# Patient Record
Sex: MALE | Race: WHITE | NOT HISPANIC OR LATINO | Employment: OTHER | URBAN - METROPOLITAN AREA
[De-identification: names, ages, dates, MRNs, and addresses within clinical notes are randomized per-mention and may not be internally consistent; named-entity substitution may affect disease eponyms.]

---

## 2017-01-12 DIAGNOSIS — I10 ESSENTIAL HYPERTENSION, BENIGN: ICD-10-CM

## 2017-01-12 DIAGNOSIS — E78.00 PURE HYPERCHOLESTEROLEMIA: Primary | ICD-10-CM

## 2017-01-12 NOTE — TELEPHONE ENCOUNTER
atorvastatin (LIPITOR) 20mg         Last Written Prescription Date: 7/13/16  Last Fill Quantity: 90, # refills: 1    Last Office Visit with SHANIA, [facility] or [facility] prescribing provider:  Kerline 4/27/16   Future Office Visit:    Next 5 appointments (look out 90 days)     Mar 10, 2017  7:45 AM   Return Visit with Zen Taylor MD   [facility] PHYSICIANS [facility] [place] ([facility])    [address] [place] [id number] [phone]                  BP Readings from Last 3 Encounters:   09/21/16 126/78   04/27/16 124/60   02/09/16 110/60     ALT       <5   9/15/2016  CHOL      154   9/15/2016  HDL       56   9/15/2016  LDL       85   9/15/2016  TRIG       63   9/15/2016  CHOLHDLRATIO      2.5   5/26/2015

## 2017-01-16 RX ORDER — ATORVASTATIN CALCIUM 20 MG/1
20 TABLET, FILM COATED ORAL DAILY
Qty: 90 TABLET | Refills: 1 | Status: SHIPPED | OUTPATIENT
Start: 2017-01-16 | End: 2017-06-13

## 2017-01-16 NOTE — TELEPHONE ENCOUNTER
Prescription approved per [facility] Refill Protocol  Danay Amador RN BS

## 2017-02-02 ENCOUNTER — ANTICOAGULATION THERAPY VISIT (OUTPATIENT)
Dept: NURSING | Facility: CLINIC | Age: 82
End: 2017-02-02
Payer: COMMERCIAL

## 2017-02-02 DIAGNOSIS — Z79.01 LONG-TERM (CURRENT) USE OF ANTICOAGULANTS: Primary | ICD-10-CM

## 2017-02-02 DIAGNOSIS — I48.91 ATRIAL FIBRILLATION, UNSPECIFIED TYPE (H): ICD-10-CM

## 2017-02-02 LAB — INR POINT OF CARE: 3 (ref 0.86–1.14)

## 2017-02-02 PROCEDURE — 85610 PROTHROMBIN TIME: CPT | Mod: QW

## 2017-02-02 PROCEDURE — 36416 COLLJ CAPILLARY BLOOD SPEC: CPT

## 2017-02-02 PROCEDURE — 99207 ZZC NO CHARGE NURSE ONLY: CPT

## 2017-02-02 NOTE — MR AVS SNAPSHOT
Nathen JANICE Gage   2/2/2017 10:00 AM   Anticoagulation Therapy Visit    Description:  81 year old male   Provider:  [facility] ANTICOAGULATION CLINIC   Department:  Cr Nurse           INR as of 2/2/2017     Selected INR 3.0 (2/2/2017)      Anticoagulation Summary as of 2/2/2017     INR goal 2.0-3.0   Selected INR 3.0 (2/2/2017)   Full instructions 3 mg on Tue, Fri; 2.5 mg all other days   Next INR check 3/16/2017    Indications   Long-term (current) use of anticoagulants [Z79.01] [Z79.01]  Atrial fibrillation (H) [I48.91]         Your next Anticoagulation Clinic appointment(s)     Mar 16, 2017 10:00 AM   Anticoagulation Visit with [facility] ANTICO[facility]ULATION CLINIC   [facility]    [address] [place] [id number]   [phone]              Contact Numbers     Clinic Number:         February 2017 Details    Sun Mon Tue Wed Thu Fri Sat        1               2      2.5 mg   See details      3      3 mg         4      2.5 mg           5      2.5 mg         6      2.5 mg         7      3 mg         8      2.5 mg         9      2.5 mg         10      3 mg         11      2.5 mg           12      2.5 mg         13      2.5 mg         14      3 mg         15      2.5 mg         16      2.5 mg         17      3 mg         18      2.5 mg           19      2.5 mg         20      2.5 mg         21      3 mg         22      2.5 mg         23      2.5 mg         24      3 mg         25      2.5 mg           26      2.5 mg         27      2.5 mg         28      3 mg              Date Details   02/02 This INR check               How to take your warfarin dose     To take:  2.5 mg Take 1 of the 2.5 mg tablets.    To take:  3 mg Take 1 of the 3 mg tablets.           March 2017 Details    Sun Mon Tue Wed Thu Fri Sat        1      2.5 mg         2      2.5 mg         3      3 mg         4      2.5 mg           5      2.5 mg         6      2.5 mg         7      3 mg         8       2.5 mg         9      2.5 mg         10      3 mg         11      2.5 mg           12      2.5 mg         13      2.5 mg         14      3 mg         15      2.5 mg         16            17               18                 19               20               21               22               23               24               25                 26               27               28               29               30               31                 Date Details   No additional details    Date of next INR:  3/16/2017         How to take your warfarin dose     To take:  2.5 mg Take 1 of the 2.5 mg tablets.    To take:  3 mg Take 1 of the 3 mg tablets.

## 2017-02-02 NOTE — PROGRESS NOTES
"  ANTICOAGULATION FOLLOW-UP CLINIC VISIT    Patient Name:  Nathen Gage  Date:  2/2/2017  Contact Type:  Face to Face    SUBJECTIVE:     Patient Findings     Positives Nose Bleeds (2 nosebleeds in the past 6 weeks, they lasted only \"minutes\" with pressure.  Pt is using moisturizing nasal spray)           OBJECTIVE    INR PROTIME   Date Value Ref Range Status   02/02/2017 3.0* 0.86 - 1.14 Final       ASSESSMENT / PLAN  INR assessment THER    Recheck INR In: 6 WEEKS    INR Location Clinic      Anticoagulation Summary as of 2/2/2017     INR goal 2.0-3.0   Selected INR 3.0 (2/2/2017)   Maintenance plan 3 mg (3 mg x 1) on Tue, Fri; 2.5 mg (2.5 mg x 1) all other days   Full instructions 3 mg on Tue, Fri; 2.5 mg all other days   Weekly total 18.5 mg   No change documented Fidelina Pizano RN   Plan last modified Fidelina Pizano RN (8/18/2016)   Next INR check 3/16/2017   Target end date Indefinite    Indications   Long-term (current) use of anticoagulants [Z79.01] [Z79.01]  Atrial fibrillation (H) [I48.91]         Anticoagulation Episode Summary     INR check location     Preferred lab     Send INR reminders to [facility] CLINIC    Comments       Anticoagulation Care Providers     Provider Role Specialty Phone number    Kushal Valentin MD [facility] [phone]            See the Encounter Report to view Anticoagulation Flowsheet and Dosing Calendar (Go to Encounters tab in chart review, and find the Anticoagulation Therapy Visit)        Fidelina Pizano RN                 "

## 2017-02-23 ENCOUNTER — PRE VISIT (OUTPATIENT)
Dept: CARDIOLOGY | Facility: CLINIC | Age: 82
End: 2017-02-23

## 2017-03-09 DIAGNOSIS — I27.20 PULMONARY HYPERTENSION (H): ICD-10-CM

## 2017-03-09 DIAGNOSIS — I49.5 SICK SINUS SYNDROME (H): ICD-10-CM

## 2017-03-09 DIAGNOSIS — R55 SYNCOPE, UNSPECIFIED SYNCOPE TYPE: ICD-10-CM

## 2017-03-09 DIAGNOSIS — I10 ESSENTIAL HYPERTENSION WITH GOAL BLOOD PRESSURE LESS THAN 140/90: ICD-10-CM

## 2017-03-09 DIAGNOSIS — I77.810 DILATED AORTIC ROOT (H): ICD-10-CM

## 2017-03-09 DIAGNOSIS — I48.91 ATRIAL FIBRILLATION, UNSPECIFIED TYPE (H): ICD-10-CM

## 2017-03-09 LAB
ALT SERPL W P-5'-P-CCNC: 20 U/L (ref 0–70)
ANION GAP SERPL CALCULATED.3IONS-SCNC: 7 MMOL/L (ref 3–14)
BUN SERPL-MCNC: 17 MG/DL (ref 7–30)
CALCIUM SERPL-MCNC: 8.9 MG/DL (ref 8.5–10.1)
CHLORIDE SERPL-SCNC: 99 MMOL/L (ref 94–109)
CHOLEST SERPL-MCNC: 165 MG/DL
CO2 SERPL-SCNC: 29 MMOL/L (ref 20–32)
CREAT SERPL-MCNC: 0.94 MG/DL (ref 0.66–1.25)
GFR SERPL CREATININE-BSD FRML MDRD: 77 ML/MIN/1.7M2
GLUCOSE SERPL-MCNC: 128 MG/DL (ref 70–99)
HDLC SERPL-MCNC: 55 MG/DL
LDLC SERPL CALC-MCNC: 85 MG/DL
NONHDLC SERPL-MCNC: 110 MG/DL
POTASSIUM SERPL-SCNC: 4.6 MMOL/L (ref 3.4–5.3)
SODIUM SERPL-SCNC: 135 MMOL/L (ref 133–144)
TRIGL SERPL-MCNC: 123 MG/DL

## 2017-03-09 PROCEDURE — 80061 LIPID PANEL: CPT | Performed by: INTERNAL MEDICINE

## 2017-03-09 PROCEDURE — 84460 ALANINE AMINO (ALT) (SGPT): CPT | Performed by: INTERNAL MEDICINE

## 2017-03-09 PROCEDURE — 80048 BASIC METABOLIC PNL TOTAL CA: CPT | Performed by: INTERNAL MEDICINE

## 2017-03-09 PROCEDURE — 36415 COLL VENOUS BLD VENIPUNCTURE: CPT | Performed by: INTERNAL MEDICINE

## 2017-03-10 ENCOUNTER — OFFICE VISIT (OUTPATIENT)
Dept: CARDIOLOGY | Facility: CLINIC | Age: 82
End: 2017-03-10
Attending: INTERNAL MEDICINE
Payer: COMMERCIAL

## 2017-03-10 VITALS
HEART RATE: 84 BPM | BODY MASS INDEX: 25.91 KG/M2 | HEIGHT: 68 IN | DIASTOLIC BLOOD PRESSURE: 74 MMHG | WEIGHT: 171 LBS | SYSTOLIC BLOOD PRESSURE: 134 MMHG

## 2017-03-10 DIAGNOSIS — I48.91 ATRIAL FIBRILLATION, UNSPECIFIED TYPE (H): ICD-10-CM

## 2017-03-10 DIAGNOSIS — I10 ESSENTIAL HYPERTENSION WITH GOAL BLOOD PRESSURE LESS THAN 140/90: ICD-10-CM

## 2017-03-10 DIAGNOSIS — I77.810 DILATED AORTIC ROOT (H): ICD-10-CM

## 2017-03-10 DIAGNOSIS — I49.5 SICK SINUS SYNDROME (H): ICD-10-CM

## 2017-03-10 DIAGNOSIS — R55 SYNCOPE, UNSPECIFIED SYNCOPE TYPE: ICD-10-CM

## 2017-03-10 DIAGNOSIS — I27.20 PULMONARY HYPERTENSION (H): ICD-10-CM

## 2017-03-10 PROCEDURE — 99214 OFFICE O/P EST MOD 30 MIN: CPT | Performed by: INTERNAL MEDICINE

## 2017-03-10 NOTE — LETTER
3/10/2017    Kushal Valentin MD  [facility]   [address]  [facility] [address]    RE: Nathen JANICE Gage       Dear Colleague,    I had the pleasure of seeing Nathen JANICE Gage in the [facility].    The patient is an 81-year-old slightly overweight white male who presents to Cardiology Clinic for history of sick sinus syndrome with evidence of bradycardia and previous gastrointestinal bleed.  He has had 2-3 second pauses in the past with heart rate in the 30s and 40s, initial potassium was 3.1, repeat potassium was 2.7.  He had a 4-second pause, a GI tube was placed after which he vomited coffee-ground material.  Upper endoscopy revealed esophagitis.  He was placed on a proton pump inhibitor.  He has a distant history of cigarette smoking, discontinued in 1985, and hypertension for the past 15-20 years.  He has no history of diabetes mellitus, family history of premature coronary artery disease or history of documented hyperlipidemia.  He has had a history of mild elevation of serum lipids recently.  Pacemaker was inserted for the significant bradycardia with significant sick sinus syndrome with sinus pauses.  Holter monitor in the past has shown heart rates varying from 60 to 110 with average rate of 70 in primarily a paced rhythm.  The patient denies symptoms of chest discomfort, shortness of breath, dizziness, palpitations, nausea, vomiting, diaphoresis or syncope.  He denies PND, orthopnea, fevers, chills or sweats.  He has some easy fatigability, general malaise and mild dyspnea on exertion as well as slight lightheadedness if he changes position too quickly.  Echocardiography showed a normal-sized left ventricle with intact systolic function with ejection fraction 60%-65%.  There was no significant regional wall motion abnormality or valvular disease, and the ascending aorta was felt to be mildly dilated with no significant change from 2015.  The patient  has complained of nosebleeds over the past several weeks, especially prevalent this winter.      MEDICATIONS:   1.  Atorvastatin 20 mg a day.   2.  Warfarin as directed.   3.  Losartan 50 mg a day.   4.  Doxazosin 4 mg a day.   5.  Protonix 40 mg a day.      LABORATORY DATA:  Demonstrates a cholesterol of 165, HDL 55, LDL 85, triglycerides 123, sodium 135, potassium 4.6, BUN 17, creatinine 0.94 and ALT is 20.        The patient participates in activities without significant restriction.  He is satisfied with his lifestyle and his diet.      PHYSICAL EXAMINATION:   VITAL SIGNS:  Blood pressure 134/74 with a heart rate of 84 and regular, weight was 171 pounds, which is stable.   NECK:  Without jugular venous distention, carotid bruit or palpable thyroid.   CHEST:  Essentially clear to percussion and auscultation.   CARDIAC:  Regular rhythm with occasional premature beat.  There was a soft S4 gallop.  There was a 1 to 2/6 systolic murmur at the left sternal border with minimal radiation.  No diastolic murmur, rub or S3.   EXTREMITIES:  Without cyanosis or edema.      CLINICAL IMPRESSION:   1.  Stable cardiac condition.   2.  History of paroxysmal atrial fibrillation.   3.  History of dilated aortic root.   4.  History of pulmonary hypertension, mild.   5.  History of hyperlipidemia, at goal.   6.  History of sick sinus syndrome, status post pacemaker insertion for bradydysrhythmia.   7.  Distant history of cigarette smoking.   8.  History of systolic hypertension with slight increase in systolic blood pressure.   9.  History of gastrointestinal bleeding.   10.  History of nosebleeds.      DISCUSSION:  The patient has done well since his last clinic visit.  He has had no significant symptoms of chest discomfort, shortness of breath, dizziness or palpitations.  He has not been documented to have ischemic heart disease in the past.  He will need continued close followup of his device, serum lipids, basic metabolic panel  and blood pressure, as well as cautious anticoagulation.  He has had some problems with nosebleeds, and his INR has trended towards the high 2s or even 3.  It would be hopeful that one could keep his INR in the 2-2.5 range, which would probably improve the frequency of his bleeding.  He does not appear restricted in activity and appears stable.  Overall he is doing well.      RECOMMENDATIONS:   1.  Continue present medication.   2.  Device followup.   3.  Cautious anticoagulation with an INR goal of 2-2.5.   4.  Close followup of serum lipids, basic metabolic panel and blood pressure.   5.  Echocardiogram in 6 months to follow left ventricular function and aortic root dimension.   6.  Diet and exercise as tolerated.   7.  Routine medical followup.   8.  Cardiology followup in 6 months.     Again, thank you for allowing me to participate in the care of your patient.      Sincerely,    Zen Taylor MD

## 2017-03-10 NOTE — PROGRESS NOTES
HISTORY OF PRESENT ILLNESS:  The patient is an 81-year-old slightly overweight white male who presents to Cardiology Clinic for history of sick sinus syndrome with evidence of bradycardia and previous gastrointestinal bleed.  He has had 2-3 second pauses in the past with heart rate in the 30s and 40s, initial potassium was 3.1, repeat potassium was 2.7.  He had a 4-second pause, a GI tube was placed after which he vomited coffee-ground material.  Upper endoscopy revealed esophagitis.  He was placed on a proton pump inhibitor.  He has a distant history of cigarette smoking, discontinued in 1985, and hypertension for the past 15-20 years.  He has no history of diabetes mellitus, family history of premature coronary artery disease or history of documented hyperlipidemia.  He has had a history of mild elevation of serum lipids recently.  Pacemaker was inserted for the significant bradycardia with significant sick sinus syndrome with sinus pauses.  Holter monitor in the past has shown heart rates varying from 60 to 110 with average rate of 70 in primarily a paced rhythm.  The patient denies symptoms of chest discomfort, shortness of breath, dizziness, palpitations, nausea, vomiting, diaphoresis or syncope.  He denies PND, orthopnea, fevers, chills or sweats.  He has some easy fatigability, general malaise and mild dyspnea on exertion as well as slight lightheadedness if he changes position too quickly.  Echocardiography showed a normal-sized left ventricle with intact systolic function with ejection fraction 60%-65%.  There was no significant regional wall motion abnormality or valvular disease, and the ascending aorta was felt to be mildly dilated with no significant change from 2015.  The patient has complained of nosebleeds over the past several weeks, especially prevalent this winter.      MEDICATIONS:   1.  Atorvastatin 20 mg a day.   2.  Warfarin as directed.   3.  Losartan 50 mg a day.   4.  Doxazosin 4 mg a day.    5.  Protonix 40 mg a day.      LABORATORY DATA:  Demonstrates a cholesterol of 165, HDL 55, LDL 85, triglycerides 123, sodium 135, potassium 4.6, BUN 17, creatinine 0.94 and ALT is 20.        The patient participates in activities without significant restriction.  He is satisfied with his lifestyle and his diet.      PHYSICAL EXAMINATION:   VITAL SIGNS:  Blood pressure 134/74 with a heart rate of 84 and regular, weight was 171 pounds, which is stable.   NECK:  Without jugular venous distention, carotid bruit or palpable thyroid.   CHEST:  Essentially clear to percussion and auscultation.   CARDIAC:  Regular rhythm with occasional premature beat.  There was a soft S4 gallop.  There was a 1 to 2/6 systolic murmur at the left sternal border with minimal radiation.  No diastolic murmur, rub or S3.   EXTREMITIES:  Without cyanosis or edema.      CLINICAL IMPRESSION:   1.  Stable cardiac condition.   2.  History of paroxysmal atrial fibrillation.   3.  History of dilated aortic root.   4.  History of pulmonary hypertension, mild.   5.  History of hyperlipidemia, at goal.   6.  History of sick sinus syndrome, status post pacemaker insertion for bradydysrhythmia.   7.  Distant history of cigarette smoking.   8.  History of systolic hypertension with slight increase in systolic blood pressure.   9.  History of gastrointestinal bleeding.   10.  History of nosebleeds.      DISCUSSION:  The patient has done well since his last clinic visit.  He has had no significant symptoms of chest discomfort, shortness of breath, dizziness or palpitations.  He has not been documented to have ischemic heart disease in the past.  He will need continued close followup of his device, serum lipids, basic metabolic panel and blood pressure, as well as cautious anticoagulation.  He has had some problems with nosebleeds, and his INR has trended towards the high 2s or even 3.  It would be hopeful that one could keep his INR in the 2-2.5 range,  which would probably improve the frequency of his bleeding.  He does not appear restricted in activity and appears stable.  Overall he is doing well.      RECOMMENDATIONS:   1.  Continue present medication.   2.  Device followup.   3.  Cautious anticoagulation with an INR goal of 2-2.5.   4.  Close followup of serum lipids, basic metabolic panel and blood pressure.   5.  Echocardiogram in 6 months to follow left ventricular function and aortic root dimension.   6.  Diet and exercise as tolerated.   7.  Routine medical followup.   8.  Cardiology followup in 6 months.      cc:   [place] [address]         SCARLETT YING MD, [facility]             D: 03/10/2017 08:41   T: 03/10/2017 16:49   MT: GENI      Name:     MANUEL DEJESUS   MRN:      [id number]        Account:      [id number]   :      1935           Service Date: 03/10/2017      Document: [id number]

## 2017-03-10 NOTE — MR AVS SNAPSHOT
After Visit Summary   3/10/2017    Nathen Gage    MRN: [id number]           Patient Information     Date Of Birth          1935        Visit Information        Provider Department      3/10/2017 7:45 AM Zen Taylor MD [facility] [place]        Today's Diagnoses     Atrial fibrillation, unspecified type (H)        Essential hypertension with goal blood pressure less than 140/90        Syncope, unspecified syncope type        Dilated aortic root (H)        Pulmonary hypertension        Sick sinus syndrome           Follow-ups after your visit        Additional Services     Follow-Up with Cardiologist                 Your next 10 appointments already scheduled     Mar 16, 2017 10:00 AM CDT   Anticoagulation Visit with [facility] ANTICOAGULATION CLINIC   [facility] ([facility]    [address] [facility] [address] [phone]            Mar 22, 2017  9:00 AM CDT   Phone Device Check with PATE TECH1   [facility] [place] ([facility])    [address]  [facility] [id number] [phone]              Future tests that were ordered for you today     Open Future Orders        Priority Expected Expires Ordered    Basic metabolic panel Routine 9/6/2017 3/10/2018 3/10/2017    Lipid Profile Routine 9/6/2017 3/10/2018 3/10/2017    ALT Routine 9/6/2017 3/10/2018 3/10/2017    Echocardiogram Routine 9/6/2017 3/10/2018 3/10/2017    Follow-Up with Cardiologist Routine 9/6/2017 3/10/2018 3/10/2017            Who to contact     If you have questions or need follow up information about today's clinic visit or your schedule please contact [facility] [place] directly at [phone].  Normal or non-critical lab and imaging results will be communicated to you by MyChart, letter or phone within 4 business days after the clinic has  "received the results. If you do not hear from us within 7 days, please contact the clinic through [employer] or phone. If you have a critical or abnormal lab result, we will notify you by phone as soon as possible.  Submit refill requests through [employer] or call your pharmacy and they will forward the refill request to us. Please allow 3 business days for your refill to be completed.          Additional Information About Your Visit        [employer] Information     [employer] gives you secure access to your electronic health record. If you see a primary care provider, you can also send messages to your care team and make appointments. If you have questions, please call your primary care clinic.  If you do not have a primary care provider, please call [phone] and they will assist you.        Care EveryWhere ID     This is your Care EveryWhere ID. This could be used by other organizations to access your [place] medical records  [id number]        Your Vitals Were     Pulse Height BMI (Body Mass Index)             84 1.715 m (5' 7.5\") 26.39 kg/m2          Blood Pressure from Last 3 Encounters:   03/10/17 134/74   09/21/16 126/78   04/27/16 124/60    Weight from Last 3 Encounters:   03/10/17 77.6 kg (171 lb)   09/21/16 78.5 kg (173 lb)   04/27/16 79.3 kg (174 lb 14.4 oz)              We Performed the Following     Follow-Up with Cardiologist        Primary Care Provider Office Phone # Fax #    Kushal Johann Valentin [phone]       [address] [address]        Thank you!     Thank you for choosing [facility] [place]  for your care. Our goal is always to provide you with excellent care. Hearing back from our patients is one way we can continue to improve our services. Please take a few minutes to complete the written survey that you may receive in the mail after your visit with us. Thank you!             Your Updated " Medication List - Protect others around you: Learn how to safely use, store and throw away your medicines at www.disposemymeds.org.          This list is accurate as of: 3/10/17  8:33 AM.  Always use your most recent med list.                   Brand Name Dispense Instructions for use    atorvastatin 20 MG tablet    LIPITOR    90 tablet    Take 1 tablet (20 mg) by mouth daily       diclofenac 1 % Gel topical gel    VOLTAREN    100 g    Apply 4 grams to knees or 2 grams to hands four times daily using enclosed dosing card.       doxazosin 4 MG tablet    CARDURA    90 tablet    Take 1 tablet (4 mg) by mouth daily       losartan 50 MG tablet    COZAAR    90 tablet    Take 1 tablet (50 mg) by mouth daily       pantoprazole 40 MG EC tablet    PROTONIX    90 tablet    Take 1 tablet (40 mg) by mouth daily       * warfarin 3 MG tablet    COUMADIN    24 tablet    Take 3mg every Tu and Friday (pt uses 2.5mg tabs on the other days) OR as instructed by INR clinic       * warfarin 2.5 MG tablet    COUMADIN    30 tablet    Patient takes 2.5mg every Mon,Wed,Th,Sat,Sun (he takes 3mg every Tu,Fri) OR As directed by INR Clinic       * Notice:  This list has 2 medication(s) that are the same as other medications prescribed for you. Read the directions carefully, and ask your doctor or other care provider to review them with you.

## 2017-03-16 ENCOUNTER — ANTICOAGULATION THERAPY VISIT (OUTPATIENT)
Dept: NURSING | Facility: CLINIC | Age: 82
End: 2017-03-16
Payer: COMMERCIAL

## 2017-03-16 ENCOUNTER — TELEPHONE (OUTPATIENT)
Dept: NURSING | Facility: CLINIC | Age: 82
End: 2017-03-16

## 2017-03-16 DIAGNOSIS — I48.91 ATRIAL FIBRILLATION, UNSPECIFIED TYPE (H): ICD-10-CM

## 2017-03-16 DIAGNOSIS — I77.810 DILATED AORTIC ROOT (H): Primary | ICD-10-CM

## 2017-03-16 DIAGNOSIS — Z79.01 LONG-TERM (CURRENT) USE OF ANTICOAGULANTS: ICD-10-CM

## 2017-03-16 LAB — INR POINT OF CARE: 2.7 (ref 0.86–1.14)

## 2017-03-16 PROCEDURE — 36416 COLLJ CAPILLARY BLOOD SPEC: CPT

## 2017-03-16 PROCEDURE — 99207 ZZC NO CHARGE NURSE ONLY: CPT

## 2017-03-16 PROCEDURE — 85610 PROTHROMBIN TIME: CPT | Mod: QW

## 2017-03-16 NOTE — MR AVS SNAPSHOT
Nathen JANICE Gage   3/16/2017 10:00 AM   Anticoagulation Therapy Visit    Description:  81 year old male   Provider:  CR ANTICOAGULATION CLINIC   Department:  Cr Nurse           INR as of 3/16/2017     Today's INR 2.7      Anticoagulation Summary as of 3/16/2017     INR goal 2.0-2.5   Prior goal 2.0-3.0   Today's INR 2.7   Full instructions 3 mg on Tue; 2.5 mg all other days   Next INR check 3/28/2017    Indications   Long-term (current) use of anticoagulants [Z79.01] [Z79.01]  Atrial fibrillation (H) [I48.91]         Your next Anticoagulation Clinic appointment(s)     Mar 28, 2017  2:15 PM CDT   Anticoagulation Visit with CR ANTICOAGULATION CLINIC   [facility] ([facility])    [address] [address]   [phone]              Contact Numbers     Clinic Number:         March 2017 Details    Sun Mon Tue Wed Thu Fri Sat        1               2               3               4                 5               6               7               8               9               10               11                 12               13               14               15               16      2.5 mg   See details      17      2.5 mg         18      2.5 mg           19      2.5 mg         20      2.5 mg         21      3 mg         22      2.5 mg         23      2.5 mg         24      2.5 mg         25      2.5 mg           26      2.5 mg         27      2.5 mg         28            29               30               31                 Date Details   03/16 This INR check       Date of next INR:  3/28/2017         How to take your warfarin dose     To take:  2.5 mg Take 1 of the 2.5 mg tablets.    To take:  3 mg Take 1 of the 3 mg tablets.

## 2017-03-16 NOTE — PROGRESS NOTES
ANTICOAGULATION FOLLOW-UP CLINIC VISIT    Patient Name:  Nathen Gage  Date:  3/16/2017  Contact Type:  Face to Face    SUBJECTIVE:     Patient Findings     Positives Nose bleeds (nosebleed on 03/10/17, cardiologist dictation states to reduce INR goal to 2.0-2.5)           OBJECTIVE    INR Protime   Date Value Ref Range Status   03/16/2017 2.7 (A) 0.86 - 1.14 Final       ASSESSMENT / PLAN  INR assessment THER    Recheck INR In: 10 DAYS    INR Location Clinic      Anticoagulation Summary as of 3/16/2017     INR goal 2.0-2.5   Prior goal 2.0-3.0   Today's INR 2.7   Maintenance plan 3 mg (3 mg x 1) on Tue; 2.5 mg (2.5 mg x 1) all other days   Full instructions 3 mg on Tue; 2.5 mg all other days   Weekly total 18 mg   Plan last modified Fidelina Pizano RN (3/16/2017)   Next INR check 3/28/2017   Target end date Indefinite    Indications   Long-term (current) use of anticoagulants [Z79.01] [Z79.01]  Atrial fibrillation (H) [I48.91]         Anticoagulation Episode Summary     INR check location     Preferred lab     Send INR reminders to [facility] CLINIC    Comments       Anticoagulation Care Providers     Provider Role Specialty Phone number    Kushal Valentin MD [facility] [phone]            See the Encounter Report to view Anticoagulation Flowsheet and Dosing Calendar (Go to Encounters tab in chart review, and find the Anticoagulation Therapy Visit)        Fidelina Pizano RN

## 2017-03-16 NOTE — TELEPHONE ENCOUNTER
Effective 03//10/17, patient's cardiologist changed patient's INR goal range to: 2.0-2.5 due to intermittent nosebleeds.    New insurance and medicare reimbursement rules require that all of our INR patients have an INR Clinic referral order in Epic, and that it be renewed annually.    We have entered this initial order for you and your signature is required once you review it.      You may close this encounter once signed.    Thank you,    Fidelina Pizano RN

## 2017-03-22 ENCOUNTER — ALLIED HEALTH/NURSE VISIT (OUTPATIENT)
Dept: CARDIOLOGY | Facility: CLINIC | Age: 82
End: 2017-03-22
Payer: COMMERCIAL

## 2017-03-22 DIAGNOSIS — Z95.0 CARDIAC PACEMAKER IN SITU: Primary | ICD-10-CM

## 2017-03-22 PROCEDURE — 93293 PM PHONE R-STRIP DEVICE EVAL: CPT | Performed by: INTERNAL MEDICINE

## 2017-03-22 NOTE — MR AVS SNAPSHOT
After Visit Summary   3/22/2017    Nathen Gage    MRN: [id number]           Patient Information     Date Of Birth          1935        Visit Information        Provider Department      3/22/2017 9:00 AM PATE TECH1 [facility] [place]        Today's Diagnoses     Cardiac pacemaker in situ    -  1       Follow-ups after your visit        Your next 10 appointments already scheduled     Mar 28, 2017  2:15 PM CDT   Anticoagulation Visit with [facility] CLINIC   [facility] ([facility])    [address] [address] [phone]            Jun 27, 2017  9:00 AM CDT   Phone Device Check with PATE TECH1   [facility] [place] ([facility])    [address]  [facility] [address] [phone]              Who to contact     If you have questions or need follow up information about today's clinic visit or your schedule please contact [facility] directly at [phone].  Normal or non-critical lab and imaging results will be communicated to you by [employer], letter or phone within 4 business days after the clinic has received the results. If you do not hear from us within 7 days, please contact the clinic through [employer] or phone. If you have a critical or abnormal lab result, we will notify you by phone as soon as possible.  Submit refill requests through [employer] or call your pharmacy and they will forward the refill request to us. Please allow 3 business days for your refill to be completed.          Additional Information About Your Visit        [employer] Information     [employer] gives you secure access to your electronic health record. If you see a primary care provider, you can also send messages to your care team and make appointments. If you have questions, please call your primary care clinic.  If you  do not have a primary care provider, please call [phone] and they will assist you.        Care EveryWhere ID     This is your Care EveryWhere ID. This could be used by other organizations to access your [facility] medical records  [id number]         Blood Pressure from Last 3 Encounters:   03/10/17 134/74   09/21/16 126/78   04/27/16 124/60    Weight from Last 3 Encounters:   03/10/17 77.6 kg (171 lb)   09/21/16 78.5 kg (173 lb)   04/27/16 79.3 kg (174 lb 14.4 oz)              We Performed the Following     PM PHONE R STRIP EVAL UP TO 90 DAYS ([id number])        Primary Care Provider Office Phone # Fax #    Kushal Valentin [phone]       [address] [address]        Thank you!     Thank you for choosing [facility] AT [place]  for your care. Our goal is always to provide you with excellent care. Hearing back from our patients is one way we can continue to improve our services. Please take a few minutes to complete the written survey that you may receive in the mail after your visit with us. Thank you!             Your Updated Medication List - Protect others around you: Learn how to safely use, store and throw away your medicines at www.disposemymeds.org.          This list is accurate as of: 3/22/17  9:11 AM.  Always use your most recent med list.                   Brand Name Dispense Instructions for use    atorvastatin 20 MG tablet    LIPITOR    90 tablet    Take 1 tablet (20 mg) by mouth daily       diclofenac 1 % Gel topical gel    VOLTAREN    100 g    Apply 4 grams to knees or 2 grams to hands four times daily using enclosed dosing card.       doxazosin 4 MG tablet    CARDURA    90 tablet    Take 1 tablet (4 mg) by mouth daily       losartan 50 MG tablet    COZAAR    90 tablet    Take 1 tablet (50 mg) by mouth daily       pantoprazole 40 MG EC tablet    PROTONIX    90 tablet    Take 1 tablet (40 mg) by mouth  daily       * warfarin 3 MG tablet    COUMADIN    24 tablet    Take 3mg every Tu and Friday (pt uses 2.5mg tabs on the other days) OR as instructed by INR clinic       * warfarin 2.5 MG tablet    COUMADIN    30 tablet    Patient takes 2.5mg every Mon,Wed,Th,Sat,Sun (he takes 3mg every Tu,Fri) OR As directed by INR Clinic       * Notice:  This list has 2 medication(s) that are the same as other medications prescribed for you. Read the directions carefully, and ask your doctor or other care provider to review them with you.

## 2017-03-22 NOTE — PROGRESS NOTES
~90 day phone teletrace  Intrinsic Rhythm at time of check. Magnet response WNL.  F/U scheduled q 3 months. LAISHA BarronT

## 2017-03-28 ENCOUNTER — ANTICOAGULATION THERAPY VISIT (OUTPATIENT)
Dept: NURSING | Facility: CLINIC | Age: 82
End: 2017-03-28
Payer: COMMERCIAL

## 2017-03-28 DIAGNOSIS — Z79.01 LONG-TERM (CURRENT) USE OF ANTICOAGULANTS: ICD-10-CM

## 2017-03-28 DIAGNOSIS — I48.91 ATRIAL FIBRILLATION, UNSPECIFIED TYPE (H): ICD-10-CM

## 2017-03-28 LAB — INR POINT OF CARE: 3.4 (ref 0.86–1.14)

## 2017-03-28 PROCEDURE — 85610 PROTHROMBIN TIME: CPT | Mod: QW

## 2017-03-28 PROCEDURE — 99207 ZZC NO CHARGE NURSE ONLY: CPT

## 2017-03-28 PROCEDURE — 36416 COLLJ CAPILLARY BLOOD SPEC: CPT

## 2017-03-28 NOTE — MR AVS SNAPSHOT
Nathen JANICE Gage   3/28/2017 2:15 PM   Anticoagulation Therapy Visit    Description:  81 year old male   Provider:  [facility]   Department:  Cr Nurse           INR as of 3/28/2017     Today's INR 3.4!      Anticoagulation Summary as of 3/28/2017     INR goal 2.0-2.5   Today's INR 3.4!   Full instructions 3/28: Hold; Otherwise 2.5 mg every day   Next INR check 4/7/2017    Indications   Long-term (current) use of anticoagulants [Z79.01] [Z79.01]  Atrial fibrillation (H) [I48.91]         Your next Anticoagulation Clinic appointment(s)     Apr 07, 2017 12:45 PM CDT   Anticoagulation Visit with [facility] ANTICOAGULATION CLINIC   [facility] ([facility]    [address] [place] [address] [phone]              Contact Numbers     Clinic Number:         March 2017 Details    Sun Mon Tue Wed Thu Fri Sat        1               2               3               4                 5               6               7               8               9               10               11                 12               13               14               15               16               17               18                 19               20               21               22               23               24               25                 26               27               28      Hold   See details      29      2.5 mg         30      2.5 mg         31      2.5 mg           Date Details   03/28 This INR check               How to take your warfarin dose     To take:  2.5 mg Take 1 of the 2.5 mg tablets.    Hold Do not take your warfarin dose. See the Details table to the right for additional instructions.                April 2017 Details    Sun Mon Tue Wed Thu Fri Sat           1      2.5 mg           2      2.5 mg         3      2.5 mg         4      2.5 mg         5      2.5 mg         6      2.5 mg         7            8                 9               10                11               12               13               14               15                 16               17               18               19               20               21               22                 23               24               25               26               27               28               29                 30                      Date Details   No additional details    Date of next INR:  4/7/2017         How to take your warfarin dose     To take:  2.5 mg Take 1 of the 2.5 mg tablets.

## 2017-03-28 NOTE — PROGRESS NOTES
ANTICOAGULATION FOLLOW-UP CLINIC VISIT    Patient Name:  Nathen Gage  Date:  3/28/2017  Contact Type:  Face to Face    SUBJECTIVE:     Patient Findings     Positives No Problem Findings, Unexplained INR or factor level change    Comments Warfarin maintenance dose was decreased; however, INR increased so I reduced his weekly dose again.           OBJECTIVE    INR Protime   Date Value Ref Range Status   03/28/2017 3.4 (A) 0.86 - 1.14 Final       ASSESSMENT / PLAN  INR assessment SUPRA    Recheck INR In: 10 DAYS    INR Location Clinic      Anticoagulation Summary as of 3/28/2017     INR goal 2.0-2.5   Today's INR 3.4!   Maintenance plan 2.5 mg (2.5 mg x 1) every day   Full instructions 3/28: Hold; Otherwise 2.5 mg every day   Weekly total 17.5 mg   Plan last modified Fidelina Pizano RN (3/28/2017)   Next INR check 4/7/2017   Target end date Indefinite    Indications   Long-term (current) use of anticoagulants [Z79.01] [Z79.01]  Atrial fibrillation (H) [I48.91]         Anticoagulation Episode Summary     INR check location     Preferred lab     Send INR reminders to [facility]    Comments       Anticoagulation Care Providers     Provider Role Specialty Phone number    Kushal Valentin MD [facility] [phone]            See the Encounter Report to view Anticoagulation Flowsheet and Dosing Calendar (Go to Encounters tab in chart review, and find the Anticoagulation Therapy Visit)        Fidelina Pizano RN

## 2017-04-07 ENCOUNTER — ANTICOAGULATION THERAPY VISIT (OUTPATIENT)
Dept: NURSING | Facility: CLINIC | Age: 82
End: 2017-04-07
Payer: COMMERCIAL

## 2017-04-07 DIAGNOSIS — I48.91 ATRIAL FIBRILLATION, UNSPECIFIED TYPE (H): ICD-10-CM

## 2017-04-07 DIAGNOSIS — Z79.01 LONG-TERM (CURRENT) USE OF ANTICOAGULANTS: ICD-10-CM

## 2017-04-07 LAB — INR POINT OF CARE: 2.5 (ref 0.86–1.14)

## 2017-04-07 PROCEDURE — 99207 ZZC NO CHARGE NURSE ONLY: CPT

## 2017-04-07 PROCEDURE — 85610 PROTHROMBIN TIME: CPT | Mod: QW

## 2017-04-07 PROCEDURE — 36416 COLLJ CAPILLARY BLOOD SPEC: CPT

## 2017-04-07 NOTE — PROGRESS NOTES
ANTICOAGULATION FOLLOW-UP CLINIC VISIT    Patient Name:  Nathen Gage  Date:  4/7/2017  Contact Type:  Face to Face    SUBJECTIVE:     Patient Findings     Positives No Problem Findings           OBJECTIVE    INR Protime   Date Value Ref Range Status   04/07/2017 2.5 (A) 0.86 - 1.14 Final       ASSESSMENT / PLAN  INR assessment THER    Recheck INR In: 2 WEEKS    INR Location Clinic      Anticoagulation Summary as of 4/7/2017     INR goal 2.0-2.5   Today's INR 2.5   Maintenance plan 2.5 mg (2.5 mg x 1) every day   Full instructions 2.5 mg every day   Weekly total 17.5 mg   No change documented Fidelina Pizano, RN   Plan last modified Fidelina Pizano RN (3/28/2017)   Next INR check 4/18/2017   Target end date Indefinite    Indications   Long-term (current) use of anticoagulants [Z79.01] [Z79.01]  Atrial fibrillation (H) [I48.91]         Anticoagulation Episode Summary     INR check location     Preferred lab     Send INR reminders to [facility] CL[facility]IC    Comments       Anticoagulation Care Providers     Provider Role Specialty Phone number    Kushal Valentin MD [facility] [phone]            See the Encounter Report to view Anticoagulation Flowsheet and Dosing Calendar (Go to Encounters tab in chart review, and find the Anticoagulation Therapy Visit)        Fidelina Pizano, RN

## 2017-04-07 NOTE — MR AVS SNAPSHOT
Nathen Gage   4/7/2017 1:15 PM   Anticoagulation Therapy Visit    Description:  81 year old male   Provider[facility]  [facility]   Department:  Cr Nurse           INR as of 4/7/2017     Today's INR 2.5      Anticoagulation Summary as of 4/7/2017     INR goal 2.0-2.5   Today's INR 2.5   Full instructions 2.5 mg every day   Next INR check 4/18/2017    Indications   Long-term (current) use of anticoagulants [Z79.01] [Z79.01]  Atrial fibrillation (H) [I48.91]         Your next Anticoagulation Clinic appointment(s)     Apr 18, 2017  9:00 AM CDT   Anticoagulation Visit with [facility] ANTICOAGULATION CLINIC   [facility] ([facility])    [address] [place] [address] [phone]              Contact Numbers     Clinic Number:         April 2017 Details    Sun Mon Tue Wed Thu Fri Sat           1                 2               3               4               5               6               7      2.5 mg   See details      8      2.5 mg           9      2.5 mg         10      2.5 mg         11      2.5 mg         12      2.5 mg         13      2.5 mg         14      2.5 mg         15      2.5 mg           16      2.5 mg         17      2.5 mg         18            19               20               21               22                 23               24               25               26               27               28               29                 30                      Date Details   04/07 This INR check       Date of next INR:  4/18/2017         How to take your warfarin dose     To take:  2.5 mg Take 1 of the 2.5 mg tablets.

## 2017-04-18 ENCOUNTER — ANTICOAGULATION THERAPY VISIT (OUTPATIENT)
Dept: NURSING | Facility: CLINIC | Age: 82
End: 2017-04-18
Payer: COMMERCIAL

## 2017-04-18 DIAGNOSIS — Z79.01 LONG-TERM (CURRENT) USE OF ANTICOAGULANTS: ICD-10-CM

## 2017-04-18 DIAGNOSIS — I48.91 ATRIAL FIBRILLATION, UNSPECIFIED TYPE (H): ICD-10-CM

## 2017-04-18 LAB — INR POINT OF CARE: 2.1 (ref 0.86–1.14)

## 2017-04-18 PROCEDURE — 85610 PROTHROMBIN TIME: CPT | Mod: QW

## 2017-04-18 PROCEDURE — 36416 COLLJ CAPILLARY BLOOD SPEC: CPT

## 2017-04-18 PROCEDURE — 99207 ZZC NO CHARGE NURSE ONLY: CPT

## 2017-04-18 NOTE — PROGRESS NOTES
ANTICOAGULATION FOLLOW-UP CLINIC VISIT    Patient Name:  Nathen Gage  Date:  4/18/2017  Contact Type:  Face to Face    SUBJECTIVE:     Patient Findings     Positives Nose bleeds (1 nosebleed on 04/10/17, did not last long)           OBJECTIVE    INR Protime   Date Value Ref Range Status   04/18/2017 2.1 (A) 0.86 - 1.14 Final       ASSESSMENT / PLAN  INR assessment THER    Recheck INR In: 4 WEEKS    INR Location Clinic      Anticoagulation Summary as of 4/18/2017     INR goal 2.0-2.5   Today's INR 2.1   Maintenance plan 2.5 mg (2.5 mg x 1) every day   Full instructions 2.5 mg every day   Weekly total 17.5 mg   No change documented Fidelina Pizano, ARTIS   Plan last modified Fidelina Pizano, RN (3/28/2017)   Next INR check 5/16/2017   Target end date Indefinite    Indications   Long-term (current) use of anticoagulants [Z79.01] [Z79.01]  Atrial fibrillation (H) [I48.91]         Anticoagulation Episode Summary     INR check location     Preferred lab     Send INR reminders to [facility] CL[facility]    Comments       Anticoagulation Care Providers     Provider Role Specialty Phone number    Kushal Valentin MD [facility] [phone]            See the Encounter Report to view Anticoagulation Flowsheet and Dosing Calendar (Go to Encounters tab in chart review, and find the Anticoagulation Therapy Visit)        Fidelina Pizano, RN

## 2017-04-18 NOTE — MR AVS SNAPSHOT
Nathen Gage   4/18/2017 9:00 AM   Anticoagulation Therapy Visit    Description:  81 year old male   Provider[facility]  [facility]   Department:  Cr Nurse           INR as of 4/18/2017     Today's INR 2.1      Anticoagulation Summary as of 4/18/2017     INR goal 2.0-2.5   Today's INR 2.1   Full instructions 2.5 mg every day   Next INR check 5/16/2017    Indications   Long-term (current) use of anticoagulants [Z79.01] [Z79.01]  Atrial fibrillation (H) [I48.91]         Your next Anticoagulation Clinic appointment(s)     May 16, 2017  9:00 AM CDT   Anticoagulation Visit with [facility] CLINIC   [facility] ([facility]    [address] [place] [address] [phone]              Contact Numbers     Clinic Number:         April 2017 Details    Sun Mon Tue Wed Thu Fri Sat           1                 2               3               4               5               6               7               8                 9               10               11               12               13               14               15                 16               17               18      2.5 mg   See details      19      2.5 mg         20      2.5 mg         21      2.5 mg         22      2.5 mg           23      2.5 mg         24      2.5 mg         25      2.5 mg         26      2.5 mg         27      2.5 mg         28      2.5 mg         29      2.5 mg           30      2.5 mg                Date Details   04/18 This INR check               How to take your warfarin dose     To take:  2.5 mg Take 1 of the 2.5 mg tablets.           May 2017 Details    Sun Mon Tue Wed Thu Fri Sat      1      2.5 mg         2      2.5 mg         3      2.5 mg         4      2.5 mg         5      2.5 mg         6      2.5 mg           7      2.5 mg         8      2.5 mg         9      2.5 mg         10      2.5 mg         11      2.5 mg         12      2.5 mg         13       2.5 mg           14      2.5 mg         15      2.5 mg         16            17               18               19               20                 21               22               23               24               25               26               27                 28               29               30               31                   Date Details   No additional details    Date of next INR:  5/16/2017         How to take your warfarin dose     To take:  2.5 mg Take 1 of the 2.5 mg tablets.

## 2017-04-29 ENCOUNTER — TELEPHONE (OUTPATIENT)
Dept: FAMILY MEDICINE | Facility: CLINIC | Age: 82
End: 2017-04-29

## 2017-04-29 DIAGNOSIS — I15.9 SECONDARY HYPERTENSION: ICD-10-CM

## 2017-04-29 RX ORDER — LOSARTAN POTASSIUM 50 MG/1
50 TABLET ORAL DAILY
Qty: 90 TABLET | Refills: 0 | Status: SHIPPED | OUTPATIENT
Start: 2017-04-29 | End: 2017-06-13

## 2017-05-10 DIAGNOSIS — K21.9 ESOPHAGEAL REFLUX: ICD-10-CM

## 2017-05-10 NOTE — LETTER
[address] [id number] [phone]          May 12, 2017    Nathen Gage                                                                                                                     [facility] [place] [address]            Dear Nathen,    We recently received a call from your pharmacy requesting a refill of your medication (pantoprazole).    A review of your chart indicates that an appointment is required.  Please contact our office at [phone] to schedule your  Annual doctor's appointment.    We have authorized one refill (last refill) of your medication to allow time for you to schedule your appointment.    Taking care of your health is important to us and ongoing visits with your provider are vital to your care.  We look forward to seeing you in the near future.    Sincerely,    Kushal Valentin MD/Nataly HAN

## 2017-05-10 NOTE — TELEPHONE ENCOUNTER
Pantoprazole 40 mg tab       Last Written Prescription Date: 05/06/16  Last Fill Quantity: 90,  # refills: 3   Last Office Visit with G, P or [facility] prescribing provider: 05/12/15 Dr. Valentin

## 2017-05-12 RX ORDER — PANTOPRAZOLE SODIUM 40 MG/1
40 TABLET, DELAYED RELEASE ORAL DAILY
Qty: 90 TABLET | Refills: 0 | Status: SHIPPED | OUTPATIENT
Start: 2017-05-12 | End: 2017-06-13

## 2017-05-12 NOTE — TELEPHONE ENCOUNTER
Appointment letter sent  Prescription approved per [facility] Protocol.  Nataly Becerra RN, BSN

## 2017-05-16 ENCOUNTER — ANTICOAGULATION THERAPY VISIT (OUTPATIENT)
Dept: NURSING | Facility: CLINIC | Age: 82
End: 2017-05-16
Payer: COMMERCIAL

## 2017-05-16 DIAGNOSIS — Z79.01 LONG-TERM (CURRENT) USE OF ANTICOAGULANTS: ICD-10-CM

## 2017-05-16 DIAGNOSIS — I48.91 ATRIAL FIBRILLATION, UNSPECIFIED TYPE (H): ICD-10-CM

## 2017-05-16 LAB — INR POINT OF CARE: 2.6 (ref 0.86–1.14)

## 2017-05-16 PROCEDURE — 99207 ZZC NO CHARGE NURSE ONLY: CPT

## 2017-05-16 PROCEDURE — 85610 PROTHROMBIN TIME: CPT | Mod: QW

## 2017-05-16 PROCEDURE — 36416 COLLJ CAPILLARY BLOOD SPEC: CPT

## 2017-05-16 NOTE — MR AVS SNAPSHOT
Nathen Gage   5/16/2017 9:00 AM   Anticoagulation Therapy Visit    Description:  81 year old male   Provider:  [facility]   Department:  Cr Nurse           INR as of 5/16/2017     Today's INR 2.6!      Anticoagulation Summary as of 5/16/2017     INR goal 2.0-2.5   Today's INR 2.6!   Full instructions 2.5 mg every day   Next INR check 6/13/2017    Indications   Long-term (current) use of anticoagulants [Z79.01] [Z79.01]  Atrial fibrillation (H) [I48.91]         Your next Anticoagulation Clinic appointment(s)     May 16, 2017  9:00 AM CDT   Anticoagulation Visit with [facility]   [facility] ([facility]    [address] [address]   [phone]            Jun 13, 2017  9:00 AM CDT   Anticoagulation Visit with [facility] CL[facility]   [facility] ([facility])    [address] [address]   [phone]              Contact Numbers     Clinic Number:         May 2017 Details    Sun Mon Tue Wed Thu Fri Sat      1               2               3               4               5               6                 7               8               9               10               11               12               13                 14               15               16      2.5 mg   See details      17      2.5 mg         18      2.5 mg         19      2.5 mg         20      2.5 mg           21      2.5 mg         22      2.5 mg         23      2.5 mg         24      2.5 mg         25      2.5 mg         26      2.5 mg         27      2.5 mg           28      2.5 mg         29      2.5 mg         30      2.5 mg         31      2.5 mg             Date Details   05/16 This INR check   Eat greens               How to take your warfarin dose     To take:  2.5 mg Take 1 of the 2.5 mg tablets.           June 2017 Details    Sun Mon Tue Wed Thu Fri Sat         1       2.5 mg         2      2.5 mg         3      2.5 mg           4      2.5 mg         5      2.5 mg         6      2.5 mg         7      2.5 mg         8      2.5 mg         9      2.5 mg         10      2.5 mg           11      2.5 mg         12      2.5 mg         13            14               15               16               17                 18               19               20               21               22               23               24                 25               26               27               28               29               30                 Date Details   No additional details    Date of next INR:  6/13/2017         How to take your warfarin dose     To take:  2.5 mg Take 1 of the 2.5 mg tablets.

## 2017-05-16 NOTE — PROGRESS NOTES
ANTICOAGULATION FOLLOW-UP CLINIC VISIT    Patient Name:  Nathen Gage  Date:  5/16/2017  Contact Type:  Face to Face    SUBJECTIVE:     Patient Findings     Positives No Problem Findings           OBJECTIVE    INR Protime   Date Value Ref Range Status   05/16/2017 2.6 (A) 0.86 - 1.14 Final       ASSESSMENT / PLAN  INR assessment THER    Recheck INR In: 4 WEEKS    INR Location Clinic      Anticoagulation Summary as of 5/16/2017     INR goal 2.0-2.5   Today's INR 2.6!   Maintenance plan 2.5 mg (2.5 mg x 1) every day   Full instructions 2.5 mg every day   Weekly total 17.5 mg   No change documented Fidelina Pizano, RN   Plan last modified Fidelina Pizano RN (3/28/2017)   Next INR check 6/13/2017   Target end date Indefinite    Indications   Long-term (current) use of anticoagulants [Z79.01] [Z79.01]  Atrial fibrillation (H) [I48.91]         Anticoagulation Episode Summary     INR check location     Preferred lab     Send INR reminders to [facility] CL[facility]    Comments       Anticoagulation Care Providers     Provider Role Specialty Phone number    Kushal Valentin MD [facility] [phone]            See the Encounter Report to view Anticoagulation Flowsheet and Dosing Calendar (Go to Encounters tab in chart review, and find the Anticoagulation Therapy Visit)        Fidelina Pizano RN

## 2017-05-17 ENCOUNTER — OFFICE VISIT (OUTPATIENT)
Dept: FAMILY MEDICINE | Facility: CLINIC | Age: 82
End: 2017-05-17
Payer: COMMERCIAL

## 2017-05-17 VITALS
HEART RATE: 62 BPM | BODY MASS INDEX: 26.04 KG/M2 | WEIGHT: 171.8 LBS | OXYGEN SATURATION: 99 % | HEIGHT: 68 IN | RESPIRATION RATE: 14 BRPM | TEMPERATURE: 97.7 F | DIASTOLIC BLOOD PRESSURE: 78 MMHG | SYSTOLIC BLOOD PRESSURE: 162 MMHG

## 2017-05-17 DIAGNOSIS — I10 ESSENTIAL HYPERTENSION, BENIGN: Primary | ICD-10-CM

## 2017-05-17 DIAGNOSIS — Z23 NEED FOR TDAP VACCINATION: ICD-10-CM

## 2017-05-17 DIAGNOSIS — Z23 NEED FOR PNEUMOCOCCAL VACCINATION: ICD-10-CM

## 2017-05-17 PROCEDURE — 90715 TDAP VACCINE 7 YRS/> IM: CPT | Performed by: FAMILY MEDICINE

## 2017-05-17 PROCEDURE — 99213 OFFICE O/P EST LOW 20 MIN: CPT | Mod: 25 | Performed by: FAMILY MEDICINE

## 2017-05-17 PROCEDURE — 90471 IMMUNIZATION ADMIN: CPT | Performed by: FAMILY MEDICINE

## 2017-05-17 PROCEDURE — 36415 COLL VENOUS BLD VENIPUNCTURE: CPT | Performed by: FAMILY MEDICINE

## 2017-05-17 PROCEDURE — 80053 COMPREHEN METABOLIC PANEL: CPT | Performed by: FAMILY MEDICINE

## 2017-05-17 PROCEDURE — 90670 PCV13 VACCINE IM: CPT | Performed by: FAMILY MEDICINE

## 2017-05-17 PROCEDURE — 80061 LIPID PANEL: CPT | Performed by: FAMILY MEDICINE

## 2017-05-17 PROCEDURE — G0009 ADMIN PNEUMOCOCCAL VACCINE: HCPCS | Mod: 59 | Performed by: FAMILY MEDICINE

## 2017-05-17 NOTE — PROGRESS NOTES
SUBJECTIVE:                                                    Nathen Gage is a 81 year old male who presents to clinic today for the following health issues:      Medication Followup of All medications    Taking Medication as prescribed: yes    Side Effects:  None    Medication Helping Symptoms:  yes     SUBJECTIVE:    CC: Nathen Gage is a 81 year old male who presents for new issues with balance     HPI: at 81 he still ski's downhill, but feels like he can't stay up on ski's to the same degree    Reflexes are symmetrical and romberg negative     No issues except when he tries to downhill ski,  No fall history        PROBLEM LIST:                   Patient Active Problem List   Diagnosis     Esophageal reflux     Essential hypertension, benign     Testicular hypofunction     Impotence of organic origin     Gout     Actinic keratosis     Sick sinus syndrome (H)     Syncope     Hyponatremia     Pneumonia     Advanced directives, counseling/discussion     Pacemaker, artificial     Health Care Home     Atrial fibrillation (H)     Dilated aortic root (H)     Pulmonary hypertension (H)     H/O: GI bleed     Mixed hyperlipidemia     Faintness     Long-term (current) use of anticoagulants [Z79.01]     Acute idiopathic gout of left knee     Presbycusis, unspecified laterality       PAST MEDICAL HISTORY:                  Past Medical History:   Diagnosis Date     Actinic keratosis      Acute idiopathic gout of left knee 4/27/2016     Atrial fibrillation (H)     on Eliquis     Dilated aortic root (H)      Esophageal reflux      Esophagitis      H/O: GI bleed 2010     Hyperlipidaemia      Hyperlipidemia LDL goal <100      Impotence of organic origin      Presbycusis, unspecified laterality 4/27/2016     Pulmonary hypertension (H)      Sick sinus syndrome (H)     pacemaker implanted 2011     Unspecified essential hypertension        PAST SURGICAL HISTORY:                  Past Surgical History:   Procedure Laterality  Date     IMPLANT PACEMAKER  2011    Pacemaker/cardiac insitu / [employer] Dual chamber, V45       CURRENT MEDICATIONS:                  Current Outpatient Prescriptions   Medication Sig Dispense Refill     pantoprazole (PROTONIX) 40 MG EC tablet Take 1 tablet (40 mg) by mouth daily 90 tablet 0     losartan (COZAAR) 50 MG tablet Take 1 tablet (50 mg) by mouth daily 90 tablet 0     atorvastatin (LIPITOR) 20 MG tablet Take 1 tablet (20 mg) by mouth daily 90 tablet 1     warfarin (COUMADIN) 2.5 MG tablet Patient takes 2.5mg every Mon,Wed,Th,Sat,Sun (he takes 3mg every Tu,Fri) OR As directed by INR Clinic 30 tablet 5     warfarin (COUMADIN) 3 MG tablet Take 3mg every Tu and Friday (pt uses 2.5mg tabs on the other days) OR as instructed by INR clinic 24 tablet 1     doxazosin (CARDURA) 4 MG tablet Take 1 tablet (4 mg) by mouth daily 90 tablet 3     diclofenac (VOLTAREN) 1 % GEL Apply 4 grams to knees or 2 grams to hands four times daily using enclosed dosing card. 100 g 1             FAMILY HISTORY:                   Family History   Problem Relation Age of Onset     Alzheimer Disease Brother      CEREBROVASCULAR DISEASE Father 80     Family History Negative Sister      Family History Negative Son      Family History Negative Daughter      Family History Negative Sister      Family History Negative Daughter      Breast Cancer No family hx of      Prostate Cancer No family hx of        HEALTH MAINTENANCE:                    REVIEW OF OUTSIDE RECORDS: NO    REVIEW OF SYSTEMS:  C: NEGATIVE for fever, chills  I: NEGATIVE for worrisome rashes, moles or lesions  E: NEGATIVE for vision changes   E/M: NEGATIVE for ear, mouth and throat problems  R: NEGATIVE for significant cough or SOB  CV: NEGATIVE for chest pain, palpitations   GI: NEGATIVE for nausea, abdominal pain, heartburn, or change in bowel habits  : NEGATIVE for frequency, dysuria, or hematuria  M: NEGATIVE for significant arthralgias or myalgia  N: NEGATIVE  "for weakness, dizziness or paresthesias or headache  NVS:no headache or balance issus  INTEG:no moles or new rashes  LYMPH:no nodes or night sweats    EXAM:    /78 (BP Location: Left arm, Patient Position: Chair, Cuff Size: Adult Regular)  Pulse 62  Temp 97.7  F (36.5  C) (Oral)  Resp 14  Ht 5' 7.75\" (1.721 m)  Wt 171 lb 12.8 oz (77.9 kg)  SpO2 99%  BMI 26.32 kg/m2  GENERAL APPEARANCE: healthy, alert and no distress   EXAM:  GENERAL APPEARANCE: healthy, alert and no distress  EYES: EOMI,  PERRL  HENT: ear canals and TM's normal and nose and mouth without ulcers or lesions  NECK: no adenopathy, no asymmetry, masses, or scars and thyroid normal to palpation  RESP: lungs clear to auscultation - no rales, rhonchi or wheezes  CV: regular rates and rhythm, normal S1 S2, no S3 or S4 and no murmur, click or rub -  ABDOMEN:  soft, nontender, no HSM or masses and bowel sounds normal    MS: extremities normal- no gross deformities noted, no evidence of inflammation in joints, FROM in all extremities.  SKIN: no suspicious lesions or rashes  NEURO: Normal strength and tone, sensory exam grossly normal, mentation intact and speech normal  PSYCH: mentation appears normal and affect normal/bright  LYMPHATICS: No axillary, cervical, inguinal, or supraclavicular nodes  BACK:no tenderness or pain on straight let raise           ASSESSMENT/PLAN    (I10) Essential hypertension, benign  (primary encounter diagnosis)  Comment: previously controlled    Get pharm d check in two weeks, see MD for recheck in 4-6 weeks. No med changes today   Plan: Comprehensive metabolic panel (BMP + Alb, Alk         Phos, ALT, AST, Total. Bili, TP), Lipid Profile        with reflex to direct LDL                                                I have discussed with patient the risks, benefits, medications, treatment options and modalities.   I have instructed the patient to call or schedule a follow-up appointment if any problems or failure to " improve.

## 2017-05-17 NOTE — MR AVS SNAPSHOT
After Visit Summary   5/17/2017    Nathen Gage    MRN: [id number]           Patient Information     Date Of Birth          1935        Visit Information        Provider Department      5/17/2017 9:45 AM Kushal Valentin MD [facility]        Today's Diagnoses     Essential hypertension, benign    -  1    Need for Tdap vaccination        Need for pneumococcal vaccination           Follow-ups after your visit        Your next 10 appointments already scheduled     Jun 13, 2017  9:00 AM CDT   Anticoagulation Visit with [facility] CL[facility]IC   [facility] ([facility])    [address] [address] [phone]            Jun 27, 2017  9:00 AM CDT   Phone Device Check with PATE TECH1   [facility] PHYS[facility] AT [place] ([facility])    [address]  [place] [id number] [phone]              Who to contact     If you have questions or need follow up information about today's clinic visit or your schedule please contact [facility] directly at [phone].  Normal or non-critical lab and imaging results will be communicated to you by Receptoshart, letter or phone within 4 business days after the clinic has received the results. If you do not hear from us within 7 days, please contact the clinic through Receptoshart or phone. If you have a critical or abnormal lab result, we will notify you by phone as soon as possible.  Submit refill requests through [employer] or call your pharmacy and they will forward the refill request to us. Please allow 3 business days for your refill to be completed.          Additional Information About Your Visit        MyChart Information     [employer] gives you secure access to your electronic health record. If you see a primary care provider, you can also send messages to your care team and make appointments. If you have  "questions, please call your primary care clinic.  If you do not have a primary care provider, please call [phone] and they will assist you.        Care EveryWhere ID     This is your Care EveryWhere ID. This could be used by other organizations to access your [facility] medical records  [id number]        Your Vitals Were     Pulse Temperature Respirations Height Pulse Oximetry BMI (Body Mass Index)    62 97.7  F (36.5  C) (Oral) 14 5' 7.75\" (1.721 m) 99% 26.32 kg/m2       Blood Pressure from Last 3 Encounters:   05/17/17 162/78   03/10/17 134/74   09/21/16 126/78    Weight from Last 3 Encounters:   05/17/17 171 lb 12.8 oz (77.9 kg)   03/10/17 171 lb (77.6 kg)   09/21/16 173 lb (78.5 kg)              We Performed the Following     Comprehensive metabolic panel (BMP + Alb, Alk Phos, ALT, AST, Total. Bili, TP)     Lipid Profile with reflex to direct LDL     PNEUMOCOCCAL CONJ VACCINE 13 VALENT IM     TDAP VACCINE (ADACEL)        Primary Care Provider Office Phone # Fax #    Kushal Valentin [phone]       [address] [address]        Thank you!     Thank you for choosing [facility]  for your care. Our goal is always to provide you with excellent care. Hearing back from our patients is one way we can continue to improve our services. Please take a few minutes to complete the written survey that you may receive in the mail after your visit with us. Thank you!             Your Updated Medication List - Protect others around you: Learn how to safely use, store and throw away your medicines at www.disposemymeds.org.          This list is accurate as of: 5/17/17 12:52 PM.  Always use your most recent med list.                   Brand Name Dispense Instructions for use    atorvastatin 20 MG tablet    LIPITOR    90 tablet    Take 1 tablet (20 mg) by mouth daily       diclofenac 1 % Gel topical gel    VOLTAREN    100 g    Apply 4 " grams to knees or 2 grams to hands four times daily using enclosed dosing card.       doxazosin 4 MG tablet    CARDURA    90 tablet    Take 1 tablet (4 mg) by mouth daily       losartan 50 MG tablet    COZAAR    90 tablet    Take 1 tablet (50 mg) by mouth daily       pantoprazole 40 MG EC tablet    PROTONIX    90 tablet    Take 1 tablet (40 mg) by mouth daily       * warfarin 3 MG tablet    COUMADIN    24 tablet    Take 3mg every Tu and Friday (pt uses 2.5mg tabs on the other days) OR as instructed by INR clinic       * warfarin 2.5 MG tablet    COUMADIN    30 tablet    Patient takes 2.5mg every Mon,Wed,Th,Sat,Sun (he takes 3mg every Tu,Fri) OR As directed by INR Clinic       * Notice:  This list has 2 medication(s) that are the same as other medications prescribed for you. Read the directions carefully, and ask your doctor or other care provider to review them with you.

## 2017-05-17 NOTE — NURSING NOTE
"Chief Complaint   Patient presents with     Recheck Medication       Initial /78 (BP Location: Left arm, Patient Position: Chair, Cuff Size: Adult Regular)  Pulse 62  Temp 97.7  F (36.5  C) (Oral)  Resp 14  Ht 5' 7.75\" (1.721 m)  Wt 171 lb 12.8 oz (77.9 kg)  SpO2 99%  BMI 26.32 kg/m2 Estimated body mass index is 26.32 kg/(m^2) as calculated from the following:    Height as of this encounter: 5' 7.75\" (1.721 m).    Weight as of this encounter: 171 lb 12.8 oz (77.9 kg).  Medication Reconciliation: complete   Yasmani Perez CMA       "

## 2017-05-18 LAB
ALBUMIN SERPL-MCNC: 4.2 G/DL (ref 3.4–5)
ALP SERPL-CCNC: 90 U/L (ref 40–150)
ALT SERPL W P-5'-P-CCNC: 18 U/L (ref 0–70)
ANION GAP SERPL CALCULATED.3IONS-SCNC: 10 MMOL/L (ref 3–14)
AST SERPL W P-5'-P-CCNC: 14 U/L (ref 0–45)
BILIRUB SERPL-MCNC: 0.9 MG/DL (ref 0.2–1.3)
BUN SERPL-MCNC: 13 MG/DL (ref 7–30)
CALCIUM SERPL-MCNC: 9.2 MG/DL (ref 8.5–10.1)
CHLORIDE SERPL-SCNC: 104 MMOL/L (ref 94–109)
CHOLEST SERPL-MCNC: 163 MG/DL
CO2 SERPL-SCNC: 25 MMOL/L (ref 20–32)
CREAT SERPL-MCNC: 0.94 MG/DL (ref 0.66–1.25)
GFR SERPL CREATININE-BSD FRML MDRD: 77 ML/MIN/1.7M2
GLUCOSE SERPL-MCNC: 99 MG/DL (ref 70–99)
HDLC SERPL-MCNC: 57 MG/DL
LDLC SERPL CALC-MCNC: 82 MG/DL
NONHDLC SERPL-MCNC: 106 MG/DL
POTASSIUM SERPL-SCNC: 4.5 MMOL/L (ref 3.4–5.3)
PROT SERPL-MCNC: 7.5 G/DL (ref 6.8–8.8)
SODIUM SERPL-SCNC: 139 MMOL/L (ref 133–144)
TRIGL SERPL-MCNC: 118 MG/DL

## 2017-05-31 ENCOUNTER — ALLIED HEALTH/NURSE VISIT (OUTPATIENT)
Dept: FAMILY MEDICINE | Facility: CLINIC | Age: 82
End: 2017-05-31
Payer: COMMERCIAL

## 2017-05-31 VITALS — SYSTOLIC BLOOD PRESSURE: 138 MMHG | DIASTOLIC BLOOD PRESSURE: 70 MMHG

## 2017-05-31 DIAGNOSIS — I10 ESSENTIAL HYPERTENSION, BENIGN: ICD-10-CM

## 2017-05-31 DIAGNOSIS — Z01.30 BP CHECK: Primary | ICD-10-CM

## 2017-05-31 PROCEDURE — 99207 ZZC NO CHARGE NURSE ONLY: CPT | Performed by: FAMILY MEDICINE

## 2017-05-31 NOTE — PROGRESS NOTES
Nathen Gage is enrolled/participating in the retail pharmacy Blood Pressure Goals Achievement Program (BPGAP).  Nathen Gage was evaluated at [facility] on May 31, 2017 at which time his blood pressure was:    BP Readings from Last 3 Encounters:   05/31/17 138/70   05/17/17 162/78   03/10/17 134/74     Reviewed lifestyle modifications for blood pressure control and reduction: including making healthy food choices, managing weight, getting regular exercise, smoking cessation, reducing alcohol consumption, monitoring blood pressure regularly.     Nathen Gage is not experiencing symptoms.    Follow-Up: BP is at goal of < 150/90 mmHg (patient 60+ years of age without diabetes).  Recommended follow-up at pharmacy in 6 months.         Completed by: BARBIE Morrissey Student Pharmacist

## 2017-05-31 NOTE — MR AVS SNAPSHOT
After Visit Summary   5/31/2017    Nathen Gage    MRN: [id number]           Patient Information     Date Of Birth          1935        Visit Information        Provider Department      5/31/2017 3:27 PM Kushal Valentin MD [facility]        Today's Diagnoses     BP check    -  1    Essential hypertension, benign           Follow-ups after your visit        Your next 10 appointments already scheduled     Jun 13, 2017  9:00 AM CDT   Anticoagulation Visit with [facility] ANTICOAGULATION CLINIC   [facility] ([facility])    [address] [address] [phone]            Jun 13, 2017  9:30 AM CDT   SHORT with Kushal Valentin MD   [facility] ([facility])    [address] [address] [phone]            Jun 27, 2017  9:00 AM CDT   Phone Device Check with PATE TECH1   [facility] PHYSICIANS HE[facility] AT [place] ([facility])    [address] [place] [address] [phone]              Who to contact     If you have questions or need follow up information about today's clinic visit or your schedule please contact [facility] directly at [phone].  Normal or non-critical lab and imaging results will be communicated to you by Primeksshart, letter or phone within 4 business days after the clinic has received the results. If you do not hear from us within 7 days, please contact the clinic through MyChart or phone. If you have a critical or abnormal lab result, we will notify you by phone as soon as possible.  Submit refill requests through [employer] or call your pharmacy and they will forward the refill request to us. Please allow 3 business days for your refill to be completed.          Additional Information About Your Visit        [employer] Information     [employer] gives you secure access to  your electronic health record. If you see a primary care provider, you can also send messages to your care team and make appointments. If you have questions, please call your primary care clinic.  If you do not have a primary care provider, please call [phone] and they will assist you.        Care EveryWhere ID     This is your Care EveryWhere ID. This could be used by other organizations to access your [facility] medical records  [id number]         Blood Pressure from Last 3 Encounters:   05/31/17 138/70   05/17/17 162/78   03/10/17 134/74    Weight from Last 3 Encounters:   05/17/17 171 lb 12.8 oz (77.9 kg)   03/10/17 171 lb (77.6 kg)   09/21/16 173 lb (78.5 kg)              Today, you had the following     No orders found for display       Primary Care Provider Office Phone # Fax #    Kushal Valentin [phone]       [address] [address]        Thank you!     Thank you for choosing [facility]  for your care. Our goal is always to provide you with excellent care. Hearing back from our patients is one way we can continue to improve our services. Please take a few minutes to complete the written survey that you may receive in the mail after your visit with us. Thank you!             Your Updated Medication List - Protect others around you: Learn how to safely use, store and throw away your medicines at www.disposemymeds.org.          This list is accurate as of: 5/31/17  3:34 PM.  Always use your most recent med list.                   Brand Name Dispense Instructions for use    atorvastatin 20 MG tablet    LIPITOR    90 tablet    Take 1 tablet (20 mg) by mouth daily       diclofenac 1 % Gel topical gel    VOLTAREN    100 g    Apply 4 grams to knees or 2 grams to hands four times daily using enclosed dosing card.       doxazosin 4 MG tablet    CARDURA    90 tablet    Take 1 tablet (4 mg) by mouth daily       losartan 50  MG tablet    COZAAR    90 tablet    Take 1 tablet (50 mg) by mouth daily       pantoprazole 40 MG EC tablet    PROTONIX    90 tablet    Take 1 tablet (40 mg) by mouth daily       * warfarin 3 MG tablet    COUMADIN    24 tablet    Take 3mg every Tu and Friday (pt uses 2.5mg tabs on the other days) OR as instructed by INR clinic       * warfarin 2.5 MG tablet    COUMADIN    30 tablet    Patient takes 2.5mg every Mon,Wed,Th,Sat,Sun (he takes 3mg every Tu,Fri) OR As directed by INR Clinic       * Notice:  This list has 2 medication(s) that are the same as other medications prescribed for you. Read the directions carefully, and ask your doctor or other care provider to review them with you.

## 2017-06-06 DIAGNOSIS — N40.1 BENIGN LOCALIZED HYPERPLASIA OF PROSTATE WITH URINARY OBSTRUCTION: Primary | ICD-10-CM

## 2017-06-06 DIAGNOSIS — N13.8 BENIGN LOCALIZED HYPERPLASIA OF PROSTATE WITH URINARY OBSTRUCTION: Primary | ICD-10-CM

## 2017-06-06 NOTE — TELEPHONE ENCOUNTER
Doxazosin 4 mg       Last Written Prescription Date: 05/27/16  Last Fill Quantity: 90, # refills: 3    Last Office Visit with G, P or [facility] prescribing provider:  05/17/17 Dr Valentin   Future Office Visit:    Next 5 appointments (look out 90 days)     Jun 13, 2017  9:30 AM CDT   SHORT with Kushal Valentin MD   [facility] ([facility])    [address] [place] [address] [phone]                    BP Readings from Last 3 Encounters:   05/31/17 138/70   05/17/17 162/78   03/10/17 134/74

## 2017-06-09 RX ORDER — DOXAZOSIN 4 MG/1
4 TABLET ORAL DAILY
Qty: 90 TABLET | Refills: 1 | Status: SHIPPED | OUTPATIENT
Start: 2017-06-09 | End: 2017-12-05

## 2017-06-09 NOTE — TELEPHONE ENCOUNTER
Prescription approved per [facility] Refill Protocol.    Karen GREENE RN, BSN, PHN  [facility] [employer] RN

## 2017-06-13 ENCOUNTER — ANTICOAGULATION THERAPY VISIT (OUTPATIENT)
Dept: NURSING | Facility: CLINIC | Age: 82
End: 2017-06-13
Payer: COMMERCIAL

## 2017-06-13 ENCOUNTER — OFFICE VISIT (OUTPATIENT)
Dept: FAMILY MEDICINE | Facility: CLINIC | Age: 82
End: 2017-06-13
Payer: COMMERCIAL

## 2017-06-13 VITALS
SYSTOLIC BLOOD PRESSURE: 138 MMHG | WEIGHT: 172.9 LBS | HEIGHT: 68 IN | HEART RATE: 65 BPM | BODY MASS INDEX: 26.21 KG/M2 | DIASTOLIC BLOOD PRESSURE: 73 MMHG | OXYGEN SATURATION: 98 % | RESPIRATION RATE: 14 BRPM | TEMPERATURE: 97.8 F

## 2017-06-13 DIAGNOSIS — Z79.01 LONG-TERM (CURRENT) USE OF ANTICOAGULANTS: ICD-10-CM

## 2017-06-13 DIAGNOSIS — I48.91 ATRIAL FIBRILLATION, UNSPECIFIED TYPE (H): ICD-10-CM

## 2017-06-13 DIAGNOSIS — I15.9 SECONDARY HYPERTENSION: ICD-10-CM

## 2017-06-13 DIAGNOSIS — I49.5 SICK SINUS SYNDROME (H): ICD-10-CM

## 2017-06-13 DIAGNOSIS — I48.0 PAROXYSMAL ATRIAL FIBRILLATION (H): ICD-10-CM

## 2017-06-13 DIAGNOSIS — Z95.0 PACEMAKER, ARTIFICIAL: ICD-10-CM

## 2017-06-13 DIAGNOSIS — I10 ESSENTIAL HYPERTENSION, BENIGN: Primary | ICD-10-CM

## 2017-06-13 DIAGNOSIS — K21.00 GASTROESOPHAGEAL REFLUX DISEASE WITH ESOPHAGITIS: ICD-10-CM

## 2017-06-13 DIAGNOSIS — E78.00 PURE HYPERCHOLESTEROLEMIA: ICD-10-CM

## 2017-06-13 LAB — INR POINT OF CARE: 2.9 (ref 0.86–1.14)

## 2017-06-13 PROCEDURE — 99207 ZZC NO CHARGE NURSE ONLY: CPT

## 2017-06-13 PROCEDURE — 85610 PROTHROMBIN TIME: CPT | Mod: QW

## 2017-06-13 PROCEDURE — 99213 OFFICE O/P EST LOW 20 MIN: CPT | Performed by: FAMILY MEDICINE

## 2017-06-13 PROCEDURE — 36416 COLLJ CAPILLARY BLOOD SPEC: CPT

## 2017-06-13 PROCEDURE — 82043 UR ALBUMIN QUANTITATIVE: CPT | Performed by: FAMILY MEDICINE

## 2017-06-13 RX ORDER — ATORVASTATIN CALCIUM 20 MG/1
20 TABLET, FILM COATED ORAL DAILY
Qty: 90 TABLET | Refills: 2 | Status: SHIPPED | OUTPATIENT
Start: 2017-06-13 | End: 2018-04-19

## 2017-06-13 RX ORDER — PANTOPRAZOLE SODIUM 40 MG/1
40 TABLET, DELAYED RELEASE ORAL DAILY
Qty: 90 TABLET | Refills: 2 | Status: SHIPPED | OUTPATIENT
Start: 2017-06-13 | End: 2018-05-11

## 2017-06-13 RX ORDER — LOSARTAN POTASSIUM 50 MG/1
50 TABLET ORAL DAILY
Qty: 90 TABLET | Refills: 2 | Status: SHIPPED | OUTPATIENT
Start: 2017-06-13 | End: 2018-03-06

## 2017-06-13 NOTE — MR AVS SNAPSHOT
Nathen JANICE Gage   6/13/2017 9:00 AM   Anticoagulation Therapy Visit    Description:  81 year old male   Provider:  [facility]   Department:  Cr Nurse           INR as of 6/13/2017     Today's INR 2.9!      Anticoagulation Summary as of 6/13/2017     INR goal 2.0-2.5   Today's INR 2.9!   Full instructions 6/13: 1.25 mg; Otherwise 2.5 mg every day   Next INR check 7/6/2017    Indications   Long-term (current) use of anticoagulants [Z79.01] [Z79.01]  Atrial fibrillation (H) [I48.91]         Your next Anticoagulation Clinic appointment(s)     Jul 06, 2017 10:00 AM CDT   Anticoagulation Visit with CR ANTICOAGULATION CLINIC   [facility] ([facility])    [address] [place] [address] [phone]              Contact Numbers     Clinic Number:         June 2017 Details    Sun Mon Tue Wed Thu Fri Sat         1               2               3                 4               5               6               7               8               9               10                 11               12               13      1.25 mg   See details      14      2.5 mg         15      2.5 mg         16      2.5 mg         17      2.5 mg           18      2.5 mg         19      2.5 mg         20      2.5 mg         21      2.5 mg         22      2.5 mg         23      2.5 mg         24      2.5 mg           25      2.5 mg         26      2.5 mg         27      2.5 mg         28      2.5 mg         29      2.5 mg         30      2.5 mg           Date Details   06/13 This INR check   Take 1.25 mg (2.5 mg tablets x 0.5)   eat greens               How to take your warfarin dose     To take:  1.25 mg Take 0.5 of a 2.5 mg tablet.    To take:  2.5 mg Take 1 of the 2.5 mg tablets.           July 2017 Details    Sun Mon Tue Wed Thu Fri Sat           1      2.5 mg           2      2.5 mg         3      2.5 mg         4      2.5 mg         5      2.5 mg         6             7               8                 9               10               11               12               13               14               15                 16               17               18               19               20               21               22                 23               24               25               26               27               28               29                 30               31                     Date Details   No additional details    Date of next INR:  7/6/2017         How to take your warfarin dose     To take:  2.5 mg Take 1 of the 2.5 mg tablets.

## 2017-06-13 NOTE — MR AVS SNAPSHOT
After Visit Summary   6/13/2017    Nathen Gage    MRN: [id number]           Patient Information     Date Of Birth          1935        Visit Information        Provider Department      6/13/2017 9:30 AM Kushal Valentin MD [facility]        Today's Diagnoses     Essential hypertension, benign    -  1    Sick sinus syndrome (H)        Pacemaker, artificial        Paroxysmal atrial fibrillation (H)        Gastroesophageal reflux disease with esophagitis        Secondary hypertension        Pure hypercholesterolemia           Follow-ups after your visit        Your next 10 appointments already scheduled     Jun 27, 2017  9:00 AM CDT   Phone Device Check with PATE TECH1   [facility] [place] ([facility])    [address]  [facility] [id number] [phone]            Jul 06, 2017 10:00 AM CDT   Anticoagulation Visit with CR ANTICOAGULATION CLINIC   [facility] ([facility])    [address] [address] [phone]              Who to contact     If you have questions or need follow up information about today's clinic visit or your schedule please contact [facility] directly at [phone].  Normal or non-critical lab and imaging results will be communicated to you by MyChart, letter or phone within 4 business days after the clinic has received the results. If you do not hear from us within 7 days, please contact the clinic through [employer] System[employer] or phone. If you have a critical or abnormal lab result, we will notify you by phone as soon as possible.  Submit refill requests through [employer] or call your pharmacy and they will forward the refill request to us. Please allow 3 business days for your refill to be completed.          Additional Information About Your Visit        MyChart Information     [employer] gives you secure access to your  "electronic health record. If you see a primary care provider, you can also send messages to your care team and make appointments. If you have questions, please call your primary care clinic.  If you do not have a primary care provider, please call [phone] and they will assist you.        Care EveryWhere ID     This is your Care EveryWhere ID. This could be used by other organizations to access your [place] medical records  [id number]        Your Vitals Were     Pulse Temperature Respirations Height Pulse Oximetry BMI (Body Mass Index)    65 97.8  F (36.6  C) (Oral) 14 5' 7.5\" (1.715 m) 98% 26.68 kg/m2       Blood Pressure from Last 3 Encounters:   06/13/17 138/73   05/31/17 138/70   05/17/17 162/78    Weight from Last 3 Encounters:   06/13/17 172 lb 14.4 oz (78.4 kg)   05/17/17 171 lb 12.8 oz (77.9 kg)   03/10/17 171 lb (77.6 kg)              We Performed the Following     Albumin Random Urine Quantitative          Where to get your medicines      These medications were sent to [facility] # [id number] - [place], [place] - [address]  [address], [facility] [address]     Phone:  [phone]     atorvastatin 20 MG tablet    losartan 50 MG tablet    pantoprazole 40 MG EC tablet          Primary Care Provider Office Phone # Fax #    Kushal Valentin [phone]       [facility] [address] [place] [address]        Thank you!     Thank you for choosing [facility]  for your care. Our goal is always to provide you with excellent care. Hearing back from our patients is one way we can continue to improve our services. Please take a few minutes to complete the written survey that you may receive in the mail after your visit with us. Thank you!             Your Updated Medication List - Protect others around you: Learn how to safely use, store and throw away your medicines at www.disposemymeds.org.          This list is accurate as of: " 6/13/17  9:45 AM.  Always use your most recent med list.                   Brand Name Dispense Instructions for use    atorvastatin 20 MG tablet    LIPITOR    90 tablet    Take 1 tablet (20 mg) by mouth daily       diclofenac 1 % Gel topical gel    VOLTAREN    100 g    Apply 4 grams to knees or 2 grams to hands four times daily using enclosed dosing card.       doxazosin 4 MG tablet    CARDURA    90 tablet    Take 1 tablet (4 mg) by mouth daily       losartan 50 MG tablet    COZAAR    90 tablet    Take 1 tablet (50 mg) by mouth daily       pantoprazole 40 MG EC tablet    PROTONIX    90 tablet    Take 1 tablet (40 mg) by mouth daily       * warfarin 3 MG tablet    COUMADIN    24 tablet    Take 3mg every Tu and Friday (pt uses 2.5mg tabs on the other days) OR as instructed by INR clinic       * warfarin 2.5 MG tablet    COUMADIN    30 tablet    Patient takes 2.5mg every Mon,Wed,Th,Sat,Sun (he takes 3mg every Tu,Fri) OR As directed by INR Clinic       * Notice:  This list has 2 medication(s) that are the same as other medications prescribed for you. Read the directions carefully, and ask your doctor or other care provider to review them with you.

## 2017-06-13 NOTE — NURSING NOTE
"Chief Complaint   Patient presents with     Hypertension       Initial /73 (BP Location: Right arm, Patient Position: Chair, Cuff Size: Adult Regular)  Pulse 65  Temp 97.8  F (36.6  C) (Oral)  Resp 14  Ht 5' 7.5\" (1.715 m)  Wt 172 lb 14.4 oz (78.4 kg)  SpO2 98%  BMI 26.68 kg/m2 Estimated body mass index is 26.68 kg/(m^2) as calculated from the following:    Height as of this encounter: 5' 7.5\" (1.715 m).    Weight as of this encounter: 172 lb 14.4 oz (78.4 kg).  Medication Reconciliation: complete     "

## 2017-06-13 NOTE — PROGRESS NOTES
ANTICOAGULATION FOLLOW-UP CLINIC VISIT    Patient Name:  Nathen Gage  Date:  6/13/2017  Contact Type:  Face to Face    SUBJECTIVE:     Patient Findings     Positives Activity level change (possibly less active recently, will resume dancing)           OBJECTIVE    INR Protime   Date Value Ref Range Status   06/13/2017 2.9 (A) 0.86 - 1.14 Final       ASSESSMENT / PLAN  INR assessment SUPRA    Recheck INR In: 3 WEEKS    INR Location Clinic      Anticoagulation Summary as of 6/13/2017     INR goal 2.0-2.5   Today's INR 2.9!   Maintenance plan 2.5 mg (2.5 mg x 1) every day   Full instructions 6/13: 1.25 mg; Otherwise 2.5 mg every day   Weekly total 17.5 mg   Plan last modified Fidelina Pizano RN (6/13/2017)   Next INR check 7/6/2017   Target end date Indefinite    Indications   Long-term (current) use of anticoagulants [Z79.01] [Z79.01]  Atrial fibrillation (H) [I48.91]         Anticoagulation Episode Summary     INR check location     Preferred lab     Send INR reminders to [facility]    Comments       Anticoagulation Care Providers     Provider Role Specialty Phone number    Kushal Valentin MD [facility] [phone]            See the Encounter Report to view Anticoagulation Flowsheet and Dosing Calendar (Go to Encounters tab in chart review, and find the Anticoagulation Therapy Visit)        Fidelina Pizano RN

## 2017-06-13 NOTE — PROGRESS NOTES
"  SUBJECTIVE:                                                    Nathen Gage is a 81 year old male who presents to clinic today for the following health issues:      Hypertension Follow-up      Outpatient blood pressures are being checked at home.  Results are high.    Low Salt Diet: no added salt       Amount of exercise or physical activity: 2-3 days/week for an average of greater than 60 minutes    Problems taking medications regularly: No    Medication side effects: none    Diet: regular (no restrictions)    /73 (BP Location: Right arm, Patient Position: Chair, Cuff Size: Adult Regular)  Pulse 65  Temp 97.8  F (36.6  C) (Oral)  Resp 14  Ht 5' 7.5\" (1.715 m)  Wt 172 lb 14.4 oz (78.4 kg)  SpO2 98%  BMI 26.68 kg/m2  Subjective:   Nathen Gage is a 81 year old male with hypertension.  Current Outpatient Prescriptions   Medication Sig Dispense Refill     doxazosin (CARDURA) 4 MG tablet Take 1 tablet (4 mg) by mouth daily 90 tablet 1     pantoprazole (PROTONIX) 40 MG EC tablet Take 1 tablet (40 mg) by mouth daily 90 tablet 0     losartan (COZAAR) 50 MG tablet Take 1 tablet (50 mg) by mouth daily 90 tablet 0     atorvastatin (LIPITOR) 20 MG tablet Take 1 tablet (20 mg) by mouth daily 90 tablet 1     warfarin (COUMADIN) 2.5 MG tablet Patient takes 2.5mg every Mon,Wed,Th,Sat,Sun (he takes 3mg every Tu,Fri) OR As directed by INR Clinic 30 tablet 5     diclofenac (VOLTAREN) 1 % GEL Apply 4 grams to knees or 2 grams to hands four times daily using enclosed dosing card. 100 g 1     warfarin (COUMADIN) 3 MG tablet Take 3mg every Tu and Friday (pt uses 2.5mg tabs on the other days) OR as instructed by INR clinic (Patient not taking: Reported on 6/13/2017) 24 tablet 1      Hypertension ROS: taking medications as instructed, no medication side effects noted, no TIA's, no chest pain on exertion, no dyspnea on exertion, no swelling of ankles.   New concerns: he has poorer balance than when he was young.   Past " "Medical History:   Diagnosis Date     Actinic keratosis      Acute idiopathic gout of left knee 4/27/2016     Atrial fibrillation (H)     on Eliquis     Dilated aortic root (H)      Esophageal reflux      Esophagitis      H/O: GI bleed 2010     Hyperlipidaemia      Hyperlipidemia LDL goal <100      Impotence of organic origin      Presbycusis, unspecified laterality 4/27/2016     Pulmonary hypertension (H)      Sick sinus syndrome (H)     pacemaker implanted 2011     Unspecified essential hypertension        Past Surgical History:   Procedure Laterality Date     IMPLANT PACEMAKER  2011    Pacemaker/cardiac insitu / [employer] Dual chamber, V45       Family History   Problem Relation Age of Onset     Alzheimer Disease Brother      CEREBROVASCULAR DISEASE Father 80     Family History Negative Sister      Family History Negative Son      Family History Negative Daughter      Family History Negative Sister      Family History Negative Daughter      Breast Cancer No family hx of      Prostate Cancer No family hx of        Social History   Substance Use Topics     Smoking status: Former Smoker     Quit date: 1/1/1975     Smokeless tobacco: Never Used     Alcohol use Yes      Comment: 1- 2 BEERS WEEKLY       Objective:   /73 (BP Location: Right arm, Patient Position: Chair, Cuff Size: Adult Regular)  Pulse 65  Temp 97.8  F (36.6  C) (Oral)  Resp 14  Ht 5' 7.5\" (1.715 m)  Wt 172 lb 14.4 oz (78.4 kg)  SpO2 98%  BMI 26.68 kg/m2   Appearance healthy, alert and cooperative.  General exam BP noted to be well controlled today in office, S1, S2 normal, no gallop, no murmur, chest clear, no JVD, no HSM, no edema.   Lab review: most recent lipid panel reviewed, showing LDL result meets goal, HDL normal.     Assessment:    Hypertension improved.     Plan:   lab results and schedule of future lab studies reviewed with patient, recommended sodium restriction, cardiovascular risk and specific lipid/LDL goals " reviewed, reviewed medications and side effects in detail.

## 2017-06-15 LAB
CREAT UR-MCNC: 49 MG/DL
MICROALBUMIN UR-MCNC: <5 MG/L
MICROALBUMIN/CREAT UR: NORMAL MG/G CR (ref 0–17)

## 2017-06-27 ENCOUNTER — ALLIED HEALTH/NURSE VISIT (OUTPATIENT)
Dept: CARDIOLOGY | Facility: CLINIC | Age: 82
End: 2017-06-27
Payer: COMMERCIAL

## 2017-06-27 DIAGNOSIS — Z95.0 CARDIAC PACEMAKER IN SITU: Primary | ICD-10-CM

## 2017-06-27 PROCEDURE — 93293 PM PHONE R-STRIP DEVICE EVAL: CPT | Performed by: INTERNAL MEDICINE

## 2017-06-27 NOTE — MR AVS SNAPSHOT
After Visit Summary   6/27/2017    Nathen Gage    MRN: [id number]           Patient Information     Date Of Birth          1935        Visit Information        Provider Department      6/27/2017 9:00 AM PATE TECH1 [facility] AT [place]        Today's Diagnoses     Cardiac pacemaker in situ    -  1       Follow-ups after your visit        Your next 10 appointments already scheduled     Jul 06, 2017 10:00 AM CDT   Anticoagulation Visit with CR ANTICOAGULATION CLINIC   [facility] ([facility])    [id number] [place] [address] [phone]            Sep 27, 2017  8:50 AM CDT   Pacemaker Check with RU DCR2   [facility] ([facility])    [address]  [facility] [address] [phone]              Who to contact     If you have questions or need follow up information about today's clinic visit or your schedule please contact [facility] directly at [phone].  Normal or non-critical lab and imaging results will be communicated to you by [employer], letter or phone within 4 business days after the clinic has received the results. If you do not hear from us within 7 days, please contact the clinic through [employer] or phone. If you have a critical or abnormal lab result, we will notify you by phone as soon as possible.  Submit refill requests through [employer] or call your pharmacy and they will forward the refill request to us. Please allow 3 business days for your refill to be completed.          Additional Information About Your Visit        [employer] Information     [employer] gives you secure access to your electronic health record. If you see a primary care provider, you can also send messages to your care team and make appointments. If you have questions, please call your primary care clinic.  If you do  not have a primary care provider, please call [phone] and they will assist you.        Care EveryWhere ID     This is your Care EveryWhere ID. This could be used by other organizations to access your [place] medical records  [id number]         Blood Pressure from Last 3 Encounters:   06/13/17 138/73   05/31/17 138/70   05/17/17 162/78    Weight from Last 3 Encounters:   06/13/17 78.4 kg (172 lb 14.4 oz)   05/17/17 77.9 kg (171 lb 12.8 oz)   03/10/17 77.6 kg (171 lb)              We Performed the Following     PM PHONE R STRIP EVAL UP TO 90 DAYS ([id number])        Primary Care Provider Office Phone # Fax #    Kushal Valentin [phone]       [address] [address]        Equal Access to Services     MICHEEL ALEXANDRA : Hadii aad ku hadasho Soomaali, waaxda luqadaha, qaybta kaalmada adeegyada, waxay idiin hayaan adesavanna kharash lakaylan . So [facility] [phone].    ATENCIÓN: Si habla español, tiene a arana disposición servicios gratuitos de asistencia lingüística. Terrence al [phone].    We comply with applicable federal civil rights laws and [place] laws. We do not discriminate on the basis of race, color, national origin, age, disability sex, sexual orientation or gender identity.            Thank you!     Thank you for choosing [facility]YS[facility] AT [place]  for your care. Our goal is always to provide you with excellent care. Hearing back from our patients is one way we can continue to improve our services. Please take a few minutes to complete the written survey that you may receive in the mail after your visit with us. Thank you!             Your Updated Medication List - Protect others around you: Learn how to safely use, store and throw away your medicines at www.disposemymeds.org.          This list is accurate as of: 6/27/17  9:15 AM.  Always use your most recent med list.                   Brand Name Dispense  Instructions for use Diagnosis    atorvastatin 20 MG tablet    LIPITOR    90 tablet    Take 1 tablet (20 mg) by mouth daily    Pure hypercholesterolemia, Essential hypertension, benign       diclofenac 1 % Gel topical gel    VOLTAREN    100 g    Apply 4 grams to knees or 2 grams to hands four times daily using enclosed dosing card.    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       losartan 50 MG tablet    COZAAR    90 tablet    Take 1 tablet (50 mg) by mouth daily    Secondary hypertension       pantoprazole 40 MG EC tablet    PROTONIX    90 tablet    Take 1 tablet (40 mg) by mouth daily    Gastroesophageal reflux disease with esophagitis       * warfarin 3 MG tablet    COUMADIN    24 tablet    Take 3mg every Tu and Friday (pt uses 2.5mg tabs on the other days) OR as instructed by INR clinic    Long-term (current) use of anticoagulants       * warfarin 2.5 MG tablet    COUMADIN    30 tablet    Patient takes 2.5mg every Mon,Wed,Th,Sat,Sun (he takes 3mg every Tu,Fri) OR As directed by INR Clinic    Long term current use of anticoagulant therapy       * Notice:  This list has 2 medication(s) that are the same as other medications prescribed for you. Read the directions carefully, and ask your doctor or other care provider to review them with you.

## 2017-06-27 NOTE — PROGRESS NOTES
~ 90 day phone teletrace ~  Intrinsic rhythm at time of check. Magnet response WNL. Annual threshold scheduled in September. Theodore BANERJEE

## 2017-07-06 ENCOUNTER — ANTICOAGULATION THERAPY VISIT (OUTPATIENT)
Dept: NURSING | Facility: CLINIC | Age: 82
End: 2017-07-06
Payer: COMMERCIAL

## 2017-07-06 DIAGNOSIS — I48.0 PAROXYSMAL ATRIAL FIBRILLATION (H): ICD-10-CM

## 2017-07-06 DIAGNOSIS — Z79.01 LONG-TERM (CURRENT) USE OF ANTICOAGULANTS: ICD-10-CM

## 2017-07-06 LAB — INR POINT OF CARE: 2.4 (ref 0.86–1.14)

## 2017-07-06 PROCEDURE — 99207 ZZC NO CHARGE NURSE ONLY: CPT

## 2017-07-06 PROCEDURE — 36416 COLLJ CAPILLARY BLOOD SPEC: CPT

## 2017-07-06 PROCEDURE — 85610 PROTHROMBIN TIME: CPT | Mod: QW

## 2017-07-06 NOTE — PROGRESS NOTES
ANTICOAGULATION FOLLOW-UP CLINIC VISIT    Patient Name:  Nathen Gage  Date:  7/6/2017  Contact Type:  Face to Face    SUBJECTIVE:     Patient Findings     Positives Nose bleeds (Had 2 scant nosebleeds in the past few weeks, did not last more than a few minutes), No Problem Findings           OBJECTIVE    INR Protime   Date Value Ref Range Status   07/06/2017 2.4 (A) 0.86 - 1.14 Final       ASSESSMENT / PLAN  INR assessment THER    Recheck INR In: 4 WEEKS    INR Location Clinic      Anticoagulation Summary as of 7/6/2017     INR goal 2.0-2.5   Today's INR 2.4   Maintenance plan 2.5 mg (2.5 mg x 1) every day   Full instructions 2.5 mg every day   Weekly total 17.5 mg   No change documented Fidelina Pizano RN   Plan last modified Fidelina Pizano RN (6/13/2017)   Next INR check 8/8/2017   Target end date Indefinite    Indications   Long-term (current) use of anticoagulants [Z79.01] [Z79.01]  Atrial fibrillation (H) [I48.91]         Anticoagulation Episode Summary     INR check location     Preferred lab     Send INR reminders to [facility] ANTICOAG CLINIC    Comments       Anticoagulation Care Providers     Provider Role Specialty Phone number    Kushal Valnetin MD [facility] [phone]            See the Encounter Report to view Anticoagulation Flowsheet and Dosing Calendar (Go to Encounters tab in chart review, and find the Anticoagulation Therapy Visit)        Fidelina Piznao, RN

## 2017-08-08 ENCOUNTER — ANTICOAGULATION THERAPY VISIT (OUTPATIENT)
Dept: NURSING | Facility: CLINIC | Age: 82
End: 2017-08-08
Payer: COMMERCIAL

## 2017-08-08 DIAGNOSIS — I48.0 PAROXYSMAL ATRIAL FIBRILLATION (H): ICD-10-CM

## 2017-08-08 DIAGNOSIS — Z79.01 LONG-TERM (CURRENT) USE OF ANTICOAGULANTS: ICD-10-CM

## 2017-08-08 DIAGNOSIS — Z79.01 LONG TERM CURRENT USE OF ANTICOAGULANT THERAPY: ICD-10-CM

## 2017-08-08 LAB — INR POINT OF CARE: 2.7 (ref 0.86–1.14)

## 2017-08-08 PROCEDURE — 36416 COLLJ CAPILLARY BLOOD SPEC: CPT

## 2017-08-08 PROCEDURE — 99207 ZZC NO CHARGE NURSE ONLY: CPT

## 2017-08-08 PROCEDURE — 85610 PROTHROMBIN TIME: CPT | Mod: QW

## 2017-08-08 RX ORDER — WARFARIN SODIUM 2.5 MG/1
TABLET ORAL
Qty: 30 TABLET | Refills: 5 | Status: ON HOLD | OUTPATIENT
Start: 2017-08-08 | End: 2017-12-24

## 2017-08-08 NOTE — MR AVS SNAPSHOT
Nathenjoya Gage   8/8/2017 9:00 AM   Anticoagulation Therapy Visit    Description:  81 year old male   Provider:  CR ANTICOAGULATION CLINIC   Department:  Cr Nurse           INR as of 8/8/2017     Today's INR 2.7!      Anticoagulation Summary as of 8/8/2017     INR goal 2.0-2.5   Today's INR 2.7!   Full instructions 1.25 mg on Tue; 2.5 mg all other days   Next INR check 8/15/2017    Indications   Long-term (current) use of anticoagulants [Z79.01] [Z79.01]  Atrial fibrillation (H) [I48.91]         Your next Anticoagulation Clinic appointment(s)     Aug 15, 2017  9:15 AM CDT   Anticoagulation Visit with CR ANTICOAGULATION CLINIC   [facility]    [address] [place] [address] [phone]              Contact Numbers     Clinic Number:         August 2017 Details    Sun Mon Tue Wed Thu Fri Sat       1               2               3               4               5                 6               7               8      1.25 mg   See details      9      2.5 mg         10      2.5 mg         11      2.5 mg         12      2.5 mg           13      2.5 mg         14      2.5 mg         15            16               17               18               19                 20               21               22               23               24               25               26                 27               28               29               30               31                  Date Details   08/08 This INR check       Date of next INR:  8/15/2017         How to take your warfarin dose     To take:  1.25 mg Take 0.5 of a 2.5 mg tablet.    To take:  2.5 mg Take 1 of the 2.5 mg tablets.

## 2017-08-08 NOTE — PROGRESS NOTES
ANTICOAGULATION FOLLOW-UP CLINIC VISIT    Patient Name:  Nathen Gage  Date:  8/8/2017  Contact Type:  Face to Face    SUBJECTIVE:     Patient Findings     Positives Nose bleeds (Nosebleed on his drive here, lasted about 10 minutes), Unexplained INR or factor level change    Comments Patient's INR has been trending on upper end of 2.5 and/or supra so I did decrease his Warfarin maintenance dose again.  He is very active and eats a serving of greens daily.           OBJECTIVE    INR Protime   Date Value Ref Range Status   08/08/2017 2.7 (A) 0.86 - 1.14 Final       ASSESSMENT / PLAN  INR assessment SUPRA    Recheck INR In: 1 WEEK    INR Location Clinic      Anticoagulation Summary as of 8/8/2017     INR goal 2.0-2.5   Today's INR 2.7!   Maintenance plan 1.25 mg (2.5 mg x 0.5) on Tue; 2.5 mg (2.5 mg x 1) all other days   Full instructions 1.25 mg on Tue; 2.5 mg all other days   Weekly total 16.25 mg   Plan last modified Fidelina Pizano RN (8/8/2017)   Next INR check 8/15/2017   Target end date Indefinite    Indications   Long-term (current) use of anticoagulants [Z79.01] [Z79.01]  Atrial fibrillation (H) [I48.91]         Anticoagulation Episode Summary     INR check location     Preferred lab     Send INR reminders to [facility] CLINIC    Comments       Anticoagulation Care Providers     Provider Role Specialty Phone number    Kushal Valentin MD [facility] [phone]            See the Encounter Report to view Anticoagulation Flowsheet and Dosing Calendar (Go to Encounters tab in chart review, and find the Anticoagulation Therapy Visit)        Fidelina Pizano RN

## 2017-08-15 ENCOUNTER — ANTICOAGULATION THERAPY VISIT (OUTPATIENT)
Dept: NURSING | Facility: CLINIC | Age: 82
End: 2017-08-15
Payer: COMMERCIAL

## 2017-08-15 DIAGNOSIS — Z79.01 LONG-TERM (CURRENT) USE OF ANTICOAGULANTS: ICD-10-CM

## 2017-08-15 DIAGNOSIS — I48.0 PAROXYSMAL ATRIAL FIBRILLATION (H): ICD-10-CM

## 2017-08-15 LAB — INR POINT OF CARE: 2.5 (ref 0.86–1.14)

## 2017-08-15 PROCEDURE — 36416 COLLJ CAPILLARY BLOOD SPEC: CPT

## 2017-08-15 PROCEDURE — 99207 ZZC NO CHARGE NURSE ONLY: CPT

## 2017-08-15 PROCEDURE — 85610 PROTHROMBIN TIME: CPT | Mod: QW

## 2017-08-15 NOTE — PROGRESS NOTES
ANTICOAGULATION FOLLOW-UP CLINIC VISIT    Patient Name:  Nathen Gage  Date:  8/15/2017  Contact Type:  Face to Face    SUBJECTIVE:     Patient Findings     Positives No Problem Findings           OBJECTIVE    INR Protime   Date Value Ref Range Status   08/15/2017 2.5 (A) 0.86 - 1.14 Final       ASSESSMENT / PLAN  INR assessment THER    Recheck INR In: 2 WEEKS    INR Location Clinic      Anticoagulation Summary as of 8/15/2017     INR goal 2.0-2.5   Today's INR 2.5   Maintenance plan 1.25 mg (2.5 mg x 0.5) on Tue; 2.5 mg (2.5 mg x 1) all other days   Full instructions 1.25 mg on Tue; 2.5 mg all other days   Weekly total 16.25 mg   No change documented Fidelina Pizano RN   Plan last modified Fidelina Pizano RN (8/8/2017)   Next INR check 8/29/2017   Target end date Indefinite    Indications   Long-term (current) use of anticoagulants [Z79.01] [Z79.01]  Atrial fibrillation (H) [I48.91]         Anticoagulation Episode Summary     INR check location     Preferred lab     Send INR reminders to [facility]    Comments       Anticoagulation Care Providers     Provider Role Specialty Phone number    Kushal Valentin MD [facility] [phone]            See the Encounter Report to view Anticoagulation Flowsheet and Dosing Calendar (Go to Encounters tab in chart review, and find the Anticoagulation Therapy Visit)        Fidelina Pizano RN

## 2017-08-15 NOTE — MR AVS SNAPSHOT
Nathen JANICE Gage   8/15/2017 2:45 PM   Anticoagulation Therapy Visit    Description:  81 year old male   Provider:  [facility]   Department:  Cr Nurse           INR as of 8/15/2017     Today's INR 2.5      Anticoagulation Summary as of 8/15/2017     INR goal 2.0-2.5   Today's INR 2.5   Full instructions 1.25 mg on Tue; 2.5 mg all other days   Next INR check 8/29/2017    Indications   Long-term (current) use of anticoagulants [Z79.01] [Z79.01]  Atrial fibrillation (H) [I48.91]         Your next Anticoagulation Clinic appointment(s)     Aug 29, 2017  9:00 AM CDT   Anticoagulation Visit with [facility]   [facility] ([facility])    [address] [place] [address] [phone]              Contact Numbers     Clinic Number:         August 2017 Details    Sun Mon Tue Wed Thu Fri Sat       1               2               3               4               5                 6               7               8               9               10               11               12                 13               14               15      1.25 mg   See details      16      2.5 mg         17      2.5 mg         18      2.5 mg         19      2.5 mg           20      2.5 mg         21      2.5 mg         22      1.25 mg         23      2.5 mg         24      2.5 mg         25      2.5 mg         26      2.5 mg           27      2.5 mg         28      2.5 mg         29            30               31                  Date Details   08/15 This INR check       Date of next INR:  8/29/2017         How to take your warfarin dose     To take:  1.25 mg Take 0.5 of a 2.5 mg tablet.    To take:  2.5 mg Take 1 of the 2.5 mg tablets.

## 2017-08-24 ENCOUNTER — PRE VISIT (OUTPATIENT)
Dept: CARDIOLOGY | Facility: CLINIC | Age: 82
End: 2017-08-24

## 2017-08-29 ENCOUNTER — ANTICOAGULATION THERAPY VISIT (OUTPATIENT)
Dept: NURSING | Facility: CLINIC | Age: 82
End: 2017-08-29
Payer: COMMERCIAL

## 2017-08-29 DIAGNOSIS — Z79.01 LONG-TERM (CURRENT) USE OF ANTICOAGULANTS: ICD-10-CM

## 2017-08-29 DIAGNOSIS — I48.0 PAROXYSMAL ATRIAL FIBRILLATION (H): ICD-10-CM

## 2017-08-29 LAB — INR POINT OF CARE: 2.9 (ref 0.86–1.14)

## 2017-08-29 PROCEDURE — 85610 PROTHROMBIN TIME: CPT | Mod: QW

## 2017-08-29 PROCEDURE — 99207 ZZC NO CHARGE NURSE ONLY: CPT

## 2017-08-29 PROCEDURE — 36416 COLLJ CAPILLARY BLOOD SPEC: CPT

## 2017-08-29 NOTE — MR AVS SNAPSHOT
Nathen Gage   8/29/2017 9:00 AM   Anticoagulation Therapy Visit    Description:  81 year old male   Provider:  [facility]   Department:  Cr Nurse           INR as of 8/29/2017     Today's INR 2.9!      Anticoagulation Summary as of 8/29/2017     INR goal 2.0-2.5   Today's INR 2.9!   Full instructions 1.25 mg on Tue; 2.5 mg all other days   Next INR check 9/12/2017    Indications   Long-term (current) use of anticoagulants [Z79.01] [Z79.01]  Atrial fibrillation (H) [I48.91]         Your next Anticoagulation Clinic appointment(s)     Sep 12, 2017  8:45 AM CDT   Anticoagulation Visit with [facility]   [facility] ([facility])    [address] [place] [address] [phone]              Contact Numbers     Clinic Number:         August 2017 Details    Sun Mon Tue Wed Thu Fri Sat       1               2               3               4               5                 6               7               8               9               10               11               12                 13               14               15               16               17               18               19                 20               21               22               23               24               25               26                 27               28               29      1.25 mg   See details      30      2.5 mg         31      2.5 mg            Date Details   08/29 This INR check   eat dark greens today               How to take your warfarin dose     To take:  1.25 mg Take 0.5 of a 2.5 mg tablet.    To take:  2.5 mg Take 1 of the 2.5 mg tablets.           September 2017 Details    Sun Mon Tue Wed Thu Fri Sat          1      2.5 mg         2      2.5 mg           3      2.5 mg         4      2.5 mg         5      1.25 mg         6      2.5 mg         7      2.5 mg         8      2.5 mg         9      2.5 mg           10      2.5 mg          11      2.5 mg         12            13               14               15               16                 17               18               19               20               21               22               23                 24               25               26               27               28               29               30                Date Details   No additional details    Date of next INR:  9/12/2017         How to take your warfarin dose     To take:  1.25 mg Take 0.5 of a 2.5 mg tablet.    To take:  2.5 mg Take 1 of the 2.5 mg tablets.

## 2017-08-29 NOTE — PROGRESS NOTES
ANTICOAGULATION FOLLOW-UP CLINIC VISIT    Patient Name:  Nathen Gage  Date:  8/29/2017  Contact Type:  Face to Face    SUBJECTIVE:     Patient Findings     Positives Change in diet/appetite (NONE--pt eats a dark green salad everyday), Nose bleeds (2 nosebleeds on 08/27, may need to see PCP to discuss seeing ENT as nosebleeds have been an issue since starting Warfarin), Activity level change (Activity level fluctuates, encouraged consistency: if not able to bike ride, perhaps going for a walk.)    Comments Will discuss lowering weekly Warfarin dose at next visit, if INR is supra again and/or nosebleeds resume.           OBJECTIVE    INR Protime   Date Value Ref Range Status   08/29/2017 2.9 (A) 0.86 - 1.14 Final       ASSESSMENT / PLAN  INR assessment SUPRA    Recheck INR In: 2 WEEKS    INR Location Clinic      Anticoagulation Summary as of 8/29/2017     INR goal 2.0-2.5   Today's INR 2.9!   Maintenance plan 1.25 mg (2.5 mg x 0.5) on Tue; 2.5 mg (2.5 mg x 1) all other days   Full instructions 1.25 mg on Tue; 2.5 mg all other days   Weekly total 16.25 mg   Plan last modified Fidelina Pizano RN (8/8/2017)   Next INR check 9/12/2017   Target end date Indefinite    Indications   Long-term (current) use of anticoagulants [Z79.01] [Z79.01]  Atrial fibrillation (H) [I48.91]         Anticoagulation Episode Summary     INR check location     Preferred lab     Send INR reminders to [facility]    Comments       Anticoagulation Care Providers     Provider Role Specialty Phone number    Kushal Valentin MD [facility] [phone]            See the Encounter Report to view Anticoagulation Flowsheet and Dosing Calendar (Go to Encounters tab in chart review, and find the Anticoagulation Therapy Visit)        Fidelina Pizano RN

## 2017-09-08 ENCOUNTER — HOSPITAL ENCOUNTER (OUTPATIENT)
Dept: CARDIOLOGY | Facility: CLINIC | Age: 82
Discharge: HOME | End: 2017-09-08
Attending: INTERNAL MEDICINE | Admitting: INTERNAL MEDICINE
Payer: COMMERCIAL

## 2017-09-08 DIAGNOSIS — I10 ESSENTIAL HYPERTENSION WITH GOAL BLOOD PRESSURE LESS THAN 140/90: ICD-10-CM

## 2017-09-08 DIAGNOSIS — I49.5 SICK SINUS SYNDROME (H): ICD-10-CM

## 2017-09-08 DIAGNOSIS — I77.810 DILATED AORTIC ROOT (H): ICD-10-CM

## 2017-09-08 DIAGNOSIS — I48.91 ATRIAL FIBRILLATION, UNSPECIFIED TYPE (H): ICD-10-CM

## 2017-09-08 DIAGNOSIS — I27.20 PULMONARY HYPERTENSION (H): ICD-10-CM

## 2017-09-08 DIAGNOSIS — R55 SYNCOPE, UNSPECIFIED SYNCOPE TYPE: ICD-10-CM

## 2017-09-08 PROCEDURE — 93306 TTE W/DOPPLER COMPLETE: CPT | Mod: 26 | Performed by: INTERNAL MEDICINE

## 2017-09-08 PROCEDURE — 93306 TTE W/DOPPLER COMPLETE: CPT

## 2017-09-12 ENCOUNTER — ANTICOAGULATION THERAPY VISIT (OUTPATIENT)
Dept: NURSING | Facility: CLINIC | Age: 82
End: 2017-09-12
Payer: COMMERCIAL

## 2017-09-12 DIAGNOSIS — I48.0 PAROXYSMAL ATRIAL FIBRILLATION (H): ICD-10-CM

## 2017-09-12 DIAGNOSIS — Z79.01 LONG-TERM (CURRENT) USE OF ANTICOAGULANTS: ICD-10-CM

## 2017-09-12 LAB — INR POINT OF CARE: 2.3 (ref 0.86–1.14)

## 2017-09-12 PROCEDURE — 36416 COLLJ CAPILLARY BLOOD SPEC: CPT

## 2017-09-12 PROCEDURE — 99207 ZZC NO CHARGE NURSE ONLY: CPT

## 2017-09-12 PROCEDURE — 85610 PROTHROMBIN TIME: CPT | Mod: QW

## 2017-09-12 NOTE — MR AVS SNAPSHOT
Nathen Gage   9/12/2017 8:45 AM   Anticoagulation Therapy Visit    Description:  81 year old male   Provider[facility]   Department:  Cr Nurse           INR as of 9/12/2017     Today's INR 2.3      Anticoagulation Summary as of 9/12/2017     INR goal 2.0-2.5   Today's INR 2.3   Full instructions 1.25 mg on Tue; 2.5 mg all other days   Next INR check 9/26/2017    Indications   Long-term (current) use of anticoagulants [Z79.01] [Z79.01]  Atrial fibrillation (H) [I48.91]         Your next Anticoagulation Clinic appointment(s)     Sep 26, 2017  9:00 AM CDT   Anticoagulation Visit with [facility]   [facility] ([facility]    [address] [place] [address] [phone]              Contact Numbers     Clinic Number:         September 2017 Details    Sun Mon Tue Wed Thu Fri Sat          1               2                 3               4               5               6               7               8               9                 10               11               12      1.25 mg   See details      13      2.5 mg         14      2.5 mg         15      2.5 mg         16      2.5 mg           17      2.5 mg         18      2.5 mg         19      1.25 mg         20      2.5 mg         21      2.5 mg         22      2.5 mg         23      2.5 mg           24      2.5 mg         25      2.5 mg         26            27               28               29               30                Date Details   09/12 This INR check       Date of next INR:  9/26/2017         How to take your warfarin dose     To take:  1.25 mg Take 0.5 of a 2.5 mg tablet.    To take:  2.5 mg Take 1 of the 2.5 mg tablets.

## 2017-09-12 NOTE — PROGRESS NOTES
ANTICOAGULATION FOLLOW-UP CLINIC VISIT    Patient Name:  Nathen Gage  Date:  9/12/2017  Contact Type:  Face to Face    SUBJECTIVE:     Patient Findings     Positives Activity level change (Has been going to the [facility] more often), No Problem Findings           OBJECTIVE    INR Protime   Date Value Ref Range Status   09/12/2017 2.3 (A) 0.86 - 1.14 Final       ASSESSMENT / PLAN  INR assessment THER    Recheck INR In: 2 WEEKS    INR Location Clinic      Anticoagulation Summary as of 9/12/2017     INR goal 2.0-2.5   Today's INR 2.3   Maintenance plan 1.25 mg (2.5 mg x 0.5) on Tue; 2.5 mg (2.5 mg x 1) all other days   Full instructions 1.25 mg on Tue; 2.5 mg all other days   Weekly total 16.25 mg   No change documented Fidelina Pizano RN   Plan last modified Fidelina Pizano RN (8/8/2017)   Next INR check 9/26/2017   Target end date Indefinite    Indications   Long-term (current) use of anticoagulants [Z79.01] [Z79.01]  Atrial fibrillation (H) [I48.91]         Anticoagulation Episode Summary     INR check location     Preferred lab     Send INR reminders to [facility]    Comments       Anticoagulation Care Providers     Provider Role Specialty Phone number    Kushal Valentin MD [facility] [phone]            See the Encounter Report to view Anticoagulation Flowsheet and Dosing Calendar (Go to Encounters tab in chart review, and find the Anticoagulation Therapy Visit)        Fidelina Pizano RN

## 2017-09-13 ENCOUNTER — OFFICE VISIT (OUTPATIENT)
Dept: CARDIOLOGY | Facility: CLINIC | Age: 82
End: 2017-09-13
Attending: INTERNAL MEDICINE
Payer: COMMERCIAL

## 2017-09-13 VITALS
SYSTOLIC BLOOD PRESSURE: 146 MMHG | WEIGHT: 171.5 LBS | DIASTOLIC BLOOD PRESSURE: 86 MMHG | HEART RATE: 73 BPM | BODY MASS INDEX: 25.99 KG/M2 | HEIGHT: 68 IN

## 2017-09-13 DIAGNOSIS — I27.20 PULMONARY HYPERTENSION (H): ICD-10-CM

## 2017-09-13 DIAGNOSIS — I10 ESSENTIAL HYPERTENSION WITH GOAL BLOOD PRESSURE LESS THAN 140/90: ICD-10-CM

## 2017-09-13 DIAGNOSIS — I48.91 ATRIAL FIBRILLATION, UNSPECIFIED TYPE (H): ICD-10-CM

## 2017-09-13 DIAGNOSIS — R55 SYNCOPE, UNSPECIFIED SYNCOPE TYPE: ICD-10-CM

## 2017-09-13 DIAGNOSIS — I49.5 SICK SINUS SYNDROME (H): ICD-10-CM

## 2017-09-13 DIAGNOSIS — E78.2 MIXED HYPERLIPIDEMIA: Primary | ICD-10-CM

## 2017-09-13 DIAGNOSIS — I77.810 DILATED AORTIC ROOT (H): ICD-10-CM

## 2017-09-13 LAB
ALT SERPL W P-5'-P-CCNC: <5 U/L (ref 5–30)
ANION GAP SERPL CALCULATED.3IONS-SCNC: 11.5 MMOL/L (ref 6–17)
BUN SERPL-MCNC: 16 MG/DL (ref 7–30)
CALCIUM SERPL-MCNC: 9.7 MG/DL (ref 8.5–10.5)
CHLORIDE SERPL-SCNC: 100 MMOL/L (ref 98–107)
CHOLEST SERPL-MCNC: 158 MG/DL
CO2 SERPL-SCNC: 28 MMOL/L (ref 23–29)
CREAT SERPL-MCNC: 1.29 MG/DL (ref 0.7–1.3)
GFR SERPL CREATININE-BSD FRML MDRD: 54 ML/MIN/1.7M2
GLUCOSE SERPL-MCNC: 104 MG/DL (ref 70–105)
HDLC SERPL-MCNC: 56 MG/DL
LDLC SERPL CALC-MCNC: 81 MG/DL
NONHDLC SERPL-MCNC: 102 MG/DL
POTASSIUM SERPL-SCNC: 4.5 MMOL/L (ref 3.5–5.1)
SODIUM SERPL-SCNC: 135 MMOL/L (ref 136–145)
TRIGL SERPL-MCNC: 103 MG/DL

## 2017-09-13 PROCEDURE — 80048 BASIC METABOLIC PNL TOTAL CA: CPT | Performed by: INTERNAL MEDICINE

## 2017-09-13 PROCEDURE — 99214 OFFICE O/P EST MOD 30 MIN: CPT | Performed by: INTERNAL MEDICINE

## 2017-09-13 PROCEDURE — 80061 LIPID PANEL: CPT | Performed by: INTERNAL MEDICINE

## 2017-09-13 PROCEDURE — 36415 COLL VENOUS BLD VENIPUNCTURE: CPT | Performed by: INTERNAL MEDICINE

## 2017-09-13 PROCEDURE — 84460 ALANINE AMINO (ALT) (SGPT): CPT | Performed by: INTERNAL MEDICINE

## 2017-09-13 NOTE — MR AVS SNAPSHOT
After Visit Summary   9/13/2017    Nathen Gage    MRN: [id number]           Patient Information     Date Of Birth          1935        Visit Information        Provider Department      9/13/2017 8:45 AM Zen Taylor MD [facility] [place]        Today's Diagnoses     Mixed hyperlipidemia    -  1    Atrial fibrillation, unspecified type (H)        Essential hypertension with goal blood pressure less than 140/90        Syncope, unspecified syncope type        Dilated aortic root (H)        Pulmonary hypertension        Sick sinus syndrome           Follow-ups after your visit        Additional Services     Follow-Up with Cardiac Advanced Practice Provider           Follow-Up with Cardiologist                 Your next 10 appointments already scheduled     Sep 26, 2017  9:00 AM CDT   Anticoagulation Visit with [facility]   [facility] ([facility])    [id number] [place] [address] [phone]            Sep 27, 2017  8:50 AM CDT   Pacemaker Check with RU DCR2   [facility] [place] ([facility])    [address]  [facility] [id number] [phone]              Future tests that were ordered for you today     Open Future Orders        Priority Expected Expires Ordered    Basic metabolic panel Routine 3/12/2018 9/13/2018 9/13/2017    Lipid Profile Routine 3/12/2018 9/13/2018 9/13/2017    ALT Routine 3/12/2018 9/13/2018 9/13/2017    Follow-Up with Cardiologist Routine 3/12/2018 9/13/2018 9/13/2017    Basic metabolic panel Routine 12/12/2017 9/13/2018 9/13/2017    Follow-Up with Cardiac Advanced Practice Provider Routine 12/12/2017 9/13/2018 9/13/2017            Who to contact     If you have questions or need follow up information about today's clinic visit or your schedule please contact [facility] [place] directly at [phone].  Normal or non-critical lab and imaging results will be communicated to you by MyChart, letter or phone within 4 business days after the clinic has received the results. If you do not hear from us within 7 days, please contact the clinic through [employer] or phone. If you have a critical or abnormal lab result, we will notify you by phone as soon as possible.  Submit refill requests through [employer] or call your pharmacy and they will forward the refill request to us. Please allow 3 business days for your refill to be completed.          Additional Information About Your Visit        Users[employer] Information     [employer] gives you secure access to your electronic health record. If you see a primary care provider, you can also send messages to your care team and make appointments. If you have questions, please call your primary care clinic.  If you do not have a primary care provider, please call [phone] and they will assist you.        Care EveryWhere ID     This is your Care EveryWhere ID. This could be used by other organizations to access your [facility] medical records  [id number]        Your Vitals Were     Pulse Height BMI (Body Mass Index)             73 1.715 m (5' 7.5\") 26.46 kg/m2          Blood Pressure from Last 3 Encounters:   09/13/17 146/86   06/13/17 138/73   05/31/17 138/70    Weight from Last 3 Encounters:   09/13/17 77.8 kg (171 lb 8 oz)   06/13/17 78.4 kg (172 lb 14.4 oz)   05/17/17 77.9 kg (171 lb 12.8 oz)              We Performed the Following     Follow-Up with Cardiologist        Primary Care Provider Office Phone # Fax #    Kushal Valentin [phone] [address] [facility] [address]        Equal Access to Services     MICHELE ALEXANDRA AH: Hadii aad ku hadasho Sorosa mariaali, waaxda luqadaha, qaybta kaalmada adesavannayada, anamaria diaz. So [facility] [phone].    ATENCIÓN: Si habla español, tiene a arana " disposición servicios gratuitos de asistencia lingüística. Terrence haq [phone].    We comply with applicable federal civil rights laws and [place] laws. We do not discriminate on the basis of race, color, national origin, age, disability sex, sexual orientation or gender identity.            Thank you!     Thank you for choosing [facility] [place]  for your care. Our goal is always to provide you with excellent care. Hearing back from our patients is one way we can continue to improve our services. Please take a few minutes to complete the written survey that you may receive in the mail after your visit with us. Thank you!             Your Updated Medication List - Protect others around you: Learn how to safely use, store and throw away your medicines at www.disposemymeds.org.          This list is accurate as of: 9/13/17  9:28 AM.  Always use your most recent med list.                   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

## 2017-09-13 NOTE — PROGRESS NOTES
HISTORY OF PRESENT ILLNESS:  The patient is an 81-year-old slightly overweight white male who presents to Cardiology Clinic for history of sick sinus syndrome with evidence of bradycardia and previous gastrointestinal bleed.  He has had 2-3 pauses in the past with heart rates in the 30-40s with initial potassium 3.1 and repeat potassium 2.7.  He and a 4-second pause.  GI tube was placed after which he vomited coffee ground material.  On endoscopy, he had esophagitis and was placed on a proton pump inhibitor.      He has a distant history of cigarette smoking, discontinued in 1985 and hypertension for the past 15-20 years.  He has had no history of diabetes mellitus, family history of premature coronary disease or history of documented hyperlipidemia.  He has had a history of mild elevation of serum lipids.  Pacemaker was inserted for the significant bradydysrhythmia and sick sinus syndrome and sinus pauses.  Holter monitor in the past has shown heart rates varying from  with average rate of 70 and a primarily paced rhythm.        He denies symptoms of chest discomfort, shortness of breath, dizziness, palpitations, nausea, vomiting, diaphoresis or syncope.  He denies PND, orthopnea, fever, chills or sweats.  He does have mild easy fatigability and general malaise with slight dyspnea on exertion or slight lightheadedness if he changes position too quickly.        His most recent echocardiogram performed this month demonstrated a normal-sized left ventricle with left ventricular ejection fraction 55%-60%.  There was mild to moderate tricuspid insufficiency and mild elevation of right ventricular systolic pressure at 28 mmHg plus right atrial pressure.  There was mild mitral insufficiency present.  No regional wall motion abnormality was noted and the ascending thoracic aorta was mildly dilated with no significant change from 2016.      MEDICATIONS:   1.  Warfarin as directed.   2.  Protonix 40 mg a day.   3.   Losartan 50 mg a day.   4.  Atorvastatin 20 mg a day.   5.  Doxazosin 4 mg a day.      LABORATORY DATA:  Demonstrated cholesterol of 158, HDL 56, LDL 81, triglycerides 103.  Sodium 135, potassium 4.5, BUN 16, creatinine 1.29.  ALT less than 5.      The patient presents to Cardiology Clinic for followup of his bradydysrhythmia, isolated episodes of atrial fibrillation, hypertension and hyperlipidemia.      PHYSICAL EXAMINATION:   VITAL SIGNS:  Blood pressure 146/86 with a recheck of 132/64 and heart rate of 73.  Weight was 171 pounds, which is stable.   NECK:  Without jugular venous distention, carotid bruit or palpable thyroid.   CHEST:  Essentially clear to percussion and auscultation with slight decreased breath sounds at the bases.   CARDIAC:  Regular rhythm, occasional premature beat.  There was a soft S4 gallop.  There is a 1-2/6 systolic murmur at the left sternal border with minimal radiation.  No diastolic murmur, rub or S3.   EXTREMITIES:  Without cyanosis or edema.      CLINICAL IMPRESSION:   1.  Stable cardiac condition.   2.  History of paroxysmal atrial fibrillation.   3.  History of mildly dilated aortic root.   4.  History of mild pulmonary hypertension.   5.  History of hyperlipidemia, at goal.   6.  History of sick sinus syndrome, status post pacemaker insertion for bradydysrhythmia.   7.  Distant history of cigarette smoking.   8.  History of systolic blood pressure with slight increase in systolic blood pressure.   9.  History of gastrointestinal bleeding in the distant past.      DISCUSSION:  The patient has actually done well clinically since his last visit.  He has had no significant symptoms of chest discomfort, shortness of breath, dizziness or palpitations.  He has not been limited in his activity.  He needs continued close followup of his device, serum lipids, basic metabolic panel and blood pressure as well as cautious anticoagulation with INR in the low 2s as goal.  This is because of his  previous history of gastrointestinal bleed and nosebleeds.  His systolic blood pressure has been tending slightly higher in the high 130s and low 140s.  He will try to eliminate caffeine and reduce salt in his diet while maintaining good sleep pattern with close followup before considering increasing losartan to  mg a day.  Otherwise, he appears to be doing well and is stable.      RECOMMENDATIONS:   1.  Continue present medications.   2.  Device followup.   3.  Cautious anticoagulation with INR goal of 2.0 to 2.5.   4.  Close followup of serum lipids, basic metabolic panel and blood pressure with followup with Ines Velasquez in 3 months.   5.  Diet and exercise as tolerated, avoiding caffeine and salt.   6.  Routine medical followup.   7.  Cardiology followup in 6 months.      cc:      [place] [address]         SCARLETT YING MD, [facility]             D: 2017 09:33   T: 2017 10:33   MT: MIGUEL      Name:     MANUEL DEJESUS   MRN:      [id number]        Account:      [id number]   :      1935           Service Date: 2017      Document: [id number]

## 2017-09-13 NOTE — LETTER
9/13/2017    Kushal Valentin MD  [address]  [facility] [address]    RE: Nathen JANICE Gage       Dear Colleague,    I had the pleasure of seeing Nathen CAMACHO Merari in the [facility].    The patient is an 81-year-old slightly overweight white male who presents to Cardiology Clinic for history of sick sinus syndrome with evidence of bradycardia and previous gastrointestinal bleed.  He has had 2-3 pauses in the past with heart rates in the 30-40s with initial potassium 3.1 and repeat potassium 2.7.  He and a 4-second pause.  GI tube was placed after which he vomited coffee ground material.  On endoscopy, he had esophagitis and was placed on a proton pump inhibitor.      He has a distant history of cigarette smoking, discontinued in 1985 and hypertension for the past 15-20 years.  He has had no history of diabetes mellitus, family history of premature coronary disease or history of documented hyperlipidemia.  He has had a history of mild elevation of serum lipids.  Pacemaker was inserted for the significant bradydysrhythmia and sick sinus syndrome and sinus pauses.  Holter monitor in the past has shown heart rates varying from  with average rate of 70 and a primarily paced rhythm.     He denies symptoms of chest discomfort, shortness of breath, dizziness, palpitations, nausea, vomiting, diaphoresis or syncope.  He denies PND, orthopnea, fever, chills or sweats.  He does have mild easy fatigability and general malaise with slight dyspnea on exertion or slight lightheadedness if he changes position too quickly.        His most recent echocardiogram performed this month demonstrated a normal-sized left ventricle with left ventricular ejection fraction 55%-60%.  There was mild to moderate tricuspid insufficiency and mild elevation of right ventricular systolic pressure at 28 mmHg plus right atrial pressure.  There was mild mitral insufficiency present.  No regional wall motion  abnormality was noted and the ascending thoracic aorta was mildly dilated with no significant change from 2016.      MEDICATIONS:   1.  Warfarin as directed.   2.  Protonix 40 mg a day.   3.  Losartan 50 mg a day.   4.  Atorvastatin 20 mg a day.   5.  Doxazosin 4 mg a day.      LABORATORY DATA:  Demonstrated cholesterol of 158, HDL 56, LDL 81, triglycerides 103.  Sodium 135, potassium 4.5, BUN 16, creatinine 1.29.  ALT less than 5.      The patient presents to Cardiology Clinic for followup of his bradydysrhythmia, isolated episodes of atrial fibrillation, hypertension and hyperlipidemia.      PHYSICAL EXAMINATION:   VITAL SIGNS:  Blood pressure 146/86 with a recheck of 132/64 and heart rate of 73.  Weight was 171 pounds, which is stable.   NECK:  Without jugular venous distention, carotid bruit or palpable thyroid.   CHEST:  Essentially clear to percussion and auscultation with slight decreased breath sounds at the bases.   CARDIAC:  Regular rhythm, occasional premature beat.  There was a soft S4 gallop.  There is a 1-2/6 systolic murmur at the left sternal border with minimal radiation.  No diastolic murmur, rub or S3.   EXTREMITIES:  Without cyanosis or edema.      CLINICAL IMPRESSION:   1.  Stable cardiac condition.   2.  History of paroxysmal atrial fibrillation.   3.  History of mildly dilated aortic root.   4.  History of mild pulmonary hypertension.   5.  History of hyperlipidemia, at goal.   6.  History of sick sinus syndrome, status post pacemaker insertion for bradydysrhythmia.   7.  Distant history of cigarette smoking.   8.  History of systolic blood pressure with slight increase in systolic blood pressure.   9.  History of gastrointestinal bleeding in the distant past.      DISCUSSION:  The patient has actually done well clinically since his last visit.  He has had no significant symptoms of chest discomfort, shortness of breath, dizziness or palpitations.  He has not been limited in his activity.  He  needs continued close followup of his device, serum lipids, basic metabolic panel and blood pressure as well as cautious anticoagulation with INR in the low 2s as goal.  This is because of his previous history of gastrointestinal bleed and nosebleeds.  His systolic blood pressure has been tending slightly higher in the high 130s and low 140s.  He will try to eliminate caffeine and reduce salt in his diet while maintaining good sleep pattern with close followup before considering increasing losartan to  mg a day.  Otherwise, he appears to be doing well and is stable.      RECOMMENDATIONS:   1.  Continue present medications.   2.  Device followup.   3.  Cautious anticoagulation with INR goal of 2.0 to 2.5.   4.  Close followup of serum lipids, basic metabolic panel and blood pressure with followup with Ines Velasquez in 3 months.   5.  Diet and exercise as tolerated, avoiding caffeine and salt.   6.  Routine medical followup.   7.  Cardiology followup in 6 months.          Again, thank you for allowing me to participate in the care of your patient.      Sincerely,    Zen Taylor MD     [facility]

## 2017-09-26 ENCOUNTER — ANTICOAGULATION THERAPY VISIT (OUTPATIENT)
Dept: NURSING | Facility: CLINIC | Age: 82
End: 2017-09-26
Payer: COMMERCIAL

## 2017-09-26 DIAGNOSIS — Z79.01 LONG-TERM (CURRENT) USE OF ANTICOAGULANTS: ICD-10-CM

## 2017-09-26 DIAGNOSIS — I48.0 PAROXYSMAL ATRIAL FIBRILLATION (H): ICD-10-CM

## 2017-09-26 LAB — INR POINT OF CARE: 2.4 (ref 0.86–1.14)

## 2017-09-26 PROCEDURE — 99207 ZZC NO CHARGE NURSE ONLY: CPT

## 2017-09-26 PROCEDURE — 36416 COLLJ CAPILLARY BLOOD SPEC: CPT

## 2017-09-26 PROCEDURE — 85610 PROTHROMBIN TIME: CPT | Mod: QW

## 2017-09-26 NOTE — PROGRESS NOTES
ANTICOAGULATION FOLLOW-UP CLINIC VISIT    Patient Name:  Nathen Gage  Date:  9/26/2017  Contact Type:  Face to Face    SUBJECTIVE:     Patient Findings     Positives No Problem Findings           OBJECTIVE    INR Protime   Date Value Ref Range Status   09/26/2017 2.4 (A) 0.86 - 1.14 Final       ASSESSMENT / PLAN  INR assessment THER    Recheck INR In: 3 WEEKS    INR Location Clinic      Anticoagulation Summary as of 9/26/2017     INR goal 2.0-2.5   Today's INR 2.4   Maintenance plan 1.25 mg (2.5 mg x 0.5) on Tue; 2.5 mg (2.5 mg x 1) all other days   Full instructions 1.25 mg on Tue; 2.5 mg all other days   Weekly total 16.25 mg   No change documented Fidelina Pizano RN   Plan last modified Fidelina Pizano RN (8/8/2017)   Next INR check 10/17/2017   Target end date Indefinite    Indications   Long-term (current) use of anticoagulants [Z79.01] [Z79.01]  Atrial fibrillation (H) [I48.91]         Anticoagulation Episode Summary     INR check location     Preferred lab     Send INR reminders to [facility] CL[facility]    Comments       Anticoagulation Care Providers     Provider Role Specialty Phone number    Kushal Valentin MD [facility] [phone]            See the Encounter Report to view Anticoagulation Flowsheet and Dosing Calendar (Go to Encounters tab in chart review, and find the Anticoagulation Therapy Visit)        Fidelina Pizano RN

## 2017-09-26 NOTE — MR AVS SNAPSHOT
Nathen Gage   9/26/2017 9:00 AM   Anticoagulation Therapy Visit    Description:  81 year old male   Provider:  [facility]   Department:  Cr Nurse           INR as of 9/26/2017     Today's INR 2.4      Anticoagulation Summary as of 9/26/2017     INR goal 2.0-2.5   Today's INR 2.4   Full instructions 1.25 mg on Tue; 2.5 mg all other days   Next INR check 10/17/2017    Indications   Long-term (current) use of anticoagulants [Z79.01] [Z79.01]  Atrial fibrillation (H) [I48.91]         Your next Anticoagulation Clinic appointment(s)     Sep 26, 2017  9:00 AM CDT   [facility]agulation [facility] with [facility]   [facility]    [address] [address] [facility] [address]   [phone]            Oct 17, 2017  9:00 AM CDT   Anticoagulation Visit with [facility]ATION CLINIC   [facility]    [address] [facility] [address]   [phone]              Contact Numbers     Clinic Number:         September 2017 Details    Sun Mon Tue Wed Thu Fri Sat          1               2                 3               4               5               6               7               8               9                 10               11               12               13               14               15               16                 17               18               19               20               21               22               23                 24               25               26      1.25 mg   See details      27      2.5 mg         28      2.5 mg         29      2.5 mg         30      2.5 mg          Date Details   09/26 This INR check               How to take your warfarin dose     To take:  1.25 mg Take 0.5 of a 2.5 mg tablet.    To take:  2.5 mg Take 1 of the 2.5 mg tablets.           October 2017 Details    Sun Mon Tue Wed Thu Fri Sat     1      2.5 mg         2       2.5 mg         3      1.25 mg         4      2.5 mg         5      2.5 mg         6      2.5 mg         7      2.5 mg           8      2.5 mg         9      2.5 mg         10      1.25 mg         11      2.5 mg         12      2.5 mg         13      2.5 mg         14      2.5 mg           15      2.5 mg         16      2.5 mg         17            18               19               20               21                 22               23               24               25               26               27               28                 29               30               31                    Date Details   No additional details    Date of next INR:  10/17/2017         How to take your warfarin dose     To take:  1.25 mg Take 0.5 of a 2.5 mg tablet.    To take:  2.5 mg Take 1 of the 2.5 mg tablets.

## 2017-09-28 ENCOUNTER — OFFICE VISIT (OUTPATIENT)
Dept: FAMILY MEDICINE | Facility: CLINIC | Age: 82
End: 2017-09-28
Payer: COMMERCIAL

## 2017-09-28 VITALS
HEART RATE: 59 BPM | DIASTOLIC BLOOD PRESSURE: 82 MMHG | TEMPERATURE: 97.8 F | BODY MASS INDEX: 26.77 KG/M2 | SYSTOLIC BLOOD PRESSURE: 140 MMHG | RESPIRATION RATE: 16 BRPM | WEIGHT: 173.5 LBS | OXYGEN SATURATION: 99 %

## 2017-09-28 DIAGNOSIS — B35.4 TINEA CORPORIS: Primary | ICD-10-CM

## 2017-09-28 PROCEDURE — 99213 OFFICE O/P EST LOW 20 MIN: CPT | Performed by: FAMILY MEDICINE

## 2017-09-28 RX ORDER — TERBINAFINE HYDROCHLORIDE 250 MG/1
250 TABLET ORAL DAILY
Qty: 10 TABLET | Refills: 0 | Status: SHIPPED | OUTPATIENT
Start: 2017-09-28 | End: 2017-09-28

## 2017-09-28 RX ORDER — TERBINAFINE HYDROCHLORIDE 250 MG/1
250 TABLET ORAL DAILY
Qty: 10 TABLET | Refills: 0 | Status: SHIPPED | OUTPATIENT
Start: 2017-09-28 | End: 2017-11-21

## 2017-09-28 NOTE — PROGRESS NOTES
SUBJECTIVE:   Nathen Gage is a 81 year old male who presents to clinic today for the following health issues:      Rash  Onset: 2 days     Description:   Location: armpits and groin area   Character: painful, red  Itching (Pruritis): YES    Progression of Symptoms:  worsening    Accompanying Signs & Symptoms:  Fever: no   Body aches or joint pain: no   Sore throat symptoms: no   Recent cold symptoms: no     History:   Previous similar rash: no     Precipitating factors:   Exposure to similar rash: no   New exposures: None and clothes detergant    Recent travel: no     Alleviating factors:  none    Therapies Tried and outcome: none      Problem list and histories reviewed & adjusted, as indicated.  Additional history: as documented    Patient Active Problem List   Diagnosis     Esophageal reflux     Essential hypertension, benign     Testicular hypofunction     Impotence of organic origin     Gout     Actinic keratosis     Sick sinus syndrome (H)     Syncope     Hyponatremia     Pneumonia     Advanced directives, counseling/discussion     Pacemaker, artificial     Health Care Home     Atrial fibrillation (H)     Dilated aortic root (H)     Pulmonary hypertension (H)     H/O: GI bleed     Mixed hyperlipidemia     Faintness     Long-term (current) use of anticoagulants [Z79.01]     Acute idiopathic gout of left knee     Presbycusis, unspecified laterality     Past Surgical History:   Procedure Laterality Date     IMPLANT PACEMAKER  2011    Pacemaker/cardiac insitu / [employer] Dual chamber, V45       Social History   Substance Use Topics     Smoking status: Former Smoker     Quit date: 1/1/1975     Smokeless tobacco: Never Used     Alcohol use Yes      Comment: 1- 2 BEERS WEEKLY     Family History   Problem Relation Age of Onset     Alzheimer Disease Brother      CEREBROVASCULAR DISEASE Father 80     Family History Negative Sister      Family History Negative Son      Family History Negative Daughter       Family History Negative Sister      Family History Negative Daughter      Breast Cancer No family hx of      Prostate Cancer No family hx of              Reviewed and updated as needed this visit by clinical staff     Reviewed and updated as needed this visit by Provider         ROS:  Constitutional, HEENT, cardiovascular, pulmonary, gi and gu systems are negative, except as otherwise noted.      OBJECTIVE:   /82  Pulse 59  Temp 97.8  F (36.6  C) (Oral)  Resp 16  Wt 173 lb 8 oz (78.7 kg)  SpO2 99%  BMI 26.77 kg/m2  Body mass index is 26.77 kg/(m^2).  GENERAL: healthy, alert and no distress  SKIN: Erythematous patches of skin in armpits and groin bilaterally with multiple circular satellite lesions on torso     ASSESSMENT/PLAN:     1. Tinea corporis  - Will treat with orals since this rash is so widespread   - terbinafine (LAMISIL) 250 MG tablet; Take 1 tablet (250 mg) by mouth daily  Dispense: 10 tablet; Refill: 0    Follow up if symptoms are worsening or not improving    Mckenna Salas,   [facility]

## 2017-09-28 NOTE — MR AVS SNAPSHOT
After Visit Summary   9/28/2017    Nathen Gage    MRN: [id number]           Patient Information     Date Of Birth          1935        Visit Information        Provider Department      9/28/2017 2:20 PM Mckenna Salas,  [facility]        Today's Diagnoses     Tinea corporis    -  1       Follow-ups after your visit        Your next 10 appointments already scheduled     Oct 17, 2017  9:00 AM CDT   Anticoagulation Visit with [facility] ANTICOAGULATION CLINIC   [facility] ([facility])    [address] [address] [facility] [id number] [phone]            Oct 25, 2017  8:50 AM CDT   Pacemaker Check with RU DCR2   [facility] ([facility])    [address]  [facility] [id number] [phone]            Nov 29, 2017  9:00 AM CST   LAB with PATE LAB   [facility] ([facility])    [address] [place] [address] [address]  [facility] [id number] [phone]           Patient must bring picture ID. Patient should be prepared to give a urine specimen  Please do not eat 10-12 hours before your appointment if you are coming in fasting for labs on lipids, cholesterol, or glucose (sugar). Pregnant women should follow their Care Team instructions. Water with medications is okay. Do not drink coffee or other fluids. If you have concerns about taking  your medications, please ask at office or if scheduling via Local[employer]t, send a message by clicking on Secure Messaging, Message Your Care Team.            Nov 29, 2017 10:00 AM CST   Return Visit with TYRESE Oates CNP   [facility] ([facility])    [address] [place] [address] [address]  [facility] [id number] [phone]              Who to contact     If you have questions or need follow up information about today's clinic visit or your schedule  please contact [facility] directly at [phone].  Normal or non-critical lab and imaging results will be communicated to you by MyChart, letter or phone within 4 business days after the clinic has received the results. If you do not hear from us within 7 days, please contact the clinic through [employer] or phone. If you have a critical or abnormal lab result, we will notify you by phone as soon as possible.  Submit refill requests through [employer] or call your pharmacy and they will forward the refill request to us. Please allow 3 business days for your refill to be completed.          Additional Information About Your Visit        [employer] Information     [employer] gives you secure access to your electronic health record. If you see a primary care provider, you can also send messages to your care team and make appointments. If you have questions, please call your primary care clinic.  If you do not have a primary care provider, please call [phone] and they will assist you.        Care EveryWhere ID     This is your Care EveryWhere ID. This could be used by other organizations to access your [facility] medical records  [id number]        Your Vitals Were     Pulse Temperature Respirations Pulse Oximetry BMI (Body Mass Index)       59 97.8  F (36.6  C) (Oral) 16 99% 26.77 kg/m2        Blood Pressure from Last 3 Encounters:   09/28/17 140/82   09/13/17 146/86   06/13/17 138/73    Weight from Last 3 Encounters:   09/28/17 173 lb 8 oz (78.7 kg)   09/13/17 171 lb 8 oz (77.8 kg)   06/13/17 172 lb 14.4 oz (78.4 kg)              Today, you had the following     No orders found for display         Today's Medication Changes          These changes are accurate as of: 9/28/17 11:59 PM.  If you have any questions, ask your nurse or doctor.               Start taking these medicines.        Dose/Directions    terbinafine 250 MG tablet   Commonly known as:  lamISIL   Used for:  Tinea corporis   Started by:   Mckenna Salas,         Dose:  250 mg   Take 1 tablet (250 mg) by mouth daily   Quantity:  10 tablet   Refills:  0            Where to get your medicines      These [place] [facility] [address]  [address] [facility] [address]     Phone:  [phone]     terbinafine 250 MG tablet                Primary Care Provider Office Phone # Fax #    Kushal Valentin [phone] [address] [facility] [address]        Equal Access to Services     [facility]: Hadii aad ku hadasho Soomaali, waaxda luqadaha, qaybta kaalmada adeegyada, waxferoz vines haydoreen mclaughlin . So [facility] [phone].    ATENCIÓN: Si habla español, tiene a arana disposición servicios gratuitos de asistencia lingüística. [facility] [phone].    We comply with applicable federal civil rights laws and [place] laws. We do not discriminate on the basis of race, color, national origin, age, disability, sex, sexual orientation, or gender identity.            Thank you!     Thank you for choosing [facility]  for your care. Our goal is always to provide you with excellent care. Hearing back from our patients is one way we can continue to improve our services. Please take a few minutes to complete the written survey that you may receive in the mail after your visit with us. Thank you!             Your Updated Medication List - Protect others around you: Learn how to safely use, store and throw away your medicines at www.disposemymeds.org.          This list is accurate as of: 9/28/17 11:59 PM.  Always use your most recent med list.                   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

## 2017-09-28 NOTE — NURSING NOTE
"Chief Complaint   Patient presents with     Derm Problem     armpt and groin area x2 days        Initial /68 (BP Location: Right arm, Patient Position: Chair, Cuff Size: Adult Regular)  Pulse 59  Temp 97.8  F (36.6  C) (Oral)  Resp 16  Wt 173 lb 8 oz (78.7 kg)  SpO2 99%  BMI 26.77 kg/m2 Estimated body mass index is 26.77 kg/(m^2) as calculated from the following:    Height as of 9/13/17: 5' 7.5\" (1.715 m).    Weight as of this encounter: 173 lb 8 oz (78.7 kg).  Medication Reconciliation: complete   "

## 2017-10-17 ENCOUNTER — ANTICOAGULATION THERAPY VISIT (OUTPATIENT)
Dept: NURSING | Facility: CLINIC | Age: 82
End: 2017-10-17
Payer: COMMERCIAL

## 2017-10-17 DIAGNOSIS — Z79.01 LONG-TERM (CURRENT) USE OF ANTICOAGULANTS: ICD-10-CM

## 2017-10-17 DIAGNOSIS — I48.0 PAROXYSMAL ATRIAL FIBRILLATION (H): ICD-10-CM

## 2017-10-17 LAB — INR POINT OF CARE: 1.6 (ref 0.86–1.14)

## 2017-10-17 PROCEDURE — 85610 PROTHROMBIN TIME: CPT | Mod: QW

## 2017-10-17 PROCEDURE — 36416 COLLJ CAPILLARY BLOOD SPEC: CPT

## 2017-10-17 PROCEDURE — 99207 ZZC NO CHARGE NURSE ONLY: CPT

## 2017-10-17 NOTE — MR AVS SNAPSHOT
Nathen Gage   10/17/2017 9:00 AM   Anticoagulation Therapy Visit    Description:  81 year old male   Provider:  [facility] ANTICOAGULATION CLINIC   Department:  Cr Nurse           INR as of 10/17/2017     Today's INR 1.6!      Anticoagulation Summary as of 10/17/2017     INR goal 2.0-2.5   Today's INR 1.6!   Full instructions 10/17: 2.5 mg; Otherwise 1.25 mg on Tue; 2.5 mg all other days   Next INR check 10/31/2017    Indications   Long-term (current) use of anticoagulants [Z79.01] [Z79.01]  Atrial fibrillation (H) [I48.91]         Your next Anticoagulation Clinic appointment(s)     Oct 31, 2017  9:45 AM CDT   Anticoagulation Visit with [facility] ANTICOAGULATION CLINIC   [facility]    [address] [place] [address]   [phone]              Contact Numbers     Clinic Number:         October 2017 Details    Sun Mon Tue Wed Thu Fri Sat     1               2               3               4               5               6               7                 8               9               10               11               12               13               14                 15               16               17      2.5 mg   See details      18      2.5 mg         19      2.5 mg         20      2.5 mg         21      2.5 mg           22      2.5 mg         23      2.5 mg         24      1.25 mg         25      2.5 mg         26      2.5 mg         27      2.5 mg         28      2.5 mg           29      2.5 mg         30      2.5 mg         31                 Date Details   10/17 This INR check       Date of next INR:  10/31/2017         How to take your warfarin dose     To take:  1.25 mg Take 0.5 of a 2.5 mg tablet.    To take:  2.5 mg Take 1 of the 2.5 mg tablets.

## 2017-10-17 NOTE — PROGRESS NOTES
ANTICOAGULATION FOLLOW-UP CLINIC VISIT    Patient Name:  Nathen Gage  Date:  10/17/2017  Contact Type:  Face to Face    SUBJECTIVE:     Patient Findings     Positives Change in diet/appetite (Pt states he ate several large salads while travelling last week.  I encouraged consistency and smaller portion of greens to keep his INR stable.)           OBJECTIVE    INR Protime   Date Value Ref Range Status   10/17/2017 1.6 (A) 0.86 - 1.14 Final       ASSESSMENT / PLAN  INR assessment SUB    Recheck INR In: 2 WEEKS    INR Location Clinic      Anticoagulation Summary as of 10/17/2017     INR goal 2.0-2.5   Today's INR 1.6!   Maintenance plan 1.25 mg (2.5 mg x 0.5) on Tue; 2.5 mg (2.5 mg x 1) all other days   Full instructions 10/17: 2.5 mg; Otherwise 1.25 mg on Tue; 2.5 mg all other days   Weekly total 16.25 mg   Plan last modified Fidelina Pizano RN (8/8/2017)   Next INR check 10/31/2017   Target end date Indefinite    Indications   Long-term (current) use of anticoagulants [Z79.01] [Z79.01]  Atrial fibrillation (H) [I48.91]         Anticoagulation Episode Summary     INR check location     Preferred lab     Send INR reminders to [facility] CLINIC    Comments       Anticoagulation Care Providers     Provider Role Specialty Phone number    Kushal Valentin MD [facility] [phone]            See the Encounter Report to view Anticoagulation Flowsheet and Dosing Calendar (Go to Encounters tab in chart review, and find the Anticoagulation Therapy Visit)        Fidelina Pizano RN

## 2017-10-25 ENCOUNTER — ALLIED HEALTH/NURSE VISIT (OUTPATIENT)
Dept: CARDIOLOGY | Facility: CLINIC | Age: 82
End: 2017-10-25
Payer: COMMERCIAL

## 2017-10-25 DIAGNOSIS — Z95.0 CARDIAC PACEMAKER IN SITU: Primary | ICD-10-CM

## 2017-10-25 PROCEDURE — 93280 PM DEVICE PROGR EVAL DUAL: CPT | Performed by: INTERNAL MEDICINE

## 2017-10-25 NOTE — MR AVS SNAPSHOT
After Visit Summary   10/25/2017    Nathen Gage    MRN: [id number]           Patient Information     Date Of Birth          1935        Visit Information        Provider Department      10/25/2017 8:50 AM RU DCR2 [facility]        Today's Diagnoses     Cardiac pacemaker in situ    -  1       Follow-ups after your visit        Your next 10 appointments already scheduled     Oct 31, 2017  9:45 AM CDT   Anticoagulation Visit with CR ANTICOAGULATION CLINIC   [facility] ([facility])    [address] [facility] [id number] [phone]            Nov 29, 2017  9:00 AM CST   LAB with PATE LAB   [facility] ([facility])    [address] [place] [id number] [phone]           Patient must bring picture ID. Patient should be prepared to give a urine specimen  Please do not eat 10-12 hours before your appointment if you are coming in fasting for labs on lipids, cholesterol, or glucose (sugar). Pregnant women should follow their Care Team instructions. Water with medications is okay. Do not drink coffee or other fluids. If you have concerns about taking  your medications, please ask at office or if scheduling via dscout, send a message by clicking on Secure Messaging, Message Your Care Team.            Nov 29, 2017 10:00 AM CST   Return Visit with TYRESE Oates CNP   [facility] ([facility])    [address] [place], [place] [address]  [facility] [address] [phone]            Jan 25, 2018  8:45 AM CST   Phone Device Check with PATE TECH2   [facility] ([facility])    [address] [place] [address] [phone]              Who to contact     If you have questions or need follow up information about today's clinic  visit or your schedule please contact [facility] [place] directly at [phone].  Normal or non-critical lab and imaging results will be communicated to you by MyChart, letter or phone within 4 business days after the clinic has received the results. If you do not hear from us within 7 days, please contact the clinic through Talkwheelhart or phone. If you have a critical or abnormal lab result, we will notify you by phone as soon as possible.  Submit refill requests through [employer] or call your pharmacy and they will forward the refill request to us. Please allow 3 business days for your refill to be completed.          Additional Information About Your Visit        Talkwheelhart Information     [employer] gives you secure access to your electronic health record. If you see a primary care provider, you can also send messages to your care team and make appointments. If you have questions, please call your primary care clinic.  If you do not have a primary care provider, please call [phone] and they will assist you.        Care EveryWhere ID     This is your Care EveryWhere ID. This could be used by other organizations to access your [place] medical records  [id number]         Blood Pressure from Last 3 Encounters:   09/28/17 140/82   09/13/17 146/86   06/13/17 138/73    Weight from Last 3 Encounters:   09/28/17 78.7 kg (173 lb 8 oz)   09/13/17 77.8 kg (171 lb 8 oz)   06/13/17 78.4 kg (172 lb 14.4 oz)              We Performed the Following     PM DEVICE PROGRAMMING EVAL, DUAL LEAD PACER ([id number])        Primary Care Provider Office Phone # Fax #    Kushal Valentin [phone] [address] [facility] [address]        Equal Access to Services     MICHELE ALEXANDRA : Harley Cade, wamickey meehan, qaybta sabrinaalanamaria friedman. So [facility] [phone].    ATENCIÓN: Si habla español, tiene a arana disposición  servicios gratuitos de asistencia lingüística. Terrence haq [phone].    We comply with applicable federal civil rights laws and [place] laws. We do not discriminate on the basis of race, color, national origin, age, disability, sex, sexual orientation, or gender identity.            Thank you!     Thank you for choosing [facility] [place]  for your care. Our goal is always to provide you with excellent care. Hearing back from our patients is one way we can continue to improve our services. Please take a few minutes to complete the written survey that you may receive in the mail after your visit with us. Thank you!             Your Updated Medication List - Protect others around you: Learn how to safely use, store and throw away your medicines at www.disposemymeds.org.          This list is accurate as of: 10/25/17  9:18 AM.  Always use your most recent med list.                   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

## 2017-10-25 NOTE — PROGRESS NOTES
[employer] Pacemaker Device Check  AP: 36 % : 0 %  Mode: DDDR        Underlying Rhythm: SR  Heart Rate: Adequate variation per histogram  Sensing: stable    Pacing Threshold: stable   Impedance: stable  Battery Status: > 5 years  Atrial Arrhythmia: none  Ventricular Arrhythmia: 3 vent high rates for PAT lasting 8 sec.   Setting Change: none    Care Plan: f/u 3 months phone teletrace check. Sohail

## 2017-10-31 ENCOUNTER — ANTICOAGULATION THERAPY VISIT (OUTPATIENT)
Dept: NURSING | Facility: CLINIC | Age: 82
End: 2017-10-31
Payer: COMMERCIAL

## 2017-10-31 ENCOUNTER — ALLIED HEALTH/NURSE VISIT (OUTPATIENT)
Dept: NURSING | Facility: CLINIC | Age: 82
End: 2017-10-31
Payer: COMMERCIAL

## 2017-10-31 DIAGNOSIS — Z79.01 LONG-TERM (CURRENT) USE OF ANTICOAGULANTS: ICD-10-CM

## 2017-10-31 DIAGNOSIS — I48.0 PAROXYSMAL ATRIAL FIBRILLATION (H): ICD-10-CM

## 2017-10-31 DIAGNOSIS — Z23 NEED FOR PROPHYLACTIC VACCINATION AND INOCULATION AGAINST INFLUENZA: Primary | ICD-10-CM

## 2017-10-31 LAB — INR POINT OF CARE: 2.2 (ref 0.86–1.14)

## 2017-10-31 PROCEDURE — 36416 COLLJ CAPILLARY BLOOD SPEC: CPT

## 2017-10-31 PROCEDURE — 85610 PROTHROMBIN TIME: CPT | Mod: QW

## 2017-10-31 PROCEDURE — 99207 ZZC NO CHARGE NURSE ONLY: CPT

## 2017-10-31 PROCEDURE — G0008 ADMIN INFLUENZA VIRUS VAC: HCPCS

## 2017-10-31 PROCEDURE — 90662 IIV NO PRSV INCREASED AG IM: CPT

## 2017-10-31 NOTE — MR AVS SNAPSHOT
After Visit Summary   10/31/2017    Nathen Gage    MRN: [id number]           Patient Information     Date Of Birth          1935        Visit Information        Provider Department      10/31/2017 11:00 AM CR SILVER MA/LPN [facility]        Today's Diagnoses     Need for prophylactic vaccination and inoculation against influenza    -  1       Follow-ups after your visit        Your next 10 appointments already scheduled     Oct 31, 2017 11:00 AM CDT   Nurse Only with CR SILVER MA/LPN   [facility]    [address] [facility] [address]   [phone]            Nov 21, 2017  9:30 AM CST   Anticoagulation Visit with CR ANTICOAGULATION CLINIC   [facility]    [address] [facility] [address]   [phone]            Nov 29, 2017  9:00 AM CST   LAB with PATE LAB   [facility]    [address] [place], [place] [address]  [facility] [address] [phone]           Please do not eat 10-12 hours before your appointment if you are coming in fasting for labs on lipids, cholesterol, or glucose (sugar). This does not apply to pregnant women. Water, hot tea and black coffee (with nothing added) are okay. Do not drink other fluids, diet soda or chew gum.            Nov 29, 2017 10:00 AM CST   Return Visit with TYRESE Oates CNP   [facility] ([facility])    [facility] [address]  [facility] [address] [phone]            Jan 25, 2018  8:45 AM CST   Phone Device Check with PATE TECH2   [facility] ([facility])    [address] [place], [place] [address]  [facility] [id number] [phone]              Who to contact     If you have questions or need follow up information about today's clinic visit  or your schedule please contact [facility] directly at [phone].  Normal or non-critical lab and imaging results will be communicated to you by MyChart, letter or phone within 4 business days after the clinic has received the results. If you do not hear from us within 7 days, please contact the clinic through MobileDevHQhart or phone. If you have a critical or abnormal lab result, we will notify you by phone as soon as possible.  Submit refill requests through [employer] or call your pharmacy and they will forward the refill request to us. Please allow 3 business days for your refill to be completed.          Additional Information About Your Visit        MobileDevHQharAlexis Bittar Information     [employer] gives you secure access to your electronic health record. If you see a primary care provider, you can also send messages to your care team and make appointments. If you have questions, please call your primary care clinic.  If you do not have a primary care provider, please call [phone] and they will assist you.        Care EveryWhere ID     This is your Care EveryWhere ID. This could be used by other organizations to access your [place] medical records  [id number]         Blood Pressure from Last 3 Encounters:   09/28/17 140/82   09/13/17 146/86   06/13/17 138/73    Weight from Last 3 Encounters:   09/28/17 173 lb 8 oz (78.7 kg)   09/13/17 171 lb 8 oz (77.8 kg)   06/13/17 172 lb 14.4 oz (78.4 kg)              We Performed the Following     ADMIN INFLUENZA (For MEDICARE Patients ONLY) []     FLU VACCINE, INCREASED ANTIGEN, PRESV FREE, AGE 65+ [10663]        Primary Care Provider Office Phone # Fax #    Kushal Johann Valentin [phone] [address] [facility] [address]        Equal Access to Services     MICHELE ALEXANDRA : Harley Cade, estephania meehan, anamaria boateng. So [facility] [phone].    ATENCIÓN: Dre valdez  español, tiene a arana disposición servicios gratuitos de asistencia lingüística. Terrence haq [phone].    We comply with applicable federal civil rights laws and [place] laws. We do not discriminate on the basis of race, color, national origin, age, disability, sex, sexual orientation, or gender identity.            Thank you!     Thank you for choosing [facility]  for your care. Our goal is always to provide you with excellent care. Hearing back from our patients is one way we can continue to improve our services. Please take a few minutes to complete the written survey that you may receive in the mail after your visit with us. Thank you!             Your Updated Medication List - Protect others around you: Learn how to safely use, store and throw away your medicines at www.disposemymeds.org.          This list is accurate as of: 10/31/17 10:03 AM.  Always use your most recent med list.                   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

## 2017-10-31 NOTE — PROGRESS NOTES

## 2017-10-31 NOTE — MR AVS SNAPSHOT
Nathen JANICE Gage   10/31/2017 9:45 AM   Anticoagulation Therapy Visit    Description:  82 year old male   Provider:  CR ANTICOAGULATION CLINIC   Department:  Cr Nurse           INR as of 10/31/2017     Today's INR 2.2      Anticoagulation Summary as of 10/31/2017     INR goal 2.0-2.5   Today's INR 2.2   Full instructions 1.25 mg on Tue; 2.5 mg all other days   Next INR check 11/21/2017    Indications   Long-term (current) use of anticoagulants [Z79.01] [Z79.01]  Atrial fibrillation (H) [I48.91]         Your next Anticoagulation Clinic appointment(s)     Nov 21, 2017  9:30 AM CST   Anticoagulation Visit with CR ANTICOAGULATION CLINIC   [facility]    [address] [place] [address] [phone]              Contact Numbers     Clinic Number:         October 2017 Details    Sun Mon Tue Wed Thu Fri Sat     1               2               3               4               5               6               7                 8               9               10               11               12               13               14                 15               16               17               18               19               20               21                 22               23               24               25               26               27               28                 29               30               31      1.25 mg   See details           Date Details   10/31 This INR check               How to take your warfarin dose     To take:  1.25 mg Take 0.5 of a 2.5 mg tablet.           November 2017 Details    Sun Mon Tue Wed Thu Fri Sat        1      2.5 mg         2      2.5 mg         3      2.5 mg         4      2.5 mg           5      2.5 mg         6      2.5 mg         7      1.25 mg         8      2.5 mg         9      2.5 mg         10      2.5 mg         11      2.5 mg           12      2.5 mg         13      2.5 mg         14      1.25  mg         15      2.5 mg         16      2.5 mg         17      2.5 mg         18      2.5 mg           19      2.5 mg         20      2.5 mg         21            22               23               24               25                 26               27               28               29               30                  Date Details   No additional details    Date of next INR:  11/21/2017         How to take your warfarin dose     To take:  1.25 mg Take 0.5 of a 2.5 mg tablet.    To take:  2.5 mg Take 1 of the 2.5 mg tablets.

## 2017-10-31 NOTE — PROGRESS NOTES
ANTICOAGULATION FOLLOW-UP CLINIC VISIT    Patient Name:  Nathen Gage  Date:  10/31/2017  Contact Type:  Face to Face    SUBJECTIVE:     Patient Findings     Positives Change in diet/appetite (Watching portion sizes of greens, trying to stay with 1 cup servings.), Other complaints (Had URI last week with cough and nasal congestion.  Pt is feeling better now), Activity level change (Has resumed going to the [facility])           OBJECTIVE    INR Protime   Date Value Ref Range Status   10/31/2017 2.2 (A) 0.86 - 1.14 Final       ASSESSMENT / PLAN  INR assessment THER    Recheck INR In: 3 WEEKS    INR Location Clinic      Anticoagulation Summary as of 10/31/2017     INR goal 2.0-2.5   Today's INR 2.2   Maintenance plan 1.25 mg (2.5 mg x 0.5) on Tue; 2.5 mg (2.5 mg x 1) all other days   Full instructions 1.25 mg on Tue; 2.5 mg all other days   Weekly total 16.25 mg   No change documented Fidelina Pizano RN   Plan last modified Fidelina Pizano RN (8/8/2017)   Next INR check 11/21/2017   Target end date Indefinite    Indications   Long-term (current) use of anticoagulants [Z79.01] [Z79.01]  Atrial fibrillation (H) [I48.91]         Anticoagulation Episode Summary     INR check location     Preferred lab     Send INR reminders to [facility]    Comments       Anticoagulation Care Providers     Provider Role Specialty Phone number    Kushal Valentin MD [facility] [phone]            See the Encounter Report to view Anticoagulation Flowsheet and Dosing Calendar (Go to Encounters tab in chart review, and find the Anticoagulation Therapy Visit)        Fidelina Pizano, RN

## 2017-11-21 ENCOUNTER — ANTICOAGULATION THERAPY VISIT (OUTPATIENT)
Dept: NURSING | Facility: CLINIC | Age: 82
End: 2017-11-21
Payer: COMMERCIAL

## 2017-11-21 DIAGNOSIS — I48.0 PAROXYSMAL ATRIAL FIBRILLATION (H): ICD-10-CM

## 2017-11-21 DIAGNOSIS — Z79.01 LONG-TERM (CURRENT) USE OF ANTICOAGULANTS: ICD-10-CM

## 2017-11-21 LAB — INR POINT OF CARE: 2.8 (ref 0.86–1.14)

## 2017-11-21 PROCEDURE — 36416 COLLJ CAPILLARY BLOOD SPEC: CPT

## 2017-11-21 PROCEDURE — 99207 ZZC NO CHARGE NURSE ONLY: CPT

## 2017-11-21 PROCEDURE — 85610 PROTHROMBIN TIME: CPT | Mod: QW

## 2017-11-21 NOTE — MR AVS SNAPSHOT
Nathen Gage   11/21/2017 9:30 AM   Anticoagulation Therapy Visit    Description:  82 year old male   Provider:  CR ANTICOAGULATION CLINIC   Department:  Cr Nurse           INR as of 11/21/2017     Today's INR 2.8!      Anticoagulation Summary as of 11/21/2017     INR goal 2.0-2.5   Today's INR 2.8!   Full instructions 1.25 mg on Tue; 2.5 mg all other days   Next INR check 12/5/2017    Indications   Long-term (current) use of anticoagulants [Z79.01] [Z79.01]  Atrial fibrillation (H) [I48.91]         Your next Anticoagulation Clinic appointment(s)     Dec 05, 2017  8:45 AM CST   Anticoagulation Visit with  ANTICOAGULATION CLINIC   [facility]    [address] [place] [address] [phone]              Contact Numbers     Clinic Number:         November 2017 Details    Sun Mon Tue Wed Thu Fri Sat        1               2               3               4                 5               6               7               8               9               10               11                 12               13               14               15               16               17               18                 19               20               21      1.25 mg   See details      22      2.5 mg         23      2.5 mg         24      2.5 mg         25      2.5 mg           26      2.5 mg         27      2.5 mg         28      1.25 mg         29      2.5 mg         30      2.5 mg            Date Details   11/21 This INR check               How to take your warfarin dose     To take:  1.25 mg Take 0.5 of a 2.5 mg tablet.    To take:  2.5 mg Take 1 of the 2.5 mg tablets.           December 2017 Details    Sun Mon Tue Wed Thu Fri Sat          1      2.5 mg         2      2.5 mg           3      2.5 mg         4      2.5 mg         5            6               7               8               9                 10               11               12                13               14               15               16                 17               18               19               20               21               22               23                 24               25               26               27               28               29               30                 31                      Date Details   No additional details    Date of next INR:  12/5/2017         How to take your warfarin dose     To take:  1.25 mg Take 0.5 of a 2.5 mg tablet.    To take:  2.5 mg Take 1 of the 2.5 mg tablets.

## 2017-11-21 NOTE — PROGRESS NOTES
ANTICOAGULATION FOLLOW-UP CLINIC VISIT    Patient Name:  Nathen Gage  Date:  11/21/2017  Contact Type:  Face to Face    SUBJECTIVE:     Patient Findings     Positives Unexplained INR or factor level change    Comments Patient is eating greens everyday, he may add spinach to his salads or incorporate broccoli as his INR has been labile.  Patient is still going to the [facility] 3 times per week.           OBJECTIVE    INR Protime   Date Value Ref Range Status   11/21/2017 2.8 (A) 0.86 - 1.14 Final       ASSESSMENT / PLAN  INR assessment SUPRA    Recheck INR In: 2 WEEKS    INR Location Clinic      Anticoagulation Summary as of 11/21/2017     INR goal 2.0-2.5   Today's INR 2.8!   Maintenance plan 1.25 mg (2.5 mg x 0.5) on Tue; 2.5 mg (2.5 mg x 1) all other days   Full instructions 1.25 mg on Tue; 2.5 mg all other days   Weekly total 16.25 mg   No change documented Fidelina Pizano RN   Plan last modified Fidelina Pizano RN (8/8/2017)   Next INR check 12/5/2017   Target end date Indefinite    Indications   Long-term (current) use of anticoagulants [Z79.01] [Z79.01]  Atrial fibrillation (H) [I48.91]         Anticoagulation Episode Summary     INR check location     Preferred lab     Send INR reminders to  [facility]    Comments       Anticoagulation Care Providers     Provider Role Specialty Phone number    Kushal Valentin MD [facility] [phone]            See the Encounter Report to view Anticoagulation Flowsheet and Dosing Calendar (Go to Encounters tab in chart review, and find the Anticoagulation Therapy Visit)        Fidelina Pizano RN

## 2017-11-29 ENCOUNTER — OFFICE VISIT (OUTPATIENT)
Dept: CARDIOLOGY | Facility: CLINIC | Age: 82
End: 2017-11-29
Attending: INTERNAL MEDICINE
Payer: COMMERCIAL

## 2017-11-29 VITALS
HEIGHT: 68 IN | WEIGHT: 173.4 LBS | BODY MASS INDEX: 26.28 KG/M2 | DIASTOLIC BLOOD PRESSURE: 60 MMHG | HEART RATE: 69 BPM | SYSTOLIC BLOOD PRESSURE: 132 MMHG

## 2017-11-29 DIAGNOSIS — I27.20 PULMONARY HYPERTENSION (H): ICD-10-CM

## 2017-11-29 DIAGNOSIS — I10 ESSENTIAL HYPERTENSION WITH GOAL BLOOD PRESSURE LESS THAN 140/90: ICD-10-CM

## 2017-11-29 DIAGNOSIS — I48.91 ATRIAL FIBRILLATION, UNSPECIFIED TYPE (H): ICD-10-CM

## 2017-11-29 DIAGNOSIS — I49.5 SICK SINUS SYNDROME (H): ICD-10-CM

## 2017-11-29 DIAGNOSIS — E78.2 MIXED HYPERLIPIDEMIA: ICD-10-CM

## 2017-11-29 DIAGNOSIS — I77.810 DILATED AORTIC ROOT (H): ICD-10-CM

## 2017-11-29 DIAGNOSIS — R55 SYNCOPE, UNSPECIFIED SYNCOPE TYPE: ICD-10-CM

## 2017-11-29 DIAGNOSIS — I48.0 PAROXYSMAL ATRIAL FIBRILLATION (H): ICD-10-CM

## 2017-11-29 LAB
ANION GAP SERPL CALCULATED.3IONS-SCNC: 11.8 MMOL/L (ref 6–17)
BUN SERPL-MCNC: 13 MG/DL (ref 7–30)
CALCIUM SERPL-MCNC: 9.3 MG/DL (ref 8.5–10.5)
CHLORIDE SERPL-SCNC: 100 MMOL/L (ref 98–107)
CO2 SERPL-SCNC: 28 MMOL/L (ref 23–29)
CREAT SERPL-MCNC: 1.08 MG/DL (ref 0.7–1.3)
GFR SERPL CREATININE-BSD FRML MDRD: 66 ML/MIN/1.7M2
GLUCOSE SERPL-MCNC: 96 MG/DL (ref 70–105)
POTASSIUM SERPL-SCNC: 4.8 MMOL/L (ref 3.5–5.1)
SODIUM SERPL-SCNC: 135 MMOL/L (ref 136–145)

## 2017-11-29 PROCEDURE — 99213 OFFICE O/P EST LOW 20 MIN: CPT | Performed by: NURSE PRACTITIONER

## 2017-11-29 PROCEDURE — 36415 COLL VENOUS BLD VENIPUNCTURE: CPT | Performed by: INTERNAL MEDICINE

## 2017-11-29 PROCEDURE — 80048 BASIC METABOLIC PNL TOTAL CA: CPT | Performed by: INTERNAL MEDICINE

## 2017-11-29 NOTE — PROGRESS NOTES
HPI and Plan: #[id number]  See dictation    Orders Placed This Encounter   Procedures     EKG 12-lead complete w/read - Clinics       No orders of the defined types were placed in this encounter.      There are no discontinued medications.      Encounter Diagnoses   Name Primary?     Atrial fibrillation, unspecified type (H)      Essential hypertension with goal blood pressure less than 140/90      Syncope, unspecified syncope type      Dilated aortic root (H)      Pulmonary hypertension      Sick sinus syndrome      Mixed hyperlipidemia        CURRENT MEDICATIONS:  Current Outpatient Prescriptions   Medication Sig Dispense Refill     warfarin (COUMADIN) 2.5 MG tablet Take 1.25mg every Tu / Take 2.5mg all other days OR As directed by INR Clinic 30 tablet 5     pantoprazole (PROTONIX) 40 MG EC tablet Take 1 tablet (40 mg) by mouth daily 90 tablet 2     losartan (COZAAR) 50 MG tablet Take 1 tablet (50 mg) by mouth daily 90 tablet 2     atorvastatin (LIPITOR) 20 MG tablet Take 1 tablet (20 mg) by mouth daily 90 tablet 2     doxazosin (CARDURA) 4 MG tablet Take 1 tablet (4 mg) by mouth daily 90 tablet 1       ALLERGIES     Allergies   Allergen Reactions     Neomycin Sulfate        PAST MEDICAL HISTORY:  Past Medical History:   Diagnosis Date     Actinic keratosis      Acute idiopathic gout of left knee 4/27/2016     Atrial fibrillation (H)     on Eliquis     Dilated aortic root (H)      Esophageal reflux      Esophagitis      H/O: GI bleed 2010     Hyperlipidaemia      Hyperlipidemia LDL goal <100      Impotence of organic origin      Presbycusis, unspecified laterality 4/27/2016     Pulmonary hypertension      Sick sinus syndrome (H)     pacemaker implanted 2011     Unspecified essential hypertension        PAST SURGICAL HISTORY:  Past Surgical History:   Procedure Laterality Date     IMPLANT PACEMAKER  2011    Pacemaker/cardiac insitu / [employer] Dual chamber, V45       FAMILY HISTORY:  Family History   Problem  "Relation Age of Onset     Alzheimer Disease Brother      CEREBROVASCULAR DISEASE Father 80     Family History Negative Sister      Family History Negative Son      Family History Negative Daughter      Family History Negative Sister      Family History Negative Daughter      Breast Cancer No family hx of      Prostate Cancer No family hx of        SOCIAL HISTORY:  Social History     Social History     Marital status:      Spouse name: N/A     Number of children: N/A     Years of education: N/A     Social History Main Topics     Smoking status: Former Smoker     Quit date: 1/1/1975     Smokeless tobacco: Never Used     Alcohol use Yes      Comment: 1- 2 BEERS WEEKLY     Drug use: No     Sexual activity: Yes     Partners: Female     Other Topics Concern     Parent/Sibling W/ Cabg, Mi Or Angioplasty Before 65f 55m? No     Caffeine Concern No     1 cup coffee per day     Sleep Concern No     Stress Concern No     Weight Concern No     Special Diet Yes     on warfarin      Exercise Yes     states he is very active, dancing, skiing, walking      Bike Helmet Yes     Seat Belt Yes     Social History Narrative       Review of Systems:  Skin:  Negative       Eyes:  Positive for glasses;glaucoma early candidate for glaucoma  ENT:  Positive for hearing loss hearing aids  Respiratory:  Positive for cough     Cardiovascular:    lightheadedness;Positive for when bending over  Gastroenterology: Negative      Genitourinary:  Negative      Musculoskeletal:  Positive for back pain;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  Neurologic:  Positive for memory problems    Psychiatric:  Negative      Heme/Lymph/Imm:  Positive for allergies;easy bruising    Endocrine:  Negative        Physical Exam:  Vitals: /60  Pulse 69  Ht 1.715 m (5' 7.5\")  Wt 78.7 kg (173 lb 6.4 oz)  BMI 26.76 kg/m2    Constitutional:  cooperative, alert and oriented, well developed, well nourished, in no acute distress        Skin:  warm and dry to " the touch, no apparent skin lesions or masses noted          Head:  normocephalic, no masses or lesions        Eyes:  pupils equal and round, conjunctivae and lids unremarkable, sclera white, no xanthalasma, EOMS intact, no nystagmus        Lymph:      ENT:  no pallor or cyanosis, dentition good        Neck:           Respiratory:  normal breath sounds, clear to auscultation, normal A-P diameter, normal symmetry, normal respiratory excursion, no use of accessory muscles         Cardiac: regular rhythm;normal S1 and S2;no murmurs, gallops or rubs detected                not assessed this visit                                        GI:  abdomen soft;BS normoactive        Extremities and Muscular Skeletal:  no deformities, clubbing, cyanosis, erythema observed;no edema              Neurological:  no gross motor deficits;affect appropriate        Psych:  Alert and Oriented x 3        CC  Zen Taylor MD  [address]  [place] [id number]

## 2017-11-29 NOTE — MR AVS SNAPSHOT
After Visit Summary   11/29/2017    Nathen Gage    MRN: [id number]           Patient Information     Date Of Birth          1935        Visit Information        Provider Department      11/29/2017 10:00 AM Ines Velasquez APRN CNP [facility]        Today's Diagnoses     Atrial fibrillation, unspecified type (H)        Essential hypertension with goal blood pressure less than 140/90        Syncope, unspecified syncope type        Dilated aortic root (H)        Pulmonary hypertension        Sick sinus syndrome        Mixed hyperlipidemia          Care Instructions    No changes    Component      Latest Ref Rng & Units 9/13/2017 11/29/2017   Sodium      136 - 145 mmol/L 135 (L) 135 (L)   Potassium      3.5 - 5.1 mmol/L 4.5 4.8   Chloride      98 - 107 mmol/L 100 100   Carbon Dioxide      23 - 29 mmol/L 28 28   Anion Gap      6 - 17 mmol/L 11.5 11.8   Glucose      70 - 105 mg/dL 104 96   Urea Nitrogen      7 - 30 mg/dL 16 13   Creatinine      0.70 - 1.30 mg/dL 1.29 1.08   GFR Estimate      >60 mL/min/1.7m2 54 (L) 66   GFR Estimate If Black      >60 mL/min/1.7m2 65 79   Calcium      8.5 - 10.5 mg/dL 9.7 9.3             Follow-ups after your visit        Your next 10 appointments already scheduled     Dec 05, 2017  8:45 AM CST   Anticoagulation Visit with [facility]   [facility]    [address] [address] [address] [phone]            Jan 25, 2018  8:45 AM CST   Phone Device Check with PATE TECH2   [facility]   [place] ([facility])    [address] [place] [id number] [phone]              Who to contact     If you have questions or need follow up information about today's clinic visit or your schedule please contact [facility]   [place] directly at [phone].  Normal or  "non-critical lab and imaging results will be communicated to you by MyChart, letter or phone within 4 business days after the clinic has received the results. If you do not hear from us within 7 days, please contact the clinic through [employer] or phone. If you have a critical or abnormal lab result, we will notify you by phone as soon as possible.  Submit refill requests through [employer] or call your pharmacy and they will forward the refill request to us. Please allow 3 business days for your refill to be completed.          Additional Information About Your Visit        [employer] Information     [employer] gives you secure access to your electronic health record. If you see a primary care provider, you can also send messages to your care team and make appointments. If you have questions, please call your primary care clinic.  If you do not have a primary care provider, please call [phone] and they will assist you.        Care EveryWhere ID     This is your Care EveryWhere ID. This could be used by other organizations to access your [facility] medical records  [id number]        Your Vitals Were     Pulse Height BMI (Body Mass Index)             69 1.715 m (5' 7.5\") 26.76 kg/m2          Blood Pressure from Last 3 Encounters:   11/29/17 132/60   09/28/17 140/82   09/13/17 146/86    Weight from Last 3 Encounters:   11/29/17 78.7 kg (173 lb 6.4 oz)   09/28/17 78.7 kg (173 lb 8 oz)   09/13/17 77.8 kg (171 lb 8 oz)              We Performed the Following     EKG 12-lead complete w/read - Clinics     Follow-Up with Cardiac Advanced Practice Provider        Primary Care Provider Office Phone # Fax #    Kushal Johann Valentin [phone] [address] [facility] [address]        Equal Access to Services     MICHELE ALEXANDRA : Harley Cade, estephania meehan, qaybta kaalanamaria friedman. So [facility] [phone].    ATENCIÓN: Si pina pate " arana disposición servicios gratuitos de asistencia lingüística. Terrence haq [phone].    We comply with applicable federal civil rights laws and [place] laws. We do not discriminate on the basis of race, color, national origin, age, disability, sex, sexual orientation, or gender identity.            Thank you!     Thank you for choosing [facility]  for your care. Our goal is always to provide you with excellent care. Hearing back from our patients is one way we can continue to improve our services. Please take a few minutes to complete the written survey that you may receive in the mail after your visit with us. Thank you!             Your Updated Medication List - Protect others around you: Learn how to safely use, store and throw away your medicines at www.disposemymeds.org.          This list is accurate as of: 11/29/17 10:12 AM.  Always use your most recent med list.                   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

## 2017-11-29 NOTE — LETTER
11/29/2017      Kushal Valentin MD  [address] [facility] [address]      RE: Nathen Gage       Dear Colleague,    I had the pleasure of seeing Nathen Gage in the [facility].    HISTORY OF PRESENT ILLNESS:  Mr. Gage is a delightful 82-year-old gentleman here today for followup.  He is an established patient of Dr. Bonilla.  He has a history of sick sinus syndrome with evidence of bradycardia and gastrointestinal bleeding in the past.  He is on warfarin with an INR range of 2-2.5.  The patient states he has not had any nosebleeds for over 6 months.      He has had issues with hypokalemia associated with pauses noted on telemetry.  This was at the time of his GI bleed.  He has a history of cigarette smoking, stopping in 1995, and hypertension.  He underwent implantation of a pacemaker for sick sinus syndrome with pauses.      Overall, he is feeling well.  He denies chest pain, shortness of breath, lightheadedness or dizziness.  Blood pressure today is stable at 132/60.  BMP today is essentially normal with a creatinine of 1.08.      Last device interrogation in October showed 36% A-paced.  He is at DDDR.  Underlying rhythm was sinus.  He has had some episodes of paroxysmal atrial tachycardia.  All other review of systems and past medical history are noted below.      ASSESSMENT AND PLAN:  Mr. Gage is a delightful 82-year-old gentleman here today for followup:   1.  History of paroxysmal atrial fibrillation.  Not noted on his recent device checks.  He is on warfarin therapy.  He has had issues with a GI bleed in the past and nosebleeds.  He is at a range of 2-2.5.  He does not like the fluctuation of INRs associated with his diet.  He tries to eat healthy and be active.  He has not had any nosebleeds since on a stricter INR schedule.   2.  Hypertension.  Blood pressure is stable today.  He is on losartan 50 mg daily, Cardura 4 mg daily (mostly for BPH).   3.   Hypercholesterolemia.  The patient is on 20 mg of atorvastatin.  Lipids were checked in September and results were as follows:  Total cholesterol 158, HDL 56, LDL 81, triglycerides of 103.   4.  Sick sinus syndrome with bradyarrhythmias.  He has a dual chamber pacemaker that is checked on a regular basis.  Pocket has healed nicely.        As always, thank you for including us in his care.  Please feel free to contact us if you have questions or concerns regarding today's assessment and plan.       Outpatient Encounter Prescriptions as of 11/29/2017   Medication Sig Dispense Refill     [DISCONTINUED] warfarin (COUMADIN) 2.5 MG tablet Take 1.25mg every Tu / Take 2.5mg all other days OR As directed by INR Clinic 30 tablet 5     pantoprazole (PROTONIX) 40 MG EC tablet Take 1 tablet (40 mg) by mouth daily 90 tablet 2     losartan (COZAAR) 50 MG tablet Take 1 tablet (50 mg) by mouth daily 90 tablet 2     atorvastatin (LIPITOR) 20 MG tablet Take 1 tablet (20 mg) by mouth daily 90 tablet 2     [DISCONTINUED] doxazosin (CARDURA) 4 MG tablet Take 1 tablet (4 mg) by mouth daily 90 tablet 1     No facility-administered encounter medications on file as of 11/29/2017.        Again, thank you for allowing me to participate in the care of your patient.      Sincerely,    Ines Velasquez NP, APRN [facility]

## 2017-11-29 NOTE — PROGRESS NOTES
HISTORY OF PRESENT ILLNESS:  Mr. Gage is a delightful 82-year-old gentleman here today for followup.  He is an established patient of Dr. Bonilla.  He has a history of sick sinus syndrome with evidence of bradycardia and gastrointestinal bleeding in the past.  He is on warfarin with an INR range of 2-2.5.  The patient states he has not had any nosebleeds for over 6 months.      He has had issues with hypokalemia associated with pauses noted on telemetry.  This was at the time of his GI bleed.  He has a history of cigarette smoking, stopping in 1995, and hypertension.  He underwent implantation of a pacemaker for sick sinus syndrome with pauses.      Overall, he is feeling well.  He denies chest pain, shortness of breath, lightheadedness or dizziness.  Blood pressure today is stable at 132/60.  BMP today is essentially normal with a creatinine of 1.08.      Last device interrogation in October showed 36% A-paced.  He is at DDDR.  Underlying rhythm was sinus.  He has had some episodes of paroxysmal atrial tachycardia.  All other review of systems and past medical history are noted below.      ASSESSMENT AND PLAN:  Mr. Gage is a delightful 82-year-old gentleman here today for followup:   1.  History of paroxysmal atrial fibrillation.  Not noted on his recent device checks.  He is on warfarin therapy.  He has had issues with a GI bleed in the past and nosebleeds.  He is at a range of 2-2.5.  He does not like the fluctuation of INRs associated with his diet.  He tries to eat healthy and be active.  He has not had any nosebleeds since on a stricter INR schedule.   2.  Hypertension.  Blood pressure is stable today.  He is on losartan 50 mg daily, Cardura 4 mg daily (mostly for BPH).   3.  Hypercholesterolemia.  The patient is on 20 mg of atorvastatin.  Lipids were checked in September and results were as follows:  Total cholesterol 158, HDL 56, LDL 81, triglycerides of 103.   4.  Sick sinus syndrome with  bradyarrhythmias.  He has a dual chamber pacemaker that is checked on a regular basis.  Pocket has healed nicely.        As always, thank you for including us in his care.  Please feel free to contact us if you have questions or concerns regarding today's assessment and plan.         TONIE HOROWITZ NP             D: 2017 11:03   T: 2017 12:50   MT: JOS      Name:     MANUEL DEJESUS   MRN:      [id number]        Account:      [id number]   :      1935           Service Date: 2017      Document: [id number]

## 2017-11-29 NOTE — PATIENT INSTRUCTIONS
No changes    Component      Latest Ref Rng & Units 9/13/2017 11/29/2017   Sodium      136 - 145 mmol/L 135 (L) 135 (L)   Potassium      3.5 - 5.1 mmol/L 4.5 4.8   Chloride      98 - 107 mmol/L 100 100   Carbon Dioxide      23 - 29 mmol/L 28 28   Anion Gap      6 - 17 mmol/L 11.5 11.8   Glucose      70 - 105 mg/dL 104 96   Urea Nitrogen      7 - 30 mg/dL 16 13   Creatinine      0.70 - 1.30 mg/dL 1.29 1.08   GFR Estimate      >60 mL/min/1.7m2 54 (L) 66   GFR Estimate If Black      >60 mL/min/1.7m2 65 79   Calcium      8.5 - 10.5 mg/dL 9.7 9.3

## 2017-12-05 ENCOUNTER — ANTICOAGULATION THERAPY VISIT (OUTPATIENT)
Dept: NURSING | Facility: CLINIC | Age: 82
End: 2017-12-05
Payer: COMMERCIAL

## 2017-12-05 DIAGNOSIS — Z79.01 LONG-TERM (CURRENT) USE OF ANTICOAGULANTS: ICD-10-CM

## 2017-12-05 DIAGNOSIS — I48.91 ATRIAL FIBRILLATION (H): ICD-10-CM

## 2017-12-05 DIAGNOSIS — N13.8 BENIGN LOCALIZED HYPERPLASIA OF PROSTATE WITH URINARY OBSTRUCTION: ICD-10-CM

## 2017-12-05 DIAGNOSIS — N40.1 BENIGN LOCALIZED HYPERPLASIA OF PROSTATE WITH URINARY OBSTRUCTION: ICD-10-CM

## 2017-12-05 LAB — INR POINT OF CARE: 2.5 (ref 0.86–1.14)

## 2017-12-05 PROCEDURE — 99207 ZZC NO CHARGE NURSE ONLY: CPT

## 2017-12-05 PROCEDURE — 85610 PROTHROMBIN TIME: CPT | Mod: QW

## 2017-12-05 PROCEDURE — 36416 COLLJ CAPILLARY BLOOD SPEC: CPT

## 2017-12-05 RX ORDER — DOXAZOSIN 4 MG/1
4 TABLET ORAL DAILY
Qty: 90 TABLET | Refills: 1 | Status: SHIPPED | OUTPATIENT
Start: 2017-12-05 | End: 2018-06-07

## 2017-12-05 NOTE — PROGRESS NOTES
ANTICOAGULATION FOLLOW-UP CLINIC VISIT    Patient Name:  Nathen Gage  Date:  12/5/2017  Contact Type:  Face to Face    SUBJECTIVE:     Patient Findings     Positives No Problem Findings           OBJECTIVE    INR Protime   Date Value Ref Range Status   12/05/2017 2.5 (A) 0.86 - 1.14 Final       ASSESSMENT / PLAN  INR assessment THER    Recheck INR In: 2 WEEKS    INR Location Clinic      Anticoagulation Summary as of 12/5/2017     INR goal 2.0-2.5   Today's INR 2.5   Maintenance plan 1.25 mg (2.5 mg x 0.5) on Tue; 2.5 mg (2.5 mg x 1) all other days   Full instructions 1.25 mg on Tue; 2.5 mg all other days   Weekly total 16.25 mg   No change documented Betty Mcallister RN   Plan last modified Fidelina Pizano RN (8/8/2017)   Next INR check 12/21/2017   Target end date Indefinite    Indications   Long-term (current) use of anticoagulants [Z79.01] [Z79.01]  Atrial fibrillation (H) [I48.91]         Anticoagulation Episode Summary     INR check location     Preferred lab     Send INR reminders to [facility]    Comments       Anticoagulation Care Providers     Provider Role Specialty Phone number    Kushal Valentin MD [facility] [phone]            See the Encounter Report to view Anticoagulation Flowsheet and Dosing Calendar (Go to Encounters tab in chart review, and find the Anticoagulation Therapy Visit)        Betty Mcallister, ARTIS

## 2017-12-05 NOTE — MR AVS SNAPSHOT
Nathen JANICE Gage   12/5/2017 8:45 AM   Anticoagulation Therapy Visit    Description:  82 year old male   Provider:  [facility] ANTICO[facility]ATION CLINIC   Department:  Cr Nurse           INR as of 12/5/2017     Today's INR 2.5      Anticoagulation Summary as of 12/5/2017     INR goal 2.0-2.5   Today's INR 2.5   Full instructions 1.25 mg on Tue; 2.5 mg all other days   Next INR check 12/21/2017    Indications   Long-term (current) use of anticoagulants [Z79.01] [Z79.01]  Atrial fibrillation (H) [I48.91]         Your next Anticoagulation Clinic appointment(s)     Dec 21, 2017  1:15 PM CST   Anticoagulation [facility] with [facility]    [address] [place] [id number] [phone]              Contact Numbers     Clinic Number:         December 2017 Details    Sun Mon Tue Wed Thu Fri Sat          1               2                 3               4               5      1.25 mg   See details      6      2.5 mg         7      2.5 mg         8      2.5 mg         9      2.5 mg           10      2.5 mg         11      2.5 mg         12      1.25 mg         13      2.5 mg         14      2.5 mg         15      2.5 mg         16      2.5 mg           17      2.5 mg         18      2.5 mg         19      1.25 mg         20      2.5 mg         21            22               23                 24               25               26               27               28               29               30                 31                      Date Details   12/05 This INR check       Date of next INR:  12/21/2017         How to take your warfarin dose     To take:  1.25 mg Take 0.5 of a 2.5 mg tablet.    To take:  2.5 mg Take 1 of the 2.5 mg tablets.

## 2017-12-21 ENCOUNTER — ANTICOAGULATION THERAPY VISIT (OUTPATIENT)
Dept: NURSING | Facility: CLINIC | Age: 82
End: 2017-12-21
Payer: COMMERCIAL

## 2017-12-21 DIAGNOSIS — Z79.01 LONG-TERM (CURRENT) USE OF ANTICOAGULANTS: ICD-10-CM

## 2017-12-21 DIAGNOSIS — I48.91 ATRIAL FIBRILLATION (H): ICD-10-CM

## 2017-12-21 LAB — INR POINT OF CARE: 2 (ref 0.86–1.14)

## 2017-12-21 PROCEDURE — 85610 PROTHROMBIN TIME: CPT | Mod: QW

## 2017-12-21 PROCEDURE — 36416 COLLJ CAPILLARY BLOOD SPEC: CPT

## 2017-12-21 NOTE — PROGRESS NOTES
ANTICOAGULATION FOLLOW-UP CLINIC VISIT    Patient Name:  Nathen Gage  Date:  12/21/2017  Contact Type:  Face to Face    SUBJECTIVE:     Patient Findings     Positives Nose bleeds (1 nosebleed last week, did not last long and did not have to apply pressure.)           OBJECTIVE    INR Protime   Date Value Ref Range Status   12/21/2017 2.0 (A) 0.86 - 1.14 Final       ASSESSMENT / PLAN  No question data found.  Anticoagulation Summary as of 12/21/2017     INR goal 2.0-2.5   Today's INR 2.0   Maintenance plan 1.25 mg (2.5 mg x 0.5) on Tue; 2.5 mg (2.5 mg x 1) all other days   Full instructions 1.25 mg on Tue; 2.5 mg all other days   Weekly total 16.25 mg   No change documented Fidelina Pizano RN   Plan last modified Fidelina Pizano RN (8/8/2017)   Next INR check 1/9/2018   Target end date Indefinite    Indications   Long-term (current) use of anticoagulants [Z79.01] [Z79.01]  Atrial fibrillation (H) [I48.91]         Anticoagulation Episode Summary     INR check location     Preferred lab     Send INR reminders to [facility]    Comments       Anticoagulation Care Providers     Provider Role Specialty Phone number    Kushal Valentin MD [facility] [phone]            See the Encounter Report to view Anticoagulation Flowsheet and Dosing Calendar (Go to Encounters tab in chart review, and find the Anticoagulation Therapy Visit)        Fidelina Pizano RN

## 2017-12-21 NOTE — MR AVS SNAPSHOT
Nathen JANICE Gage   12/21/2017 1:15 PM   Anticoagulation Therapy Visit    Description:  82 year old male   Provider:  [facility]   Department:  [facility] Nurse           INR as of 12/21/2017     Today's INR 2.0      Anticoagulation Summary as of 12/21/2017     INR goal 2.0-2.5   Today's INR 2.0   Full instructions 1.25 mg on Tue; 2.5 mg all other days   Next INR check 1/9/2018    Indications   Long-term (current) use of anticoagulants [Z79.01] [Z79.01]  Atrial fibrillation (H) [I48.91]         Your next Anticoagulation Clinic appointment(s)     Dec 21, 2017  1:15 PM CST   [facility] Visit with [facility]   [facility]    [address] [address]   [phone]            Jan 09, 2018  9:00 AM CST   Anticoagulation Visit with [facility]   [facility]    [address] [address]   [phone]              Contact Numbers     Clinic Number:         December 2017 Details    Sun Mon Tue Wed Thu Fri Sat          1               2                 3               4               5               6               7               8               9                 10               11               12               13               14               15               16                 17               18               19               20               21      2.5 mg   See details      22      2.5 mg         23      2.5 mg           24      2.5 mg         25      2.5 mg         26      1.25 mg         27      2.5 mg         28      2.5 mg         29      2.5 mg         30      2.5 mg           31      2.5 mg                Date Details   12/21 This INR check               How to take your warfarin dose     To take:  1.25 mg Take 0.5 of a 2.5 mg tablet.    To take:  2.5 mg Take 1 of the 2.5 mg tablets.           January 2018 Details     Sun Mon Tue Wed Thu Fri Sat      1      2.5 mg         2      1.25 mg         3      2.5 mg         4      2.5 mg         5      2.5 mg         6      2.5 mg           7      2.5 mg         8      2.5 mg         9            10               11               12               13                 14               15               16               17               18               19               20                 21               22               23               24               25               26               27                 28               29               30               31                   Date Details   No additional details    Date of next INR:  1/9/2018         How to take your warfarin dose     To take:  1.25 mg Take 0.5 of a 2.5 mg tablet.    To take:  2.5 mg Take 1 of the 2.5 mg tablets.

## 2017-12-24 ENCOUNTER — HOSPITAL ENCOUNTER (OUTPATIENT)
Facility: CLINIC | Age: 82
Setting detail: OBSERVATION
Discharge: HOME | End: 2017-12-25
Attending: EMERGENCY MEDICINE | Admitting: INTERNAL MEDICINE
Payer: COMMERCIAL

## 2017-12-24 ENCOUNTER — APPOINTMENT (OUTPATIENT)
Dept: GENERAL RADIOLOGY | Facility: CLINIC | Age: 82
End: 2017-12-24
Attending: EMERGENCY MEDICINE
Payer: COMMERCIAL

## 2017-12-24 DIAGNOSIS — R07.9 ACUTE CHEST PAIN: ICD-10-CM

## 2017-12-24 DIAGNOSIS — Z79.01 LONG-TERM (CURRENT) USE OF ANTICOAGULANTS: Primary | ICD-10-CM

## 2017-12-24 DIAGNOSIS — J18.9 PNEUMONIA DUE TO INFECTIOUS ORGANISM, UNSPECIFIED LATERALITY, UNSPECIFIED PART OF LUNG: ICD-10-CM

## 2017-12-24 LAB
ALBUMIN SERPL-MCNC: 3.5 G/DL (ref 3.4–5)
ALP SERPL-CCNC: 83 U/L (ref 40–150)
ALT SERPL W P-5'-P-CCNC: 20 U/L (ref 0–70)
ANION GAP SERPL CALCULATED.3IONS-SCNC: 8 MMOL/L (ref 3–14)
AST SERPL W P-5'-P-CCNC: 15 U/L (ref 0–45)
BASOPHILS # BLD AUTO: 0 10E9/L (ref 0–0.2)
BASOPHILS NFR BLD AUTO: 0.2 %
BILIRUB SERPL-MCNC: 0.7 MG/DL (ref 0.2–1.3)
BUN SERPL-MCNC: 23 MG/DL (ref 7–30)
CALCIUM SERPL-MCNC: 8.6 MG/DL (ref 8.5–10.1)
CHLORIDE SERPL-SCNC: 100 MMOL/L (ref 94–109)
CO2 SERPL-SCNC: 24 MMOL/L (ref 20–32)
CREAT SERPL-MCNC: 0.93 MG/DL (ref 0.66–1.25)
DIFFERENTIAL METHOD BLD: ABNORMAL
EOSINOPHIL # BLD AUTO: 0.1 10E9/L (ref 0–0.7)
EOSINOPHIL NFR BLD AUTO: 0.9 %
ERYTHROCYTE [DISTWIDTH] IN BLOOD BY AUTOMATED COUNT: 13.9 % (ref 10–15)
GFR SERPL CREATININE-BSD FRML MDRD: 78 ML/MIN/1.7M2
GLUCOSE SERPL-MCNC: 146 MG/DL (ref 70–99)
HCT VFR BLD AUTO: 40.3 % (ref 40–53)
HGB BLD-MCNC: 13.6 G/DL (ref 13.3–17.7)
IMM GRANULOCYTES # BLD: 0.1 10E9/L (ref 0–0.4)
IMM GRANULOCYTES NFR BLD: 0.4 %
INR PPP: 2.19 (ref 0.86–1.14)
LACTATE SERPL-SCNC: 1.6 MMOL/L (ref 0.4–2)
LYMPHOCYTES # BLD AUTO: 0.8 10E9/L (ref 0.8–5.3)
LYMPHOCYTES NFR BLD AUTO: 6 %
MCH RBC QN AUTO: 29.6 PG (ref 26.5–33)
MCHC RBC AUTO-ENTMCNC: 33.7 G/DL (ref 31.5–36.5)
MCV RBC AUTO: 88 FL (ref 78–100)
MONOCYTES # BLD AUTO: 0.9 10E9/L (ref 0–1.3)
MONOCYTES NFR BLD AUTO: 6.5 %
NEUTROPHILS # BLD AUTO: 11.3 10E9/L (ref 1.6–8.3)
NEUTROPHILS NFR BLD AUTO: 86 %
NRBC # BLD AUTO: 0 10*3/UL
NRBC BLD AUTO-RTO: 0 /100
PLATELET # BLD AUTO: 167 10E9/L (ref 150–450)
POTASSIUM SERPL-SCNC: 3.8 MMOL/L (ref 3.4–5.3)
PROT SERPL-MCNC: 7 G/DL (ref 6.8–8.8)
RBC # BLD AUTO: 4.6 10E12/L (ref 4.4–5.9)
SODIUM SERPL-SCNC: 132 MMOL/L (ref 133–144)
SODIUM SERPL-SCNC: 133 MMOL/L (ref 133–144)
TROPONIN I SERPL-MCNC: <0.015 UG/L (ref 0–0.04)
TROPONIN I SERPL-MCNC: <0.015 UG/L (ref 0–0.04)
WBC # BLD AUTO: 13.2 10E9/L (ref 4–11)
WBC # BLD AUTO: 16.4 10E9/L (ref 4–11)

## 2017-12-24 PROCEDURE — 84295 ASSAY OF SERUM SODIUM: CPT | Performed by: INTERNAL MEDICINE

## 2017-12-24 PROCEDURE — 25000132 ZZH RX MED GY IP 250 OP 250 PS 637: Performed by: INTERNAL MEDICINE

## 2017-12-24 PROCEDURE — 84484 ASSAY OF TROPONIN QUANT: CPT | Performed by: INTERNAL MEDICINE

## 2017-12-24 PROCEDURE — 96367 TX/PROPH/DG ADDL SEQ IV INF: CPT

## 2017-12-24 PROCEDURE — 76604 US EXAM CHEST: CPT

## 2017-12-24 PROCEDURE — 25000128 H RX IP 250 OP 636

## 2017-12-24 PROCEDURE — 93005 ELECTROCARDIOGRAM TRACING: CPT

## 2017-12-24 PROCEDURE — 87040 BLOOD CULTURE FOR BACTERIA: CPT | Performed by: EMERGENCY MEDICINE

## 2017-12-24 PROCEDURE — 96365 THER/PROPH/DIAG IV INF INIT: CPT

## 2017-12-24 PROCEDURE — 85048 AUTOMATED LEUKOCYTE COUNT: CPT | Mod: 91 | Performed by: INTERNAL MEDICINE

## 2017-12-24 PROCEDURE — 85610 PROTHROMBIN TIME: CPT | Performed by: EMERGENCY MEDICINE

## 2017-12-24 PROCEDURE — 36415 COLL VENOUS BLD VENIPUNCTURE: CPT | Performed by: INTERNAL MEDICINE

## 2017-12-24 PROCEDURE — 71020 XR CHEST 2 VW: CPT

## 2017-12-24 PROCEDURE — 25000128 H RX IP 250 OP 636: Performed by: INTERNAL MEDICINE

## 2017-12-24 PROCEDURE — 80053 COMPREHEN METABOLIC PANEL: CPT | Performed by: EMERGENCY MEDICINE

## 2017-12-24 PROCEDURE — 84484 ASSAY OF TROPONIN QUANT: CPT | Performed by: EMERGENCY MEDICINE

## 2017-12-24 PROCEDURE — 96361 HYDRATE IV INFUSION ADD-ON: CPT

## 2017-12-24 PROCEDURE — 25000128 H RX IP 250 OP 636: Performed by: EMERGENCY MEDICINE

## 2017-12-24 PROCEDURE — 85025 COMPLETE CBC W/AUTO DIFF WBC: CPT | Performed by: EMERGENCY MEDICINE

## 2017-12-24 PROCEDURE — 36415 COLL VENOUS BLD VENIPUNCTURE: CPT | Performed by: EMERGENCY MEDICINE

## 2017-12-24 PROCEDURE — 99219 ZZC INITIAL OBSERVATION CARE,LEVL II: CPT | Performed by: INTERNAL MEDICINE

## 2017-12-24 PROCEDURE — G0378 HOSPITAL OBSERVATION PER HR: HCPCS

## 2017-12-24 PROCEDURE — 96375 TX/PRO/DX INJ NEW DRUG ADDON: CPT

## 2017-12-24 PROCEDURE — 99285 EMERGENCY DEPT VISIT HI MDM: CPT | Mod: 25

## 2017-12-24 PROCEDURE — 83605 ASSAY OF LACTIC ACID: CPT | Performed by: EMERGENCY MEDICINE

## 2017-12-24 RX ORDER — DOXAZOSIN 4 MG/1
4 TABLET ORAL DAILY
Status: DISCONTINUED | OUTPATIENT
Start: 2017-12-24 | End: 2017-12-25 | Stop reason: HOSPADM

## 2017-12-24 RX ORDER — ACETAMINOPHEN 325 MG/1
650 TABLET ORAL EVERY 4 HOURS PRN
Status: DISCONTINUED | OUTPATIENT
Start: 2017-12-24 | End: 2017-12-25 | Stop reason: HOSPADM

## 2017-12-24 RX ORDER — WARFARIN SODIUM 2.5 MG/1
2.5 TABLET ORAL
Status: ON HOLD | COMMUNITY
End: 2017-12-25

## 2017-12-24 RX ORDER — ONDANSETRON 2 MG/ML
INJECTION INTRAMUSCULAR; INTRAVENOUS
Status: COMPLETED
Start: 2017-12-24 | End: 2017-12-24

## 2017-12-24 RX ORDER — CEFTRIAXONE SODIUM 1 G/50ML
1 INJECTION, SOLUTION INTRAVENOUS EVERY 24 HOURS
Status: DISCONTINUED | OUTPATIENT
Start: 2017-12-25 | End: 2017-12-24

## 2017-12-24 RX ORDER — SODIUM CHLORIDE AND POTASSIUM CHLORIDE 150; 900 MG/100ML; MG/100ML
INJECTION, SOLUTION INTRAVENOUS CONTINUOUS
Status: DISPENSED | OUTPATIENT
Start: 2017-12-24 | End: 2017-12-24

## 2017-12-24 RX ORDER — ASPIRIN 81 MG/1
81 TABLET ORAL DAILY
Status: DISCONTINUED | OUTPATIENT
Start: 2017-12-25 | End: 2017-12-25 | Stop reason: HOSPADM

## 2017-12-24 RX ORDER — NALOXONE HYDROCHLORIDE 0.4 MG/ML
.1-.4 INJECTION, SOLUTION INTRAMUSCULAR; INTRAVENOUS; SUBCUTANEOUS
Status: DISCONTINUED | OUTPATIENT
Start: 2017-12-24 | End: 2017-12-25 | Stop reason: HOSPADM

## 2017-12-24 RX ORDER — CEFTRIAXONE SODIUM 1 G/50ML
1 INJECTION, SOLUTION INTRAVENOUS ONCE
Status: COMPLETED | OUTPATIENT
Start: 2017-12-24 | End: 2017-12-24

## 2017-12-24 RX ORDER — AZITHROMYCIN 250 MG/1
250 TABLET, FILM COATED ORAL DAILY
Status: DISCONTINUED | OUTPATIENT
Start: 2017-12-25 | End: 2017-12-25 | Stop reason: HOSPADM

## 2017-12-24 RX ORDER — PANTOPRAZOLE SODIUM 40 MG/1
40 TABLET, DELAYED RELEASE ORAL DAILY
Status: DISCONTINUED | OUTPATIENT
Start: 2017-12-24 | End: 2017-12-25 | Stop reason: HOSPADM

## 2017-12-24 RX ORDER — NITROGLYCERIN 0.4 MG/1
0.4 TABLET SUBLINGUAL EVERY 5 MIN PRN
Status: DISCONTINUED | OUTPATIENT
Start: 2017-12-24 | End: 2017-12-25 | Stop reason: HOSPADM

## 2017-12-24 RX ORDER — ONDANSETRON 2 MG/ML
4 INJECTION INTRAMUSCULAR; INTRAVENOUS ONCE
Status: COMPLETED | OUTPATIENT
Start: 2017-12-24 | End: 2017-12-24

## 2017-12-24 RX ORDER — LIDOCAINE 40 MG/G
CREAM TOPICAL
Status: DISCONTINUED | OUTPATIENT
Start: 2017-12-24 | End: 2017-12-25 | Stop reason: HOSPADM

## 2017-12-24 RX ORDER — WARFARIN SODIUM 2.5 MG/1
1.25 TABLET ORAL
COMMUNITY
End: 2018-02-06

## 2017-12-24 RX ORDER — LOSARTAN POTASSIUM 25 MG/1
50 TABLET ORAL DAILY
Status: DISCONTINUED | OUTPATIENT
Start: 2017-12-24 | End: 2017-12-25 | Stop reason: HOSPADM

## 2017-12-24 RX ORDER — ATORVASTATIN CALCIUM 20 MG/1
20 TABLET, FILM COATED ORAL DAILY
Status: DISCONTINUED | OUTPATIENT
Start: 2017-12-24 | End: 2017-12-25 | Stop reason: HOSPADM

## 2017-12-24 RX ORDER — ACETAMINOPHEN 650 MG/1
650 SUPPOSITORY RECTAL EVERY 4 HOURS PRN
Status: DISCONTINUED | OUTPATIENT
Start: 2017-12-24 | End: 2017-12-25 | Stop reason: HOSPADM

## 2017-12-24 RX ORDER — ASPIRIN 81 MG/1
162 TABLET, CHEWABLE ORAL ONCE
Status: DISCONTINUED | OUTPATIENT
Start: 2017-12-24 | End: 2017-12-24

## 2017-12-24 RX ORDER — ALUMINA, MAGNESIA, AND SIMETHICONE 2400; 2400; 240 MG/30ML; MG/30ML; MG/30ML
30 SUSPENSION ORAL EVERY 4 HOURS PRN
Status: DISCONTINUED | OUTPATIENT
Start: 2017-12-24 | End: 2017-12-25 | Stop reason: HOSPADM

## 2017-12-24 RX ORDER — WARFARIN SODIUM 2.5 MG/1
2.5 TABLET ORAL
Status: COMPLETED | OUTPATIENT
Start: 2017-12-24 | End: 2017-12-24

## 2017-12-24 RX ADMIN — ONDANSETRON 4 MG: 2 INJECTION INTRAMUSCULAR; INTRAVENOUS at 05:02

## 2017-12-24 RX ADMIN — DOXAZOSIN 4 MG: 4 TABLET ORAL at 20:36

## 2017-12-24 RX ADMIN — AZITHROMYCIN MONOHYDRATE 500 MG: 500 INJECTION, POWDER, LYOPHILIZED, FOR SOLUTION INTRAVENOUS at 04:38

## 2017-12-24 RX ADMIN — POTASSIUM CHLORIDE AND SODIUM CHLORIDE: 900; 150 INJECTION, SOLUTION INTRAVENOUS at 12:38

## 2017-12-24 RX ADMIN — PANTOPRAZOLE SODIUM 40 MG: 40 TABLET, DELAYED RELEASE ORAL at 20:36

## 2017-12-24 RX ADMIN — ATORVASTATIN CALCIUM 20 MG: 20 TABLET, FILM COATED ORAL at 20:36

## 2017-12-24 RX ADMIN — WARFARIN SODIUM 2.5 MG: 2.5 TABLET ORAL at 17:39

## 2017-12-24 RX ADMIN — CEFTRIAXONE SODIUM 1 G: 1 INJECTION, SOLUTION INTRAVENOUS at 04:28

## 2017-12-24 ASSESSMENT — ACTIVITIES OF DAILY LIVING (ADL)
SWALLOWING: 0-->SWALLOWS FOODS/LIQUIDS WITHOUT DIFFICULTY
BATHING: 0-->INDEPENDENT
DRESS: 0-->INDEPENDENT
COGNITION: 0 - NO COGNITION ISSUES REPORTED
EATING: 0 - INDEPENDENT
AMBULATION: 0-->INDEPENDENT
COMMUNICATION: 0 - UNDERSTANDS/COMMUNICATES WITHOUT DIFFICULTY
RETIRED_COMMUNICATION: 0-->UNDERSTANDS/COMMUNICATES WITHOUT DIFFICULTY
TOILETING: 0 - INDEPENDENT
SWALLOWING: 0 - SWALLOWS FOODS/LIQUIDS WITHOUT DIFFICULTY
AMBULATION: 0 - INDEPENDENT
BATHING: 0 - INDEPENDENT
TOILETING: 0-->INDEPENDENT
FALL_HISTORY_WITHIN_LAST_SIX_MONTHS: NO
TRANSFERRING: 0 - INDEPENDENT
DRESS: 0 - INDEPENDENT
TRANSFERRING: 0-->INDEPENDENT
RETIRED_EATING: 0-->INDEPENDENT

## 2017-12-24 ASSESSMENT — ENCOUNTER SYMPTOMS
SHORTNESS OF BREATH: 0
DIAPHORESIS: 1
CHEST TIGHTNESS: 1
FEVER: 0
NAUSEA: 0
VOMITING: 0
COUGH: 1

## 2017-12-24 NOTE — PLAN OF CARE
Problem: Patient Care Overview  Goal: Plan of Care/Patient Progress Review  PRIMARY DIAGNOSIS: PNEUMONIA  OUTPATIENT/OBSERVATION GOALS TO BE MET BEFORE DISCHARGE:  1. Dyspnea improved and O2 sats >88% on RA or back to baseline O2 levels: Yes   SpO2: 97 %, O2 Device: None (Room air)    2. Tolerating oral abx or appropriate plans made outpatient infusion: Yes    3. Vitals signs normal or return to baseline: Yes    4. Short term supplemental O2 needed with activity at home: No    5. Tolerate oral intake to maintain hydration: Yes    6. Return to near baseline physical activity: Yes    Discharge Planner Nurse   Safe discharge environment identified: Yes  Barriers to discharge: No       Entered by: Zee Galan 12/24/2017 4:09 PM     Please review provider order for any additional goals.   Nurse to notify provider when observation goals have been met and patient is ready for discharge.    VSS, up with SBA, A&Ox4 though forgetful, [place], LS with exp wheezing, RA, denies SOB, on IV rocephin and zithromax, family at bedside, will continue to monitor and provide supportive cares.

## 2017-12-24 NOTE — ED NOTES
Observation Brochure and Video   Patient informed of observation status based on provider's order. Observation Brochure was given and video watched.  Jonathan Seaver, RN

## 2017-12-24 NOTE — PLAN OF CARE
Problem: Patient Care Overview  Goal: Plan of Care/Patient Progress Review  PRIMARY DIAGNOSIS: PNEUMONIA  OUTPATIENT/OBSERVATION GOALS TO BE MET BEFORE DISCHARGE:  1. Dyspnea improved and O2 sats >88% on RA or back to baseline O2 levels: Yes   SpO2: 96 %, O2 Device: None (Room air)    2. Tolerating oral abx or appropriate plans made outpatient infusion: No    3. Vitals signs normal or return to baseline: Yes    4. Short term supplemental O2 needed with activity at home: No    5. Tolerate oral intake to maintain hydration: Yes    6. Return to near baseline physical activity: Yes    Discharge Planner Nurse   Safe discharge environment identified: Yes  Barriers to discharge: Yes- IV antibiotics        Entered by: Porsha Lira 12/24/2017 10:32 AM     Please review provider order for any additional goals.   Nurse to notify provider when observation goals have been met and patient is ready for discharge.    Patient alert and oriented but forgetful and hard of hearing. Received rocephin and zithromax in ed. Placed on droplet precautions for pneumonia. Home meds not verified. SL btw antibiotics. Denies cough. On tele- nsr. Moving SBA- denied dizziness when he got up to go to bathroom. Wife at bedside. Denies chest pain, pressure, or heaviness. Trop x2 neg.Blood cultures pending. INR 2.19. WBC 13.2 and Na 132.  No nausea- diet switched from mod carb diet to low saturated fat diet.

## 2017-12-24 NOTE — PROGRESS NOTES
Pt seen again in room with his wife.  His wife reports that he has been coughing, weak, poor appetite over the last 3 days.  He had emesis x 1 in ED.  Still appears and feels miserable and mildly dehydrated.  Will restart some IVF and check BMP in am.  Monitor I/O's and encourage po intake as able.  No fevers, but WBC is increasing.

## 2017-12-24 NOTE — ED PROVIDER NOTES
History     Chief Complaint:  Chest Pain      HPI   Nathen Gage is a 82 year old male with a history of hypertension, hyperlipidemia, and atrial fibrillation with a pacemaker currently anticoagulated on Coumadin who presents via EMS for evaluation of chest pain. Recently, the patient has had a slight non-productive cough. Yesterday morning, the patient started to develop chest tightness throughout his entire chest. Between midnight and 0100, the patient started to develop primarily left-sided chest pain. He characterizes this chest pain as the sensation of intense pressure with some associated diaphoresis. Due to this new chest pain, EMS was called to bring the patient into the ED for evaluation. He was provided 324 mg Aspirin and a spray of Nitroglycerin by EMS. He did have relief of his chest pain after the Nitro. Currently in the ED, the patient reports that his chest pain is completely resolved, and he would have rated it at 3/10 at its most severe. Otherwise, he has not had any fever, shortness of breath, leg swelling, nausea, or vomiting in association with his recent symptoms. He has never had similar chest pain.     Cardiac Risk Factors:  CAD:    Negative  Hypertension:   Positive  Hyperlipidemia:  Positive  Diabetes:   Negative  Tobacco use:   Former smoker  Gender:   Male  Age:    82  Familial Hx of CAD:  Negative      Allergies:  Neomycin sulfate      Medications:    doxazosin (CARDURA) 4 MG tablet  warfarin (COUMADIN) 2.5 MG tablet  pantoprazole (PROTONIX) 40 MG EC tablet  losartan (COZAAR) 50 MG tablet  atorvastatin (LIPITOR) 20 MG tablet    Past Medical History:    Actinic keratosis   Acute idiopathic gout of left knee  Atrial fibrillation  Dilated aortic root  Esophageal reflux  Esophagitis   History of GI bleed  Hyperlipidemia   Impotence of organic origin  Presbycusis   Pulmonary hypertension  Hypertension   Sick sinus syndrome     Past Surgical History:    Implant pacemaker     Family History:  "   Alzheimer disease - Brother  Cerebrovascular disease - Father     Social History:  Tobacco use:    Former smoker, quit 1975  Alcohol use:    Positive - 1-2 beers / week  Marital status:       Accompanied to ED by:  EMS and wife      Review of Systems   Constitutional: Positive for diaphoresis. Negative for fever.   Respiratory: Positive for cough and chest tightness. Negative for shortness of breath.    Cardiovascular: Positive for chest pain. Negative for leg swelling.   Gastrointestinal: Negative for nausea and vomiting.   All other systems reviewed and are negative.    Physical Exam   First Vitals:  BP: 151/86  Pulse: 78  Heart Rate: 70  Temp: 98.7  F (37.1  C)  Resp: 18  Height: 172.7 cm (5' 8\")  Weight: 75.8 kg (167 lb)  SpO2: 97 %      Physical Exam  Constitutional:  Oriented to person, place, and time. Well-appearing.   HENT:   Head:    Normocephalic.   Mouth/Throat:   Oropharynx is clear and moist.   Eyes:    EOM are normal. Pupils are equal, round, and reactive to light.   Neck:    Neck supple.   Cardiovascular:  Normal rate, regular rhythm and normal heart sounds.      Exam reveals no gallop and no friction rub.       No murmur heard.  Pulmonary/Chest:  Effort normal and breath sounds normal.      No respiratory distress. No wheezes. No rales.      No reproducible chest wall pain.  Abdominal:   Soft. No distension. No tenderness. No rebound and no guarding.   Musculoskeletal:  Normal range of motion.      2+ distal equal pulses.     No leg calf tenderness, swelling or edema.   Neurological:   Alert and oriented to person, place, and time.           Moves all 4 extremities spontaneously    Skin:    No rash noted. No pallor.      Emergency Department Course   ECG (2:08:31):  Indication: Screening for cardiovascular disease.   Rate 69 bpm. RI interval 164 ms. QRS duration 96 ms. QT/QTc 402/430 ms. P-R-T axes 45 -43 26.   Interpretation: Normal sinus rhythm, Left axis deviation, Moderate voltage " criteria for LVH may be normal variant, Abnormal ECG  Agree with computer interpretation. Yes   No significant change compared to EKG dated 6/2/2014.   Interpreted at 0217 by Dr. Lynn.      Imaging:  Radiographic findings were communicated with the patient and family who voiced understanding of the findings.    XR Chest:  IMPRESSION: A small region of hazy opacities in the mid left lung is nonspecific, but most likely represents pneumonia. A followup chest radiograph would be useful in one month to confirm resolution.  Per radiology.     Laboratory:  CBC: WBC 13.2 high, o/w WNL (HGB 13.6, )  CMP:  low, Glucose 146 high, o/w WNL (Creatinine 0.93)  Troponin I 0210: <0.015  INR: 2.19 high  Lactic acid: 1.6   Blood culture: Pending x2       Interventions:  0428 Rocephin 1 g IV   0438 Azithromycin 500 mg IV     Emergency Department Course:  Patient was brought to the ED via EMS.     Nursing notes and vitals reviewed.  0218: I performed an exam of the patient as documented above.     0330: I updated and reassessed the patient .    0355: I spoke with Dr. Mayo of the hospitalist service regarding patient's presentation, findings, and plan of care.     Findings and plan explained to the Patient and spouse who consents to admission. Discussed the patient with Dr. Mayo, who will admit the patient to an obs bed for further monitoring, evaluation, and treatment.     Impression & Plan      Medical Decision Making:  Nathen Gage is a 82 year old male that came in complaining of chest pain. Differential includes ACS, PE, pneumothorax, costochondritis, or other causes. Symptoms did resolve after one spray of Nitroglycerin by EMS. Lab work and chest X-ray otherwise shows a slightly elevated white count with opacity that could go along with pneumonia. This is quite an atypical presentation, and the patient denies any fever or productive cough, but certainly this could go along with community acquired pneumonia.  I do believe that risk factors and chest pain that resolved with Nitroglycerin, he will need further monitoring to evaluate for possible cardiac causes. The patient is on Coumadin and is therapeutic, and pulmonary embolism seems quite unlikely, especially in light of resolved symptoms. The patient will be admitted for further monitoring.     Addendum: It was noted after admission,  that Mr Gage had a episode of vomiting and became clammy. He denied chest pain but bp was 85 systolic. I repeated a ekg and troponin that was unchanged and preformed a bedside cardiac us that showed no pericardial fluid. After a 500 cc fluid bolus he BP has normalized and he feels better after Zofran and remains asymptomatic.     Diagnosis:    ICD-10-CM   1. Acute chest pain R07.9   2. Pneumonia due to infectious organism, unspecified laterality, unspecified part of lung J18.9       Disposition:  Admitted to Dr. Mayo.       I, Alexandro Hess, am serving as a scribe at 2:18 AM on 12/24/2017 to document services personally performed by Dr. Lynn, based on my observations and the provider's statements to me.    [facility] EMERGENCY DEPARTMENT       Sha Lynn MD  12/24/17 5052       Sha Lynn MD  12/24/17 6149

## 2017-12-24 NOTE — PLAN OF CARE
Problem: Patient Care Overview  Goal: Plan of Care/Patient Progress Review  Outcome: Improving  PRIMARY DIAGNOSIS: PNEUMONIA  OUTPATIENT/OBSERVATION GOALS TO BE MET BEFORE DISCHARGE:  1. Dyspnea improved and O2 sats >88% on RA or back to baseline O2 levels: Yes   SpO2: 96 %, O2 Device: None (Room air)     2. Tolerating oral abx or appropriate plans made outpatient infusion: No     3. Vitals signs normal or return to baseline: Yes     4. Short term supplemental O2 needed with activity at home: No     5. Tolerate oral intake to maintain hydration: Yes     6. Return to near baseline physical activity: Yes     Discharge Planner Nurse   Safe discharge environment identified: Yes  Barriers to discharge: Yes- IV antibiotics        Entered by: Porsha Lira 12/24/2017 10:32 AM     Please review provider order for any additional goals.   Nurse to notify provider when observation goals have been met and patient is ready for discharge.     Patient alert and oriented but forgetful and hard of hearing. Received rocephin and zithromax in ed. Placed on droplet precautions for pneumonia. Home meds not verified. SL btw antibiotics. Denies cough. On tele- nsr. Moving SBA- denied dizziness when he got up to go to bathroom. Wife at bedside. Denies chest pain, pressure, or heaviness. Trop x2 neg.Blood cultures pending. INR 2.19. WBC 13.2 and Na 132.  No nausea- diet switched from mod carb diet to low saturated fat diet. Patient ambulated halls as SBA this a.m And 02 watched- see note.

## 2017-12-24 NOTE — ED NOTES
[facility]  ED Nurse Handoff Report    Nathen Gage is a 82 year old male with a complaint of chest pressure/epigastric pain since last evening.  Per EMS, pain resolved after nitro administration en route.  Pt is currently pain free.  Xray shows likely PNA.  Pt is alert and 1x assist in ambulation.  ED Chief complaint: Chest Pain  . ED Diagnosis:   Final diagnoses:   Acute chest pain     Allergies:   Allergies   Allergen Reactions     Neomycin Sulfate        Code Status: Full Code  Activity level - Baseline/Home:  Independent. Activity Level - Current:   Stand with Assist. Lift room needed: No. Bariatric: No   Needed: No   Isolation: No. Infection: Not Applicable.     Vital Signs:   Vitals:    12/24/17 0230 12/24/17 0240 12/24/17 0245 12/24/17 0300   BP:  113/54  110/64   Pulse:       Resp: 25 21 15    Temp:       TempSrc:       SpO2: 96% 96% 96%    Weight:       Height:           Cardiac Rhythm:  ,      Pain level:    Patient confused: No. Patient Falls Risk: Yes.   Elimination Status: Has voided   Patient Report - Initial Complaint: chest pressure. Focused Assessment: epigastric pain and chest pressure   Tests Performed: blood, xray. Abnormal Results: xray shows possible PNA.   Treatments provided: ASA, nitro  Family Comments: wife at bedside  OBS brochure/video discussed/provided to patient:  Yes  ED Medications:   Medications   cefTRIAXone in d5w (ROCEPHIN) intermittent infusion 1 g (not administered)   azithromycin (ZITHROMAX) 500 mg in D5W 250 mL intermittent infusion (not administered)     Drips infusing:  No  For the majority of the shift, the patient's behavior Green. Interventions performed were n/a.     Severe Sepsis OR Septic Shock Diagnosis Present: No      ED Nurse Name/Phone Number: Jonathan Seaver,   3:35 AM  RECEIVING UNIT ED HANDOFF REVIEW    Above ED Nurse Handoff Report was reviewed: Yes  Reviewed by: Steff MATHEW Che on December 24, 2017 at 4:28 AM

## 2017-12-24 NOTE — PLAN OF CARE
Problem: Patient Care Overview  Goal: Discharge Needs Assessment  ROOM # 272-65    Living Situation (if not independent, order SW consult):  Facility name:  : [facility],  (Wife)    Activity level at baseline: Independent  Activity level on admit: SBA      Patient registered to observation; given Patient Bill of Rights; given the opportunity to ask questions about observation status and their plan of care.  Patient has been oriented to the observation room, bathroom and call light is in place.    Discussed discharge goals and expectations with patient/family.

## 2017-12-24 NOTE — PHARMACY-ANTICOAGULATION SERVICE
Clinical Pharmacy - Warfarin Dosing Consult     Pharmacy has been consulted to manage this patient s warfarin therapy.  Indication: Atrial Fibrillation  Therapy Goal: INR 2-3  Warfarin Prior to Admission: Yes  Warfarin PTA Regimen: 2.5 mg daily except Tuesday 1.25 mg  Significant drug interactions: Lipitor, Protonix, ASA, Zithromax  Recent documented change in oral intake/nutrition: Unknown    INR   Date Value Ref Range Status   12/24/2017 2.19 (H) 0.86 - 1.14 Final     INR Protime   Date Value Ref Range Status   12/21/2017 2.0 (A) 0.86 - 1.14 Final       Recommend warfarin 2.5 mg today.  Pharmacy will monitor Nathen JANICE Gage daily and order warfarin doses to achieve specified goal.      Please contact pharmacy as soon as possible if the warfarin needs to be held for a procedure or if the warfarin goals change.

## 2017-12-24 NOTE — PROGRESS NOTES
Pt walked with SBA assist 300 ft,pt O2%ranged from 93% to 95% and HR ranged from 94 to 101 beats pt did very well.

## 2017-12-24 NOTE — PHARMACY-ADMISSION MEDICATION HISTORY
Admission medication history interview status for this patient is complete. See [facility] admission navigator for allergy information, prior to admission medications and immunization status.     Medication history interview source(s):Patient  Medication history resources (including written lists, pill bottles, clinic record): Pill bottles  Primary pharmacy:[facility]     Changes made to PTA medication list:  Added: none  Deleted: none  Changed: none    Actions taken by pharmacist (provider contacted, etc):None     Additional medication history information:None    Medication reconciliation/reorder completed by provider prior to medication history? Yes    Do you take OTC medications (eg tylenol, ibuprofen, fish oil, eye/ear drops, etc)? No    For patients on insulin therapy: No    Prior to Admission medications    Medication Sig Last Dose Taking? Auth Provider   warfarin (COUMADIN) 2.5 MG tablet Take 2.5 mg by mouth All other days 12/23/2017 at pm Yes Unknown, Entered By History   warfarin (COUMADIN) 2.5 MG tablet Take 1.25 mg by mouth Every Tuesday 12/19/2017 Yes Unknown, Entered By History   doxazosin (CARDURA) 4 MG tablet Take 1 tablet (4 mg) by mouth daily 12/23/2017 at pm Yes Kushal Valentin MD   pantoprazole (PROTONIX) 40 MG EC tablet Take 1 tablet (40 mg) by mouth daily 12/23/2017 at pm Yes Kushal Valentin MD   losartan (COZAAR) 50 MG tablet Take 1 tablet (50 mg) by mouth daily 12/23/2017 at am Yes Kushal Valentin MD   atorvastatin (LIPITOR) 20 MG tablet Take 1 tablet (20 mg) by mouth daily 12/23/2017 at pm Yes Kushal Valentin MD

## 2017-12-24 NOTE — ED NOTES
Pt w/chest pressure/epigastric discomfort since last evening.  Unable to get to sleep due to pressure.  Given ASA and Nitro en route, pt reports relief of pressure after nitro administration.  Denies n/v. Some dizziness when trying to get up.

## 2017-12-24 NOTE — ED NOTES
Bed: ED10  Expected date: 12/24/17  Expected time: 1:53 AM  Means of arrival: Ambulance  Comments:  BV2

## 2017-12-24 NOTE — ED NOTES
Pt w/brief vagal episode after vomiting.  BP dropped to 80 systolic and HR down to 60.  Pt responded well to fluids and repositioning.  Repeat troponin ordered.

## 2017-12-24 NOTE — PROGRESS NOTES
Pt seen and examined.  Admitted earlier this am.  Lung exam fairly clear with no wheezing and good air exchange. Being treated for PNA (hazy infiltrate in mid left lung). Will start droplet precautions.  Recheck WBC count and ambulate to assess for oxygen needs.    Was mildly hypotensive and hyponatremic on ED arrival - as given one Liter of IVF and BP normalized.  Will repeat sodium level now.

## 2017-12-24 NOTE — IP AVS SNAPSHOT
[facility] Observation Department    [address]    [facility] [address]    Phone:  [phone]                                       After Visit Summary   12/24/2017    Nathen Gage    MRN: [id number]           After Visit Summary Signature Page     I have received my discharge instructions, and my questions have been answered. I have discussed any challenges I see with this plan with the nurse or doctor.    ..........................................................................................................................................  Patient/Patient Representative Signature      ..........................................................................................................................................  Patient Representative Print Name and Relationship to Patient    ..................................................               ................................................  Date                                            Time    ..........................................................................................................................................  Reviewed by Signature/Title    ...................................................              ..............................................  Date                                                            Time

## 2017-12-24 NOTE — IP AVS SNAPSHOT
MRN:[id number]                      After Visit Summary   12/24/2017    Nathen Gage    MRN: [id number]           Thank you!     Thank you for choosing [facility] for your care. Our goal is always to provide you with excellent care. Hearing back from our patients is one way we can continue to improve our services. Please take a few minutes to complete the written survey that you may receive in the mail after you visit. If you would like to speak to someone directly about your visit please contact Patient Relations at [phone]. Thank you!          Patient Information     Date Of Birth          1935        Designated Caregiver       Most Recent Value    Caregiver    Will someone help with your care after discharge? yes    Name of designated caregiver  Nichol    Phone number of caregiver [phone]    Caregiver address na      About your hospital stay     You were admitted on:  December 24, 2017 You last received care in the:  [facility] Observation Department    You were discharged on:  December 25, 2017        Reason for your hospital stay       You were hospitalized due to elevated wbc, chest pain and CXR revealed a left sided pneumonia.  Your cardiac labs were normal and telemetry monitoring was wnl.  Your symptoms improved.                  Who to Call     For medical emergencies, please call 911.  For non-urgent questions about your medical care, please call your primary care provider or clinic, [phone]          Attending Provider     Provider Specialty    Sha Lynn MD Emergency Medicine    Mehul Mayo MD Internal Medicine       Primary Care Provider Office Phone # Fax #    Kushal Valentin [phone]       When to contact your care team       Notify for fever >101.5; black or bloody stools; severe, persistent, or worsening nausea, vomiting, abdominal pain or distention, diarrhea, constipation; chest pain,  difficulty breathing, swelling; dizziness, weakness, lightheadedness, fainting.  Proceed to the nearest emergency room for any urgent concerns.                  After Care Instructions     Activity       Your activity upon discharge: activity as tolerated            Diet       Follow this diet upon discharge: Orders Placed This Encounter      Low Saturated Fat Diet                  Follow-up Appointments     Follow-up and recommended labs and tests        Please follow up with your PCP this week for recheck.  Please have your INR checked on 12/27 this week while on antibiotics that may alter your warfarin level.  Please do not take your warfarin dose tonight 12/25.  Take your normal 1.25mg dose on 12/26.  Have your INR checked on 12/27 and discuss the results with your doctor before resuming your regular dose of warfarin.  Your hemoglobin was slightly low at 11.7.  Please follow up with your PCP for further monitoring.  Consider outpatient stress testing with your PCP after your acute illness has resolved.                  Your next 10 appointments already scheduled     Dec 27, 2017 10:30 AM CST   [facility]   [facility]    [address] [place] [id number] [phone]           Please do not eat 10-12 hours before your appointment if you are coming in fasting for labs on lipids, cholesterol, or glucose (sugar). This does not apply to pregnant women. Water, hot tea and black coffee (with nothing added) are okay. Do not drink other fluids, diet soda or chew gum.            Jan 09, 2018  9:00 AM CST   Anticoagulation Visit with [facility] CL[facility]   [facility]    [address] [place] [id number] [phone]            Jan 25, 2018  8:45 AM CST   Phone Device Check with PATE TECHSondra   [facility]   Kayla ([facility])    Eddie Box  "[facility] [address]  [facility] [id number] [phone]                         Future tests that were ordered for you     INR                 Warfarin Instruction     You have started taking a medicine called warfarin. This is a blood-thinning medicine (anticoagulant). It helps prevent and treat blood clots.      Before leaving the hospital, make sure you know how much to take and how long to take it.      You will need regular blood tests to make sure your blood is clotting safely. It is very important to see your doctor for regular blood tests.    Talk to your doctor before taking any new medicine (this includes over-the-counter drugs and herbal products). Many medicines can interact with warfarin. This may cause more bleeding or too much clotting.     Eating a lot of vitamin K--found in green, leafy vegetables--can change the way warfarin works in your body. Do NOT avoid these foods. Instead, try to eat the same amount each day.     Bleeding is the most common side-effect of warfarin. You may notice bleeding gums, a bloody nose, bruises and bleeding longer when you cut yourself. See a doctor at once if:   o You cough up blood  o You find blood in your stool (poop)  o You have a deep cut, or a cut that bleeds longer than 10 minutes   o You have a bad cut, hard fall, accident or hit your head (go to urgent care or the emergency room).    For women who can get pregnant: This medicine can harm an unborn baby. Be very careful not to get pregnant while taking this medicine. If you think you might be pregnant, call your doctor right away.    For more information, read \"Guide to Warfarin Therapy,  the booklet you received in the hospital.        Pending Results     Date and Time Order Name Status Description    12/24/2017 0453 EKG 12 lead Preliminary     12/24/2017 0354 Blood culture Preliminary     12/24/2017 0354 Blood culture Preliminary     12/24/2017 0223 EKG 12-lead, tracing only Preliminary           " "  Statement of Approval     Ordered          12/25/17 1023  I have reviewed and agree with all the recommendations and orders detailed in this document.  EFFECTIVE NOW     Approved and electronically signed by:  Chula Hernandez PA-C             Admission Information     Date & Time Provider Department Dept. Phone    12/24/2017 Mehul Mayo MD [facility] Observation Department [phone]      Your Vitals Were     Blood Pressure Pulse Temperature Respirations Height Weight    152/60 (BP Location: Right arm) 78 96.2  F (35.7  C) (Oral) 16 1.74 m (5' 8.5\") 78 kg (171 lb 14.4 oz)    Pulse Oximetry BMI (Body Mass Index)                96% 25.76 kg/m2          [employer] Information     [employer] gives you secure access to your electronic health record. If you see a primary care provider, you can also send messages to your care team and make appointments. If you have questions, please call your primary care clinic.  If you do not have a primary care provider, please call [phone] and they will assist you.        Care EveryWhere ID     This is your Care EveryWhere ID. This could be used by other organizations to access your [facility] medical records  [id number]        Equal Access to Services     MICHELE ALEXANDRA : Hadii margarita Cade, wayusrada zoran, qaybta kaalmada emmett, anamaria diaz. So [facility] [phone].    ATENCIÓN: Si habla español, tiene a arana disposición servicios gratuitos de asistencia lingüística. Terrence al [phone].    We comply with applicable federal civil rights laws and [place] laws. We do not discriminate on the basis of race, color, national origin, age, disability, sex, sexual orientation, or gender identity.               Review of your medicines      START taking        Dose / Directions    acetaminophen 325 MG tablet   Commonly known as:  TYLENOL   Used for:  Pneumonia due to infectious organism, unspecified laterality, unspecified " part of lung        Dose:  650 mg   Take 2 tablets (650 mg) by mouth every 4 hours as needed for mild pain or fever   Quantity:  100 tablet   Refills:  0       azithromycin 250 MG tablet   Commonly known as:  ZITHROMAX   Indication:  Community Acquired Pneumonia   Used for:  Pneumonia due to infectious organism, unspecified laterality, unspecified part of lung        Dose:  250 mg   Start taking on:  12/26/2017   Take 1 tablet (250 mg) by mouth daily   Quantity:  3 tablet   Refills:  0       cefuroxime 500 MG tablet   Commonly known as:  CEFTIN   Used for:  Pneumonia due to infectious organism, unspecified laterality, unspecified part of lung        Dose:  500 mg   Start taking on:  12/26/2017   Take 1 tablet (500 mg) by mouth 2 times daily for 5 days   Quantity:  10 tablet   Refills:  0         CONTINUE these medicines which may have CHANGED, or have new prescriptions. If we are uncertain of the size of tablets/capsules you have at home, strength may be listed as something that might have changed.        Dose / Directions    warfarin 2.5 MG tablet   Commonly known as:  COUMADIN   This may have changed:  Another medication with the same name was removed. Continue taking this medication, and follow the directions you see here.        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        Dose:  4 mg   Take 1 tablet (4 mg) by mouth daily   Quantity:  90 tablet   Refills:  1       losartan 50 MG tablet   Commonly known as:  COZAAR   Used for:  Secondary  hypertension        Dose:  50 mg   Take 1 tablet (50 mg) by mouth daily   Quantity:  90 tablet   Refills:  2       pantoprazole 40 MG EC tablet   Commonly known as:  PROTONIX   Used for:  Gastroesophageal reflux disease with esophagitis        Dose:  40 mg   Take 1 tablet (40 mg) by mouth daily   Quantity:  90 tablet   Refills:  2            Where to get your medicines      These medications were sent to [place], [place] - [address] [facility]  [address] [facility] [address]     Phone:  [phone]     azithromycin 250 MG tablet    cefuroxime 500 MG tablet         Some of these will need a paper prescription and others can be bought over the counter. Ask your nurse if you have questions.     You don't need a prescription for these medications     acetaminophen 325 MG tablet               ANTIBIOTIC INSTRUCTION     You've Been Prescribed an Antibiotic - Now What?  Your healthcare team thinks that you or your loved one might have an infection. Some infections can be treated with antibiotics, which are powerful, life-saving drugs. Like all medications, antibiotics have side effects and should only be used when necessary. There are some important things you should know about your antibiotic treatment.      Your healthcare team may run tests before you start taking an antibiotic.    Your team may take samples (e.g., from your blood, urine or other areas) to run tests to look for bacteria. These test can be important to determine if you need an antibiotic at all and, if you do, which antibiotic will work best.      Within a few days, your healthcare team might change or even stop your antibiotic.    Your team may start you on an antibiotic while they are working to find out what is making you sick.    Your team might change your antibiotic because test results show that a different antibiotic would be better to treat your infection.    In some cases, once your team has more  information, they learn that you do not need an antibiotic at all. They may find out that you don't have an infection, or that the antibiotic you're taking won't work against your infection. For example, an infection caused by a virus can't be treated with antibiotics. Staying on an antibiotic when you don't need it is more likely to be harmful than helpful.      You may experience side effects from your antibiotic.    Like all medications, antibiotics have side effects. Some of these can be serious.    Let you healthcare team know if you have any known allergies when you are admitted to the hospital.    One significant side effect of nearly all antibiotics is the risk of severe and sometimes deadly diarrhea caused by Clostridium difficile (C. Difficile). This occurs when a person takes antibiotics because some good germs are destroyed. Antibiotic use allows C. diificile to take over, putting patients at high risk for this serious infection.    As a patient or caregiver, it is important to understand your or your loved one's antibiotic treatment. It is especially important for caregivers to speak up when patients can't speak for themselves. Here are some important questions to ask your healthcare team.    What infection is this antibiotic treating and how do you know I have that infection?    What side effects might occur from this antibiotic?    How long will I need to take this antibiotic?    Is it safe to take this antibiotic with other medications or supplements (e.g., vitamins) that I am taking?     Are there any special directions I need to know about taking this antibiotic? For example, should I take it with food?    How will I be monitored to know whether my infection is responding to the antibiotic?    What tests may help to make sure the right antibiotic is prescribed for me?      Information provided by:  www.cdc.gov/getsmart  U.S. Department of Health and Human Services  Centers for disease Control and  Prevention  National Center for Emerging and Zoonotic Infectious Diseases  Division of Healthcare Quality Promotion         Protect others around you: Learn how to safely use, store and throw away your medicines at www.disposemymeds.org.             Medication List: This is a list of all your medications and when to take them. Check marks below indicate your daily home schedule. Keep this list as a reference.      Medications           Morning Afternoon Evening Bedtime As Needed    acetaminophen 325 MG tablet   Commonly known as:  TYLENOL   Take 2 tablets (650 mg) by mouth every 4 hours as needed for mild pain or fever                                atorvastatin 20 MG tablet   Commonly known as:  LIPITOR   Take 1 tablet (20 mg) by mouth daily   Last time this was given:  20 mg on 12/24/2017  8:36 PM                                azithromycin 250 MG tablet   Commonly known as:  ZITHROMAX   Take 1 tablet (250 mg) by mouth daily   Start taking on:  12/26/2017   Last time this was given:  250 mg on 12/25/2017  8:47 AM                                cefuroxime 500 MG tablet   Commonly known as:  CEFTIN   Take 1 tablet (500 mg) by mouth 2 times daily for 5 days   Start taking on:  12/26/2017                                doxazosin 4 MG tablet   Commonly known as:  CARDURA   Take 1 tablet (4 mg) by mouth daily   Last time this was given:  4 mg on 12/24/2017  8:36 PM                                losartan 50 MG tablet   Commonly known as:  COZAAR   Take 1 tablet (50 mg) by mouth daily   Last time this was given:  50 mg on 12/25/2017  8:47 AM                                pantoprazole 40 MG EC tablet   Commonly known as:  PROTONIX   Take 1 tablet (40 mg) by mouth daily   Last time this was given:  40 mg on 12/24/2017  8:36 PM                                warfarin 2.5 MG tablet   Commonly known as:  COUMADIN   Take 1.25 mg by mouth Every Tuesday   Last time this was given:  2.5 mg on 12/24/2017  5:39 PM                                           More Information        Pneumonia (Adult)  Pneumonia is an infection deep within the lungs. It is in the small air sacs (alveoli). Pneumonia may be caused by a virus or bacteria. Pneumonia caused by bacteria is usually treated with an antibiotic. Severe cases may need to be treated in the hospital. Milder cases can be treated at home. Symptoms usually start to get better during the first 2 days of treatment.    Home care  Follow these guidelines when caring for yourself at home:    Rest at home for the first 2 to 3 days, or until you feel stronger. Don t let yourself get overly tired when you go back to your activities.    Stay away from cigarette smoke - yours or other people s.    You may use acetaminophen or ibuprofen to control fever or pain, unless another medicine was prescribed. If you have chronic liver or kidney disease, talk with your healthcare provider before using these medicines. Also talk with your provider if you ve had a stomach ulcer or gastrointestinal bleeding. Don t give aspirin to anyone younger than 18 years of age who is ill with a fever. It may cause severe liver damage.    Your appetite may be poor, so a light diet is fine.    Drink 6 to 8 glasses of fluids every day to make sure you are getting enough fluids. Beverages can include water, sport drinks, sodas without caffeine, juices, tea, or soup. Fluids will help loosen secretions in the lung. This will make it easier for you to cough up the phlegm (sputum). If you also have heart or kidney disease, check with your healthcare provider before you drink extra fluids.    Take antibiotic medicine prescribed until it is all gone, even if you are feeling better after a few days.  Follow-up care  Follow up with your healthcare provider in the next 2 to 3 days, or as advised. This is to be sure the medicine is helping you get better.  If you are 65 or older, you should get a pneumococcal vaccine and a yearly flu  (influenza) shot. You should also get these vaccines if you have chronic lung disease like asthma, emphysema, or COPD. Recently, a second type of pneumonia vaccine has become available for everyone over 65 years old. This is in addition to the previous vaccine. Ask your provider about this.  When to seek medical advice  Call your healthcare provider right away if any of these occur:    You don t get better within the first 48 hours of treatment    Shortness of breath gets worse    Rapid breathing (more than 25 breaths per minute)    Coughing up blood    Chest pain gets worse with breathing    Fever of 100.4 F (38 C) or higher that doesn t get better with fever medicine    Weakness, dizziness, or fainting that gets worse    Thirst or dry mouth that gets worse    Sinus pain, headache, or a stiff neck    Chest pain not caused by coughing  Date Last Reviewed: 1/1/2017 2000-2017 [employer]. [address] [place] [address]. All rights reserved. This information is not intended as a substitute for professional medical care. Always follow your healthcare professional's instructions.                Treating Pneumonia  Pneumonia is an infection of one or both of the lungs. Pneumonia:    Is usually caused by either a virus or a bacteria    Can be very serious, especially in infants, young children, and older adults. It s also serious for those with other long-term health problems or weakened immune systems.    Is sometimes treated at home and sometimes in the hospital    Antibiotic medicines  Antibiotics may be prescribed for pneumonia caused by bacteria. They may be pills (oral medicines), or shots (injections). Or they may be given by IV (intravenously) into a vein. If you are taking oral medicines at home:    Fill your prescription and start taking your medicine as soon as you can.    You will likely start to feel better in a day or 2, but don t stop taking the antibiotic.    Use a pill  organizer to help you remember to take your medicine.    Let your healthcare provider know if you have side effects.    Take your medicine exactly as directed on the label. Talk to your provider or pharmacist if you have any questions.  Antiviral medicines  Antiviral medicine may be prescribed for pneumonia caused by a virus. For example, antiviral medicine may be prescribed for pneumonia caused by the flu virus. Antibiotics do not work against viruses. If you are taking antiviral medicine at home:    Fill your prescription and start taking your medicine as soon as you can.    Talk with your provider or pharmacist about possible side effects.    Take the medicine exactly as instructed.  To relieve symptoms  There are many medicines that can help relieve symptoms of pneumonia. Some are prescription and some are over-the-counter.  Your healthcare provider may recommend:    Acetaminophen or ibuprofen to lower your fever and to lessen headache or other pain    Cough medicine to loosen mucus or to reduce coughing  Make sure you check with your healthcare provider or pharmacist before taking any over-the-counter medicines.  Special treatments  If you are hospitalized for pneumonia, you may have other therapies, including:    Inhaled medicines to help with breathing or chest congestion    Supplemental oxygen to increase low oxygen levels  Drink fluids and eat healthy  You should eat healthy to help your body fight the infection. Drinking a lot of fluids helps to replace fluids lost from fever and to loosen mucus in your chest.    Diet. Make healthy food choices, including fruits and vegetables, lean meats and other proteins, 100% whole grain and low- or no-fat dairy products.    Fluids. Drink at least 6 to 8 tall glasses a day. Water and 100% fruit or vegetable juice are best.  Get plenty of rest and sleep  You may be more tired than usual for a while. It is important to get enough sleep at night. It s also important to  rest during the day. Talk with your healthcare provider if coughing or other symptoms are interfering with your sleep.  Preventing the spread of germs  The best thing you can do to prevent spreading germs is to wash your hands often. You should:    Rub your hand with soap and water for 20 to 30 seconds.    Clean in between your fingers, the backs of your hands, and around your nails.    Dry your hands on a separate towel or use paper towels.  You should also:    Keep alcohol-based hand  nearby.    Make sure you also clean surfaces that you touch. Use a product that kills all types of germs.    Stay away from others until you are feeling better.  When to call your healthcare provider  Call your healthcare provider if you have any of the following:    Symptoms get worse    Fever continues    Shortness of breath gets worse    Increased mucus or mucus that is darker in color    Coughing gets worse    Lips or fingers are bluish in color    Side effects from your medicine   Date Last Reviewed: 12/1/2016 2000-2017 [employer]. [address] [place] [address]. All rights reserved. This information is not intended as a substitute for professional medical care. Always follow your healthcare professional's instructions.                Patient Education    Cefuroxime Axetil Oral suspension    Cefuroxime Axetil Oral tablet    Cefuroxime Sodium Solution for injection  Cefuroxime Axetil Oral tablet  What is this medicine?  CEFUROXIME (se fyoor OX eem) is a cephalosporin antibiotic. It is used to treat certain kinds of bacterial infections. It will not work for colds, flu, or other viral infections.  This medicine may be used for other purposes; ask your health care provider or pharmacist if you have questions.  What should I tell my health care provider before I take this medicine?  They need to know if you have any of these conditions:    bleeding problems    bowel disease, like  colitis    kidney disease    liver disease    an unusual or allergic reaction to cefuroxime, other antibiotics or medicines, foods, dyes or preservatives    pregnant or trying to get pregnant    breast-feeding  How should I use this medicine?  Take this medicine by mouth with a full glass of water. Follow the directions on the prescription label. Do not crush or chew. This medicine works best if you take it with food. Take your medicine at regular intervals. Do not take your medicine more often than directed. Take all of your medicine as directed even if you think your are better. Do not skip doses or stop your medicine early.  Talk to your pediatrician regarding the use of this medicine in children. Special care may be needed. While this drug may be prescribed for children as young as 3 months of age for selected conditions, precautions do apply.  Overdosage: If you think you have taken too much of this medicine contact a poison control center or emergency room at once.  NOTE: This medicine is only for you. Do not share this medicine with others.  What if I miss a dose?  If you miss a dose, take it as soon as you can. If it is almost time for your next dose, take only that dose. Do not take double or extra doses.  What may interact with this medicine?  This medicine may interact with the following medications:    antacids    birth control pills    certain medicines for infection like amikacin, gentamicin, tobramycin    diuretics    probenecid    warfarin  This list may not describe all possible interactions. Give your health care provider a list of all the medicines, herbs, non-prescription drugs, or dietary supplements you use. Also tell them if you smoke, drink alcohol, or use illegal drugs. Some items may interact with your medicine.  What should I watch for while using this medicine?  Tell your doctor or health care professional if your symptoms do not improve or if you get new symptoms.  Do not treat diarrhea  with over the counter products. Contact your doctor if you have diarrhea that lasts more than 2 days or if it is severe and watery.  This medicine can interfere with some urine glucose tests. If you use such tests, talk with your health care professional.  If you are being treated for a sexually transmitted disease, avoid sexual contact until you have finished your treatment. Your sexual partner may also need treatment.  What side effects may I notice from receiving this medicine?  Side effects that you should report to your doctor or health care professional as soon as possible:    allergic reactions like skin rash, itching or hives, swelling of the face, lips, or tongue    dark urine    difficulty breathing    fever    irregular heartbeat or chest pain    redness, blistering, peeling or loosening of the skin, including inside the mouth    seizures    unusual bleeding or bruising    unusually weak or tired    white patches or sores in the mouth  Side effects that usually do not require medical attention (report to your doctor or health care professional if they continue or are bothersome):    diarrhea    gas or heartburn    headache    nausea, vomiting    vaginal itching  This list may not describe all possible side effects. Call your doctor for medical advice about side effects. You may report side effects to FDA at 2-084-FDA-2869.  Where should I keep my medicine?  Keep out of the reach of children.  Store at room temperature between 15 and 30 degrees C (59 and 86 degrees F). Keep container tightly closed. Protect from moisture. Throw away any unused medicine after the expiration date.  NOTE:This sheet is a summary. It may not cover all possible information. If you have questions about this medicine, talk to your doctor, pharmacist, or health care provider. Copyright  2016 Gold Standard                Patient Education    Azithromycin Ophthalmic drops, solution    Azithromycin Oral suspension    Azithromycin Oral  suspension, extended release    Azithromycin Oral tablet    Azithromycin Solution for injection  Azithromycin Oral tablet  What is this medicine?  AZITHROMYCIN (az ith mona MYE sin) is a macrolide antibiotic. It is used to treat or prevent certain kinds of bacterial infections. It will not work for colds, flu, or other viral infections.  This medicine may be used for other purposes; ask your health care provider or pharmacist if you have questions.  What should I tell my health care provider before I take this medicine?  They need to know if you have any of these conditions:    kidney disease    liver disease    irregular heartbeat or heart disease    an unusual or allergic reaction to azithromycin, erythromycin, other macrolide antibiotics, foods, dyes, or preservatives    pregnant or trying to get pregnant    breast-feeding  How should I use this medicine?  Take this medicine by mouth with a full glass of water. Follow the directions on the prescription label. The tablets can be taken with food or on an empty stomach. If the medicine upsets your stomach, take it with food. Take your medicine at regular intervals. Do not take your medicine more often than directed. Take all of your medicine as directed even if you think your are better. Do not skip doses or stop your medicine early.  Talk to your pediatrician regarding the use of this medicine in children. Special care may be needed.  Overdosage: If you think you have taken too much of this medicine contact a poison control center or emergency room at once.  NOTE: This medicine is only for you. Do not share this medicine with others.  What if I miss a dose?  If you miss a dose, take it as soon as you can. If it is almost time for your next dose, take only that dose. Do not take double or extra doses.  What may interact with this medicine?  Do not take this medicine with any of the following medications:    lincomycin  This medicine may also interact with the  following medications:    amiodarone    antacids    birth control pills    cyclosporine    digoxin    magnesium    nelfinavir    phenytoin    warfarin  This list may not describe all possible interactions. Give your health care provider a list of all the medicines, herbs, non-prescription drugs, or dietary supplements you use. Also tell them if you smoke, drink alcohol, or use illegal drugs. Some items may interact with your medicine.  What should I watch for while using this medicine?  Tell your doctor or health care professional if your symptoms do not improve.  Do not treat diarrhea with over the counter products. Contact your doctor if you have diarrhea that lasts more than 2 days or if it is severe and watery.  This medicine can make you more sensitive to the sun. Keep out of the sun. If you cannot avoid being in the sun, wear protective clothing and use sunscreen. Do not use sun lamps or tanning beds/booths.  What side effects may I notice from receiving this medicine?  Side effects that you should report to your doctor or health care professional as soon as possible:    allergic reactions like skin rash, itching or hives, swelling of the face, lips, or tongue    confusion, nightmares or hallucinations    dark urine    difficulty breathing    hearing loss    irregular heartbeat or chest pain    pain or difficulty passing urine    redness, blistering, peeling or loosening of the skin, including inside the mouth    white patches or sores in the mouth    yellowing of the eyes or skin  Side effects that usually do not require medical attention (report to your doctor or health care professional if they continue or are bothersome):    diarrhea    dizziness, drowsiness    headache    stomach upset or vomiting    tooth discoloration    vaginal irritation  This list may not describe all possible side effects. Call your doctor for medical advice about side effects. You may report side effects to FDA at  1-800-FDA-1088.  Where should I keep my medicine?  Keep out of the reach of children.  Store at room temperature between 15 and 30 degrees C (59 and 86 degrees F). Throw away any unused medicine after the expiration date.  NOTE:This sheet is a summary. It may not cover all possible information. If you have questions about this medicine, talk to your doctor, pharmacist, or health care provider. Copyright  2016 Gold Standard

## 2017-12-25 VITALS
HEIGHT: 69 IN | OXYGEN SATURATION: 96 % | BODY MASS INDEX: 25.46 KG/M2 | TEMPERATURE: 96.2 F | SYSTOLIC BLOOD PRESSURE: 152 MMHG | WEIGHT: 171.9 LBS | DIASTOLIC BLOOD PRESSURE: 60 MMHG | RESPIRATION RATE: 16 BRPM | HEART RATE: 78 BPM

## 2017-12-25 LAB
ANION GAP SERPL CALCULATED.3IONS-SCNC: 5 MMOL/L (ref 3–14)
BUN SERPL-MCNC: 19 MG/DL (ref 7–30)
CALCIUM SERPL-MCNC: 8.4 MG/DL (ref 8.5–10.1)
CHLORIDE SERPL-SCNC: 105 MMOL/L (ref 94–109)
CO2 SERPL-SCNC: 25 MMOL/L (ref 20–32)
CREAT SERPL-MCNC: 0.84 MG/DL (ref 0.66–1.25)
ERYTHROCYTE [DISTWIDTH] IN BLOOD BY AUTOMATED COUNT: 14.5 % (ref 10–15)
FLUAV+FLUBV AG SPEC QL: NEGATIVE
FLUAV+FLUBV AG SPEC QL: NEGATIVE
GFR SERPL CREATININE-BSD FRML MDRD: 88 ML/MIN/1.7M2
GLUCOSE SERPL-MCNC: 96 MG/DL (ref 70–99)
HCT VFR BLD AUTO: 35.6 % (ref 40–53)
HGB BLD-MCNC: 11.7 G/DL (ref 13.3–17.7)
INR PPP: 3.02 (ref 0.86–1.14)
MCH RBC QN AUTO: 29.3 PG (ref 26.5–33)
MCHC RBC AUTO-ENTMCNC: 32.9 G/DL (ref 31.5–36.5)
MCV RBC AUTO: 89 FL (ref 78–100)
PLATELET # BLD AUTO: 158 10E9/L (ref 150–450)
POTASSIUM SERPL-SCNC: 4.3 MMOL/L (ref 3.4–5.3)
RBC # BLD AUTO: 3.99 10E12/L (ref 4.4–5.9)
SODIUM SERPL-SCNC: 135 MMOL/L (ref 133–144)
SPECIMEN SOURCE: NORMAL
WBC # BLD AUTO: 8.6 10E9/L (ref 4–11)

## 2017-12-25 PROCEDURE — G0378 HOSPITAL OBSERVATION PER HR: HCPCS

## 2017-12-25 PROCEDURE — 25000132 ZZH RX MED GY IP 250 OP 250 PS 637: Performed by: INTERNAL MEDICINE

## 2017-12-25 PROCEDURE — 99217 ZZC OBSERVATION CARE DISCHARGE: CPT | Performed by: PHYSICIAN ASSISTANT

## 2017-12-25 PROCEDURE — 87804 INFLUENZA ASSAY W/OPTIC: CPT | Mod: 91 | Performed by: PHYSICIAN ASSISTANT

## 2017-12-25 PROCEDURE — 80048 BASIC METABOLIC PNL TOTAL CA: CPT | Performed by: INTERNAL MEDICINE

## 2017-12-25 PROCEDURE — 85610 PROTHROMBIN TIME: CPT | Performed by: INTERNAL MEDICINE

## 2017-12-25 PROCEDURE — 36415 COLL VENOUS BLD VENIPUNCTURE: CPT | Performed by: INTERNAL MEDICINE

## 2017-12-25 PROCEDURE — 85027 COMPLETE CBC AUTOMATED: CPT | Performed by: INTERNAL MEDICINE

## 2017-12-25 PROCEDURE — 96366 THER/PROPH/DIAG IV INF ADDON: CPT

## 2017-12-25 PROCEDURE — 25000128 H RX IP 250 OP 636: Performed by: INTERNAL MEDICINE

## 2017-12-25 RX ORDER — ACETAMINOPHEN 325 MG/1
650 TABLET ORAL EVERY 4 HOURS PRN
Qty: 100 TABLET | Status: ON HOLD | COMMUNITY
Start: 2017-12-25 | End: 2018-12-02

## 2017-12-25 RX ORDER — CEFUROXIME AXETIL 500 MG/1
500 TABLET ORAL 2 TIMES DAILY
Qty: 10 TABLET | Refills: 0 | Status: SHIPPED | OUTPATIENT
Start: 2017-12-26 | End: 2017-12-31

## 2017-12-25 RX ORDER — AZITHROMYCIN 250 MG/1
250 TABLET, FILM COATED ORAL DAILY
Qty: 3 TABLET | Refills: 0 | Status: SHIPPED | OUTPATIENT
Start: 2017-12-26 | End: 2017-12-29

## 2017-12-25 RX ORDER — WARFARIN SODIUM 2 MG/1
2 TABLET ORAL
Status: DISCONTINUED | OUTPATIENT
Start: 2017-12-25 | End: 2017-12-25 | Stop reason: HOSPADM

## 2017-12-25 RX ORDER — CEFTRIAXONE SODIUM 2 G/50ML
2 INJECTION, SOLUTION INTRAVENOUS EVERY 24 HOURS
Status: DISCONTINUED | OUTPATIENT
Start: 2017-12-25 | End: 2017-12-25 | Stop reason: HOSPADM

## 2017-12-25 RX ADMIN — LOSARTAN POTASSIUM 50 MG: 25 TABLET, FILM COATED ORAL at 08:47

## 2017-12-25 RX ADMIN — AZITHROMYCIN 250 MG: 250 TABLET, FILM COATED ORAL at 08:47

## 2017-12-25 RX ADMIN — CEFTRIAXONE SODIUM 2 G: 2 INJECTION, SOLUTION INTRAVENOUS at 03:57

## 2017-12-25 RX ADMIN — ASPIRIN 81 MG: 81 TABLET, COATED ORAL at 08:47

## 2017-12-25 NOTE — PLAN OF CARE
Problem: Patient Care Overview  Goal: Plan of Care/Patient Progress Review  Outcome: Improving  PRIMARY DIAGNOSIS: PNEUMONIA  OUTPATIENT/OBSERVATION GOALS TO BE MET BEFORE DISCHARGE:  1. Dyspnea improved and O2 sats >88% on RA or back to baseline O2 levels: Yes   SpO2: 96 %, O2 Device: None (Room air)    2. Tolerating oral abx or appropriate plans made outpatient infusion: No- IV antibiotics still ordered    3. Vitals signs normal or return to baseline: Yes    4. Short term supplemental O2 needed with activity at home: No    5. Tolerate oral intake to maintain hydration: Yes    6. Return to near baseline physical activity: Yes    Discharge Planner Nurse   Safe discharge environment identified: Yes  Barriers to discharge: Yes- on IV antibiotics        Entered by: Porsha Lira 12/25/2017 11:08 AM     Pt is alert and oriented but forgetful. [place]. On IV rocephin and oral zithromax for pneumonia. No cough present and denies SOB at rest. Will walk in halls and watch 02. On tele- NSR but also a-paced. Low saturated fat. WBC 8.6 from 16.4 yesterday. INR 3.02. Lives with wife. Up SBA. No fevers present.      Please review provider order for any additional goals.   Nurse to notify provider when observation goals have been met and patient is ready for discharge.

## 2017-12-25 NOTE — PLAN OF CARE
Problem: Patient Care Overview  Goal: Plan of Care/Patient Progress Review  Outcome: Improving  PRIMARY DIAGNOSIS: PNEUMONIA  OUTPATIENT/OBSERVATION GOALS TO BE MET BEFORE DISCHARGE:  1. Dyspnea improved and O2 sats >88% on RA or back to baseline O2 levels: Yes                          SpO2: 95 %, O2 Device: None (Room air)      2. Tolerating oral abx or appropriate plans made outpatient infusion: Yes      3. Vitals signs normal or return to baseline: Yes      4. Short term supplemental O2 needed with activity at home: No      5. Tolerate oral intake to maintain hydration: Yes      6. Return to near baseline physical activity: Yes      Discharge Planner Nurse   Safe discharge environment identified: Yes  Barriers to discharge: No       Entered by: Beverley Johnson, 12/25/2017     Please review provider order for any additional goals.   Nurse to notify provider when observation goals have been met and patient is ready for discharge.  Vitally stable, on room air. Denies difficulty breathing, shortness of breath or cough. Lung sounds diminished. Up to bathroom with stand by assist. Bed alarm on for safety.

## 2017-12-25 NOTE — PHARMACY-ANTICOAGULATION SERVICE
Clinical Pharmacy- Warfarin Discharge Note  This patient is currently on warfarin for the treatment of Atrial fibrillation.  INR Goal= 2-3      Anticoagulation Dose History     Recent Dosing and Labs Latest Ref Rng & Units 10/17/2017 10/31/2017 11/21/2017 12/5/2017 12/21/2017 12/24/2017 12/25/2017    Warfarin 2.5 mg - - - - - - 2.5 mg -    INR 0.86 - 1.14 - - - - - 2.19(H) 3.02(H)    INR 0.86 - 1.14 1.6(A) 2.2(A) 2.8(A) 2.5(A) 2.0(A) - -            Agree with discharging the patient on a warfarin regimen of no dose today, 12/25/17 (as INR jumped from 2.19 yesterday to 3.02 today); take 1.25 mg on 12/26; have INR checked at clinic on 12/27/17, with further dosing at that time. Note: is on antibiotic which may affect warfarin dosing, so need to monitor INR closely.

## 2017-12-25 NOTE — PLAN OF CARE
"Problem: Patient Care Overview  Goal: Plan of Care/Patient Progress Review  Pt walked halls as SBA. \"A little SOB but I normally do on walks\". Fast pace. 02 watched and stayed above 95. HR max was 101.       "

## 2017-12-25 NOTE — PLAN OF CARE
Problem: Patient Care Overview  Goal: Plan of Care/Patient Progress Review  Outcome: Adequate for Discharge Date Met: 12/25/17  Patient's After Visit Summary was reviewed with patient and spouse  Patient verbalized understanding of After Visit Summary, recommended follow up and was given an opportunity to ask questions.   Discharge medications sent home with patient/family: YES   Discharged with spouse    OBSERVATION patient END time: 1113

## 2017-12-25 NOTE — PLAN OF CARE
Problem: Patient Care Overview  Goal: Plan of Care/Patient Progress Review  Outcome: Improving  PRIMARY DIAGNOSIS: PNEUMONIA  OUTPATIENT/OBSERVATION GOALS TO BE MET BEFORE DISCHARGE:  1. Dyspnea improved and O2 sats >88% on RA or back to baseline O2 levels: Yes                                      SpO2: 95 %, O2 Device: None (Room air)      2. Tolerating oral abx or appropriate plans made outpatient infusion: Yes. Will switch to PO antibiotics at discharge.      3. Vitals signs normal or return to baseline: Yes      4. Short term supplemental O2 needed with activity at home: No      5. Tolerate oral intake to maintain hydration: Yes      6. Return to near baseline physical activity: Yes    Vitally stable; afebrile and on room air. Reports feeling well and ready to discharge. IV Rocephin given. Heart rhythm per telemetry tech: sinus rhythm with rate of 60, occasional a-paced.         Discharge Planner Nurse   Safe discharge environment identified: Yes  Barriers to discharge: No       Entered by: Beverley Johnson, 12/25/2017      Please review provider order for any additional goals.   Nurse to notify provider when observation goals have been met and patient is ready for discharge.

## 2017-12-25 NOTE — PLAN OF CARE
Problem: Patient Care Overview  Goal: Plan of Care/Patient Progress Review  PRIMARY DIAGNOSIS: PNEUMONIA  OUTPATIENT/OBSERVATION GOALS TO BE MET BEFORE DISCHARGE:  1. Dyspnea improved and O2 sats >88% on RA or back to baseline O2 levels: Yes   SpO2: 97 %, O2 Device: None (Room air)     2. Tolerating oral abx or appropriate plans made outpatient infusion: Yes     3. Vitals signs normal or return to baseline: Yes     4. Short term supplemental O2 needed with activity at home: No     5. Tolerate oral intake to maintain hydration: Yes     6. Return to near baseline physical activity: Yes     Discharge Planner Nurse   Safe discharge environment identified: Yes  Barriers to discharge: No       Entered by: Zee Galan 12/24/2017 7:23 PM     Please review provider order for any additional goals.   Nurse to notify provider when observation goals have been met and patient is ready for discharge.     No changes, denies pain, VSS, up with SBA, A&Ox4 though forgetful, [place], LS with exp wheezing, RA, denies SOB, on IV rocephin and zithromax, family at bedside, will continue to monitor and provide supportive cares.

## 2017-12-25 NOTE — DISCHARGE SUMMARY
[facility] Observation Unit Discharge Summary          Nathen Gage MRN# [id number]   Age: 82 year old YOB: 1935     Date of Admission:  12/24/2017  Date of Discharge::  12/25/2017  Admitting Physician:  Mehul Mayo MD  Discharge Physician:  Chula Hernandez PA-C  Primary Physician: Kushal Valentin     Primary Discharge Diagnoses:   Chest Pain  Community Acquired Pneumonia     Secondary Discharge Diagnoses:   HTN  Sick Sinus Syndrome s/p PPM  Pulmonary HTN  Erectile Dysfunction  HLD  GI Bleed 2010  Esophagitis  GERD  Dilated Aortic Arch  Chronic Atrial Fibrillation on Warfarin  Gout     Hospital Course:   For detail history, please refer to H & P from 12/24/2017. In brief, this is a 82 year old male with history of hypertension, sick sinus syndrome status post pacemaker placement, pulmonary hypertension, erectile dysfunction, hypercholesterolemia, GI bleed in 2010, esophagitis, GERD, dilated aortic arch, atrial fibrillation for which he is chronically anticoagulated with Coumadin, and gout involving left knee.  He presented to the emergency department early in the morning on 12/24/17 for evaluation of 1 day of chest tightness, diaphoresis, shortness of breath.    Patient reported not feeling well starting the evening of 12/22 with upset stomach, reflux, nausea.  He continue to have general feelings of not being well on 12/23 with increased reflux, belching and upset stomach.  He developed fatigue, weakness and diffuse chest tightness around midnight on 12/24.  Due to this new chest pain, EMS was called to bring the patient into the ED for evaluation. He was provided 324 mg Aspirin and a spray of Nitroglycerin by EMS. He did have relief of his chest pain after the Nitro.    Work up in the ED revealed elevated wbc, cxr with left sided infiltrate, ekg wnl x2.  While in the ED patient had an episode of vomiting and became clammy. He denied chest pain at that time but bp was  85 systolic. An ekg and troponin were repeated that was unchanged and a bedside cardiac us was performed that showed no pericardial fluid. After a 500 cc fluid bolus the BP normalized.    Patient was admitted to the observation unit..  He had no further chest pain.  He denied shortness of breath or evidence of blood loss.  Telemetry monitoring was unremarkable.  He was treated for CAP and dehydration with IVF hydration,Rocephin and Zithromax.   Influenza was negative.  The following day, patient felt back to his baseline.  He was able to ambulate independently with oxygen saturations >95% on room air.  He felt ready to discharge.  He was discharged on Ceftin and Zithromax.  INR the day of discharge was 3.02.  Pharmacy was consulted and patient was instructed to hold his coumadin tonight 12/25, resume 1.25mg on 12/26 and patient was scheduled for an INR check on 12/27 with further dosing to be completed by his PCP.  Patient instructed to follow up with his PCP for recheck later this week and to consider outpatient cardiac stress test when acute illness has resolved and follow up hgb of 11.7.      Procedures/Imaging:     Results for orders placed or performed during the hospital encounter of 12/24/17   XR Chest 2 Views    Narrative    CHEST 2 VIEWS   12/24/2017 2:53 AM     HISTORY: Chest pain.    COMPARISON: 6/2/2014.    FINDINGS: A small region of ill-defined hazy opacities in the lateral  aspect of the mid left lung. The right lung is clear. Normal-sized  cardiac silhouette. Atherosclerotic calcification in the thoracic  aorta. Left anterior chest wall cardiac device with lead tips in the  right atrium and right ventricle.      Impression    IMPRESSION: A small region of hazy opacities in the mid left lung is  nonspecific, but most likely represents pneumonia. A followup chest  radiograph would be useful in one month to confirm resolution.    MORGAN RUSSO MD       Consultations:   None    Code Status:  "  Full    Allergies:     Allergies   Allergen Reactions     Neomycin Sulfate         Subjective:   Patient denies chest pain, shortness of breath, lightheadedness, abdominal pain, dysuria, cough, blood in stools. He feels back at his baseline today.    Physical Exam:   Blood pressure 143/67, pulse 78, temperature 96.3  F (35.7  C), temperature source Axillary, resp. rate 16, height 1.74 m (5' 8.5\"), weight 78 kg (171 lb 14.4 oz), SpO2 96 %.  On room air  General: Alert, interactive, NAD  HEENT: AT/NC, sclera anicteric, PERRL, EOMI, OP clear with MMM  Neck: Supple, no JVD or cervical LAD  Resp: clear to auscultation bilaterally, no crackles or wheezes  Cardiac: regular rate and rhythm, no murmur  Abdomen: Soft, nontender, nondistended. +BS.  No rebound or guarding.  Extremities: No LE edema  Skin: Warm and dry, no jaundice or rash  Neuro: Alert & oriented x 3, no focal deficits, moves all extremities equally     Discharge Medicatios:        Discharge Medication List as of 12/25/2017 10:52 AM      START taking these medications    Details   acetaminophen (TYLENOL) 325 MG tablet Take 2 tablets (650 mg) by mouth every 4 hours as needed for mild pain or fever, Disp-100 tablet, OTC      cefuroxime (CEFTIN) 500 MG tablet Take 1 tablet (500 mg) by mouth 2 times daily for 5 days, Disp-10 tablet, R-0, E-Prescribe      azithromycin (ZITHROMAX) 250 MG tablet Take 1 tablet (250 mg) by mouth daily, Disp-3 tablet, R-0, E-Prescribe         CONTINUE these medications which have NOT CHANGED    Details   warfarin (COUMADIN) 2.5 MG tablet Take 1.25 mg by mouth Every Tuesday , Historical      doxazosin (CARDURA) 4 MG tablet Take 1 tablet (4 mg) by mouth daily, Disp-90 tablet, R-1, E-Prescribe      pantoprazole (PROTONIX) 40 MG EC tablet Take 1 tablet (40 mg) by mouth daily, Disp-90 tablet, R-2, E-Prescribe      losartan (COZAAR) 50 MG tablet Take 1 tablet (50 mg) by mouth daily, Disp-90 tablet, R-2, E-Prescribe      atorvastatin " (LIPITOR) 20 MG tablet Take 1 tablet (20 mg) by mouth daily, Disp-90 tablet, R-2, E-Prescribe             Instructions Given to Patient as Discharge:     Discharge Procedure Orders  INR   Standing Status: Future  Standing Exp. Date: 02/23/18     Activity   Order Comments: Your activity upon discharge: activity as tolerated   Order Specific Question Answer Comments   Is discharge order? Yes      When to contact your care team   Order Comments: Notify for fever >101.5; black or bloody stools; severe, persistent, or worsening nausea, vomiting, abdominal pain or distention, diarrhea, constipation; chest pain, difficulty breathing, swelling; dizziness, weakness, lightheadedness, fainting.  Proceed to the nearest emergency room for any urgent concerns.     Reason for your hospital stay   Order Comments: You were hospitalized due to elevated wbc, chest pain and CXR revealed a left sided pneumonia.  Your cardiac labs were normal and telemetry monitoring was wnl.  Your symptoms improved.     Follow-up and recommended labs and tests    Order Comments: Please follow up with your PCP this week for recheck.  Please have your INR checked on 12/27 this week while on antibiotics that may alter your warfarin level.  Please do not take your warfarin dose tonight 12/25.  Take your normal 1.25mg dose on 12/26.  Have your INR checked on 12/27 and discuss the results with your doctor before resuming your regular dose of warfarin.  Your hemoglobin was slightly low at 11.7.  Please follow up with your PCP for further monitoring.  Consider outpatient stress testing with your PCP after your acute illness has resolved.     Full Code     Diet   Order Comments: Follow this diet upon discharge: Orders Placed This Encounter     Low Saturated Fat Diet   Order Specific Question Answer Comments   Is discharge order? Yes          Pending Tests at Discharge:   none    Discharge Disposition:   Discharged to home     Chula VARMAC  Hospitalist  Service  Pager [phone]    >30 minutes was spent in discharge planning, care coordination, physical examination and medication reconciliation on the date of discharge, 12/25/2017

## 2017-12-26 ENCOUNTER — TELEPHONE (OUTPATIENT)
Dept: FAMILY MEDICINE | Facility: CLINIC | Age: 82
End: 2017-12-26

## 2017-12-26 LAB
INTERPRETATION ECG - MUSE: NORMAL
INTERPRETATION ECG - MUSE: NORMAL

## 2017-12-26 NOTE — TELEPHONE ENCOUNTER
Chief Complaint   Patient presents with     Hospital F/U     IP D/C 12/25/17  Acute Chest Pain, Long-Term (Current) Use Of Anticoagulants   ED/IP VISITS 0/0

## 2017-12-26 NOTE — TELEPHONE ENCOUNTER
"Hospital/TCU/ED for chronic condition Discharge Protocol    \"Hi, my name is Nataly SCHRADER, a registered nurse, and I am calling from [facility].  I am calling to follow up and see how things are going for you after your recent emergency visit/hospital/TCU stay.\"    Tell me how you are doing now that you are home?\" doing good      Discharge Instructions    \"Let's review your discharge instructions.  What is/are the follow-up recommendations?  Pt. Response: see JM    \"Has an appointment with your primary care provider been scheduled?\"   Yes. (confirm)    \"When you see the provider, I would recommend that you bring your medications with you.\"    Medications    \"Tell me what changed about your medicines when you discharged?\"    Changes to chronic meds?    none    \"What questions do you have about your medications?\"    None     New diagnoses of heart failure, COPD, diabetes, or MI?    No        On warfarin: \"Were you given any recommendations for follow-up with the anticoagulation clinic?\" Yes, has lab apt since INR clinic not here    Medication reconciliation completed? Yes  Was MTM referral placed (*Make sure to put transitions as reason for referral)?   No    Call Summary    \"What questions or concerns do you have about your recent visit and your follow-up care?\"     none    \"If you have questions or things don't continue to improve, we encourage you contact us through the main clinic number (give number).  Even if the clinic is not open, triage nurses are available 24/7 to help you.     We would like you to know that our clinic has extended hours (provide information).  We also have urgent care (provide details on closest location and hours/contact info)\"      \"Thank you for your time and take care!\"    Nataly Becerra, RN, BSN  Message handled by Nurse Triage.               "

## 2017-12-27 ENCOUNTER — OFFICE VISIT (OUTPATIENT)
Dept: FAMILY MEDICINE | Facility: CLINIC | Age: 82
End: 2017-12-27
Payer: COMMERCIAL

## 2017-12-27 VITALS
BODY MASS INDEX: 25.92 KG/M2 | TEMPERATURE: 97.7 F | DIASTOLIC BLOOD PRESSURE: 68 MMHG | HEIGHT: 69 IN | HEART RATE: 72 BPM | WEIGHT: 175 LBS | RESPIRATION RATE: 12 BRPM | OXYGEN SATURATION: 99 % | SYSTOLIC BLOOD PRESSURE: 134 MMHG

## 2017-12-27 DIAGNOSIS — Z79.01 LONG-TERM (CURRENT) USE OF ANTICOAGULANTS: Primary | ICD-10-CM

## 2017-12-27 DIAGNOSIS — Z79.01 LONG-TERM (CURRENT) USE OF ANTICOAGULANTS: ICD-10-CM

## 2017-12-27 LAB — INR PPP: 1.6 (ref 0.86–1.14)

## 2017-12-27 PROCEDURE — 85610 PROTHROMBIN TIME: CPT | Performed by: FAMILY MEDICINE

## 2017-12-27 PROCEDURE — 99495 TRANSJ CARE MGMT MOD F2F 14D: CPT | Performed by: FAMILY MEDICINE

## 2017-12-27 PROCEDURE — 36416 COLLJ CAPILLARY BLOOD SPEC: CPT | Performed by: FAMILY MEDICINE

## 2017-12-27 NOTE — MR AVS SNAPSHOT
After Visit Summary   12/27/2017    Nathen Gage    MRN: [id number]           Patient Information     Date Of Birth          1935        Visit Information        Provider Department      12/27/2017 1:00 PM Kushal Valentin MD [facility]        Today's Diagnoses     Long-term (current) use of anticoagulants [Z79.01]           Follow-ups after your visit        Your next 10 appointments already scheduled     Jan 09, 2018  9:00 AM CST   Anticoagulation Visit with [facility] ANT[facility]AGULATION CLINIC   [facility] ([facility])    [address] [id number] [phone]            Jan 25, 2018  8:45 AM CST   Phone Device Check with PATE TECH2   [facility] ([facility])    [address]  [facility] [id number] [phone]              Future tests that were ordered for you today     Open Future Orders        Priority Expected Expires Ordered    INR ASAP  12/27/2018 12/27/2017            Who to contact     If you have questions or need follow up information about today's clinic visit or your schedule please contact [facility] directly at [phone].  Normal or non-critical lab and imaging results will be communicated to you by MyChart, letter or phone within 4 business days after the clinic has received the results. If you do not hear from us within 7 days, please contact the clinic through MyChart or phone. If you have a critical or abnormal lab result, we will notify you by phone as soon as possible.  Submit refill requests through [employer] or call your pharmacy and they will forward the refill request to us. Please allow 3 business days for your refill to be completed.          Additional Information About Your Visit        MyChart Information     [employer] gives you secure access to your electronic health record. If you see a primary care  "provider, you can also send messages to your care team and make appointments. If you have questions, please call your primary care clinic.  If you do not have a primary care provider, please call [phone] and they will assist you.        Care EveryWhere ID     This is your Care EveryWhere ID. This could be used by other organizations to access your [facility] medical records  [id number]        Your Vitals Were     Pulse Temperature Respirations Height Pulse Oximetry BMI (Body Mass Index)    72 97.7  F (36.5  C) (Oral) 12 5' 8.5\" (1.74 m) 99% 26.22 kg/m2       Blood Pressure from Last 3 Encounters:   12/27/17 134/68   12/25/17 152/60   11/29/17 132/60    Weight from Last 3 Encounters:   12/27/17 175 lb (79.4 kg)   12/24/17 171 lb 14.4 oz (78 kg)   11/29/17 173 lb 6.4 oz (78.7 kg)               Primary Care Provider Office Phone # Fax #    Kushal Valentin [phone] [address] [address] [address]        Equal Access to Services     KRIS ALEXANDRA AH: Hadii aad ku hadasho Soomaali, waaxda luqadaha, qaybta kaalmada adeegyada, waxay jasmyn haydavinn jd baca lakaylan ah. So [facility] [phone].    ATENCIÓN: Si habla español, tiene a arana disposición servicios gratuitos de asistencia lingüística. Llame al [phone].    We comply with applicable federal civil rights laws and [place] laws. We do not discriminate on the basis of race, color, national origin, age, disability, sex, sexual orientation, or gender identity.            Thank you!     Thank you for choosing [facility]  for your care. Our goal is always to provide you with excellent care. Hearing back from our patients is one way we can continue to improve our services. Please take a few minutes to complete the written survey that you may receive in the mail after your visit with us. Thank you!             Your Updated Medication List - Protect others around you: Learn how to safely use, store and throw away " your medicines at www.disposemymeds.org.          This list is accurate as of: 12/27/17  1:25 PM.  Always use your most recent med list.                   Brand Name Dispense Instructions for use Diagnosis    acetaminophen 325 MG tablet    TYLENOL    100 tablet    Take 2 tablets (650 mg) by mouth every 4 hours as needed for mild pain or fever    Pneumonia due to infectious organism, unspecified laterality, unspecified part of lung       atorvastatin 20 MG tablet    LIPITOR    90 tablet    Take 1 tablet (20 mg) by mouth daily    Pure hypercholesterolemia, Essential hypertension, benign       azithromycin 250 MG tablet    ZITHROMAX    3 tablet    Take 1 tablet (250 mg) by mouth daily    Pneumonia due to infectious organism, unspecified laterality, unspecified part of lung       cefuroxime 500 MG tablet    CEFTIN    10 tablet    Take 1 tablet (500 mg) by mouth 2 times daily for 5 days    Pneumonia due to infectious organism, unspecified laterality, unspecified part of lung       doxazosin 4 MG tablet    CARDURA    90 tablet    Take 1 tablet (4 mg) by mouth daily    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

## 2017-12-27 NOTE — PROGRESS NOTES
"  SUBJECTIVE:   Nathen Gage is a 82 year old male who presents to clinic today for the following health issues:          Hospital Follow-up Visit:    Hospital/Nursing Home/IP Rehab Facility: [facility]  Date of Admission: 12/24/17  Date of Discharge: 12/25/17  Reason(s) for Admission: chest pain with epigastric pain after eating spicy food           Problems taking medications regularly:  None       Medication changes since discharge: antibiotics       Problems adhering to non-medication therapy:  None  Check INR due to antibiotic therapy     Summary of hospitalization:  [facility] discharge summary reviewed  Diagnostic Tests/Treatments reviewed.  Follow up needed: INR   Other Healthcare Providers Involved in Patient s Care:         Specialist appointment - Cardiology for his routine folowup   Update since discharge: improved.     Post Discharge Medication Reconciliation: discharge medications reconciled, continue medications without change.  Plan of care communicated with patient     Coding guidelines for this visit:  Type of Medical   Decision Making Face-to-Face Visit       within 7 Days of discharge Face-to-Face Visit        within 14 days of discharge   Moderate Complexity [id number]   High Complexity [id number]          XRAY AT ER FOUND GROUND GLASS \"INFILTRATE\"   Past Medical History:   Diagnosis Date     Actinic keratosis      Acute idiopathic gout of left knee 4/27/2016     Atrial fibrillation (H)     on Eliquis     Dilated aortic root (H)      Esophageal reflux      Esophagitis      H/O: GI bleed 2010     Hyperlipidaemia      Hyperlipidemia LDL goal <100      Impotence of organic origin      Presbycusis, unspecified laterality 4/27/2016     Pulmonary hypertension      Sick sinus syndrome (H)     pacemaker implanted 2011     Unspecified essential hypertension        Past Surgical History:   Procedure Laterality Date     IMPLANT PACEMAKER  2011    Pacemaker/cardiac insitu / [employer] " [employer] Dual chamber, V45       Family History   Problem Relation Age of Onset     Alzheimer Disease Brother      CEREBROVASCULAR DISEASE Father 80     Family History Negative Sister      Family History Negative Son      Family History Negative Daughter      Family History Negative Sister      Family History Negative Daughter      Breast Cancer No family hx of      Prostate Cancer No family hx of        Social History   Substance Use Topics     Smoking status: Former Smoker     Quit date: 1/1/1975     Smokeless tobacco: Never Used     Alcohol use Yes      Comment: 1- 2 BEERS WEEKLY      REVIEW OF SYSTEMS    Generally has been started  feeling well since the epigastric pain episode. No problems with vision, hearing, dental or neck pain.Has hph airborne or ingestion allergy  No chest pain, palpitations, dyspnea, change in bowel habits, blood  in stool or dyspepsia.  No rashes, changing moles, weakness, lassitude or back problems.  No chronic issues . No dysuria  Patient not  a smoker. No problems with significant headaches.    On exam the vital signs are stable  Weight is Body mass index is 26.22 kg/(m^2).   Eyes show iram   No neck masses or thyromegaly.Ear nose and throat shows normal   No bruits, murmers, rubs or extrasounds. No cardiomegaly or chest wall tenderness. Lungs clear, no abdominal masses or organomegaly. No CVA tenderness.  Skin eval no rash    Good peripheral pulses. No adenopathy.  Normal gait and stance. Neck is supple.  Back exam shows good rom     (Z79.01) Long-term (current) use of anticoagulants [Z79.01]  Comment:   Plan: INR        Monitor while on antibiotic for pneumonia: ground glass opacity could be new but chronic, ./68 (BP Location: Right arm, Patient Position: Chair, Cuff Size: Adult Regular)  Pulse 72  Temp 97.7  F (36.5  C) (Oral)  Resp 12  Ht 5' 8.5\" (1.74 m)  Wt 175 lb (79.4 kg)  SpO2 99%  BMI 26.22 kg/m2      Follow up again in two days for antibiotic and inr " evalutation

## 2017-12-28 ENCOUNTER — ANTICOAGULATION THERAPY VISIT (OUTPATIENT)
Dept: FAMILY MEDICINE | Facility: CLINIC | Age: 82
End: 2017-12-28
Payer: COMMERCIAL

## 2017-12-28 DIAGNOSIS — Z79.01 LONG-TERM (CURRENT) USE OF ANTICOAGULANTS: ICD-10-CM

## 2017-12-28 DIAGNOSIS — I48.91 ATRIAL FIBRILLATION (H): ICD-10-CM

## 2017-12-28 NOTE — MR AVS SNAPSHOT
Nathen CAMACHO Merari   12/28/2017   Anticoagulation Therapy Visit    Description:  82 year old male   Provider:  Kushal Valentin MD   Department:  [facility]           INR as of 12/28/2017     Today's INR No new INR was available at the time of this encounter.      Anticoagulation Summary as of 12/28/2017     INR goal 2.0-2.5   Today's INR No new INR was available at the time of this encounter.   Full instructions 1.25 mg on Tue; 2.5 mg all other days   Next INR check 12/29/2017    Indications   Long-term (current) use of anticoagulants [Z79.01] [Z79.01]  Atrial fibrillation (H) [I48.91]         Your next Anticoagulation Clinic appointment(s)     Jan 09, 2018  9:00 AM CST   Anticoagulation Visit with [facility]   [facility]    [address] [place] [id number]   [phone]              December 2017 Details    Sun Mon Tue Wed Thu Fri Sat          1               2                 3               4               5               6               7               8               9                 10               11               12               13               14               15               16                 17               18               19               20               21               22               23                 24               25               26               27               28      2.5 mg   See details      29 30                 31                      Date Details   12/28 This INR check       Date of next INR:  12/29/2017         How to take your warfarin dose     To take:  2.5 mg Take 1 of the 2.5 mg tablets.

## 2017-12-28 NOTE — PROGRESS NOTES
ANTICOAGULATION FOLLOW-UP CLINIC VISIT    Patient Name:  Nathen Gage  Date:  12/28/2017  Contact Type:  Telephone/ with pt     SUBJECTIVE:     Patient Findings     Positives Change in medications           OBJECTIVE    INR   Date Value Ref Range Status   12/27/2017 1.60 (H) 0.86 - 1.14 Final     Comment:     This test is intended for monitoring Coumadin therapy.  Results are not   accurate in patients with prolonged INR due to factor deficiency.         ASSESSMENT / PLAN  No question data found.  Anticoagulation Summary as of 12/28/2017     INR goal 2.0-2.5   Today's INR    Maintenance plan 1.25 mg (2.5 mg x 0.5) on Tue; 2.5 mg (2.5 mg x 1) all other days   Full instructions 1.25 mg on Tue; 2.5 mg all other days   Weekly total 16.25 mg   Plan last modified Fidelina Pizano RN (8/8/2017)   Next INR check 12/29/2017   Target end date Indefinite    Indications   Long-term (current) use of anticoagulants [Z79.01] [Z79.01]  Atrial fibrillation (H) [I48.91]         Anticoagulation Episode Summary     INR check location     Preferred lab     Send INR reminders to [facility]    Comments       Anticoagulation Care Providers     Provider Role Specialty Phone number    Kushal Valentin MD [facility] [phone]            See the Encounter Report to view Anticoagulation Flowsheet and Dosing Calendar (Go to Encounters tab in chart review, and find the Anticoagulation Therapy Visit)    Dosage done by pt's PCP according to pt    Juliette Harper RN

## 2017-12-29 ENCOUNTER — OFFICE VISIT (OUTPATIENT)
Dept: FAMILY MEDICINE | Facility: CLINIC | Age: 82
End: 2017-12-29
Payer: COMMERCIAL

## 2017-12-29 ENCOUNTER — ANTICOAGULATION THERAPY VISIT (OUTPATIENT)
Dept: FAMILY MEDICINE | Facility: CLINIC | Age: 82
End: 2017-12-29

## 2017-12-29 VITALS
WEIGHT: 174 LBS | TEMPERATURE: 97.3 F | RESPIRATION RATE: 14 BRPM | BODY MASS INDEX: 26.37 KG/M2 | HEART RATE: 65 BPM | OXYGEN SATURATION: 97 % | HEIGHT: 68 IN | SYSTOLIC BLOOD PRESSURE: 138 MMHG | DIASTOLIC BLOOD PRESSURE: 82 MMHG

## 2017-12-29 DIAGNOSIS — Z79.01 LONG-TERM (CURRENT) USE OF ANTICOAGULANTS: ICD-10-CM

## 2017-12-29 DIAGNOSIS — I48.91 ATRIAL FIBRILLATION (H): ICD-10-CM

## 2017-12-29 DIAGNOSIS — E78.5 HYPERLIPIDEMIA LDL GOAL <160: Primary | ICD-10-CM

## 2017-12-29 DIAGNOSIS — K22.4 DIFFUSE ESOPHAGEAL SPASM: ICD-10-CM

## 2017-12-29 LAB
ERYTHROCYTE [DISTWIDTH] IN BLOOD BY AUTOMATED COUNT: 14 % (ref 10–15)
HCT VFR BLD AUTO: 39.7 % (ref 40–53)
HGB BLD-MCNC: 13.3 G/DL (ref 13.3–17.7)
INR PPP: 1.5 (ref 0.86–1.14)
MCH RBC QN AUTO: 29.6 PG (ref 26.5–33)
MCHC RBC AUTO-ENTMCNC: 33.5 G/DL (ref 31.5–36.5)
MCV RBC AUTO: 88 FL (ref 78–100)
PLATELET # BLD AUTO: 220 10E9/L (ref 150–450)
RBC # BLD AUTO: 4.49 10E12/L (ref 4.4–5.9)
WBC # BLD AUTO: 6.5 10E9/L (ref 4–11)

## 2017-12-29 PROCEDURE — 36415 COLL VENOUS BLD VENIPUNCTURE: CPT | Performed by: FAMILY MEDICINE

## 2017-12-29 PROCEDURE — 85610 PROTHROMBIN TIME: CPT | Performed by: FAMILY MEDICINE

## 2017-12-29 PROCEDURE — 80053 COMPREHEN METABOLIC PANEL: CPT | Performed by: FAMILY MEDICINE

## 2017-12-29 PROCEDURE — 80061 LIPID PANEL: CPT | Performed by: FAMILY MEDICINE

## 2017-12-29 PROCEDURE — 85027 COMPLETE CBC AUTOMATED: CPT | Performed by: FAMILY MEDICINE

## 2017-12-29 PROCEDURE — 99214 OFFICE O/P EST MOD 30 MIN: CPT | Performed by: FAMILY MEDICINE

## 2017-12-29 NOTE — PROGRESS NOTES
SUBJECTIVE:   Nathen Gage is a 82 year old male who presents to clinic today for the following health issues:       Follow up from the last visit     SUBJECTIVE:    CC: Nathen Gage is a 82 year old male who presents for spell of very hot foot precipitating a fainting reaction    HPI: no chest pain, no palpitations, went to ER. Ischemia ruled out.  Current Outpatient Prescriptions   Medication     acetaminophen (TYLENOL) 325 MG tablet     cefuroxime (CEFTIN) 500 MG tablet     warfarin (COUMADIN) 2.5 MG tablet     doxazosin (CARDURA) 4 MG tablet     pantoprazole (PROTONIX) 40 MG EC tablet     losartan (COZAAR) 50 MG tablet     atorvastatin (LIPITOR) 20 MG tablet     No current facility-administered medications for this visit.        PROBLEM LIST:                   Patient Active Problem List   Diagnosis     Esophageal reflux     Essential hypertension, benign     Testicular hypofunction     Impotence of organic origin     Gout     Actinic keratosis     Sick sinus syndrome (H)     Syncope     Hyponatremia     Community acquired pneumonia     Advanced directives, counseling/discussion     Pacemaker, artificial     Health Care Home     Atrial fibrillation (H)     Dilated aortic root (H)     Pulmonary hypertension     H/O: GI bleed     Mixed hyperlipidemia     Faintness     Long-term (current) use of anticoagulants [Z79.01]     Acute idiopathic gout of left knee     Presbycusis, unspecified laterality     Chest pain       PAST MEDICAL HISTORY:                  Past Medical History:   Diagnosis Date     Actinic keratosis      Acute idiopathic gout of left knee 4/27/2016     Atrial fibrillation (H)     on Eliquis     Dilated aortic root (H)      Esophageal reflux      Esophagitis      H/O: GI bleed 2010     Hyperlipidaemia      Hyperlipidemia LDL goal <100      Impotence of organic origin      Presbycusis, unspecified laterality 4/27/2016     Pulmonary hypertension      Sick sinus syndrome (H)     pacemaker  implanted 2011     Unspecified essential hypertension        PAST SURGICAL HISTORY:                  Past Surgical History:   Procedure Laterality Date     IMPLANT PACEMAKER  2011    Pacemaker/cardiac insitu / [employer] Dual chamber, V45       CURRENT MEDICATIONS:                  Current Outpatient Prescriptions   Medication Sig Dispense Refill     acetaminophen (TYLENOL) 325 MG tablet Take 2 tablets (650 mg) by mouth every 4 hours as needed for mild pain or fever 100 tablet      cefuroxime (CEFTIN) 500 MG tablet Take 1 tablet (500 mg) by mouth 2 times daily for 5 days 10 tablet 0     warfarin (COUMADIN) 2.5 MG tablet Take 1.25 mg by mouth Every Tuesday        doxazosin (CARDURA) 4 MG tablet Take 1 tablet (4 mg) by mouth daily 90 tablet 1     pantoprazole (PROTONIX) 40 MG EC tablet Take 1 tablet (40 mg) by mouth daily 90 tablet 2     losartan (COZAAR) 50 MG tablet Take 1 tablet (50 mg) by mouth daily 90 tablet 2     atorvastatin (LIPITOR) 20 MG tablet Take 1 tablet (20 mg) by mouth daily 90 tablet 2             FAMILY HISTORY:                   Family History   Problem Relation Age of Onset     Alzheimer Disease Brother      CEREBROVASCULAR DISEASE Father 80     Family History Negative Sister      Family History Negative Son      Family History Negative Daughter      Family History Negative Sister      Family History Negative Daughter      Breast Cancer No family hx of      Prostate Cancer No family hx of        HEALTH MAINTENANCE:                    REVIEW OF OUTSIDE RECORDS: NO    REVIEW OF SYSTEMS:  C: NEGATIVE for fever, chills  I: NEGATIVE for worrisome rashes, moles or lesions  E: NEGATIVE for vision changes   E/M: NEGATIVE for ear, mouth and throat problems  R: NEGATIVE for significant cough or SOB  CV: NEGATIVE for chest pain, palpitations   GI: NEGATIVE for nausea, abdominal pain, heartburn, or change in bowel habits  : NEGATIVE for frequency, dysuria, or hematuria  M: NEGATIVE for significant  "arthralgias or myalgia  N: NEGATIVE for weakness, dizziness or paresthesias or headache  NVS:no headache or balance issus  INTEG:no moles or new rashes  LYMPH:no nodes or night sweats    EXAM:    /70 (BP Location: Right arm, Patient Position: Chair, Cuff Size: Adult Regular)  Pulse 65  Temp 97.3  F (36.3  C) (Oral)  Resp 14  Ht 5' 7.5\" (1.715 m)  Wt 174 lb (78.9 kg)  SpO2 97%  BMI 26.85 kg/m2  GENERAL APPEARANCE: healthy, alert and no distress   EXAM:  GENERAL APPEARANCE: healthy, alert and no distress  EYES: EOMI,  PERRL  HENT: ear canals and TM's normal and nose and mouth without ulcers or lesions  NECK: no adenopathy, no asymmetry, masses, or scars and thyroid normal to palpation  RESP: lungs clear to auscultation - no rales, rhonchi or wheezes  CV: regular rates and rhythm, normal S1 S2, no S3 or S4 and no murmur, click or rub -  ABDOMEN:  soft, nontender, no HSM or masses and bowel sounds normal  BACK:no tenderness or pain on straight let raise           ASSESSMENT/PLAN  1. Hyperlipidemia LDL goal <160        2. Syncope:  Apparent vasovagal secondary to esophogeal spasm    -review med issues, diet and exercise                         I have discussed with patient the risks, benefits, medications, treatment options and modalities.   I have instructed the patient to call or schedule a follow-up appointment if any problems or failure to improve.                         "

## 2017-12-29 NOTE — MR AVS SNAPSHOT
Nathen CAMACHO Merari   12/29/2017   Anticoagulation Therapy Visit    Description:  82 year old male   Provider:  Kushal Valentin MD   Department:  [facility]           INR as of 12/29/2017     Today's INR No new INR was available at the time of this encounter.      Anticoagulation Summary as of 12/29/2017     INR goal 2.0-2.5   Today's INR No new INR was available at the time of this encounter.   Full instructions 12/29: 5 mg; Otherwise 1.25 mg on Tue; 2.5 mg all other days   Next INR check 1/2/2018    Indications   Long-term (current) use of anticoagulants [Z79.01] [Z79.01]  Atrial fibrillation (H) [I48.91]         Description     Dosing per pt's PCP -        Your next Anticoagulation Clinic appointment(s)     Jan 02, 2018  3:30 PM CST   Anticoagulation Visit with  ANT[facility]ULATION CLINIC   [facility]    [address] [address]   [phone]            Jan 09, 2018  9:00 AM CST   Anticoagulation Visit with  ANTICOAGULATION CLINIC   [facility]    [address] [address]   [phone]              December 2017 Details    Sun Mon Tue Wed Thu Fri Sat          1               2                 3               4               5               6               7               8               9                 10               11               12               13               14               15               16                 17               18               19               20               21               22               23                 24               25               26               27               28               29      5 mg   See details      30      2.5 mg           31      2.5 mg                Date Details   12/29 This INR check               How to take your warfarin dose     To take:  2.5 mg Take 1 of the 2.5 mg tablets.     To take:  5 mg Take 2 of the 2.5 mg tablets.           January 2018 Details    Sun Mon Tue Wed Thu Fri Sat      1      2.5 mg         2            3               4               5               6                 7               8               9               10               11               12               13                 14               15               16               17               18               19               20                 21               22               23               24               25               26               27                 28               29               30               31                   Date Details   No additional details    Date of next INR:  1/2/2018         How to take your warfarin dose     To take:  1.25 mg Take 0.5 of a 2.5 mg tablet.    To take:  2.5 mg Take 1 of the 2.5 mg tablets.

## 2017-12-29 NOTE — MR AVS SNAPSHOT
After Visit Summary   12/29/2017    Nathen Gage    MRN: [id number]           Patient Information     Date Of Birth          1935        Visit Information        Provider Department      12/29/2017 10:15 AM Kushal Valentin MD [facility]        Today's Diagnoses     Hyperlipidemia LDL goal <160    -  1    Diffuse esophageal spasm          Care Instructions    Take 5 mg today then back to the 2.5 and see INR Monday           Follow-ups after your visit        Follow-up notes from your care team     Return in about 3 months (around 3/29/2018).      Your next 10 appointments already scheduled     Jan 02, 2018  3:30 PM CST   Anticoagulation Visit with [facility]   [facility] ([facility])    [address] [facility] [address] [phone]            Jan 09, 2018  9:00 AM CST   Anticoagulation Visit with [facility]ATION CLINIC   [facility] ([facility])    [id number] [place] [address] [phone]            Jan 25, 2018  8:45 AM CST   Phone Device Check with PATE TECH2   [facility] ([facility])    [address] [place] [address] [address]  [facility] [id number] [phone]              Who to contact     If you have questions or need follow up information about today's clinic visit or your schedule please contact [facility] directly at [phone].  Normal or non-critical lab and imaging results will be communicated to you by MyChart, letter or phone within 4 business days after the clinic has received the results. If you do not hear from us within 7 days, please contact the clinic through MyChart or phone. If you have a critical or abnormal lab result, we will notify you by phone as soon as possible.  Submit refill requests through [email or web address] or call your pharmacy and they will  "forward the refill request to us. Please allow 3 business days for your refill to be completed.          Additional Information About Your Visit        Tr[employer] Information     [employer] gives you secure access to your electronic health record. If you see a primary care provider, you can also send messages to your care team and make appointments. If you have questions, please call your primary care clinic.  If you do not have a primary care provider, please call [phone] and they will assist you.        Care EveryWhere ID     This is your Care EveryWhere ID. This could be used by other organizations to access your [place] medical records  [id number]        Your Vitals Were     Pulse Temperature Respirations Height Pulse Oximetry BMI (Body Mass Index)    65 97.3  F (36.3  C) (Oral) 14 5' 7.5\" (1.715 m) 97% 26.85 kg/m2       Blood Pressure from Last 3 Encounters:   12/29/17 138/82   12/27/17 134/68   12/25/17 152/60    Weight from Last 3 Encounters:   12/29/17 174 lb (78.9 kg)   12/27/17 175 lb (79.4 kg)   12/24/17 171 lb 14.4 oz (78 kg)              We Performed the Following     CBC with platelets     Comprehensive metabolic panel     Lipid panel reflex to direct LDL Fasting          Today's Medication Changes          These changes are accurate as of: 12/29/17 11:59 PM.  If you have any questions, ask your nurse or doctor.               Stop taking these medicines if you haven't already. Please contact your care team if you have questions.     azithromycin 250 MG tablet   Commonly known as:  ZITHROMAX   Stopped by:  Kushal Valentin MD                    Primary Care Provider Office Phone # Fax #    Kushal Valentin [phone] [address] [facility] [address]        Equal Access to Services     KRIS ALEXANDRA : Harley Cade, wamickey meehan, qaybta kaalellen christine, anamaria diaz. So [facility] [phone].    ATENCIÓN: Si habla " español, tiene a arana disposición servicios gratuitos de asistencia lingüística. Terrence haq [phone].    We comply with applicable federal civil rights laws and [place] laws. We do not discriminate on the basis of race, color, national origin, age, disability, sex, sexual orientation, or gender identity.            Thank you!     Thank you for choosing [facility]  for your care. Our goal is always to provide you with excellent care. Hearing back from our patients is one way we can continue to improve our services. Please take a few minutes to complete the written survey that you may receive in the mail after your visit with us. Thank you!             Your Updated Medication List - Protect others around you: Learn how to safely use, store and throw away your medicines at www.disposemymeds.org.          This list is accurate as of: 12/29/17 11:59 PM.  Always use your most recent med list.                   Brand Name Dispense Instructions for use Diagnosis    acetaminophen 325 MG tablet    TYLENOL    100 tablet    Take 2 tablets (650 mg) by mouth every 4 hours as needed for mild pain or fever    Pneumonia due to infectious organism, unspecified laterality, unspecified part of lung       atorvastatin 20 MG tablet    LIPITOR    90 tablet    Take 1 tablet (20 mg) by mouth daily    Pure hypercholesterolemia, Essential hypertension, benign       cefuroxime 500 MG tablet    CEFTIN    10 tablet    Take 1 tablet (500 mg) by mouth 2 times daily for 5 days    Pneumonia due to infectious organism, unspecified laterality, unspecified part of lung       doxazosin 4 MG tablet    CARDURA    90 tablet    Take 1 tablet (4 mg) by mouth daily    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

## 2017-12-29 NOTE — PROGRESS NOTES
ANTICOAGULATION FOLLOW-UP CLINIC VISIT    Patient Name:  Nathen Gage  Date:  12/29/2017  Contact Type:  Telephone- confirmed with pt dosing done with PCP at  today     SUBJECTIVE:     Patient Findings     Positives No Problem Findings           OBJECTIVE    INR   Date Value Ref Range Status   12/29/2017 1.50 (H) 0.86 - 1.14 Final     Comment:     This test is intended for monitoring Coumadin therapy.  Results are not   accurate in patients with prolonged INR due to factor deficiency.         ASSESSMENT / PLAN  No question data found.  Anticoagulation Summary as of 12/29/2017     INR goal 2.0-2.5   Today's INR No new INR was available at the time of this encounter.   Maintenance plan 1.25 mg (2.5 mg x 0.5) on Tue; 2.5 mg (2.5 mg x 1) all other days   Full instructions 12/29: 5 mg; Otherwise 1.25 mg on Tue; 2.5 mg all other days   Weekly total 16.25 mg   Plan last modified Fidelina Pizano RN (8/8/2017)   Next INR check 1/2/2018   Target end date Indefinite    Indications   Long-term (current) use of anticoagulants [Z79.01] [Z79.01]  Atrial fibrillation (H) [I48.91]         Anticoagulation Episode Summary     INR check location     Preferred lab     Send INR reminders to [facility]    Comments       Anticoagulation Care Providers     Provider Role Specialty Phone number    Kushal Valentin MD [facility] [phone]            See the Encounter Report to view Anticoagulation Flowsheet and Dosing Calendar (Go to Encounters tab in chart review, and find the Anticoagulation Therapy Visit)    Dosage adjustment done by pt's PCP at      Juliette Harper RN

## 2017-12-29 NOTE — NURSING NOTE
"Chief Complaint   Patient presents with     RECHECK       Initial /70 (BP Location: Right arm, Patient Position: Chair, Cuff Size: Adult Regular)  Pulse 65  Temp 97.3  F (36.3  C) (Oral)  Resp 14  Ht 5' 7.5\" (1.715 m)  Wt 174 lb (78.9 kg)  SpO2 97%  BMI 26.85 kg/m2 Estimated body mass index is 26.85 kg/(m^2) as calculated from the following:    Height as of this encounter: 5' 7.5\" (1.715 m).    Weight as of this encounter: 174 lb (78.9 kg).  Medication Reconciliation: complete   "

## 2017-12-30 LAB
ALBUMIN SERPL-MCNC: 4 G/DL (ref 3.4–5)
ALP SERPL-CCNC: 89 U/L (ref 40–150)
ALT SERPL W P-5'-P-CCNC: 19 U/L (ref 0–70)
ANION GAP SERPL CALCULATED.3IONS-SCNC: 7 MMOL/L (ref 3–14)
AST SERPL W P-5'-P-CCNC: 18 U/L (ref 0–45)
BACTERIA SPEC CULT: NO GROWTH
BACTERIA SPEC CULT: NO GROWTH
BILIRUB SERPL-MCNC: 0.7 MG/DL (ref 0.2–1.3)
BUN SERPL-MCNC: 12 MG/DL (ref 7–30)
CALCIUM SERPL-MCNC: 9.3 MG/DL (ref 8.5–10.1)
CHLORIDE SERPL-SCNC: 100 MMOL/L (ref 94–109)
CHOLEST SERPL-MCNC: 142 MG/DL
CO2 SERPL-SCNC: 29 MMOL/L (ref 20–32)
CREAT SERPL-MCNC: 0.92 MG/DL (ref 0.66–1.25)
GFR SERPL CREATININE-BSD FRML MDRD: 79 ML/MIN/1.7M2
GLUCOSE SERPL-MCNC: 94 MG/DL (ref 70–99)
HDLC SERPL-MCNC: 56 MG/DL
LDLC SERPL CALC-MCNC: 73 MG/DL
Lab: NORMAL
Lab: NORMAL
NONHDLC SERPL-MCNC: 86 MG/DL
POTASSIUM SERPL-SCNC: 4.8 MMOL/L (ref 3.4–5.3)
PROT SERPL-MCNC: 7.5 G/DL (ref 6.8–8.8)
SODIUM SERPL-SCNC: 136 MMOL/L (ref 133–144)
SPECIMEN SOURCE: NORMAL
SPECIMEN SOURCE: NORMAL
TRIGL SERPL-MCNC: 65 MG/DL

## 2018-01-02 ENCOUNTER — ANTICOAGULATION THERAPY VISIT (OUTPATIENT)
Dept: NURSING | Facility: CLINIC | Age: 83
End: 2018-01-02
Payer: COMMERCIAL

## 2018-01-02 DIAGNOSIS — Z79.01 LONG-TERM (CURRENT) USE OF ANTICOAGULANTS: ICD-10-CM

## 2018-01-02 DIAGNOSIS — I48.91 ATRIAL FIBRILLATION (H): ICD-10-CM

## 2018-01-02 LAB — INR POINT OF CARE: 1.8 (ref 0.86–1.14)

## 2018-01-02 PROCEDURE — 99207 ZZC NO CHARGE NURSE ONLY: CPT

## 2018-01-02 PROCEDURE — 36416 COLLJ CAPILLARY BLOOD SPEC: CPT

## 2018-01-02 PROCEDURE — 85610 PROTHROMBIN TIME: CPT | Mod: QW

## 2018-01-02 NOTE — MR AVS SNAPSHOT
Nathen JANICE Gage   1/2/2018 3:30 PM   Anticoagulation Therapy Visit    Description:  82 year old male   Provider:  [facility]   Department:  Cr Nurse           INR as of 1/2/2018     Today's INR 1.8!      Anticoagulation Summary as of 1/2/2018     INR goal 2.0-2.5   Today's INR 1.8!   Full instructions 1/2: 2.5 mg; Otherwise 1.25 mg on Tue; 2.5 mg all other days   Next INR check 1/11/2018    Indications   Long-term (current) use of anticoagulants [Z79.01] [Z79.01]  Atrial fibrillation (H) [I48.91]         Your next Anticoagulation Clinic appointment(s)     Jan 11, 2018 10:15 AM CST   Anticoagulation Visit with  [facility]UL[facility] CLINIC   [facility]    [address] [place] [address]   [phone]              Contact Numbers     Clinic Number:         January 2018 Details    Sun Mon Tue Wed Thu Fri Sat      1               2      2.5 mg   See details      3      2.5 mg         4      2.5 mg         5      2.5 mg         6      2.5 mg           7      2.5 mg         8      2.5 mg         9      1.25 mg         10      2.5 mg         11            12               13                 14               15               16               17               18               19               20                 21               22               23               24               25               26               27                 28               29               30               31                   Date Details   01/02 This INR check       Date of next INR:  1/11/2018         How to take your warfarin dose     To take:  1.25 mg Take 0.5 of a 2.5 mg tablet.    To take:  2.5 mg Take 1 of the 2.5 mg tablets.

## 2018-01-02 NOTE — PROGRESS NOTES
ANTICOAGULATION FOLLOW-UP CLINIC VISIT    Patient Name:  Nathen Gage  Date:  1/2/2018  Contact Type:  Face to Face    SUBJECTIVE:     Patient Findings     Positives Unexplained INR or factor level change           OBJECTIVE    INR Protime   Date Value Ref Range Status   01/02/2018 1.8 (A) 0.86 - 1.14 Final       ASSESSMENT / PLAN  INR assessment SUB    Recheck INR In: 10 DAYS    INR Location Clinic      Anticoagulation Summary as of 1/2/2018     INR goal 2.0-2.5   Today's INR 1.8!   Maintenance plan 1.25 mg (2.5 mg x 0.5) on Tue; 2.5 mg (2.5 mg x 1) all other days   Full instructions 1/2: 2.5 mg; Otherwise 1.25 mg on Tue; 2.5 mg all other days   Weekly total 16.25 mg   Plan last modified Fidelina Pizano RN (8/8/2017)   Next INR check 1/11/2018   Target end date Indefinite    Indications   Long-term (current) use of anticoagulants [Z79.01] [Z79.01]  Atrial fibrillation (H) [I48.91]         Anticoagulation Episode Summary     INR check location     Preferred lab     Send INR reminders to [facility] CL[facility]    Comments       Anticoagulation Care Providers     Provider Role Specialty Phone number    Kushal Valentin MD [facility] [phone]            See the Encounter Report to view Anticoagulation Flowsheet and Dosing Calendar (Go to Encounters tab in chart review, and find the Anticoagulation Therapy Visit)        Fidelina Pizano, RN

## 2018-01-11 ENCOUNTER — ANTICOAGULATION THERAPY VISIT (OUTPATIENT)
Dept: NURSING | Facility: CLINIC | Age: 83
End: 2018-01-11
Payer: COMMERCIAL

## 2018-01-11 DIAGNOSIS — Z79.01 LONG-TERM (CURRENT) USE OF ANTICOAGULANTS: ICD-10-CM

## 2018-01-11 DIAGNOSIS — I48.91 ATRIAL FIBRILLATION (H): ICD-10-CM

## 2018-01-11 LAB — INR POINT OF CARE: 2 (ref 0.86–1.14)

## 2018-01-11 PROCEDURE — 99207 ZZC NO CHARGE NURSE ONLY: CPT

## 2018-01-11 PROCEDURE — 36416 COLLJ CAPILLARY BLOOD SPEC: CPT

## 2018-01-11 PROCEDURE — 85610 PROTHROMBIN TIME: CPT | Mod: QW

## 2018-01-11 NOTE — MR AVS SNAPSHOT
Nathen JANICE Gage   1/11/2018 10:15 AM   Anticoagulation Therapy Visit    Description:  82 year old male   Provider:  [facility]   Department:  Cr Nurse           INR as of 1/11/2018     Today's INR 2.0      Anticoagulation Summary as of 1/11/2018     INR goal 2.0-2.5   Today's INR 2.0   Full instructions 1.25 mg on Tue; 2.5 mg all other days   Next INR check 1/23/2018    Indications   Long-term (current) use of anticoagulants [Z79.01] [Z79.01]  Atrial fibrillation (H) [I48.91]         Your next Anticoagulation Clinic appointment(s)     Jan 23, 2018  9:30 AM CST   Anticoagulation Visit with [facility]   [facility]    [address] [place] [id number] [phone]              Contact Numbers     Clinic Number:         January 2018 Details    Sun Mon Tue Wed Thu Fri Sat      1               2               3               4               5               6                 7               8               9               10               11      2.5 mg   See details      12      2.5 mg         13      2.5 mg           14      2.5 mg         15      2.5 mg         16      1.25 mg         17      2.5 mg         18      2.5 mg         19      2.5 mg         20      2.5 mg           21      2.5 mg         22      2.5 mg         23            24               25               26               27                 28               29               30               31                   Date Details   01/11 This INR check       Date of next INR:  1/23/2018         How to take your warfarin dose     To take:  1.25 mg Take 0.5 of a 2.5 mg tablet.    To take:  2.5 mg Take 1 of the 2.5 mg tablets.

## 2018-01-11 NOTE — PROGRESS NOTES
ANTICOAGULATION FOLLOW-UP CLINIC VISIT    Patient Name:  Nathen Gage  Date:  1/11/2018  Contact Type:  Face to Face    SUBJECTIVE:     Patient Findings     Positives No Problem Findings           OBJECTIVE    INR Protime   Date Value Ref Range Status   01/11/2018 2.0 (A) 0.86 - 1.14 Final       ASSESSMENT / PLAN  INR assessment THER    Recheck INR In: 2 WEEKS    INR Location Clinic      Anticoagulation Summary as of 1/11/2018     INR goal 2.0-2.5   Today's INR 2.0   Maintenance plan 1.25 mg (2.5 mg x 0.5) on Tue; 2.5 mg (2.5 mg x 1) all other days   Full instructions 1.25 mg on Tue; 2.5 mg all other days   Weekly total 16.25 mg   No change documented Fidelina Pizano RN   Plan last modified Fidelina Pizano RN (8/8/2017)   Next INR check 1/23/2018   Target end date Indefinite    Indications   Long-term (current) use of anticoagulants [Z79.01] [Z79.01]  Atrial fibrillation (H) [I48.91]         Anticoagulation Episode Summary     INR check location     Preferred lab     Send INR reminders to [facility]    Comments       Anticoagulation Care Providers     Provider Role Specialty Phone number    Kushal Valentin MD [facility] [phone]            See the Encounter Report to view Anticoagulation Flowsheet and Dosing Calendar (Go to Encounters tab in chart review, and find the Anticoagulation Therapy Visit)        Fidelina Pizano, ARTIS

## 2018-01-23 ENCOUNTER — ANTICOAGULATION THERAPY VISIT (OUTPATIENT)
Dept: NURSING | Facility: CLINIC | Age: 83
End: 2018-01-23
Payer: COMMERCIAL

## 2018-01-23 DIAGNOSIS — Z79.01 LONG-TERM (CURRENT) USE OF ANTICOAGULANTS: ICD-10-CM

## 2018-01-23 DIAGNOSIS — I48.91 ATRIAL FIBRILLATION (H): ICD-10-CM

## 2018-01-23 LAB — INR POINT OF CARE: 2.4 (ref 0.86–1.14)

## 2018-01-23 PROCEDURE — 99207 ZZC NO CHARGE NURSE ONLY: CPT

## 2018-01-23 PROCEDURE — 85610 PROTHROMBIN TIME: CPT | Mod: QW

## 2018-01-23 PROCEDURE — 36416 COLLJ CAPILLARY BLOOD SPEC: CPT

## 2018-01-23 NOTE — MR AVS SNAPSHOT
Nathen Gage   1/23/2018 9:30 AM   Anticoagulation Therapy Visit    Description:  82 year old male   Provider:  [facility]   Department:  Cr Nurse           INR as of 1/23/2018     Today's INR 2.4      Anticoagulation Summary as of 1/23/2018     INR goal 2.0-2.5   Today's INR 2.4   Full instructions 1.25 mg on Tue; 2.5 mg all other days   Next INR check 2/6/2018    Indications   Long-term (current) use of anticoagulants [Z79.01] [Z79.01]  Atrial fibrillation (H) [I48.91]         Your next Anticoagulation Clinic appointment(s)     Jan 23, 2018  9:30 AM CST   Anticoagulation [facility] with [facility]   [facility]    [address] [facility] [address]   [phone]            Feb 06, 2018  9:30 AM CST   Anticoagulation Visit with [facility]   [facility]    [address] [facility] [address]   [phone]              Contact Numbers     Clinic Number:         January 2018 Details    Sun Mon Tue Wed Thu Fri Sat      1               2               3               4               5               6                 7               8               9               10               11               12               13                 14               15               16               17               18               19               20                 21               22               23      1.25 mg   See details      24      2.5 mg         25      2.5 mg         26      2.5 mg         27      2.5 mg           28      2.5 mg         29      2.5 mg         30      1.25 mg         31      2.5 mg             Date Details   01/23 This INR check               How to take your warfarin dose     To take:  1.25 mg Take 0.5 of a 2.5 mg tablet.    To take:  2.5 mg Take 1 of the 2.5 mg tablets.           February 2018 Details    Sun Mon Tue Wed Thu  Fri Sat         1      2.5 mg         2      2.5 mg         3      2.5 mg           4      2.5 mg         5      2.5 mg         6            7               8               9               10                 11               12               13               14               15               16               17                 18               19               20               21               22               23               24                 25               26               27               28                   Date Details   No additional details    Date of next INR:  2/6/2018         How to take your warfarin dose     To take:  1.25 mg Take 0.5 of a 2.5 mg tablet.    To take:  2.5 mg Take 1 of the 2.5 mg tablets.

## 2018-01-23 NOTE — PROGRESS NOTES
ANTICOAGULATION FOLLOW-UP CLINIC VISIT    Patient Name:  Nathen Gage  Date:  1/23/2018  Contact Type:  Face to Face    SUBJECTIVE:     Patient Findings     Positives Nose bleeds (Had 3 nosebleeds in the past 2 weeks, pt states he did hold pressure on his nose and the bleeding stopped in a timely fashion)    Comments I did discuss with patient the medicated nosebleed sticks that he can search the internet for since he is prone to nosebleeds even when his INR is therapeutic.           OBJECTIVE    INR Protime   Date Value Ref Range Status   01/23/2018 2.4 (A) 0.86 - 1.14 Final       ASSESSMENT / PLAN  INR assessment THER    Recheck INR In: 2 WEEKS    INR Location Clinic      Anticoagulation Summary as of 1/23/2018     INR goal 2.0-2.5   Today's INR 2.4   Maintenance plan 1.25 mg (2.5 mg x 0.5) on Tue; 2.5 mg (2.5 mg x 1) all other days   Full instructions 1.25 mg on Tue; 2.5 mg all other days   Weekly total 16.25 mg   No change documented Fidelina Pizano RN   Plan last modified Fidelina Pizano, RN (8/8/2017)   Next INR check 2/6/2018   Target end date Indefinite    Indications   Long-term (current) use of anticoagulants [Z79.01] [Z79.01]  Atrial fibrillation (H) [I48.91]         Anticoagulation Episode Summary     INR check location     Preferred lab     Send INR reminders to [facility]    Comments       Anticoagulation Care Providers     Provider Role Specialty Phone number    Kushal Valentin MD [facility] [phone]            See the Encounter Report to view Anticoagulation Flowsheet and Dosing Calendar (Go to Encounters tab in chart review, and find the Anticoagulation Therapy Visit)        Fidelina Pizano, ARTIS

## 2018-01-25 ENCOUNTER — ALLIED HEALTH/NURSE VISIT (OUTPATIENT)
Dept: CARDIOLOGY | Facility: CLINIC | Age: 83
End: 2018-01-25
Payer: COMMERCIAL

## 2018-01-25 DIAGNOSIS — Z95.0 CARDIAC PACEMAKER IN SITU: Primary | ICD-10-CM

## 2018-01-25 PROCEDURE — 93293 PM PHONE R-STRIP DEVICE EVAL: CPT | Performed by: INTERNAL MEDICINE

## 2018-01-25 NOTE — MR AVS SNAPSHOT
After Visit Summary   1/25/2018    Nathen Gage    MRN: [id number]           Patient Information     Date Of Birth          1935        Visit Information        Provider Department      1/25/2018 8:45 AM HERLINDA BANEGAS [facility]        Today's Diagnoses     Cardiac pacemaker in situ    -  1       Follow-ups after your visit        Your next 10 appointments already scheduled     Feb 06, 2018  9:30 AM CST   Anticoagulation Visit with [facility] ANTICOAGULATION CLINIC   [facility]    [address] [place] [phone]            Mar 01, 2018  9:15 AM CST   LAB with RU LAB   [facility] AT [place] ([facility])    [address]  [facility] [id number] [phone]           Please do not eat 10-12 hours before your appointment if you are coming in fasting for labs on lipids, cholesterol, or glucose (sugar). This does not apply to pregnant women. Water, hot tea and black coffee (with nothing added) are okay. Do not drink other fluids, diet soda or chew gum.            Mar 06, 2018  8:15 AM CST   Return Visit with Zen Taylor MD   [facility] ([facility]    [address] [place] [address] [address]  [facility] [id number] [phone]            Apr 26, 2018  8:45 AM CDT   Phone Device Check with HERLINDA BANEGAS   [facility] ([facility]    [address] [place], [place] [id number]  [facility] [id number] [phone]              Who to contact     If you have questions or need follow up information about today's clinic visit or your schedule please contact [facility] directly at [phone].  Normal or non-critical lab and imaging results will be communicated to you by MyChart, letter or phone within 4 business days  after the clinic has received the results. If you do not hear from us within 7 days, please contact the clinic through Pumodo or phone. If you have a critical or abnormal lab result, we will notify you by phone as soon as possible.  Submit refill requests through Pumodo or call your pharmacy and they will forward the refill request to us. Please allow 3 business days for your refill to be completed.          Additional Information About Your Visit        [email or web address] Information     Pumodo gives you secure access to your electronic health record. If you see a primary care provider, you can also send messages to your care team and make appointments. If you have questions, please call your primary care clinic.  If you do not have a primary care provider, please call [phone] and they will assist you.        Care EveryWhere ID     This is your Care EveryWhere ID. This could be used by other organizations to access your [place] medical records  [id number]         Blood Pressure from Last 3 Encounters:   12/29/17 138/82   12/27/17 134/68   12/25/17 152/60    Weight from Last 3 Encounters:   12/29/17 78.9 kg (174 lb)   12/27/17 79.4 kg (175 lb)   12/24/17 78 kg (171 lb 14.4 oz)              We Performed the Following     PM PHONE R STRIP EVAL UP TO 90 DAYS ([id number])        Primary Care Provider Office Phone # Fax #    Kushal Johann Valentin [phone] [address] [facility] [address]        Equal Access to Services     KRIS ALEXANDRA : Hadii margarita ku hadasho Soomaali, waaxda luqadaha, qaybta kaalmada adeegyada, anamaria mclaughlin . So [facility] [phone].    ATENCIÓN: Si habla tarik, tiene a arana disposición servicios gratuitos de asistencia lingüística. Llame al [phone].    We comply with applicable federal civil rights laws and [place] laws. We do not discriminate on the basis of race, color, national origin, age, disability, sex, sexual orientation, or gender  identity.            Thank you!     Thank you for choosing [facility]ART [facility]  for your care. Our goal is always to provide you with excellent care. Hearing back from our patients is one way we can continue to improve our services. Please take a few minutes to complete the written survey that you may receive in the mail after your visit with us. Thank you!             Your Updated Medication List - Protect others around you: Learn how to safely use, store and throw away your medicines at www.disposemymeds.org.          This list is accurate as of 1/25/18  8:52 AM.  Always use your most recent med list.                   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

## 2018-02-06 ENCOUNTER — ANTICOAGULATION THERAPY VISIT (OUTPATIENT)
Dept: NURSING | Facility: CLINIC | Age: 83
End: 2018-02-06
Payer: COMMERCIAL

## 2018-02-06 DIAGNOSIS — I48.91 ATRIAL FIBRILLATION (H): ICD-10-CM

## 2018-02-06 DIAGNOSIS — Z79.01 LONG-TERM (CURRENT) USE OF ANTICOAGULANTS: ICD-10-CM

## 2018-02-06 LAB — INR POINT OF CARE: 2.3 (ref 0.86–1.14)

## 2018-02-06 PROCEDURE — 99207 ZZC NO CHARGE NURSE ONLY: CPT

## 2018-02-06 PROCEDURE — 85610 PROTHROMBIN TIME: CPT | Mod: QW

## 2018-02-06 PROCEDURE — 36416 COLLJ CAPILLARY BLOOD SPEC: CPT

## 2018-02-06 RX ORDER — WARFARIN SODIUM 2.5 MG/1
TABLET ORAL
Qty: 30 TABLET | Refills: 5 | Status: SHIPPED | OUTPATIENT
Start: 2018-02-06 | End: 2018-04-24

## 2018-02-06 NOTE — PROGRESS NOTES
ANTICOAGULATION FOLLOW-UP CLINIC VISIT    Patient Name:  Nathen Gage  Date:  2/6/2018  Contact Type:  Face to Face    SUBJECTIVE:     Patient Findings     Positives No Problem Findings           OBJECTIVE    INR Protime   Date Value Ref Range Status   02/06/2018 2.3 (A) 0.86 - 1.14 Final       ASSESSMENT / PLAN  INR assessment THER    Recheck INR In: 2 WEEKS    INR Location Clinic      Anticoagulation Summary as of 2/6/2018     INR goal 2.0-2.5   Today's INR 2.3   Maintenance plan 1.25 mg (2.5 mg x 0.5) on Tue; 2.5 mg (2.5 mg x 1) all other days   Full instructions 1.25 mg on Tue; 2.5 mg all other days   Weekly total 16.25 mg   No change documented Fidelina Pizano RN   Plan last modified Fidelina Pizano RN (8/8/2017)   Next INR check 2/20/2018   Target end date Indefinite    Indications   Long-term (current) use of anticoagulants [Z79.01] [Z79.01]  Atrial fibrillation (H) [I48.91]         Anticoagulation Episode Summary     INR check location     Preferred lab     Send INR reminders to [facility] CLINIC    Comments       Anticoagulation Care Providers     Provider Role Specialty Phone number    Kushal Valentin MD [facility] [phone]            See the Encounter Report to view Anticoagulation Flowsheet and Dosing Calendar (Go to Encounters tab in chart review, and find the Anticoagulation Therapy Visit)        Fidelina Pizano, ARTIS

## 2018-02-06 NOTE — MR AVS SNAPSHOT
Nathen JANICE Gage   2/6/2018 9:30 AM   Anticoagulation Therapy Visit    Description:  82 year old male   Provider:  [facility] ANTICO[facility]ATION CLINIC   Department:  Cr Nurse           INR as of 2/6/2018     Today's INR 2.3      Anticoagulation Summary as of 2/6/2018     INR goal 2.0-2.5   Today's INR 2.3   Full instructions 1.25 mg on Tue; 2.5 mg all other days   Next INR check 2/20/2018    Indications   Long-term (current) use of anticoagulants [Z79.01] [Z79.01]  Atrial fibrillation (H) [I48.91]         Your next Anticoagulation Clinic appointment(s)     Feb 20, 2018  9:00 AM CST   Anticoagulation Visit with CR ANTICOAGULATION CLINIC   [facility]    [address] [place] [phone]              Contact Numbers     Clinic Number:         February 2018 Details    Sun Mon Tue Wed Thu Fri Sat         1               2               3                 4               5               6      1.25 mg   See details      7      2.5 mg         8      2.5 mg         9      2.5 mg         10      2.5 mg           11      2.5 mg         12      2.5 mg         13      1.25 mg         14      2.5 mg         15      2.5 mg         16      2.5 mg         17      2.5 mg           18      2.5 mg         19      2.5 mg         20            21               22               23               24                 25               26               27               28                   Date Details   02/06 This INR check       Date of next INR:  2/20/2018         How to take your warfarin dose     To take:  1.25 mg Take 0.5 of a 2.5 mg tablet.    To take:  2.5 mg Take 1 of the 2.5 mg tablets.

## 2018-02-20 ENCOUNTER — PRE VISIT (OUTPATIENT)
Dept: CARDIOLOGY | Facility: CLINIC | Age: 83
End: 2018-02-20

## 2018-02-20 ENCOUNTER — ANTICOAGULATION THERAPY VISIT (OUTPATIENT)
Dept: NURSING | Facility: CLINIC | Age: 83
End: 2018-02-20
Payer: COMMERCIAL

## 2018-02-20 DIAGNOSIS — Z79.01 LONG-TERM (CURRENT) USE OF ANTICOAGULANTS: ICD-10-CM

## 2018-02-20 DIAGNOSIS — I48.91 ATRIAL FIBRILLATION (H): ICD-10-CM

## 2018-02-20 LAB — INR POINT OF CARE: 2.7 (ref 0.86–1.14)

## 2018-02-20 PROCEDURE — 36416 COLLJ CAPILLARY BLOOD SPEC: CPT

## 2018-02-20 PROCEDURE — 99207 ZZC NO CHARGE NURSE ONLY: CPT

## 2018-02-20 PROCEDURE — 85610 PROTHROMBIN TIME: CPT | Mod: QW

## 2018-02-20 NOTE — PROGRESS NOTES
ANTICOAGULATION FOLLOW-UP CLINIC VISIT    Patient Name:  Nathen Gage  Date:  2/20/2018  Contact Type:  Face to Face    SUBJECTIVE:     Patient Findings     Positives Nose bleeds (1 nosebleed 1 week ago, pt states it stopped quickly after he applied pressure), Unexplained INR or factor level change           OBJECTIVE    INR Protime   Date Value Ref Range Status   02/20/2018 2.7 (A) 0.86 - 1.14 Final       ASSESSMENT / PLAN  INR assessment SUPRA    Recheck INR In: 2 WEEKS    INR Location Clinic      Anticoagulation Summary as of 2/20/2018     INR goal 2.0-2.5   Today's INR 2.7!   Maintenance plan 1.25 mg (2.5 mg x 0.5) on Tue; 2.5 mg (2.5 mg x 1) all other days   Full instructions 1.25 mg on Tue; 2.5 mg all other days   Weekly total 16.25 mg   Plan last modified Fidelina Pizano RN (8/8/2017)   Next INR check    Target end date Indefinite    Indications   Long-term (current) use of anticoagulants [Z79.01] [Z79.01]  Atrial fibrillation (H) [I48.91]         Anticoagulation Episode Summary     INR check location     Preferred lab     Send INR reminders to [facility] ANTICO CLINIC    Comments       Anticoagulation Care Providers     Provider Role Specialty Phone number    Kushal Valentin MD [facility] [phone]            See the Encounter Report to view Anticoagulation Flowsheet and Dosing Calendar (Go to Encounters tab in chart review, and find the Anticoagulation Therapy Visit)        Fidelina Pizano RN

## 2018-02-20 NOTE — MR AVS SNAPSHOT
Nathen JANICE Gage   2/20/2018 9:00 AM   Anticoagulation Therapy Visit    Description:  82 year old male   Provider:  [facility]   Department:  Cr Nurse           INR as of 2/20/2018     Today's INR 2.7!      Anticoagulation Summary as of 2/20/2018     INR goal 2.0-2.5   Today's INR 2.7!   Full instructions 1.25 mg on Tue; 2.5 mg all other days   Next INR check 3/6/2018    Indications   Long-term (current) use of anticoagulants [Z79.01] [Z79.01]  Atrial fibrillation (H) [I48.91]         Contact Numbers     Clinic Number:         February 2018 Details    Sun Mon Tue Wed Thu Fri Sat         1               2               3                 4               5               6               7               8               9               10                 11               12               13               14               15               16               17                 18               19               20      1.25 mg   See details      21      2.5 mg         22      2.5 mg         23      2.5 mg         24      2.5 mg           25      2.5 mg         26      2.5 mg         27      1.25 mg         28      2.5 mg             Date Details   02/20 This INR check   Eat greens               How to take your warfarin dose     To take:  1.25 mg Take 0.5 of a 2.5 mg tablet.    To take:  2.5 mg Take 1 of the 2.5 mg tablets.           March 2018 Details    Sun Mon Tue Wed Thu Fri Sat         1      2.5 mg         2      2.5 mg         3      2.5 mg           4      2.5 mg         5      2.5 mg         6            7               8               9               10                 11               12               13               14               15               16               17                 18               19               20               21               22               23               24                 25               26               27               28               29               30                31                Date Details   No additional details    Date of next INR:  3/6/2018         How to take your warfarin dose     To take:  1.25 mg Take 0.5 of a 2.5 mg tablet.    To take:  2.5 mg Take 1 of the 2.5 mg tablets.

## 2018-03-01 DIAGNOSIS — I10 ESSENTIAL HYPERTENSION WITH GOAL BLOOD PRESSURE LESS THAN 140/90: ICD-10-CM

## 2018-03-01 DIAGNOSIS — I27.20 PULMONARY HYPERTENSION (H): ICD-10-CM

## 2018-03-01 DIAGNOSIS — I49.5 SICK SINUS SYNDROME (H): ICD-10-CM

## 2018-03-01 DIAGNOSIS — R55 SYNCOPE, UNSPECIFIED SYNCOPE TYPE: ICD-10-CM

## 2018-03-01 DIAGNOSIS — I48.91 ATRIAL FIBRILLATION, UNSPECIFIED TYPE (H): ICD-10-CM

## 2018-03-01 DIAGNOSIS — I77.810 DILATED AORTIC ROOT (H): ICD-10-CM

## 2018-03-01 DIAGNOSIS — E78.2 MIXED HYPERLIPIDEMIA: ICD-10-CM

## 2018-03-01 LAB
ALT SERPL W P-5'-P-CCNC: 20 U/L (ref 0–70)
ANION GAP SERPL CALCULATED.3IONS-SCNC: 5 MMOL/L (ref 3–14)
BUN SERPL-MCNC: 15 MG/DL (ref 7–30)
CALCIUM SERPL-MCNC: 9.1 MG/DL (ref 8.5–10.1)
CHLORIDE SERPL-SCNC: 100 MMOL/L (ref 94–109)
CHOLEST SERPL-MCNC: 155 MG/DL
CO2 SERPL-SCNC: 29 MMOL/L (ref 20–32)
CREAT SERPL-MCNC: 0.97 MG/DL (ref 0.66–1.25)
GFR SERPL CREATININE-BSD FRML MDRD: 74 ML/MIN/1.7M2
GLUCOSE SERPL-MCNC: 93 MG/DL (ref 70–99)
HDLC SERPL-MCNC: 63 MG/DL
LDLC SERPL CALC-MCNC: 69 MG/DL
NONHDLC SERPL-MCNC: 92 MG/DL
POTASSIUM SERPL-SCNC: 4.6 MMOL/L (ref 3.4–5.3)
SODIUM SERPL-SCNC: 134 MMOL/L (ref 133–144)
TRIGL SERPL-MCNC: 116 MG/DL

## 2018-03-01 PROCEDURE — 84460 ALANINE AMINO (ALT) (SGPT): CPT | Performed by: INTERNAL MEDICINE

## 2018-03-01 PROCEDURE — 80048 BASIC METABOLIC PNL TOTAL CA: CPT | Performed by: INTERNAL MEDICINE

## 2018-03-01 PROCEDURE — 80061 LIPID PANEL: CPT | Performed by: INTERNAL MEDICINE

## 2018-03-01 PROCEDURE — 36415 COLL VENOUS BLD VENIPUNCTURE: CPT | Performed by: INTERNAL MEDICINE

## 2018-03-06 ENCOUNTER — OFFICE VISIT (OUTPATIENT)
Dept: CARDIOLOGY | Facility: CLINIC | Age: 83
End: 2018-03-06
Attending: INTERNAL MEDICINE
Payer: COMMERCIAL

## 2018-03-06 VITALS
HEART RATE: 84 BPM | SYSTOLIC BLOOD PRESSURE: 162 MMHG | HEIGHT: 68 IN | DIASTOLIC BLOOD PRESSURE: 79 MMHG | WEIGHT: 176 LBS | BODY MASS INDEX: 26.67 KG/M2

## 2018-03-06 DIAGNOSIS — I15.9 SECONDARY HYPERTENSION: ICD-10-CM

## 2018-03-06 DIAGNOSIS — I48.91 ATRIAL FIBRILLATION, UNSPECIFIED TYPE (H): ICD-10-CM

## 2018-03-06 DIAGNOSIS — I27.20 PULMONARY HYPERTENSION (H): ICD-10-CM

## 2018-03-06 DIAGNOSIS — I10 ESSENTIAL HYPERTENSION WITH GOAL BLOOD PRESSURE LESS THAN 140/90: ICD-10-CM

## 2018-03-06 DIAGNOSIS — R55 SYNCOPE, UNSPECIFIED SYNCOPE TYPE: ICD-10-CM

## 2018-03-06 DIAGNOSIS — E78.2 MIXED HYPERLIPIDEMIA: ICD-10-CM

## 2018-03-06 DIAGNOSIS — I77.810 DILATED AORTIC ROOT (H): ICD-10-CM

## 2018-03-06 DIAGNOSIS — I49.5 SICK SINUS SYNDROME (H): ICD-10-CM

## 2018-03-06 PROCEDURE — 99214 OFFICE O/P EST MOD 30 MIN: CPT | Performed by: INTERNAL MEDICINE

## 2018-03-06 RX ORDER — LOSARTAN POTASSIUM 100 MG/1
100 TABLET ORAL DAILY
Qty: 90 TABLET | Refills: 3 | Status: SHIPPED | OUTPATIENT
Start: 2018-03-06 | End: 2019-03-26

## 2018-03-06 NOTE — MR AVS SNAPSHOT
After Visit Summary   3/6/2018    Nathen Gage    MRN: [id number]           Patient Information     Date Of Birth          1935        Visit Information        Provider Department      3/6/2018 8:15 AM Zen Taylor MD [facility]        Today's Diagnoses     Atrial fibrillation, unspecified type (H)        Essential hypertension with goal blood pressure less than 140/90        Syncope, unspecified syncope type        Dilated aortic root (H)        Pulmonary hypertension        Sick sinus syndrome        Mixed hyperlipidemia        Secondary hypertension           Follow-ups after your visit        Additional Services     Follow-Up with Cardiac Advanced Practice Provider           Follow-Up with Cardiologist                 Your next 10 appointments already scheduled     Mar 08, 2018 10:15 AM CST   Anticoagulation Visit with CR ANTICOAGULATION CLINIC   [facility]    [address] [address] [facility] [id number] [phone]            Apr 26, 2018  8:45 AM CDT   Phone Device Check with PATE TECH2   [facility] ([facility])    [address]  [facility] [address]   [phone]              Future tests that were ordered for you today     Open Future Orders        Priority Expected Expires Ordered    Basic metabolic panel Routine 9/2/2018 3/6/2019 3/6/2018    Lipid Profile Routine 9/2/2018 3/6/2019 3/6/2018    ALT Routine 9/2/2018 3/6/2019 3/6/2018    Echocardiogram Routine 9/2/2018 3/6/2019 3/6/2018    Follow-Up with Cardiologist Routine 9/2/2018 3/6/2019 3/6/2018    Basic metabolic panel Routine 4/5/2018 3/6/2019 3/6/2018    Follow-Up with Cardiac Advanced Practice Provider Routine 4/5/2018 3/6/2019 3/6/2018            Who to contact     If you have questions or need follow up information about today's clinic visit or your  "schedule please contact [facility] directly at [phone].  Normal or non-critical lab and imaging results will be communicated to you by MyChart, letter or phone within 4 business days after the clinic has received the results. If you do not hear from us within 7 days, please contact the clinic through [employer] Software[employer] or phone. If you have a critical or abnormal lab result, we will notify you by phone as soon as possible.  Submit refill requests through Stadion[employer] or call your pharmacy and they will forward the refill request to us. Please allow 3 business days for your refill to be completed.          Additional Information About Your Visit        [employer] Information     [employer] gives you secure access to your electronic health record. If you see a primary care provider, you can also send messages to your care team and make appointments. If you have questions, please call your primary care clinic.  If you do not have a primary care provider, please call [phone] and they will assist you.        Care EveryWhere ID     This is your Care EveryWhere ID. This could be used by other organizations to access your [place] medical records  [id number]        Your Vitals Were     Pulse Height BMI (Body Mass Index)             84 1.715 m (5' 7.5\") 27.16 kg/m2          Blood Pressure from Last 3 Encounters:   03/06/18 162/79   12/29/17 138/82   12/27/17 134/68    Weight from Last 3 Encounters:   03/06/18 79.8 kg (176 lb)   12/29/17 78.9 kg (174 lb)   12/27/17 79.4 kg (175 lb)              We Performed the Following     Follow-Up with Cardiologist          Today's Medication Changes          These changes are accurate as of 3/6/18  9:17 AM.  If you have any questions, ask your nurse or doctor.               These medicines have changed or have updated prescriptions.        Dose/Directions    losartan 100 MG tablet   Commonly known as:  COZAAR   This may have changed:    - medication " strength  - how much to take   Used for:  Secondary hypertension   Changed by:  Zen Taylor MD        Dose:  100 mg   Take 1 tablet (100 mg) by mouth daily   Quantity:  90 tablet   Refills:  3            Where to get your medicines      These medications were sent to [id number] - [place], [place] - [address]  [address], [facility] [address]     Phone:  [phone]     losartan 100 MG tablet                Primary Care Provider Office Phone # Fax #    Kushal Johann Valentin [phone] [address] [facility] [address]        Equal Access to Services     [facility]: Hadii aad ku hadasho Soomaali, waaxda luqadaha, qaybta kaalmada adeegyada, waxay idiin hayaan adesavanna mclaughlin . So [facility] [phone].    ATENCIÓN: Si habla español, tiene a arana disposición servicios gratuitos de asistencia lingüística. [facility] [phone].    We comply with applicable federal civil rights laws and [place] laws. We do not discriminate on the basis of race, color, national origin, age, disability, sex, sexual orientation, or gender identity.            Thank you!     Thank you for choosing [facility]  for your care. Our goal is always to provide you with excellent care. Hearing back from our patients is one way we can continue to improve our services. Please take a few minutes to complete the written survey that you may receive in the mail after your visit with us. Thank you!             Your Updated Medication List - Protect others around you: Learn how to safely use, store and throw away your medicines at www.disposemymeds.org.          This list is accurate as of 3/6/18  9:17 AM.  Always use your most recent med list.                   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

## 2018-03-06 NOTE — LETTER
3/6/2018    Kushal Valentin MD  [address]  [facility] [address]    RE: Nathen JANICE Gage       Dear Colleague,    I had the pleasure of seeing Nathen JANICE Gage in the [facility].    Service Date: 03/06/2018      HISTORY OF PRESENT ILLNESS:  The patient is an 82-year-old slightly overweight white male who presents to Cardiology Clinic for history of sick sinus syndrome with evidence of bradycardia and previous gastrointestinal bleed.  He had evidence of 2-3 second pauses in the past with heart rates in the 30s-40swith initial potassium of 3.1 and repeat potassium of 2.7.  He had a 4-second pause at that time.  A GI tube was placed after which he vomited coffee-ground material and diagnosed with esophagitis and was treated with protein pump inhibitor.      He has a distant history of cigarette smoking, discontinued in 1985, hypertension for the past 15-20 years.  He has no history of diabetes mellitus, family history of premature coronary disease or history of documented hyperlipidemia.  He has had a history of mild elevation of serum lipids.  Pacemaker was inserted for the significant bradydysrhythmia, sick sinus syndrome with sinus pauses.  Holter monitor in the past has shown heart rates varying from  with average rate of 70, primarily paced rhythm.        He denies symptoms of anurag chest discomfort, shortness of breath, dizziness, palpitations, nausea, vomiting, diaphoresis or syncope.  He denies PND, orthopnea, fevers, chills or sweats.  He does have mild easy fatigability, general malaise and slight dyspnea on exertion and slight lightheadedness if he moves too quickly.        Echocardiography performed last year demonstrated a normal-sized left ventricle with left ventricular ejection fraction of 55%-60%.  There was mild to moderate tricuspid insufficiency with mild elevation of right ventricular systolic pressure of 28 mmHg plus right atrial pressure.  There was  mild mitral insufficiency present.  There was no regional wall motion abnormality.  The ascending thoracic aorta was mildly dilated with no significant change from 2016.      MEDICATIONS:   1.  Losartan 50 mg a day.   2.  Warfarin as directed.   3.  Acetaminophen 650 mg as needed.   4.  Cardura 4 mg a day.   5.  Protonix 40 mg a day.   6.  Atorvastatin 20 mg a day.      LABORATORY DATA:  Demonstrated cholesterol 155, HDL 63, LDL 69, triglyceride 116.  Sodium 134, potassium 4.6, BUN 15, creatinine 0.97.  Hemoglobin 13.3, platelet count 220,000.  ALT 20.        The patient presents to Cardiology Clinic for followup of bradydysrhythmia with isolated episodes of atrial fibrillation, hypertension and hyperlipidemia.      PHYSICAL EXAMINATION:   VITAL SIGNS:  Blood pressure 162/79 with a heart rate of 80 and regular.  Weight was 176 pounds, which is up 5 pounds from previous visit.   NECK:  Without jugular venous distention, carotid bruit or palpable thyroid.   CHEST:  Essentially clear to percussion and auscultation with slight decreased breath sounds at the bases.   CARDIAC:  Regular rhythm with occasional premature beat.  There was a soft S4 gallop.  There is a 1-2/6 systolic murmur at the left sternal border with minimal radiation.  No diastolic murmur, rub or S3.   EXTREMITIES:  Without cyanosis or edema.      CLINICAL IMPRESSION:   1.  Stable cardiac condition.   2.  History of paroxysmal atrial fibrillation.   3.  History of mildly dilated aortic root.   4.  History of mild pulmonary hypertension.   5.  History of hyperlipidemia, at goal.   6.  History of sick sinus syndrome, status post pacemaker insertion for bradydysrhythmia.   7.  Distant history of cigarette smoking.   8.  History of mild elevation in systolic blood pressure.   9.  History of gastrointestinal bleed in the past.      DISCUSSION:  The patient has done well clinically from last clinic visit.  He has had no significant symptoms of chest  discomfort, shortness of breath, dizziness or palpitations.  He is not limited his activity.  Blood pressure has remained slightly elevated and this will certainly be increased to 100 mg a day from 50 mg.  He will need close followup of his serum lipids, basic metabolic panel and blood pressure.  INR goal has been low 2's or high 1's because of this tendency towards nosebleeds and gastrointestinal bleeding.  He will continue to try to avoid caffeine and salt.  He will need close followup of his device as well as serum lipids, basic metabolic panel and blood pressure.  Otherwise, he appears to be doing well and is stable.      RECOMMENDATIONS:   1.  Continue present medications except to increase losartan to 100 mg a day.   2.  Device followup.   3.  Cautious anticoagulation.   4.  Close followup of serum lipids, basic metabolic panel and blood pressure with followup with Siobhan Lobo in 1-2 months   5.  Diet and exercise as tolerated, avoiding caffeine and salt.   6.  Consider echocardiography later this year to follow left ventricular function.   7.  Routine medical followup.   8.  Cardiology followup in 4-6 months.      cc:      Kushal Valentin MD    [facility]   [address] [place] [address]         SCARLETT YING MD, [facility]             D: 2018   T: 2018   MT: MIGUEL      Name:     MANUEL DEJESUS   MRN:      [id number]        Account:      [id number]   :      1935           Service Date: 2018      Document: [id number]        Thank you for allowing me to participate in the care of your patient.      Sincerely,     Scarlett Ying MD     [facility]    cc:   Scarlett Ying MD  [address]  [place] [address]

## 2018-03-06 NOTE — LETTER
3/6/2018      Kushal Valetnin MD  [address]  [facility] [address]      RE: Nathen JANICE Gage       Dear Colleague,    I had the pleasure of seeing Nathen JANICE Gage in the [facility].    Service Date: 03/06/2018      HISTORY OF PRESENT ILLNESS:  The patient is an 82-year-old slightly overweight white male who presents to Cardiology Clinic for history of sick sinus syndrome with evidence of bradycardia and previous gastrointestinal bleed.  He had evidence of 2-3 second pauses in the past with heart rates in the 30s-40swith initial potassium of 3.1 and repeat potassium of 2.7.  He had a 4-second pause at that time.  A GI tube was placed after which he vomited coffee-ground material and diagnosed with esophagitis and was treated with protein pump inhibitor.      He has a distant history of cigarette smoking, discontinued in 1985, hypertension for the past 15-20 years.  He has no history of diabetes mellitus, family history of premature coronary disease or history of documented hyperlipidemia.  He has had a history of mild elevation of serum lipids.  Pacemaker was inserted for the significant bradydysrhythmia, sick sinus syndrome with sinus pauses.  Holter monitor in the past has shown heart rates varying from  with average rate of 70, primarily paced rhythm.        He denies symptoms of anurag chest discomfort, shortness of breath, dizziness, palpitations, nausea, vomiting, diaphoresis or syncope.  He denies PND, orthopnea, fevers, chills or sweats.  He does have mild easy fatigability, general malaise and slight dyspnea on exertion and slight lightheadedness if he moves too quickly.        Echocardiography performed last year demonstrated a normal-sized left ventricle with left ventricular ejection fraction of 55%-60%.  There was mild to moderate tricuspid insufficiency with mild elevation of right ventricular systolic pressure of 28 mmHg plus right atrial pressure.  There  was mild mitral insufficiency present.  There was no regional wall motion abnormality.  The ascending thoracic aorta was mildly dilated with no significant change from 2016.      MEDICATIONS:   1.  Losartan 50 mg a day.   2.  Warfarin as directed.   3.  Acetaminophen 650 mg as needed.   4.  Cardura 4 mg a day.   5.  Protonix 40 mg a day.   6.  Atorvastatin 20 mg a day.      LABORATORY DATA:  Demonstrated cholesterol 155, HDL 63, LDL 69, triglyceride 116.  Sodium 134, potassium 4.6, BUN 15, creatinine 0.97.  Hemoglobin 13.3, platelet count 220,000.  ALT 20.        The patient presents to Cardiology Clinic for followup of bradydysrhythmia with isolated episodes of atrial fibrillation, hypertension and hyperlipidemia.      PHYSICAL EXAMINATION:   VITAL SIGNS:  Blood pressure 162/79 with a heart rate of 80 and regular.  Weight was 176 pounds, which is up 5 pounds from previous visit.   NECK:  Without jugular venous distention, carotid bruit or palpable thyroid.   CHEST:  Essentially clear to percussion and auscultation with slight decreased breath sounds at the bases.   CARDIAC:  Regular rhythm with occasional premature beat.  There was a soft S4 gallop.  There is a 1-2/6 systolic murmur at the left sternal border with minimal radiation.  No diastolic murmur, rub or S3.   EXTREMITIES:  Without cyanosis or edema.      CLINICAL IMPRESSION:   1.  Stable cardiac condition.   2.  History of paroxysmal atrial fibrillation.   3.  History of mildly dilated aortic root.   4.  History of mild pulmonary hypertension.   5.  History of hyperlipidemia, at goal.   6.  History of sick sinus syndrome, status post pacemaker insertion for bradydysrhythmia.   7.  Distant history of cigarette smoking.   8.  History of mild elevation in systolic blood pressure.   9.  History of gastrointestinal bleed in the past.      DISCUSSION:  The patient has done well clinically from last clinic visit.  He has had no significant symptoms of chest  discomfort, shortness of breath, dizziness or palpitations.  He is not limited his activity.  Blood pressure has remained slightly elevated and this will certainly be increased to 100 mg a day from 50 mg.  He will need close followup of his serum lipids, basic metabolic panel and blood pressure.  INR goal has been low 2's or high 1's because of this tendency towards nosebleeds and gastrointestinal bleeding.  He will continue to try to avoid caffeine and salt.  He will need close followup of his device as well as serum lipids, basic metabolic panel and blood pressure.  Otherwise, he appears to be doing well and is stable.      RECOMMENDATIONS:   1.  Continue present medications except to increase losartan to 100 mg a day.   2.  Device followup.   3.  Cautious anticoagulation.   4.  Close followup of serum lipids, basic metabolic panel and blood pressure with followup with Siobhan Lobo in 1-2 months   5.  Diet and exercise as tolerated, avoiding caffeine and salt.   6.  Consider echocardiography later this year to follow left ventricular function.   7.  Routine medical followup.   8.  Cardiology followup in 4-6 months.      cc:      Kushal Valentin MD    [address] [address]         SCARLETT YING MD, FACC             D: 2018   T: 2018   MT: MIGUEL      Name:     MANUEL DEJESUS   MRN:      [id number]        Account:      [id number]   :      1935           Service Date: 2018      Document: [id number]        Outpatient Encounter Prescriptions as of 3/6/2018   Medication Sig Dispense Refill     losartan (COZAAR) 100 MG tablet Take 1 tablet (100 mg) by mouth daily 90 tablet 3     warfarin (COUMADIN) 2.5 MG tablet Take 1.25mg every Tu / Take 2.5mg all other days OR as instructed by INR clinic 30 tablet 5     acetaminophen (TYLENOL) 325 MG tablet Take 2 tablets (650 mg) by mouth every 4 hours as needed for mild pain or fever 100 tablet       doxazosin (CARDURA) 4 MG tablet Take 1 tablet (4 mg) by mouth daily 90 tablet 1     pantoprazole (PROTONIX) 40 MG EC tablet Take 1 tablet (40 mg) by mouth daily 90 tablet 2     atorvastatin (LIPITOR) 20 MG tablet Take 1 tablet (20 mg) by mouth daily 90 tablet 2     [DISCONTINUED] losartan (COZAAR) 50 MG tablet Take 1 tablet (50 mg) by mouth daily 90 tablet 2     No facility-administered encounter medications on file as of 3/6/2018.      Again, thank you for allowing me to participate in the care of your patient.      Sincerely,    Zen Taylor MD     [facility]

## 2018-03-08 ENCOUNTER — ANTICOAGULATION THERAPY VISIT (OUTPATIENT)
Dept: NURSING | Facility: CLINIC | Age: 83
End: 2018-03-08
Payer: COMMERCIAL

## 2018-03-08 DIAGNOSIS — Z79.01 LONG-TERM (CURRENT) USE OF ANTICOAGULANTS: ICD-10-CM

## 2018-03-08 DIAGNOSIS — I48.91 ATRIAL FIBRILLATION (H): ICD-10-CM

## 2018-03-08 LAB — INR POINT OF CARE: 2.6 (ref 0.86–1.14)

## 2018-03-08 PROCEDURE — 99207 ZZC NO CHARGE NURSE ONLY: CPT

## 2018-03-08 PROCEDURE — 85610 PROTHROMBIN TIME: CPT | Mod: QW

## 2018-03-08 PROCEDURE — 36416 COLLJ CAPILLARY BLOOD SPEC: CPT

## 2018-03-08 NOTE — MR AVS SNAPSHOT
Nathen Gage   3/8/2018 10:15 AM   Anticoagulation Therapy Visit    Description:  82 year old male   Provider:  [facility]   Department:  [facility] Nurse           INR as of 3/8/2018     Today's INR 2.6!      Anticoagulation Summary as of 3/8/2018     INR goal 2.0-2.5   Today's INR 2.6!   Full instructions 1.25 mg on Tue; 2.5 mg all other days   Next INR check 3/20/2018    Indications   Long-term (current) use of anticoagulants [Z79.01] [Z79.01]  Atrial fibrillation (H) [I48.91]         Your next Anticoagulation Clinic appointment(s)     Mar 20, 2018  9:30 AM CDT   [facility]ticoagulation Visit with [facility]   [facility]    [address] [place] [id number] [phone]              Contact Numbers     Clinic Number:         March 2018 Details    Sun Mon Tue Wed Thu Fri Sat         1               2               3                 4               5               6               7               8      2.5 mg   See details      9      2.5 mg         10      2.5 mg           11      2.5 mg         12      2.5 mg         13      1.25 mg         14      2.5 mg         15      2.5 mg         16      2.5 mg         17      2.5 mg           18      2.5 mg         19      2.5 mg         20            21               22               23               24                 25               26               27               28               29               30               31                Date Details   03/08 This INR check       Date of next INR:  3/20/2018         How to take your warfarin dose     To take:  1.25 mg Take 0.5 of a 2.5 mg tablet.    To take:  2.5 mg Take 1 of the 2.5 mg tablets.

## 2018-03-08 NOTE — PROGRESS NOTES
ANTICOAGULATION FOLLOW-UP CLINIC VISIT    Patient Name:  Nathen Gage  Date:  3/8/2018  Contact Type:  Face to Face    SUBJECTIVE:     Patient Findings     Positives No Problem Findings           OBJECTIVE    INR Protime   Date Value Ref Range Status   03/08/2018 2.6 (A) 0.86 - 1.14 Final       ASSESSMENT / PLAN  INR assessment THER    Recheck INR In: 2 WEEKS    INR Location Clinic      Anticoagulation Summary as of 3/8/2018     INR goal 2.0-2.5   Today's INR 2.6!   Maintenance plan 1.25 mg (2.5 mg x 0.5) on Tue; 2.5 mg (2.5 mg x 1) all other days   Full instructions 1.25 mg on Tue; 2.5 mg all other days   Weekly total 16.25 mg   No change documented Fidelina Pizano RN   Plan last modified Fidelina Pizano RN (8/8/2017)   Next INR check 3/20/2018   Target end date Indefinite    Indications   Long-term (current) use of anticoagulants [Z79.01] [Z79.01]  Atrial fibrillation (H) [I48.91]         Anticoagulation Episode Summary     INR check location     Preferred lab     Send INR reminders to [facility]    Comments       Anticoagulation Care Providers     Provider Role Specialty Phone number    Kushal Valentin MD [facility] [phone]            See the Encounter Report to view Anticoagulation Flowsheet and Dosing Calendar (Go to Encounters tab in chart review, and find the Anticoagulation Therapy Visit)        Fidelina Pizano, ARTIS

## 2018-03-08 NOTE — PROGRESS NOTES
Service Date: 03/06/2018      HISTORY OF PRESENT ILLNESS:  The patient is an 82-year-old slightly overweight white male who presents to Cardiology Clinic for history of sick sinus syndrome with evidence of bradycardia and previous gastrointestinal bleed.  He had evidence of 2-3 second pauses in the past with heart rates in the 30s-40swith initial potassium of 3.1 and repeat potassium of 2.7.  He had a 4-second pause at that time.  A GI tube was placed after which he vomited coffee-ground material and diagnosed with esophagitis and was treated with protein pump inhibitor.      He has a distant history of cigarette smoking, discontinued in 1985, hypertension for the past 15-20 years.  He has no history of diabetes mellitus, family history of premature coronary disease or history of documented hyperlipidemia.  He has had a history of mild elevation of serum lipids.  Pacemaker was inserted for the significant bradydysrhythmia, sick sinus syndrome with sinus pauses.  Holter monitor in the past has shown heart rates varying from  with average rate of 70, primarily paced rhythm.        He denies symptoms of anurag chest discomfort, shortness of breath, dizziness, palpitations, nausea, vomiting, diaphoresis or syncope.  He denies PND, orthopnea, fevers, chills or sweats.  He does have mild easy fatigability, general malaise and slight dyspnea on exertion and slight lightheadedness if he moves too quickly.        Echocardiography performed last year demonstrated a normal-sized left ventricle with left ventricular ejection fraction of 55%-60%.  There was mild to moderate tricuspid insufficiency with mild elevation of right ventricular systolic pressure of 28 mmHg plus right atrial pressure.  There was mild mitral insufficiency present.  There was no regional wall motion abnormality.  The ascending thoracic aorta was mildly dilated with no significant change from 2016.      MEDICATIONS:   1.  Losartan 50 mg a day.   2.   Warfarin as directed.   3.  Acetaminophen 650 mg as needed.   4.  Cardura 4 mg a day.   5.  Protonix 40 mg a day.   6.  Atorvastatin 20 mg a day.      LABORATORY DATA:  Demonstrated cholesterol 155, HDL 63, LDL 69, triglyceride 116.  Sodium 134, potassium 4.6, BUN 15, creatinine 0.97.  Hemoglobin 13.3, platelet count 220,000.  ALT 20.        The patient presents to Cardiology Clinic for followup of bradydysrhythmia with isolated episodes of atrial fibrillation, hypertension and hyperlipidemia.      PHYSICAL EXAMINATION:   VITAL SIGNS:  Blood pressure 162/79 with a heart rate of 80 and regular.  Weight was 176 pounds, which is up 5 pounds from previous visit.   NECK:  Without jugular venous distention, carotid bruit or palpable thyroid.   CHEST:  Essentially clear to percussion and auscultation with slight decreased breath sounds at the bases.   CARDIAC:  Regular rhythm with occasional premature beat.  There was a soft S4 gallop.  There is a 1-2/6 systolic murmur at the left sternal border with minimal radiation.  No diastolic murmur, rub or S3.   EXTREMITIES:  Without cyanosis or edema.      CLINICAL IMPRESSION:   1.  Stable cardiac condition.   2.  History of paroxysmal atrial fibrillation.   3.  History of mildly dilated aortic root.   4.  History of mild pulmonary hypertension.   5.  History of hyperlipidemia, at goal.   6.  History of sick sinus syndrome, status post pacemaker insertion for bradydysrhythmia.   7.  Distant history of cigarette smoking.   8.  History of mild elevation in systolic blood pressure.   9.  History of gastrointestinal bleed in the past.      DISCUSSION:  The patient has done well clinically from last clinic visit.  He has had no significant symptoms of chest discomfort, shortness of breath, dizziness or palpitations.  He is not limited his activity.  Blood pressure has remained slightly elevated and this will certainly be increased to 100 mg a day from 50 mg.  He will need close  followup of his serum lipids, basic metabolic panel and blood pressure.  INR goal has been low 2's or high 1's because of this tendency towards nosebleeds and gastrointestinal bleeding.  He will continue to try to avoid caffeine and salt.  He will need close followup of his device as well as serum lipids, basic metabolic panel and blood pressure.  Otherwise, he appears to be doing well and is stable.      RECOMMENDATIONS:   1.  Continue present medications except to increase losartan to 100 mg a day.   2.  Device followup.   3.  Cautious anticoagulation.   4.  Close followup of serum lipids, basic metabolic panel and blood pressure with followup with Siobhan Lobo in 1-2 months   5.  Diet and exercise as tolerated, avoiding caffeine and salt.   6.  Consider echocardiography later this year to follow left ventricular function.   7.  Routine medical followup.   8.  Cardiology followup in 4-6 months.      cc:      Kushal Valentin MD    [facility]   [address] [place] [address]         SCARLETT YING MD, [facility]             D: 2018   T: 2018   MT: MIGUEL      Name:     MANUEL DEJESUS   MRN:      [id number]        Account:      [id number]   :      1935           Service Date: 2018      Document: [id number]

## 2018-03-12 ENCOUNTER — TELEPHONE (OUTPATIENT)
Dept: NURSING | Facility: CLINIC | Age: 83
End: 2018-03-12

## 2018-03-12 DIAGNOSIS — I48.0 PAROXYSMAL ATRIAL FIBRILLATION (H): Primary | ICD-10-CM

## 2018-03-12 NOTE — TELEPHONE ENCOUNTER
Has the patient previously taken warfarin? yes  If yes, for what indication? Atrial Fibrillation    Does the patient have any of the following indications for a higher range of 2.5-3.5:    Mitral position mechanical valve? no    Meryl-Shiley, Ball and Cage or Monoleaflet valve (regardless of position) no    Other (if yes, please explain)No    New insurance and medicare reimbursement rules require that all of our INR patients have an INR Clinic referral order in Epic, and that it be renewed annually.     We have entered this initial order for you and your signature is required once you review it.       You may close this encounter once signed.     Thank you,     Fidelina Pizano RN

## 2018-03-20 ENCOUNTER — ALLIED HEALTH/NURSE VISIT (OUTPATIENT)
Dept: FAMILY MEDICINE | Facility: CLINIC | Age: 83
End: 2018-03-20

## 2018-03-20 ENCOUNTER — ANTICOAGULATION THERAPY VISIT (OUTPATIENT)
Dept: NURSING | Facility: CLINIC | Age: 83
End: 2018-03-20
Payer: COMMERCIAL

## 2018-03-20 VITALS — SYSTOLIC BLOOD PRESSURE: 142 MMHG | DIASTOLIC BLOOD PRESSURE: 60 MMHG

## 2018-03-20 DIAGNOSIS — I27.20 PULMONARY HYPERTENSION (H): Primary | ICD-10-CM

## 2018-03-20 DIAGNOSIS — Z79.01 LONG-TERM (CURRENT) USE OF ANTICOAGULANTS: ICD-10-CM

## 2018-03-20 DIAGNOSIS — I48.91 ATRIAL FIBRILLATION (H): ICD-10-CM

## 2018-03-20 LAB — INR POINT OF CARE: 2.6 (ref 0.86–1.14)

## 2018-03-20 PROCEDURE — 99207 ZZC NO CHARGE NURSE ONLY: CPT | Performed by: FAMILY MEDICINE

## 2018-03-20 PROCEDURE — 36416 COLLJ CAPILLARY BLOOD SPEC: CPT

## 2018-03-20 PROCEDURE — 99207 ZZC NO CHARGE NURSE ONLY: CPT

## 2018-03-20 PROCEDURE — 85610 PROTHROMBIN TIME: CPT | Mod: QW

## 2018-03-20 NOTE — PROGRESS NOTES
Nathen Gage is enrolled/participating in the retail pharmacy Blood Pressure Goals Achievement Program (BPGAP).  Nathen Gage was evaluated at [facility] on March 20, 2018 at which time his blood pressure was:    BP Readings from Last 3 Encounters:   03/20/18 142/60   03/06/18 162/79   12/29/17 138/82     Reviewed lifestyle modifications for blood pressure control and reduction: including making healthy food choices, managing weight, getting regular exercise, smoking cessation, reducing alcohol consumption, monitoring blood pressure regularly.     Nathen Gage is not experiencing symptoms.    Follow-Up: BP is at goal of < 150/90 mmHg (patient 60+ years of age without diabetes).  Recommended follow-up at pharmacy in 6 months.     Recommendation to Provider: Patient is currently at goal    Nathen Gage was evaluated for enrollment into the PGEN study today.    Patient eligible for enrollment:  No  Patient interested in enrollment:  No    Completed by: Susan Mcfarland

## 2018-03-20 NOTE — MR AVS SNAPSHOT
Nathen JANICE Gage   3/20/2018 9:30 AM   Anticoagulation Therapy Visit    Description:  82 year old male   Provider:  [facility]   Department:  Cr Nurse           INR as of 3/20/2018     Today's INR 2.6!      Anticoagulation Summary as of 3/20/2018     INR goal 2.0-2.5   Today's INR 2.6!   Full instructions 1.25 mg on Tue; 2.5 mg all other days   Next INR check 4/3/2018    Indications   Long-term (current) use of anticoagulants [Z79.01] [Z79.01]  Atrial fibrillation (H) [I48.91]         Your next Anticoagulation Clinic appointment(s)     Apr 03, 2018  9:30 AM CDT   [facility]    [address] [place] [address] [phone]              Contact Numbers     Clinic Number:         March 2018 Details    Sun Mon Tue Wed Thu Fri Sat         1               2               3                 4               5               6               7               8               9               10                 11               12               13               14               15               16               17                 18               19               20      1.25 mg   See details      21      2.5 mg         22      2.5 mg         23      2.5 mg         24      2.5 mg           25      2.5 mg         26      2.5 mg         27      1.25 mg         28      2.5 mg         29      2.5 mg         30      2.5 mg         31      2.5 mg          Date Details   03/20 This INR check               How to take your warfarin dose     To take:  1.25 mg Take 0.5 of a 2.5 mg tablet.    To take:  2.5 mg Take 1 of the 2.5 mg tablets.           April 2018 Details    Sun Mon Tue Wed Thu Fri Sat     1      2.5 mg         2      2.5 mg         3            4               5               6               7                 8               9               10               11               12                13               14                 15               16               17               18               19               20               21                 22               23               24               25               26               27               28                 29               30                     Date Details   No additional details    Date of next INR:  4/3/2018         How to take your warfarin dose     To take:  1.25 mg Take 0.5 of a 2.5 mg tablet.    To take:  2.5 mg Take 1 of the 2.5 mg tablets.

## 2018-03-20 NOTE — PROGRESS NOTES
ANTICOAGULATION FOLLOW-UP CLINIC VISIT    Patient Name:  Nathen Gage  Date:  3/20/2018  Contact Type:  Face to Face    SUBJECTIVE:     Patient Findings     Positives No Problem Findings, Unexplained INR or factor level change    Comments Will continue at same Warfarin maintenance dose at this time.  Patient denies any nosebleeds or any other signs of bleeding and he eats dark greens everyday.           OBJECTIVE    INR Protime   Date Value Ref Range Status   03/20/2018 2.6 (A) 0.86 - 1.14 Final       ASSESSMENT / PLAN  INR assessment THER    Recheck INR In: 2 WEEKS    INR Location Clinic      Anticoagulation Summary as of 3/20/2018     INR goal 2.0-2.5   Today's INR 2.6!   Maintenance plan 1.25 mg (2.5 mg x 0.5) on Tue; 2.5 mg (2.5 mg x 1) all other days   Full instructions 1.25 mg on Tue; 2.5 mg all other days   Weekly total 16.25 mg   Plan last modified Fidelina Pizano RN (8/8/2017)   Next INR check 4/3/2018   Target end date Indefinite    Indications   Long-term (current) use of anticoagulants [Z79.01] [Z79.01]  Atrial fibrillation (H) [I48.91]         Anticoagulation Episode Summary     INR check location     Preferred lab     Send INR reminders to [facility]    Comments       Anticoagulation Care Providers     Provider Role Specialty Phone number    Kushal Valentin MD [facility] [phone]            See the Encounter Report to view Anticoagulation Flowsheet and Dosing Calendar (Go to Encounters tab in chart review, and find the Anticoagulation Therapy Visit)        Fidelina Pizano RN

## 2018-03-20 NOTE — MR AVS SNAPSHOT
After Visit Summary   3/20/2018    Nathen Gage    MRN: [id number]           Patient Information     Date Of Birth          1935        Visit Information        Provider Department      3/20/2018 10:02 AM Kushal Valentin MD [facility]        Today's Diagnoses     Pulmonary hypertension    -  1       Follow-ups after your visit        Your next 10 appointments already scheduled     Apr 03, 2018  9:30 AM CDT   Anticoagulation Visit with [facility]   [facility] ([facility]    [address] [id number] [phone]            Apr 17, 2018 10:00 AM CDT   LAB with RU LAB   [facility] [place] ([facility])    [address]  [facility] [id number] [phone]           Please do not eat 10-12 hours before your appointment if you are coming in fasting for labs on lipids, cholesterol, or glucose (sugar). This does not apply to pregnant women. Water, hot tea and black coffee (with nothing added) are okay. Do not drink other fluids, diet soda or chew gum.            Apr 17, 2018 11:00 AM CDT   Return Visit with Siobhan Santoro PA-C   [facility] ([facility])    [address]  [facility] [address] [phone]            Apr 26, 2018  8:45 AM CDT   Phone Device Check with PATE TECH2   [facility]   [place] ([facility])    [address] [place] [address] [address]  [facility] [id number] [phone]              Who to contact     If you have questions or need follow up information about today's clinic visit or your schedule please contact [facility] directly at [phone].  Normal or non-critical lab and imaging results will be communicated to you by MyChart, letter or phone within 4 business days after the clinic has  received the results. If you do not hear from us within 7 days, please contact the clinic through [employer] or phone. If you have a critical or abnormal lab result, we will notify you by phone as soon as possible.  Submit refill requests through [employer] or call your pharmacy and they will forward the refill request to us. Please allow 3 business days for your refill to be completed.          Additional Information About Your Visit        [employer] Information     [employer] gives you secure access to your electronic health record. If you see a primary care provider, you can also send messages to your care team and make appointments. If you have questions, please call your primary care clinic.  If you do not have a primary care provider, please call [phone] and they will assist you.        Care EveryWhere ID     This is your Care EveryWhere ID. This could be used by other organizations to access your [place] medical records  [id number]         Blood Pressure from Last 3 Encounters:   03/20/18 142/60   03/06/18 162/79   12/29/17 138/82    Weight from Last 3 Encounters:   03/06/18 176 lb (79.8 kg)   12/29/17 174 lb (78.9 kg)   12/27/17 175 lb (79.4 kg)              Today, you had the following     No orders found for display       Primary Care Provider Office Phone # Fax #    Kushal Valentin [phone] [address] [address] [facility] [address]        Equal Access to Services     [facility] : Hadii aad ku hadasho Soomaali, waaxda luqadaha, qaybta kaalmada adeegyada, anamaria vines haydoreen mclaughlin . So [facility] [phone].    ATENCIÓN: Si habla español, tiene a arana disposición servicios gratuitos de asistencia lingüística. Llame al [phone].    We comply with applicable federal civil rights laws and [place] laws. We do not discriminate on the basis of race, color, national origin, age, disability, sex, sexual orientation, or gender identity.            Thank you!     Thank you  for choosing [facility]  for your care. Our goal is always to provide you with excellent care. Hearing back from our patients is one way we can continue to improve our services. Please take a few minutes to complete the written survey that you may receive in the mail after your visit with us. Thank you!             Your Updated Medication List - Protect others around you: Learn how to safely use, store and throw away your medicines at www.disposemymeds.org.          This list is accurate as of 3/20/18 10:04 AM.  Always use your most recent med list.                   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

## 2018-04-03 ENCOUNTER — ANTICOAGULATION THERAPY VISIT (OUTPATIENT)
Dept: NURSING | Facility: CLINIC | Age: 83
End: 2018-04-03
Payer: COMMERCIAL

## 2018-04-03 DIAGNOSIS — I48.91 ATRIAL FIBRILLATION (H): ICD-10-CM

## 2018-04-03 DIAGNOSIS — Z79.01 LONG-TERM (CURRENT) USE OF ANTICOAGULANTS: ICD-10-CM

## 2018-04-03 LAB — INR POINT OF CARE: 2.6 (ref 0.86–1.14)

## 2018-04-03 PROCEDURE — 85610 PROTHROMBIN TIME: CPT | Mod: QW

## 2018-04-03 PROCEDURE — 36416 COLLJ CAPILLARY BLOOD SPEC: CPT

## 2018-04-03 PROCEDURE — 99207 ZZC NO CHARGE NURSE ONLY: CPT

## 2018-04-03 NOTE — MR AVS SNAPSHOT
Nathen JANICE Gage   4/3/2018 9:30 AM   Anticoagulation Therapy Visit    Description:  82 year old male   Provider[facility]  [facility]   Department:  Cr Nurse           INR as of 4/3/2018     Today's INR 2.6!      Anticoagulation Summary as of 4/3/2018     INR goal 2.0-2.5   Today's INR 2.6!   Full instructions 1.25 mg on Tue, Fri; 2.5 mg all other days   Next INR check 4/24/2018    Indications   Long-term (current) use of anticoagulants [Z79.01] [Z79.01]  Atrial fibrillation (H) [I48.91]         Your next Anticoagulation Clinic appointment(s)     Apr 24, 2018  9:00 AM CDT   Anticoagulation Visit with [facility]   [facility]    [address] [place] [address]   [phone]              Contact Numbers     Clinic Number:         April 2018 Details    Sun Mon Tue Wed Thu Fri Sat     1               2               3      1.25 mg   See details      4      2.5 mg         5      2.5 mg         6      1.25 mg         7      2.5 mg           8      2.5 mg         9      2.5 mg         10      1.25 mg         11      2.5 mg         12      2.5 mg         13      1.25 mg         14      2.5 mg           15      2.5 mg         16      2.5 mg         17      1.25 mg         18      2.5 mg         19      2.5 mg         20      1.25 mg         21      2.5 mg           22      2.5 mg         23      2.5 mg         24            25               26               27               28                 29               30                     Date Details   04/03 This INR check   eat greens       Date of next INR:  4/24/2018         How to take your warfarin dose     To take:  1.25 mg Take 0.5 of a 2.5 mg tablet.    To take:  2.5 mg Take 1 of the 2.5 mg tablets.

## 2018-04-03 NOTE — PROGRESS NOTES
ANTICOAGULATION FOLLOW-UP CLINIC VISIT    Patient Name:  Nathen Gage  Date:  4/3/2018  Contact Type:  Face to Face    SUBJECTIVE:     Patient Findings     Positives Dental/Other procedures (Cataract surgery scheduled for 04/17/18), No Problem Findings    Comments INR has been trending on high side so I did decrease his Warfarin maintenance dose.  Patient prefers to come back for next INR in 3 weeks due to cataract surgery 2 weeks from today.           OBJECTIVE    INR Protime   Date Value Ref Range Status   04/03/2018 2.6 (A) 0.86 - 1.14 Final       ASSESSMENT / PLAN  INR assessment THER    Recheck INR In: 3 WEEKS    INR Location Clinic      Anticoagulation Summary as of 4/3/2018     INR goal 2.0-2.5   Today's INR 2.6!   Maintenance plan 1.25 mg (2.5 mg x 0.5) on Tue, Fri; 2.5 mg (2.5 mg x 1) all other days   Full instructions 1.25 mg on Tue, Fri; 2.5 mg all other days   Weekly total 15 mg   Plan last modified Fidelina Pizano RN (4/3/2018)   Next INR check 4/24/2018   Target end date Indefinite    Indications   Long-term (current) use of anticoagulants [Z79.01] [Z79.01]  Atrial fibrillation (H) [I48.91]         Anticoagulation Episode Summary     INR check location     Preferred lab     Send INR reminders to [facility] CLINIC    Comments       Anticoagulation Care Providers     Provider Role Specialty Phone number    Kushal Valentin MD [facility] [phone]            See the Encounter Report to view Anticoagulation Flowsheet and Dosing Calendar (Go to Encounters tab in chart review, and find the Anticoagulation Therapy Visit)        Fidelina Pizano RN

## 2018-04-12 ENCOUNTER — OFFICE VISIT (OUTPATIENT)
Dept: FAMILY MEDICINE | Facility: CLINIC | Age: 83
End: 2018-04-12
Payer: COMMERCIAL

## 2018-04-12 VITALS
HEART RATE: 64 BPM | BODY MASS INDEX: 26.31 KG/M2 | SYSTOLIC BLOOD PRESSURE: 140 MMHG | TEMPERATURE: 97.6 F | RESPIRATION RATE: 12 BRPM | OXYGEN SATURATION: 99 % | WEIGHT: 173.6 LBS | DIASTOLIC BLOOD PRESSURE: 84 MMHG | HEIGHT: 68 IN

## 2018-04-12 DIAGNOSIS — Z01.818 PREOP GENERAL PHYSICAL EXAM: Primary | ICD-10-CM

## 2018-04-12 PROCEDURE — 99214 OFFICE O/P EST MOD 30 MIN: CPT | Performed by: FAMILY MEDICINE

## 2018-04-12 NOTE — MR AVS SNAPSHOT
After Visit Summary   4/12/2018    Nathen Gage    MRN: [id number]           Patient Information     Date Of Birth          1935        Visit Information        Provider Department      4/12/2018 9:00 AM Kushal Valentin MD [facility]        Today's Diagnoses     Preop general physical exam    -  1      Care Instructions      Before Your Surgery      Call your surgeon if there is any change in your health. This includes signs of a cold or flu (such as a sore throat, runny nose, cough, rash or fever).    Do not smoke, drink alcohol or take over the counter medicine (unless your surgeon or primary care doctor tells you to) for the 24 hours before and after surgery.    If you take prescribed drugs: Follow your doctor s orders about which medicines to take and which to stop until after surgery.    Eating and drinking prior to surgery: follow the instructions from your surgeon    Take a shower or bath the night before surgery. Use the soap your surgeon gave you to gently clean your skin. If you do not have soap from your surgeon, use your regular soap. Do not shave or scrub the surgery site.  Wear clean pajamas and have clean sheets on your bed.           Follow-ups after your visit        Your next 10 appointments already scheduled     Apr 16, 2018  9:00 AM CDT   LAB with RU LAB   [facility] ([facility])    [address] [address] [phone]           Please do not eat 10-12 hours before your appointment if you are coming in fasting for labs on lipids, cholesterol, or glucose (sugar). This does not apply to pregnant women. Water, hot tea and black coffee (with nothing added) are okay. Do not drink other fluids, diet soda or chew gum.            Apr 17, 2018  7:30 AM CDT   Return Visit with Siobhan Santoro PA-C   [facility] ([facility])     [address]  [facility] [address]   [phone]            Apr 24, 2018  9:00 AM CDT   Anticoagulation Visit with [facility]ATION CLINIC   [facility] ([facility])    [address] [facility] [id number] [phone]            Apr 26, 2018  8:45 AM CDT   Phone Device Check with PATE TECH2   [facility] ([facility])    [address]  [facility] [id number] [phone] OPT 2            May 29, 2018   Procedure with Mat Echevarria MD   [facility] (--)    [address]  [facility] [id number] [phone]              Who to contact     If you have questions or need follow up information about today's clinic visit or your schedule please contact [facility] directly at [phone].  Normal or non-critical lab and imaging results will be communicated to you by [employer], letter or phone within 4 business days after the clinic has received the results. If you do not hear from us within 7 days, please contact the clinic through [employer] or phone. If you have a critical or abnormal lab result, we will notify you by phone as soon as possible.  Submit refill requests through [employer] or call your pharmacy and they will forward the refill request to us. Please allow 3 business days for your refill to be completed.          Additional Information About Your Visit        [employer] Information     [employer] gives you secure access to your electronic health record. If you see a primary care provider, you can also send messages to your care team and make appointments. If you have questions, please call your primary care clinic.  If you do not have a primary care provider, please call [phone] and they will assist you.        Care EveryWhere ID     This is your Care EveryWhere ID. This could be used by other organizations to  "access your [place] medical records  [id number]        Your Vitals Were     Pulse Temperature Respirations Height Pulse Oximetry BMI (Body Mass Index)    64 97.6  F (36.4  C) (Oral) 12 5' 7.5\" (1.715 m) 99% 26.79 kg/m2       Blood Pressure from Last 3 Encounters:   04/12/18 140/84   03/20/18 142/60   03/06/18 162/79    Weight from Last 3 Encounters:   04/12/18 173 lb 9.6 oz (78.7 kg)   03/06/18 176 lb (79.8 kg)   12/29/17 174 lb (78.9 kg)              Today, you had the following     No orders found for display       Primary Care Provider Office Phone # Fax #    Kushal Valentin [phone] [address] [facility] [address]        Equal Access to Services     [facility]: Hadii aad ku hadasho Soomaali, waaxda luqadaha, qaybta kaalmada adeegyada, waxay chloein hayaan jd mclaughlin . So [facility] [phone].    ATENCIÓN: Si habla español, tiene a arana disposición servicios gratuitos de asistencia lingüística. Terrence al [phone].    We comply with applicable federal civil rights laws and [place] laws. We do not discriminate on the basis of race, color, national origin, age, disability, sex, sexual orientation, or gender identity.            Thank you!     Thank you for choosing [facility]  for your care. Our goal is always to provide you with excellent care. Hearing back from our patients is one way we can continue to improve our services. Please take a few minutes to complete the written survey that you may receive in the mail after your visit with us. Thank you!             Your Updated Medication List - Protect others around you: Learn how to safely use, store and throw away your medicines at www.disposemymeds.org.          This list is accurate as of 4/12/18  9:53 AM.  Always use your most recent med list.                   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

## 2018-04-12 NOTE — PROGRESS NOTES
[facility]  [address] [facility] [address]  [phone]  Dept: [phone]    PRE-OP EVALUATION:  Today's date: 2018    Nathen Gage (: 1935) presents for pre-operative evaluation assessment as requested by Dr. Perez.  He requires evaluation and anesthesia risk assessment prior to undergoing surgery/procedure for treatment of the left eye .    Proposed Surgery/ Procedure: cataract surgery  Date of Surgery/ Procedure: 18  Time of Surgery/ Procedure: 11:30am  Hospital/Surgical Facility: [facility]  Fax number for surgical facility:   Primary Physician: Kushal Valentin  Type of Anesthesia Anticipated: Choice    Patient has a Health Care Directive or Living Will:  YES living will    1. YES - DO YOU HAVE A HISTORY OF HEART ATTACK, STROKE, STENT, BYPASS OR SURGERY ON AN ARTERY IN THE HEAD, NECK, HEART OR LEG?  2. YES - DO YOU EVER HAVE ANY PAIN OR DISCOMFORT IN YOUR CHEST?   3. NO - Do you have a history of  Heart Failure?  4. NO - Are you troubled by shortness of breath when: walking on the level, up a slight hill or at night?  5. NO - Do you currently have a cold, bronchitis or other respiratory infection?  6. NO - Do you have a cough, shortness of breath or wheezing?  7. NO - Do you sometimes get pains in the calves of your legs when you walk?  8. YES - DO YOU OR ANYONE IN YOUR FAMILY HAVE PREVIOUS HISTORY OF BLOOD CLOTS?   9. NO - Do you or does anyone in your family have a serious bleeding problem such as prolonged bleeding following surgeries or cuts?  10. NO - Have you ever had problems with anemia or been told to take iron pills?  11. NO - Have you had any abnormal blood loss such as black, tarry or bloody stools, or abnormal vaginal bleeding?  12. NO - Have you ever had a blood transfusion?  13. NO - Have you or any of your relatives ever had problems with anesthesia?  14. NO - Do you have sleep apnea, excessive snoring or  daytime drowsiness?  15. NO - Do you have any prosthetic heart valves?  16. NO - Do you have prosthetic joints?  17. NO - Is there any chance that you may be pregnant?      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 6 weeks apart   Patient takes warfarin on a regular basis: [facility] about need to hold or not     MEDICAL HISTORY:     Patient Active Problem List    Diagnosis Date Noted     Chest pain 12/24/2017     Priority: Medium     Acute idiopathic gout of left knee 04/27/2016     Priority: Medium     Presbycusis, unspecified laterality 04/27/2016     Priority: Medium     Long-term (current) use of anticoagulants [Z79.01] 04/14/2016     Priority: Medium     Faintness 05/12/2015     Priority: Medium     Dilated aortic root (H)      Priority: Medium     Pulmonary hypertension      Priority: Medium     H/O: GI bleed      Priority: Medium     2010 in [place]       Mixed hyperlipidemia      Priority: Medium     Diagnosis updated by automated process. Provider to review and confirm.       Atrial fibrillation (H) 06/11/2014     Priority: Medium     Health Care Home 03/15/2013     Priority: Medium     Sylwia Gonzalez RN-BSN, [facility]  [phone].  FPA / [facility]      DX V65.8 REPLACED WITH [id number] HEALTH CARE HOME (04/08/2013)       Pacemaker, artificial 02/08/2012     Priority: Medium     Advanced directives, counseling/discussion 11/08/2011     Priority: Medium     Advance Directive Problem List Overview:   Name Relationship Phone    Primary Health Care Agent            Alternative Health Care Agent          Discussed advance care planning with patient; information given to patient to review. 11/8/2011            Sick sinus syndrome (H) 10/19/2011     Priority: Medium     S/p dual chamber pacemaker 2011       Syncope 10/19/2011     Priority: Medium     Hyponatremia 10/19/2011     Priority: Medium     Community acquired pneumonia  10/19/2011     Priority: Medium     Actinic keratosis 08/02/2005     Priority: Medium     Gout 08/01/2005     Priority: Medium     Problem list name updated by automated process. 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 Provider to review       Esophageal reflux 11/04/2003     Priority: Medium      Past Medical History:   Diagnosis Date     Actinic keratosis      Acute idiopathic gout of left knee 4/27/2016     Atrial fibrillation (H)     on Eliquis     Dilated aortic root (H)      Esophageal reflux      Esophagitis      H/O: GI bleed 2010     Hyperlipidaemia      Hyperlipidemia LDL goal <100      Impotence of organic origin      Presbycusis, unspecified laterality 4/27/2016     Pulmonary hypertension      Sick sinus syndrome (H)     pacemaker implanted 2011     Unspecified essential hypertension      Past Surgical History:   Procedure Laterality Date     IMPLANT PACEMAKER  2011    Pacemaker/cardiac insitu / [place] [employer] Dual chamber, V45     Current Outpatient Prescriptions   Medication Sig Dispense Refill     losartan (COZAAR) 100 MG tablet Take 1 tablet (100 mg) by mouth daily 90 tablet 3     warfarin (COUMADIN) 2.5 MG tablet Take 1.25mg every Tu / Take 2.5mg all other days OR as instructed by INR clinic 30 tablet 5     acetaminophen (TYLENOL) 325 MG tablet Take 2 tablets (650 mg) by mouth every 4 hours as needed for mild pain or fever 100 tablet      doxazosin (CARDURA) 4 MG tablet Take 1 tablet (4 mg) by mouth daily 90 tablet 1     pantoprazole (PROTONIX) 40 MG EC tablet Take 1 tablet (40 mg) by mouth daily 90 tablet 2     atorvastatin (LIPITOR) 20 MG tablet Take 1 tablet (20 mg) by mouth daily 90 tablet 2     OTC products: None, except as noted above    Allergies   Allergen Reactions      Neomycin Sulfate       Latex Allergy: NO    Social History   Substance Use Topics     Smoking status: Former Smoker     Quit date: 1/1/1975     Smokeless tobacco: Never Used     Alcohol use Yes      Comment: 1- 2 BEERS WEEKLY     History   Drug Use No       REVIEW OF SYSTEMS:      REVIEW OF SYSTEMS    Generally has been generally feeling well until this episode. No problems with vision, hearing, dental or neck pain.Has hph airborne or ingestion allergy  No chest pain, palpitations, dyspnea, change in bowel habits, blood  in stool or dyspepsia.  No rashes, changing moles, weakness, lassitude or back problems.  No chronic issues . No dysuria  Patient not  a smoker. No problems with significant headaches.      EXAM:   On exam the vital signs are stable  Weight is Body mass index is 26.79 kg/(m^2).   Eyes show iram   No neck masses or thyromegaly.Ear nose and throat shows good re  No bruits, murmers, rubs or extrasounds. No cardiomegaly or chest wall tenderness. Lungs clear, no abdominal masses or organomegaly. No CVA tenderness.  Skin eval normal   No hernias, good range of motion neck, back and extremities. No abnormal skin lesions. Normal genitalia. Good peripheral pulses. No adenopathy.  Normal gait and stance. Neck is supple.  Back exam shows good rom         DIAGNOSTICS:   No labs or EKG required for low risk surgery (c    Recent Labs   Lab Test 04/03/18 03/20/18 03/01/18   0909   12/29/17   1033   12/25/17   0534   HGB   --    --    --    --    --   13.3   --   11.7*   PLT   --    --    --    --    --   220   --   158   INR  2.6*  2.6*   < >   --    < >   --    < >  3.02*   NA   --    --    --   134   --   136   --   135   POTASSIUM   --    --    --   4.6   --   4.8   --   4.3   CR   --    --    --   0.97   --   0.92   --   0.84    < > = values in this interval not displayed.        IMPRESSION:     (Z01.818) Preop general physical exam  (primary encounter diagnosis)  Comment: check re: inr  Plan: ok to  stay on meds per surgeons office           ICD-10-CM    1. Preop general physical exam Z01.818      Fit for surgery   RECOMMENDATIONS:   (Z01.818) Preop general physical exam  (primary encounter diagnosis)  Comment:   Plan: fit for surgery, history of heart disease , will need follow up visit for procedure 2          Copy of this evaluation report is provided to requesting physician.    [facility] Preop Guidelines    Revised Cardiac Risk Index

## 2018-04-16 DIAGNOSIS — E78.2 MIXED HYPERLIPIDEMIA: ICD-10-CM

## 2018-04-16 DIAGNOSIS — R55 SYNCOPE, UNSPECIFIED SYNCOPE TYPE: ICD-10-CM

## 2018-04-16 DIAGNOSIS — I48.91 ATRIAL FIBRILLATION, UNSPECIFIED TYPE (H): ICD-10-CM

## 2018-04-16 DIAGNOSIS — I15.9 SECONDARY HYPERTENSION: ICD-10-CM

## 2018-04-16 DIAGNOSIS — I49.5 SICK SINUS SYNDROME (H): ICD-10-CM

## 2018-04-16 DIAGNOSIS — I77.810 DILATED AORTIC ROOT (H): ICD-10-CM

## 2018-04-16 DIAGNOSIS — I27.20 PULMONARY HYPERTENSION (H): ICD-10-CM

## 2018-04-16 DIAGNOSIS — I10 ESSENTIAL HYPERTENSION WITH GOAL BLOOD PRESSURE LESS THAN 140/90: ICD-10-CM

## 2018-04-16 LAB
ALT SERPL W P-5'-P-CCNC: 19 U/L (ref 0–70)
ANION GAP SERPL CALCULATED.3IONS-SCNC: 7 MMOL/L (ref 3–14)
BUN SERPL-MCNC: 13 MG/DL (ref 7–30)
CALCIUM SERPL-MCNC: 9.2 MG/DL (ref 8.5–10.1)
CHLORIDE SERPL-SCNC: 100 MMOL/L (ref 94–109)
CHOLEST SERPL-MCNC: 162 MG/DL
CO2 SERPL-SCNC: 27 MMOL/L (ref 20–32)
CREAT SERPL-MCNC: 1.04 MG/DL (ref 0.66–1.25)
GFR SERPL CREATININE-BSD FRML MDRD: 68 ML/MIN/1.7M2
GLUCOSE SERPL-MCNC: 94 MG/DL (ref 70–99)
HDLC SERPL-MCNC: 66 MG/DL
LDLC SERPL CALC-MCNC: 74 MG/DL
NONHDLC SERPL-MCNC: 96 MG/DL
POTASSIUM SERPL-SCNC: 4.1 MMOL/L (ref 3.4–5.3)
SODIUM SERPL-SCNC: 134 MMOL/L (ref 133–144)
TRIGL SERPL-MCNC: 109 MG/DL

## 2018-04-16 PROCEDURE — 84460 ALANINE AMINO (ALT) (SGPT): CPT | Performed by: INTERNAL MEDICINE

## 2018-04-16 PROCEDURE — 80061 LIPID PANEL: CPT | Performed by: INTERNAL MEDICINE

## 2018-04-16 PROCEDURE — 36415 COLL VENOUS BLD VENIPUNCTURE: CPT | Performed by: INTERNAL MEDICINE

## 2018-04-16 PROCEDURE — 80048 BASIC METABOLIC PNL TOTAL CA: CPT | Performed by: INTERNAL MEDICINE

## 2018-04-17 ENCOUNTER — OFFICE VISIT (OUTPATIENT)
Dept: CARDIOLOGY | Facility: CLINIC | Age: 83
End: 2018-04-17
Attending: INTERNAL MEDICINE
Payer: COMMERCIAL

## 2018-04-17 VITALS
HEART RATE: 66 BPM | WEIGHT: 173.1 LBS | SYSTOLIC BLOOD PRESSURE: 162 MMHG | DIASTOLIC BLOOD PRESSURE: 78 MMHG | BODY MASS INDEX: 26.24 KG/M2 | HEIGHT: 68 IN

## 2018-04-17 DIAGNOSIS — I10 ESSENTIAL HYPERTENSION WITH GOAL BLOOD PRESSURE LESS THAN 140/90: Primary | ICD-10-CM

## 2018-04-17 DIAGNOSIS — I77.810 DILATED AORTIC ROOT (H): ICD-10-CM

## 2018-04-17 DIAGNOSIS — I49.5 SICK SINUS SYNDROME (H): ICD-10-CM

## 2018-04-17 DIAGNOSIS — I48.0 PAROXYSMAL ATRIAL FIBRILLATION (H): ICD-10-CM

## 2018-04-17 PROCEDURE — 99214 OFFICE O/P EST MOD 30 MIN: CPT | Performed by: PHYSICIAN ASSISTANT

## 2018-04-17 NOTE — PROGRESS NOTES
"Service Date: 04/17/2018      HISTORY OF PRESENT ILLNESS:  Mr. Gage is a pleasant 82-year-old gentleman who presents to Cardiology office today to follow up on his blood pressure after he was placed on an increased dose of losartan when he last saw Dr. Taylor at the beginning of March.      His cardiac history includes sick sinus syndrome status post permanent pacemaker implantation in 2011, reportedly with a history of paroxysmal atrial fibrillation based on \"mode switching\" noted on a device check in March 2014.  However, in looking at his most recent device checks, I do not see that recurrent atrial fibrillation has been noted.  He is on coumadin with an INR goal of 2-2.5 due to a h/o a GI bleed.  He has hypertension and a mildly dilated ascending aorta at 3.7 cm.  Echocardiography has also suggested elevated right ventricular systolic pressures consistent with mild pulmonary hypertension which has been stable for at least 5 years.  Again, when he was last in to see Dr. Taylor his blood pressure was somewhat elevated and it was recommended that he increase his losartan to 100 mg daily.  He presents today for followup.      Unfortunately, the patient has not taken his blood pressure medication for the last 2 days.  He is undergoing cataract surgery later today and tells me that he was instructed to not take any of his medications for 2 days.  His blood pressure is again elevated in the office today.  He does not have a way to follow up his blood pressure at home.  He was in to see his primary care provider about a week ago for his pre-op.  His blood pressure was 140/84 at that time.  The patient is overall feeling well and denies any chest discomfort, dyspnea on exertion, orthopnea, PND or lower extremity edema.  He is tolerating his current medication regimen without difficulty.      CURRENT CARDIAC MEDICATIONS:   1.  Losartan 100 mg daily.   2.  Coumadin as directed.   3.  Atorvastatin 20 mg daily.     4.  He " also takes Doxazosin 4 mg daily.      The remainder of his medications, allergies and review of systems were reviewed and as are documented separately.      PHYSICAL EXAMINATION:   GENERAL:  The patient is an 82-year-old male who appears his stated age.  He is in no apparent distress.   VITAL SIGNS:  His blood pressure on recheck is 162/78, pulse 66.  Weight is 173 pounds, which is stable.   LUNGS:  Breathing is nonlabored.  His lungs are clear to auscultation bilaterally.   CARDIAC:  Reveals a regular rate and rhythm, no murmurs appreciated.  No lower extremity edema.   NEUROLOGIC:  Alert and oriented.      ASSESSMENT AND PLAN:  Neal is a pleasant 82-year-old gentleman with a history of sick sinus syndrome, status post permanent pacemaker implantation, history of paroxysmal atrial fibrillation, although I do not see it has been noted on any recent device checks, mild pulmonary hypertension and a mild ascending aortic dilatation by echocardiography and hypertension who has had some elevated blood pressure readings recently.  His blood pressure is elevated today, but again he has not taken his blood pressure medication for the past 2 days.  At this point, I suggested that we set him up for a nurse visit in a couple of weeks to recheck his blood pressure.  If his blood pressure is still greater than 140/90, at that time, I would like to consider adding an additional antihypertensive such as a low dose of beta blocker.      If his blood pressure appears controlled, then we will plan to have him follow up in the Cardiology Clinic with Dr. Zheng this fall.  If his blood pressure is not controlled, of course we will set up a followup in the interim.      Thank you for allowing me to participate in the care of this pleasant patient.         ELLEN FAGAN PA-C             D: 2018   T: 2018   MT: JOS      Name:     MANUEL DEJESUS   MRN:      [id number]        Account:      [id number]   :       1935           Service Date: 04/17/2018      Document: [id number]

## 2018-04-17 NOTE — PATIENT INSTRUCTIONS
Thank you for your [facility] visit today. Your provider has recommended the following:  Medication Changes:  No changes today  Recommendations:  Please set up a RN visit for a BP check in 1-2 weeks  Follow-up:  See Dr Zheng for cardiology follow up in Sept 2018.    Reminder:  Please bring in your current medication list or your medication, over the counter supplements and vitamin bottles as we will review these at each office visit.

## 2018-04-17 NOTE — PROGRESS NOTES
Please see separate dictation for HPI, PHYSICAL EXAM AND IMPRESSION/PLAN.    CURRENT MEDICATIONS:  Current Outpatient Prescriptions   Medication Sig Dispense Refill     prednisolon-gatiflox-bromfenac, pt own, no charge, 1-0.5-0.075 % opthalmic solution 1 drop 3 times daily       losartan (COZAAR) 100 MG tablet Take 1 tablet (100 mg) by mouth daily 90 tablet 3     warfarin (COUMADIN) 2.5 MG tablet Take 1.25mg every Tu / Take 2.5mg all other days OR as instructed by INR clinic 30 tablet 5     acetaminophen (TYLENOL) 325 MG tablet Take 2 tablets (650 mg) by mouth every 4 hours as needed for mild pain or fever 100 tablet      doxazosin (CARDURA) 4 MG tablet Take 1 tablet (4 mg) by mouth daily 90 tablet 1     pantoprazole (PROTONIX) 40 MG EC tablet Take 1 tablet (40 mg) by mouth daily 90 tablet 2     atorvastatin (LIPITOR) 20 MG tablet Take 1 tablet (20 mg) by mouth daily 90 tablet 2       ALLERGIES:     Allergies   Allergen Reactions     Neomycin Sulfate        PAST MEDICAL HISTORY:  Past Medical History:   Diagnosis Date     Actinic keratosis      Acute idiopathic gout of left knee 4/27/2016     Atrial fibrillation (H)     on Eliquis     Dilated aortic root (H)      Esophageal reflux      Esophagitis      H/O: GI bleed 2010     Hyperlipidaemia      Hyperlipidemia LDL goal <100      Impotence of organic origin      Presbycusis, unspecified laterality 4/27/2016     Pulmonary hypertension      Sick sinus syndrome (H)     pacemaker implanted 2011     Unspecified essential hypertension        PAST SURGICAL HISTORY:  Past Surgical History:   Procedure Laterality Date     IMPLANT PACEMAKER  2011    Pacemaker/cardiac insitu / [employer] [employer] Dual chamber, V45       SOCIAL HISTORY:  Social History     Social History     Marital status:      Spouse name: N/A     Number of children: N/A     Years of education: N/A     Social History Main Topics     Smoking status: Former Smoker     Quit date: 1/1/1975     Smokeless  tobacco: Never Used     Alcohol use Yes      Comment: 1- 2 BEERS WEEKLY     Drug use: No     Sexual activity: Yes     Partners: Female     Other Topics Concern     Parent/Sibling W/ Cabg, Mi Or Angioplasty Before 65f 55m? No     Caffeine Concern No     1 cup coffee per day     Sleep Concern No     Stress Concern No     Weight Concern No     Special Diet Yes     on warfarin      Exercise Yes     states he is very active, dancing, skiing, walking      Bike Helmet Yes     Seat Belt Yes     Social History Narrative       FAMILY HISTORY:  Family History   Problem Relation Age of Onset     Alzheimer Disease Brother      CEREBROVASCULAR DISEASE Father 80     Family History Negative Sister      Family History Negative Son      Family History Negative Daughter      Family History Negative Sister      Family History Negative Daughter      Breast Cancer No family hx of      Prostate Cancer No family hx of        Review of Systems:  Skin:  Negative   follows with Dermatology   Eyes:  Positive for glasses;glaucoma cataract surgery today (4/17)  ENT:  Positive for hearing loss hearing aids  Respiratory:  Positive for dyspnea on exertion stairs   Cardiovascular:    lightheadedness;Positive for when bending over  Gastroenterology: Negative      Genitourinary:  Negative      Musculoskeletal:  Positive for back pain;arthritis    Neurologic:  Positive for memory problems    Psychiatric:  Negative      Heme/Lymph/Imm:  Positive for allergies;easy bruising    Endocrine:  Negative         Reviewed. Remainder of the note dictated.    Siobhan Santoro PA-C

## 2018-04-17 NOTE — LETTER
4/17/2018    Kushal Valentin MD  [address] [address] [facility] [address]    RE: Nathen Gage       Dear Colleague,    I had the pleasure of seeing Nathen Gage in the [facility].    Please see separate dictation for HPI, PHYSICAL EXAM AND IMPRESSION/PLAN.    CURRENT MEDICATIONS:  Current Outpatient Prescriptions   Medication Sig Dispense Refill     prednisolon-gatiflox-bromfenac, pt own, no charge, 1-0.5-0.075 % opthalmic solution 1 drop 3 times daily       losartan (COZAAR) 100 MG tablet Take 1 tablet (100 mg) by mouth daily 90 tablet 3     warfarin (COUMADIN) 2.5 MG tablet Take 1.25mg every Tu / Take 2.5mg all other days OR as instructed by INR clinic 30 tablet 5     acetaminophen (TYLENOL) 325 MG tablet Take 2 tablets (650 mg) by mouth every 4 hours as needed for mild pain or fever 100 tablet      doxazosin (CARDURA) 4 MG tablet Take 1 tablet (4 mg) by mouth daily 90 tablet 1     pantoprazole (PROTONIX) 40 MG EC tablet Take 1 tablet (40 mg) by mouth daily 90 tablet 2     atorvastatin (LIPITOR) 20 MG tablet Take 1 tablet (20 mg) by mouth daily 90 tablet 2       ALLERGIES:     Allergies   Allergen Reactions     Neomycin Sulfate        PAST MEDICAL HISTORY:  Past Medical History:   Diagnosis Date     Actinic keratosis      Acute idiopathic gout of left knee 4/27/2016     Atrial fibrillation (H)     on Eliquis     Dilated aortic root (H)      Esophageal reflux      Esophagitis      H/O: GI bleed 2010     Hyperlipidaemia      Hyperlipidemia LDL goal <100      Impotence of organic origin      Presbycusis, unspecified laterality 4/27/2016     Pulmonary hypertension      Sick sinus syndrome (H)     pacemaker implanted 2011     Unspecified essential hypertension        PAST SURGICAL HISTORY:  Past Surgical History:   Procedure Laterality Date     IMPLANT PACEMAKER  2011    Pacemaker/cardiac insitu / [employer] Dual chamber, V45       SOCIAL HISTORY:  Social History      Social History     Marital status:      Spouse name: N/A     Number of children: N/A     Years of education: N/A     Social History Main Topics     Smoking status: Former Smoker     Quit date: 1/1/1975     Smokeless tobacco: Never Used     Alcohol use Yes      Comment: 1- 2 BEERS WEEKLY     Drug use: No     Sexual activity: Yes     Partners: Female     Other Topics Concern     Parent/Sibling W/ Cabg, Mi Or Angioplasty Before 65f 55m? No     Caffeine Concern No     1 cup coffee per day     Sleep Concern No     Stress Concern No     Weight Concern No     Special Diet Yes     on warfarin      Exercise Yes     states he is very active, dancing, skiing, walking      Bike Helmet Yes     Seat Belt Yes     Social History Narrative       FAMILY HISTORY:  Family History   Problem Relation Age of Onset     Alzheimer Disease Brother      CEREBROVASCULAR DISEASE Father 80     Family History Negative Sister      Family History Negative Son      Family History Negative Daughter      Family History Negative Sister      Family History Negative Daughter      Breast Cancer No family hx of      Prostate Cancer No family hx of        Review of Systems:  Skin:  Negative   follows with Dermatology   Eyes:  Positive for glasses;glaucoma cataract surgery today (4/17)  ENT:  Positive for hearing loss hearing aids  Respiratory:  Positive for dyspnea on exertion stairs   Cardiovascular:    lightheadedness;Positive for when bending over  Gastroenterology: Negative      Genitourinary:  Negative      Musculoskeletal:  Positive for back pain;arthritis    Neurologic:  Positive for memory problems    Psychiatric:  Negative      Heme/Lymph/Imm:  Positive for allergies;easy bruising    Endocrine:  Negative         Reviewed. Remainder of the note dictated.    Siobhan Santoro PA-C        Service Date: 04/17/2018      HISTORY OF PRESENT ILLNESS:  Mr. Gage is a pleasant 82-year-old gentleman who presents to Cardiology office today to  "follow up on his blood pressure after he was placed on an increased dose of losartan when he last saw Dr. Taylor at the beginning of March.      His cardiac history includes sick sinus syndrome status post permanent pacemaker implantation in 2011, reportedly with a history of paroxysmal atrial fibrillation based on \"mode switching\" noted on a device check in March 2014.  However, in looking at his most recent device checks, I do not see that recurrent atrial fibrillation has been noted.  He is on coumadin with an INR goal of 2-2.5 due to a h/o a GI bleed.  He has hypertension and a mildly dilated ascending aorta at 3.7 cm.  Echocardiography has also suggested elevated right ventricular systolic pressures consistent with mild pulmonary hypertension which has been stable for at least 5 years.  Again, when he was last in to see Dr. Taylor his blood pressure was somewhat elevated and it was recommended that he increase his losartan to 100 mg daily.  He presents today for followup.      Unfortunately, the patient has not taken his blood pressure medication for the last 2 days.  He is undergoing cataract surgery later today and tells me that he was instructed to not take any of his medications for 2 days.  His blood pressure is again elevated in the office today.  He does not have a way to follow up his blood pressure at home.  He was in to see his primary care provider about a week ago for his pre-op.  His blood pressure was 140/84 at that time.  The patient is overall feeling well and denies any chest discomfort, dyspnea on exertion, orthopnea, PND or lower extremity edema.  He is tolerating his current medication regimen without difficulty.      CURRENT CARDIAC MEDICATIONS:   1.  Losartan 100 mg daily.   2.  Coumadin as directed.   3.  Atorvastatin 20 mg daily.     4.  He also takes Doxazosin 4 mg daily.      The remainder of his medications, allergies and review of systems were reviewed and as are documented " separately.      PHYSICAL EXAMINATION:   GENERAL:  The patient is an 82-year-old male who appears his stated age.  He is in no apparent distress.   VITAL SIGNS:  His blood pressure on recheck is 162/78, pulse 66.  Weight is 173 pounds, which is stable.   LUNGS:  Breathing is nonlabored.  His lungs are clear to auscultation bilaterally.   CARDIAC:  Reveals a regular rate and rhythm, no murmurs appreciated.  No lower extremity edema.   NEUROLOGIC:  Alert and oriented.      ASSESSMENT AND PLAN:  Neal is a pleasant 82-year-old gentleman with a history of sick sinus syndrome, status post permanent pacemaker implantation, history of paroxysmal atrial fibrillation, although I do not see it has been noted on any recent device checks, mild pulmonary hypertension and a mild ascending aortic dilatation by echocardiography and hypertension who has had some elevated blood pressure readings recently.  His blood pressure is elevated today, but again he has not taken his blood pressure medication for the past 2 days.  At this point, I suggested that we set him up for a nurse visit in a couple of weeks to recheck his blood pressure.  If his blood pressure is still greater than 140/90, at that time, I would like to consider adding an additional antihypertensive such as a low dose of beta blocker.      If his blood pressure appears controlled, then we will plan to have him follow up in the Cardiology Clinic with Dr. Zheng this fall.  If his blood pressure is not controlled, of course we will set up a followup in the interim.      Thank you for allowing me to participate in the care of this pleasant patient.         ELLEN FAGAN PA-C             D: 2018   T: 2018   MT: JOS      Name:     MANUEL DEJESUS   MRN:      [id number]        Account:      [id number]   :      1935           Service Date: 2018      Document: [id number]        Thank you for allowing me to participate in the care of your  patient.      Sincerely,     Siobhan Santoro PA-C     [facility] [place]    cc:   Zen Taylor MD  [address]  [place] [id number]

## 2018-04-17 NOTE — MR AVS SNAPSHOT
After Visit Summary   4/17/2018    Nathen Gage    MRN: [id number]           Patient Information     Date Of Birth          1935        Visit Information        Provider Department      4/17/2018 7:30 AM Siobhan Santoro PA-C [facility]        Today's Diagnoses     Essential hypertension with goal blood pressure less than 140/90    -  1    Atrial fibrillation, unspecified type (H)        Dilated aortic root (H)        Sick sinus syndrome          Care Instructions    Thank you for your  Heart Care visit today. Your provider has recommended the following:  Medication Changes:  No changes today  Recommendations:  Please set up a RN visit for a BP check in 1-2 weeks  Follow-up:  See Dr Zheng for cardiology follow up in Sept 2018.    Reminder:  Please bring in your current medication list or your medication, over the counter supplements and vitamin bottles as we will review these at each office visit.                  Follow-ups after your visit        Additional Services     Follow-Up with Nurse                 Your next 10 appointments already scheduled     Apr 24, 2018  9:00 AM CDT   Anticoagulation Visit with [facility]    [address] [facility] [id number] [phone]            Apr 24, 2018 10:00 AM CDT   Nurse Only with RU [facility] HRT NURSE   [facility] ([facility])    [address]  [facility] [id number] [phone]            Apr 26, 2018  8:45 AM CDT   Phone Device Check with PATE TECH2   [facility] ([facility])    [address]  [facility] [address] [phone] OPT 2            May 29, 2018   Procedure with Mat Echevarria MD   [facility] (--)    [address] [place] [address] [address]  [place] [address]   [phone]              Future tests that were ordered for you today     Open Future Orders        Priority Expected Expires Ordered    Follow-Up with Nurse Routine 4/24/2018 4/16/2020 4/17/2018            Who to contact     If you have questions or need follow up information about today's clinic visit or your schedule please contact [facility] directly at [phone].  Normal or non-critical lab and imaging results will be communicated to you by [employer], letter or phone within 4 business days after the clinic has received the results. If you do not hear from us within 7 days, please contact the clinic through [employer] or phone. If you have a critical or abnormal lab result, we will notify you by phone as soon as possible.  Submit refill requests through [employer] or call your pharmacy and they will forward the refill request to us. Please allow 3 business days for your refill to be completed.          Additional Information About Your Visit        [employer] Information     [employer] gives you secure access to your electronic health record. If you see a primary care provider, you can also send messages to your care team and make appointments. If you have questions, please call your primary care clinic.  If you do not have a primary care provider, please call [phone] and they will assist you.        Care EveryWhere ID     This is your Care EveryWhere ID. This could be used by other organizations to access your [place] medical records  [id number]        Your Vitals Were     Pulse Height BMI (Body Mass Index)             66 1.715 m (5' 7.5\") 26.71 kg/m2          Blood Pressure from Last 3 Encounters:   04/17/18 162/78   04/12/18 140/84   03/20/18 142/60    Weight from Last 3 Encounters:   04/17/18 78.5 kg (173 lb 1.6 oz)   04/12/18 78.7 kg (173 lb 9.6 oz)   03/06/18 79.8 kg (176 lb)              We Performed the Following     Follow-Up with " Cardiac Advanced Practice Provider        Primary Care Provider Office Phone # Fax #    Kushal Johann Valentin [phone] [address] [facility] [address]        Equal Access to Services     MICHELE ALEXANDRA : Hadjeff margarita ku manano Soomaali, waaxda luqadaha, qaybta kaalmada adeegyada, anamaria medinadoreen diaz. So [facility] [phone].    ATENCIÓN: Si habla español, tiene a arana disposición servicios gratuitos de asistencia lingüística. Llame al [phone].    We comply with applicable federal civil rights laws and [place] laws. We do not discriminate on the basis of race, color, national origin, age, disability, sex, sexual orientation, or gender identity.            Thank you!     Thank you for choosing [facility]  for your care. Our goal is always to provide you with excellent care. Hearing back from our patients is one way we can continue to improve our services. Please take a few minutes to complete the written survey that you may receive in the mail after your visit with us. Thank you!             Your Updated Medication List - Protect others around you: Learn how to safely use, store and throw away your medicines at www.disposemymeds.org.          This list is accurate as of 4/17/18  8:19 AM.  Always use your most recent med list.                   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

## 2018-04-17 NOTE — LETTER
"4/17/2018      Kushal Valentin MD  [address] [facility] [address]      RE: Nathen Gage       Dear Colleague,    I had the pleasure of seeing Nathen Gage in the [facility].    Service Date: 04/17/2018      HISTORY OF PRESENT ILLNESS:  Mr. Gage is a pleasant 82-year-old gentleman who presents to Cardiology office today to follow up on his blood pressure after he was placed on an increased dose of losartan when he last saw Dr. Taylor at the beginning of March.      His cardiac history includes sick sinus syndrome status post permanent pacemaker implantation in 2011, reportedly with a history of paroxysmal atrial fibrillation based on \"mode switching\" noted on a device check in March 2014.  However, in looking at his most recent device checks, I do not see that recurrent atrial fibrillation has been noted.  He is on coumadin with an INR goal of 2-2.5 due to a h/o a GI bleed.  He has hypertension and a mildly dilated ascending aorta at 3.7 cm.  Echocardiography has also suggested elevated right ventricular systolic pressures consistent with mild pulmonary hypertension which has been stable for at least 5 years.  Again, when he was last in to see Dr. Taylor his blood pressure was somewhat elevated and it was recommended that he increase his losartan to 100 mg daily.  He presents today for followup.      Unfortunately, the patient has not taken his blood pressure medication for the last 2 days.  He is undergoing cataract surgery later today and tells me that he was instructed to not take any of his medications for 2 days.  His blood pressure is again elevated in the office today.  He does not have a way to follow up his blood pressure at home.  He was in to see his primary care provider about a week ago for his pre-op.  His blood pressure was 140/84 at that time.  The patient is overall feeling well and denies any chest discomfort, dyspnea on exertion, orthopnea, PND or " lower extremity edema.  He is tolerating his current medication regimen without difficulty.      CURRENT CARDIAC MEDICATIONS:   1.  Losartan 100 mg daily.   2.  Coumadin as directed.   3.  Atorvastatin 20 mg daily.     4.  He also takes Doxazosin 4 mg daily.      The remainder of his medications, allergies and review of systems were reviewed and as are documented separately.      PHYSICAL EXAMINATION:   GENERAL:  The patient is an 82-year-old male who appears his stated age.  He is in no apparent distress.   VITAL SIGNS:  His blood pressure on recheck is 162/78, pulse 66.  Weight is 173 pounds, which is stable.   LUNGS:  Breathing is nonlabored.  His lungs are clear to auscultation bilaterally.   CARDIAC:  Reveals a regular rate and rhythm, no murmurs appreciated.  No lower extremity edema.   NEUROLOGIC:  Alert and oriented.      ASSESSMENT AND PLAN:  Neal is a pleasant 82-year-old gentleman with a history of sick sinus syndrome, status post permanent pacemaker implantation, history of paroxysmal atrial fibrillation, although I do not see it has been noted on any recent device checks, mild pulmonary hypertension and a mild ascending aortic dilatation by echocardiography and hypertension who has had some elevated blood pressure readings recently.  His blood pressure is elevated today, but again he has not taken his blood pressure medication for the past 2 days.  At this point, I suggested that we set him up for a nurse visit in a couple of weeks to recheck his blood pressure.  If his blood pressure is still greater than 140/90, at that time, I would like to consider adding an additional antihypertensive such as a low dose of beta blocker.      If his blood pressure appears controlled, then we will plan to have him follow up in the Cardiology Clinic with Dr. Zheng this fall.  If his blood pressure is not controlled, of course we will set up a followup in the interim.      Thank you for allowing me to participate in  the care of this pleasant patient.         ELLEN FAGAN PA-C             D: 2018   T: 2018   MT: JOS      Name:     MANUEL DEJESUS   MRN:      [id number]        Account:      [id number]   :      1935           Service Date: 2018      Document: [id number]           Outpatient Encounter Prescriptions as of 2018   Medication Sig Dispense Refill     acetaminophen (TYLENOL) 325 MG tablet Take 2 tablets (650 mg) by mouth every 4 hours as needed for mild pain or fever 100 tablet      doxazosin (CARDURA) 4 MG tablet Take 1 tablet (4 mg) by mouth daily 90 tablet 1     losartan (COZAAR) 100 MG tablet Take 1 tablet (100 mg) by mouth daily 90 tablet 3     pantoprazole (PROTONIX) 40 MG EC tablet Take 1 tablet (40 mg) by mouth daily 90 tablet 2     prednisolon-gatiflox-bromfenac, pt own, no charge, 1-0.5-0.075 % opthalmic solution 1 drop 3 times daily       warfarin (COUMADIN) 2.5 MG tablet Take 1.25mg every Tu / Take 2.5mg all other days OR as instructed by INR clinic 30 tablet 5     [DISCONTINUED] atorvastatin (LIPITOR) 20 MG tablet Take 1 tablet (20 mg) by mouth daily 90 tablet 2     No facility-administered encounter medications on file as of 2018.        Again, thank you for allowing me to participate in the care of your patient.      Sincerely,    Ellen Fagan PA-C     [facility]

## 2018-04-19 DIAGNOSIS — E78.00 PURE HYPERCHOLESTEROLEMIA: ICD-10-CM

## 2018-04-19 DIAGNOSIS — I10 ESSENTIAL HYPERTENSION, BENIGN: ICD-10-CM

## 2018-04-19 NOTE — TELEPHONE ENCOUNTER
Requested Prescriptions   Pending Prescriptions Disp Refills     atorvastatin (LIPITOR) 20 MG tablet 90 tablet 2     Sig: Take 1 tablet (20 mg) by mouth daily    There is no refill protocol information for this order

## 2018-04-20 RX ORDER — ATORVASTATIN CALCIUM 20 MG/1
20 TABLET, FILM COATED ORAL DAILY
Qty: 90 TABLET | Refills: 3 | Status: SHIPPED | OUTPATIENT
Start: 2018-04-20 | End: 2019-02-15

## 2018-04-20 NOTE — TELEPHONE ENCOUNTER
Recent Labs   Lab Test  04/16/18   0848  03/01/18   0909   05/26/15   0727  12/04/14   0804   CHOL  162  155   < >  133  170   HDL  66  63   < >  54  53   LDL  74  69   < >  66  96   TRIG  109  116   < >  63  105   CHOLHDLRATIO   --    --    --   2.5  3.2    < > = values in this interval not displayed.     Lab Results   Component Value Date    AST 18 12/29/2017     Lab Results   Component Value Date    ALT 19 04/16/2018     Prescription approved per [facility] Refill Protocol.  Nataly Becerra, RN, BSN

## 2018-04-24 ENCOUNTER — ALLIED HEALTH/NURSE VISIT (OUTPATIENT)
Dept: CARDIOLOGY | Facility: CLINIC | Age: 83
End: 2018-04-24
Attending: PHYSICIAN ASSISTANT
Payer: COMMERCIAL

## 2018-04-24 ENCOUNTER — TELEPHONE (OUTPATIENT)
Dept: CARDIOLOGY | Facility: CLINIC | Age: 83
End: 2018-04-24

## 2018-04-24 ENCOUNTER — ANTICOAGULATION THERAPY VISIT (OUTPATIENT)
Dept: NURSING | Facility: CLINIC | Age: 83
End: 2018-04-24
Payer: COMMERCIAL

## 2018-04-24 VITALS — SYSTOLIC BLOOD PRESSURE: 128 MMHG | HEART RATE: 70 BPM | DIASTOLIC BLOOD PRESSURE: 62 MMHG

## 2018-04-24 DIAGNOSIS — I10 ESSENTIAL HYPERTENSION WITH GOAL BLOOD PRESSURE LESS THAN 140/90: ICD-10-CM

## 2018-04-24 DIAGNOSIS — I48.0 PAROXYSMAL ATRIAL FIBRILLATION (H): ICD-10-CM

## 2018-04-24 DIAGNOSIS — Z79.01 LONG-TERM (CURRENT) USE OF ANTICOAGULANTS: ICD-10-CM

## 2018-04-24 LAB — INR POINT OF CARE: 1.6 (ref 0.86–1.14)

## 2018-04-24 PROCEDURE — 99207 ZZC NO CHARGE NURSE ONLY: CPT

## 2018-04-24 PROCEDURE — 99207 ZZC NO CHARGE NURSE ONLY: CPT | Performed by: PHYSICIAN ASSISTANT

## 2018-04-24 PROCEDURE — 85610 PROTHROMBIN TIME: CPT | Mod: QW

## 2018-04-24 PROCEDURE — 36416 COLLJ CAPILLARY BLOOD SPEC: CPT

## 2018-04-24 RX ORDER — WARFARIN SODIUM 2.5 MG/1
TABLET ORAL
Qty: 30 TABLET | Refills: 5 | Status: SHIPPED | OUTPATIENT
Start: 2018-04-24 | End: 2018-11-27

## 2018-04-24 NOTE — TELEPHONE ENCOUNTER
[facility] NURSE VISIT    On 4/17/18, Patient's blood pressure was 162/78, pulse was 66. Pt had not taken his meds for two days at this OV.      Patient is here today for a blood pressure check. Patient took Losartan 100 mg at 6:30am. Blood pressure today was taken at 10:15am.     Office blood pressure and pulse  Site: RA  Position: sitting   Cuff size: Adult large   BP: 128/62    Pulse: 70 bpm    2nd BP after sitting for 5 mins: 126/62 HR 70    Tolerance: good.     Visit ordered by: Siobhan Santoro PA-C.    Provider in clinic today:    Patient's cardiologist: Dr.Chapel White RN  [facility]

## 2018-04-24 NOTE — MR AVS SNAPSHOT
Nathen Gage   4/24/2018 9:00 AM   Anticoagulation Therapy Visit    Description:  82 year old male   Provider:  CR ANTICOAGULATION CLINIC   Department:  Cr Nurse           INR as of 4/24/2018     Today's INR 1.6!      Anticoagulation Summary as of 4/24/2018     INR goal 2.0-2.5   Today's INR 1.6!   Full instructions 4/24: 2.5 mg; Otherwise 1.25 mg on Tue, Fri; 2.5 mg all other days   Next INR check 5/8/2018    Indications   Long-term (current) use of anticoagulants [Z79.01] [Z79.01]  Atrial fibrillation (H) [I48.91]         Your next Anticoagulation Clinic appointment(s)     May 08, 2018  2:15 PM CDT   An[facility] with [facility]    [address] [place] [address]   [phone]              Contact Numbers     Clinic Number:         April 2018 Details    Sun Mon Tue Wed Thu Fri Sat     1               2               3               4               5               6               7                 8               9               10               11               12               13               14                 15               16               17               18               19               20               21                 22               23               24      2.5 mg   See details      25      2.5 mg         26      2.5 mg         27      1.25 mg         28      2.5 mg           29      2.5 mg         30      2.5 mg               Date Details   04/24 This INR check               How to take your warfarin dose     To take:  1.25 mg Take 0.5 of a 2.5 mg tablet.    To take:  2.5 mg Take 1 of the 2.5 mg tablets.           May 2018 Details    Sun Mon Tue Wed Thu Fri Sat       1      1.25 mg         2      2.5 mg         3      2.5 mg         4      1.25 mg         5      2.5 mg           6      2.5 mg         7      2.5 mg         8            9               10               11                12                 13               14               15               16               17               18               19                 20               21               22               23               24               25               26                 27               28               29               30               31                  Date Details   No additional details    Date of next INR:  5/8/2018         How to take your warfarin dose     To take:  1.25 mg Take 0.5 of a 2.5 mg tablet.    To take:  2.5 mg Take 1 of the 2.5 mg tablets.

## 2018-04-24 NOTE — PROGRESS NOTES
ANTICOAGULATION FOLLOW-UP CLINIC VISIT    Patient Name:  Nathen Gage  Date:  4/24/2018  Contact Type:  Face to Face    SUBJECTIVE:     Patient Findings     Positives Change in diet/appetite (Ate more greens in the past week, I encouraged consistency and small portion sizes.), Dental/Other procedures (L cataract surgery on 04/17/18), Intentional hold of therapy (Held 04/15 and 04/16/18 for cataract surgery. Pt states he was told he didn't have to hold his Warfarin prior to surgery but then someone else told he had to.)           OBJECTIVE    INR Protime   Date Value Ref Range Status   04/24/2018 1.6 (A) 0.86 - 1.14 Final       ASSESSMENT / PLAN  INR assessment SUB    Recheck INR In: 2 WEEKS    INR Location Clinic      Anticoagulation Summary as of 4/24/2018     INR goal 2.0-2.5   Today's INR 1.6!   Maintenance plan 1.25 mg (2.5 mg x 0.5) on Tue, Fri; 2.5 mg (2.5 mg x 1) all other days   Full instructions 4/24: 2.5 mg; Otherwise 1.25 mg on Tue, Fri; 2.5 mg all other days   Weekly total 15 mg   Plan last modified Fidelina Pizano RN (4/3/2018)   Next INR check 5/8/2018   Target end date Indefinite    Indications   Long-term (current) use of anticoagulants [Z79.01] [Z79.01]  Atrial fibrillation (H) [I48.91]         Anticoagulation Episode Summary     INR check location     Preferred lab     Send INR reminders to [facility] CL[facility]IC    Comments       Anticoagulation Care Providers     Provider Role Specialty Phone number    Kushal Valentin MD [facility] [phone]            See the Encounter Report to view Anticoagulation Flowsheet and Dosing Calendar (Go to Encounters tab in chart review, and find the Anticoagulation Therapy Visit)        Fidelina Pizano RN

## 2018-04-24 NOTE — MR AVS SNAPSHOT
After Visit Summary   4/24/2018    Nathen Gage    MRN: [id number]           Patient Information     Date Of Birth          1935        Visit Information        Provider Department      4/24/2018 10:00 AM RU [facility] HRT NURSE [facility]        Today's Diagnoses     Essential hypertension with goal blood pressure less than 140/90          Care Instructions    See telephone encounter for nurse only BP check. ARTIS White            Follow-ups after your visit        Your next 10 appointments already scheduled     Apr 26, 2018  8:45 AM CDT   Phone Device Check with PATE TECH2   [facility] ([facility])    [address]  [facility] [address] [phone] OPT 2            May 08, 2018  2:15 PM CDT   Anticoagulation Visit with CR ANTICOAGULATION CLINIC   [facility]    [address] [place] [address] [phone]            May 24, 2018  8:45 AM CDT   Pre-Op physical with Kushal Valentin MD   [facility] ([facility])    [address] [place] [address] [phone]            May 29, 2018   Procedure with Mat Echevarria MD   [facility] PeriOP Services (--)    [address] [place] [address], [address]  [facility] [address] [phone]              Who to contact     If you have questions or need follow up information about today's clinic visit or your schedule please contact [facility] directly at [phone].  Normal or non-critical lab and imaging results will be communicated to you by MyChart, letter or phone within 4 business days after the clinic has received the results. If you do not hear from us within 7 days, please contact the clinic through MyChart or phone. If you have a critical or abnormal lab  result, we will notify you by phone as soon as possible.  Submit refill requests through [employer] or call your pharmacy and they will forward the refill request to us. Please allow 3 business days for your refill to be completed.          Additional Information About Your Visit        [employer] Information     [employer] gives you secure access to your electronic health record. If you see a primary care provider, you can also send messages to your care team and make appointments. If you have questions, please call your primary care clinic.  If you do not have a primary care provider, please call [phone] and they will assist you.        Care EveryWhere ID     This is your Care EveryWhere ID. This could be used by other organizations to access your [facility] medical records  [id number]        Your Vitals Were     Pulse                   70            Blood Pressure from Last 3 Encounters:   04/24/18 128/62   04/17/18 162/78   04/12/18 140/84    Weight from Last 3 Encounters:   04/17/18 78.5 kg (173 lb 1.6 oz)   04/12/18 78.7 kg (173 lb 9.6 oz)   03/06/18 79.8 kg (176 lb)              We Performed the Following     Follow-Up with Nurse          Today's Medication Changes          These changes are accurate as of 4/24/18 10:37 AM.  If you have any questions, ask your nurse or doctor.               These medicines have changed or have updated prescriptions.        Dose/Directions    warfarin 2.5 MG tablet   Commonly known as:  COUMADIN   This may have changed:  additional instructions   Used for:  Long-term (current) use of anticoagulants        Take 1.25mg every Tu & Fri / Take 2.5mg all other days OR as instructed by INR clinic   Quantity:  30 tablet   Refills:  5            Where to get your medicines      These medications were sent to [id number] - [place] - [address]  [address], [facility] [address]     Phone:  [phone]     warfarin 2.5 MG tablet                Primary Care  Provider Office Phone # Fax #    Kushal Johann Valentin [phone] [address] [address] [address]        Equal Access to Services     CAROLINEKRIS IBRAHIMA : Harley margarita gordon jason Soifeanyi, waaxda luqadaha, qaybta kaalmada luis fda, anamaria fordminnie diaz. So [facility] [phone].    ATENCIÓN: Si habla español, tiene a arana disposición servicios gratuitos de asistencia lingüística. Llame al [phone].    We comply with applicable federal civil rights laws and [place] laws. We do not discriminate on the basis of race, color, national origin, age, disability, sex, sexual orientation, or gender identity.            Thank you!     Thank you for choosing [facility]  for your care. Our goal is always to provide you with excellent care. Hearing back from our patients is one way we can continue to improve our services. Please take a few minutes to complete the written survey that you may receive in the mail after your visit with us. Thank you!             Your Updated Medication List - Protect others around you: Learn how to safely use, store and throw away your medicines at www.disposemymeds.org.          This list is accurate as of 4/24/18 10:37 AM.  Always use your most recent med list.                   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

## 2018-04-27 ENCOUNTER — ALLIED HEALTH/NURSE VISIT (OUTPATIENT)
Dept: CARDIOLOGY | Facility: CLINIC | Age: 83
End: 2018-04-27
Payer: COMMERCIAL

## 2018-04-27 DIAGNOSIS — Z95.0 CARDIAC PACEMAKER IN SITU: Primary | ICD-10-CM

## 2018-04-27 PROCEDURE — 93293 PM PHONE R-STRIP DEVICE EVAL: CPT | Performed by: INTERNAL MEDICINE

## 2018-04-27 NOTE — PROGRESS NOTES
~ 90 day phone teletrace ~  Intrinsic rhythm at time of check. Magnet response WNL. F/U phone teletrace q 3 months. Order in for follow up with Dr. Rodriguez to be scheduled in September. Theodore BANERJEE

## 2018-04-27 NOTE — MR AVS SNAPSHOT
After Visit Summary   4/27/2018    Nathen Gage    MRN: [id number]           Patient Information     Date Of Birth          1935        Visit Information        Provider Department      4/27/2018 3:45 PM PATE TECH1 [facility]        Today's Diagnoses     Cardiac pacemaker in situ    -  1       Follow-ups after your visit        Your next 10 appointments already scheduled     May 08, 2018  2:15 PM CDT   Anticoagulation Visit with CR ANTICOAGULATION CLINIC   [facility] ([facility])    [address] [place] [address] [phone]            May 24, 2018  8:45 AM CDT   Pre-Op physical with Kushal Valentin MD   [facility] ([facility])    [address] [address] [phone]            May 29, 2018   Procedure with Mat Echevarria MD   [facility] PeriOP [facility] (--)    [address] [place], [place] [address], [address]  [facility] [address] [phone]            Jul 31, 2018  9:30 AM CDT   Phone Device Check with HERLINDA KEITH   [facility] ([facility])    [address]  [facility] [address] [phone] OPT 2              Who to contact     If you have questions or need follow up information about today's clinic visit or your schedule please contact [facility] directly at [phone].  Normal or non-critical lab and imaging results will be communicated to you by MyChart, letter or phone within 4 business days after the clinic has received the results. If you do not hear from us within 7 days, please contact the clinic through MyChart or phone. If you have a critical or abnormal lab result, we will notify you by phone as soon as possible.  Submit refill requests through [employer] or call your pharmacy and they will forward the refill  request to us. Please allow 3 business days for your refill to be completed.          Additional Information About Your Visit        MyChart Information     [employer] gives you secure access to your electronic health record. If you see a primary care provider, you can also send messages to your care team and make appointments. If you have questions, please call your primary care clinic.  If you do not have a primary care provider, please call [phone] and they will assist you.        Care EveryWhere ID     This is your Care EveryWhere ID. This could be used by other organizations to access your [facility] medical records  [id number]         Blood Pressure from Last 3 Encounters:   04/24/18 128/62   04/17/18 162/78   04/12/18 140/84    Weight from Last 3 Encounters:   04/17/18 78.5 kg (173 lb 1.6 oz)   04/12/18 78.7 kg (173 lb 9.6 oz)   03/06/18 79.8 kg (176 lb)              We Performed the Following     PM PHONE R STRIP EVAL UP TO 90 DAYS ([id number])        Primary Care Provider Office Phone # Fax #    Kushal Johann Valentin [phone] [address] [address] [facility] [address]        Equal Access to Services     MICHELE ALEXANDRA : Hadii aad ku hadasho Soomaali, waaxda luqadaha, qaybta kaalmada adeegyada, anamaria deann jd diaz. So [facility] [phone].    ATENCIÓN: Si habla español, tiene a arana disposición servicios gratuitos de asistencia lingüística. Llame al [phone].    We comply with applicable federal civil rights laws and [place] laws. We do not discriminate on the basis of race, color, national origin, age, disability, sex, sexual orientation, or gender identity.            Thank you!     Thank you for choosing [facility]  for your care. Our goal is always to provide you with excellent care. Hearing back from our patients is one way we can continue to improve our services. Please take a few minutes to complete the written survey that  you may receive in the mail after your visit with us. Thank you!             Your Updated Medication List - Protect others around you: Learn how to safely use, store and throw away your medicines at www.disposemymeds.org.          This list is accurate as of 4/27/18  3:58 PM.  Always use your most recent med list.                   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

## 2018-05-08 ENCOUNTER — ANTICOAGULATION THERAPY VISIT (OUTPATIENT)
Dept: NURSING | Facility: CLINIC | Age: 83
End: 2018-05-08
Payer: COMMERCIAL

## 2018-05-08 DIAGNOSIS — Z79.01 LONG-TERM (CURRENT) USE OF ANTICOAGULANTS: ICD-10-CM

## 2018-05-08 DIAGNOSIS — I48.0 PAROXYSMAL ATRIAL FIBRILLATION (H): ICD-10-CM

## 2018-05-08 LAB — INR POINT OF CARE: 2.1 (ref 0.86–1.14)

## 2018-05-08 PROCEDURE — 85610 PROTHROMBIN TIME: CPT | Mod: QW

## 2018-05-08 PROCEDURE — 99207 ZZC NO CHARGE NURSE ONLY: CPT

## 2018-05-08 PROCEDURE — 36416 COLLJ CAPILLARY BLOOD SPEC: CPT

## 2018-05-08 NOTE — MR AVS SNAPSHOT
Nathen JANICE Gage   5/8/2018 2:15 PM   Anticoagulation Therapy Visit    Description:  82 year old male   Provider:   ANTICOAGULATION CLINIC   Department:  Cr Nurse           INR as of 5/8/2018     Today's INR 2.1      Anticoagulation Summary as of 5/8/2018     INR goal 2.0-2.5   Today's INR 2.1   Full instructions 1.25 mg on Tue, Fri; 2.5 mg all other days   Next INR check 5/24/2018    Indications   Long-term (current) use of anticoagulants [Z79.01] [Z79.01]  Atrial fibrillation (H) [I48.91]         Your next Anticoagulation Clinic appointment(s)     May 08, 2018  2:15 PM CDT   Anticoagulation Visit with CR ANTICOAGULATION CLINIC   [facility] ([facility]    [address] [address]   [phone]            May 24, 2018 11:00 AM CDT   Anticoagulation Visit with  ANTICOAGULATION CLINIC   [facility] ([facility]    [address] [address]   [phone]              Contact Numbers     Clinic Number:         May 2018 Details    Sun Mon Tue Wed Thu Fri Sat       1               2               3               4               5                 6               7               8      1.25 mg   See details      9      2.5 mg         10      2.5 mg         11      1.25 mg         12      2.5 mg           13      2.5 mg         14      2.5 mg         15      1.25 mg         16      2.5 mg         17      2.5 mg         18      1.25 mg         19      2.5 mg           20      2.5 mg         21      2.5 mg         22      1.25 mg         23      2.5 mg         24            25               26                 27               28               29               30               31                  Date Details   05/08 This INR check       Date of next INR:  5/24/2018         How to take your warfarin dose     To take:  1.25 mg Take 0.5 of a 2.5 mg tablet.    To take:  2.5 mg Take 1 of the  2.5 mg tablets.

## 2018-05-08 NOTE — PROGRESS NOTES
ANTICOAGULATION FOLLOW-UP CLINIC VISIT    Patient Name:  Nathen Gage  Date:  5/8/2018  Contact Type:  Face to Face    SUBJECTIVE:     Patient Findings     Positives No Problem Findings           OBJECTIVE    INR Protime   Date Value Ref Range Status   05/08/2018 2.1 (A) 0.86 - 1.14 Final       ASSESSMENT / PLAN  INR assessment THER    Recheck INR In: 3 WEEKS    INR Location Clinic      Anticoagulation Summary as of 5/8/2018     INR goal 2.0-2.5   Today's INR 2.1   Maintenance plan 1.25 mg (2.5 mg x 0.5) on Tue, Fri; 2.5 mg (2.5 mg x 1) all other days   Full instructions 1.25 mg on Tue, Fri; 2.5 mg all other days   Weekly total 15 mg   No change documented Fidelina Pizano RN   Plan last modified Fidelina Pizano RN (4/3/2018)   Next INR check 5/24/2018   Target end date Indefinite    Indications   Long-term (current) use of anticoagulants [Z79.01] [Z79.01]  Atrial fibrillation (H) [I48.91]         Anticoagulation Episode Summary     INR check location     Preferred lab     Send INR reminders to [facility] CLINIC    Comments       Anticoagulation Care Providers     Provider Role Specialty Phone number    Kushal Valentin MD [facility] Practice [phone]            See the Encounter Report to view Anticoagulation Flowsheet and Dosing Calendar (Go to Encounters tab in chart review, and find the Anticoagulation Therapy Visit)        Fidelina Pizano, ARTIS

## 2018-05-11 DIAGNOSIS — K21.00 GASTROESOPHAGEAL REFLUX DISEASE WITH ESOPHAGITIS: ICD-10-CM

## 2018-05-11 NOTE — TELEPHONE ENCOUNTER
"Requested Prescriptions   Pending Prescriptions Disp Refills     pantoprazole (PROTONIX) 40 MG EC tablet    Last Written Prescription Date:  6/13/17  Last Fill Quantity: 90 tablet,  # refills: 2   Last office visit: 4/12/2018 with prescribing provider:  Barbie 4/12/18   Future Office Visit:   Next 5 appointments (look out 90 days)     May 24, 2018 11:15 AM CDT   Pre-Op physical with Kushal Valentin MD   [facility]    [address] [place] [address] [phone]                  90 tablet 2     Sig: Take 1 tablet (40 mg) by mouth daily    PPI Protocol Passed    5/11/2018  2:59 PM       Passed - Not on Clopidogrel (unless Pantoprazole ordered)       Passed - No diagnosis of osteoporosis on record       Passed - Recent (12 mo) or future (30 days) visit within the authorizing provider's specialty    Patient had office visit in the last 12 months or has a visit in the next 30 days with authorizing provider or within the authorizing provider's specialty.  See \"Patient Info\" tab in inbasket, or \"Choose Columns\" in Meds & Orders section of the refill encounter.           Passed - Patient is age 18 or older          "

## 2018-05-14 RX ORDER — PANTOPRAZOLE SODIUM 40 MG/1
40 TABLET, DELAYED RELEASE ORAL DAILY
Qty: 90 TABLET | Refills: 0 | Status: SHIPPED | OUTPATIENT
Start: 2018-05-14 | End: 2018-08-14

## 2018-05-14 NOTE — TELEPHONE ENCOUNTER
Due for annual med check for gerd  Prescription approved per [facility] Refill Protocol.  Nataly Becerra RN, BSN

## 2018-05-24 ENCOUNTER — ANTICOAGULATION THERAPY VISIT (OUTPATIENT)
Dept: NURSING | Facility: CLINIC | Age: 83
End: 2018-05-24
Payer: COMMERCIAL

## 2018-05-24 ENCOUNTER — OFFICE VISIT (OUTPATIENT)
Dept: FAMILY MEDICINE | Facility: CLINIC | Age: 83
End: 2018-05-24
Payer: COMMERCIAL

## 2018-05-24 VITALS
BODY MASS INDEX: 26.22 KG/M2 | RESPIRATION RATE: 14 BRPM | HEART RATE: 69 BPM | SYSTOLIC BLOOD PRESSURE: 148 MMHG | TEMPERATURE: 97.9 F | WEIGHT: 169.9 LBS | DIASTOLIC BLOOD PRESSURE: 75 MMHG | OXYGEN SATURATION: 98 %

## 2018-05-24 DIAGNOSIS — Z79.01 LONG-TERM (CURRENT) USE OF ANTICOAGULANTS: ICD-10-CM

## 2018-05-24 DIAGNOSIS — Z01.818 PREOP GENERAL PHYSICAL EXAM: Primary | ICD-10-CM

## 2018-05-24 DIAGNOSIS — I48.0 PAROXYSMAL ATRIAL FIBRILLATION (H): ICD-10-CM

## 2018-05-24 LAB — INR POINT OF CARE: 1.6 (ref 0.86–1.14)

## 2018-05-24 PROCEDURE — 85610 PROTHROMBIN TIME: CPT | Mod: QW

## 2018-05-24 PROCEDURE — 36416 COLLJ CAPILLARY BLOOD SPEC: CPT

## 2018-05-24 PROCEDURE — 99207 ZZC NO CHARGE NURSE ONLY: CPT

## 2018-05-24 PROCEDURE — 99214 OFFICE O/P EST MOD 30 MIN: CPT | Performed by: FAMILY MEDICINE

## 2018-05-24 NOTE — PROGRESS NOTES
[facility]  [address] [address]  [phone]  Dept: [phone]    PRE-OP EVALUATION:  Today's date: 2018    Nathen Gage (: 1935) presents for pre-operative evaluation assessment as requested by Dr. Echevarria.  He requires evaluation and anesthesia risk assessment prior to undergoing surgery/procedure for treatment of the right eye .    Fax number for surgical facility: [facility]  Primary Physician: Kushal Valentin  Type of Anesthesia Anticipated: Choice    Patient has a Health Care Directive or Living Will:  YES both    Preop Questions 2018   1.  Do you have a history of Heart attack, stroke, stent, coronary bypass surgery, or other heart surgery? YES  - stable   2.  Do you ever have any pain or discomfort in your chest? None current    3.  Do you have a history of  Heart Failure? no   4.   Are you troubled by shortness of breath when:  walking on a level surface, or up a slight hill, or at night? Cardiac pacemaker   5.  Do you currently have a cold, bronchitis or other respiratory infection? No   6.  Do you have a cough, shortness of breath, or wheezing? No   7.  Do you sometimes get pains in the calves of your legs when you walk? No   8. Do you or anyone in your family have previous history of blood clots? no   9.  Do you or does anyone in your family have a serious bleeding problem such as prolonged bleeding following surgeries or cuts? No   10. Have you ever had problems with anemia or been told to take iron pills? No   11. Have you had any abnormal blood loss such as black, tarry or bloody stools? No   12. Have you ever had a blood transfusion? No   13. Have you or any of your relatives ever had problems with anesthesia? UNKNOWN -    14. Do you have sleep apnea, excessive snoring or daytime drowsiness? UNKNOWN -    15. Do you have any prosthetic heart valves? No   16. Do you have prosthetic joints? No         HPI:     HPI  related to upcoming procedure: cataract surgery      A-FIB - Patient has a longstanding history of chronic A-fib currently on anticoagulent control. Current treatment regimen includes Warfarin for stroke prevention and denies significant symptoms of lightheadedness, palpitations or dyspnea.                                                                                                                                                                             .    MEDICAL HISTORY:     Patient Active Problem List    Diagnosis Date Noted     Chest pain 12/24/2017     Priority: Medium     Acute idiopathic gout of left knee 04/27/2016     Priority: Medium     Presbycusis, unspecified laterality 04/27/2016     Priority: Medium     Long-term (current) use of anticoagulants [Z79.01] 04/14/2016     Priority: Medium     Faintness 05/12/2015     Priority: Medium     Dilated aortic root (H)      Priority: Medium     Pulmonary hypertension      Priority: Medium     H/O: GI bleed      Priority: Medium     2010 in [place]       Mixed hyperlipidemia      Priority: Medium     Diagnosis updated by automated process. Provider to review and confirm.       Atrial fibrillation (H) 06/11/2014     Priority: Medium     Health Care Home 03/15/2013     Priority: Medium     Sylwia Gonzalez RN-BSN, [facility]  [phone].  FPA / [facility]      DX V65.8 REPLACED WITH [id number] HEALTH CARE HOME (04/08/2013)       Pacemaker, artificial 02/08/2012     Priority: Medium     Advanced directives, counseling/discussion 11/08/2011     Priority: Medium     Advance Directive Problem List Overview:   Name Relationship Phone    Primary Health Care Agent            Alternative Health Care Agent          Discussed advance care planning with patient; information given to patient to review. 11/8/2011            Sick sinus syndrome (H) 10/19/2011     Priority: Medium     S/p dual chamber pacemaker 2011       Syncope 10/19/2011     Priority:  Medium     Hyponatremia 10/19/2011     Priority: Medium     Community acquired pneumonia 10/19/2011     Priority: Medium     Actinic keratosis 08/02/2005     Priority: Medium     Gout 08/01/2005     Priority: Medium     Problem list name updated by automated process. 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 Provider to review       Esophageal reflux 11/04/2003     Priority: Medium      Past Medical History:   Diagnosis Date     Actinic keratosis      Acute idiopathic gout of left knee 4/27/2016     Atrial fibrillation (H)     on Eliquis     Dilated aortic root (H)      Esophageal reflux      Esophagitis      H/O: GI bleed 2010     Hyperlipidaemia      Hyperlipidemia LDL goal <100      Impotence of organic origin      Presbycusis, unspecified laterality 4/27/2016     Pulmonary hypertension      Sick sinus syndrome (H)     pacemaker implanted 2011     Unspecified essential hypertension      Past Surgical History:   Procedure Laterality Date     IMPLANT PACEMAKER  2011    Pacemaker/cardiac insitu / [employer] Dual chamber, V45     Current Outpatient Prescriptions   Medication Sig Dispense Refill     acetaminophen (TYLENOL) 325 MG tablet Take 2 tablets (650 mg) by mouth every 4 hours as needed for mild pain or fever 100 tablet      atorvastatin (LIPITOR) 20 MG tablet Take 1 tablet (20 mg) by mouth daily 90 tablet 3     doxazosin (CARDURA) 4 MG tablet Take 1 tablet (4 mg) by mouth daily 90 tablet 1     losartan (COZAAR) 100 MG tablet Take 1 tablet (100 mg) by mouth daily 90 tablet 3     pantoprazole (PROTONIX) 40 MG EC tablet Take 1 tablet (40 mg) by mouth daily 90 tablet 0     prednisolon-gatiflox-bromfenac, pt own, no charge, 1-0.5-0.075 % opthalmic solution 1 drop 3 times daily       warfarin  (COUMADIN) 2.5 MG tablet Take 1.25mg every Tu & Fri / Take 2.5mg all other days OR as instructed by INR clinic 30 tablet 5     OTC products: None, except as noted above    Allergies   Allergen Reactions     Neomycin Sulfate       Latex Allergy: NO    Social History   Substance Use Topics     Smoking status: Former Smoker     Quit date: 1/1/1975     Smokeless tobacco: Never Used     Alcohol use Yes      Comment: 1- 2 BEERS WEEKLY     History   Drug Use No       REVIEW OF SYSTEMS:   Constitutional, neuro, ENT, endocrine, pulmonary, cardiac, gastrointestinal, genitourinary, musculoskeletal, integument and psychiatric systems are negative, except as otherwise noted.    EXAM:   There were no vitals taken for this visit.    GENERAL APPEARANCE: healthy, alert and no distress     EYES: EOMI,  PERRL     HENT: ear canals and TM's normal and nose and mouth without ulcers or lesions     NECK: no adenopathy, no asymmetry, masses, or scars and thyroid normal to palpation     RESP: lungs clear to auscultation - no rales, rhonchi or wheezes     CV: regular rates and rhythm, normal S1 S2, no S3 or S4 and no murmur, click or rub     ABDOMEN:  soft, nontender, no HSM or masses and bowel sounds normal     MS: extremities normal- no gross deformities noted, no evidence of inflammation in joints, FROM in all extremities.     SKIN: no suspicious lesions or rashes     NEURO: Normal strength and tone, sensory exam grossly normal, mentation intact and speech normal     PSYCH: mentation appears normal. and affect normal/bright     LYMPHATICS: No cervical adenopathy    DIAGNOSTICS:   No labs or EKG required for low risk surgery (cataract, skin procedure, breast biopsy, etc)    Recent Labs   Lab Test 05/24/18 05/08/18 04/16/18   0848   03/01/18   0909   12/29/17   1033   12/25/17   0534   HGB   --    --    --    --    --    --    --   13.3   --   11.7*   PLT   --    --    --    --    --    --    --   220   --   158   INR  1.6*  2.1*   < >    --    < >   --    < >   --    < >  3.02*   NA   --    --    --   134   --   134   --   136   --   135   POTASSIUM   --    --    --   4.1   --   4.6   --   4.8   --   4.3   CR   --    --    --   1.04   --   0.97   --   0.92   --   0.84    < > = values in this interval not displayed.        IMPRESSION:   Reason for surgery/procedure: right eye cataract  Diagnosis/reason for consult: preop     The proposed surgical procedure is considered LOW risk.    REVISED CARDIAC RISK INDEX  The patient has the following serious cardiovascular risks for perioperative complications such as (MI, PE, VFib and 3  AV Block):  No serious cardiac risks  INTERPRETATION: 1 risks: Class II (low risk - 0.9% complication rate)    The patient has the following additional risks for perioperative complications:  No identified additional risks      ICD-10-CM    1. Preop general physical exam Z01.818        RECOMMENDATIONS:     (Z01.818) Preop general physical exam  (primary encounter diagnosis)  Comment:   Plan: fit for surgery and MAC anaesthesia     Signed Electronically by: Kushal Valentin MD    Copy of this evaluation report is provided to requesting physician.    [place] Preop Guidelines    Revised Cardiac Risk Index

## 2018-05-24 NOTE — MR AVS SNAPSHOT
After Visit Summary   5/24/2018    Nathen Gage    MRN: [id number]           Patient Information     Date Of Birth          1935        Visit Information        Provider Department      5/24/2018 11:15 AM Kushal Valentin MD [facility]        Today's Diagnoses     Preop general physical exam    -  1      Care Instructions      Before Your Surgery      Call your surgeon if there is any change in your health. This includes signs of a cold or flu (such as a sore throat, runny nose, cough, rash or fever).    Do not smoke, drink alcohol or take over the counter medicine (unless your surgeon or primary care doctor tells you to) for the 24 hours before and after surgery.    If you take prescribed drugs: Follow your doctor s orders about which medicines to take and which to stop until after surgery.    Eating and drinking prior to surgery: follow the instructions from your surgeon    Take a shower or bath the night before surgery. Use the soap your surgeon gave you to gently clean your skin. If you do not have soap from your surgeon, use your regular soap. Do not shave or scrub the surgery site.  Wear clean pajamas and have clean sheets on your bed.           Follow-ups after your visit        Your next 10 appointments already scheduled     May 29, 2018   Procedure with Mat Echevarria MD   [facility] (--)    [address]  [facility] [address]   [phone]            Jun 05, 2018  9:00 AM CDT   Anticoagulation Visit with [facility] CL[facility]IC   [facility]    [address] [address] [id number] [phone]            Jul 31, 2018  9:30 AM CDT   Phone Device Check with PATE TECH1   [facility] ([facility])    [address]  [facility] [address] [phone] OPT 2              Who to contact      If you have questions or need follow up information about today's clinic visit or your schedule please contact [facility] directly at [phone].  Normal or non-critical lab and imaging results will be communicated to you by MyChart, letter or phone within 4 business days after the clinic has received the results. If you do not hear from us within 7 days, please contact the clinic through MyChart or phone. If you have a critical or abnormal lab result, we will notify you by phone as soon as possible.  Submit refill requests through [employer] or call your pharmacy and they will forward the refill request to us. Please allow 3 business days for your refill to be completed.          Additional Information About Your Visit        [facility] Information     [employer] gives you secure access to your electronic health record. If you see a primary care provider, you can also send messages to your care team and make appointments. If you have questions, please call your primary care clinic.  If you do not have a primary care provider, please call [phone] and they will assist you.        Care EveryWhere ID     This is your Care EveryWhere ID. This could be used by other organizations to access your [place] medical records  [id number]        Your Vitals Were     Pulse Temperature Respirations Pulse Oximetry BMI (Body Mass Index)       69 97.9  F (36.6  C) (Oral) 14 98% 26.22 kg/m2        Blood Pressure from Last 3 Encounters:   05/24/18 148/75   04/24/18 128/62   04/17/18 162/78    Weight from Last 3 Encounters:   05/24/18 169 lb 14.4 oz (77.1 kg)   04/17/18 173 lb 1.6 oz (78.5 kg)   04/12/18 173 lb 9.6 oz (78.7 kg)              Today, you had the following     No orders found for display       Primary Care Provider Office Phone # Fax #    Kushal Valentin [phone] [address]  [facility] [address]        Equal Access to Services     MICHELE ALEXANDRA AH: Harley gomez  Rayo, wayusrada luqadaha, qaybta kaalellen christine, anamaria chloein hayaan imersavanna ummlo laLoretadoreen joe. So [facility] [phone].    ATENCIÓN: Si courtney montanez, tiene a arana disposición servicios gratuitos de asistencia lingüística. Terrence al [phone].    We comply with applicable federal civil rights laws and [place] laws. We do not discriminate on the basis of race, color, national origin, age, disability, sex, sexual orientation, or gender identity.            Thank you!     Thank you for choosing [facility]  for your care. Our goal is always to provide you with excellent care. Hearing back from our patients is one way we can continue to improve our services. Please take a few minutes to complete the written survey that you may receive in the mail after your visit with us. Thank you!             Your Updated Medication List - Protect others around you: Learn how to safely use, store and throw away your medicines at www.disposemymeds.org.          This list is accurate as of 5/24/18 11:56 AM.  Always use your most recent med list.                   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

## 2018-05-24 NOTE — PROGRESS NOTES
ANTICOAGULATION FOLLOW-UP CLINIC VISIT    Patient Name:  Nathen Gage  Date:  5/24/2018  Contact Type:  Face to Face    SUBJECTIVE:     Patient Findings     Positives Dental/Other procedures (05/29-R eye cataract surgery), Unexplained INR or factor level change           OBJECTIVE    INR Protime   Date Value Ref Range Status   05/24/2018 1.6 (A) 0.86 - 1.14 Final       ASSESSMENT / PLAN  INR assessment SUB    Recheck INR In: 2 WEEKS    INR Location Clinic      Anticoagulation Summary as of 5/24/2018     INR goal 2.0-2.5   Today's INR 1.6!   Warfarin maintenance plan 1.25 mg (2.5 mg x 0.5) on Tue, Fri; 2.5 mg (2.5 mg x 1) all other days   Full warfarin instructions 5/24: 3.75 mg; Otherwise 1.25 mg on Tue, Fri; 2.5 mg all other days   Weekly warfarin total 15 mg   Plan last modified Fidelina Pizano RN (4/3/2018)   Next INR check 6/5/2018   Target end date Indefinite    Indications   Long-term (current) use of anticoagulants [Z79.01] [Z79.01]  Atrial fibrillation (H) [I48.91]         Anticoagulation Episode Summary     INR check location     Preferred lab     Send INR reminders to [facility]    Comments       Anticoagulation Care Providers     Provider Role Specialty Phone number    Kushal Valentin MD [facility] [phone]            See the Encounter Report to view Anticoagulation Flowsheet and Dosing Calendar (Go to Encounters tab in chart review, and find the Anticoagulation Therapy Visit)        Fidelina Pizano RN

## 2018-05-24 NOTE — MR AVS SNAPSHOT
Nathen JANICE Gage   5/24/2018 11:00 AM   Anticoagulation Therapy Visit    Description:  82 year old male   Provider:  [facility]   Department:  Cr Nurse           INR as of 5/24/2018     Today's INR 1.6!      Anticoagulation Summary as of 5/24/2018     INR goal 2.0-2.5   Today's INR 1.6!   Full warfarin instructions 5/24: 3.75 mg; Otherwise 1.25 mg on Tue, Fri; 2.5 mg all other days   Next INR check 6/5/2018    Indications   Long-term (current) use of anticoagulants [Z79.01] [Z79.01]  Atrial fibrillation (H) [I48.91]         Your next Anticoagulation Clinic appointment(s)     Jun 05, 2018  9:00 AM CDT   Anticoagulation Visit with  ANTICOAGULATION CLINIC   [facility] ([facility])    [address] [place] [address]   [phone]              Contact Numbers     Clinic Number:         May 2018 Details    Sun Mon Tue Wed Thu Fri Sat       1               2               3               4               5                 6               7               8               9               10               11               12                 13               14               15               16               17               18               19                 20               21               22               23               24      3.75 mg   See details      25      1.25 mg         26      2.5 mg           27      2.5 mg         28      2.5 mg         29      1.25 mg         30      2.5 mg         31      2.5 mg            Date Details   05/24 This INR check               How to take your warfarin dose     To take:  1.25 mg Take 0.5 of a 2.5 mg tablet.    To take:  2.5 mg Take 1 of the 2.5 mg tablets.    To take:  3.75 mg Take 1.5 of the 2.5 mg tablets.           June 2018 Details    Sun Mon Tue Wed Thu Fri Sat          1      1.25 mg         2      2.5 mg           3      2.5 mg         4      2.5 mg         5            6               7                8               9                 10               11               12               13               14               15               16                 17               18               19               20               21               22               23                 24               25               26               27               28               29               30                Date Details   No additional details    Date of next INR:  6/5/2018         How to take your warfarin dose     To take:  1.25 mg Take 0.5 of a 2.5 mg tablet.    To take:  2.5 mg Take 1 of the 2.5 mg tablets.

## 2018-05-25 NOTE — H&P (VIEW-ONLY)
[facility]  [address] [facility] [address]  [phone]  Dept: [phone]    PRE-OP EVALUATION:  Today's date: 2018    Nathen Gage (: 1935) presents for pre-operative evaluation assessment as requested by Dr. Echevarria.  He requires evaluation and anesthesia risk assessment prior to undergoing surgery/procedure for treatment of the right eye .    Fax number for surgical facility: [facility]  Primary Physician: Kushal Valentin  Type of Anesthesia Anticipated: Choice    Patient has a Health Care Directive or Living Will:  YES both    Preop Questions 2018   1.  Do you have a history of Heart attack, stroke, stent, coronary bypass surgery, or other heart surgery? YES  - stable   2.  Do you ever have any pain or discomfort in your chest? None current    3.  Do you have a history of  Heart Failure? no   4.   Are you troubled by shortness of breath when:  walking on a level surface, or up a slight hill, or at night? Cardiac pacemaker   5.  Do you currently have a cold, bronchitis or other respiratory infection? No   6.  Do you have a cough, shortness of breath, or wheezing? No   7.  Do you sometimes get pains in the calves of your legs when you walk? No   8. Do you or anyone in your family have previous history of blood clots? no   9.  Do you or does anyone in your family have a serious bleeding problem such as prolonged bleeding following surgeries or cuts? No   10. Have you ever had problems with anemia or been told to take iron pills? No   11. Have you had any abnormal blood loss such as black, tarry or bloody stools? No   12. Have you ever had a blood transfusion? No   13. Have you or any of your relatives ever had problems with anesthesia? UNKNOWN -    14. Do you have sleep apnea, excessive snoring or daytime drowsiness? UNKNOWN -    15. Do you have any prosthetic heart valves? No   16. Do you have prosthetic joints? No         HPI:     HPI  related to upcoming procedure: cataract surgery      A-FIB - Patient has a longstanding history of chronic A-fib currently on anticoagulent control. Current treatment regimen includes Warfarin for stroke prevention and denies significant symptoms of lightheadedness, palpitations or dyspnea.                                                                                                                                                                             .    MEDICAL HISTORY:     Patient Active Problem List    Diagnosis Date Noted     Chest pain 12/24/2017     Priority: Medium     Acute idiopathic gout of left knee 04/27/2016     Priority: Medium     Presbycusis, unspecified laterality 04/27/2016     Priority: Medium     Long-term (current) use of anticoagulants [Z79.01] 04/14/2016     Priority: Medium     Faintness 05/12/2015     Priority: Medium     Dilated aortic root (H)      Priority: Medium     Pulmonary hypertension      Priority: Medium     H/O: GI bleed      Priority: Medium     2010 in [place]       Mixed hyperlipidemia      Priority: Medium     Diagnosis updated by automated process. Provider to review and confirm.       Atrial fibrillation (H) 06/11/2014     Priority: Medium     Health Care Home 03/15/2013     Priority: Medium     Sylwia Gonzalez RN-BSN, [facility]  [phone].  FPA / G [facility]      DX V65.8 REPLACED WITH [id number] HEALTH CARE HOME (04/08/2013)       Pacemaker, artificial 02/08/2012     Priority: Medium     Advanced directives, counseling/discussion 11/08/2011     Priority: Medium     Advance Directive Problem List Overview:   Name Relationship Phone    Primary Health Care Agent            Alternative Health Care Agent          Discussed advance care planning with patient; information given to patient to review. 11/8/2011            Sick sinus syndrome (H) 10/19/2011     Priority: Medium     S/p dual chamber pacemaker 2011       Syncope 10/19/2011     Priority:  Medium     Hyponatremia 10/19/2011     Priority: Medium     Community acquired pneumonia 10/19/2011     Priority: Medium     Actinic keratosis 08/02/2005     Priority: Medium     Gout 08/01/2005     Priority: Medium     Problem list name updated by automated process. 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 Provider to review       Esophageal reflux 11/04/2003     Priority: Medium      Past Medical History:   Diagnosis Date     Actinic keratosis      Acute idiopathic gout of left knee 4/27/2016     Atrial fibrillation (H)     on Eliquis     Dilated aortic root (H)      Esophageal reflux      Esophagitis      H/O: GI bleed 2010     Hyperlipidaemia      Hyperlipidemia LDL goal <100      Impotence of organic origin      Presbycusis, unspecified laterality 4/27/2016     Pulmonary hypertension      Sick sinus syndrome (H)     pacemaker implanted 2011     Unspecified essential hypertension      Past Surgical History:   Procedure Laterality Date     IMPLANT PACEMAKER  2011    Pacemaker/cardiac insitu / [employer] Dual chamber, V45     Current Outpatient Prescriptions   Medication Sig Dispense Refill     acetaminophen (TYLENOL) 325 MG tablet Take 2 tablets (650 mg) by mouth every 4 hours as needed for mild pain or fever 100 tablet      atorvastatin (LIPITOR) 20 MG tablet Take 1 tablet (20 mg) by mouth daily 90 tablet 3     doxazosin (CARDURA) 4 MG tablet Take 1 tablet (4 mg) by mouth daily 90 tablet 1     losartan (COZAAR) 100 MG tablet Take 1 tablet (100 mg) by mouth daily 90 tablet 3     pantoprazole (PROTONIX) 40 MG EC tablet Take 1 tablet (40 mg) by mouth daily 90 tablet 0     prednisolon-gatiflox-bromfenac, pt own, no charge, 1-0.5-0.075 % opthalmic solution 1 drop 3 times daily       warfarin  (COUMADIN) 2.5 MG tablet Take 1.25mg every Tu & Fri / Take 2.5mg all other days OR as instructed by INR clinic 30 tablet 5     OTC products: None, except as noted above    Allergies   Allergen Reactions     Neomycin Sulfate       Latex Allergy: NO    Social History   Substance Use Topics     Smoking status: Former Smoker     Quit date: 1/1/1975     Smokeless tobacco: Never Used     Alcohol use Yes      Comment: 1- 2 BEERS WEEKLY     History   Drug Use No       REVIEW OF SYSTEMS:   Constitutional, neuro, ENT, endocrine, pulmonary, cardiac, gastrointestinal, genitourinary, musculoskeletal, integument and psychiatric systems are negative, except as otherwise noted.    EXAM:   There were no vitals taken for this visit.    GENERAL APPEARANCE: healthy, alert and no distress     EYES: EOMI,  PERRL     HENT: ear canals and TM's normal and nose and mouth without ulcers or lesions     NECK: no adenopathy, no asymmetry, masses, or scars and thyroid normal to palpation     RESP: lungs clear to auscultation - no rales, rhonchi or wheezes     CV: regular rates and rhythm, normal S1 S2, no S3 or S4 and no murmur, click or rub     ABDOMEN:  soft, nontender, no HSM or masses and bowel sounds normal     MS: extremities normal- no gross deformities noted, no evidence of inflammation in joints, FROM in all extremities.     SKIN: no suspicious lesions or rashes     NEURO: Normal strength and tone, sensory exam grossly normal, mentation intact and speech normal     PSYCH: mentation appears normal. and affect normal/bright     LYMPHATICS: No cervical adenopathy    DIAGNOSTICS:   No labs or EKG required for low risk surgery (cataract, skin procedure, breast biopsy, etc)    Recent Labs   Lab Test 05/24/18 05/08/18 04/16/18   0848   03/01/18   0909   12/29/17   1033   12/25/17   0534   HGB   --    --    --    --    --    --    --   13.3   --   11.7*   PLT   --    --    --    --    --    --    --   220   --   158   INR  1.6*  2.1*   < >    --    < >   --    < >   --    < >  3.02*   NA   --    --    --   134   --   134   --   136   --   135   POTASSIUM   --    --    --   4.1   --   4.6   --   4.8   --   4.3   CR   --    --    --   1.04   --   0.97   --   0.92   --   0.84    < > = values in this interval not displayed.        IMPRESSION:   Reason for surgery/procedure: right eye cataract  Diagnosis/reason for consult: preop     The proposed surgical procedure is considered LOW risk.    REVISED CARDIAC RISK INDEX  The patient has the following serious cardiovascular risks for perioperative complications such as (MI, PE, VFib and 3  AV Block):  No serious cardiac risks  INTERPRETATION: 1 risks: Class II (low risk - 0.9% complication rate)    The patient has the following additional risks for perioperative complications:  No identified additional risks      ICD-10-CM    1. Preop general physical exam Z01.818        RECOMMENDATIONS:     (Z01.818) Preop general physical exam  (primary encounter diagnosis)  Comment:   Plan: fit for surgery and MAC anaesthesia     Signed Electronically by: Kushal Valentin MD    Copy of this evaluation report is provided to requesting physician.    [place] Preop Guidelines    Revised Cardiac Risk Index

## 2018-05-29 ENCOUNTER — ANESTHESIA EVENT (OUTPATIENT)
Dept: SURGERY | Facility: CLINIC | Age: 83
End: 2018-05-29
Payer: COMMERCIAL

## 2018-05-29 ENCOUNTER — HOSPITAL ENCOUNTER (OUTPATIENT)
Facility: CLINIC | Age: 83
Discharge: HOME | End: 2018-05-29
Attending: OPHTHALMOLOGY | Admitting: OPHTHALMOLOGY
Payer: COMMERCIAL

## 2018-05-29 ENCOUNTER — ANESTHESIA (OUTPATIENT)
Dept: SURGERY | Facility: CLINIC | Age: 83
End: 2018-05-29
Payer: COMMERCIAL

## 2018-05-29 ENCOUNTER — SURGERY (OUTPATIENT)
Age: 83
End: 2018-05-29

## 2018-05-29 VITALS
TEMPERATURE: 97.3 F | HEIGHT: 69 IN | DIASTOLIC BLOOD PRESSURE: 82 MMHG | SYSTOLIC BLOOD PRESSURE: 126 MMHG | OXYGEN SATURATION: 95 % | RESPIRATION RATE: 16 BRPM

## 2018-05-29 PROCEDURE — V2632 POST CHMBR INTRAOCULAR LENS: HCPCS | Performed by: OPHTHALMOLOGY

## 2018-05-29 PROCEDURE — 27210794 ZZH OR GENERAL SUPPLY STERILE: Performed by: OPHTHALMOLOGY

## 2018-05-29 PROCEDURE — 37000009 ZZH ANESTHESIA TECHNICAL FEE, EACH ADDTL 15 MIN: Performed by: OPHTHALMOLOGY

## 2018-05-29 PROCEDURE — 25000128 H RX IP 250 OP 636: Performed by: OPHTHALMOLOGY

## 2018-05-29 PROCEDURE — 25000125 ZZHC RX 250: Performed by: OPHTHALMOLOGY

## 2018-05-29 PROCEDURE — 25000128 H RX IP 250 OP 636: Performed by: NURSE ANESTHETIST, CERTIFIED REGISTERED

## 2018-05-29 PROCEDURE — 40000170 ZZH STATISTIC PRE-PROCEDURE ASSESSMENT II: Performed by: OPHTHALMOLOGY

## 2018-05-29 PROCEDURE — 36000101 ZZH EYE SURGERY LEVEL 3 1ST 30 MIN: Performed by: OPHTHALMOLOGY

## 2018-05-29 PROCEDURE — 37000008 ZZH ANESTHESIA TECHNICAL FEE, 1ST 30 MIN: Performed by: OPHTHALMOLOGY

## 2018-05-29 PROCEDURE — 25000128 H RX IP 250 OP 636: Performed by: ANESTHESIOLOGY

## 2018-05-29 PROCEDURE — 25000125 ZZHC RX 250: Performed by: ANESTHESIOLOGY

## 2018-05-29 PROCEDURE — 71000028 ZZH EYE RECOVERY PHASE 2 EACH 15 MINS: Performed by: OPHTHALMOLOGY

## 2018-05-29 DEVICE — EYE IMP IOL AMO PCL TECNIS ZCB00 17.0: Type: IMPLANTABLE DEVICE | Site: EYE | Status: FUNCTIONAL

## 2018-05-29 RX ORDER — DICLOFENAC SODIUM 1 MG/ML
1 SOLUTION/ DROPS OPHTHALMIC
Status: COMPLETED | OUTPATIENT
Start: 2018-05-29 | End: 2018-05-29

## 2018-05-29 RX ORDER — ONDANSETRON 2 MG/ML
INJECTION INTRAMUSCULAR; INTRAVENOUS PRN
Status: DISCONTINUED | OUTPATIENT
Start: 2018-05-29 | End: 2018-05-29

## 2018-05-29 RX ORDER — PHENYLEPHRINE HYDROCHLORIDE 25 MG/ML
1 SOLUTION/ DROPS OPHTHALMIC
Status: COMPLETED | OUTPATIENT
Start: 2018-05-29 | End: 2018-05-29

## 2018-05-29 RX ORDER — PROPARACAINE HYDROCHLORIDE 5 MG/ML
1 SOLUTION/ DROPS OPHTHALMIC ONCE
Status: DISCONTINUED | OUTPATIENT
Start: 2018-05-29 | End: 2018-05-29 | Stop reason: HOSPADM

## 2018-05-29 RX ORDER — BALANCED SALT SOLUTION 6.4; .75; .48; .3; 3.9; 1.7 MG/ML; MG/ML; MG/ML; MG/ML; MG/ML; MG/ML
SOLUTION OPHTHALMIC PRN
Status: DISCONTINUED | OUTPATIENT
Start: 2018-05-29 | End: 2018-05-29 | Stop reason: HOSPADM

## 2018-05-29 RX ORDER — TROPICAMIDE 10 MG/ML
1 SOLUTION/ DROPS OPHTHALMIC
Status: COMPLETED | OUTPATIENT
Start: 2018-05-29 | End: 2018-05-29

## 2018-05-29 RX ORDER — PROPARACAINE HYDROCHLORIDE 5 MG/ML
1 SOLUTION/ DROPS OPHTHALMIC ONCE
Status: COMPLETED | OUTPATIENT
Start: 2018-05-29 | End: 2018-05-29

## 2018-05-29 RX ORDER — MOXIFLOXACIN 5 MG/ML
1 SOLUTION/ DROPS OPHTHALMIC
Status: COMPLETED | OUTPATIENT
Start: 2018-05-29 | End: 2018-05-29

## 2018-05-29 RX ORDER — LIDOCAINE HYDROCHLORIDE 10 MG/ML
INJECTION, SOLUTION EPIDURAL; INFILTRATION; INTRACAUDAL; PERINEURAL PRN
Status: DISCONTINUED | OUTPATIENT
Start: 2018-05-29 | End: 2018-05-29 | Stop reason: HOSPADM

## 2018-05-29 RX ORDER — SODIUM CHLORIDE, SODIUM LACTATE, POTASSIUM CHLORIDE, CALCIUM CHLORIDE 600; 310; 30; 20 MG/100ML; MG/100ML; MG/100ML; MG/100ML
INJECTION, SOLUTION INTRAVENOUS CONTINUOUS
Status: DISCONTINUED | OUTPATIENT
Start: 2018-05-29 | End: 2018-05-29 | Stop reason: HOSPADM

## 2018-05-29 RX ADMIN — Medication 1 APPLICATOR: at 13:10

## 2018-05-29 RX ADMIN — ONDANSETRON 4 MG: 2 INJECTION INTRAMUSCULAR; INTRAVENOUS at 13:04

## 2018-05-29 RX ADMIN — TROPICAMIDE 1 DROP: 10 SOLUTION/ DROPS OPHTHALMIC at 12:14

## 2018-05-29 RX ADMIN — LIDOCAINE HYDROCHLORIDE 1 ML: 10 INJECTION, SOLUTION EPIDURAL; INFILTRATION; INTRACAUDAL; PERINEURAL at 13:09

## 2018-05-29 RX ADMIN — TROPICAMIDE 1 DROP: 10 SOLUTION/ DROPS OPHTHALMIC at 12:00

## 2018-05-29 RX ADMIN — TROPICAMIDE 1 DROP: 10 SOLUTION/ DROPS OPHTHALMIC at 12:05

## 2018-05-29 RX ADMIN — MIDAZOLAM 1 MG: 1 INJECTION INTRAMUSCULAR; INTRAVENOUS at 13:12

## 2018-05-29 RX ADMIN — MOXIFLOXACIN 1 DROP: 5 SOLUTION/ DROPS OPHTHALMIC at 12:14

## 2018-05-29 RX ADMIN — MIDAZOLAM 1 MG: 1 INJECTION INTRAMUSCULAR; INTRAVENOUS at 12:58

## 2018-05-29 RX ADMIN — PHENYLEPHRINE HYDROCHLORIDE 1 DROP: 2.5 SOLUTION/ DROPS OPHTHALMIC at 12:05

## 2018-05-29 RX ADMIN — DICLOFENAC SODIUM 1 DROP: 1 SOLUTION OPHTHALMIC at 12:14

## 2018-05-29 RX ADMIN — MOXIFLOXACIN 1 DROP: 5 SOLUTION/ DROPS OPHTHALMIC at 12:00

## 2018-05-29 RX ADMIN — LIDOCAINE HYDROCHLORIDE 1 ML: 10 INJECTION, SOLUTION EPIDURAL; INFILTRATION; INTRACAUDAL; PERINEURAL at 12:14

## 2018-05-29 RX ADMIN — EPINEPHRINE 500 ML: 1 INJECTION, SOLUTION, CONCENTRATE INTRAVENOUS at 13:09

## 2018-05-29 RX ADMIN — LIDOCAINE HYDROCHLORIDE 0.5 ML: 35 GEL OPHTHALMIC at 13:09

## 2018-05-29 RX ADMIN — MOXIFLOXACIN 1 DROP: 5 SOLUTION/ DROPS OPHTHALMIC at 12:05

## 2018-05-29 RX ADMIN — BALANCED SALT SOLUTION 15 ML: 6.4; .75; .48; .3; 3.9; 1.7 SOLUTION OPHTHALMIC at 13:09

## 2018-05-29 RX ADMIN — PROPARACAINE HYDROCHLORIDE 1 DROP: 5 SOLUTION/ DROPS OPHTHALMIC at 12:00

## 2018-05-29 RX ADMIN — DICLOFENAC SODIUM 1 DROP: 1 SOLUTION OPHTHALMIC at 12:05

## 2018-05-29 RX ADMIN — SODIUM CHLORIDE, POTASSIUM CHLORIDE, SODIUM LACTATE AND CALCIUM CHLORIDE: 600; 310; 30; 20 INJECTION, SOLUTION INTRAVENOUS at 12:14

## 2018-05-29 RX ADMIN — PHENYLEPHRINE HYDROCHLORIDE 1 DROP: 2.5 SOLUTION/ DROPS OPHTHALMIC at 12:00

## 2018-05-29 RX ADMIN — DICLOFENAC SODIUM 1 DROP: 1 SOLUTION OPHTHALMIC at 12:00

## 2018-05-29 RX ADMIN — PHENYLEPHRINE HYDROCHLORIDE 1 DROP: 2.5 SOLUTION/ DROPS OPHTHALMIC at 12:14

## 2018-05-29 ASSESSMENT — ENCOUNTER SYMPTOMS: DYSRHYTHMIAS: 1

## 2018-05-29 NOTE — OP NOTE
PREOPERATIVE DIAGNOSIS: Cataract, Right eye.   POSTOPERATIVE DIAGNOSIS: Cataract, Right eye.   OPERATION: Phacoemulsification with implantation of posterior chamber intraocular lens, Right eye.   ANESTHESIA: Monitored anesthesia care.   INDICATIONS FOR SURGERY: Nathen Gage has noted a progressive decline in the vision of his Right eye secondary to a cataract. This has affected his ability to perform routine functions including reading. The patient has progressive cataract changes consistent with his vision and symptoms.     PROCEDURE: Informed consent was obtained from the patient preoperatively with the risks and alternatives reviewed, including the possibility of loss of vision. In the preoperative area, the patient was administered topical anesthetic consisting of 2% Xylocaine jelly. The patient was taken to the operating room. The face was prepped and draped in the usual sterile fashion. Attention was directed to the Right eye. A stab incision was made at the limbus with a 15-degree blade. Viscoat was used to replace aqueous. A keratome was used to make a limbal self-sealing incision 2.5 mm in diameter. A curvilinear capsulorrhexis was performed with the Utrata forceps. Hydrodissection was carried out. The nucleus was removed with the phacoemulsification handpiece in a four-quadrant cracking technique. The cortex was removed with the irrigation and aspiration handpiece. The posterior capsule remained intact. Provisc was used to inflate the capsular bag. A posterior chamber intraocular lens was taken from its case and inspected. It was free of defects and it was folded into the shooter. The lens was then injected into the eye by directing the leading haptic into the capsular bag. The trailing haptic was then placed in the eye with a haptic . The lens centered well. The Provisc was removed from the eye with the I&A handpiece. The eye was inflated with balanced salt solution. The wound was inspected and  found to be watertight. Topical Vigamox and Pred Forte were applied. An eye shield was placed over the eye. The patient tolerated the procedure well and left the operating area in good condition.       Implant Name Type Inv. Item Serial No.  Lot No. LRB No. Used   EYE IMP IOL MARGIE PCL TECNIS ZCB00 17.0 Lens/Eye Implant EYE IMP IOL MARGIE PCL TECNIS ZCB00 17.0 [id number] [employer] OPT   Right 1       Mat Echevarria M.D.

## 2018-05-29 NOTE — DISCHARGE INSTRUCTIONS
[facility] Discharge  Instructions  Anesthesia ([facility] Care Center)   Adult Discharge Instructions    For 24 hours after surgery    1. Get plenty of rest.  Make arrangements to have a responsible adult stay with you for at least 6 hours after you leave the hospital.  2. Do not drive or use heavy equipment for 24 hours.    3. Do not drink alcohol for 24 hours.  4. Do not sign legal documents or make important decisions for 24 hours.  5. Avoid strenuous or risky activities. You may feel lightheaded.  If so, sit for a few minutes before standing.  Have someone help you get up.   6. Conscious sedation patients may resume a regular diet..  7. Any questions of medical nature, call your physician.    [facility]  Cataract Surgery Discharge Instructions  [facility] MD SP Gardner MD J. Hasan, MD C. Nichols, MD J. O'Neill, MD S. Schaefer, MD J. Stephens, MD        Start using drops when you arrive at home today    You may have been prescribed SmartDrops or OneDrop, which is a compound formula drop that combines all three medications in a single drop. This drop should be instilled to the surgical eye 3 times daily until gone.        Place shield over surgical eye at bedtime for 3 nights.      The eye will feel itchy, scratchy, and vision will be blurred, you may take Tylenol for the scratchy feeling if this is bothersome.      No eye rubbing or swimming for I week.      You may resume all prescription medications as directed by your primary doctor.      Call if increasing pain, progressively worsening vision or worsening redness of surgical eye.      On-call doctor can be reached at [phone].      **Bring your eye drops to your eye appointment 5/30/18.

## 2018-05-29 NOTE — ANESTHESIA PREPROCEDURE EVALUATION
Anesthesia Evaluation     . Pt has had prior anesthetic.     No history of anesthetic complications          ROS/MED HX    ENT/Pulmonary:      (-) sleep apnea   Neurologic:       Cardiovascular: Comment: Dilated Aortic root    (+) Dyslipidemia, hypertension----. : . . . :. dysrhythmias a-fib and Sick Sinus Syndrome, . pulmonary hypertension,       METS/Exercise Tolerance:     Hematologic:         Musculoskeletal:         GI/Hepatic:     (+) GERD       Renal/Genitourinary:         Endo:         Psychiatric:         Infectious Disease:         Malignancy:         Other:                                    Anesthesia Plan      History & Physical Review  History and physical reviewed and following examination; no interval change.    ASA Status:  3 .    NPO Status:  > 8 hours    Plan for MAC Reason for MAC:  Procedure to face, neck, head or breast  PONV prophylaxis:  Ondansetron (or other 5HT-3)       Postoperative Care  Postoperative pain management:  Oral pain medications.      Consents  Anesthetic plan, risks, benefits and alternatives discussed with:  Patient..                          .

## 2018-05-29 NOTE — ANESTHESIA POSTPROCEDURE EVALUATION
Patient: Nathen Gage    Procedure(s):  RIGHT EYE PHACOEMULSIFICATION CLEAR CORNEA WITH STANDARD INTRAOCULAR LENS IMPLANT  - Wound Class: I-Clean    Diagnosis:NUCLEAR CATARACT   Diagnosis Additional Information: No value filed.    Anesthesia Type:  MAC    Note:  Anesthesia Post Evaluation    Patient location during evaluation: PACU  Patient participation: Able to fully participate in evaluation  Level of consciousness: awake  Pain management: adequate  Airway patency: patent  Cardiovascular status: acceptable  Respiratory status: acceptable  Hydration status: acceptable  PONV: none     Anesthetic complications: None          Last vitals:  Vitals:    05/29/18 1328 05/29/18 1340 05/29/18 1400   BP: 136/82 146/74 126/82   Resp: 14 16 16   Temp:      SpO2: 98% 96% 95%         Electronically Signed By: Nalini Brizuela MD, MD  May 29, 2018  3:56 PM

## 2018-05-29 NOTE — IP AVS SNAPSHOT
MRN:[id number]                      After Visit Summary   5/29/2018    Nathen Gage    MRN: [id number]           Thank you!     Thank you for choosing [facility] for your care. Our goal is always to provide you with excellent care. Hearing back from our patients is one way we can continue to improve our services. Please take a few minutes to complete the written survey that you may receive in the mail after you visit with us. Thank you!        Patient Information     Date Of Birth          1935        About your hospital stay     You were admitted on:  May 29, 2018 You last received care in the:  [facility] [place]    You were discharged on:  May 29, 2018       Who to Call     For medical emergencies, please call 911.  For non-urgent questions about your medical care, please call your primary care provider or clinic, [phone]  For questions related to your surgery, please call your surgery clinic        Attending Provider     Provider Specialty    Mat Echevarria MD Ophthalmology       Primary Care Provider Office Phone # Fax #    Kushal Valentin [phone]      Your next 10 appointments already scheduled     Jun 05, 2018  9:00 AM CDT   Anticoagulation Visit with [facility]ULATION CLINIC   [facility]    [address] [address] [address] [phone]            Jul 31, 2018  9:30 AM CDT   Phone Device Check with PATE TECH1   [facility] ([facility])    [address]  [facility] [place] [id number] [phone] OPT 2              Further instructions from your care team       [facility]est[facility] Discharge  Instructions  Anesthesia (Eye Care Center)   Adult Discharge Instructions    For 24 hours after surgery    1. Get plenty of rest.  Make arrangements to have a responsible adult stay with you for  "at least 6 hours after you leave the hospital.  2. Do not drive or use heavy equipment for 24 hours.    3. Do not drink alcohol for 24 hours.  4. Do not sign legal documents or make important decisions for 24 hours.  5. Avoid strenuous or risky activities. You may feel lightheaded.  If so, sit for a few minutes before standing.  Have someone help you get up.   6. Conscious sedation patients may resume a regular diet..  7. Any questions of medical nature, call your physician.    [facility]  Cataract Surgery Discharge Instructions  [facility] MD SP Gardner MD J. Hasan, MD C. Nichols, MD J. O'Neill, MD S. Schaefer, MD J. Stephens, MD        Start using drops when you arrive at home today    You may have been prescribed SmartDrops or OneDrop, which is a compound formula drop that combines all three medications in a single drop. This drop should be instilled to the surgical eye 3 times daily until gone.        Place shield over surgical eye at bedtime for 3 nights.      The eye will feel itchy, scratchy, and vision will be blurred, you may take Tylenol for the scratchy feeling if this is bothersome.      No eye rubbing or swimming for I week.      You may resume all prescription medications as directed by your primary doctor.      Call if increasing pain, progressively worsening vision or worsening redness of surgical eye.      On-call doctor can be reached at [phone].      **Bring your eye drops to your eye appointment 5/30/18.    Pending Results     No orders found from 5/27/2018 to 5/30/2018.            Admission Information     Date & Time Provider Department Dept. Phone    5/29/2018 Mat Echevarria MD [facility] [place] [phone]      Your Vitals Were     Blood Pressure Temperature Respirations Height Pulse Oximetry       136/82 97.3  F (36.3  C) (Temporal) 14 1.74 m (5' 8.5\") 98%     "   [employer] Information     [employer] gives you secure access to your electronic health record. If you see a primary care provider, you can also send messages to your care team and make appointments. If you have questions, please call your primary care clinic.  If you do not have a primary care provider, please call [phone] and they will assist you.        Care EveryWhere ID     This is your Care EveryWhere ID. This could be used by other organizations to access your [facility] medical records  [id number]        Equal Access to Services     MICHELE ALEXANDRA : Hadii margarita gordon hadasho Soomaali, waaxda luqadaha, qaybta kaalmada adeegyada, anamaria diaz. So [facility] [phone].    ATENCIÓN: Si habla español, tiene a arana disposición servicios gratuitos de asistencia lingüística. Llame al [phone].    We comply with applicable federal civil rights laws and [place] laws. We do not discriminate on the basis of race, color, national origin, age, disability, sex, sexual orientation, or gender identity.               Review of your medicines      CONTINUE these medicines which have NOT CHANGED        Dose / Directions    acetaminophen 325 MG tablet   Commonly known as:  TYLENOL   Used for:  Pneumonia due to infectious organism, unspecified laterality, unspecified part of lung        Dose:  650 mg   Take 2 tablets (650 mg) by mouth every 4 hours as needed for mild pain or fever   Quantity:  100 tablet   Refills:  0       atorvastatin 20 MG tablet   Commonly known as:  LIPITOR   Used for:  Pure hypercholesterolemia, Essential hypertension, benign        Dose:  20 mg   Take 1 tablet (20 mg) by mouth daily   Quantity:  90 tablet   Refills:  3       doxazosin 4 MG tablet   Commonly known as:  CARDURA   Used for:  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        Dose:  4 mg   Take 1 tablet (4 mg) by mouth daily   Quantity:  90 tablet   Refills:  1       losartan 100 MG tablet    Commonly known as:  COZAAR   Used for:  Secondary hypertension        Dose:  100 mg   Take 1 tablet (100 mg) by mouth daily   Quantity:  90 tablet   Refills:  3       pantoprazole 40 MG EC tablet   Commonly known as:  PROTONIX   Used for:  Gastroesophageal reflux disease with esophagitis        Dose:  40 mg   Take 1 tablet (40 mg) by mouth daily   Quantity:  90 tablet   Refills:  0       prednisolon-gatiflox-bromfenac (pt own, no charge) 1-0.5-0.075 % opthalmic solution        Dose:  1 drop   1 drop 3 times daily   Refills:  0       warfarin 2.5 MG tablet   Commonly known as:  COUMADIN   Used for:  Long-term (current) use of anticoagulants        Take 1.25mg every Tu & Fri / Take 2.5mg all other days OR as instructed by INR clinic   Quantity:  30 tablet   Refills:  5                Protect others around you: Learn how to safely use, store and throw away your medicines at www.disposemymeds.org.             Medication List: This is a list of all your medications and when to take them. Check marks below indicate your daily home schedule. Keep this list as a reference.      Medications           Morning Afternoon Evening Bedtime As Needed    acetaminophen 325 MG tablet   Commonly known as:  TYLENOL   Take 2 tablets (650 mg) by mouth every 4 hours as needed for mild pain or fever                                atorvastatin 20 MG tablet   Commonly known as:  LIPITOR   Take 1 tablet (20 mg) by mouth daily                                doxazosin 4 MG tablet   Commonly known as:  CARDURA   Take 1 tablet (4 mg) by mouth daily                                losartan 100 MG tablet   Commonly known as:  COZAAR   Take 1 tablet (100 mg) by mouth daily                                pantoprazole 40 MG EC tablet   Commonly known as:  PROTONIX   Take 1 tablet (40 mg) by mouth daily                                prednisolon-gatiflox-bromfenac (pt own, no charge) 1-0.5-0.075 % opthalmic solution   1 drop 3 times daily   Last time  this was given:  1 drop on 5/29/2018  1:10 PM                                warfarin 2.5 MG tablet   Commonly known as:  COUMADIN   Take 1.25mg every Tu & Fri / Take 2.5mg all other days OR as instructed by INR clinic

## 2018-05-29 NOTE — IP AVS SNAPSHOT
[facility]    [address]    [place] [address]    Phone:  [phone]    Fax:  [phone]                                       After Visit Summary   5/29/2018    Nathen Gage    MRN: [id number]           After Visit Summary Signature Page     I have received my discharge instructions, and my questions have been answered. I have discussed any challenges I see with this plan with the nurse or doctor.    ..........................................................................................................................................  Patient/Patient Representative Signature      ..........................................................................................................................................  Patient Representative Print Name and Relationship to Patient    ..................................................               ................................................  Date                                            Time    ..........................................................................................................................................  Reviewed by Signature/Title    ...................................................              ..............................................  Date                                                            Time

## 2018-06-05 ENCOUNTER — ANTICOAGULATION THERAPY VISIT (OUTPATIENT)
Dept: NURSING | Facility: CLINIC | Age: 83
End: 2018-06-05
Payer: COMMERCIAL

## 2018-06-05 DIAGNOSIS — Z79.01 LONG-TERM (CURRENT) USE OF ANTICOAGULANTS: ICD-10-CM

## 2018-06-05 DIAGNOSIS — I48.0 PAROXYSMAL ATRIAL FIBRILLATION (H): ICD-10-CM

## 2018-06-05 LAB — INR POINT OF CARE: 2.1 (ref 0.86–1.14)

## 2018-06-05 PROCEDURE — 99207 ZZC NO CHARGE NURSE ONLY: CPT

## 2018-06-05 PROCEDURE — 36416 COLLJ CAPILLARY BLOOD SPEC: CPT

## 2018-06-05 PROCEDURE — 85610 PROTHROMBIN TIME: CPT | Mod: QW

## 2018-06-05 NOTE — MR AVS SNAPSHOT
Nathen JANICE Gage   6/5/2018 2:30 PM   Anticoagulation Therapy Visit    Description:  82 year old male   Provider:   [facility]   Department:  Cr Nurse           INR as of 6/5/2018     Today's INR 2.1      Anticoagulation Summary as of 6/5/2018     INR goal 2.0-2.5   Today's INR 2.1   Full warfarin instructions 1.25 mg on Tue, Fri; 2.5 mg all other days   Next INR check 6/19/2018    Indications   Long-term (current) use of anticoagulants [Z79.01] [Z79.01]  Atrial fibrillation (H) [I48.91]         Your next Anticoagulation Clinic appointment(s)     Jun 05, 2018  2:30 PM CDT   Anticoagulation Visit with [facility]   [facility] ([facility])    [address] [address]   [phone]            Jun 19, 2018  9:00 AM CDT   Anticoagulation Visit with  ANTICOAGULATION CLINIC   [facility] ([facility])    [address] [address] [address]   [phone]              Contact Numbers     Clinic Number:         June 2018 Details    Sun Mon Tue Wed Thu Fri Sat          1               2                 3               4               5      1.25 mg   See details      6      2.5 mg         7      2.5 mg         8      1.25 mg         9      2.5 mg           10      2.5 mg         11      2.5 mg         12      1.25 mg         13      2.5 mg         14      2.5 mg         15      1.25 mg         16      2.5 mg           17      2.5 mg         18      2.5 mg         19            20               21               22               23                 24               25               26               27               28               29               30                Date Details   06/05 This INR check       Date of next INR:  6/19/2018         How to take your warfarin dose     To take:  1.25 mg Take 0.5 of a 2.5 mg tablet.    To take:  2.5 mg Take 1 of the 2.5 mg tablets.

## 2018-06-05 NOTE — PROGRESS NOTES
ANTICOAGULATION FOLLOW-UP CLINIC VISIT    Patient Name:  Nathen Gage  Date:  6/5/2018  Contact Type:  Face to Face    SUBJECTIVE:     Patient Findings     Positives No Problem Findings           OBJECTIVE    INR Protime   Date Value Ref Range Status   06/05/2018 2.1 (A) 0.86 - 1.14 Final       ASSESSMENT / PLAN  INR assessment THER    Recheck INR In: 2 WEEKS    INR Location Clinic      Anticoagulation Summary as of 6/5/2018     INR goal 2.0-2.5   Today's INR 2.1   Warfarin maintenance plan 1.25 mg (2.5 mg x 0.5) on Tue, Fri; 2.5 mg (2.5 mg x 1) all other days   Full warfarin instructions 1.25 mg on Tue, Fri; 2.5 mg all other days   Weekly warfarin total 15 mg   No change documented Fidelina Pizano RN   Plan last modified Fidelina Pizano RN (4/3/2018)   Next INR check 6/19/2018   Target end date Indefinite    Indications   Long-term (current) use of anticoagulants [Z79.01] [Z79.01]  Atrial fibrillation (H) [I48.91]         Anticoagulation Episode Summary     INR check location     Preferred lab     Send INR reminders to [facility]    Comments       Anticoagulation Care Providers     Provider Role Specialty Phone number    Kushal Valentin MD [facility] [phone]            See the Encounter Report to view Anticoagulation Flowsheet and Dosing Calendar (Go to Encounters tab in chart review, and find the Anticoagulation Therapy Visit)        Fidelina Pizano RN

## 2018-06-07 DIAGNOSIS — N40.1 BENIGN LOCALIZED HYPERPLASIA OF PROSTATE WITH URINARY OBSTRUCTION: ICD-10-CM

## 2018-06-07 DIAGNOSIS — N13.8 BENIGN LOCALIZED HYPERPLASIA OF PROSTATE WITH URINARY OBSTRUCTION: ICD-10-CM

## 2018-06-09 NOTE — TELEPHONE ENCOUNTER
"Requested Prescriptions   Pending Prescriptions Disp Refills     doxazosin (CARDURA) 4 MG tablet [Pharmacy Med Name: Doxazosin Mesylate Oral Tablet 4 MG]  Last Written Prescription Date:  12/5/2017  Last Fill Quantity: 90 tablet,  # refills: 1   Last office visit: 5/24/2018 with prescribing provider:  Barbie   Future Office Visit:     90 tablet 0     Sig: TAKE 1 TABLET (4 MG) BY MOUTH DAILY    Alpha Blockers Passed    6/7/2018  9:12 AM       Passed - Blood pressure under 140/90 in past 12 months    BP Readings from Last 3 Encounters:   05/29/18 126/82   05/24/18 148/75   04/24/18 128/62                Passed - Recent (12 mo) or future (30 days) visit within the authorizing provider's specialty    Patient had office visit in the last 12 months or has a visit in the next 30 days with authorizing provider or within the authorizing provider's specialty.  See \"Patient Info\" tab in inbasket, or \"Choose Columns\" in Meds & Orders section of the refill encounter.           Passed - Patient does not have Tadalafil, Vardenafil, or Sildenafil on their medication list       Passed - Patient is 18 years of age or older          "

## 2018-06-11 RX ORDER — DOXAZOSIN 4 MG/1
TABLET ORAL
Qty: 90 TABLET | Refills: 0 | Status: SHIPPED | OUTPATIENT
Start: 2018-06-11 | End: 2018-09-11

## 2018-06-11 NOTE — TELEPHONE ENCOUNTER
Prescription approved per [facility] Refill Protocol.  Juliette Harper RN

## 2018-06-19 ENCOUNTER — ANTICOAGULATION THERAPY VISIT (OUTPATIENT)
Dept: NURSING | Facility: CLINIC | Age: 83
End: 2018-06-19
Payer: COMMERCIAL

## 2018-06-19 DIAGNOSIS — Z79.01 LONG-TERM (CURRENT) USE OF ANTICOAGULANTS: ICD-10-CM

## 2018-06-19 DIAGNOSIS — I48.0 PAROXYSMAL ATRIAL FIBRILLATION (H): ICD-10-CM

## 2018-06-19 LAB — INR POINT OF CARE: 2.7 (ref 0.86–1.14)

## 2018-06-19 PROCEDURE — 99207 ZZC NO CHARGE NURSE ONLY: CPT

## 2018-06-19 PROCEDURE — 85610 PROTHROMBIN TIME: CPT | Mod: QW

## 2018-06-19 PROCEDURE — 36416 COLLJ CAPILLARY BLOOD SPEC: CPT

## 2018-06-19 NOTE — MR AVS SNAPSHOT
Nathen JANICE Gage   6/19/2018 9:00 AM   Anticoagulation Therapy Visit    Description:  82 year old male   Provider:  [facility]   Department:  Cr Nurse           INR as of 6/19/2018     Today's INR 2.7!      Anticoagulation Summary as of 6/19/2018     INR goal 2.0-2.5   Today's INR 2.7!   Full warfarin instructions 1.25 mg on Tue, Fri; 2.5 mg all other days   Next INR check 7/3/2018    Indications   Long-term (current) use of anticoagulants [Z79.01] [Z79.01]  Atrial fibrillation (H) [I48.91]         Contact Numbers     Clinic Number:         June 2018 Details    Sun Mon Tue Wed Thu Fri Sat          1               2                 3               4               5               6               7               8               9                 10               11               12               13               14               15               16                 17               18               19      1.25 mg   See details      20      2.5 mg         21      2.5 mg         22      1.25 mg         23      2.5 mg           24      2.5 mg         25      2.5 mg         26      1.25 mg         27      2.5 mg         28      2.5 mg         29      1.25 mg         30      2.5 mg          Date Details   06/19 This INR check   Eat greens               How to take your warfarin dose     To take:  1.25 mg Take 0.5 of a 2.5 mg tablet.    To take:  2.5 mg Take 1 of the 2.5 mg tablets.           July 2018 Details    Sun Mon Tue Wed Thu Fri Sat     1      2.5 mg         2      2.5 mg         3            4               5               6               7                 8               9               10               11               12               13               14                 15               16               17               18               19               20               21                 22               23               24               25               26               27               28                  29               30               31                    Date Details   No additional details    Date of next INR:  7/3/2018         How to take your warfarin dose     To take:  1.25 mg Take 0.5 of a 2.5 mg tablet.    To take:  2.5 mg Take 1 of the 2.5 mg tablets.

## 2018-06-19 NOTE — PROGRESS NOTES
"  ANTICOAGULATION FOLLOW-UP CLINIC VISIT    Patient Name:  Nathen Gage  Date:  6/19/2018  Contact Type:  Face to Face    SUBJECTIVE:     Patient Findings     Positives Change in diet/appetite (Less greens in the past week, I encouraged consistency), Nose bleeds (1 nosebleed about 1 1/2 weeks ago but pt states the bleeding stopped \"right away\" with pressure)           OBJECTIVE    INR Protime   Date Value Ref Range Status   06/19/2018 2.7 (A) 0.86 - 1.14 Final       ASSESSMENT / PLAN  INR assessment SUPRA    Recheck INR In: 2 WEEKS    INR Location Clinic      Anticoagulation Summary as of 6/19/2018     INR goal 2.0-2.5   Today's INR 2.7!   Warfarin maintenance plan 1.25 mg (2.5 mg x 0.5) on Tue, Fri; 2.5 mg (2.5 mg x 1) all other days   Full warfarin instructions 1.25 mg on Tue, Fri; 2.5 mg all other days   Weekly warfarin total 15 mg   No change documented Fidelina Pizano RN   Plan last modified Fidelina Pizano RN (4/3/2018)   Next INR check 7/3/2018   Target end date Indefinite    Indications   Long-term (current) use of anticoagulants [Z79.01] [Z79.01]  Atrial fibrillation (H) [I48.91]         Anticoagulation Episode Summary     INR check location     Preferred lab     Send INR reminders to  [facility]    Comments       Anticoagulation Care Providers     Provider Role Specialty Phone number    Kushal Valentin MD [facility] [phone]            See the Encounter Report to view Anticoagulation Flowsheet and Dosing Calendar (Go to Encounters tab in chart review, and find the Anticoagulation Therapy Visit)        Fidelina Pizano RN               "

## 2018-07-03 ENCOUNTER — ANTICOAGULATION THERAPY VISIT (OUTPATIENT)
Dept: NURSING | Facility: CLINIC | Age: 83
End: 2018-07-03
Payer: COMMERCIAL

## 2018-07-03 DIAGNOSIS — Z79.01 LONG-TERM (CURRENT) USE OF ANTICOAGULANTS: ICD-10-CM

## 2018-07-03 DIAGNOSIS — I48.0 PAROXYSMAL ATRIAL FIBRILLATION (H): ICD-10-CM

## 2018-07-03 LAB — INR POINT OF CARE: 1.9 (ref 0.86–1.14)

## 2018-07-03 PROCEDURE — 99207 ZZC NO CHARGE NURSE ONLY: CPT

## 2018-07-03 PROCEDURE — 36416 COLLJ CAPILLARY BLOOD SPEC: CPT

## 2018-07-03 PROCEDURE — 85610 PROTHROMBIN TIME: CPT | Mod: QW

## 2018-07-03 NOTE — MR AVS SNAPSHOT
Nathen JANICE Gage   7/3/2018 11:30 AM   Anticoagulation Therapy Visit    Description:  82 year old male   Provider:  [facility]   Department:  Cr Nurse           INR as of 7/3/2018     Today's INR 1.9!      Anticoagulation Summary as of 7/3/2018     INR goal 2.0-2.5   Today's INR 1.9!   Full warfarin instructions 7/3: 2.5 mg; 7/4: 1.25 mg; Otherwise 1.25 mg on Tue, Fri; 2.5 mg all other days   Next INR check 7/17/2018    Indications   Long-term (current) use of anticoagulants [Z79.01] [Z79.01]  Atrial fibrillation (H) [I48.91]         Your next Anticoagulation Clinic appointment(s)     Jul 17, 2018  9:45 AM CDT   Anticoagulation Visit with [facility]   [facility]    [address] [address]   [phone]              Contact Numbers     Clinic Number:         July 2018 Details    Sun Mon Tue Wed Thu Fri Sat     1               2               3      2.5 mg   See details      4      1.25 mg         5      2.5 mg         6      1.25 mg         7      2.5 mg           8      2.5 mg         9      2.5 mg         10      1.25 mg         11      2.5 mg         12      2.5 mg         13      1.25 mg         14      2.5 mg           15      2.5 mg         16      2.5 mg         17            18               19               20               21                 22               23               24               25               26               27               28                 29               30               31                    Date Details   07/03 This INR check       Date of next INR:  7/17/2018         How to take your warfarin dose     To take:  1.25 mg Take 0.5 of a 2.5 mg tablet.    To take:  2.5 mg Take 1 of the 2.5 mg tablets.

## 2018-07-03 NOTE — PROGRESS NOTES
ANTICOAGULATION FOLLOW-UP CLINIC VISIT    Patient Name:  Nathen Gage  Date:  7/3/2018  Contact Type:  Face to Face    SUBJECTIVE:     Patient Findings     Positives Change in diet/appetite (We discussed vitamin K and the importance of eating small portions and being consistent with number of servings per week.), No Problem Findings           OBJECTIVE    INR Protime   Date Value Ref Range Status   07/03/2018 1.9 (A) 0.86 - 1.14 Final       ASSESSMENT / PLAN  INR assessment THER    Recheck INR In: 2 WEEKS    INR Location Clinic      Anticoagulation Summary as of 7/3/2018     INR goal 2.0-2.5   Today's INR 1.9!   Warfarin maintenance plan 1.25 mg (2.5 mg x 0.5) on Tue, Fri; 2.5 mg (2.5 mg x 1) all other days   Full warfarin instructions 7/3: 2.5 mg; 7/4: 1.25 mg; Otherwise 1.25 mg on Tue, Fri; 2.5 mg all other days   Weekly warfarin total 15 mg   Plan last modified Fidelina Pizano RN (4/3/2018)   Next INR check 7/17/2018   Target end date Indefinite    Indications   Long-term (current) use of anticoagulants [Z79.01] [Z79.01]  Atrial fibrillation (H) [I48.91]         Anticoagulation Episode Summary     INR check location     Preferred lab     Send INR reminders to [facility]    Comments       Anticoagulation Care Providers     Provider Role Specialty Phone number    Kushal Valentin MD [facility] [phone]            See the Encounter Report to view Anticoagulation Flowsheet and Dosing Calendar (Go to Encounters tab in chart review, and find the Anticoagulation Therapy Visit)        Fidelina Pizano RN

## 2018-07-17 ENCOUNTER — ANTICOAGULATION THERAPY VISIT (OUTPATIENT)
Dept: NURSING | Facility: CLINIC | Age: 83
End: 2018-07-17
Payer: COMMERCIAL

## 2018-07-17 DIAGNOSIS — I48.0 PAROXYSMAL ATRIAL FIBRILLATION (H): ICD-10-CM

## 2018-07-17 DIAGNOSIS — Z79.01 LONG-TERM (CURRENT) USE OF ANTICOAGULANTS: ICD-10-CM

## 2018-07-17 LAB — INR POINT OF CARE: 2 (ref 0.86–1.14)

## 2018-07-17 PROCEDURE — 99207 ZZC NO CHARGE NURSE ONLY: CPT

## 2018-07-17 PROCEDURE — 85610 PROTHROMBIN TIME: CPT | Mod: QW

## 2018-07-17 PROCEDURE — 36416 COLLJ CAPILLARY BLOOD SPEC: CPT

## 2018-07-17 NOTE — MR AVS SNAPSHOT
Nathen JANICE Gage   7/17/2018 9:45 AM   Anticoagulation Therapy Visit    Description:  82 year old male   Provider[facility]   Department:  Cr Nurse           INR as of 7/17/2018     Today's INR 2.0      Anticoagulation Summary as of 7/17/2018     INR goal 2.0-2.5   Today's INR 2.0   Full warfarin instructions 1.25 mg on Tue, Fri; 2.5 mg all other days   Next INR check 7/31/2018    Indications   Long-term (current) use of anticoagulants [Z79.01] [Z79.01]  Atrial fibrillation (H) [I48.91]         Your next Anticoagulation Clinic appointment(s)     Jul 17, 2018  9:45 AM CDT   Anticoagulation Visit with [facility]   [facility]    [address] [place] [address] [phone]              Contact Numbers     Clinic Number:         July 2018 Details    Sun Mon Tue Wed Thu Fri Sat     1               2               3               4               5               6               7                 8               9               10               11               12               13               14                 15               16               17      1.25 mg   See details      18      2.5 mg         19      2.5 mg         20      1.25 mg         21      2.5 mg           22      2.5 mg         23      2.5 mg         24      1.25 mg         25      2.5 mg         26      2.5 mg         27      1.25 mg         28      2.5 mg           29      2.5 mg         30      2.5 mg         31                 Date Details   07/17 This INR check       Date of next INR:  7/31/2018         How to take your warfarin dose     To take:  1.25 mg Take 0.5 of a 2.5 mg tablet.    To take:  2.5 mg Take 1 of the 2.5 mg tablets.

## 2018-07-17 NOTE — PROGRESS NOTES
ANTICOAGULATION FOLLOW-UP CLINIC VISIT    Patient Name:  Nathen Gage  Date:  7/17/2018  Contact Type:  Face to Face    SUBJECTIVE:     Patient Findings     Positives No Problem Findings           OBJECTIVE    INR Protime   Date Value Ref Range Status   07/17/2018 2.0 (A) 0.86 - 1.14 Final       ASSESSMENT / PLAN  INR assessment THER    Recheck INR In: 2 WEEKS    INR Location Clinic      Anticoagulation Summary as of 7/17/2018     INR goal 2.0-2.5   Today's INR 2.0   Warfarin maintenance plan 1.25 mg (2.5 mg x 0.5) on Tue, Fri; 2.5 mg (2.5 mg x 1) all other days   Full warfarin instructions 1.25 mg on Tue, Fri; 2.5 mg all other days   Weekly warfarin total 15 mg   No change documented Fidelina Pizano RN   Plan last modified Fidelina Pizano RN (4/3/2018)   Next INR check 7/31/2018   Target end date Indefinite    Indications   Long-term (current) use of anticoagulants [Z79.01] [Z79.01]  Atrial fibrillation (H) [I48.91]         Anticoagulation Episode Summary     INR check location     Preferred lab     Send [facility]R reminders to  [facility]    Comments       Anticoagulation Care Providers     Provider Role Specialty Phone number    Kushal Valentin MD [facility] [phone]            See the Encounter Report to view Anticoagulation Flowsheet and Dosing Calendar (Go to Encounters tab in chart review, and find the Anticoagulation Therapy Visit)        Fidelina Pizano RN

## 2018-07-24 ENCOUNTER — OFFICE VISIT (OUTPATIENT)
Dept: FAMILY MEDICINE | Facility: CLINIC | Age: 83
End: 2018-07-24
Payer: COMMERCIAL

## 2018-07-24 VITALS
DIASTOLIC BLOOD PRESSURE: 75 MMHG | BODY MASS INDEX: 25.61 KG/M2 | HEART RATE: 66 BPM | RESPIRATION RATE: 12 BRPM | SYSTOLIC BLOOD PRESSURE: 138 MMHG | OXYGEN SATURATION: 97 % | TEMPERATURE: 97.5 F | WEIGHT: 170.9 LBS

## 2018-07-24 DIAGNOSIS — M76.61 ACHILLES TENDINITIS, RIGHT LEG: Primary | ICD-10-CM

## 2018-07-24 DIAGNOSIS — I10 ESSENTIAL HYPERTENSION, BENIGN: ICD-10-CM

## 2018-07-24 DIAGNOSIS — I48.0 PAROXYSMAL ATRIAL FIBRILLATION (H): ICD-10-CM

## 2018-07-24 DIAGNOSIS — Z95.0 PACEMAKER, ARTIFICIAL: ICD-10-CM

## 2018-07-24 PROCEDURE — 99214 OFFICE O/P EST MOD 30 MIN: CPT | Performed by: FAMILY MEDICINE

## 2018-07-24 NOTE — PROGRESS NOTES
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    CMS is normal to the foot. No significant numbness and tingling.  No history of specific trauma but overuse and footwear issues     Discussed heel sparing appliances and orthotics as well as the basics of footwear choices and barefoot walking.    Fascial stretches, nsaid  meds, orthotics and Podiatry referral for non improvement: suggested shoe inserts  (M76.61) Achilles tendinitis, right leg  (primary encounter diagnosis)  Comment:   Plan: ice and orthotics,   Current Outpatient Prescriptions   Medication     acetaminophen (TYLENOL) 325 MG tablet     atorvastatin (LIPITOR) 20 MG tablet     doxazosin (CARDURA) 4 MG tablet     losartan (COZAAR) 100 MG tablet     pantoprazole (PROTONIX) 40 MG EC tablet     prednisolon-gatiflox-bromfenac, pt own, no charge, 1-0.5-0.075 % opthalmic solution     warfarin (COUMADIN) 2.5 MG tablet     No current facility-administered medications for this visit.      On coumadin no nsaids         Plantar  achilles tendonitis    Follow up atrial fibrillation and pacemaker, had syncopal episodes and has done really well since, [facility] follow up       Duration: 2 1/2 to 3 weeks    Description  Location: pain in right heel    Intensity:  8/10    Accompanying signs and symptoms: none    History  Previous similar problem: no   Previous evaluation:  none    Precipitating or alleviating factors:  Trauma or overuse: no   Aggravating factors include: walking    Therapies tried and outcome: ice and exercises      Past Medical History:   Diagnosis Date     Actinic keratosis      Acute idiopathic  gout of left knee 4/27/2016     Atrial fibrillation (H)     on Eliquis     Dilated aortic root (H)      Esophageal reflux      Esophagitis      H/O: GI bleed 2010     Hyperlipidaemia      Hyperlipidemia LDL goal <100      Impotence of organic origin      Presbycusis, unspecified laterality 4/27/2016     Pulmonary hypertension      Sick sinus syndrome (H)     pacemaker implanted 2011     Unspecified essential hypertension        Past Surgical History:   Procedure Laterality Date     IMPLANT PACEMAKER  2011    Pacemaker/cardiac insitu / [employer] Dual chamber, V45     PHACOEMULSIFICATION CLEAR CORNEA WITH STANDARD INTRAOCULAR LENS IMPLANT Right 5/29/2018    Procedure: PHACOEMULSIFICATION CLEAR CORNEA WITH STANDARD INTRAOCULAR LENS IMPLANT;  RIGHT EYE PHACOEMULSIFICATION CLEAR CORNEA WITH STANDARD INTRAOCULAR LENS IMPLANT ;  Surgeon: Mat Echevarria MD;  Location: [facility]       Family History   Problem Relation Age of Onset     Alzheimer Disease Brother      Cerebrovascular Disease Father 80     Family History Negative Sister      Family History Negative Son      Family History Negative Daughter      Family History Negative Sister      Family History Negative Daughter      Breast Cancer No family hx of      Prostate Cancer No family hx of        Social History   Substance Use Topics     Smoking status: Former Smoker     Quit date: 1/1/1975     Smokeless tobacco: Never Used     Alcohol use Yes      Comment: 1- 2 BEERS WEEKLY     On exam the vital signs are stable  Weight is Body mass index is 25.61 kg/(m^2).   Eyes show malinda  No neck masses or thyromegaly.Ear nose and throat shows normal   No bruits, murmers, rubs or extrasounds. No cardiomegaly or chest wall tenderness. Lungs clear, no abdominal masses or organomegaly. No CVA tenderness.  Skin eval no rash     (M76.61) Achilles tendinitis, right leg  (primary encounter diagnosis)  Comment:   Plan:     (I10) Essential hypertension, benign  Comment:    Plan: at [facility]    (Z95.0) Pacemaker, artificial  Comment:   Plan: regular pulse    (I48.0) Paroxysmal atrial fibrillation (H)  Comment:   /75 (BP Location: Left arm, Patient Position: Chair, Cuff Size: Adult Regular)  Pulse 66  Temp 97.5  F (36.4  C) (Oral)  Resp 12  Wt 170 lb 14.4 oz (77.5 kg)  SpO2 97%  BMI 25.61 kg/m2  Regular pulse

## 2018-07-24 NOTE — MR AVS SNAPSHOT
After Visit Summary   7/24/2018    Nathen Gage    MRN: [id number]           Patient Information     Date Of Birth          1935        Visit Information        Provider Department      7/24/2018 11:30 AM Kushal Valentin MD [facility]        Today's Diagnoses     Achilles tendinitis, right leg    -  1    Essential hypertension, benign        Pacemaker, artificial        Paroxysmal atrial fibrillation (H)           Follow-ups after your visit        Follow-up notes from your care team     Return in about 3 months (around 10/24/2018).      Your next 10 appointments already scheduled     Jul 31, 2018  9:30 AM CDT   Phone Device Check with PATE TECH1   [facility] ([facility])    [address]  [facility] [id number] [phone] OPT 2            Jul 31, 2018 10:00 AM CDT   Anticoagulation Visit with [facility] CL[facility]   [facility] ([facility]    [address] [address] [phone]              Who to contact     If you have questions or need follow up information about today's clinic visit or your schedule please contact [facility] directly at [phone].  Normal or non-critical lab and imaging results will be communicated to you by MyChart, letter or phone within 4 business days after the clinic has received the results. If you do not hear from us within 7 days, please contact the clinic through MyChart or phone. If you have a critical or abnormal lab result, we will notify you by phone as soon as possible.  Submit refill requests through [employer] or call your pharmacy and they will forward the refill request to us. Please allow 3 business days for your refill to be completed.          Additional Information About Your Visit        [employer] ([employer]hart Information     [employer] gives you secure access to your electronic health  record. If you see a primary care provider, you can also send messages to your care team and make appointments. If you have questions, please call your primary care clinic.  If you do not have a primary care provider, please call [phone] and they will assist you.        Care EveryWhere ID     This is your Care EveryWhere ID. This could be used by other organizations to access your [place] medical records  [id number]        Your Vitals Were     Pulse Temperature Respirations Pulse Oximetry BMI (Body Mass Index)       66 97.5  F (36.4  C) (Oral) 12 97% 25.61 kg/m2        Blood Pressure from Last 3 Encounters:   07/24/18 138/75   05/29/18 126/82   05/24/18 148/75    Weight from Last 3 Encounters:   07/24/18 170 lb 14.4 oz (77.5 kg)   05/24/18 169 lb 14.4 oz (77.1 kg)   04/17/18 173 lb 1.6 oz (78.5 kg)              Today, you had the following     No orders found for display       Primary Care Provider Office Phone # Fax #    Kushal Valentin [phone] [address] [address] [address]        Equal Access to Services     MICHELE ALEXANDRA : Hadii margarita ku hadasho Soomaali, waaxda luqadaha, qaybta kaalmada adeegyada, anamaria diaz. So [facility] [phone].    ATENCIÓN: Si habla español, tiene a arana disposición servicios gratuitos de asistencia lingüística. Llame al [phone].    We comply with applicable federal civil rights laws and [place] laws. We do not discriminate on the basis of race, color, national origin, age, disability, sex, sexual orientation, or gender identity.            Thank you!     Thank you for choosing [facility]  for your care. Our goal is always to provide you with excellent care. Hearing back from our patients is one way we can continue to improve our services. Please take a few minutes to complete the written survey that you may receive in the mail after your visit with us. Thank you!             Your Updated  Medication List - Protect others around you: Learn how to safely use, store and throw away your medicines at www.disposemymeds.org.          This list is accurate as of 7/24/18  5:24 PM.  Always use your most recent med list.                   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

## 2018-07-31 ENCOUNTER — ALLIED HEALTH/NURSE VISIT (OUTPATIENT)
Dept: CARDIOLOGY | Facility: CLINIC | Age: 83
End: 2018-07-31
Payer: COMMERCIAL

## 2018-07-31 ENCOUNTER — ANTICOAGULATION THERAPY VISIT (OUTPATIENT)
Dept: NURSING | Facility: CLINIC | Age: 83
End: 2018-07-31
Payer: COMMERCIAL

## 2018-07-31 DIAGNOSIS — I48.0 PAROXYSMAL ATRIAL FIBRILLATION (H): ICD-10-CM

## 2018-07-31 DIAGNOSIS — Z79.01 LONG-TERM (CURRENT) USE OF ANTICOAGULANTS: ICD-10-CM

## 2018-07-31 DIAGNOSIS — Z95.0 CARDIAC PACEMAKER IN SITU: Primary | ICD-10-CM

## 2018-07-31 LAB — INR POINT OF CARE: 2.1 (ref 0.86–1.14)

## 2018-07-31 PROCEDURE — 99207 ZZC NO CHARGE NURSE ONLY: CPT

## 2018-07-31 PROCEDURE — 85610 PROTHROMBIN TIME: CPT | Mod: QW

## 2018-07-31 PROCEDURE — 36416 COLLJ CAPILLARY BLOOD SPEC: CPT

## 2018-07-31 PROCEDURE — 93293 PM PHONE R-STRIP DEVICE EVAL: CPT | Performed by: INTERNAL MEDICINE

## 2018-07-31 NOTE — MR AVS SNAPSHOT
After Visit Summary   7/31/2018    Nathen Gage    MRN: [id number]           Patient Information     Date Of Birth          1935        Visit Information        Provider Department      7/31/2018 9:30 AM PATE TECH1 [facility]        Today's Diagnoses     Cardiac pacemaker in situ    -  1       Follow-ups after your visit        Your next 10 appointments already scheduled     Aug 14, 2018  9:45 AM CDT   Anticoagulation Visit with [facility] ANTICOAGULATION CLINIC   [facility] ([facility])    [id number] [place] [address] [phone]            Nov 07, 2018  9:30 AM CST   Pacemaker Check with RU DCRN   [facility] ([facility])    [address]  [place] [address] [phone]              Who to contact     If you have questions or need follow up information about today's clinic visit or your schedule please contact [facility] directly at [phone].  Normal or non-critical lab and imaging results will be communicated to you by RunnerPlacehart, letter or phone within 4 business days after the clinic has received the results. If you do not hear from us within 7 days, please contact the clinic through [employer]ner[employer] or phone. If you have a critical or abnormal lab result, we will notify you by phone as soon as possible.  Submit refill requests through [employer] or call your pharmacy and they will forward the refill request to us. Please allow 3 business days for your refill to be completed.          Additional Information About Your Visit        Runner[employer] Information     [employer] gives you secure access to your electronic health record. If you see a primary care provider, you can also send messages to your care team and make appointments. If you have questions, please call your primary care clinic.  If you do not  have a primary care provider, please call [phone] and they will assist you.        Care EveryWhere ID     This is your Care EveryWhere ID. This could be used by other organizations to access your [place] medical records  [id number]         Blood Pressure from Last 3 Encounters:   07/24/18 138/75   05/29/18 126/82   05/24/18 148/75    Weight from Last 3 Encounters:   07/24/18 77.5 kg (170 lb 14.4 oz)   05/24/18 77.1 kg (169 lb 14.4 oz)   04/17/18 78.5 kg (173 lb 1.6 oz)              We Performed the Following     PM PHONE R STRIP EVAL UP TO 90 DAYS ([phone])        Primary Care Provider Office Phone # Fax #    Kushal Johann Valentin [phone] [address] [address] [address]        Equal Access to Services     [facility]: Hadii aad ku hadasho Soomaali, waaxda luqadaha, qaybta kaalmada adeegyada, waxay chloein hayaan jd mclaughlin . So [facility] [place] [facility] [phone].    ATENCIÓN: Si habla español, tiene a arana disposición servicios gratuitos de asistencia lingüística. Harpalame al [phone].    We comply with applicable federal civil rights laws and [place] laws. We do not discriminate on the basis of race, color, national origin, age, disability, sex, sexual orientation, or gender identity.            Thank you!     Thank you for choosing [facility]  for your care. Our goal is always to provide you with excellent care. Hearing back from our patients is one way we can continue to improve our services. Please take a few minutes to complete the written survey that you may receive in the mail after your visit with us. Thank you!             Your Updated Medication List - Protect others around you: Learn how to safely use, store and throw away your medicines at www.disposemymeds.org.          This list is accurate as of 7/31/18 10:31 AM.  Always use your most recent med list.                   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

## 2018-07-31 NOTE — PROGRESS NOTES
~ 90 day phone teletrace ~  Intrinsic rhythm at time of check. Magnet response WNL. Annual threshold scheduled in November. Theodore BANERJEE

## 2018-07-31 NOTE — PROGRESS NOTES
ANTICOAGULATION FOLLOW-UP CLINIC VISIT    Patient Name:  Nathen Gage  Date:  7/31/2018  Contact Type:  Face to Face    SUBJECTIVE:     Patient Findings     Positives No Problem Findings           OBJECTIVE    INR Protime   Date Value Ref Range Status   07/31/2018 2.1 (A) 0.86 - 1.14 Final       ASSESSMENT / PLAN  INR assessment THER    Recheck INR In: 2 WEEKS    INR Location Clinic      Anticoagulation Summary as of 7/31/2018     INR goal 2.0-2.5   Today's INR 2.1   Warfarin maintenance plan 1.25 mg (2.5 mg x 0.5) on Tue, Fri; 2.5 mg (2.5 mg x 1) all other days   Full warfarin instructions 1.25 mg on Tue, Fri; 2.5 mg all other days   Weekly warfarin total 15 mg   No change documented Fidelina Pizano RN   Plan last modified Fidelina Pizano RN (4/3/2018)   Next INR check 8/14/2018   Target end date Indefinite    Indications   Long-term (current) use of anticoagulants [Z79.01] [Z79.01]  Atrial fibrillation (H) [I48.91]         Anticoagulation Episode Summary     INR check location     Preferred lab     Send INR reminders to [facility] CL[facility]    Comments       Anticoagulation Care Providers     Provider Role Specialty Phone number    Kushal Valentin MD [facility] [phone]            See the Encounter Report to view Anticoagulation Flowsheet and Dosing Calendar (Go to Encounters tab in chart review, and find the Anticoagulation Therapy Visit)        Fidelina Pizano RN

## 2018-07-31 NOTE — MR AVS SNAPSHOT
Nathen JANICE Gage   7/31/2018 10:00 AM   Anticoagulation Therapy Visit    Description:  82 year old male   Provider:  [facility]   Department:  Cr Nurse           INR as of 7/31/2018     Today's INR 2.1      Anticoagulation Summary as of 7/31/2018     INR goal 2.0-2.5   Today's INR 2.1   Full warfarin instructions 1.25 mg on Tue, Fri; 2.5 mg all other days   Next INR check 8/14/2018    Indications   Long-term (current) use of anticoagulants [Z79.01] [Z79.01]  Atrial fibrillation (H) [I48.91]         Your next Anticoagulation Clinic appointment(s)     Aug 14, 2018  9:45 AM CDT   Anticoagulation Visit with [facility] ANTICOAGULATION CLINIC   [facility] ([facility]    [address] [place] [address] [phone]              Contact Numbers     Clinic Number:         July 2018 Details    Sun Mon Tue Wed Thu Fri Sat     1               2               3               4               5               6               7                 8               9               10               11               12               13               14                 15               16               17               18               19               20               21                 22               23               24               25               26               27               28                 29               30               31      1.25 mg   See details           Date Details   07/31 This INR check               How to take your warfarin dose     To take:  1.25 mg Take 0.5 of a 2.5 mg tablet.           August 2018 Details    Sun Mon Tue Wed Thu Fri Sat        1      2.5 mg         2      2.5 mg         3      1.25 mg         4      2.5 mg           5      2.5 mg         6      2.5 mg         7      1.25 mg         8      2.5 mg         9      2.5 mg         10      1.25 mg         11      2.5 mg           12      2.5 mg         13      2.5 mg         14             15               16               17               18                 19               20               21               22               23               24               25                 26               27               28               29               30               31                 Date Details   No additional details    Date of next INR:  8/14/2018         How to take your warfarin dose     To take:  1.25 mg Take 0.5 of a 2.5 mg tablet.    To take:  2.5 mg Take 1 of the 2.5 mg tablets.

## 2018-08-14 ENCOUNTER — ANTICOAGULATION THERAPY VISIT (OUTPATIENT)
Dept: NURSING | Facility: CLINIC | Age: 83
End: 2018-08-14
Payer: COMMERCIAL

## 2018-08-14 DIAGNOSIS — Z79.01 LONG-TERM (CURRENT) USE OF ANTICOAGULANTS: ICD-10-CM

## 2018-08-14 DIAGNOSIS — K21.00 GASTROESOPHAGEAL REFLUX DISEASE WITH ESOPHAGITIS: ICD-10-CM

## 2018-08-14 DIAGNOSIS — I48.0 PAROXYSMAL ATRIAL FIBRILLATION (H): ICD-10-CM

## 2018-08-14 LAB — INR POINT OF CARE: 2.1 (ref 0.86–1.14)

## 2018-08-14 PROCEDURE — 36416 COLLJ CAPILLARY BLOOD SPEC: CPT

## 2018-08-14 PROCEDURE — 99207 ZZC NO CHARGE NURSE ONLY: CPT

## 2018-08-14 PROCEDURE — 85610 PROTHROMBIN TIME: CPT | Mod: QW

## 2018-08-14 RX ORDER — PANTOPRAZOLE SODIUM 40 MG/1
40 TABLET, DELAYED RELEASE ORAL DAILY
Qty: 90 TABLET | Refills: 2 | Status: SHIPPED | OUTPATIENT
Start: 2018-08-14 | End: 2019-05-10

## 2018-08-14 NOTE — MR AVS SNAPSHOT
Nathen JANICE Gage   8/14/2018 9:45 AM   Anticoagulation Therapy Visit    Description:  82 year old male   Provider:  [facility]   Department:  Cr Nurse           INR as of 8/14/2018     Today's INR 2.1      Anticoagulation Summary as of 8/14/2018     INR goal 2.0-2.5   Today's INR 2.1   Full warfarin instructions 1.25 mg on Tue, Fri; 2.5 mg all other days   Next INR check 8/28/2018    Indications   Long-term (current) use of anticoagulants [Z79.01] [Z79.01]  Atrial fibrillation (H) [I48.91]         Your next Anticoagulation Clinic appointment(s)     Aug 28, 2018  9:00 AM CDT   Anticoagulation Visit with CR ANTICOAGULATION CLINIC   [facility] ([facility])    [address] [place] [id number] [phone]              Contact Numbers     Clinic Number:         August 2018 Details    Sun Mon Tue Wed Thu Fri Sat        1               2               3               4                 5               6               7               8               9               10               11                 12               13               14      1.25 mg   See details      15      2.5 mg         16      2.5 mg         17      1.25 mg         18      2.5 mg           19      2.5 mg         20      2.5 mg         21      1.25 mg         22      2.5 mg         23      2.5 mg         24      1.25 mg         25      2.5 mg           26      2.5 mg         27      2.5 mg         28            29               30               31                 Date Details   08/14 This INR check       Date of next INR:  8/28/2018         How to take your warfarin dose     To take:  1.25 mg Take 0.5 of a 2.5 mg tablet.    To take:  2.5 mg Take 1 of the 2.5 mg tablets.

## 2018-08-14 NOTE — PROGRESS NOTES
ANTICOAGULATION FOLLOW-UP CLINIC VISIT    Patient Name:  Nathen Gage  Date:  8/14/2018  Contact Type:  Face to Face    SUBJECTIVE:     Patient Findings     Positives No Problem Findings           OBJECTIVE    INR Protime   Date Value Ref Range Status   08/14/2018 2.1 (A) 0.86 - 1.14 Final       ASSESSMENT / PLAN  INR assessment THER    Recheck INR In: 2 WEEKS    INR Location Clinic      Anticoagulation Summary as of 8/14/2018     INR goal 2.0-2.5   Today's INR 2.1   Warfarin maintenance plan 1.25 mg (2.5 mg x 0.5) on Tue, Fri; 2.5 mg (2.5 mg x 1) all other days   Full warfarin instructions 1.25 mg on Tue, Fri; 2.5 mg all other days   Weekly warfarin total 15 mg   No change documented Fidelina Pizano RN   Plan last modified Fidelina Pizano RN (4/3/2018)   Next INR check 8/28/2018   Target end date Indefinite    Indications   Long-term (current) use of anticoagulants [Z79.01] [Z79.01]  Atrial fibrillation (H) [I48.91]         Anticoagulation Episode Summary     INR check location     Preferred lab     Send INR reminders to [facility] CL[facility]IC    Comments       Anticoagulation Care Providers     Provider Role Specialty Phone number    Kushal Valentin MD [facility] [phone]            See the Encounter Report to view Anticoagulation Flowsheet and Dosing Calendar (Go to Encounters tab in chart review, and find the Anticoagulation Therapy Visit)        Fidelina Pizano RN

## 2018-08-28 ENCOUNTER — ANTICOAGULATION THERAPY VISIT (OUTPATIENT)
Dept: NURSING | Facility: CLINIC | Age: 83
End: 2018-08-28
Payer: COMMERCIAL

## 2018-08-28 DIAGNOSIS — Z79.01 LONG-TERM (CURRENT) USE OF ANTICOAGULANTS: ICD-10-CM

## 2018-08-28 DIAGNOSIS — I48.0 PAROXYSMAL ATRIAL FIBRILLATION (H): ICD-10-CM

## 2018-08-28 LAB — INR POINT OF CARE: 2.6 (ref 0.86–1.14)

## 2018-08-28 PROCEDURE — 36416 COLLJ CAPILLARY BLOOD SPEC: CPT

## 2018-08-28 PROCEDURE — 99207 ZZC NO CHARGE NURSE ONLY: CPT

## 2018-08-28 PROCEDURE — 85610 PROTHROMBIN TIME: CPT | Mod: QW

## 2018-08-28 NOTE — MR AVS SNAPSHOT
Nathenjoya Gage   8/28/2018 9:00 AM   Anticoagulation Therapy Visit    Description:  82 year old male   Provider:  [facility]   Department:  Cr Nurse           INR as of 8/28/2018     Today's INR 2.6!      Anticoagulation Summary as of 8/28/2018     INR goal 2.0-2.5   Today's INR 2.6!   Full warfarin instructions 1.25 mg on Tue, Fri; 2.5 mg all other days   Next INR check 9/11/2018    Indications   Long-term (current) use of anticoagulants [Z79.01] [Z79.01]  Atrial fibrillation (H) [I48.91]         Your next Anticoagulation Clinic appointment(s)     Sep 11, 2018  9:00 AM CDT   Anticoagulation Visit with CR ANTICOAGULATION CLINIC   [facility]    [address] [place] [id number] [phone]              Contact Numbers     Clinic Number:         August 2018 Details    Sun Mon Tue Wed Thu Fri Sat        1               2               3               4                 5               6               7               8               9               10               11                 12               13               14               15               16               17               18                 19               20               21               22               23               24               25                 26               27               28      1.25 mg   See details      29      2.5 mg         30      2.5 mg         31      1.25 mg           Date Details   08/28 This INR check   Eat greens               How to take your warfarin dose     To take:  1.25 mg Take 0.5 of a 2.5 mg tablet.    To take:  2.5 mg Take 1 of the 2.5 mg tablets.           September 2018 Details    Sun Mon Tue Wed Thu Fri Sat           1      2.5 mg           2      2.5 mg         3      2.5 mg         4      1.25 mg         5      2.5 mg         6      2.5 mg         7      1.25 mg         8      2.5 mg           9      2.5 mg         10       2.5 mg         11            12               13               14               15                 16               17               18               19               20               21               22                 23               24               25               26               27               28               29                 30                      Date Details   No additional details    Date of next INR:  9/11/2018         How to take your warfarin dose     To take:  1.25 mg Take 0.5 of a 2.5 mg tablet.    To take:  2.5 mg Take 1 of the 2.5 mg tablets.

## 2018-08-28 NOTE — PROGRESS NOTES
"  ANTICOAGULATION FOLLOW-UP CLINIC VISIT    Patient Name:  Nathen Gage  Date:  8/28/2018  Contact Type:  Face to Face    SUBJECTIVE:     Patient Findings     Positives Change in diet/appetite (\"a little less greens\", I encouraged consistency)           OBJECTIVE    INR Protime   Date Value Ref Range Status   08/28/2018 2.6 (A) 0.86 - 1.14 Final       ASSESSMENT / PLAN  INR assessment THER    Recheck INR In: 2 WEEKS    INR Location Clinic      Anticoagulation Summary as of 8/28/2018     INR goal 2.0-2.5   Today's INR 2.6!   Warfarin maintenance plan 1.25 mg (2.5 mg x 0.5) on Tue, Fri; 2.5 mg (2.5 mg x 1) all other days   Full warfarin instructions 1.25 mg on Tue, Fri; 2.5 mg all other days   Weekly warfarin total 15 mg   No change documented Fidelina Pizano RN   Plan last modified Fidelina Pizano RN (4/3/2018)   Next INR check 9/11/2018   Target end date Indefinite    Indications   Long-term (current) use of anticoagulants [Z79.01] [Z79.01]  Atrial fibrillation (H) [I48.91]         Anticoagulation Episode Summary     INR check location     Preferred lab     Send INR reminders to [facility] CL[facility]    Comments       Anticoagulation Care Providers     Provider Role Specialty Phone number    Kushal Valentin MD [facility] [phone]            See the Encounter Report to view Anticoagulation Flowsheet and Dosing Calendar (Go to Encounters tab in chart review, and find the Anticoagulation Therapy Visit)        Fidelina Pizano RN               "

## 2018-09-11 ENCOUNTER — ANTICOAGULATION THERAPY VISIT (OUTPATIENT)
Dept: NURSING | Facility: CLINIC | Age: 83
End: 2018-09-11
Payer: COMMERCIAL

## 2018-09-11 DIAGNOSIS — N13.8 BENIGN LOCALIZED HYPERPLASIA OF PROSTATE WITH URINARY OBSTRUCTION: ICD-10-CM

## 2018-09-11 DIAGNOSIS — Z79.01 LONG-TERM (CURRENT) USE OF ANTICOAGULANTS: ICD-10-CM

## 2018-09-11 DIAGNOSIS — N40.1 BENIGN LOCALIZED HYPERPLASIA OF PROSTATE WITH URINARY OBSTRUCTION: ICD-10-CM

## 2018-09-11 DIAGNOSIS — I48.0 PAROXYSMAL ATRIAL FIBRILLATION (H): ICD-10-CM

## 2018-09-11 LAB — INR POINT OF CARE: 2.8 (ref 0.86–1.14)

## 2018-09-11 PROCEDURE — 85610 PROTHROMBIN TIME: CPT | Mod: QW

## 2018-09-11 PROCEDURE — 99207 ZZC NO CHARGE NURSE ONLY: CPT

## 2018-09-11 PROCEDURE — 36416 COLLJ CAPILLARY BLOOD SPEC: CPT

## 2018-09-11 RX ORDER — DOXAZOSIN 4 MG/1
TABLET ORAL
Qty: 90 TABLET | Refills: 0 | Status: SHIPPED | OUTPATIENT
Start: 2018-09-11 | End: 2018-12-12

## 2018-09-11 NOTE — MR AVS SNAPSHOT
Nathen JANICE Gage   9/11/2018 11:30 AM   Anticoagulation Therapy Visit    Description:  82 year old male   Provider:  [facility]   Department:  Cr Nurse           INR as of 9/11/2018     Today's INR 2.8!      Anticoagulation Summary as of 9/11/2018     INR goal 2.0-2.5   Today's INR 2.8!   Full warfarin instructions 1.25 mg on Tue, Fri; 2.5 mg all other days   Next INR check 9/25/2018    Indications   Long-term (current) use of anticoagulants [Z79.01] [Z79.01]  Atrial fibrillation (H) [I48.91]         Your next Anticoagulation Clinic appointment(s)     Sep 25, 2018  9:45 AM CDT   Anticoagulation Visit with [facility] CLINIC   [facility]    [address] [place] [address]   [phone]              Contact Numbers     Clinic Number:         September 2018 Details    Sun Mon Tue Wed Thu Fri Sat           1                 2               3               4               5               6               7               8                 9               10               11      1.25 mg   See details      12      2.5 mg         13      2.5 mg         14      1.25 mg         15      2.5 mg           16      2.5 mg         17      2.5 mg         18      1.25 mg         19      2.5 mg         20      2.5 mg         21      1.25 mg         22      2.5 mg           23      2.5 mg         24      2.5 mg         25            26               27               28               29                 30                      Date Details   09/11 This INR check   Eat greens       Date of next INR:  9/25/2018         How to take your warfarin dose     To take:  1.25 mg Take 0.5 of a 2.5 mg tablet.    To take:  2.5 mg Take 1 of the 2.5 mg tablets.

## 2018-09-11 NOTE — PROGRESS NOTES
ANTICOAGULATION FOLLOW-UP CLINIC VISIT    Patient Name:  Nathen Gage  Date:  9/11/2018  Contact Type:  Face to Face    SUBJECTIVE:     Patient Findings     Positives Activity level change (has not been to the [facility] x 1 + weeks, he does plan to resume)           OBJECTIVE    INR Protime   Date Value Ref Range Status   09/11/2018 2.8 (A) 0.86 - 1.14 Final       ASSESSMENT / PLAN  INR assessment SUPRA    Recheck INR In: 2 WEEKS    INR Location Clinic      Anticoagulation Summary as of 9/11/2018     INR goal 2.0-2.5   Today's INR 2.8!   Warfarin maintenance plan 1.25 mg (2.5 mg x 0.5) on Tue, Fri; 2.5 mg (2.5 mg x 1) all other days   Full warfarin instructions 1.25 mg on Tue, Fri; 2.5 mg all other days   Weekly warfarin total 15 mg   No change documented Fidelina Pizano, RN   Plan last modified Fidelina Pizano RN (4/3/2018)   Next INR check 9/25/2018   Target end date Indefinite    Indications   Long-term (current) use of anticoagulants [Z79.01] [Z79.01]  Atrial fibrillation (H) [I48.91]         Anticoagulation Episode Summary     INR check location     Preferred lab     Send INR reminders to [facility]    Comments       Anticoagulation Care Providers     Provider Role Specialty Phone number    Kushal Valentin MD [facility] [phone]            See the Encounter Report to view Anticoagulation Flowsheet and Dosing Calendar (Go to Encounters tab in chart review, and find the Anticoagulation Therapy Visit)        Fidelina Pizano RN

## 2018-09-18 ENCOUNTER — OFFICE VISIT (OUTPATIENT)
Dept: PODIATRY | Facility: CLINIC | Age: 83
End: 2018-09-18
Payer: COMMERCIAL

## 2018-09-18 ENCOUNTER — RADIANT APPOINTMENT (OUTPATIENT)
Dept: GENERAL RADIOLOGY | Facility: CLINIC | Age: 83
End: 2018-09-18
Attending: PODIATRIST
Payer: COMMERCIAL

## 2018-09-18 VITALS
BODY MASS INDEX: 25.18 KG/M2 | DIASTOLIC BLOOD PRESSURE: 62 MMHG | SYSTOLIC BLOOD PRESSURE: 130 MMHG | HEIGHT: 69 IN | WEIGHT: 170 LBS

## 2018-09-18 DIAGNOSIS — M76.61 ACHILLES TENDINITIS OF RIGHT LOWER EXTREMITY: ICD-10-CM

## 2018-09-18 DIAGNOSIS — M79.671 RIGHT FOOT PAIN: Primary | ICD-10-CM

## 2018-09-18 DIAGNOSIS — M79.671 RIGHT FOOT PAIN: ICD-10-CM

## 2018-09-18 DIAGNOSIS — M77.51 BONE SPUR OF RIGHT ANKLE: ICD-10-CM

## 2018-09-18 PROCEDURE — 73630 X-RAY EXAM OF FOOT: CPT | Mod: RT

## 2018-09-18 PROCEDURE — 99203 OFFICE O/P NEW LOW 30 MIN: CPT | Performed by: PODIATRIST

## 2018-09-18 RX ORDER — DEXAMETHASONE SODIUM PHOSPHATE 4 MG/ML
4 INJECTION, SOLUTION INTRA-ARTICULAR; INTRALESIONAL; INTRAMUSCULAR; INTRAVENOUS; SOFT TISSUE ONCE
Qty: 30 ML | Refills: 0 | Status: SHIPPED | OUTPATIENT
Start: 2018-09-18 | End: 2018-12-06

## 2018-09-18 NOTE — PATIENT INSTRUCTIONS
Thank you for choosing [facility]!    DR. OLIVA'S CLINIC SCHEDULE  MONDAY AM - [place] TUESDAY - [place]   [address] [address] [place] [address]   [phone] / [phone] / [phone]       WEDNESDAY - [place] FRIDAY AM - WOUND CENTER   [address]   [place] [address] [place] [address]   [phone] / [phone]       FRIDAY PM - [place] SCHEDULE SURGERY: [phone]   [address] BILLING QUESTIONS: [phone]   [place] [address] AFTER HOURS: [phone] APPOINTMENTS: [phone]     Consumer Price Line (CPL) [phone]       FOOT CARE NURSES  If you are interested in having a foot care nurse come out to your   home, please call one of these contacts for more information:  [facility]  [phone] [facility]  [phone]   [facility]  [phone]  [place] [phone]  [place]   [place]  [phone]  At [place] & [facility] [phone]     NAIL FUNGUS / ONYCHOMYCOSIS   Nail fungus is not a hygiene problem and will not likely lead to significant medical   problems. The nails may get thick causing pain and possibly local skin infection.   Treatments include debridement (trimming), oral antifungals, topical antifungals and complete removal of the nail. Most fungal nails are not treated.   Topicals such as tea tree oil can be helpful for surface fungus and may, at best, limit   progression. Over the counter creams (such as Lamisil) can also be used however, their effectiveness is also quite low.  Topical treatment with Pen lac is expensive and often not covered by insurance. Pen lac has an approximate 8% success rate. Topical therapy recommendations is to apply twice a day for at least 3-4 months as it takes 9 months for new  nail to grow out.    Experts suggest soaking your feet for 15 to 20 minutes in a mixture of 1 cup vinegar to 4 cups warm water. Be sure to rinse well and pat your feet dry when you're done. You can soak your feet like this daily. But if your skin becomes irritated, try soaking only two to three times a week. Vicks VapoRub, as with vinegar, there have been no controlled clinical trials to assess the effectiveness of Vicks VapoRub on nail fungus, but there have been numerous anecdotal reports that it works. There's no consensus on how often to apply this product, so check with your doctor before using it on your nails.     Oral therapies include Sporanox and Lamisil. Oral therapies are also expensive and not very effective. Side effects such as liver disease are the main concern. Return of fungus is common even if the treatment worked.     Other Tips:  - Penlac nail medication apply daily x 4 months; remove old polish first day of each week  - Antifungal cream/powder (Zeasorb) - apply daily to feet and shoes x 2 months  - Clean shoes with Lysol or in washing machine every few weeks  - Rotate shoe gear; give them 24 hours to dry out between days wearing them  - Clean pair of socks in morning, clean pair in afternoon if your feet sweat  - Shower shoes used in public showers/pools    ACHILLES TENDON LENGTHENING  The Achilles tendon and calf muscles play a critical role in normal foot and ankle function. Tightness of the muscle and tendon complex prohibits the normal amount of ankle flexibility. Inadequate ankle dorsiflexion, also known as equinus, results in compensatory stress throughout the leg and foot.     Unnatural stress on the foot results in arch collapse, foot pronation, plantar fasciitis, pain in the ball of the foot, hammer toes, bunions, arthritis, foot wounds, etc. The knee, hip, and leg muscles may also be affected by ankle stiffness. People with diabetes have additional problems with tightness of the  Achilles tendon. The combination of foot numbness and a tight Achilles may lead to pressure sores in the ball of the foot. Bone breakdown through the midfoot may also occur as a complication of equinus.     Initial treatment might involve stretching exercises. Stretching will keep the muscles loose but you will not likely accomplish making the tendon longer. Daily Achilles stretching is important   nonetheless. Surgical lengthening of the tendon is often required. .   Surgery can be performed in several ways. The tendon can be lengthened at the ankle level (Achilles lengthening) or in the mid calf (Gastroc lengthening). Achilles and gastrocnemius lengthening can be performed as an isolated procedure or in combination with surgical procedures for other conditions.   Lengthening procedures are most commonly performed for treatment of flatfoot deformity, contracture associated with diabetes related forefoot and midfoot ulcers, contracture after injury or stroke and to treat Achilles tendonitis.   The goal of surgery is to make the tendon longer yet still preserve function of the calf muscles. Most surgical patients do not have noticeable weakness once they recover from the surgery.     The main potential hazard of surgery involves tendon rupture during the healing process. Rupture is not common but could potentially occur. The tendon can also become excessively lengthened, especially from the stress of premature walking before the tendon is healed. Tendon adhesion, skin adhesion and nerve irritation or numbness can also occur.    CALF AND ACHILLES TENDON STRECHES   Stretch gently, do not bounce.   Do each set of stretches three times a day while you are having symptoms.   Do each set of stretches once a day after symptoms are relieved.     1. Towel Stretch     Sit on hard surface with one leg straight out in front of you.     Loop a towel around the ball of the foot and pull the towel to your body.     Be sure to keep  your leg straight.     You should feel tension in the calf muscle of the straight leg.     Repeat 3 times on each side for at least 15 seconds each.     2. Standing Calf Stretch     Stand about a foot from a wall and extend one leg behind you.     Keep both feet flat on the floor, toes pointed straight ahead, and the rear knee  straight.     Lean into the wall until you feel tension in the rear leg.     Hold for at least 15 seconds.     Repeat 2 times on each side.     3. Standing Achilles Tendon Stretch     Get into position of Standing Calf Stretch.     Lower the hips downward as you slightly bend the knee of the rear leg.     Keep both feet flat on the floor and toes straight ahead.     Hold for at least 15 seconds.     Repeat 2 times on each side.       Home Medical Equipment:  1. [place] [facility]    [facility] -[address]  [address]   [place] [phone]     2. [facility] - [address] [place] [address], [place], [phone]        Body Mass Index (BMI)  Many things can cause foot and ankle problems. Foot structure, activity level, foot mechanics and injuries are common causes of pain.  One very important issue that often goes unmentioned, is body weight. Extra weight can cause increased stress on muscles, ligaments, bones and tendons.  Sometimes just a few extra pounds is all it takes to put one over her/his threshold. Without reducing that stress, it can be difficult to alleviate pain. Some people are uncomfortable addressing this issue, but we feel it is important for you to think about it. As Foot &  Ankle specialists, our job is addressing the lower extremity problem and possible causes. Regarding extra body weight, we encourage patients to discuss diet and weight management plans with their primary care doctors. It is this team approach that gives you the best opportunity for pain relief and  getting you back on your feet.

## 2018-09-18 NOTE — MR AVS SNAPSHOT
After Visit Summary   9/18/2018    Nathen Gage    MRN: [id number]           Patient Information     Date Of Birth          1935        Visit Information        Provider Department      9/18/2018 9:15 AM Jenny Napier DPM, Podiatry/Foot and Ankle Surgery [facility]        Today's Diagnoses     Right foot pain    -  1    Achilles tendinitis of right lower extremity        Bone spur of right ankle          Care Instructions    Thank you for choosing [facility]!    DR. NAPIER'S CLINIC SCHEDULE  MONDAY AM - [place] TUESDAY - [place]   [address] [facility] [address] [address] [address] [place] [address]   [phone] / [phone]       WEDNESDAY - [place] FRIDAY AM - WOUND CENTER   [address]   [place] [address] [place] [address]   [phone] / [phone]       FRIDAY PM - [place] SCHEDULE SURGERY: [phone]   [address] BILLING QUESTIONS: [phone]   [place] [address] AFTER HOURS: [phone] APPOINTMENTS: [phone]     Consumer Price Line (CPL) [phone]       FOOT CARE NURSES  If you are interested in having a foot care nurse come out to your   home, please call one of these contacts for more information:  [facility]  [phone] [facility]  [phone]   [facility]  [phone]  [place] [phone]  [place]   [place] [facility]  [phone]  At [place] & [facility] [phone]     NAIL FUNGUS / ONYCHOMYCOSIS   Nail fungus is not a hygiene problem and will not likely lead to significant medical   problems. The nails may get thick causing pain and possibly local skin infection.   Treatments include debridement (trimming), oral antifungals, topical antifungals and complete removal of the nail. Most  fungal nails are not treated.   Topicals such as tea tree oil can be helpful for surface fungus and may, at best, limit   progression. Over the counter creams (such as Lamisil) can also be used however, their effectiveness is also quite low.  Topical treatment with Pen lac is expensive and often not covered by insurance. Pen lac has an approximate 8% success rate. Topical therapy recommendations is to apply twice a day for at least 3-4 months as it takes 9 months for new nail to grow out.    Experts suggest soaking your feet for 15 to 20 minutes in a mixture of 1 cup vinegar to 4 cups warm water. Be sure to rinse well and pat your feet dry when you're done. You can soak your feet like this daily. But if your skin becomes irritated, try soaking only two to three times a week. Vicks VapoRub, as with vinegar, there have been no controlled clinical trials to assess the effectiveness of Vicks VapoRub on nail fungus, but there have been numerous anecdotal reports that it works. There's no consensus on how often to apply this product, so check with your doctor before using it on your nails.     Oral therapies include Sporanox and Lamisil. Oral therapies are also expensive and not very effective. Side effects such as liver disease are the main concern. Return of fungus is common even if the treatment worked.     Other Tips:  - Penlac nail medication apply daily x 4 months; remove old polish first day of each week  - Antifungal cream/powder (Zeasorb) - apply daily to feet and shoes x 2 months  - Clean shoes with Lysol or in washing machine every few weeks  - Rotate shoe gear; give them 24 hours to dry out between days wearing them  - Clean pair of socks in morning, clean pair in afternoon if your feet sweat  - Shower shoes used in public showers/pools    ACHILLES TENDON LENGTHENING  The Achilles tendon and calf muscles play a critical role in normal foot and ankle function. Tightness of the muscle and tendon complex  prohibits the normal amount of ankle flexibility. Inadequate ankle dorsiflexion, also known as equinus, results in compensatory stress throughout the leg and foot.     Unnatural stress on the foot results in arch collapse, foot pronation, plantar fasciitis, pain in the ball of the foot, hammer toes, bunions, arthritis, foot wounds, etc. The knee, hip, and leg muscles may also be affected by ankle stiffness. People with diabetes have additional problems with tightness of the Achilles tendon. The combination of foot numbness and a tight Achilles may lead to pressure sores in the ball of the foot. Bone breakdown through the midfoot may also occur as a complication of equinus.     Initial treatment might involve stretching exercises. Stretching will keep the muscles loose but you will not likely accomplish making the tendon longer. Daily Achilles stretching is important   nonetheless. Surgical lengthening of the tendon is often required. .   Surgery can be performed in several ways. The tendon can be lengthened at the ankle level (Achilles lengthening) or in the mid calf (Gastroc lengthening). Achilles and gastrocnemius lengthening can be performed as an isolated procedure or in combination with surgical procedures for other conditions.   Lengthening procedures are most commonly performed for treatment of flatfoot deformity, contracture associated with diabetes related forefoot and midfoot ulcers, contracture after injury or stroke and to treat Achilles tendonitis.   The goal of surgery is to make the tendon longer yet still preserve function of the calf muscles. Most surgical patients do not have noticeable weakness once they recover from the surgery.     The main potential hazard of surgery involves tendon rupture during the healing process. Rupture is not common but could potentially occur. The tendon can also become excessively lengthened, especially from the stress of premature walking before the tendon is healed.  Tendon adhesion, skin adhesion and nerve irritation or numbness can also occur.    CALF AND ACHILLES TENDON STRECHES   Stretch gently, do not bounce.   Do each set of stretches three times a day while you are having symptoms.   Do each set of stretches once a day after symptoms are relieved.     1. Towel Stretch     Sit on hard surface with one leg straight out in front of you.     Loop a towel around the ball of the foot and pull the towel to your body.     Be sure to keep your leg straight.     You should feel tension in the calf muscle of the straight leg.     Repeat 3 times on each side for at least 15 seconds each.     2. Standing Calf Stretch     Stand about a foot from a wall and extend one leg behind you.     Keep both feet flat on the floor, toes pointed straight ahead, and the rear knee  straight.     Lean into the wall until you feel tension in the rear leg.     Hold for at least 15 seconds.     Repeat 2 times on each side.     3. Standing Achilles Tendon Stretch     Get into position of Standing Calf Stretch.     Lower the hips downward as you slightly bend the knee of the rear leg.     Keep both feet flat on the floor and toes straight ahead.     Hold for at least 15 seconds.     Repeat 2 times on each side.       Home Medical Equipment:  1. [place] [facility]    [facility] -[address] [place]   [address]   [place] [phone]     2. [place] [facility] - [address], [phone]        Body Mass Index (BMI)  Many things can cause foot and ankle problems. Foot structure, activity level, foot mechanics and injuries are common causes of pain.  One very important issue that often goes unmentioned, is body weight. Extra weight can cause increased stress on muscles, ligaments, bones and tendons.  Sometimes just a few extra pounds is all it takes to put one over her/his threshold. Without  reducing that stress, it can be difficult to alleviate pain. Some people are uncomfortable addressing this issue, but we feel it is important for you to think about it. As Foot &  Ankle specialists, our job is addressing the lower extremity problem and possible causes. Regarding extra body weight, we encourage patients to discuss diet and weight management plans with their primary care doctors. It is this team approach that gives you the best opportunity for pain relief and getting you back on your feet.                  Follow-ups after your visit        Your next 10 appointments already scheduled     Sep 25, 2018  9:45 AM CDT   Anticoagulation [facility] with [facility]    [address] [facility] [address] [phone]            Nov 07, 2018  9:30 AM CST   Pacemaker Check with OSVALDO ESQUIVEL   [facility] ([facility])    [address] [facility] [address]  [facility] [address] [phone]              Who to contact     If you have questions or need follow up information about today's clinic visit or your schedule please contact [facility] directly at [phone].  Normal or non-critical lab and imaging results will be communicated to you by MyChart, letter or phone within 4 business days after the clinic has received the results. If you do not hear from us within 7 days, please contact the clinic through [employer] or phone. If you have a critical or abnormal lab result, we will notify you by phone as soon as possible.  Submit refill requests through [employer] or call your pharmacy and they will forward the refill request to us. Please allow 3 business days for your refill to be completed.          Additional Information About Your Visit        MyChart Information     [employer] gives you secure access to your electronic health record. If you see a primary care  "provider, you can also send messages to your care team and make appointments. If you have questions, please call your primary care clinic.  If you do not have a primary care provider, please call [phone] and they will assist you.        Care EveryWhere ID     This is your Care EveryWhere ID. This could be used by other organizations to access your [facility] medical records  [id number]        Your Vitals Were     Height BMI (Body Mass Index)                5' 8.5\" (1.74 m) 25.47 kg/m2           Blood Pressure from Last 3 Encounters:   09/18/18 130/62   07/24/18 138/75   05/29/18 126/82    Weight from Last 3 Encounters:   09/18/18 170 lb (77.1 kg)   07/24/18 170 lb 14.4 oz (77.5 kg)   05/24/18 169 lb 14.4 oz (77.1 kg)               Primary Care Provider Office Phone # Fax #    Kushal Johann Valentin [phone] [address] [facility] [address]        Equal Access to Services     [facility]: Hadii aad ku hadasho Soomaali, waaxda luqadaha, qaybta kaalmada adeegyada, anamaria mclaughlin . So [facility] [phone].    ATENCIÓN: Si habla español, tiene a arana disposición servicios gratuitos de asistencia lingüística. Llame al [phone].    We comply with applicable federal civil rights laws and [place] laws. We do not discriminate on the basis of race, color, national origin, age, disability, sex, sexual orientation, or gender identity.            Thank you!     Thank you for choosing [facility]  for your care. Our goal is always to provide you with excellent care. Hearing back from our patients is one way we can continue to improve our services. Please take a few minutes to complete the written survey that you may receive in the mail after your visit with us. Thank you!             Your Updated Medication List - Protect others around you: Learn how to safely use, store and throw away your medicines at www.disposemymeds.org.          This list is " accurate as of 9/18/18  9:45 AM.  Always use your most recent med list.                   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

## 2018-09-18 NOTE — LETTER
9/18/2018         RE: Nathen Gage  [address] [place] [address]        Dear Colleague,    Thank you for referring your patient, Nathen Gage, to the [facility]. Please see a copy of my visit note below.    PATIENT HISTORY:   Nathen Gage is a 82 year old male who presents to clinic for pain to right heel. Notes it hurts after walking a lot. Has been that way for 2 months. Denies injury. Has tried inserts about a week ago which have been helping some. Pain is 6/10 at its worst. Wondering what can be done for it. Also wondering where to get his nails clipped.     Review of Systems:  Patient denies fever, chills, rash, wound, stiffness,numbness, weakness, heart burn, blood in stool, chest pain with activity, calf pain when walking, shortness of breath with activity, chronic cough, easy bleeding/bruising, swelling of ankles, excessive thirst, fatigue, depression, anxiety.  Patient admits to limping at times.     PAST MEDICAL HISTORY:   Past Medical History:   Diagnosis Date     Actinic keratosis      Acute idiopathic gout of left knee 4/27/2016     Atrial fibrillation (H)     on Eliquis     Dilated aortic root (H)      Esophageal reflux      Esophagitis      H/O: GI bleed 2010     Hyperlipidaemia      Hyperlipidemia LDL goal <100      Impotence of organic origin      Presbycusis, unspecified laterality 4/27/2016     Pulmonary hypertension      Sick sinus syndrome (H)     pacemaker implanted 2011     Unspecified essential hypertension         PAST SURGICAL HISTORY:   Past Surgical History:   Procedure Laterality Date     IMPLANT PACEMAKER  2011    Pacemaker/cardiac insitu / [employer] Dual chamber, V45     PHACOEMULSIFICATION CLEAR CORNEA WITH STANDARD INTRAOCULAR LENS IMPLANT Right 5/29/2018    Procedure: PHACOEMULSIFICATION CLEAR CORNEA WITH STANDARD INTRAOCULAR LENS IMPLANT;  RIGHT EYE PHACOEMULSIFICATION CLEAR CORNEA WITH STANDARD INTRAOCULAR LENS IMPLANT ;   Surgeon: Mat Echevarria MD;  Location: [facility]        MEDICATIONS:   Current Outpatient Prescriptions:      acetaminophen (TYLENOL) 325 MG tablet, Take 2 tablets (650 mg) by mouth every 4 hours as needed for mild pain or fever, Disp: 100 tablet, Rfl:      atorvastatin (LIPITOR) 20 MG tablet, Take 1 tablet (20 mg) by mouth daily, Disp: 90 tablet, Rfl: 3     doxazosin (CARDURA) 4 MG tablet, TAKE 1 TABLET (4 MG) BY MOUTH DAILY, Disp: 90 tablet, Rfl: 0     losartan (COZAAR) 100 MG tablet, Take 1 tablet (100 mg) by mouth daily, Disp: 90 tablet, Rfl: 3     pantoprazole (PROTONIX) 40 MG EC tablet, Take 1 tablet (40 mg) by mouth daily, Disp: 90 tablet, Rfl: 2     prednisolon-gatiflox-bromfenac, pt own, no charge, 1-0.5-0.075 % opthalmic solution, 1 drop 3 times daily, Disp: , Rfl:      warfarin (COUMADIN) 2.5 MG tablet, Take 1.25mg every Tu & Fri / Take 2.5mg all other days OR as instructed by INR clinic, Disp: 30 tablet, Rfl: 5     ALLERGIES:    Allergies   Allergen Reactions     Neomycin Sulfate         SOCIAL HISTORY:   Social History     Social History     Marital status:      Spouse name: N/A     Number of children: N/A     Years of education: N/A     Occupational History     Not on file.     Social History Main Topics     Smoking status: Former Smoker     Quit date: 1/1/1975     Smokeless tobacco: Never Used     Alcohol use Yes      Comment: 1- 2 BEERS WEEKLY     Drug use: No     Sexual activity: Yes     Partners: Female     Other Topics Concern     Parent/Sibling W/ Cabg, Mi Or Angioplasty Before 65f 55m? No     Caffeine Concern No     1 cup coffee per day     Sleep Concern No     Stress Concern No     Weight Concern No     Special Diet Yes     on warfarin      Exercise Yes     states he is very active, dancing, skiing, walking      Bike Helmet Yes     Seat Belt Yes     Social History Narrative        FAMILY HISTORY:   Family History   Problem Relation Age of Onset     Alzheimer Disease Brother       "Cerebrovascular Disease Father 80     Family History Negative Sister      Family History Negative Son      Family History Negative Daughter      Family History Negative Sister      Family History Negative Daughter      Breast Cancer No family hx of      Prostate Cancer No family hx of         EXAM:Vitals: /62  Ht 5' 8.5\" (1.74 m)  Wt 170 lb (77.1 kg)  BMI 25.47 kg/m2    General appearance: Patient is alert and fully cooperative with history & exam.  No sign of distress is noted during the visit.     Psychiatric: Affect is pleasant & appropriate.  Patient appears motivated to improve health.     Respiratory: Breathing is regular & unlabored while sitting.     HEENT: Hearing is intact to spoken word.  Speech is clear.  No gross evidence of visual impairment that would impact ambulation.     Dermatologic: no open lesions.      Vascular: DP & PT pulses are intact & regular bilaterally.  No significant edema or varicosities noted.  CFT and skin temperature is normal to both lower extremities.     Neurologic: Lower extremity sensation is intact to light touch.  No evidence of weakness or contracture in the lower extremities.  No evidence of neuropathy.     Musculoskeletal: Patient is ambulatory without assistive device or brace.  No gross ankle deformity noted.  No foot or ankle joint effusion is noted.    Radiographs:  I personally reviewed the xrays. Posterior heel spur noted. No fractures. Non weight bearing.      ASSESSMENT:    Right foot pain  Achilles tendinitis of right lower extremity  Bone spur of right ankle     PLAN:  Reviewed [facility] in [facility]. Reviewed xrays. Reviewed and discussed causes of tendonitis.  We discussed treatments such as immobiliation, icing, stretching, heel lifts, orthotics, physical therapy, MRI.     At this time, continue in inserts. Recommend PT with ionto, stretches and night splint. If pain continues, recommend MRI.       Jenny Napier DPM, Podiatry/Foot and Ankle " Surgery    Weight management plan: Patient was referred to their PCP to discuss a diet and exercise plan.    Patient to follow up with Primary Care provider regarding elevated blood pressure.        Again, thank you for allowing me to participate in the care of your patient.        Sincerely,        Jenny Napier DPM, Podiatry/Foot and Ankle Surgery

## 2018-09-18 NOTE — PROGRESS NOTES
PATIENT HISTORY:   Nathen Gage is a 82 year old male who presents to clinic for pain to right heel. Notes it hurts after walking a lot. Has been that way for 2 months. Denies injury. Has tried inserts about a week ago which have been helping some. Pain is 6/10 at its worst. Wondering what can be done for it. Also wondering where to get his nails clipped.     Review of Systems:  Patient denies fever, chills, rash, wound, stiffness,numbness, weakness, heart burn, blood in stool, chest pain with activity, calf pain when walking, shortness of breath with activity, chronic cough, easy bleeding/bruising, swelling of ankles, excessive thirst, fatigue, depression, anxiety.  Patient admits to limping at times.     PAST MEDICAL HISTORY:   Past Medical History:   Diagnosis Date     Actinic keratosis      Acute idiopathic gout of left knee 4/27/2016     Atrial fibrillation (H)     on Eliquis     Dilated aortic root (H)      Esophageal reflux      Esophagitis      H/O: GI bleed 2010     Hyperlipidaemia      Hyperlipidemia LDL goal <100      Impotence of organic origin      Presbycusis, unspecified laterality 4/27/2016     Pulmonary hypertension      Sick sinus syndrome (H)     pacemaker implanted 2011     Unspecified essential hypertension         PAST SURGICAL HISTORY:   Past Surgical History:   Procedure Laterality Date     IMPLANT PACEMAKER  2011    Pacemaker/cardiac insitu / [employer] Dual chamber, V45     PHACOEMULSIFICATION CLEAR CORNEA WITH STANDARD INTRAOCULAR LENS IMPLANT Right 5/29/2018    Procedure: PHACOEMULSIFICATION CLEAR CORNEA WITH STANDARD INTRAOCULAR LENS IMPLANT;  RIGHT EYE PHACOEMULSIFICATION CLEAR CORNEA WITH STANDARD INTRAOCULAR LENS IMPLANT ;  Surgeon: Mat Echevarria MD;  Location: [facility]        MEDICATIONS:   Current Outpatient Prescriptions:      acetaminophen (TYLENOL) 325 MG tablet, Take 2 tablets (650 mg) by mouth every 4 hours as needed for mild pain or fever, Disp: 100 tablet,  Rfl:      atorvastatin (LIPITOR) 20 MG tablet, Take 1 tablet (20 mg) by mouth daily, Disp: 90 tablet, Rfl: 3     doxazosin (CARDURA) 4 MG tablet, TAKE 1 TABLET (4 MG) BY MOUTH DAILY, Disp: 90 tablet, Rfl: 0     losartan (COZAAR) 100 MG tablet, Take 1 tablet (100 mg) by mouth daily, Disp: 90 tablet, Rfl: 3     pantoprazole (PROTONIX) 40 MG EC tablet, Take 1 tablet (40 mg) by mouth daily, Disp: 90 tablet, Rfl: 2     prednisolon-gatiflox-bromfenac, pt own, no charge, 1-0.5-0.075 % opthalmic solution, 1 drop 3 times daily, Disp: , Rfl:      warfarin (COUMADIN) 2.5 MG tablet, Take 1.25mg every Tu & Fri / Take 2.5mg all other days OR as instructed by INR clinic, Disp: 30 tablet, Rfl: 5     ALLERGIES:    Allergies   Allergen Reactions     Neomycin Sulfate         SOCIAL HISTORY:   Social History     Social History     Marital status:      Spouse name: N/A     Number of children: N/A     Years of education: N/A     Occupational History     Not on file.     Social History Main Topics     Smoking status: Former Smoker     Quit date: 1/1/1975     Smokeless tobacco: Never Used     Alcohol use Yes      Comment: 1- 2 BEERS WEEKLY     Drug use: No     Sexual activity: Yes     Partners: Female     Other Topics Concern     Parent/Sibling W/ Cabg, Mi Or Angioplasty Before 65f 55m? No     Caffeine Concern No     1 cup coffee per day     Sleep Concern No     Stress Concern No     Weight Concern No     Special Diet Yes     on warfarin      Exercise Yes     states he is very active, dancing, skiing, walking      Bike Helmet Yes     Seat Belt Yes     Social History Narrative        FAMILY HISTORY:   Family History   Problem Relation Age of Onset     Alzheimer Disease Brother      Cerebrovascular Disease Father 80     Family History Negative Sister      Family History Negative Son      Family History Negative Daughter      Family History Negative Sister      Family History Negative Daughter      Breast Cancer No family hx of       "Prostate Cancer No family hx of         EXAM:Vitals: /62  Ht 5' 8.5\" (1.74 m)  Wt 170 lb (77.1 kg)  BMI 25.47 kg/m2    General appearance: Patient is alert and fully cooperative with history & exam.  No sign of distress is noted during the visit.     Psychiatric: Affect is pleasant & appropriate.  Patient appears motivated to improve health.     Respiratory: Breathing is regular & unlabored while sitting.     HEENT: Hearing is intact to spoken word.  Speech is clear.  No gross evidence of visual impairment that would impact ambulation.     Dermatologic: no open lesions.      Vascular: DP & PT pulses are intact & regular bilaterally.  No significant edema or varicosities noted.  CFT and skin temperature is normal to both lower extremities.     Neurologic: Lower extremity sensation is intact to light touch.  No evidence of weakness or contracture in the lower extremities.  No evidence of neuropathy.     Musculoskeletal: Patient is ambulatory without assistive device or brace.  No gross ankle deformity noted.  No foot or ankle joint effusion is noted.    Radiographs:  I personally reviewed the xrays. Posterior heel spur noted. No fractures. Non weight bearing.      ASSESSMENT:    Right foot pain  Achilles tendinitis of right lower extremity  Bone spur of right ankle     PLAN:  Reviewed patient's chart in [facility]. Reviewed xrays. Reviewed and discussed causes of tendonitis.  We discussed treatments such as immobiliation, icing, stretching, heel lifts, orthotics, physical therapy, MRI.     At this time, continue in inserts. Recommend PT with ionto, stretches and night splint. If pain continues, recommend MRI.       Jenny Napier DPM, Podiatry/Foot and Ankle Surgery    Weight management plan: Patient was referred to their PCP to discuss a diet and exercise plan.    Patient to follow up with Primary Care provider regarding elevated blood pressure.      "

## 2018-09-20 ENCOUNTER — THERAPY VISIT (OUTPATIENT)
Dept: PHYSICAL THERAPY | Facility: CLINIC | Age: 83
End: 2018-09-20
Payer: COMMERCIAL

## 2018-09-20 DIAGNOSIS — M25.571 PAIN IN JOINT INVOLVING ANKLE AND FOOT, RIGHT: Primary | ICD-10-CM

## 2018-09-20 PROCEDURE — 97161 PT EVAL LOW COMPLEX 20 MIN: CPT | Mod: GP | Performed by: PHYSICAL THERAPIST

## 2018-09-20 PROCEDURE — G8979 MOBILITY GOAL STATUS: HCPCS | Mod: GP | Performed by: PHYSICAL THERAPIST

## 2018-09-20 PROCEDURE — 97110 THERAPEUTIC EXERCISES: CPT | Mod: GP | Performed by: PHYSICAL THERAPIST

## 2018-09-20 PROCEDURE — G8978 MOBILITY CURRENT STATUS: HCPCS | Mod: GP | Performed by: PHYSICAL THERAPIST

## 2018-09-20 NOTE — PROGRESS NOTES
[facility] Initial Evaluation  Subjective:  Patient is a 82 year old male presenting with rehab right ankle/foot hpi. The history is provided by the patient. No  was used.   Nathen Gage is a 82 year old male with a right ankle condition.  Condition occurred with:  Insidious onset.  Condition occurred: for unknown reasons.  This is a new condition  Patient reports an onset of posterior heel pain beginning about 6 weeks ago.  Pain developed over the course of the day on about 8/13/2018.  Patient c/o pain with walking.    Patient reports pain:  Posterior and other (heel).    Pain is described as sharp and is intermittent and reported as 4/10.  Associated with: patient denies swelling, N/T, instability or weakness. Pain is worse in the P.M. and worse in the A.M..  Symptoms are exacerbated by standing and walking and relieved by rest and ice.  Since onset symptoms are gradually improving.  Special tests:  X-ray (heel spur).  Previous treatment: none.    General health as reported by patient is good.  Pertinent medical history includes:  High blood pressure, heart problems and implanted devices.  Medical allergies: no.  Other surgeries include:  Heart surgery (pacemaker).  Current medications:  High blood pressure medication.  Current occupation is Retired.        Barriers include:  None as reported by patient.    Red flags:  None as reported by patient.                        Objective:  Standing Alignment:                Ankle/Foot:  Pes cavus R    Gait:    Gait Type:  Normal   Assistive Devices:  None      Flexibility/Screens:       Lower Extremity:      Decreased right lower extremity flexibility:  Gastroc and Soleus          Ankle/Foot Evaluation  ROM:  AROM is normal.PROM is normal.      Strength:    Dorsiflexion:  Right: 5/5    Pain:-  Plantarflexion: Right: 5-/5   Pain:-/+  Inversion:Right: 5/5   Pain:-  Eversion:Right: 5/5   Pain:-                    SPECIAL TESTS:        Right ankle negative for the following special tests:  Ibrahim sign and Toy's sign  PALPATION:     Right ankle tenderness present at:   achilles tendon (insertion)  Right ankle tenderness not present at:  gastroc/soleus; anterior tibialis; deltoid ligament; plantar fascia or medial calcaneal  EDEMA: normal            FUNCTIONAL TESTS:           Proprioception:  Stork Balance Test: Right: 20+ seconds                                                     General     ROS    Assessment/Plan:    Patient is a 82 year old male with right side ankle complaints.    Patient has the following significant findings with corresponding treatment plan.                Diagnosis 1:  Achilles tendinitis   Pain -  self management, education and home program  Decreased ROM/flexibility - therapeutic exercise, therapeutic activity and home program  Decreased strength - therapeutic exercise, therapeutic activities and home program  Impaired gait - gait training and home program  Impaired muscle performance - neuro re-education and home program  Decreased function - therapeutic activities and home program    Therapy Evaluation Codes:   1) History comprised of:   Personal factors that impact the plan of care:      None.    Comorbidity factors that impact the plan of care are:      Heart problems and High blood pressure.     Medications impacting care: High blood pressure.  2) Examination of Body Systems comprised of:   Body structures and functions that impact the plan of care:      Ankle.   Activity limitations that impact the plan of care are:      Standing and Walking.  3) Clinical presentation characteristics are:   Stable/Uncomplicated.  4) Decision-Making    Low complexity using standardized patient assessment instrument and/or measureable assessment of functional outcome.  Cumulative Therapy Evaluation is: Low complexity.    Previous and current functional limitations:  (See Goal Flow Sheet for this information)    Short term  and Long term goals: (See Goal Flow Sheet for this information)     Communication ability:  Patient appears to be able to clearly communicate and understand verbal and written communication and follow directions correctly.  Treatment Explanation - The following has been discussed with the patient:   RX ordered/plan of care  Anticipated outcomes  Possible risks and side effects  This patient would benefit from PT intervention to resume normal activities.   Rehab potential is good.    Frequency:  1 X week, once daily  Duration:  for 6 weeks  Discharge Plan:  Achieve all LTG.  Independent in home treatment program.  Reach maximal therapeutic benefit.    Please refer to the daily flowsheet for treatment today, total treatment time and time spent performing 1:1 timed codes.

## 2018-09-27 ENCOUNTER — THERAPY VISIT (OUTPATIENT)
Dept: PHYSICAL THERAPY | Facility: CLINIC | Age: 83
End: 2018-09-27
Payer: COMMERCIAL

## 2018-09-27 DIAGNOSIS — M25.571 PAIN IN JOINT INVOLVING ANKLE AND FOOT, RIGHT: ICD-10-CM

## 2018-09-27 PROCEDURE — 97110 THERAPEUTIC EXERCISES: CPT | Mod: GP | Performed by: PHYSICAL THERAPY ASSISTANT

## 2018-09-27 PROCEDURE — 97033 APP MDLTY 1+IONTPHRSIS EA 15: CPT | Mod: GP | Performed by: PHYSICAL THERAPY ASSISTANT

## 2018-10-02 ENCOUNTER — ANTICOAGULATION THERAPY VISIT (OUTPATIENT)
Dept: NURSING | Facility: CLINIC | Age: 83
End: 2018-10-02
Payer: COMMERCIAL

## 2018-10-02 DIAGNOSIS — I48.0 PAROXYSMAL ATRIAL FIBRILLATION (H): ICD-10-CM

## 2018-10-02 LAB — INR POINT OF CARE: 2.2 (ref 0.86–1.14)

## 2018-10-02 PROCEDURE — 36416 COLLJ CAPILLARY BLOOD SPEC: CPT

## 2018-10-02 PROCEDURE — 85610 PROTHROMBIN TIME: CPT | Mod: QW

## 2018-10-02 NOTE — PROGRESS NOTES
ANTICOAGULATION FOLLOW-UP CLINIC VISIT    Patient Name:  Nathen Gage  Date:  10/2/2018  Contact Type:  Face to Face    SUBJECTIVE:     Patient Findings     Positives Activity level change (Going to physical therapy for R foot bone spur and plantar faciitis), No Problem Findings           OBJECTIVE    INR Protime   Date Value Ref Range Status   10/02/2018 2.2 (A) 0.86 - 1.14 Final       ASSESSMENT / PLAN  INR assessment THER    Recheck INR In: 2 WEEKS    INR Location Clinic      Anticoagulation Summary as of 10/2/2018     INR goal 2.0-2.5   Today's INR 2.2   Warfarin maintenance plan 1.25 mg (2.5 mg x 0.5) on Tue, Fri; 2.5 mg (2.5 mg x 1) all other days   Full warfarin instructions 1.25 mg on Tue, Fri; 2.5 mg all other days   Weekly warfarin total 15 mg   No change documented Fidelina Pizano, ARTIS   Plan last modified Fidelina Pizano RN (4/3/2018)   Next INR check 10/16/2018   Target end date Indefinite    Indications   Long-term (current) use of anticoagulants [Z79.01] [Z79.01]  Atrial fibrillation (H) [I48.91]         Anticoagulation Episode Summary     INR check location     Preferred lab     Send INR reminders to [facility]    Comments       Anticoagulation Care Providers     Provider Role Specialty Phone number    Kushal Valentin MD [facility] [phone]            See the Encounter Report to view Anticoagulation Flowsheet and Dosing Calendar (Go to Encounters tab in chart review, and find the Anticoagulation Therapy Visit)        Fidelina Pizano RN

## 2018-10-02 NOTE — MR AVS SNAPSHOT
Nathen Gage   10/2/2018 9:00 AM   Anticoagulation Therapy Visit    Description:  82 year old male   Provider:  [facility]   Department:  [facility]           INR as of 10/2/2018     Today's INR 2.2      Anticoagulation Summary as of 10/2/2018     INR goal 2.0-2.5   Today's INR 2.2   Full warfarin instructions 1.25 mg on Tue, Fri; 2.5 mg all other days   Next INR check 10/16/2018    Indications   Long-term (current) use of anticoagulants [Z79.01] [Z79.01]  Atrial fibrillation (H) [I48.91]         Your next Anticoagulation Clinic appointment(s)     Oct 16, 2018  8:15 AM CDT   [facility]ticoagulation [facility] with [facility]   [facility]    [address] [place] [address] [id number] [phone]              Contact Numbers     Clinic Number:         October 2018 Details    Sun Mon Tue Wed Thu Fri Sat      1               2      1.25 mg   See details      3      2.5 mg         4      2.5 mg         5      1.25 mg         6      2.5 mg           7      2.5 mg         8      2.5 mg         9      1.25 mg         10      2.5 mg         11      2.5 mg         12      1.25 mg         13      2.5 mg           14      2.5 mg         15      2.5 mg         16            17               18               19               20                 21               22               23               24               25               26               27                 28               29               30               31                   Date Details   10/02 This INR check       Date of next INR:  10/16/2018         How to take your warfarin dose     To take:  1.25 mg Take 0.5 of a 2.5 mg tablet.    To take:  2.5 mg Take 1 of the 2.5 mg tablets.

## 2018-10-04 ENCOUNTER — THERAPY VISIT (OUTPATIENT)
Dept: PHYSICAL THERAPY | Facility: CLINIC | Age: 83
End: 2018-10-04
Payer: COMMERCIAL

## 2018-10-04 DIAGNOSIS — M25.571 PAIN IN JOINT INVOLVING ANKLE AND FOOT, RIGHT: ICD-10-CM

## 2018-10-04 PROCEDURE — 97033 APP MDLTY 1+IONTPHRSIS EA 15: CPT | Mod: GP | Performed by: PHYSICAL THERAPY ASSISTANT

## 2018-10-04 PROCEDURE — 97110 THERAPEUTIC EXERCISES: CPT | Mod: GP | Performed by: PHYSICAL THERAPY ASSISTANT

## 2018-10-10 ENCOUNTER — THERAPY VISIT (OUTPATIENT)
Dept: PHYSICAL THERAPY | Facility: CLINIC | Age: 83
End: 2018-10-10
Payer: COMMERCIAL

## 2018-10-10 DIAGNOSIS — M25.571 PAIN IN JOINT INVOLVING ANKLE AND FOOT, RIGHT: ICD-10-CM

## 2018-10-10 PROCEDURE — 97110 THERAPEUTIC EXERCISES: CPT | Mod: GP | Performed by: PHYSICAL THERAPY ASSISTANT

## 2018-10-10 PROCEDURE — 97033 APP MDLTY 1+IONTPHRSIS EA 15: CPT | Mod: GP | Performed by: PHYSICAL THERAPY ASSISTANT

## 2018-10-16 ENCOUNTER — ALLIED HEALTH/NURSE VISIT (OUTPATIENT)
Dept: NURSING | Facility: CLINIC | Age: 83
End: 2018-10-16
Payer: COMMERCIAL

## 2018-10-16 ENCOUNTER — ANTICOAGULATION THERAPY VISIT (OUTPATIENT)
Dept: NURSING | Facility: CLINIC | Age: 83
End: 2018-10-16
Payer: COMMERCIAL

## 2018-10-16 DIAGNOSIS — I48.0 PAROXYSMAL ATRIAL FIBRILLATION (H): ICD-10-CM

## 2018-10-16 DIAGNOSIS — Z23 NEED FOR PROPHYLACTIC VACCINATION AND INOCULATION AGAINST INFLUENZA: Primary | ICD-10-CM

## 2018-10-16 LAB — INR POINT OF CARE: 1.7 (ref 0.86–1.14)

## 2018-10-16 PROCEDURE — G0008 ADMIN INFLUENZA VIRUS VAC: HCPCS

## 2018-10-16 PROCEDURE — 90662 IIV NO PRSV INCREASED AG IM: CPT

## 2018-10-16 PROCEDURE — 85610 PROTHROMBIN TIME: CPT | Mod: QW

## 2018-10-16 PROCEDURE — 36416 COLLJ CAPILLARY BLOOD SPEC: CPT

## 2018-10-16 NOTE — PROGRESS NOTES

## 2018-10-16 NOTE — MR AVS SNAPSHOT
After Visit Summary   10/16/2018    Nathen Gage    MRN: [id number]           Patient Information     Date Of Birth          1935        Visit Information        Provider Department      10/16/2018 8:30 AM CR MA/LPN [facility]        Today's Diagnoses     Need for prophylactic vaccination and inoculation against influenza    -  1       Follow-ups after your visit        Your next 10 appointments already scheduled     Oct 17, 2018 11:00 AM CDT   CASSIDY Extremity with Dora Grimes PTA   [facility] [place] [facility] ([facility] [place]  )    [address] [facility] [address]  [address]  [facility] [address]   [phone]            Oct 30, 2018  8:45 AM CDT   Anticoagulation Visit with [facility] CLINIC   [facility] ([facility])    [address] [address] [phone]            Nov 07, 2018  9:30 AM CST   Pacemaker Check with OSVALDO ROMANN   [facility] ([facility])    [address] [facility] [address]  [facility] [id number] [phone]              Who to contact     If you have questions or need follow up information about today's clinic visit or your schedule please contact [facility] directly at [phone].  Normal or non-critical lab and imaging results will be communicated to you by Kyruushart, letter or phone within 4 business days after the clinic has received the results. If you do not hear from us within 7 days, please contact the clinic through Kyruushart or phone. If you have a critical or abnormal lab result, we will notify you by phone as soon as possible.  Submit refill requests through Movatu or call your pharmacy and they will forward the refill request to us. Please allow 3 business days for your refill to be completed.          Additional Information About Your Visit        Movatu Information     Movatu gives you secure access to your electronic  health record. If you see a primary care provider, you can also send messages to your care team and make appointments. If you have questions, please call your primary care clinic.  If you do not have a primary care provider, please call [phone] and they will assist you.        Care EveryWhere ID     This is your Care EveryWhere ID. This could be used by other organizations to access your [facility] medical records  [id number]         Blood Pressure from Last 3 Encounters:   09/18/18 130/62   07/24/18 138/75   05/29/18 126/82    Weight from Last 3 Encounters:   09/18/18 170 lb (77.1 kg)   07/24/18 170 lb 14.4 oz (77.5 kg)   05/24/18 169 lb 14.4 oz (77.1 kg)              We Performed the Following     FLU VACCINE, INCREASED ANTIGEN, PRESV FREE, AGE 65+ [91483]     Vaccine Administration, Initial [74627]        Primary Care Provider Office Phone # Fax #    Kushal Valentin [phone] [address] [facility] [address]        Equal Access to Services     KRIS ALEXANDRA : Hadii aad ku hadasho Soifeanyi, waaxda luqadaha, qaybta kaalmada emmett, anamaria mclaughlin . So [facility] [phone].    ATENCIÓN: Si habla español, tiene a arana disposición servicios gratuitos de asistencia lingüística. [facility] [phone].    We comply with applicable federal civil rights laws and [place] laws. We do not discriminate on the basis of race, color, national origin, age, disability, sex, sexual orientation, or gender identity.            Thank you!     Thank you for choosing [facility]  for your care. Our goal is always to provide you with excellent care. Hearing back from our patients is one way we can continue to improve our services. Please take a few minutes to complete the written survey that you may receive in the mail after your visit with us. Thank you!             Your Updated Medication List - Protect others around you: Learn how to safely use, store  and throw away your medicines at www.disposemymeds.org.          This list is accurate as of 10/16/18  8:48 AM.  Always use your most recent med list.                   Brand Name Dispense Instructions for use Diagnosis    acetaminophen 325 MG tablet    TYLENOL    100 tablet    Take 2 tablets (650 mg) by mouth every 4 hours as needed for mild pain or fever    Pneumonia due to infectious organism, unspecified laterality, unspecified part of lung       atorvastatin 20 MG tablet    LIPITOR    90 tablet    Take 1 tablet (20 mg) by mouth daily    Pure hypercholesterolemia, Essential hypertension, benign       dexamethasone 4 MG/ML injection    DECADRON    30 mL    Apply 1 mL (4 mg) topically once for 1 dose For physical therapy, iontophoresis    Right foot pain, Achilles tendinitis of right lower extremity, Bone spur of right ankle       doxazosin 4 MG tablet    CARDURA    90 tablet    TAKE 1 TABLET (4 MG) BY MOUTH DAILY    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       losartan 100 MG tablet    COZAAR    90 tablet    Take 1 tablet (100 mg) by mouth daily    Secondary hypertension       order for DME     1 Device    Equipment being ordered: night splint    Right foot pain, Achilles tendinitis of right lower extremity, Bone spur of right ankle       pantoprazole 40 MG EC tablet    PROTONIX    90 tablet    Take 1 tablet (40 mg) by mouth daily    Gastroesophageal reflux disease with esophagitis       prednisolon-gatiflox-bromfenac (pt own, no charge) 1-0.5-0.075 % opthalmic solution      1 drop 3 times daily        warfarin 2.5 MG tablet    COUMADIN    30 tablet    Take 1.25mg every Tu & Fri / Take 2.5mg all other days OR as instructed by INR clinic    Long-term (current) use of anticoagulants

## 2018-10-16 NOTE — MR AVS SNAPSHOT
Nathen JANICE Gage   10/16/2018 8:15 AM   Anticoagulation Therapy Visit    Description:  82 year old male   Provider:  [facility]   Department:  Cr Nurse           INR as of 10/16/2018     Today's INR 1.7!      Anticoagulation Summary as of 10/16/2018     INR goal 2.0-2.5   Today's INR 1.7!   Full warfarin instructions 10/16: 2.5 mg; Otherwise 1.25 mg on Tue, Fri; 2.5 mg all other days   Next INR check 10/30/2018    Indications   Long-term (current) use of anticoagulants [Z79.01] [Z79.01]  Atrial fibrillation (H) [I48.91]         Your next Anticoagulation Clinic appointment(s)     Oct 30, 2018  8:45 AM CDT   Anticoagulation Visit with  ANTICOAGULATION CLINIC   [facility]    [address] [place] [address]   [phone]              Contact Numbers     Clinic Number:         October 2018 Details    Sun Mon Tue Wed Thu Fri Sat      1               2               3               4               5               6                 7               8               9               10               11               12               13                 14               15               16      2.5 mg   See details      17      2.5 mg         18      2.5 mg         19      1.25 mg         20      2.5 mg           21      2.5 mg         22      2.5 mg         23      1.25 mg         24      2.5 mg         25      2.5 mg         26      1.25 mg         27      2.5 mg           28      2.5 mg         29      2.5 mg         30            31                   Date Details   10/16 This INR check       Date of next INR:  10/30/2018         How to take your warfarin dose     To take:  1.25 mg Take 0.5 of a 2.5 mg tablet.    To take:  2.5 mg Take 1 of the 2.5 mg tablets.

## 2018-10-16 NOTE — PROGRESS NOTES
ANTICOAGULATION FOLLOW-UP CLINIC VISIT    Patient Name:  Nathen Gage  Date:  10/16/2018  Contact Type:  Face to Face    SUBJECTIVE:     Patient Findings     Positives Change in diet/appetite (Possibly more greens in the past week)           OBJECTIVE    INR Protime   Date Value Ref Range Status   10/16/2018 1.7 (A) 0.86 - 1.14 Final       ASSESSMENT / PLAN  INR assessment SUB 2 we   Recheck INR In: 2 WEEKS    INR Location Clinic      Anticoagulation Summary as of 10/16/2018     INR goal 2.0-2.5   Today's INR 1.7!   Warfarin maintenance plan 1.25 mg (2.5 mg x 0.5) on Tue, Fri; 2.5 mg (2.5 mg x 1) all other days   Full warfarin instructions 10/16: 2.5 mg; Otherwise 1.25 mg on Tue, Fri; 2.5 mg all other days   Weekly warfarin total 15 mg   Plan last modified Fidelina Pizano RN (4/3/2018)   Next INR check 10/30/2018   Target end date Indefinite    Indications   Long-term (current) use of anticoagulants [Z79.01] [Z79.01]  Atrial fibrillation (H) [I48.91]         Anticoagulation Episode Summary     INR check location     Preferred lab     Send INR reminders to [facility]    Comments       Anticoagulation Care Providers     Provider Role Specialty Phone number    Kushal Valentin MD [facility] [phone]            See the Encounter Report to view Anticoagulation Flowsheet and Dosing Calendar (Go to Encounters tab in chart review, and find the Anticoagulation Therapy Visit)        Fidelina Pizano, RN

## 2018-10-17 ENCOUNTER — THERAPY VISIT (OUTPATIENT)
Dept: PHYSICAL THERAPY | Facility: CLINIC | Age: 83
End: 2018-10-17
Payer: COMMERCIAL

## 2018-10-17 DIAGNOSIS — M25.571 PAIN IN JOINT INVOLVING ANKLE AND FOOT, RIGHT: ICD-10-CM

## 2018-10-17 PROCEDURE — 97033 APP MDLTY 1+IONTPHRSIS EA 15: CPT | Mod: GP | Performed by: PHYSICAL THERAPY ASSISTANT

## 2018-10-17 PROCEDURE — 97110 THERAPEUTIC EXERCISES: CPT | Mod: GP | Performed by: PHYSICAL THERAPY ASSISTANT

## 2018-10-24 ENCOUNTER — THERAPY VISIT (OUTPATIENT)
Dept: PHYSICAL THERAPY | Facility: CLINIC | Age: 83
End: 2018-10-24
Payer: COMMERCIAL

## 2018-10-24 DIAGNOSIS — M25.571 PAIN IN JOINT INVOLVING ANKLE AND FOOT, RIGHT: ICD-10-CM

## 2018-10-24 PROCEDURE — 97033 APP MDLTY 1+IONTPHRSIS EA 15: CPT | Mod: GP | Performed by: PHYSICAL THERAPY ASSISTANT

## 2018-10-24 PROCEDURE — 97110 THERAPEUTIC EXERCISES: CPT | Mod: GP | Performed by: PHYSICAL THERAPY ASSISTANT

## 2018-10-24 PROCEDURE — G8979 MOBILITY GOAL STATUS: HCPCS | Mod: GP | Performed by: PHYSICAL THERAPY ASSISTANT

## 2018-10-24 PROCEDURE — G8978 MOBILITY CURRENT STATUS: HCPCS | Mod: GP | Performed by: PHYSICAL THERAPY ASSISTANT

## 2018-10-24 PROCEDURE — G8980 MOBILITY D/C STATUS: HCPCS | Mod: GP | Performed by: PHYSICAL THERAPY ASSISTANT

## 2018-10-24 NOTE — MR AVS SNAPSHOT
After Visit Summary   10/24/2018    Nathen Gage    MRN: [id number]           Patient Information     Date Of Birth          1935        Visit Information        Provider Department      10/24/2018 9:00 AM Dora Grimes PTA [facility] [place] [facility]        Today's Diagnoses     Pain in joint involving ankle and foot, right           Follow-ups after your visit        Your next 10 appointments already scheduled     Oct 30, 2018  8:45 AM CDT   Anticoagulation Visit with [facility]AGULATION CLINIC   [facility]    [id number] [place] [address] [phone]            Nov 07, 2018  9:30 AM CST   Pacemaker Check with OSVALDO ROMANN   [facility] ([facility])    [address]  [facility] [address] [phone]              Who to contact     If you have questions or need follow up information about today's clinic visit or your schedule please contact [facility] directly at [phone].  Normal or non-critical lab and imaging results will be communicated to you by MyChart, letter or phone within 4 business days after the clinic has received the results. If you do not hear from us within 7 days, please contact the clinic through Hipmunkhart or phone. If you have a critical or abnormal lab result, we will notify you by phone as soon as possible.  Submit refill requests through HengZhi or call your pharmacy and they will forward the refill request to us. Please allow 3 business days for your refill to be completed.          Additional Information About Your Visit        MyChart Information     HengZhi gives you secure access to your electronic health record. If you see a primary care provider, you can also send messages to your care team and make appointments. If you have questions, please call your primary care clinic.  If you do not have a primary care provider,  please call [phone] and they will assist you.        Care EveryWhere ID     This is your Care EveryWhere ID. This could be used by other organizations to access your [facility] medical records  [id number]         Blood Pressure from Last 3 Encounters:   09/18/18 130/62   07/24/18 138/75   05/29/18 126/82    Weight from Last 3 Encounters:   09/18/18 77.1 kg (170 lb)   07/24/18 77.5 kg (170 lb 14.4 oz)   05/24/18 77.1 kg (169 lb 14.4 oz)              We Performed the Following     Iontophoresis     Therapeutic Exercises        Primary Care Provider Office Phone # Fax #    Kushal Valentin [phone] [address] [facility] [address]        Equal Access to Services     [facility] HospitalCHARLENE : Hadii margarita gordon hadasho Soomaali, waaxda luqadaha, qaybta kaalmada adeegyada, anamaria mclaughlin . So [facility] [phone].    ATENCIÓN: Si habla español, tiene a arana disposición servicios gratuitos de asistencia lingüística. [place] [phone].    We comply with applicable federal civil rights laws and [place] laws. We do not discriminate on the basis of race, color, national origin, age, disability, sex, sexual orientation, or gender identity.            Thank you!     Thank you for choosing [facility]  for your care. Our goal is always to provide you with excellent care. Hearing back from our patients is one way we can continue to improve our services. Please take a few minutes to complete the written survey that you may receive in the mail after your visit with us. Thank you!             Your Updated Medication List - Protect others around you: Learn how to safely use, store and throw away your medicines at www.disposemymeds.org.          This list is accurate as of 10/24/18 11:59 PM.  Always use your most recent med list.                   Brand Name Dispense Instructions for use Diagnosis    acetaminophen 325 MG tablet    TYLENOL    100 tablet    Take 2 tablets  (650 mg) by mouth every 4 hours as needed for mild pain or fever    Pneumonia due to infectious organism, unspecified laterality, unspecified part of lung       atorvastatin 20 MG tablet    LIPITOR    90 tablet    Take 1 tablet (20 mg) by mouth daily    Pure hypercholesterolemia, Essential hypertension, benign       dexamethasone 4 MG/ML injection    DECADRON    30 mL    Apply 1 mL (4 mg) topically once for 1 dose For physical therapy, iontophoresis    Right foot pain, Achilles tendinitis of right lower extremity, Bone spur of right ankle       doxazosin 4 MG tablet    CARDURA    90 tablet    TAKE 1 TABLET (4 MG) BY MOUTH DAILY    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       losartan 100 MG tablet    COZAAR    90 tablet    Take 1 tablet (100 mg) by mouth daily    Secondary hypertension       order for DME     1 Device    Equipment being ordered: night splint    Right foot pain, Achilles tendinitis of right lower extremity, Bone spur of right ankle       pantoprazole 40 MG EC tablet    PROTONIX    90 tablet    Take 1 tablet (40 mg) by mouth daily    Gastroesophageal reflux disease with esophagitis       prednisolon-gatiflox-bromfenac (pt own, no charge) 1-0.5-0.075 % opthalmic solution      1 drop 3 times daily        warfarin 2.5 MG tablet    COUMADIN    30 tablet    Take 1.25mg every Tu & Fri / Take 2.5mg all other days OR as instructed by INR clinic    Long-term (current) use of anticoagulants

## 2018-10-25 NOTE — PROGRESS NOTES
Subjective:  HPI                    Objective:  System    Physical Exam    General     ROS    Assessment/Plan:    ASSESSMENT/PLAN  Updated problem list and treatment plan: Diagnosis 1:  R Ankle Pain  Pain -  home program  Decreased ROM/flexibility - home program  Impaired muscle performance - home program  Decreased function - home program  STG/LTGs have been met:  Yes (See Goal flow sheet completed today.)  Progress toward STG/LTGs have been made:  Yes (See Goal flow sheet completed today.)  Assessment of Progress: The patient's condition is improving.  Patient is meeting short term goals and is progressing towards long term goals.  Self Management Plans:  Patient has been instructed in a home treatment program.  Patient is independent in a home treatment program.  I have re-evaluated this patient and find that the nature, scope, duration and intensity of the therapy is appropriate for the medical condition of the patient.  Nathen continues to require the following intervention to meet STG and LT's:  PT intervention is no longer required to meet STG/LTG.    Recommendations:  This patient is ready to be discharged from therapy and continue their home treatment program.    Please refer to the daily flowsheet for treatment today, total treatment time and time spent performing 1:1 timed codes.

## 2018-10-25 NOTE — PROGRESS NOTES
Subjective:  HPI                    Objective:  System    Physical Exam    General     ROS    Assessment/Plan:    DISCHARGE REPORT    Progress reporting period is from 9/20/18 to 10/24/18.      SUBJECTIVE  Subjective changes noted by patient:   Patient reports that he is about 75-80% back to normal. He states that he has an occasional pain but for the most part her can do most anything.  Difficulty finding the tender area on the achilles tendon insertion area.  Current pain level is .  Current Pain level: 2/10    Previous pain level was:   Initial Pain level: 4/10   Changes in function:  Yes (See Goal flowsheet attached for changes in current functional level)     Adverse reaction to treatment or activity: None     OBJECTIVE  Changes noted in objective findings:  Yes, right ankle DF 16, PF =45,  Patient strength was good with patient able to get to 30 single leg heel raises on the right only.  5/5.  Patient has a good HEP.

## 2018-10-30 ENCOUNTER — ANTICOAGULATION THERAPY VISIT (OUTPATIENT)
Dept: NURSING | Facility: CLINIC | Age: 83
End: 2018-10-30
Payer: COMMERCIAL

## 2018-10-30 DIAGNOSIS — I48.0 PAROXYSMAL ATRIAL FIBRILLATION (H): ICD-10-CM

## 2018-10-30 LAB — INR POINT OF CARE: 2.6 (ref 0.86–1.14)

## 2018-10-30 PROCEDURE — 85610 PROTHROMBIN TIME: CPT | Mod: QW

## 2018-10-30 PROCEDURE — 99207 ZZC NO CHARGE NURSE ONLY: CPT

## 2018-10-30 PROCEDURE — 36416 COLLJ CAPILLARY BLOOD SPEC: CPT

## 2018-10-30 NOTE — MR AVS SNAPSHOT
Nathen JANICE Gage   10/30/2018 8:45 AM   Anticoagulation Therapy Visit    Description:  83 year old male   Provider:  [facility]   Department:  Cr Nurse           INR as of 10/30/2018     Today's INR 2.6!      Anticoagulation Summary as of 10/30/2018     INR goal 2.0-2.5   Today's INR 2.6!   Full warfarin instructions 1.25 mg on Tue, Fri; 2.5 mg all other days   Next INR check 11/13/2018    Indications   Long-term (current) use of anticoagulants [Z79.01] [Z79.01]  Atrial fibrillation (H) [I48.91]         Your next Anticoagulation Clinic appointment(s)     Nov 13, 2018  8:30 AM CST   Anticoagulation Visit with [facility]   [facility]    [address] [place] [address] [phone]              Contact Numbers     Clinic Number:         October 2018 Details    Sun Mon Tue Wed Thu Fri Sat      1               2               3               4               5               6                 7               8               9               10               11               12               13                 14               15               16               17               18               19               20                 21               22               23               24               25               26               27                 28               29               30      1.25 mg   See details      31      2.5 mg             Date Details   10/30 This INR check               How to take your warfarin dose     To take:  1.25 mg Take 0.5 of a 2.5 mg tablet.    To take:  2.5 mg Take 1 of the 2.5 mg tablets.           November 2018 Details    Sun Mon Tue Wed Thu Fri Sat         1      2.5 mg         2      1.25 mg         3      2.5 mg           4      2.5 mg         5      2.5 mg         6      1.25 mg         7      2.5 mg         8      2.5 mg         9      1.25 mg         10      2.5 mg           11       2.5 mg         12      2.5 mg         13            14               15               16               17                 18               19               20               21               22               23               24                 25               26               27               28               29               30                 Date Details   No additional details    Date of next INR:  11/13/2018         How to take your warfarin dose     To take:  1.25 mg Take 0.5 of a 2.5 mg tablet.    To take:  2.5 mg Take 1 of the 2.5 mg tablets.

## 2018-10-30 NOTE — PROGRESS NOTES
ANTICOAGULATION FOLLOW-UP CLINIC VISIT    Patient Name:  Nathen Gage  Date:  10/30/2018  Contact Type:  Face to Face    SUBJECTIVE:     Patient Findings     Positives No Problem Findings, Unexplained INR or factor level change           OBJECTIVE    INR Protime   Date Value Ref Range Status   10/30/2018 2.6 (A) 0.86 - 1.14 Final       ASSESSMENT / PLAN  INR assessment THER    Recheck INR In: 2 WEEKS    INR Location Clinic      Anticoagulation Summary as of 10/30/2018     INR goal 2.0-2.5   Today's INR 2.6!   Warfarin maintenance plan 1.25 mg (2.5 mg x 0.5) on Tue, Fri; 2.5 mg (2.5 mg x 1) all other days   Full warfarin instructions 1.25 mg on Tue, Fri; 2.5 mg all other days   Weekly warfarin total 15 mg   No change documented Fidelina Pizano RN   Plan last modified Fidelina Pizano RN (4/3/2018)   Next INR check 11/13/2018   Target end date Indefinite    Indications   Long-term (current) use of anticoagulants [Z79.01] [Z79.01]  Atrial fibrillation (H) [I48.91]         Anticoagulation Episode Summary     INR check location     Preferred lab     Send INR reminders to [facility]    Comments       Anticoagulation Care Providers     Provider Role Specialty Phone number    Kushal Valentin MD [facility] [phone]            See the Encounter Report to view Anticoagulation Flowsheet and Dosing Calendar (Go to Encounters tab in chart review, and find the Anticoagulation Therapy Visit)        Fidelina Pizano RN

## 2018-11-07 ENCOUNTER — ALLIED HEALTH/NURSE VISIT (OUTPATIENT)
Dept: CARDIOLOGY | Facility: CLINIC | Age: 83
End: 2018-11-07
Payer: COMMERCIAL

## 2018-11-07 DIAGNOSIS — Z95.0 CARDIAC PACEMAKER IN SITU: Primary | ICD-10-CM

## 2018-11-07 DIAGNOSIS — I49.5 SICK SINUS SYNDROME (H): ICD-10-CM

## 2018-11-07 PROCEDURE — 93280 PM DEVICE PROGR EVAL DUAL: CPT | Performed by: INTERNAL MEDICINE

## 2018-11-07 NOTE — MR AVS SNAPSHOT
After Visit Summary   11/7/2018    Nathen Gage    MRN: [id number]           Patient Information     Date Of Birth          1935        Visit Information        Provider Department      11/7/2018 9:30 AM RU DCRN [facility]        Today's Diagnoses     Cardiac pacemaker in situ    -  1    Sick sinus syndrome           Follow-ups after your visit        Your next 10 appointments already scheduled     Nov 13, 2018  8:30 AM CST   Anticoagulation Visit with [facility] ANTICOAGULATION CLINIC   [facility] ([facility])    [address] [place] [address] [phone]            Jan 22, 2019  8:30 AM CST   Ech Complete with [address] ([facility] [facility] [place]    [address]  [facility] [address] [phone]           1.  Please bring or wear a comfortable two-piece outfit. 2.  You may eat, drink and take your normal medicines. 3.  For any questions that cannot be answered, please contact the ordering physician 4.  Please do not wear perfumes or scented lotions on the day of your exam. ***Please check-in at the [place] Registration Office located in [address] in the [facility] building. When you are finished registering, please go to [address] and have a seat. The technician will call your name for the test.            Jan 22, 2019  9:30 AM CST   LAB with RU LAB   [facility] ([facility])    [address]  [facility] [id number] [phone]           Please do not eat 10-12 hours before your appointment if you are coming in fasting for labs on lipids, cholesterol, or glucose (sugar). This does not apply to pregnant women. Water, hot tea and black coffee (with nothing added) are okay. Do not drink other fluids, diet soda or chew gum.            Jan 31, 2019   9:45 AM CST   Return Visit with De Zheng MD   [facility] ([facility])    [address]  [facility] [address] [phone]            Feb 12, 2019  8:45 AM CST   Phone Device Check with PATE TECH1   [facility] ([facility])    [address]  [place] [address] [phone] OPT 2              Who to contact     If you have questions or need follow up information about today's clinic visit or your schedule please contact [facility] directly at [phone].  Normal or non-critical lab and imaging results will be communicated to you by [employer], letter or phone within 4 business days after the clinic has received the results. If you do not hear from us within 7 days, please contact the clinic through [employer] or phone. If you have a critical or abnormal lab result, we will notify you by phone as soon as possible.  Submit refill requests through [employer] or call your pharmacy and they will forward the refill request to us. Please allow 3 business days for your refill to be completed.          Additional Information About Your Visit        [employer] Information     [employer] gives you secure access to your electronic health record. If you see a primary care provider, you can also send messages to your care team and make appointments. If you have questions, please call your primary care clinic.  If you do not have a primary care provider, please call [phone] and they will assist you.        Care EveryWhere ID     This is your Care EveryWhere ID. This could be used by other organizations to access your [facility] medical records  [id number]         Blood Pressure from Last 3 Encounters:   09/18/18 130/62   07/24/18 138/75   05/29/18 126/82    Weight from Last 3 Encounters:   09/18/18 77.1 kg (170 lb)   07/24/18 77.5 kg (170 lb 14.4 oz)    05/24/18 77.1 kg (169 lb 14.4 oz)              We Performed the Following     PM DEVICE PROGRAMMING EVAL, DUAL LEAD PACER ([id number])        Primary Care Provider Office Phone # Fax #    Kushal Valentin [phone]        Equal Access to Services     [facility]: Hadii aad ku hadasho Soomaali, waaxda luqadaha, qaybta kaalmada adeegyada, waxay idiin hayaan adeeg kharash la'aan . So [facility] [phone].    ATENCIÓN: Si habla español, tiene a arana disposición servicios gratuitos de asistencia lingüística. Llame al [phone].    We comply with applicable federal civil rights laws and [place] laws. We do not discriminate on the basis of race, color, national origin, age, disability, sex, sexual orientation, or gender identity.            Thank you!     Thank you for choosing [facility]  for your care. Our goal is always to provide you with excellent care. Hearing back from our patients is one way we can continue to improve our services. Please take a few minutes to complete the written survey that you may receive in the mail after your visit with us. Thank you!             Your Updated Medication List - Protect others around you: Learn how to safely use, store and throw away your medicines at www.disposemymeds.org.          This list is accurate as of 11/7/18  9:47 AM.  Always use your most recent med list.                   Brand Name Dispense Instructions for use Diagnosis    acetaminophen 325 MG tablet    TYLENOL    100 tablet    Take 2 tablets (650 mg) by mouth every 4 hours as needed for mild pain or fever    Pneumonia due to infectious organism, unspecified laterality, unspecified part of lung       atorvastatin 20 MG tablet    LIPITOR    90 tablet    Take 1 tablet (20 mg) by mouth daily    Pure hypercholesterolemia, Essential hypertension, benign       dexamethasone 4 MG/ML injection    DECADRON    30  mL    Apply 1 mL (4 mg) topically once for 1 dose For physical therapy, iontophoresis    Right foot pain, Achilles tendinitis of right lower extremity, Bone spur of right ankle       doxazosin 4 MG tablet    CARDURA    90 tablet    TAKE 1 TABLET (4 MG) BY MOUTH DAILY    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       losartan 100 MG tablet    COZAAR    90 tablet    Take 1 tablet (100 mg) by mouth daily    Secondary hypertension       order for DME     1 Device    Equipment being ordered: night splint    Right foot pain, Achilles tendinitis of right lower extremity, Bone spur of right ankle       pantoprazole 40 MG EC tablet    PROTONIX    90 tablet    Take 1 tablet (40 mg) by mouth daily    Gastroesophageal reflux disease with esophagitis       prednisolon-gatiflox-bromfenac (pt own, no charge) 1-0.5-0.075 % opthalmic solution      1 drop 3 times daily        warfarin 2.5 MG tablet    COUMADIN    30 tablet    Take 1.25mg every Tu & Fri / Take 2.5mg all other days OR as instructed by INR clinic    Long-term (current) use of anticoagulants

## 2018-11-07 NOTE — PROGRESS NOTES
[employer] (D) Pacemaker Device Check  AP: 30 % : 0 %  Mode: DDDR         Underlying Rhythm: SR 60's  Heart Rate: good variability   Sensing: WNL    Pacing Threshold: WNL   Impedance: WNL  Battery Status: >5 yrs estimated longevity   Device Site: [facility]  Atrial Arrhythmia: 1 episode of PAT lasting 3 seconds.   Ventricular Arrhythmia: none  Setting Change: none    Care Plan: phone check in 3 months, scheduled. Pt due to see Dr. Zheng this fall, pt set this up on the say out of clinic today.    RICHIE RN

## 2018-11-13 ENCOUNTER — ANTICOAGULATION THERAPY VISIT (OUTPATIENT)
Dept: NURSING | Facility: CLINIC | Age: 83
End: 2018-11-13
Payer: COMMERCIAL

## 2018-11-13 DIAGNOSIS — I48.0 PAROXYSMAL ATRIAL FIBRILLATION (H): ICD-10-CM

## 2018-11-13 LAB — INR POINT OF CARE: 2.7 (ref 0.86–1.14)

## 2018-11-13 PROCEDURE — 99207 ZZC NO CHARGE NURSE ONLY: CPT

## 2018-11-13 PROCEDURE — 36416 COLLJ CAPILLARY BLOOD SPEC: CPT

## 2018-11-13 PROCEDURE — 85610 PROTHROMBIN TIME: CPT | Mod: QW

## 2018-11-13 NOTE — PROGRESS NOTES
ANTICOAGULATION FOLLOW-UP CLINIC VISIT    Patient Name:  Nathen Gage  Date:  11/13/2018  Contact Type:  Face to Face    SUBJECTIVE:     Patient Findings     Positives Change in diet/appetite (6 days of greens vs. 7), Activity level change (1 day at the [facility] versus 2 days )           OBJECTIVE    INR Protime   Date Value Ref Range Status   11/13/2018 2.7 (A) 0.86 - 1.14 Final       ASSESSMENT / PLAN  INR assessment SUPRA    Recheck INR In: 2 WEEKS    INR Location Clinic      Anticoagulation Summary as of 11/13/2018     INR goal 2.0-2.5   Today's INR 2.7!   Warfarin maintenance plan 1.25 mg (2.5 mg x 0.5) on Tue, Fri; 2.5 mg (2.5 mg x 1) all other days   Full warfarin instructions 1.25 mg on Tue, Fri; 2.5 mg all other days   Weekly warfarin total 15 mg   No change documented Fidelina Pizano RN   Plan last modified Fidelina Pizano RN (4/3/2018)   Next INR check 11/27/2018   Target end date Indefinite    Indications   Long-term (current) use of anticoagulants [Z79.01] [Z79.01]  Atrial fibrillation (H) [I48.91]         Anticoagulation Episode Summary     INR check location     Preferred lab     Send INR reminders to [facility]    Comments       Anticoagulation Care Providers     Provider Role Specialty Phone number    Kushal Valentin MD [facility] [phone]            See the Encounter Report to view Anticoagulation Flowsheet and Dosing Calendar (Go to Encounters tab in chart review, and find the Anticoagulation Therapy Visit)        Fidelina Pizano RN

## 2018-11-13 NOTE — MR AVS SNAPSHOT
Nathen JANICE Gage   11/13/2018 8:30 AM   Anticoagulation Therapy Visit    Description:  83 year old male   Provider:  CR ANTICOAGULATION CLINIC   Department:  Cr Nurse           INR as of 11/13/2018     Today's INR 2.7!      Anticoagulation Summary as of 11/13/2018     INR goal 2.0-2.5   Today's INR 2.7!   Full warfarin instructions 1.25 mg on Tue, Fri; 2.5 mg all other days   Next INR check 11/27/2018    Indications   Long-term (current) use of anticoagulants [Z79.01] [Z79.01]  Atrial fibrillation (H) [I48.91]         Your next Anticoagulation Clinic appointment(s)     Nov 27, 2018  8:45 AM CST   Anticoagulation Visit with CR ANTICOAGULATION CLINIC   [facility] ([facility])    [address] [place] [address]   [phone]              Contact Numbers     Clinic Number:         November 2018 Details    Sun Mon Tue Wed Thu Fri Sat         1               2               3                 4               5               6               7               8               9               10                 11               12               13      1.25 mg   See details      14      2.5 mg         15      2.5 mg         16      1.25 mg         17      2.5 mg           18      2.5 mg         19      2.5 mg         20      1.25 mg         21      2.5 mg         22      2.5 mg         23      1.25 mg         24      2.5 mg           25      2.5 mg         26      2.5 mg         27            28               29               30                 Date Details   11/13 This INR check   Eat greens       Date of next INR:  11/27/2018         How to take your warfarin dose     To take:  1.25 mg Take 0.5 of a 2.5 mg tablet.    To take:  2.5 mg Take 1 of the 2.5 mg tablets.

## 2018-11-27 ENCOUNTER — ANTICOAGULATION THERAPY VISIT (OUTPATIENT)
Dept: NURSING | Facility: CLINIC | Age: 83
End: 2018-11-27
Payer: COMMERCIAL

## 2018-11-27 DIAGNOSIS — I48.0 PAROXYSMAL ATRIAL FIBRILLATION (H): ICD-10-CM

## 2018-11-27 DIAGNOSIS — Z79.01 LONG TERM CURRENT USE OF ANTICOAGULANTS WITH INR GOAL OF 2.0-3.0: Primary | ICD-10-CM

## 2018-11-27 LAB — INR POINT OF CARE: 1.6 (ref 0.86–1.14)

## 2018-11-27 PROCEDURE — 36416 COLLJ CAPILLARY BLOOD SPEC: CPT

## 2018-11-27 PROCEDURE — 99207 ZZC NO CHARGE NURSE ONLY: CPT

## 2018-11-27 PROCEDURE — 85610 PROTHROMBIN TIME: CPT | Mod: QW

## 2018-11-27 RX ORDER — WARFARIN SODIUM 2.5 MG/1
TABLET ORAL
Qty: 30 TABLET | Refills: 8 | Status: SHIPPED | OUTPATIENT
Start: 2018-11-27 | End: 2019-02-13

## 2018-11-27 NOTE — MR AVS SNAPSHOT
Nathen CAMACHO Merari   11/27/2018 8:45 AM   Anticoagulation Therapy Visit    Description:  83 year old male   Provider:  [facility]   Department:  Cr Nurse           INR as of 11/27/2018     Today's INR 1.6!      Anticoagulation Summary as of 11/27/2018     INR goal 2.0-2.5   Today's INR 1.6!   Full warfarin instructions 11/27: 2.5 mg; 11/28: 3.75 mg; Otherwise 1.25 mg on Tue, Fri; 2.5 mg all other days   Next INR check 12/11/2018    Indications   Long-term (current) use of anticoagulants [Z79.01] [Z79.01]  Atrial fibrillation (H) [I48.91]         Your next Anticoagulation Clinic appointment(s)     Dec 11, 2018  8:30 AM CST   Anticoagulation Visit with  ANTICOAGULATION CLINIC   [facility] ([facility])    [address] [place] [address]   [phone]              Contact Numbers     Clinic Number:         November 2018 Details    Sun Mon Tue Wed Thu Fri Sat         1               2               3                 4               5               6               7               8               9               10                 11               12               13               14               15               16               17                 18               19               20               21               22               23               24                 25               26               27      2.5 mg   See details      28      3.75 mg         29      2.5 mg         30      1.25 mg           Date Details   11/27 This INR check               How to take your warfarin dose     To take:  1.25 mg Take 0.5 of a 2.5 mg tablet.    To take:  2.5 mg Take 1 of the 2.5 mg tablets.    To take:  3.75 mg Take 1.5 of the 2.5 mg tablets.           December 2018 Details    Sun Mon Tue Wed Thu Fri Sat           1      2.5 mg           2      2.5 mg         3      2.5 mg         4      1.25 mg         5      2.5 mg         6      2.5 mg         7       1.25 mg         8      2.5 mg           9      2.5 mg         10      2.5 mg         11            12               13               14               15                 16               17               18               19               20               21               22                 23               24               25               26               27               28               29                 30               31                     Date Details   No additional details    Date of next INR:  12/11/2018         How to take your warfarin dose     To take:  1.25 mg Take 0.5 of a 2.5 mg tablet.    To take:  2.5 mg Take 1 of the 2.5 mg tablets.

## 2018-11-27 NOTE — PROGRESS NOTES
ANTICOAGULATION FOLLOW-UP CLINIC VISIT    Patient Name:  Nathen Gage  Date:  11/27/2018  Contact Type:  Face to Face    SUBJECTIVE:     Patient Findings     Positives Change in diet/appetite (Increased greens recently, I encouraged consistency and small portions.), Missed doses (Missed x 1, unsure what day)           OBJECTIVE    INR Protime   Date Value Ref Range Status   11/27/2018 1.6 (A) 0.86 - 1.14 Final       ASSESSMENT / PLAN  INR assessment SUB    Recheck INR In: 2 WEEKS    INR Location Clinic      Anticoagulation Summary as of 11/27/2018     INR goal 2.0-2.5   Today's INR 1.6!   Warfarin maintenance plan 1.25 mg (2.5 mg x 0.5) on Tue, Fri; 2.5 mg (2.5 mg x 1) all other days   Full warfarin instructions 11/27: 2.5 mg; 11/28: 3.75 mg; Otherwise 1.25 mg on Tue, Fri; 2.5 mg all other days   Weekly warfarin total 15 mg   Plan last modified Fidelina Pizano RN (4/3/2018)   Next INR check 12/11/2018   Target end date Indefinite    Indications   Long-term (current) use of anticoagulants [Z79.01] [Z79.01]  Atrial fibrillation (H) [I48.91]         Anticoagulation Episode Summary     INR check location     Preferred lab     Send INR reminders to [facility]    Comments       Anticoagulation Care Providers     Provider Role Specialty Phone number    Kushal Valentin MD [facility] [phone]            See the Encounter Report to view Anticoagulation Flowsheet and Dosing Calendar (Go to Encounters tab in chart review, and find the Anticoagulation Therapy Visit)        Fidelina Pizano RN

## 2018-12-01 ENCOUNTER — HOSPITAL ENCOUNTER (EMERGENCY)
Facility: CLINIC | Age: 83
Discharge: HOME | End: 2018-12-01
Attending: EMERGENCY MEDICINE | Admitting: EMERGENCY MEDICINE
Payer: COMMERCIAL

## 2018-12-01 ENCOUNTER — APPOINTMENT (OUTPATIENT)
Dept: GENERAL RADIOLOGY | Facility: CLINIC | Age: 83
End: 2018-12-01
Payer: COMMERCIAL

## 2018-12-01 VITALS
OXYGEN SATURATION: 99 % | TEMPERATURE: 97.7 F | DIASTOLIC BLOOD PRESSURE: 92 MMHG | SYSTOLIC BLOOD PRESSURE: 159 MMHG | RESPIRATION RATE: 16 BRPM

## 2018-12-01 DIAGNOSIS — R07.89 ATYPICAL CHEST PAIN: ICD-10-CM
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
## 2018-12-01 DIAGNOSIS — R06.02 SOB (SHORTNESS OF BREATH): ICD-10-CM

## 2018-12-01 LAB
ANION GAP SERPL CALCULATED.3IONS-SCNC: 6 MMOL/L (ref 3–14)
BASOPHILS # BLD AUTO: 0 10E9/L (ref 0–0.2)
BASOPHILS NFR BLD AUTO: 0.3 %
BUN SERPL-MCNC: 17 MG/DL (ref 7–30)
CALCIUM SERPL-MCNC: 8.9 MG/DL (ref 8.5–10.1)
CHLORIDE SERPL-SCNC: 98 MMOL/L (ref 94–109)
CO2 SERPL-SCNC: 29 MMOL/L (ref 20–32)
CREAT SERPL-MCNC: 0.95 MG/DL (ref 0.66–1.25)
DIFFERENTIAL METHOD BLD: NORMAL
EOSINOPHIL # BLD AUTO: 0.1 10E9/L (ref 0–0.7)
EOSINOPHIL NFR BLD AUTO: 1.1 %
ERYTHROCYTE [DISTWIDTH] IN BLOOD BY AUTOMATED COUNT: 13.4 % (ref 10–15)
GFR SERPL CREATININE-BSD FRML MDRD: 76 ML/MIN/1.7M2
GLUCOSE SERPL-MCNC: 100 MG/DL (ref 70–99)
HCT VFR BLD AUTO: 42.5 % (ref 40–53)
HGB BLD-MCNC: 14 G/DL (ref 13.3–17.7)
IMM GRANULOCYTES # BLD: 0 10E9/L (ref 0–0.4)
IMM GRANULOCYTES NFR BLD: 0.6 %
INR PPP: 2.02 (ref 0.86–1.14)
LYMPHOCYTES # BLD AUTO: 0.8 10E9/L (ref 0.8–5.3)
LYMPHOCYTES NFR BLD AUTO: 11.6 %
MCH RBC QN AUTO: 29.7 PG (ref 26.5–33)
MCHC RBC AUTO-ENTMCNC: 32.9 G/DL (ref 31.5–36.5)
MCV RBC AUTO: 90 FL (ref 78–100)
MONOCYTES # BLD AUTO: 0.7 10E9/L (ref 0–1.3)
MONOCYTES NFR BLD AUTO: 10.2 %
NEUTROPHILS # BLD AUTO: 5.3 10E9/L (ref 1.6–8.3)
NEUTROPHILS NFR BLD AUTO: 76.2 %
NRBC # BLD AUTO: 0 10*3/UL
NRBC BLD AUTO-RTO: 0 /100
PLATELET # BLD AUTO: 184 10E9/L (ref 150–450)
POTASSIUM SERPL-SCNC: 5.4 MMOL/L (ref 3.4–5.3)
RBC # BLD AUTO: 4.72 10E12/L (ref 4.4–5.9)
SODIUM SERPL-SCNC: 133 MMOL/L (ref 133–144)
TROPONIN I SERPL-MCNC: <0.015 UG/L (ref 0–0.04)
WBC # BLD AUTO: 7 10E9/L (ref 4–11)

## 2018-12-01 PROCEDURE — 71046 X-RAY EXAM CHEST 2 VIEWS: CPT

## 2018-12-01 PROCEDURE — 99285 EMERGENCY DEPT VISIT HI MDM: CPT | Mod: 25

## 2018-12-01 PROCEDURE — 36415 COLL VENOUS BLD VENIPUNCTURE: CPT | Performed by: EMERGENCY MEDICINE

## 2018-12-01 PROCEDURE — 85610 PROTHROMBIN TIME: CPT | Performed by: EMERGENCY MEDICINE

## 2018-12-01 PROCEDURE — 93005 ELECTROCARDIOGRAM TRACING: CPT

## 2018-12-01 PROCEDURE — 84484 ASSAY OF TROPONIN QUANT: CPT | Performed by: EMERGENCY MEDICINE

## 2018-12-01 PROCEDURE — 25000132 ZZH RX MED GY IP 250 OP 250 PS 637: Performed by: PHYSICIAN ASSISTANT

## 2018-12-01 PROCEDURE — 80048 BASIC METABOLIC PNL TOTAL CA: CPT | Performed by: EMERGENCY MEDICINE

## 2018-12-01 PROCEDURE — 82565 ASSAY OF CREATININE: CPT

## 2018-12-01 PROCEDURE — 85025 COMPLETE CBC W/AUTO DIFF WBC: CPT | Performed by: EMERGENCY MEDICINE

## 2018-12-01 RX ORDER — ACETAMINOPHEN 325 MG/1
975 TABLET ORAL ONCE
Status: COMPLETED | OUTPATIENT
Start: 2018-12-01 | End: 2018-12-01

## 2018-12-01 RX ADMIN — ACETAMINOPHEN 975 MG: 325 TABLET, FILM COATED ORAL at 16:28

## 2018-12-01 ASSESSMENT — ENCOUNTER SYMPTOMS
NECK PAIN: 0
ABDOMINAL PAIN: 0
NAUSEA: 0
VOMITING: 0
SHORTNESS OF BREATH: 1
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
## 2018-12-01 NOTE — ED AVS SNAPSHOT
[facility] Emergency Department    [address]    [facility] [address]    Phone:  [phone]    Fax:  [phone]                                       Nathen Gage   MRN: [id number]    Department:  [facility] Emergency Department   Date of Visit:  12/1/2018           After Visit Summary Signature Page     I have received my discharge instructions, and my questions have been answered. I have discussed any challenges I see with this plan with the nurse or doctor.    ..........................................................................................................................................  Patient/Patient Representative Signature      ..........................................................................................................................................  Patient Representative Print Name and Relationship to Patient    ..................................................               ................................................  Date                                   Time    ..........................................................................................................................................  Reviewed by Signature/Title    ...................................................              ..............................................  Date                                               Time          22EPIC Rev 08/18
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
## 2018-12-02 ENCOUNTER — HOSPITAL ENCOUNTER (OUTPATIENT)
Facility: CLINIC | Age: 83
Setting detail: OBSERVATION
Discharge: HOME | End: 2018-12-02
Attending: EMERGENCY MEDICINE | Admitting: INTERNAL MEDICINE
Payer: COMMERCIAL

## 2018-12-02 ENCOUNTER — APPOINTMENT (OUTPATIENT)
Dept: GENERAL RADIOLOGY | Facility: CLINIC | Age: 83
End: 2018-12-02
Attending: EMERGENCY MEDICINE
Payer: COMMERCIAL

## 2018-12-02 VITALS
SYSTOLIC BLOOD PRESSURE: 103 MMHG | HEIGHT: 69 IN | BODY MASS INDEX: 25.59 KG/M2 | RESPIRATION RATE: 16 BRPM | OXYGEN SATURATION: 96 % | DIASTOLIC BLOOD PRESSURE: 49 MMHG | WEIGHT: 172.8 LBS | TEMPERATURE: 96.9 F

## 2018-12-02 DIAGNOSIS — J18.9 PNEUMONIA DUE TO INFECTIOUS ORGANISM, UNSPECIFIED LATERALITY, UNSPECIFIED PART OF LUNG: ICD-10-CM
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
## 2018-12-02 DIAGNOSIS — R07.89 ATYPICAL CHEST PAIN: ICD-10-CM

## 2018-12-02 DIAGNOSIS — R55 NEAR SYNCOPE: ICD-10-CM

## 2018-12-02 LAB
ALBUMIN SERPL-MCNC: 3.8 G/DL (ref 3.4–5)
ALP SERPL-CCNC: 93 U/L (ref 40–150)
ALT SERPL W P-5'-P-CCNC: 23 U/L (ref 0–70)
ANION GAP SERPL CALCULATED.3IONS-SCNC: 8 MMOL/L (ref 3–14)
AST SERPL W P-5'-P-CCNC: 19 U/L (ref 0–45)
BASOPHILS # BLD AUTO: 0 10E9/L (ref 0–0.2)
BASOPHILS NFR BLD AUTO: 0.3 %
BILIRUB SERPL-MCNC: 0.7 MG/DL (ref 0.2–1.3)
BUN SERPL-MCNC: 17 MG/DL (ref 7–30)
CALCIUM SERPL-MCNC: 8.7 MG/DL (ref 8.5–10.1)
CHLORIDE SERPL-SCNC: 98 MMOL/L (ref 94–109)
CO2 SERPL-SCNC: 28 MMOL/L (ref 20–32)
CREAT SERPL-MCNC: 0.99 MG/DL (ref 0.66–1.25)
DIFFERENTIAL METHOD BLD: NORMAL
EOSINOPHIL # BLD AUTO: 0.1 10E9/L (ref 0–0.7)
EOSINOPHIL NFR BLD AUTO: 0.8 %
ERYTHROCYTE [DISTWIDTH] IN BLOOD BY AUTOMATED COUNT: 13.5 % (ref 10–15)
GFR SERPL CREATININE-BSD FRML MDRD: 72 ML/MIN/1.7M2
GLUCOSE SERPL-MCNC: 104 MG/DL (ref 70–99)
HCT VFR BLD AUTO: 41.8 % (ref 40–53)
HGB BLD-MCNC: 14.2 G/DL (ref 13.3–17.7)
IMM GRANULOCYTES # BLD: 0 10E9/L (ref 0–0.4)
IMM GRANULOCYTES NFR BLD: 0.2 %
INTERPRETATION ECG - MUSE: NORMAL
INTERPRETATION ECG - MUSE: NORMAL
LYMPHOCYTES # BLD AUTO: 0.9 10E9/L (ref 0.8–5.3)
LYMPHOCYTES NFR BLD AUTO: 9.8 %
MCH RBC QN AUTO: 30.4 PG (ref 26.5–33)
MCHC RBC AUTO-ENTMCNC: 34 G/DL (ref 31.5–36.5)
MCV RBC AUTO: 90 FL (ref 78–100)
MONOCYTES # BLD AUTO: 0.9 10E9/L (ref 0–1.3)
MONOCYTES NFR BLD AUTO: 9.5 %
NEUTROPHILS # BLD AUTO: 7.1 10E9/L (ref 1.6–8.3)
NEUTROPHILS NFR BLD AUTO: 79.4 %
NRBC # BLD AUTO: 0 10*3/UL
NRBC BLD AUTO-RTO: 0 /100
PLATELET # BLD AUTO: 202 10E9/L (ref 150–450)
POTASSIUM SERPL-SCNC: 3.7 MMOL/L (ref 3.4–5.3)
PROT SERPL-MCNC: 7.2 G/DL (ref 6.8–8.8)
RBC # BLD AUTO: 4.67 10E12/L (ref 4.4–5.9)
SODIUM SERPL-SCNC: 134 MMOL/L (ref 133–144)
TROPONIN I SERPL-MCNC: <0.015 UG/L (ref 0–0.04)
WBC # BLD AUTO: 9 10E9/L (ref 4–11)

## 2018-12-02 PROCEDURE — 25000128 H RX IP 250 OP 636: Performed by: EMERGENCY MEDICINE

## 2018-12-02 PROCEDURE — 99285 EMERGENCY DEPT VISIT HI MDM: CPT | Mod: 25

## 2018-12-02 PROCEDURE — 80053 COMPREHEN METABOLIC PANEL: CPT | Performed by: EMERGENCY MEDICINE

## 2018-12-02 PROCEDURE — 93005 ELECTROCARDIOGRAM TRACING: CPT

## 2018-12-02 PROCEDURE — 25000128 H RX IP 250 OP 636: Performed by: INTERNAL MEDICINE

## 2018-12-02 PROCEDURE — 25000132 ZZH RX MED GY IP 250 OP 250 PS 637: Performed by: INTERNAL MEDICINE

## 2018-12-02 PROCEDURE — 36415 COLL VENOUS BLD VENIPUNCTURE: CPT | Performed by: INTERNAL MEDICINE

## 2018-12-02 PROCEDURE — 25000132 ZZH RX MED GY IP 250 OP 250 PS 637: Performed by: EMERGENCY MEDICINE

## 2018-12-02 PROCEDURE — 84484 ASSAY OF TROPONIN QUANT: CPT | Performed by: EMERGENCY MEDICINE

## 2018-12-02 PROCEDURE — 85025 COMPLETE CBC W/AUTO DIFF WBC: CPT | Performed by: EMERGENCY MEDICINE

## 2018-12-02 PROCEDURE — 99207 ZZC CDG-CODE CATEGORY CHANGED: CPT | Performed by: INTERNAL MEDICINE

## 2018-12-02 PROCEDURE — 84484 ASSAY OF TROPONIN QUANT: CPT | Performed by: INTERNAL MEDICINE

## 2018-12-02 PROCEDURE — 71046 X-RAY EXAM CHEST 2 VIEWS: CPT

## 2018-12-02 PROCEDURE — G0378 HOSPITAL OBSERVATION PER HR: HCPCS

## 2018-12-02 PROCEDURE — 99235 HOSP IP/OBS SAME DATE MOD 70: CPT | Performed by: INTERNAL MEDICINE

## 2018-12-02 RX ORDER — LOSARTAN POTASSIUM 100 MG/1
100 TABLET ORAL DAILY
Status: DISCONTINUED | OUTPATIENT
Start: 2018-12-02 | End: 2018-12-02 | Stop reason: HOSPADM

## 2018-12-02 RX ORDER — ACETAMINOPHEN 325 MG/1
975 TABLET ORAL 3 TIMES DAILY
Status: DISCONTINUED | OUTPATIENT
Start: 2018-12-02 | End: 2018-12-02 | Stop reason: HOSPADM

## 2018-12-02 RX ORDER — NALOXONE HYDROCHLORIDE 0.4 MG/ML
.1-.4 INJECTION, SOLUTION INTRAMUSCULAR; INTRAVENOUS; SUBCUTANEOUS
Status: DISCONTINUED | OUTPATIENT
Start: 2018-12-02 | End: 2018-12-02 | Stop reason: HOSPADM

## 2018-12-02 RX ORDER — ACETAMINOPHEN 325 MG/1
975 TABLET ORAL 3 TIMES DAILY
Qty: 1 BOTTLE | Refills: 0 | Status: SHIPPED | OUTPATIENT
Start: 2018-12-02 | End: 2019-02-22

## 2018-12-02 RX ORDER — PANTOPRAZOLE SODIUM 40 MG/1
40 TABLET, DELAYED RELEASE ORAL DAILY
Status: DISCONTINUED | OUTPATIENT
Start: 2018-12-02 | End: 2018-12-02 | Stop reason: HOSPADM

## 2018-12-02 RX ORDER — SODIUM CHLORIDE, SODIUM LACTATE, POTASSIUM CHLORIDE, CALCIUM CHLORIDE 600; 310; 30; 20 MG/100ML; MG/100ML; MG/100ML; MG/100ML
1000 INJECTION, SOLUTION INTRAVENOUS CONTINUOUS
Status: DISCONTINUED | OUTPATIENT
Start: 2018-12-02 | End: 2018-12-02

## 2018-12-02 RX ORDER — CYCLOBENZAPRINE HCL 10 MG
5-10 TABLET ORAL 3 TIMES DAILY PRN
Qty: 15 TABLET | Refills: 0 | Status: SHIPPED | OUTPATIENT
Start: 2018-12-02 | End: 2019-03-12

## 2018-12-02 RX ORDER — ONDANSETRON 2 MG/ML
4 INJECTION INTRAMUSCULAR; INTRAVENOUS EVERY 6 HOURS PRN
Status: DISCONTINUED | OUTPATIENT
Start: 2018-12-02 | End: 2018-12-02 | Stop reason: HOSPADM

## 2018-12-02 RX ORDER — ACETAMINOPHEN 325 MG/1
650 TABLET ORAL EVERY 4 HOURS PRN
Status: DISCONTINUED | OUTPATIENT
Start: 2018-12-02 | End: 2018-12-02

## 2018-12-02 RX ORDER — ONDANSETRON 4 MG/1
4 TABLET, ORALLY DISINTEGRATING ORAL EVERY 6 HOURS PRN
Status: DISCONTINUED | OUTPATIENT
Start: 2018-12-02 | End: 2018-12-02 | Stop reason: HOSPADM

## 2018-12-02 RX ORDER — SODIUM CHLORIDE 9 MG/ML
INJECTION, SOLUTION INTRAVENOUS CONTINUOUS
Status: DISCONTINUED | OUTPATIENT
Start: 2018-12-02 | End: 2018-12-02

## 2018-12-02 RX ORDER — ACETAMINOPHEN 650 MG/1
650 SUPPOSITORY RECTAL EVERY 4 HOURS PRN
Status: DISCONTINUED | OUTPATIENT
Start: 2018-12-02 | End: 2018-12-02

## 2018-12-02 RX ORDER — HYDROMORPHONE HYDROCHLORIDE 1 MG/ML
0.5 INJECTION, SOLUTION INTRAMUSCULAR; INTRAVENOUS; SUBCUTANEOUS
Status: DISCONTINUED | OUTPATIENT
Start: 2018-12-02 | End: 2018-12-02

## 2018-12-02 RX ORDER — OXYCODONE AND ACETAMINOPHEN 5; 325 MG/1; MG/1
1 TABLET ORAL ONCE
Status: COMPLETED | OUTPATIENT
Start: 2018-12-02 | End: 2018-12-02

## 2018-12-02 RX ORDER — OXYCODONE HYDROCHLORIDE 5 MG/1
5-10 TABLET ORAL
Status: DISCONTINUED | OUTPATIENT
Start: 2018-12-02 | End: 2018-12-02 | Stop reason: HOSPADM

## 2018-12-02 RX ORDER — IBUPROFEN 600 MG/1
600 TABLET, FILM COATED ORAL EVERY 6 HOURS PRN
Status: DISCONTINUED | OUTPATIENT
Start: 2018-12-02 | End: 2018-12-02 | Stop reason: HOSPADM

## 2018-12-02 RX ORDER — ATORVASTATIN CALCIUM 20 MG/1
20 TABLET, FILM COATED ORAL DAILY
Status: DISCONTINUED | OUTPATIENT
Start: 2018-12-02 | End: 2018-12-02 | Stop reason: HOSPADM

## 2018-12-02 RX ADMIN — PANTOPRAZOLE SODIUM 40 MG: 40 TABLET, DELAYED RELEASE ORAL at 09:29

## 2018-12-02 RX ADMIN — OXYCODONE HYDROCHLORIDE 10 MG: 5 TABLET ORAL at 09:30

## 2018-12-02 RX ADMIN — IBUPROFEN 600 MG: 600 TABLET ORAL at 09:30

## 2018-12-02 RX ADMIN — ATORVASTATIN CALCIUM 20 MG: 20 TABLET, FILM COATED ORAL at 09:29

## 2018-12-02 RX ADMIN — LOSARTAN POTASSIUM 100 MG: 100 TABLET ORAL at 09:30

## 2018-12-02 RX ADMIN — OXYCODONE HYDROCHLORIDE AND ACETAMINOPHEN 1 TABLET: 5; 325 TABLET ORAL at 01:31

## 2018-12-02 RX ADMIN — SODIUM CHLORIDE, POTASSIUM CHLORIDE, SODIUM LACTATE AND CALCIUM CHLORIDE 1000 ML: 600; 310; 30; 20 INJECTION, SOLUTION INTRAVENOUS at 00:57

## 2018-12-02 RX ADMIN — SODIUM CHLORIDE: 9 INJECTION, SOLUTION INTRAVENOUS at 04:11

## 2018-12-02 ASSESSMENT — ENCOUNTER SYMPTOMS
CHILLS: 0
ABDOMINAL PAIN: 0
FEVER: 0
DIARRHEA: 0
VOMITING: 0
NUMBNESS: 0
NAUSEA: 0
WEAKNESS: 0
NECK PAIN: 1
DIZZINESS: 1

## 2018-12-02 NOTE — PLAN OF CARE
"Problem: Patient Care Overview  Goal: Plan of Care/Patient Progress Review  PRIMARY DIAGNOSIS: CHEST PAIN  OUTPATIENT/OBSERVATION GOALS TO BE MET BEFORE DISCHARGE:  1. Ruled out acute coronary syndrome (negative or stable Troponin):  Yes  2. Pain Status: Improved-controlled with oral pain medications.  3. Appropriate provocative testing performed: Yes  - Stress Test Procedure: none at this time  - Interpretation of cardiac rhythm per telemetry tech: A paced SR HR 73    4. Cleared by Consultants (if applicable):No  5. Return to near baseline physical activity: No  Discharge Planner Nurse   Safe discharge environment identified: Yes  Barriers to discharge: Yes       Entered by: Sintia Guerrero 12/02/2018 9:45 AM     Please review provider order for any additional goals.   Nurse to notify provider when observation goals have been met and patient is ready for discharge.  Patient is alert and oriented x4. VS WNL and documented. Lung sounds clear in all lobes and patient is on RA. Denies SOB. Patient states increased pain when taking deep breaths. Patient rating chest pain a 5/10 and is being treated with Ibuprofen and Oxycodone. Patient states pain is worse with any movement. Active bowel sounds in all 4 quadrants with LBM yesterday. Patient denies any pain, urgency, and frequency when voiding. Will get patient up to ambulate. Monitor pain.   /77 (BP Location: Right arm)  Temp 95.6  F (35.3  C) (Oral)  Resp 16  Ht 1.753 m (5' 9\")  Wt 78.4 kg (172 lb 12.8 oz)  SpO2 96%  BMI 25.52 kg/m2            "
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
## 2018-12-02 NOTE — PHARMACY-ANTICOAGULATION SERVICE
Clinical Pharmacy- Warfarin Discharge Note  This patient is currently on warfarin for the treatment of Atrial fibrillation.  INR Goal= 2-3  Expected length of therapy undetermined.           Anticoagulation Dose History     Recent Dosing and Labs Latest Ref Rng & Units 9/11/2018 10/2/2018 10/16/2018 10/30/2018 11/13/2018 11/27/2018 12/1/2018    INR 0.86 - 1.14 - - - - - - 2.02(H)    INR 0.86 - 1.14 2.8(A) 2.2(A) 1.7(A) 2.6(A) 2.7(A) 1.6(A) -        Agree with discharge orders to continue home regimen and to have INR checked on 12/11/2018

## 2018-12-02 NOTE — PROGRESS NOTES
"Problem: Patient Care Overview  Goal: Plan of Care/Patient Progress Review  Patient's After Visit Summary was reviewed with patient and/or spouse.   Patient verbalized understanding of After Visit Summary, recommended follow up and was given an opportunity to ask questions.   Discharge medications sent home with patient/family: YES, Flexeril, Tylenol, and Icy hot patches.    Discharged with spouse and transport tech with all belongings. Patient medically cleared to discharge.      /46 (BP Location: Left arm)  Pulse 65  Temp 97.8  F (36.6  C) (Oral)  Resp 16  Ht 1.778 m (5' 10\")  Wt 68 kg (150 lb)  SpO2 96%  BMI 21.52 kg/m2    OBSERVATION patient END time: 1325  "

## 2018-12-02 NOTE — DISCHARGE INSTRUCTIONS
Take Tylenol as needed at home for pain control.  Follow-up with your primary care provider next week for recheck of symptoms.  Return to the ED for any changing or worsening chest pain, increased shortness of breath, new concerns.

## 2018-12-02 NOTE — PROGRESS NOTES
ROOM # [id number]    Living Situation (if not independent, order SW consult): Ind with wife  Facility name:  : [facility] [phone]    Activity level at baseline: Ind  Activity level on admit: SBA d/t dizziness & pain      Patient registered to observation; given Patient Bill of Rights; given the opportunity to ask questions about observation status and their plan of care.  Patient has been oriented to the observation room, bathroom and call light is in place.    Discussed discharge goals and expectations with patient/family.
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
## 2018-12-02 NOTE — PHARMACY-ADMISSION MEDICATION HISTORY
Admission medication history interview status for this patient is complete. See [facility] admission navigator for allergy information, prior to admission medications and immunization status.     Medication history interview source(s):Patient  Medication history resources (including written lists, pill bottles, clinic record):None    Changes made to PTA medication list:  Added: none  Deleted: none  Changed: none    Actions taken by pharmacist (provider contacted, etc):None     Additional medication history information:None    Medication reconciliation/reorder completed by provider prior to medication history? No    For patients on insulin therapy: no (Yes/No)   Lantus/levemir/NPH/Mix 70/30 dose: ___ in AM/PM or twice daily   Sliding scale Novolog Y/N   If Yes, do you have a baseline novolog pre-meal dose: ______units with meals   Patients eat three meals a day: Y/N ---  How many episodes of hypoglycemia (low blood glucose) do you have weekly: ---   How many missed doses do you have a week: ---  How many times do you check your blood glucose per day: ---  Any Barriers to therapy: cost of medications/comfortable with giving injections (if applicable)/ comfortable and confident with current diabetes regimen ---      Prior to Admission medications    Medication Sig Last Dose Taking? Auth Provider   acetaminophen (TYLENOL) 325 MG tablet Take 2 tablets (650 mg) by mouth every 4 hours as needed for mild pain or fever 12/1/2018 at Unknown time Yes Chula Hernandez PA-C   atorvastatin (LIPITOR) 20 MG tablet Take 1 tablet (20 mg) by mouth daily Past Week at Unknown time Yes Kushal Valentin MD   doxazosin (CARDURA) 4 MG tablet TAKE 1 TABLET (4 MG) BY MOUTH DAILY Past Week at Unknown time Yes Kushal Valentin MD   losartan (COZAAR) 100 MG tablet Take 1 tablet (100 mg) by mouth daily 12/1/2018 at Unknown time Yes Zen Taylor MD   pantoprazole (PROTONIX) 40 MG EC tablet Take 1 tablet (40 mg) by mouth daily  Past Week at Unknown time Yes Kushal Valentin MD   prednisolon-gatiflox-bromfenac, pt own, no charge, 1-0.5-0.075 % opthalmic solution 1 drop by Both Eyelids route 3 times daily  12/1/2018 at Unknown time Yes Reported, Patient   warfarin (COUMADIN) 2.5 MG tablet Take 1.25mg every Tu & Fri / Take 2.5mg all other days OR as instructed by INR clinic 12/1/2018 at 2.5mg Yes Kushal Valentin MD   dexamethasone (DECADRON) 4 MG/ML injection Apply 1 mL (4 mg) topically once for 1 dose For physical therapy, iontophoresis   Jenny Napier DPM, Podiatry/Foot and Ankle Surgery   order for DME Equipment being ordered: night splint   Jenny Napier DPM, Podiatry/Foot and Ankle Surgery
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
## 2018-12-02 NOTE — PLAN OF CARE
Problem: Patient Care Overview  Goal: Plan of Care/Patient Progress Review  Outcome: No Change  PRIMARY DIAGNOSIS: CHEST PAIN  OUTPATIENT/OBSERVATION GOALS TO BE MET BEFORE DISCHARGE:  1. Ruled out acute coronary syndrome (negative or stable Troponin):  Neg x 2  2. Pain Status: Improved - controlled with oral pain medications.  3. Appropriate provocative testing performed: N/A  - Stress Test Procedure: None ordered.  - Interpretation of cardiac rhythm per telemetry tech: Atrial paced HR 60    4. Cleared by Consultants (if applicable):N/A  5. Return to near baseline physical activity: Yes - SBA d/t dizziness  Discharge Planner Nurse   Safe discharge environment identified: Yes  Barriers to discharge: Yes - pain control       Entered by: Chanell Melgar 12/02/2018       VSS. Patient reports 4/10 chest pain, worse with movement and deep breath; declines need for intervention at this time. Denies shortness of breath, dizziness, or associated cardiac symptoms. Trops neg x 2. Chest xray neg. Tele = atrial paced HR 60. Moving with SBA d/t near syncope at home and increased pain with movement. PT to see. IVF infusing.     Please review provider order for any additional goals.   Nurse to notify provider when observation goals have been met and patient is ready for discharge.

## 2018-12-02 NOTE — PHARMACY-ADMISSION MEDICATION HISTORY
Alfredo Daigle, [facility] Pharmacist Signed Pharmacy Pharmacy-Admission Medication History   Date of Service: 12/2/2018  8:55 AM Creation Time: 12/2/2018  8:55 AM         []Hide copied text  []Leona for attribution information  Admission medication history interview status for this patient is complete. See [facility] admission navigator for allergy information, prior to admission medications and immunization status.      Medication history interview source(s):Patient  Medication history resources (including written lists, pill bottles, clinic record):None     Changes made to PTA medication list:  Added: none  Deleted: none  Changed: none     Actions taken by pharmacist (provider contacted, etc):None      Additional medication history information:None     Medication reconciliation/reorder completed by provider prior to medication history? No     For patients on insulin therapy: no (Yes/No)   Lantus/levemir/NPH/Mix 70/30 dose: ___ in AM/PM or twice daily   Sliding scale Novolog Y/N   If Yes, do you have a baseline novolog pre-meal dose: ______units with meals   Patients eat three meals a day: Y/N ---  How many episodes of hypoglycemia (low blood glucose) do you have weekly: ---   How many missed doses do you have a week: ---  How many times do you check your blood glucose per day: ---  Any Barriers to therapy: cost of medications/comfortable with giving injections (if applicable)/ comfortable and confident with current diabetes regimen ---               Prior to Admission medications    Medication Sig Last Dose Taking? Auth Provider   acetaminophen (TYLENOL) 325 MG tablet Take 2 tablets (650 mg) by mouth every 4 hours as needed for mild pain or fever 12/1/2018 at Unknown time Yes Chula Hernandez PA-C   atorvastatin (LIPITOR) 20 MG tablet Take 1 tablet (20 mg) by mouth daily Past Week at Unknown time Yes Kushal Valentin MD   doxazosin (CARDURA) 4 MG tablet TAKE 1 TABLET (4 MG) BY MOUTH DAILY Past Week at Unknown time Yes  Kushal Valentin MD   losartan (COZAAR) 100 MG tablet Take 1 tablet (100 mg) by mouth daily 12/1/2018 at Unknown time Yes Zen Taylor MD   pantoprazole (PROTONIX) 40 MG EC tablet Take 1 tablet (40 mg) by mouth daily Past Week at Unknown time Yes Kushal Valentin MD   prednisolon-gatiflox-bromfenac, pt own, no charge, 1-0.5-0.075 % opthalmic solution 1 drop by Both Eyelids route 3 times daily  12/1/2018 at Unknown time Yes Reported, Patient   warfarin (COUMADIN) 2.5 MG tablet Take 1.25mg every Tu & Fri / Take 2.5mg all other days OR as instructed by INR clinic 12/1/2018 at 2.5mg Yes Kushal Valentin MD   dexamethasone (DECADRON) 4 MG/ML injection Apply 1 mL (4 mg) topically once for 1 dose For physical therapy, iontophoresis     Jenny Napier DPM, Podiatry/Foot and Ankle Surgery   order for DME Equipment being ordered: night splint     Jenny Napier DPM, Podiatry/Foot and Ankle Surgery

## 2018-12-02 NOTE — IP AVS SNAPSHOT
[facility] Observation Department    [address]    [facility] [address]    Phone:  [phone]                                       After Visit Summary   12/2/2018    Nathen Gage    MRN: [id number]           After Visit Summary Signature Page     I have received my discharge instructions, and my questions have been answered. I have discussed any challenges I see with this plan with the nurse or doctor.    ..........................................................................................................................................  Patient/Patient Representative Signature      ..........................................................................................................................................  Patient Representative Print Name and Relationship to Patient    ..................................................               ................................................  Date                                   Time    ..........................................................................................................................................  Reviewed by Signature/Title    ...................................................              ..............................................  Date                                               Time          22EPIC Rev 08/18

## 2018-12-02 NOTE — ED TRIAGE NOTES
Patient had a near syncopal episode while taking his meds before bed. Patient felt faint and lowered himself to his knees and called for help. Denies LOC. VSS.

## 2018-12-03 ENCOUNTER — TELEPHONE (OUTPATIENT)
Dept: FAMILY MEDICINE | Facility: CLINIC | Age: 83
End: 2018-12-03

## 2018-12-03 LAB
CREAT BLD-MCNC: 0.9 MG/DL (ref 0.66–1.25)
GFR SERPL CREATININE-BSD FRML MDRD: 81 ML/MIN/1.7M2

## 2018-12-03 NOTE — TELEPHONE ENCOUNTER
"Hospital/TCU/ED for chronic condition Discharge Protocol    \"Hi, my name is Nataly MACRINA, a registered nurse, and I am calling from [facility].  I am calling to follow up and see how things are going for you after your recent emergency visit/hospital/TCU stay.\"    Tell me how you are doing now that you are home?\" pain with movement      Discharge Instructions    \"Let's review your discharge instructions.  What is/are the follow-up recommendations?  Pt. Response: f/u with     \"Has an appointment with your primary care provider been scheduled?\"   Yes. (confirm) will see Dr Salas    \"When you see the provider, I would recommend that you bring your medications with you.\"    Medications    \"Tell me what changed about your medicines when you discharged?\"    Changes to chronic meds?    no    \"What questions do you have about your medications?\"    None     New diagnoses of heart failure, COPD, diabetes, or MI?    No          On warfarin: \"Were you given any recommendations for follow-up with the anticoagulation clinic?\" Yes - Anticoagulation clinic appointment is already scheduled at appropriate interval, had INR at ER and no concerns    Medication reconciliation completed? Yes  Was MTM referral placed (*Make sure to put transitions as reason for referral)?   No    Call Summary    \"What questions or concerns do you have about your recent visit and your follow-up care?\"     none    \"If you have questions or things don't continue to improve, we encourage you contact us through the main clinic number (give number).  Even if the clinic is not open, triage nurses are available 24/7 to help you.     We would like you to know that our clinic has extended hours (provide information).  We also have urgent care (provide details on closest location and hours/contact info)\"      \"Thank you for your time and take care!\"  Nataly Becerra RN, BSN  Message handled by Nurse Triage.             "

## 2018-12-06 ENCOUNTER — OFFICE VISIT (OUTPATIENT)
Dept: FAMILY MEDICINE | Facility: CLINIC | Age: 83
End: 2018-12-06
Payer: COMMERCIAL

## 2018-12-06 VITALS
HEART RATE: 90 BPM | HEIGHT: 69 IN | OXYGEN SATURATION: 98 % | TEMPERATURE: 97.5 F | RESPIRATION RATE: 12 BRPM | BODY MASS INDEX: 26.07 KG/M2 | SYSTOLIC BLOOD PRESSURE: 142 MMHG | WEIGHT: 176 LBS | DIASTOLIC BLOOD PRESSURE: 80 MMHG

## 2018-12-06 DIAGNOSIS — R07.89 CHEST WALL PAIN: ICD-10-CM

## 2018-12-06 DIAGNOSIS — K59.00 CONSTIPATION, UNSPECIFIED CONSTIPATION TYPE: ICD-10-CM

## 2018-12-06 DIAGNOSIS — Z95.0 PACEMAKER, ARTIFICIAL: ICD-10-CM
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
## 2018-12-06 DIAGNOSIS — I49.5 SICK SINUS SYNDROME (H): ICD-10-CM

## 2018-12-06 DIAGNOSIS — R55 PRE-SYNCOPE: ICD-10-CM

## 2018-12-06 PROCEDURE — 99496 TRANSJ CARE MGMT HIGH F2F 7D: CPT | Performed by: FAMILY MEDICINE
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
## 2018-12-11 ENCOUNTER — ANTICOAGULATION THERAPY VISIT (OUTPATIENT)
Dept: NURSING | Facility: CLINIC | Age: 83
End: 2018-12-11
Payer: COMMERCIAL

## 2018-12-11 DIAGNOSIS — I48.0 PAROXYSMAL ATRIAL FIBRILLATION (H): ICD-10-CM

## 2018-12-11 LAB — INR POINT OF CARE: 3.1 (ref 0.86–1.14)

## 2018-12-11 PROCEDURE — 36416 COLLJ CAPILLARY BLOOD SPEC: CPT

## 2018-12-11 PROCEDURE — 99207 ZZC NO CHARGE NURSE ONLY: CPT

## 2018-12-11 PROCEDURE — 85610 PROTHROMBIN TIME: CPT | Mod: QW
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
## 2018-12-12 DIAGNOSIS — N40.1 BENIGN LOCALIZED HYPERPLASIA OF PROSTATE WITH URINARY OBSTRUCTION: ICD-10-CM

## 2018-12-12 DIAGNOSIS — N13.8 BENIGN LOCALIZED HYPERPLASIA OF PROSTATE WITH URINARY OBSTRUCTION: ICD-10-CM

## 2018-12-13 NOTE — TELEPHONE ENCOUNTER
"Requested Prescriptions   Pending Prescriptions Disp Refills     doxazosin (CARDURA) 4 MG tablet [Pharmacy Med Name: DOXAZOSIN 4MG TABLETS]    Last Written Prescription Date: 9/11/18  Last Fill Quantity: 90 tablet,  # refills: 0   Last office visit: 12/6/2018 with prescribing provider:  Josue   Future Office Visit:   Next 5 appointments (look out 90 days)    Jan 31, 2019  9:45 AM CST  Return Visit with De Zheng MD  [facility] ([facility] [address]  [facility] [id number] [phone]          90 tablet 0     Sig: TAKE 1 TABLET(4 MG) BY MOUTH DAILY    Alpha Blockers Failed - 12/12/2018  7:16 AM       Failed - Blood pressure under 140/90 in past 12 months    BP Readings from Last 3 Encounters:   12/06/18 142/80   12/02/18 103/49   12/01/18 (!) 159/92                Passed - Recent (12 mo) or future (30 days) visit within the authorizing provider's specialty    Patient had office visit in the last 12 months or has a visit in the next 30 days with authorizing provider or within the authorizing provider's specialty.  See \"Patient Info\" tab in inbasket, or \"Choose Columns\" in Meds & Orders section of the refill encounter.             Passed - Patient does not have Tadalafil, Vardenafil, or Sildenafil on their medication list       Passed - Patient is 18 years of age or older        "
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
## 2018-12-15 RX ORDER — DOXAZOSIN 4 MG/1
TABLET ORAL
Qty: 30 TABLET | Refills: 0 | Status: SHIPPED | OUTPATIENT
Start: 2018-12-15 | End: 2018-12-26

## 2018-12-15 NOTE — TELEPHONE ENCOUNTER
Prescription approved per [facility] Refill Protocol.  For 30 day fill pt is due for f/u   LM with wife to have pt call clinic next week.       Return in about 3 months (around 10/24/2018)     Juliette Harper RN

## 2018-12-15 NOTE — TELEPHONE ENCOUNTER
Pt returned call-informed of message, scheduled with Dr Valentin on 12/26/18.    Archana Cornell/TYLER

## 2018-12-18 ENCOUNTER — ANTICOAGULATION THERAPY VISIT (OUTPATIENT)
Dept: NURSING | Facility: CLINIC | Age: 83
End: 2018-12-18
Payer: COMMERCIAL

## 2018-12-18 DIAGNOSIS — I48.0 PAROXYSMAL ATRIAL FIBRILLATION (H): ICD-10-CM

## 2018-12-18 LAB — INR POINT OF CARE: 1.9 (ref 0.86–1.14)

## 2018-12-18 PROCEDURE — 36416 COLLJ CAPILLARY BLOOD SPEC: CPT

## 2018-12-18 PROCEDURE — 99207 ZZC NO CHARGE NURSE ONLY: CPT

## 2018-12-18 PROCEDURE — 85610 PROTHROMBIN TIME: CPT | Mod: QW

## 2018-12-18 NOTE — PROGRESS NOTES
ANTICOAGULATION FOLLOW-UP CLINIC VISIT    Patient Name:  Nathen Gage  Date:  2018  Contact Type:  Face to Face    SUBJECTIVE:     Patient Findings     Positives:   Intentional hold of therapy (Held on  due to supra INR), No Problem Findings    Comments:   Patient will try to be consistent with greens and resume activity so I will have him resume his usual Warfarin maintenance dose.           OBJECTIVE    INR Protime   Date Value Ref Range Status   2018 1.9 (A) 0.86 - 1.14 Final       ASSESSMENT / PLAN  INR assessment THER    Recheck INR In: 2 WEEKS    INR Location Clinic      Anticoagulation Summary  As of 2018    INR goal:   2.0-2.5   TTR:   57.1 % (2.7 y)   INR used for dosin.9! (2018)   Warfarin maintenance plan:   1.25 mg (2.5 mg x 0.5) every Tue, Fri; 2.5 mg (2.5 mg x 1) all other days   Full warfarin instructions:   : 2.5 mg; : 1.25 mg; Otherwise 1.25 mg every Tue, Fri; 2.5 mg all other days   Weekly warfarin total:   15 mg   Plan last modified:   Fidelina Pizano RN (4/3/2018)   Next INR check:   1/3/2019   Target end date:   Indefinite    Indications    Long-term (current) use of anticoagulants [Z79.01] [Z79.01]  Atrial fibrillation (H) [I48.91]             Anticoagulation Episode Summary     INR check location:       Preferred lab:       Send INR reminders to:   [facility]    Comments:         Anticoagulation Care Providers     Provider Role Specialty Phone number    Kushal Valentin MD [facility] [phone]            See the Encounter Report to view Anticoagulation Flowsheet and Dosing Calendar (Go to Encounters tab in chart review, and find the Anticoagulation Therapy Visit)        Fidelina Pizano RN

## 2018-12-26 ENCOUNTER — OFFICE VISIT (OUTPATIENT)
Dept: FAMILY MEDICINE | Facility: CLINIC | Age: 83
End: 2018-12-26
Payer: COMMERCIAL

## 2018-12-26 VITALS
SYSTOLIC BLOOD PRESSURE: 138 MMHG | HEART RATE: 67 BPM | BODY MASS INDEX: 25.9 KG/M2 | OXYGEN SATURATION: 99 % | WEIGHT: 175.4 LBS | DIASTOLIC BLOOD PRESSURE: 78 MMHG | TEMPERATURE: 97.5 F

## 2018-12-26 DIAGNOSIS — I49.5 SICK SINUS SYNDROME (H): Primary | ICD-10-CM

## 2018-12-26 DIAGNOSIS — N40.1 BENIGN LOCALIZED HYPERPLASIA OF PROSTATE WITH URINARY OBSTRUCTION: ICD-10-CM

## 2018-12-26 DIAGNOSIS — R55 PRE-SYNCOPE: ICD-10-CM

## 2018-12-26 DIAGNOSIS — I48.0 PAROXYSMAL ATRIAL FIBRILLATION (H): ICD-10-CM

## 2018-12-26 DIAGNOSIS — N13.8 BENIGN LOCALIZED HYPERPLASIA OF PROSTATE WITH URINARY OBSTRUCTION: ICD-10-CM

## 2018-12-26 PROCEDURE — 99214 OFFICE O/P EST MOD 30 MIN: CPT | Performed by: FAMILY MEDICINE

## 2018-12-26 RX ORDER — DOXAZOSIN 4 MG/1
TABLET ORAL
Qty: 90 TABLET | Refills: 1 | Status: SHIPPED | OUTPATIENT
Start: 2018-12-26 | End: 2019-07-04
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
## 2019-01-03 ENCOUNTER — ANTICOAGULATION THERAPY VISIT (OUTPATIENT)
Dept: NURSING | Facility: CLINIC | Age: 84
End: 2019-01-03
Payer: COMMERCIAL

## 2019-01-03 DIAGNOSIS — I48.0 PAROXYSMAL ATRIAL FIBRILLATION (H): ICD-10-CM

## 2019-01-03 LAB — INR POINT OF CARE: 2.3 (ref 0.86–1.14)

## 2019-01-03 PROCEDURE — 36416 COLLJ CAPILLARY BLOOD SPEC: CPT

## 2019-01-03 PROCEDURE — 99207 ZZC NO CHARGE NURSE ONLY: CPT

## 2019-01-03 PROCEDURE — 85610 PROTHROMBIN TIME: CPT | Mod: QW

## 2019-01-03 NOTE — PROGRESS NOTES
ANTICOAGULATION FOLLOW-UP CLINIC VISIT    Patient Name:  Nathen Gage  Date:  1/3/2019  Contact Type:  Face to Face    SUBJECTIVE:     Patient Findings     Positives:   No Problem Findings           OBJECTIVE    INR Protime   Date Value Ref Range Status   2019 2.3 (A) 0.86 - 1.14 Final       ASSESSMENT / PLAN  INR assessment THER    Recheck INR In: 2 WEEKS    INR Location Clinic      Anticoagulation Summary  As of 1/3/2019    INR goal:   2.0-2.5   TTR:   57.4 % (2.7 y)   INR used for dosin.3 (1/3/2019)   Warfarin maintenance plan:   1.25 mg (2.5 mg x 0.5) every Tue, Fri; 2.5 mg (2.5 mg x 1) all other days   Full warfarin instructions:   1.25 mg every Tue, Fri; 2.5 mg all other days   Weekly warfarin total:   15 mg   No change documented:   Fidelina Pizano RN   Plan last modified:   Fidelina Pizano RN (4/3/2018)   Next INR check:   1/15/2019   Target end date:   Indefinite    Indications    Long-term (current) use of anticoagulants [Z79.01] [Z79.01]  Atrial fibrillation (H) [I48.91]             Anticoagulation Episode Summary     INR check location:       Preferred lab:       Send INR reminders to:   [facility]    Comments:         Anticoagulation Care Providers     Provider Role Specialty Phone number    Kushal Valentin MD [facility] [phone]            See the Encounter Report to view Anticoagulation Flowsheet and Dosing Calendar (Go to Encounters tab in chart review, and find the Anticoagulation Therapy Visit)        Fidelina Pizano RN

## 2019-01-15 ENCOUNTER — ANTICOAGULATION THERAPY VISIT (OUTPATIENT)
Dept: NURSING | Facility: CLINIC | Age: 84
End: 2019-01-15
Payer: COMMERCIAL

## 2019-01-15 DIAGNOSIS — I48.0 PAROXYSMAL ATRIAL FIBRILLATION (H): ICD-10-CM

## 2019-01-15 LAB — INR POINT OF CARE: 2.3 (ref 0.86–1.14)

## 2019-01-15 PROCEDURE — 99207 ZZC NO CHARGE NURSE ONLY: CPT

## 2019-01-15 PROCEDURE — 36416 COLLJ CAPILLARY BLOOD SPEC: CPT

## 2019-01-15 PROCEDURE — 85610 PROTHROMBIN TIME: CPT | Mod: QW

## 2019-01-15 NOTE — PROGRESS NOTES
ANTICOAGULATION FOLLOW-UP CLINIC VISIT    Patient Name:  Nathen Gage  Date:  1/15/2019  Contact Type:  Face to Face    SUBJECTIVE:     Patient Findings     Positives:   No Problem Findings           OBJECTIVE    INR Protime   Date Value Ref Range Status   01/15/2019 2.3 (A) 0.86 - 1.14 Final       ASSESSMENT / PLAN  INR assessment THER    Recheck INR In: 3 WEEKS    INR Location Clinic      Anticoagulation Summary  As of 1/15/2019    INR goal:   2.0-2.5   TTR:   57.9 % (2.7 y)   INR used for dosin.3 (1/15/2019)   Warfarin maintenance plan:   1.25 mg (2.5 mg x 0.5) every Tue, Fri; 2.5 mg (2.5 mg x 1) all other days   Full warfarin instructions:   1.25 mg every Tue, Fri; 2.5 mg all other days   Weekly warfarin total:   15 mg   No change documented:   Fidelina Pizano RN   Plan last modified:   Fidelina Pizano RN (4/3/2018)   Next INR check:   2019   Target end date:   Indefinite    Indications    Long-term (current) use of anticoagulants [Z79.01] [Z79.01]  Atrial fibrillation (H) [I48.91]             Anticoagulation Episode Summary     INR check location:       Preferred lab:       Send INR reminders to:   [facility]    Comments:         Anticoagulation Care Providers     Provider Role Specialty Phone number    Kushal Valentin MD [facility] [phone]            See the Encounter Report to view Anticoagulation Flowsheet and Dosing Calendar (Go to Encounters tab in chart review, and find the Anticoagulation Therapy Visit)        Fidelina Pizano RN

## 2019-01-22 ENCOUNTER — HOSPITAL ENCOUNTER (OUTPATIENT)
Dept: CARDIOLOGY | Facility: CLINIC | Age: 84
Discharge: HOME | End: 2019-01-22
Attending: INTERNAL MEDICINE | Admitting: INTERNAL MEDICINE
Payer: COMMERCIAL

## 2019-01-22 DIAGNOSIS — I10 ESSENTIAL HYPERTENSION WITH GOAL BLOOD PRESSURE LESS THAN 140/90: ICD-10-CM

## 2019-01-22 DIAGNOSIS — R55 SYNCOPE, UNSPECIFIED SYNCOPE TYPE: ICD-10-CM

## 2019-01-22 DIAGNOSIS — I77.810 DILATED AORTIC ROOT (H): ICD-10-CM

## 2019-01-22 DIAGNOSIS — I49.5 SICK SINUS SYNDROME (H): ICD-10-CM

## 2019-01-22 DIAGNOSIS — I27.20 PULMONARY HYPERTENSION (H): ICD-10-CM

## 2019-01-22 DIAGNOSIS — I15.9 SECONDARY HYPERTENSION: ICD-10-CM

## 2019-01-22 DIAGNOSIS — E78.2 MIXED HYPERLIPIDEMIA: ICD-10-CM

## 2019-01-22 DIAGNOSIS — I48.91 ATRIAL FIBRILLATION, UNSPECIFIED TYPE (H): ICD-10-CM

## 2019-01-22 LAB
ANION GAP SERPL CALCULATED.3IONS-SCNC: 6 MMOL/L (ref 3–14)
BUN SERPL-MCNC: 13 MG/DL (ref 7–30)
CALCIUM SERPL-MCNC: 8.6 MG/DL (ref 8.5–10.1)
CHLORIDE SERPL-SCNC: 104 MMOL/L (ref 94–109)
CO2 SERPL-SCNC: 27 MMOL/L (ref 20–32)
CREAT SERPL-MCNC: 0.97 MG/DL (ref 0.66–1.25)
GFR SERPL CREATININE-BSD FRML MDRD: 72 ML/MIN/{1.73_M2}
GLUCOSE SERPL-MCNC: 97 MG/DL (ref 70–99)
POTASSIUM SERPL-SCNC: 4.3 MMOL/L (ref 3.4–5.3)
SODIUM SERPL-SCNC: 137 MMOL/L (ref 133–144)

## 2019-01-22 PROCEDURE — 93306 TTE W/DOPPLER COMPLETE: CPT | Mod: 26 | Performed by: INTERNAL MEDICINE

## 2019-01-22 PROCEDURE — 80048 BASIC METABOLIC PNL TOTAL CA: CPT | Performed by: INTERNAL MEDICINE

## 2019-01-22 PROCEDURE — 93306 TTE W/DOPPLER COMPLETE: CPT

## 2019-01-22 PROCEDURE — 36415 COLL VENOUS BLD VENIPUNCTURE: CPT | Performed by: INTERNAL MEDICINE

## 2019-01-31 ENCOUNTER — OFFICE VISIT (OUTPATIENT)
Dept: CARDIOLOGY | Facility: CLINIC | Age: 84
End: 2019-01-31
Attending: INTERNAL MEDICINE
Payer: COMMERCIAL

## 2019-01-31 VITALS
DIASTOLIC BLOOD PRESSURE: 70 MMHG | HEIGHT: 69 IN | BODY MASS INDEX: 25.77 KG/M2 | WEIGHT: 174 LBS | HEART RATE: 62 BPM | SYSTOLIC BLOOD PRESSURE: 128 MMHG

## 2019-01-31 DIAGNOSIS — I71.21 ASCENDING AORTIC ANEURYSM (H): ICD-10-CM

## 2019-01-31 DIAGNOSIS — I48.0 PAF (PAROXYSMAL ATRIAL FIBRILLATION) (H): ICD-10-CM

## 2019-01-31 DIAGNOSIS — I10 ESSENTIAL HYPERTENSION WITH GOAL BLOOD PRESSURE LESS THAN 140/90: Primary | ICD-10-CM

## 2019-01-31 PROCEDURE — 99214 OFFICE O/P EST MOD 30 MIN: CPT | Performed by: INTERNAL MEDICINE

## 2019-01-31 ASSESSMENT — MIFFLIN-ST. JEOR: SCORE: 1479.89

## 2019-01-31 NOTE — LETTER
1/31/2019      Kushal Valentin MD  [address] [facility] [address]      RE: Nathen Colonkoffi       Dear Colleague,    I had the pleasure of seeing Nathen Gage in the [facility].    Service Date: 01/31/2019      HISTORY OF PRESENT ILLNESS:  Mr. Gage is a pleasant 83-year-old gentleman with a history of a permanent pacemaker for sick sinus syndrome, paroxysmal atrial fibrillation and mildly dilated ascending aorta along with essential hypertension who returns in close clinical followup.  He saw Siobhan Santoro last in 04/2018.  The patient has been feeling well from a cardiovascular standpoint, denying any chest pain, pressure, shortness of breath, orthopnea or paroxysmal nocturnal dyspnea.      His blood pressure is well controlled at today's visit at 128/70.      He underwent a transthoracic echocardiogram prior to our visit on 01/22/2019.  This showed a visually estimated ejection fraction of 55%-60%.  His ascending aorta measured 3.9 cm, which was not significantly changed in comparison with the prior study.      His most recent device check showed normal function.      IMPRESSION AND PLAN:   1.  Status post permanent pacemaker implantation.  The patient will continue to follow in the Device Clinic as directed by the Device Clinic.   2.  Paroxysmal atrial fibrillation.  The patient will remain on warfarin for stroke prophylaxis, unchanged.  No mode switch was seen on his most recent device check.  He is not on any AV jason blocking agents and if his device check in the future shows fibrillation with RVR, he should be started on a beta blocker.   3.  Mildly dilated ascending aorta.  This is unchanged in comparison with the prior transthoracic echocardiogram.  I suspect it is related to his hypertension rather than an aortopathy.  I will hold on adding a beta blocker at this time.  He will undergo a repeat transthoracic echocardiogram in 1 year.   4.  Essential  hypertension.  The patient's blood pressure is currently well controlled and I will continue his antihypertensive regimen unchanged.      Mr. Dejesus will return in 1 year with a repeat transthoracic echocardiogram to be completed at that time.         TOMAS ZHENG MD             D: 2019   T: 2019   MT:       Name:     MANUEL DEJESUS   MRN:      [id number]        Account:      [id number]   :      1935           Service Date: 2019      Document: [id number]         Outpatient Encounter Medications as of 2019   Medication Sig Dispense Refill     atorvastatin (LIPITOR) 20 MG tablet Take 1 tablet (20 mg) by mouth daily 90 tablet 3     doxazosin (CARDURA) 4 MG tablet TAKE 1 TABLET(4 MG) BY MOUTH DAILY 90 tablet 1     losartan (COZAAR) 100 MG tablet Take 1 tablet (100 mg) by mouth daily 90 tablet 3     pantoprazole (PROTONIX) 40 MG EC tablet Take 1 tablet (40 mg) by mouth daily 90 tablet 2     warfarin (COUMADIN) 2.5 MG tablet Take 1.25mg every  & Fri / Take 2.5mg all other days OR as instructed by INR clinic 30 tablet 8     [] acetaminophen (TYLENOL) 325 MG tablet Take 3 tablets (975 mg) by mouth 3 times daily for 5 days 1 Bottle 0     cyclobenzaprine (FLEXERIL) 10 MG tablet Take 0.5-1 tablets (5-10 mg) by mouth 3 times daily as needed for muscle spasms (Patient not taking: Reported on 2019) 15 tablet 0     prednisolon-gatiflox-bromfenac, pt own, no charge, 1-0.5-0.075 % opthalmic solution 1 drop by Both Eyelids route 3 times daily        No facility-administered encounter medications on file as of 2019.          Again, thank you for allowing me to participate in the care of your patient.      Sincerely,    Tomas Zheng MD     [facility]

## 2019-01-31 NOTE — LETTER
1/31/2019    Kushal Valentin MD  [address] [facility] [address]    RE: Nathen Gage       Dear Colleague,    I had the pleasure of seeing Nathen Gage in the [facility].    HPI and Plan:   See dictation    Orders Placed This Encounter   Procedures     Follow-Up with Cardiologist     Echocardiogram Complete       No orders of the defined types were placed in this encounter.      There are no discontinued medications.      Encounter Diagnoses   Name Primary?     Essential hypertension with goal blood pressure less than 140/90 Yes     Ascending aortic aneurysm (H)      PAF (paroxysmal atrial fibrillation) (H)        CURRENT MEDICATIONS:  Current Outpatient Medications   Medication Sig Dispense Refill     atorvastatin (LIPITOR) 20 MG tablet Take 1 tablet (20 mg) by mouth daily 90 tablet 3     doxazosin (CARDURA) 4 MG tablet TAKE 1 TABLET(4 MG) BY MOUTH DAILY 90 tablet 1     losartan (COZAAR) 100 MG tablet Take 1 tablet (100 mg) by mouth daily 90 tablet 3     pantoprazole (PROTONIX) 40 MG EC tablet Take 1 tablet (40 mg) by mouth daily 90 tablet 2     warfarin (COUMADIN) 2.5 MG tablet Take 1.25mg every Tu & Fri / Take 2.5mg all other days OR as instructed by INR clinic 30 tablet 8     cyclobenzaprine (FLEXERIL) 10 MG tablet Take 0.5-1 tablets (5-10 mg) by mouth 3 times daily as needed for muscle spasms (Patient not taking: Reported on 1/31/2019) 15 tablet 0     prednisolon-gatiflox-bromfenac, pt own, no charge, 1-0.5-0.075 % opthalmic solution 1 drop by Both Eyelids route 3 times daily          ALLERGIES     Allergies   Allergen Reactions     Neomycin Sulfate        PAST MEDICAL HISTORY:  Past Medical History:   Diagnosis Date     Actinic keratosis      Acute idiopathic gout of left knee 4/27/2016     Atrial fibrillation (H)     on Eliquis     Dilated aortic root (H)      Esophageal reflux      Esophagitis      H/O: GI bleed 2010     Hyperlipidaemia      Hyperlipidemia LDL  goal <100      Impotence of organic origin      Presbycusis, unspecified laterality 2016     Pulmonary hypertension (H)      Sick sinus syndrome (H)     pacemaker implanted      Unspecified essential hypertension        PAST SURGICAL HISTORY:  Past Surgical History:   Procedure Laterality Date     IMPLANT PACEMAKER      Pacemaker/cardiac insitu / [employer] Dual chamber, V45     PHACOEMULSIFICATION CLEAR CORNEA WITH STANDARD INTRAOCULAR LENS IMPLANT Right 2018    Procedure: PHACOEMULSIFICATION CLEAR CORNEA WITH STANDARD INTRAOCULAR LENS IMPLANT;  RIGHT EYE PHACOEMULSIFICATION CLEAR CORNEA WITH STANDARD INTRAOCULAR LENS IMPLANT ;  Surgeon: Mat Echevarria MD;  Location: [facility]       FAMILY HISTORY:  Family History   Problem Relation Age of Onset     Alzheimer Disease Brother      Cerebrovascular Disease Father 80     Family History Negative Sister      Family History Negative Son      Family History Negative Daughter      Family History Negative Sister      Family History Negative Daughter      Breast Cancer No family hx of      Prostate Cancer No family hx of        SOCIAL HISTORY:  Social History     Socioeconomic History     Marital status:      Spouse name: None     Number of children: None     Years of education: None     Highest education level: None   Social Needs     Financial resource strain: None     Food insecurity - worry: None     Food insecurity - inability: None     Transportation needs - medical: None     Transportation needs - non-medical: None   Occupational History     None   Tobacco Use     Smoking status: Former Smoker     Last attempt to quit: 1975     Years since quittin.1     Smokeless tobacco: Never Used   Substance and Sexual Activity     Alcohol use: Yes     Comment: 1- 2 BEERS WEEKLY     Drug use: No     Sexual activity: Yes     Partners: Female   Other Topics Concern     Parent/sibling w/ CABG, MI or angioplasty before 65F 55M? No      " Service Not Asked     Blood Transfusions Not Asked     Caffeine Concern No     Comment: 1 cup coffee per day     Occupational Exposure Not Asked     Hobby Hazards Not Asked     Sleep Concern No     Stress Concern No     Weight Concern No     Special Diet Yes     Comment: on warfarin      Back Care Not Asked     Exercise Yes     Comment: states he is very active, dancing, skiing, walking      Bike Helmet Yes     Seat Belt Yes     Self-Exams Not Asked   Social History Narrative     None       Review of Systems:  Skin:  Negative for       Eyes:  Positive for glasses    ENT:  Negative      Respiratory:  Negative       Cardiovascular:  Negative      Gastroenterology: Positive for reflux takes medication   Genitourinary:  Negative      Musculoskeletal:  Positive for arthritis    Neurologic:  Negative      Psychiatric:  Negative      Heme/Lymph/Imm:  Positive for easy bruising    Endocrine:  Negative        Physical Exam:  Vitals: /70 (BP Location: Right arm, Patient Position: Sitting, Cuff Size: Adult Regular)   Pulse 62   Ht 1.753 m (5' 9.02\")   Wt 78.9 kg (174 lb)   BMI 25.68 kg/m       Constitutional:  in no acute distress        Skin:  warm and dry to the touch          Head:  normocephalic        Eyes:  sclera white        Lymph:No Cervical lymphadenopathy present     ENT:  no pallor or cyanosis        Neck:  no carotid bruit        Respiratory:  clear to auscultation         Cardiac: regular rhythm;normal S1 and S2   S4   systolic murmur;LUSB;grade 1        pulses full and equal                                        GI:  abdomen soft;BS normoactive        Extremities and Muscular Skeletal:  no deformities, clubbing, cyanosis, erythema observed;no edema              Neurological:  no gross motor deficits        Psych:  Alert and Oriented x 3;affect appropriate, oriented to time, person and place        CC  Zen Taylor MD  [address]  [place] [address]                Thank you " for allowing me to participate in the care of your patient.      Sincerely,     De Zheng MD     [facility]    cc:   Zen Taylor MD  [address]  [place] [id number]

## 2019-01-31 NOTE — PROGRESS NOTES
HPI and Plan:   See dictation    Orders Placed This Encounter   Procedures     Follow-Up with Cardiologist     Echocardiogram Complete       No orders of the defined types were placed in this encounter.      There are no discontinued medications.      Encounter Diagnoses   Name Primary?     Essential hypertension with goal blood pressure less than 140/90 Yes     Ascending aortic aneurysm (H)      PAF (paroxysmal atrial fibrillation) (H)        CURRENT MEDICATIONS:  Current Outpatient Medications   Medication Sig Dispense Refill     atorvastatin (LIPITOR) 20 MG tablet Take 1 tablet (20 mg) by mouth daily 90 tablet 3     doxazosin (CARDURA) 4 MG tablet TAKE 1 TABLET(4 MG) BY MOUTH DAILY 90 tablet 1     losartan (COZAAR) 100 MG tablet Take 1 tablet (100 mg) by mouth daily 90 tablet 3     pantoprazole (PROTONIX) 40 MG EC tablet Take 1 tablet (40 mg) by mouth daily 90 tablet 2     warfarin (COUMADIN) 2.5 MG tablet Take 1.25mg every Tu & Fri / Take 2.5mg all other days OR as instructed by INR clinic 30 tablet 8     cyclobenzaprine (FLEXERIL) 10 MG tablet Take 0.5-1 tablets (5-10 mg) by mouth 3 times daily as needed for muscle spasms (Patient not taking: Reported on 1/31/2019) 15 tablet 0     prednisolon-gatiflox-bromfenac, pt own, no charge, 1-0.5-0.075 % opthalmic solution 1 drop by Both Eyelids route 3 times daily          ALLERGIES     Allergies   Allergen Reactions     Neomycin Sulfate        PAST MEDICAL HISTORY:  Past Medical History:   Diagnosis Date     Actinic keratosis      Acute idiopathic gout of left knee 4/27/2016     Atrial fibrillation (H)     on Eliquis     Dilated aortic root (H)      Esophageal reflux      Esophagitis      H/O: GI bleed 2010     Hyperlipidaemia      Hyperlipidemia LDL goal <100      Impotence of organic origin      Presbycusis, unspecified laterality 4/27/2016     Pulmonary hypertension (H)      Sick sinus syndrome (H)     pacemaker implanted 2011     Unspecified essential hypertension         PAST SURGICAL HISTORY:  Past Surgical History:   Procedure Laterality Date     IMPLANT PACEMAKER      Pacemaker/cardiac insitu / [employer] Dual chamber, V45     PHACOEMULSIFICATION CLEAR CORNEA WITH STANDARD INTRAOCULAR LENS IMPLANT Right 2018    Procedure: PHACOEMULSIFICATION CLEAR CORNEA WITH STANDARD INTRAOCULAR LENS IMPLANT;  RIGHT EYE PHACOEMULSIFICATION CLEAR CORNEA WITH STANDARD INTRAOCULAR LENS IMPLANT ;  Surgeon: Mat Echevarria MD;  Location: [facility]       FAMILY HISTORY:  Family History   Problem Relation Age of Onset     Alzheimer Disease Brother      Cerebrovascular Disease Father 80     Family History Negative Sister      Family History Negative Son      Family History Negative Daughter      Family History Negative Sister      Family History Negative Daughter      Breast Cancer No family hx of      Prostate Cancer No family hx of        SOCIAL HISTORY:  Social History     Socioeconomic History     Marital status:      Spouse name: None     Number of children: None     Years of education: None     Highest education level: None   Social Needs     Financial resource strain: None     Food insecurity - worry: None     Food insecurity - inability: None     Transportation needs - medical: None     Transportation needs - non-medical: None   Occupational History     None   Tobacco Use     Smoking status: Former Smoker     Last attempt to quit: 1975     Years since quittin.1     Smokeless tobacco: Never Used   Substance and Sexual Activity     Alcohol use: Yes     Comment: 1- 2 BEERS WEEKLY     Drug use: No     Sexual activity: Yes     Partners: Female   Other Topics Concern     Parent/sibling w/ CABG, MI or angioplasty before 65F 55M? No      Service Not Asked     Blood Transfusions Not Asked     Caffeine Concern No     Comment: 1 cup coffee per day     Occupational Exposure Not Asked     Hobby Hazards Not Asked     Sleep Concern No     Stress Concern No      "Weight Concern No     Special Diet Yes     Comment: on warfarin      Back Care Not Asked     Exercise Yes     Comment: states he is very active, dancing, skiing, walking      Bike Helmet Yes     Seat Belt Yes     Self-Exams Not Asked   Social History Narrative     None       Review of Systems:  Skin:  Negative for       Eyes:  Positive for glasses    ENT:  Negative      Respiratory:  Negative       Cardiovascular:  Negative      Gastroenterology: Positive for reflux takes medication   Genitourinary:  Negative      Musculoskeletal:  Positive for arthritis    Neurologic:  Negative      Psychiatric:  Negative      Heme/Lymph/Imm:  Positive for easy bruising    Endocrine:  Negative        Physical Exam:  Vitals: /70 (BP Location: Right arm, Patient Position: Sitting, Cuff Size: Adult Regular)   Pulse 62   Ht 1.753 m (5' 9.02\")   Wt 78.9 kg (174 lb)   BMI 25.68 kg/m      Constitutional:  in no acute distress        Skin:  warm and dry to the touch          Head:  normocephalic        Eyes:  sclera white        Lymph:No Cervical lymphadenopathy present     ENT:  no pallor or cyanosis        Neck:  no carotid bruit        Respiratory:  clear to auscultation         Cardiac: regular rhythm;normal S1 and S2   S4   systolic murmur;LUSB;grade 1        pulses full and equal                                        GI:  abdomen soft;BS normoactive        Extremities and Muscular Skeletal:  no deformities, clubbing, cyanosis, erythema observed;no edema              Neurological:  no gross motor deficits        Psych:  Alert and Oriented x 3;affect appropriate, oriented to time, person and place        CC  Zen Taylor MD  [address]  [place] [id number]              "

## 2019-01-31 NOTE — PROGRESS NOTES
Service Date: 01/31/2019      HISTORY OF PRESENT ILLNESS:  Mr. Gage is a pleasant 83-year-old gentleman with a history of a permanent pacemaker for sick sinus syndrome, paroxysmal atrial fibrillation and mildly dilated ascending aorta along with essential hypertension who returns in close clinical followup.  He saw Siobhan Santoro last in 04/2018.  The patient has been feeling well from a cardiovascular standpoint, denying any chest pain, pressure, shortness of breath, orthopnea or paroxysmal nocturnal dyspnea.      His blood pressure is well controlled at today's visit at 128/70.      He underwent a transthoracic echocardiogram prior to our visit on 01/22/2019.  This showed a visually estimated ejection fraction of 55%-60%.  His ascending aorta measured 3.9 cm, which was not significantly changed in comparison with the prior study.      His most recent device check showed normal function.      IMPRESSION AND PLAN:   1.  Status post permanent pacemaker implantation.  The patient will continue to follow in the Device Clinic as directed by the Device Clinic.   2.  Paroxysmal atrial fibrillation.  The patient will remain on warfarin for stroke prophylaxis, unchanged.  No mode switch was seen on his most recent device check.  He is not on any AV jason blocking agents and if his device check in the future shows fibrillation with RVR, he should be started on a beta blocker.   3.  Mildly dilated ascending aorta.  This is unchanged in comparison with the prior transthoracic echocardiogram.  I suspect it is related to his hypertension rather than an aortopathy.  I will hold on adding a beta blocker at this time.  He will undergo a repeat transthoracic echocardiogram in 1 year.   4.  Essential hypertension.  The patient's blood pressure is currently well controlled and I will continue his antihypertensive regimen unchanged.      Mr. Gage will return in 1 year with a repeat transthoracic echocardiogram to be completed at  that time.         TOMAS EVANS MD             D: 2019   T: 2019   MT: MIGUEL      Name:     MANUEL DEJESUS   MRN:      [id number]        Account:      [id number]   :      1935           Service Date: 2019      Document: [id number]

## 2019-02-05 ENCOUNTER — ANTICOAGULATION THERAPY VISIT (OUTPATIENT)
Dept: NURSING | Facility: CLINIC | Age: 84
End: 2019-02-05
Payer: COMMERCIAL

## 2019-02-05 DIAGNOSIS — I48.0 PAROXYSMAL ATRIAL FIBRILLATION (H): ICD-10-CM

## 2019-02-05 LAB — INR POINT OF CARE: 1.9 (ref 0.86–1.14)

## 2019-02-05 PROCEDURE — 85610 PROTHROMBIN TIME: CPT | Mod: QW

## 2019-02-05 PROCEDURE — 36416 COLLJ CAPILLARY BLOOD SPEC: CPT

## 2019-02-05 PROCEDURE — 99207 ZZC NO CHARGE NURSE ONLY: CPT

## 2019-02-05 NOTE — PROGRESS NOTES
ANTICOAGULATION FOLLOW-UP CLINIC VISIT    Patient Name:  Nathen Gage  Date:  2019  Contact Type:  Face to Face    SUBJECTIVE:     Patient Findings     Positives:   Nose bleeds (Nosebleed on  that lasted about 5 minutes.), Unexplained INR or factor level change           OBJECTIVE    INR Protime   Date Value Ref Range Status   2019 1.9 (A) 0.86 - 1.14 Final       ASSESSMENT / PLAN  INR assessment THER    Recheck INR In: 3 WEEKS    INR Location Clinic      Anticoagulation Summary  As of 2019    INR goal:   2.0-2.5   TTR:   58.2 % (2.8 y)   INR used for dosin.9! (2019)   Warfarin maintenance plan:   1.25 mg (2.5 mg x 0.5) every Tue, Fri; 2.5 mg (2.5 mg x 1) all other days   Full warfarin instructions:   2/: 2.5 mg; 2/6: 1.25 mg; Otherwise 1.25 mg every Tue, Fri; 2.5 mg all other days   Weekly warfarin total:   15 mg   Plan last modified:   Fidelina Pizano RN (4/3/2018)   Next INR check:   2019   Target end date:   Indefinite    Indications    Long-term (current) use of anticoagulants [Z79.01] [Z79.01]  Atrial fibrillation (H) [I48.91]             Anticoagulation Episode Summary     INR check location:       Preferred lab:       Send INR reminders to:   [facility]    Comments:         Anticoagulation Care Providers     Provider Role Specialty Phone number    Kushal Valentin MD [facility] [phone]            See the Encounter Report to view Anticoagulation Flowsheet and Dosing Calendar (Go to Encounters tab in chart review, and find the Anticoagulation Therapy Visit)        Fidelina Pizano RN

## 2019-02-12 ENCOUNTER — ANCILLARY PROCEDURE (OUTPATIENT)
Dept: CARDIOLOGY | Facility: CLINIC | Age: 84
End: 2019-02-12
Attending: INTERNAL MEDICINE
Payer: COMMERCIAL

## 2019-02-12 DIAGNOSIS — I49.5 SICK SINUS SYNDROME (H): ICD-10-CM

## 2019-02-12 PROCEDURE — 93293 PM PHONE R-STRIP DEVICE EVAL: CPT | Performed by: INTERNAL MEDICINE

## 2019-02-13 DIAGNOSIS — Z79.01 LONG TERM CURRENT USE OF ANTICOAGULANTS WITH INR GOAL OF 2.0-3.0: ICD-10-CM

## 2019-02-13 DIAGNOSIS — I48.0 PAROXYSMAL ATRIAL FIBRILLATION (H): Primary | ICD-10-CM

## 2019-02-13 NOTE — TELEPHONE ENCOUNTER
"Requested Prescriptions   Pending Prescriptions Disp Refills     warfarin (JANTOVEN) 2.5 MG tablet [Pharmacy Med Name: JANTOVEN 2.5MG TABLETS (GREEN)]    Last Written Prescription Date:  11/27/18  Last Fill Quantity: 30 tablets,  # refills: 8   Last office visit: 12/26/2018 with prescribing provider:  Barbie   Future Office Visit:     30 tablet 0     Sig: TAKE ONE-HALF TABLET BY MOUTH ON TUESDAY AND FRIDAY AND TAKE 1 TABLET BY MOUTH EVERY OTHER DAY OR AS DIRECTED BY INR CLINIC    Vitamin K Antagonists Failed - 2/13/2019  3:21 AM       Failed - INR is within goal in the past 6 weeks    Confirm INR is within goal in the past 6 weeks.     Recent Labs   Lab Test 02/05/19   INR 1.9*                      Passed - Recent (12 mo) or future (30 days) visit within the authorizing provider's specialty    Patient had office visit in the last 12 months or has a visit in the next 30 days with authorizing provider or within the authorizing provider's specialty.  See \"Patient Info\" tab in inbasket, or \"Choose Columns\" in Meds & Orders section of the refill encounter.             Passed - Medication is active on med list       Passed - Patient is 18 years of age or older          "

## 2019-02-14 DIAGNOSIS — E78.00 PURE HYPERCHOLESTEROLEMIA: ICD-10-CM

## 2019-02-14 DIAGNOSIS — I10 ESSENTIAL HYPERTENSION, BENIGN: ICD-10-CM

## 2019-02-14 RX ORDER — WARFARIN SODIUM 2.5 MG/1
TABLET ORAL
Qty: 30 TABLET | Refills: 8 | Status: SHIPPED | OUTPATIENT
Start: 2019-02-14 | End: 2019-04-02

## 2019-02-14 NOTE — TELEPHONE ENCOUNTER
Last INR: 02/05/19=1.9  Next INR: 02/26/19    Prescription approved per [facility] Refill Protocol.    Fidelina Pizano RN

## 2019-02-14 NOTE — TELEPHONE ENCOUNTER
"Requested Prescriptions   Pending Prescriptions Disp Refills     atorvastatin (LIPITOR) 20 MG tablet    Last Written Prescription Date: 4/20/18   Last Fill Quantity: 90 tablet,  # refills: 3   Last office visit: 12/26/2018 with prescribing provider:  Barbie   Future Office Visit:     90 tablet 3     Sig: Take 1 tablet (20 mg) by mouth daily    Statins Protocol Passed - 2/14/2019 10:34 AM       Passed - LDL on file in past 12 months    Recent Labs   Lab Test 04/16/18  0848   LDL 74            Passed - No abnormal creatine kinase in past 12 months    No lab results found.            Passed - Recent (12 mo) or future (30 days) visit within the authorizing provider's specialty    Patient had office visit in the last 12 months or has a visit in the next 30 days with authorizing provider or within the authorizing provider's specialty.  See \"Patient Info\" tab in inbasket, or \"Choose Columns\" in Meds & Orders section of the refill encounter.             Passed - Medication is active on med list       Passed - Patient is age 18 or older          "

## 2019-02-15 RX ORDER — ATORVASTATIN CALCIUM 20 MG/1
20 TABLET, FILM COATED ORAL DAILY
Qty: 90 TABLET | Refills: 0 | Status: SHIPPED | OUTPATIENT
Start: 2019-02-15 | End: 2019-07-21

## 2019-02-15 NOTE — TELEPHONE ENCOUNTER
Prescription approved per [facility] Refill Protocol.  Nataly Becerra RN, BSN

## 2019-02-16 LAB
MDC_IDC_LEAD_IMPLANT_DT: NORMAL
MDC_IDC_LEAD_IMPLANT_DT: NORMAL
MDC_IDC_LEAD_LOCATION: NORMAL
MDC_IDC_LEAD_LOCATION: NORMAL
MDC_IDC_LEAD_LOCATION_DETAIL_1: NORMAL
MDC_IDC_LEAD_LOCATION_DETAIL_1: NORMAL
MDC_IDC_LEAD_MFG: NORMAL
MDC_IDC_LEAD_MFG: NORMAL
MDC_IDC_LEAD_MODEL: NORMAL
MDC_IDC_LEAD_MODEL: NORMAL
MDC_IDC_LEAD_POLARITY_TYPE: NORMAL
MDC_IDC_LEAD_POLARITY_TYPE: NORMAL
MDC_IDC_LEAD_SERIAL: NORMAL
MDC_IDC_LEAD_SERIAL: NORMAL
MDC_IDC_PG_IMPLANT_DTM: NORMAL
MDC_IDC_PG_MFG: NORMAL
MDC_IDC_PG_MODEL: NORMAL
MDC_IDC_PG_SERIAL: NORMAL
MDC_IDC_PG_TYPE: NORMAL
MDC_IDC_SESS_CLINIC_NAME: NORMAL
MDC_IDC_SESS_DTM: NORMAL
MDC_IDC_SESS_TYPE: NORMAL

## 2019-02-22 ENCOUNTER — OFFICE VISIT (OUTPATIENT)
Dept: FAMILY MEDICINE | Facility: CLINIC | Age: 84
End: 2019-02-22
Payer: COMMERCIAL

## 2019-02-22 ENCOUNTER — ANCILLARY PROCEDURE (OUTPATIENT)
Dept: GENERAL RADIOLOGY | Facility: CLINIC | Age: 84
End: 2019-02-22
Attending: FAMILY MEDICINE
Payer: COMMERCIAL

## 2019-02-22 DIAGNOSIS — I48.0 PAROXYSMAL ATRIAL FIBRILLATION (H): ICD-10-CM

## 2019-02-22 DIAGNOSIS — I10 ESSENTIAL HYPERTENSION, BENIGN: ICD-10-CM

## 2019-02-22 DIAGNOSIS — Z95.0 PACEMAKER, ARTIFICIAL: ICD-10-CM

## 2019-02-22 DIAGNOSIS — I27.20 PULMONARY HYPERTENSION (H): ICD-10-CM

## 2019-02-22 DIAGNOSIS — M25.561 ACUTE PAIN OF RIGHT KNEE: Primary | ICD-10-CM

## 2019-02-22 PROCEDURE — 73560 X-RAY EXAM OF KNEE 1 OR 2: CPT | Mod: RT

## 2019-02-22 PROCEDURE — 73565 X-RAY EXAM OF KNEES: CPT

## 2019-02-22 PROCEDURE — 99214 OFFICE O/P EST MOD 30 MIN: CPT | Performed by: FAMILY MEDICINE

## 2019-02-22 NOTE — PROGRESS NOTES
SUBJECTIVE:   Nathen Gage is a 83 year old male who presents to clinic today for the following health issues:      RESPIRATORY SYMPTOMS and RIGHT KNEE PAIN        Duration: five days    Description  rhinorrhea, fatigue/malaise and scratchy throat    Severity: mild    Accompanying signs and symptoms: None    History (predisposing factors):  none    Precipitating or alleviating factors: None    Therapies tried and outcome:  none  Patient complains of congestion with sore throat, nasal congestion and sinus pain. Some mucopurulant discharge but no upper dental pain and no sustained fever. Duration less than five days .  Has history of airborn allergy or asthma.   No chest pain, diaphoresis, or sob.  nonproductive cough.  TMs dull  Not *red, sinus tenderness none   lungs clear, s1s2,soft abd,no distress, cyanosis or stridor.  A:URI with serous otitis media   P:fluids, antipyretics,rest and anti congestion measures as directed.  Recheck PRN worsening or non-improvement over 5 days.  Discussed signs of systemic or constutional illness.    Knee Subjective:     recurrent acute knee pain.    Pain is in the right knee(s), and began 4 and 5 week(s) prior to presentation, and is unchanged since onset.  Pain is characterized as aching and dull, and at worst is a 4 on a scale of 1-10.      Pain location: medial and patellar.  Associated sx: able to bear weight.  Mechanism of injury: none.  Pain exacerbated by: extended rest and inclines or declines/stairs.    Meds used for pain relief: Tylenol 325 mg 1-3 tabs po every 4 hours as needed for pain or fever.  Patient denies prior hx of knee pain/injury.       Focused knee examination: full range of motion, no effusion, no laxity with Lachman's, varus or valgus stress, no joint line tenderness and negative Brian's.    Knee Xrays: taken and show minimal djd, he was a downhill skiier for years .    Diagnosisi is patellofemoral arthritis  and Orthopedic evaluation is offered with  alternatives including expectant management and Physical Therpy for /if simple quad exercises don't do it. Consider ortho eval.  (M25.561) Acute pain of right knee  (primary encounter diagnosis)  Comment:   Plan: XR Knee AP Standing Bilateral, XR Knee Right         1/2 Views        Minimal djd    (I27.20) Pulmonary hypertension (H)  Comment:   Plan: no recent spells    (I10) Essential hypertension, benign  Comment:   Plan: at goal    (I48.0) Paroxysmal atrial fibrillation (H)  Comment: anticoagulant  Plan:     (Z95.0) Pacemaker, artificial  Comment:   Plan: recent check A-1

## 2019-02-22 NOTE — LETTER
February 28, 2019      Nathen Gage  [address] [place] [address]        Dear ,    We are writing to inform you of your test results.    Just like we said, under the knee cap is where most of the arthritis is and you should see it settlle down with the exercises.      Resulted Orders   XR Knee Right 1/2 Views    Narrative    KNEE ONE-TWO VIEWS RIGHT, ONE VIEW BILATERAL 2/22/2019 3:36 PM     HISTORY: Acute pain of right knee.    COMPARISON: None.    FINDINGS: Mild-moderate patellofemoral degenerative change on the  right. No definite medial or lateral joint space loss in either knee.  No suprapatellar effusion. There is no acute fracture. No dislocation.  There are no worrisome bony lesions.      Impression    IMPRESSION: No acute osseous abnormality demonstrated.    HECTOR WHITTEN MD       If you have any questions or concerns, please call the clinic at the number listed above.       Sincerely,        Kushal Valentin MD/RAON

## 2019-02-26 ENCOUNTER — ANTICOAGULATION THERAPY VISIT (OUTPATIENT)
Dept: NURSING | Facility: CLINIC | Age: 84
End: 2019-02-26
Payer: COMMERCIAL

## 2019-02-26 DIAGNOSIS — I48.0 PAROXYSMAL ATRIAL FIBRILLATION (H): ICD-10-CM

## 2019-02-26 LAB — INR POINT OF CARE: 1.7 (ref 0.86–1.14)

## 2019-02-26 PROCEDURE — 36416 COLLJ CAPILLARY BLOOD SPEC: CPT

## 2019-02-26 PROCEDURE — 99207 ZZC NO CHARGE NURSE ONLY: CPT

## 2019-02-26 PROCEDURE — 85610 PROTHROMBIN TIME: CPT | Mod: QW

## 2019-02-26 NOTE — PROGRESS NOTES
ANTICOAGULATION FOLLOW-UP CLINIC VISIT    Patient Name:  Nathen Gage  Date:  2019  Contact Type:  Face to Face    SUBJECTIVE:     Patient Findings     Positives:   Nose bleeds (1 nosebleed that did not last long), Other complaints (URI with cough and nasal congestion), Activity level change (Less active recently due to URI), Unexplained INR or factor level change           OBJECTIVE    INR Protime   Date Value Ref Range Status   2019 1.7 (A) 0.86 - 1.14 Final       ASSESSMENT / PLAN  INR assessment SUB    Recheck INR In: 2 WEEKS    INR Location Clinic      Anticoagulation Summary  As of 2019    INR goal:   2.0-2.5   TTR:   57.1 % (2.8 y)   INR used for dosin.7! (2019)   Warfarin maintenance plan:   1.25 mg (2.5 mg x 0.5) every Tue, Fri; 2.5 mg (2.5 mg x 1) all other days   Full warfarin instructions:   : 2.5 mg; Otherwise 1.25 mg every Tue, Fri; 2.5 mg all other days   Weekly warfarin total:   15 mg   Plan last modified:   Fidelina Pizano RN (4/3/2018)   Next INR check:   3/12/2019   Target end date:   Indefinite    Indications    Long-term (current) use of anticoagulants [Z79.01] [Z79.01]  Atrial fibrillation (H) [I48.91]             Anticoagulation Episode Summary     INR check location:       Preferred lab:       Send INR reminders to:   [facility]    Comments:         Anticoagulation Care Providers     Provider Role Specialty Phone number    Kushal Valentin MD [facility] [phone]            See the Encounter Report to view Anticoagulation Flowsheet and Dosing Calendar (Go to Encounters tab in chart review, and find the Anticoagulation Therapy Visit)        Fidelina Pizano RN

## 2019-03-05 ENCOUNTER — TELEPHONE (OUTPATIENT)
Dept: FAMILY MEDICINE | Facility: CLINIC | Age: 84
End: 2019-03-05

## 2019-03-05 DIAGNOSIS — I48.0 PAROXYSMAL ATRIAL FIBRILLATION (H): Primary | ICD-10-CM

## 2019-03-05 DIAGNOSIS — Z79.01 LONG TERM CURRENT USE OF ANTICOAGULANTS WITH INR GOAL OF 2.0-3.0: ICD-10-CM

## 2019-03-05 NOTE — TELEPHONE ENCOUNTER
Has the patient previously taken warfarin? yes  If yes, for what indication? Atrial Fibrillation    Does the patient have any of the following indications for a higher range of 2.5-3.5:    Mitral position mechanical valve? no    Meryl-Shiley, Ball and Cage or Monoleaflet valve (regardless of position) no    Other (if yes, please explain) no    Patient's INR goal range is 2.0-2.5 per cardiologist due to frequent nosebleeds.    New insurance and medicare reimbursement rules require that all of our INR patients have an INR Clinic referral order in Epic, and that it be renewed annually.     We have entered this initial order for you and your signature is required once you review it.       You may close this encounter once signed.     Thank you,     Fidelina Pizano RN

## 2019-03-12 ENCOUNTER — ANTICOAGULATION THERAPY VISIT (OUTPATIENT)
Dept: NURSING | Facility: CLINIC | Age: 84
End: 2019-03-12
Payer: COMMERCIAL

## 2019-03-12 DIAGNOSIS — I48.0 PAROXYSMAL ATRIAL FIBRILLATION (H): ICD-10-CM

## 2019-03-12 LAB — INR POINT OF CARE: 1.8 (ref 0.86–1.14)

## 2019-03-12 PROCEDURE — 85610 PROTHROMBIN TIME: CPT | Mod: QW

## 2019-03-12 PROCEDURE — 36416 COLLJ CAPILLARY BLOOD SPEC: CPT

## 2019-03-12 PROCEDURE — 99207 ZZC NO CHARGE NURSE ONLY: CPT

## 2019-03-12 NOTE — PROGRESS NOTES
"ANTICOAGULATION FOLLOW-UP CLINIC VISIT    Patient Name:  Nathen Gage  Date:  3/12/2019  Contact Type:  Face to Face    SUBJECTIVE:     Patient Findings     Comments:   Patient denies any medication, diet or activity changes. Patient states he has been drinking 4oz of V8 everyday for \"years\".  Patient denies any new supplements or nutritional/protein drinks.  2nd sub-therapeutic INR so I increased his Warfarin maintenance dose.           OBJECTIVE    INR Protime   Date Value Ref Range Status   2019 1.8 (A) 0.86 - 1.14 Final       ASSESSMENT / PLAN  INR assessment SUB    Recheck INR In: 2 WEEKS    INR Location Clinic      Anticoagulation Summary  As of 3/12/2019    INR goal:   2.0-2.5   TTR:   56.3 % (2.9 y)   INR used for dosin.8! (3/12/2019)   Warfarin maintenance plan:   1.25 mg (2.5 mg x 0.5) every Fri; 2.5 mg (2.5 mg x 1) all other days   Full warfarin instructions:   1.25 mg every Fri; 2.5 mg all other days   Weekly warfarin total:   16.25 mg   Plan last modified:   Fidelina Pizano RN (3/12/2019)   Next INR check:   3/26/2019   Target end date:   Indefinite    Indications    Long-term (current) use of anticoagulants [Z79.01] [Z79.01]  Atrial fibrillation (H) [I48.91]             Anticoagulation Episode Summary     INR check location:       Preferred lab:       Send INR reminders to:   [facility]    Comments:         Anticoagulation Care Providers     Provider Role Specialty Phone number    Kushal Valentin MD [facility] [phone]            See the Encounter Report to view Anticoagulation Flowsheet and Dosing Calendar (Go to Encounters tab in chart review, and find the Anticoagulation Therapy Visit)        Fidelina Pizano RN                 "

## 2019-03-26 ENCOUNTER — ANTICOAGULATION THERAPY VISIT (OUTPATIENT)
Dept: NURSING | Facility: CLINIC | Age: 84
End: 2019-03-26
Payer: COMMERCIAL

## 2019-03-26 DIAGNOSIS — I48.0 PAROXYSMAL ATRIAL FIBRILLATION (H): ICD-10-CM

## 2019-03-26 DIAGNOSIS — I15.9 SECONDARY HYPERTENSION: ICD-10-CM

## 2019-03-26 LAB — INR POINT OF CARE: 1.8 (ref 0.86–1.14)

## 2019-03-26 PROCEDURE — 85610 PROTHROMBIN TIME: CPT | Mod: QW

## 2019-03-26 PROCEDURE — 36416 COLLJ CAPILLARY BLOOD SPEC: CPT

## 2019-03-26 RX ORDER — LOSARTAN POTASSIUM 100 MG/1
100 TABLET ORAL DAILY
Qty: 90 TABLET | Refills: 3 | Status: SHIPPED | OUTPATIENT
Start: 2019-03-26 | End: 2019-03-26

## 2019-03-26 RX ORDER — LOSARTAN POTASSIUM 100 MG/1
100 TABLET ORAL DAILY
Qty: 90 TABLET | Refills: 3 | Status: SHIPPED | OUTPATIENT
Start: 2019-03-26 | End: 2020-05-20

## 2019-03-26 NOTE — PROGRESS NOTES
ANTICOAGULATION FOLLOW-UP CLINIC VISIT    Patient Name:  Nathen Gage  Date:  3/26/2019  Contact Type:  Face to Face    SUBJECTIVE:     Patient Findings     Comments:   Unexplained 3rd sub-therapeutic so I will increase his weekly Warfarin dose more generously.           OBJECTIVE    INR Protime   Date Value Ref Range Status   2019 1.8 (A) 0.86 - 1.14 Final       ASSESSMENT / PLAN  INR assessment SUB    Recheck INR In: 1 WEEK    INR Location Clinic      Anticoagulation Summary  As of 3/26/2019    INR goal:   2.0-2.5   TTR:   55.6 % (2.9 y)   INR used for dosin.8! (3/26/2019)   Warfarin maintenance plan:   1.25 mg (2.5 mg x 0.5) every Fri; 2.5 mg (2.5 mg x 1) all other days   Full warfarin instructions:   3/26: 3.75 mg; 3/29: 2.5 mg; Otherwise 1.25 mg every Fri; 2.5 mg all other days   Weekly warfarin total:   16.25 mg   Plan last modified:   Fidelina Pizano RN (3/12/2019)   Next INR check:   2019   Target end date:   Indefinite    Indications    Long-term (current) use of anticoagulants [Z79.01] [Z79.01]  Atrial fibrillation (H) [I48.91]             Anticoagulation Episode Summary     INR check location:       Preferred lab:       Send INR reminders to:   [facility]    Comments:         Anticoagulation Care Providers     Provider Role Specialty Phone number    Kushal Valentin MD [facility] [phone]            See the Encounter Report to view Anticoagulation Flowsheet and Dosing Calendar (Go to Encounters tab in chart review, and find the Anticoagulation Therapy Visit)        Fidelina Pizano RN

## 2019-04-02 ENCOUNTER — ANTICOAGULATION THERAPY VISIT (OUTPATIENT)
Dept: NURSING | Facility: CLINIC | Age: 84
End: 2019-04-02
Payer: COMMERCIAL

## 2019-04-02 DIAGNOSIS — Z79.01 LONG TERM CURRENT USE OF ANTICOAGULANTS WITH INR GOAL OF 2.0-3.0: ICD-10-CM

## 2019-04-02 DIAGNOSIS — I48.0 PAROXYSMAL ATRIAL FIBRILLATION (H): ICD-10-CM

## 2019-04-02 LAB — INR POINT OF CARE: 2.4 (ref 0.86–1.14)

## 2019-04-02 PROCEDURE — 85610 PROTHROMBIN TIME: CPT | Mod: QW

## 2019-04-02 PROCEDURE — 99207 ZZC NO CHARGE NURSE ONLY: CPT

## 2019-04-02 PROCEDURE — 36416 COLLJ CAPILLARY BLOOD SPEC: CPT

## 2019-04-02 RX ORDER — WARFARIN SODIUM 2.5 MG/1
TABLET ORAL
Qty: 30 TABLET | Refills: 5 | Status: SHIPPED | OUTPATIENT
Start: 2019-04-02 | End: 2019-04-19

## 2019-04-02 NOTE — PROGRESS NOTES
ANTICOAGULATION FOLLOW-UP CLINIC VISIT    Patient Name:  Nathen Gage  Date:  2019  Contact Type:  Face to Face    SUBJECTIVE:     Patient Findings     Positives:   Signs/symptoms of bleeding (1 nosebleed after INR appointment on 19, pt states it did not last long.), Change in activity (Pt going to the gym 2 times per week--doing strength exercises. Pt plans to start yard work soon.), Change in diet/appetite (NO changes, pt will continue to eat greens everyday.)           OBJECTIVE    INR Protime   Date Value Ref Range Status   2019 2.4 (A) 0.86 - 1.14 Final       ASSESSMENT / PLAN  INR assessment THER    Recheck INR In: 1 WEEK    INR Location Clinic      Anticoagulation Summary  As of 2019    INR goal:   2.0-2.5   TTR:   55.6 % (2.9 y)   INR used for dosin.4 (2019)   Warfarin maintenance plan:   3.75 mg (2.5 mg x 1.5) every Tue; 2.5 mg (2.5 mg x 1) all other days   Full warfarin instructions:   3.75 mg every Tue; 2.5 mg all other days   Weekly warfarin total:   18.75 mg   Plan last modified:   Fidelina Pizano RN (2019)   Next INR check:   2019   Target end date:   Indefinite    Indications    Long-term (current) use of anticoagulants [Z79.01] [Z79.01]  Atrial fibrillation (H) [I48.91]             Anticoagulation Episode Summary     INR check location:       Preferred lab:       Send INR reminders to:    [facility]    Comments:         Anticoagulation Care Providers     Provider Role Specialty Phone number    Kushal Valentin MD [facility] [phone]            See the Encounter Report to view Anticoagulation Flowsheet and Dosing Calendar (Go to Encounters tab in chart review, and find the Anticoagulation Therapy Visit)        Fidelina Pizano RN

## 2019-04-09 ENCOUNTER — ANTICOAGULATION THERAPY VISIT (OUTPATIENT)
Dept: NURSING | Facility: CLINIC | Age: 84
End: 2019-04-09
Payer: COMMERCIAL

## 2019-04-09 DIAGNOSIS — I48.0 PAROXYSMAL ATRIAL FIBRILLATION (H): ICD-10-CM

## 2019-04-09 LAB — INR POINT OF CARE: 3.1 (ref 0.86–1.14)

## 2019-04-09 PROCEDURE — 36416 COLLJ CAPILLARY BLOOD SPEC: CPT

## 2019-04-09 PROCEDURE — 99207 ZZC NO CHARGE NURSE ONLY: CPT

## 2019-04-09 PROCEDURE — 85610 PROTHROMBIN TIME: CPT | Mod: QW

## 2019-04-09 NOTE — PROGRESS NOTES
ANTICOAGULATION FOLLOW-UP CLINIC VISIT    Patient Name:  Nathen Gage  Date:  4/9/2019  Contact Type:  Face to Face    SUBJECTIVE:     Patient Findings     Comments:   The patient was assessed for diet, medication, and activity level changes, missed or extra doses, bruising or bleeding, with no problem findings.  I decreased patient's weekly Warfarin dose down to 17.5mg/week.           OBJECTIVE    INR Protime   Date Value Ref Range Status   04/09/2019 3.1 (A) 0.86 - 1.14 Final       ASSESSMENT / PLAN  INR assessment SUPRA    Recheck INR In: 10 DAYS    INR Location Clinic      Anticoagulation Summary  As of 4/9/2019    INR goal:   2.0-2.5   TTR:   55.4 % (3 y)   INR used for dosing:   3.1! (4/9/2019)   Warfarin maintenance plan:   2.5 mg (2.5 mg x 1) every day   Full warfarin instructions:   4/9: 1.25 mg; Otherwise 2.5 mg every day   Weekly warfarin total:   17.5 mg   Plan last modified:   Fidelina Pizano RN (4/9/2019)   Next INR check:   4/19/2019   Target end date:   Indefinite    Indications    Long-term (current) use of anticoagulants [Z79.01] [Z79.01]  Atrial fibrillation (H) [I48.91]             Anticoagulation Episode Summary     INR check location:       Preferred lab:       Send INR reminders to:   [facility]    Comments:         Anticoagulation Care Providers     Provider Role Specialty Phone number    Kushal Valentin MD [facility] [phone]            See the Encounter Report to view Anticoagulation Flowsheet and Dosing Calendar (Go to Encounters tab in chart review, and find the Anticoagulation Therapy Visit)        Fidelina Pizano RN

## 2019-04-19 ENCOUNTER — ANTICOAGULATION THERAPY VISIT (OUTPATIENT)
Dept: NURSING | Facility: CLINIC | Age: 84
End: 2019-04-19
Payer: COMMERCIAL

## 2019-04-19 DIAGNOSIS — I48.0 PAROXYSMAL ATRIAL FIBRILLATION (H): ICD-10-CM

## 2019-04-19 DIAGNOSIS — Z79.01 LONG TERM CURRENT USE OF ANTICOAGULANTS WITH INR GOAL OF 2.0-3.0: ICD-10-CM

## 2019-04-19 LAB — INR POINT OF CARE: 3 (ref 0.86–1.14)

## 2019-04-19 PROCEDURE — 99207 ZZC NO CHARGE NURSE ONLY: CPT

## 2019-04-19 PROCEDURE — 36416 COLLJ CAPILLARY BLOOD SPEC: CPT

## 2019-04-19 PROCEDURE — 85610 PROTHROMBIN TIME: CPT | Mod: QW

## 2019-04-19 RX ORDER — WARFARIN SODIUM 2.5 MG/1
TABLET ORAL
Qty: 30 TABLET | Refills: 5
Start: 2019-04-19 | End: 2019-07-23

## 2019-04-19 NOTE — PROGRESS NOTES
ANTICOAGULATION FOLLOW-UP CLINIC VISIT    Patient Name:  Nathen Gage  Date:  4/19/2019  Contact Type:  Face to Face    SUBJECTIVE:     Patient Findings     Positives:   Signs/symptoms of bleeding (1 nosebleed on 04/14, pt states it stopped quickly)    Comments:   No known reason for supra INR, patient had had 4 sub-therapeutic INR's prior to dose increase.  Patient is eating greens everyday, I encouraged him to be consistent with this.  Patient will resume 16.25mg/week and recheck INR in 11 days.           OBJECTIVE    INR Protime   Date Value Ref Range Status   04/19/2019 3.0 (A) 0.86 - 1.14 Final       ASSESSMENT / PLAN  INR assessment SUPRA    Recheck INR In: 10 DAYS    INR Location Clinic      Anticoagulation Summary  As of 4/19/2019    INR goal:   2.0-2.5   TTR:   54.9 % (3 y)   INR used for dosing:   3.0! (4/19/2019)   Warfarin maintenance plan:   1.25 mg (2.5 mg x 0.5) every Fri; 2.5 mg (2.5 mg x 1) all other days   Full warfarin instructions:   1.25 mg every Fri; 2.5 mg all other days   Weekly warfarin total:   16.25 mg   Plan last modified:   Fidelina Pizano RN (4/19/2019)   Next INR check:   4/30/2019   Target end date:   Indefinite    Indications    Long-term (current) use of anticoagulants [Z79.01] [Z79.01]  Atrial fibrillation (H) [I48.91]             Anticoagulation Episode Summary     INR check location:       Preferred lab:       Send INR reminders to:   [facility]    Comments:         Anticoagulation Care Providers     Provider Role Specialty Phone number    Kushal Valentin MD [facility] [phone]            See the Encounter Report to view Anticoagulation Flowsheet and Dosing Calendar (Go to Encounters tab in chart review, and find the Anticoagulation Therapy Visit)        Fidelina Pizano RN

## 2019-04-30 ENCOUNTER — ANTICOAGULATION THERAPY VISIT (OUTPATIENT)
Dept: NURSING | Facility: CLINIC | Age: 84
End: 2019-04-30
Payer: COMMERCIAL

## 2019-04-30 DIAGNOSIS — I48.0 PAROXYSMAL ATRIAL FIBRILLATION (H): ICD-10-CM

## 2019-04-30 LAB — INR POINT OF CARE: 2.5 (ref 0.86–1.14)

## 2019-04-30 PROCEDURE — 85610 PROTHROMBIN TIME: CPT | Mod: QW

## 2019-04-30 PROCEDURE — 99207 ZZC NO CHARGE NURSE ONLY: CPT

## 2019-04-30 PROCEDURE — 36416 COLLJ CAPILLARY BLOOD SPEC: CPT

## 2019-04-30 NOTE — PROGRESS NOTES
ANTICOAGULATION FOLLOW-UP CLINIC VISIT    Patient Name:  Nathen Gage  Date:  2019  Contact Type:  Face to Face    SUBJECTIVE:     Patient Findings     Positives:   Signs/symptoms of bleeding (2 nosebleeds in the past 2 weeks)           OBJECTIVE    INR Protime   Date Value Ref Range Status   2019 2.5 (A) 0.86 - 1.14 Final       ASSESSMENT / PLAN  INR assessment THER    Recheck INR In: 2 WEEKS    INR Location Clinic      Anticoagulation Summary  As of 2019    INR goal:   2.0-2.5   TTR:   54.3 % (3 y)   INR used for dosin.5 (2019)   Warfarin maintenance plan:   1.25 mg (2.5 mg x 0.5) every Fri; 2.5 mg (2.5 mg x 1) all other days   Full warfarin instructions:   1.25 mg every Fri; 2.5 mg all other days   Weekly warfarin total:   16.25 mg   No change documented:   Fidelina Pizano RN   Plan last modified:   Fidelina Pizano RN (2019)   Next INR check:   2019   Target end date:   Indefinite    Indications    Long-term (current) use of anticoagulants [Z79.01] [Z79.01]  Atrial fibrillation (H) [I48.91]             Anticoagulation Episode Summary     INR check location:       Preferred lab:       Send INR reminders to:   [facility]    Comments:         Anticoagulation Care Providers     Provider Role Specialty Phone number    Kushal Valentin MD [facility] [phone]            See the Encounter Report to view Anticoagulation Flowsheet and Dosing Calendar (Go to Encounters tab in chart review, and find the Anticoagulation Therapy Visit)        Fidelina Pizano RN

## 2019-05-10 DIAGNOSIS — K21.00 GASTROESOPHAGEAL REFLUX DISEASE WITH ESOPHAGITIS: ICD-10-CM

## 2019-05-10 RX ORDER — PANTOPRAZOLE SODIUM 40 MG/1
TABLET, DELAYED RELEASE ORAL
Qty: 90 TABLET | Refills: 2 | Status: SHIPPED | OUTPATIENT
Start: 2019-05-10 | End: 2020-02-03

## 2019-05-10 NOTE — TELEPHONE ENCOUNTER
"Requested Prescriptions   Pending Prescriptions Disp Refills     pantoprazole (PROTONIX) 40 MG EC tablet [Pharmacy Med Name: PANTOPRAZOLE 40MG TABLETS] 90 tablet 0     Sig: TAKE 1 TABLET(40 MG) BY MOUTH DAILY     Last Written Prescription Date:  08/14/2018  Last Fill Quantity: 90 TABLET,  # refills: 2   Last office visit: 2/22/2019 with prescribing provider:  02/22/2019   Future Office Visit:          PPI Protocol Passed - 5/10/2019  3:20 AM        Passed - Not on Clopidogrel (unless Pantoprazole ordered)        Passed - No diagnosis of osteoporosis on record        Passed - Recent (12 mo) or future (30 days) visit within the authorizing provider's specialty     Patient had office visit in the last 12 months or has a visit in the next 30 days with authorizing provider or within the authorizing provider's specialty.  See \"Patient Info\" tab in inbasket, or \"Choose Columns\" in Meds & Orders section of the refill encounter.              Passed - Medication is active on med list        Passed - Patient is age 18 or older          Rickey MICHAELS  "

## 2019-05-10 NOTE — TELEPHONE ENCOUNTER
Protonix  Prescription approved per [facility] Refill Protocol.    Lesia Muñiz, RN, BSN

## 2019-05-14 ENCOUNTER — ANTICOAGULATION THERAPY VISIT (OUTPATIENT)
Dept: NURSING | Facility: CLINIC | Age: 84
End: 2019-05-14
Payer: COMMERCIAL

## 2019-05-14 DIAGNOSIS — I48.0 PAROXYSMAL ATRIAL FIBRILLATION (H): ICD-10-CM

## 2019-05-14 LAB — INR POINT OF CARE: 2.2 (ref 0.86–1.14)

## 2019-05-14 PROCEDURE — 99207 ZZC NO CHARGE NURSE ONLY: CPT

## 2019-05-14 PROCEDURE — 85610 PROTHROMBIN TIME: CPT | Mod: QW

## 2019-05-14 PROCEDURE — 36416 COLLJ CAPILLARY BLOOD SPEC: CPT

## 2019-05-14 NOTE — PROGRESS NOTES
ANTICOAGULATION FOLLOW-UP CLINIC VISIT    Patient Name:  Nathen Gage  Date:  2019  Contact Type:  Face to Face    SUBJECTIVE:  Patient Findings     Positives:   Signs/symptoms of bleeding (1 nosebleed on )        Clinical Outcomes     Negatives:   Major bleeding event, Thromboembolic event, Anticoagulation-related hospital admission, Anticoagulation-related ED visit, Anticoagulation-related fatality           OBJECTIVE    INR Protime   Date Value Ref Range Status   2019 2.2 (A) 0.86 - 1.14 Final       ASSESSMENT / PLAN  INR assessment THER    Recheck INR In: 2 WEEKS    INR Location Clinic      Anticoagulation Summary  As of 2019    INR goal:   2.0-2.5   TTR:   54.9 % (3.1 y)   INR used for dosin.2 (2019)   Warfarin maintenance plan:   1.25 mg (2.5 mg x 0.5) every Fri; 2.5 mg (2.5 mg x 1) all other days   Full warfarin instructions:   1.25 mg every Fri; 2.5 mg all other days   Weekly warfarin total:   16.25 mg   No change documented:   Fidelina Pizano RN   Plan last modified:   Fidelina Pizano RN (2019)   Next INR check:   2019   Target end date:   Indefinite    Indications    Long-term (current) use of anticoagulants [Z79.01] [Z79.01]  Atrial fibrillation (H) [I48.91]             Anticoagulation Episode Summary     INR check location:       Preferred lab:       Send INR reminders to:   [facility]    Comments:         Anticoagulation Care Providers     Provider Role Specialty Phone number    Kushal Valentin MD [facility] [phone]            See the Encounter Report to view Anticoagulation Flowsheet and Dosing Calendar (Go to Encounters tab in chart review, and find the Anticoagulation Therapy Visit)        Fidelina Pizano RN

## 2019-05-21 ENCOUNTER — ANCILLARY PROCEDURE (OUTPATIENT)
Dept: CARDIOLOGY | Facility: CLINIC | Age: 84
End: 2019-05-21
Attending: INTERNAL MEDICINE
Payer: COMMERCIAL

## 2019-05-21 DIAGNOSIS — I49.5 SICK SINUS SYNDROME (H): ICD-10-CM

## 2019-05-21 PROCEDURE — 93293 PM PHONE R-STRIP DEVICE EVAL: CPT | Performed by: INTERNAL MEDICINE

## 2019-05-28 ENCOUNTER — ANTICOAGULATION THERAPY VISIT (OUTPATIENT)
Dept: NURSING | Facility: CLINIC | Age: 84
End: 2019-05-28
Payer: COMMERCIAL

## 2019-05-28 DIAGNOSIS — I48.0 PAROXYSMAL ATRIAL FIBRILLATION (H): ICD-10-CM

## 2019-05-28 LAB — INR POINT OF CARE: 2.4 (ref 0.86–1.14)

## 2019-05-28 PROCEDURE — 36416 COLLJ CAPILLARY BLOOD SPEC: CPT

## 2019-05-28 PROCEDURE — 85610 PROTHROMBIN TIME: CPT | Mod: QW

## 2019-05-28 PROCEDURE — 99207 ZZC NO CHARGE NURSE ONLY: CPT

## 2019-05-28 NOTE — PROGRESS NOTES
ANTICOAGULATION FOLLOW-UP CLINIC VISIT    Patient Name:  Nathen Gage  Date:  2019  Contact Type:  Face to Face    SUBJECTIVE:  Patient Findings     Positives:   Signs/symptoms of bleeding (2 bloody noses in the past week)        Clinical Outcomes     Negatives:   Major bleeding event, Thromboembolic event, Anticoagulation-related hospital admission, Anticoagulation-related ED visit, Anticoagulation-related fatality           OBJECTIVE    INR Protime   Date Value Ref Range Status   2019 2.4 (A) 0.86 - 1.14 Final       ASSESSMENT / PLAN  INR assessment THER    Recheck INR In: 2 WEEKS    INR Location Clinic      Anticoagulation Summary  As of 2019    INR goal:   2.0-2.5   TTR:   55.4 % (3.1 y)   INR used for dosin.4 (2019)   Warfarin maintenance plan:   1.25 mg (2.5 mg x 0.5) every Fri; 2.5 mg (2.5 mg x 1) all other days   Full warfarin instructions:   1.25 mg every Fri; 2.5 mg all other days   Weekly warfarin total:   16.25 mg   No change documented:   Fidelina Pizano RN   Plan last modified:   Fidelina Pizano RN (2019)   Next INR check:   2019   Target end date:   Indefinite    Indications    Long-term (current) use of anticoagulants [Z79.01] [Z79.01]  Atrial fibrillation (H) [I48.91]             Anticoagulation Episode Summary     INR check location:       Preferred lab:       Send INR reminders to:   [facility]    Comments:         Anticoagulation Care Providers     Provider Role Specialty Phone number    Kushal Valentin MD [facility] [phone]            See the Encounter Report to view Anticoagulation Flowsheet and Dosing Calendar (Go to Encounters tab in chart review, and find the Anticoagulation Therapy Visit)        Fidelina Pizano RN

## 2019-06-11 ENCOUNTER — ANTICOAGULATION THERAPY VISIT (OUTPATIENT)
Dept: NURSING | Facility: CLINIC | Age: 84
End: 2019-06-11
Payer: COMMERCIAL

## 2019-06-11 DIAGNOSIS — I48.0 PAROXYSMAL ATRIAL FIBRILLATION (H): ICD-10-CM

## 2019-06-11 LAB — INR POINT OF CARE: 2.1 (ref 0.86–1.14)

## 2019-06-11 PROCEDURE — 85610 PROTHROMBIN TIME: CPT | Mod: QW

## 2019-06-11 PROCEDURE — 36416 COLLJ CAPILLARY BLOOD SPEC: CPT

## 2019-06-11 NOTE — PROGRESS NOTES
ANTICOAGULATION FOLLOW-UP CLINIC VISIT    Patient Name:  Nathen Gage  Date:  2019  Contact Type:  Face to Face    SUBJECTIVE:  Patient Findings     Positives:   Signs/symptoms of bleeding (3 nosebleeds last week, 1 lasted about 4-5 minutes, the other 2 were shorter. I informed pt to schedule with his PCP if nosebleeds continue.)        Clinical Outcomes     Negatives:   Major bleeding event, Thromboembolic event, Anticoagulation-related hospital admission, Anticoagulation-related ED visit, Anticoagulation-related fatality           OBJECTIVE    INR Protime   Date Value Ref Range Status   2019 2.1 (A) 0.86 - 1.14 Final       ASSESSMENT / PLAN  INR assessment THER    Recheck INR In: 2 WEEKS    INR Location Clinic      Anticoagulation Summary  As of 2019    INR goal:   2.0-2.5   TTR:   56.0 % (3.1 y)   INR used for dosin.1 (2019)   Warfarin maintenance plan:   1.25 mg (2.5 mg x 0.5) every Fri; 2.5 mg (2.5 mg x 1) all other days   Full warfarin instructions:   1.25 mg every Fri; 2.5 mg all other days   Weekly warfarin total:   16.25 mg   No change documented:   Fidelina Pizano RN   Plan last modified:   Fidelina Pizano RN (2019)   Next INR check:   2019   Target end date:   Indefinite    Indications    Long-term (current) use of anticoagulants [Z79.01] [Z79.01]  Atrial fibrillation (H) [I48.91]             Anticoagulation Episode Summary     INR check location:       Preferred lab:       Send INR reminders to:   [facility]    Comments:         Anticoagulation Care Providers     Provider Role Specialty Phone number    Kushal Valentin MD [facility] [phone]            See the Encounter Report to view Anticoagulation Flowsheet and Dosing Calendar (Go to Encounters tab in chart review, and find the Anticoagulation Therapy Visit)        Fidelina Pizano RN

## 2019-06-25 ENCOUNTER — ANTICOAGULATION THERAPY VISIT (OUTPATIENT)
Dept: NURSING | Facility: CLINIC | Age: 84
End: 2019-06-25
Payer: COMMERCIAL

## 2019-06-25 DIAGNOSIS — I48.0 PAROXYSMAL ATRIAL FIBRILLATION (H): ICD-10-CM

## 2019-06-25 DIAGNOSIS — Z79.01 LONG TERM CURRENT USE OF ANTICOAGULANT THERAPY: ICD-10-CM

## 2019-06-25 LAB — INR POINT OF CARE: 2.5 (ref 0.86–1.14)

## 2019-06-25 PROCEDURE — 36416 COLLJ CAPILLARY BLOOD SPEC: CPT

## 2019-06-25 PROCEDURE — 85610 PROTHROMBIN TIME: CPT | Mod: QW

## 2019-06-25 PROCEDURE — 99207 ZZC NO CHARGE NURSE ONLY: CPT

## 2019-06-25 NOTE — PROGRESS NOTES
ANTICOAGULATION FOLLOW-UP CLINIC VISIT    Patient Name:  Nathen Gage  Date:  2019  Contact Type:  Face to Face    SUBJECTIVE:  Patient Findings     Comments:   The patient was assessed for diet, medication, and activity level changes, missed or extra doses, bruising or bleeding, with no problem findings.          Clinical Outcomes     Negatives:   Major bleeding event, Thromboembolic event, Anticoagulation-related hospital admission, Anticoagulation-related ED visit, Anticoagulation-related fatality    Comments:   The patient was assessed for diet, medication, and activity level changes, missed or extra doses, bruising or bleeding, with no problem findings.             OBJECTIVE    INR Protime   Date Value Ref Range Status   2019 2.5 (A) 0.86 - 1.14 Final       ASSESSMENT / PLAN  INR assessment THER    Recheck INR In: 2 WEEKS    INR Location Clinic      Anticoagulation Summary  As of 2019    INR goal:   2.0-2.5   TTR:   56.5 % (3.2 y)   INR used for dosin.5 (2019)   Warfarin maintenance plan:   1.25 mg (2.5 mg x 0.5) every Fri; 2.5 mg (2.5 mg x 1) all other days   Full warfarin instructions:   1.25 mg every Fri; 2.5 mg all other days   Weekly warfarin total:   16.25 mg   No change documented:   Fidelina Pizano RN   Plan last modified:   Fidelina Pizano RN (2019)   Next INR check:   2019   Target end date:   Indefinite    Indications    Long-term (current) use of anticoagulants [Z79.01] [Z79.01]  Atrial fibrillation (H) [I48.91]             Anticoagulation Episode Summary     INR check location:       Preferred lab:       Send INR reminders to:   [facility]    Comments:         Anticoagulation Care Providers     Provider Role Specialty Phone number    Kushal Valentin MD [facility] [phone]            See the Encounter Report to view Anticoagulation Flowsheet and Dosing Calendar (Go to Encounters tab in chart review, and find the  Anticoagulation Therapy Visit)        Fidelina Pizano RN

## 2019-07-04 DIAGNOSIS — N40.1 BENIGN LOCALIZED HYPERPLASIA OF PROSTATE WITH URINARY OBSTRUCTION: ICD-10-CM

## 2019-07-04 DIAGNOSIS — N13.8 BENIGN LOCALIZED HYPERPLASIA OF PROSTATE WITH URINARY OBSTRUCTION: ICD-10-CM

## 2019-07-04 RX ORDER — DOXAZOSIN 4 MG/1
TABLET ORAL
Qty: 90 TABLET | Refills: 0 | Status: SHIPPED | OUTPATIENT
Start: 2019-07-04 | End: 2019-09-30

## 2019-07-09 ENCOUNTER — ANTICOAGULATION THERAPY VISIT (OUTPATIENT)
Dept: NURSING | Facility: CLINIC | Age: 84
End: 2019-07-09
Payer: COMMERCIAL

## 2019-07-09 DIAGNOSIS — Z79.01 LONG TERM CURRENT USE OF ANTICOAGULANT THERAPY: ICD-10-CM

## 2019-07-09 DIAGNOSIS — I48.0 PAROXYSMAL ATRIAL FIBRILLATION (H): ICD-10-CM

## 2019-07-09 LAB — INR POINT OF CARE: 2.3 (ref 0.86–1.14)

## 2019-07-09 PROCEDURE — 36416 COLLJ CAPILLARY BLOOD SPEC: CPT

## 2019-07-09 PROCEDURE — 85610 PROTHROMBIN TIME: CPT | Mod: QW

## 2019-07-09 PROCEDURE — 99207 ZZC NO CHARGE NURSE ONLY: CPT

## 2019-07-09 NOTE — PROGRESS NOTES
ANTICOAGULATION FOLLOW-UP CLINIC VISIT    Patient Name:  Nathen Gage  Date:  2019  Contact Type:  Face to Face    SUBJECTIVE:  Patient Findings     Positives:   Signs/symptoms of bleeding (1 nosebleed on --pt states it lasted about 20 minutes with pressure.   Pt has had chronic nosebleeds since starting Warfarin so I encouraged him to scheule appt. with PCP to discuss.)        Clinical Outcomes     Negatives:   Major bleeding event, Thromboembolic event, Anticoagulation-related hospital admission, Anticoagulation-related ED visit, Anticoagulation-related fatality           OBJECTIVE    INR Protime   Date Value Ref Range Status   2019 2.3 (A) 0.86 - 1.14 Final       ASSESSMENT / PLAN  INR assessment THER    Recheck INR In: 2 WEEKS    INR Location Clinic      Anticoagulation Summary  As of 2019    INR goal:   2.0-2.5   TTR:   57.0 % (3.2 y)   INR used for dosin.3 (2019)   Warfarin maintenance plan:   1.25 mg (2.5 mg x 0.5) every Fri; 2.5 mg (2.5 mg x 1) all other days   Full warfarin instructions:   1.25 mg every Fri; 2.5 mg all other days   Weekly warfarin total:   16.25 mg   No change documented:   Fidelina Pizano RN   Plan last modified:   Fidelina Pizano RN (2019)   Next INR check:   2019   Target end date:   Indefinite    Indications    Long-term (current) use of anticoagulants [Z79.01] [Z79.01]  Atrial fibrillation (H) [I48.91]             Anticoagulation Episode Summary     INR check location:       Preferred lab:       Send INR reminders to:   [facility]    Comments:         Anticoagulation Care Providers     Provider Role Specialty Phone number    Kushal Valentin MD [facility] [phone]            See the Encounter Report to view Anticoagulation Flowsheet and Dosing Calendar (Go to Encounters tab in chart review, and find the Anticoagulation Therapy Visit)        Fidelina Pizano RN

## 2019-07-12 ENCOUNTER — OFFICE VISIT (OUTPATIENT)
Dept: FAMILY MEDICINE | Facility: CLINIC | Age: 84
End: 2019-07-12
Payer: COMMERCIAL

## 2019-07-12 VITALS
BODY MASS INDEX: 25.48 KG/M2 | HEART RATE: 68 BPM | SYSTOLIC BLOOD PRESSURE: 147 MMHG | HEIGHT: 69 IN | RESPIRATION RATE: 16 BRPM | TEMPERATURE: 97.7 F | DIASTOLIC BLOOD PRESSURE: 70 MMHG | OXYGEN SATURATION: 98 % | WEIGHT: 172 LBS

## 2019-07-12 DIAGNOSIS — R04.0 EPISTAXIS: Primary | ICD-10-CM

## 2019-07-12 PROCEDURE — 99213 OFFICE O/P EST LOW 20 MIN: CPT | Performed by: NURSE PRACTITIONER

## 2019-07-12 ASSESSMENT — MIFFLIN-ST. JEOR: SCORE: 1470.57

## 2019-07-12 NOTE — PROGRESS NOTES
Subjective     Nathen Gage is a 83 year old male who presents to clinic today for the following health issues:    History of Present Illness        He eats 2-3 servings of fruits and vegetables daily.He consumes 5 sweetened beverage(s) daily.  He is taking medications regularly.     Concern - nose bleeds, has had nosebleeds for several years, last on 2019 that lasted about 20 minutes.  INR last checked on 2019 was 2.3 which is therapeutic range. Left nostril. Typically nose bleeds once every 3 weeks last 5-10 minutes.     Denies excessive bruising, blood in urine, bleeding of gums.     Patient Active Problem List   Diagnosis     Esophageal reflux     Essential hypertension, benign     Testicular hypofunction     Impotence of organic origin     Gout     Actinic keratosis     Sick sinus syndrome (H)     Syncope     Hyponatremia     Advanced directives, counseling/discussion     Pacemaker, artificial     Health Care Home     Atrial fibrillation (H)     Dilated aortic root (H)     Pulmonary hypertension (H)     H/O: GI bleed     Mixed hyperlipidemia     Faintness     Long-term (current) use of anticoagulants [Z79.01]     Acute idiopathic gout of left knee     Presbycusis, unspecified laterality     Chest pain     Pain in joint involving ankle and foot, right     SOB (shortness of breath)     Past Surgical History:   Procedure Laterality Date     IMPLANT PACEMAKER      Pacemaker/cardiac insitu / [employer] Dual chamber, V45     PHACOEMULSIFICATION CLEAR CORNEA WITH STANDARD INTRAOCULAR LENS IMPLANT Right 2018    Procedure: PHACOEMULSIFICATION CLEAR CORNEA WITH STANDARD INTRAOCULAR LENS IMPLANT;  RIGHT EYE PHACOEMULSIFICATION CLEAR CORNEA WITH STANDARD INTRAOCULAR LENS IMPLANT ;  Surgeon: Mat Echevarria MD;  Location[facility]       Social History     Tobacco Use     Smoking status: Former Smoker     Last attempt to quit: 1975     Years since quittin.5     Smokeless tobacco: Never  "Used   Substance Use Topics     Alcohol use: Yes     Comment: 1- 2 BEERS WEEKLY     Family History   Problem Relation Age of Onset     Alzheimer Disease Brother      Cerebrovascular Disease Father 80     Family History Negative Sister      Family History Negative Son      Family History Negative Daughter      Family History Negative Sister      Family History Negative Daughter      Breast Cancer No family hx of      Prostate Cancer No family hx of          BP Readings from Last 3 Encounters:   07/12/19 147/70   01/31/19 128/70   12/26/18 138/78    Wt Readings from Last 3 Encounters:   07/12/19 78 kg (172 lb)   01/31/19 78.9 kg (174 lb)   12/26/18 79.6 kg (175 lb 6.4 oz)      Reviewed and updated as needed this visit by Provider         Review of Systems   ROS COMP: CONSTITUTIONAL: NEGATIVE for fever, chills, change in weight  ENT:  See HPI  RESP: NEGATIVE for significant cough or SOB  CV: NEGATIVE for chest pain, palpitations or peripheral edema  PSYCHIATRIC: NEGATIVE for changes in mood or affect      Objective    /70 (BP Location: Right arm, Patient Position: Chair, Cuff Size: Adult Large)   Pulse 68   Temp 97.7  F (36.5  C) (Oral)   Resp 16   Ht 1.753 m (5' 9\")   Wt 78 kg (172 lb)   SpO2 98%   BMI 25.40 kg/m    Body mass index is 25.4 kg/m .  Physical Exam   GENERAL: healthy, alert and no distress  HENT: left nare with fragile blood vessel along anterior aspect, no bleeding.  Right nare normal.    RESP: lungs clear to auscultation - no rales, rhonchi or wheezes  CV: regular rate and rhythm, normal S1 S2, no S3 or S4, no murmur, click or rub,   PSYCH: mentation appears normal, affect normal/bright        Assessment & Plan   Assessment    Plan  1. Epistaxis:  No active bleeding, will refer to ENT for further evaluation.    - OTOLARYNGOLOGY REFERRAL    BMI:   Estimated body mass index is 25.4 kg/m  as calculated from the following:    Height as of this encounter: 1.753 m (5' 9\").    Weight as of this " encounter: 78 kg (172 lb).           FUTURE APPOINTMENTS:       - Follow-up visit in next 1-2 months for physical exam    No follow-ups on file.    Susan Haase, APRN CNP  [facility]

## 2019-07-21 DIAGNOSIS — I10 ESSENTIAL HYPERTENSION, BENIGN: ICD-10-CM

## 2019-07-21 DIAGNOSIS — E78.00 PURE HYPERCHOLESTEROLEMIA: ICD-10-CM

## 2019-07-22 RX ORDER — ATORVASTATIN CALCIUM 20 MG/1
TABLET, FILM COATED ORAL
Qty: 90 TABLET | Refills: 0 | Status: SHIPPED | OUTPATIENT
Start: 2019-07-22 | End: 2019-10-18

## 2019-07-22 NOTE — TELEPHONE ENCOUNTER
"Requested Prescriptions   Pending Prescriptions Disp Refills     atorvastatin (LIPITOR) 20 MG tablet [Pharmacy Med Name: ATORVASTATIN 20MG TABLETS]  Last Written Prescription Date:  2/15/2019  Last Fill Quantity: 90 tablet,  # refills: 0   Last office visit: 7/12/2019 with prescribing provider:  Haase   Future Office Visit:     90 tablet 0     Sig: TAKE 1 TABLET BY MOUTH EVERY DAY       Statins Protocol Failed - 7/21/2019  3:21 AM        Failed - LDL on file in past 12 months     Recent Labs   Lab Test 04/16/18  0848   LDL 74             Passed - No abnormal creatine kinase in past 12 months     No lab results found.             Passed - Recent (12 mo) or future (30 days) visit within the authorizing provider's specialty     Patient had office visit in the last 12 months or has a visit in the next 30 days with authorizing provider or within the authorizing provider's specialty.  See \"Patient Info\" tab in inbasket, or \"Choose Columns\" in Meds & Orders section of the refill encounter.              Passed - Medication is active on med list        Passed - Patient is age 18 or older          "

## 2019-07-22 NOTE — TELEPHONE ENCOUNTER
Routing refill request to provider for review/approval because:  Labs not current:  LDL    Wendy Fay RN on 7/22/2019 at 12:21 PM

## 2019-07-23 ENCOUNTER — ANTICOAGULATION THERAPY VISIT (OUTPATIENT)
Dept: NURSING | Facility: CLINIC | Age: 84
End: 2019-07-23
Payer: COMMERCIAL

## 2019-07-23 DIAGNOSIS — I48.0 PAROXYSMAL ATRIAL FIBRILLATION (H): ICD-10-CM

## 2019-07-23 DIAGNOSIS — Z79.01 LONG TERM CURRENT USE OF ANTICOAGULANTS WITH INR GOAL OF 2.0-3.0: ICD-10-CM

## 2019-07-23 DIAGNOSIS — Z79.01 LONG TERM CURRENT USE OF ANTICOAGULANT THERAPY: ICD-10-CM

## 2019-07-23 LAB — INR POINT OF CARE: 2.8 (ref 0.86–1.14)

## 2019-07-23 PROCEDURE — 36416 COLLJ CAPILLARY BLOOD SPEC: CPT

## 2019-07-23 PROCEDURE — 85610 PROTHROMBIN TIME: CPT | Mod: QW

## 2019-07-23 PROCEDURE — 99207 ZZC NO CHARGE NURSE ONLY: CPT

## 2019-07-23 RX ORDER — WARFARIN SODIUM 2.5 MG/1
TABLET ORAL
Qty: 30 TABLET | Refills: 5 | Status: SHIPPED | OUTPATIENT
Start: 2019-07-23 | End: 2020-04-30

## 2019-07-23 NOTE — PROGRESS NOTES
ANTICOAGULATION FOLLOW-UP CLINIC VISIT    Patient Name:  Nathen Gage  Date:  2019  Contact Type:  Face to Face    SUBJECTIVE:  Patient Findings     Positives:   Signs/symptoms of bleeding (1 nosebleed on  that did not last long.), Change in health (Pt saw ENT on , a small vessel in L nare was cauterized, pt has not had a nosebleed since that visit.), Change in diet/appetite (Pt had 6 days of greens vs. his usual 7, pt will resume his usual routine.)        Clinical Outcomes     Negatives:   Major bleeding event, Thromboembolic event, Anticoagulation-related hospital admission, Anticoagulation-related ED visit, Anticoagulation-related fatality           OBJECTIVE    INR Protime   Date Value Ref Range Status   2019 2.8 (A) 0.86 - 1.14 Final       ASSESSMENT / PLAN  INR assessment SUPRA    Recheck INR In: 2 WEEKS    INR Location Clinic      Anticoagulation Summary  As of 2019    INR goal:   2.0-2.5   TTR:   56.8 % (3.2 y)   INR used for dosin.8! (2019)   Warfarin maintenance plan:   1.25 mg (2.5 mg x 0.5) every Fri; 2.5 mg (2.5 mg x 1) all other days   Full warfarin instructions:   1.25 mg every Fri; 2.5 mg all other days   Weekly warfarin total:   16.25 mg   Plan last modified:   Fidelina Pizano RN (2019)   Next INR check:   2019   Target end date:   Indefinite    Indications    Long-term (current) use of anticoagulants [Z79.01] [Z79.01]  Atrial fibrillation (H) [I48.91]             Anticoagulation Episode Summary     INR check location:       Preferred lab:       Send INR reminders to:   [facility]    Comments:         Anticoagulation Care Providers     Provider Role Specialty Phone number    Kushal Valentin MD [facility] [phone]            See the Encounter Report to view Anticoagulation Flowsheet and Dosing Calendar (Go to Encounters tab in chart review, and find the Anticoagulation Therapy Visit)        Fidelina Pizano RN         none

## 2019-08-06 ENCOUNTER — ANTICOAGULATION THERAPY VISIT (OUTPATIENT)
Dept: NURSING | Facility: CLINIC | Age: 84
End: 2019-08-06
Payer: COMMERCIAL

## 2019-08-06 DIAGNOSIS — I48.0 PAROXYSMAL ATRIAL FIBRILLATION (H): ICD-10-CM

## 2019-08-06 DIAGNOSIS — Z79.01 LONG TERM CURRENT USE OF ANTICOAGULANT THERAPY: ICD-10-CM

## 2019-08-06 LAB — INR POINT OF CARE: 2.2 (ref 0.86–1.14)

## 2019-08-06 PROCEDURE — 85610 PROTHROMBIN TIME: CPT | Mod: QW

## 2019-08-06 PROCEDURE — 99207 ZZC NO CHARGE NURSE ONLY: CPT

## 2019-08-06 PROCEDURE — 36416 COLLJ CAPILLARY BLOOD SPEC: CPT

## 2019-08-06 NOTE — PROGRESS NOTES
ANTICOAGULATION FOLLOW-UP CLINIC VISIT    Patient Name:  Nathen Gage  Date:  2019  Contact Type:  Face to Face    SUBJECTIVE:  Patient Findings     Comments:   The patient was assessed for diet, medication, and activity level changes, missed or extra doses, bruising or bleeding, with no problem findings.  Patient states he has NOT had any nosebleeds since L nare was cauterized.        Clinical Outcomes     Negatives:   Major bleeding event, Thromboembolic event, Anticoagulation-related hospital admission, Anticoagulation-related ED visit, Anticoagulation-related fatality    Comments:   The patient was assessed for diet, medication, and activity level changes, missed or extra doses, bruising or bleeding, with no problem findings.  Patient states he has NOT had any nosebleeds since L nare was cauterized.           OBJECTIVE    INR Protime   Date Value Ref Range Status   2019 2.2 (A) 0.86 - 1.14 Final       ASSESSMENT / PLAN  INR assessment THER    Recheck INR In: 2 WEEKS    INR Location Clinic      Anticoagulation Summary  As of 2019    INR goal:   2.0-2.5   TTR:   56.8 % (3.3 y)   INR used for dosin.2 (2019)   Warfarin maintenance plan:   1.25 mg (2.5 mg x 0.5) every Fri; 2.5 mg (2.5 mg x 1) all other days   Full warfarin instructions:   1.25 mg every Fri; 2.5 mg all other days   Weekly warfarin total:   16.25 mg   No change documented:   Fidelina Pizano, RN   Plan last modified:   Fidelina Pizano RN (2019)   Next INR check:   2019   Target end date:   Indefinite    Indications    Long-term (current) use of anticoagulants [Z79.01] [Z79.01]  Atrial fibrillation (H) [I48.91]             Anticoagulation Episode Summary     INR check location:       Preferred lab:       Send INR reminders to:   [facility]    Comments:         Anticoagulation Care Providers     Provider Role Specialty Phone number    Kushal Valentin MD [facility] [phone]             See the Encounter Report to view Anticoagulation Flowsheet and Dosing Calendar (Go to Encounters tab in chart review, and find the Anticoagulation Therapy Visit)        Fidelina Pizano RN

## 2019-08-20 ENCOUNTER — ANTICOAGULATION THERAPY VISIT (OUTPATIENT)
Dept: NURSING | Facility: CLINIC | Age: 84
End: 2019-08-20
Payer: COMMERCIAL

## 2019-08-20 DIAGNOSIS — I48.0 PAROXYSMAL ATRIAL FIBRILLATION (H): ICD-10-CM

## 2019-08-20 DIAGNOSIS — Z79.01 LONG TERM CURRENT USE OF ANTICOAGULANT THERAPY: ICD-10-CM

## 2019-08-20 LAB — INR POINT OF CARE: 2.6 (ref 0.86–1.14)

## 2019-08-20 PROCEDURE — 99207 ZZC NO CHARGE NURSE ONLY: CPT

## 2019-08-20 PROCEDURE — 85610 PROTHROMBIN TIME: CPT | Mod: QW

## 2019-08-20 PROCEDURE — 36416 COLLJ CAPILLARY BLOOD SPEC: CPT

## 2019-08-20 NOTE — PROGRESS NOTES
ANTICOAGULATION FOLLOW-UP CLINIC VISIT    Patient Name:  Nathen Gage  Date:  2019  Contact Type:  Face to Face    SUBJECTIVE:  Patient Findings     Positives:   Change in diet/appetite (Possibly 1 less salad in the past week)        Clinical Outcomes     Negatives:   Major bleeding event, Thromboembolic event, Anticoagulation-related hospital admission, Anticoagulation-related ED visit, Anticoagulation-related fatality           OBJECTIVE    INR Protime   Date Value Ref Range Status   2019 2.6 (A) 0.86 - 1.14 Final       ASSESSMENT / PLAN  INR assessment SUPRA    Recheck INR In: 2 WEEKS    INR Location Clinic      Anticoagulation Summary  As of 2019    INR goal:   2.0-2.5   TTR:   57.0 % (3.3 y)   INR used for dosin.6! (2019)   Warfarin maintenance plan:   1.25 mg (2.5 mg x 0.5) every Fri; 2.5 mg (2.5 mg x 1) all other days   Full warfarin instructions:   1.25 mg every Fri; 2.5 mg all other days   Weekly warfarin total:   16.25 mg   No change documented:   Fidelina Pizano RN   Plan last modified:   Fidelina Pizano RN (2019)   Next INR check:   9/3/2019   Target end date:   Indefinite    Indications    Long-term (current) use of anticoagulants [Z79.01] [Z79.01]  Atrial fibrillation (H) [I48.91]             Anticoagulation Episode Summary     INR check location:       Preferred lab:       Send INR reminders to:   [facility]    Comments:         Anticoagulation Care Providers     Provider Role Specialty Phone number    Kushal Valentin MD [facility] [phone]            See the Encounter Report to view Anticoagulation Flowsheet and Dosing Calendar (Go to Encounters tab in chart review, and find the Anticoagulation Therapy Visit)        Fidelina Pizano RN

## 2019-08-28 ENCOUNTER — ANCILLARY PROCEDURE (OUTPATIENT)
Dept: CARDIOLOGY | Facility: CLINIC | Age: 84
End: 2019-08-28
Attending: INTERNAL MEDICINE
Payer: COMMERCIAL

## 2019-08-28 DIAGNOSIS — I49.5 SICK SINUS SYNDROME (H): ICD-10-CM

## 2019-08-28 PROCEDURE — 93293 PM PHONE R-STRIP DEVICE EVAL: CPT | Performed by: INTERNAL MEDICINE

## 2019-09-03 ENCOUNTER — ANTICOAGULATION THERAPY VISIT (OUTPATIENT)
Dept: NURSING | Facility: CLINIC | Age: 84
End: 2019-09-03
Payer: COMMERCIAL

## 2019-09-03 DIAGNOSIS — Z79.01 LONG TERM CURRENT USE OF ANTICOAGULANT THERAPY: ICD-10-CM

## 2019-09-03 DIAGNOSIS — I48.0 PAROXYSMAL ATRIAL FIBRILLATION (H): ICD-10-CM

## 2019-09-03 LAB — INR POINT OF CARE: 2.1 (ref 0.86–1.14)

## 2019-09-03 PROCEDURE — 36416 COLLJ CAPILLARY BLOOD SPEC: CPT

## 2019-09-03 PROCEDURE — 85610 PROTHROMBIN TIME: CPT | Mod: QW

## 2019-09-03 PROCEDURE — 99207 ZZC NO CHARGE NURSE ONLY: CPT

## 2019-09-03 NOTE — PROGRESS NOTES
ANTICOAGULATION FOLLOW-UP CLINIC VISIT    Patient Name:  Nathen Gage  Date:  9/3/2019  Contact Type:  Face to Face    SUBJECTIVE:  Patient Findings     Comments:   The patient was assessed for diet, medication, and activity level changes, missed or extra doses, bruising or bleeding, with no problem findings.          Clinical Outcomes     Negatives:   Major bleeding event, Thromboembolic event, Anticoagulation-related hospital admission, Anticoagulation-related ED visit, Anticoagulation-related fatality    Comments:   The patient was assessed for diet, medication, and activity level changes, missed or extra doses, bruising or bleeding, with no problem findings.             OBJECTIVE    INR Protime   Date Value Ref Range Status   2019 2.1 (A) 0.86 - 1.14 Final       ASSESSMENT / PLAN  INR assessment THER    Recheck INR In: 2 WEEKS    INR Location Clinic      Anticoagulation Summary  As of 9/3/2019    INR goal:   2.0-2.5   TTR:   57.2 % (3.4 y)   INR used for dosin.1 (9/3/2019)   Warfarin maintenance plan:   1.25 mg (2.5 mg x 0.5) every Fri; 2.5 mg (2.5 mg x 1) all other days   Full warfarin instructions:   1.25 mg every Fri; 2.5 mg all other days   Weekly warfarin total:   16.25 mg   No change documented:   Fidelina Pizano RN   Plan last modified:   Fidelina Pizano RN (2019)   Next INR check:   2019   Target end date:   Indefinite    Indications    Long-term (current) use of anticoagulants [Z79.01] [Z79.01]  Atrial fibrillation (H) [I48.91]             Anticoagulation Episode Summary     INR check location:       Preferred lab:       Send INR reminders to:   [facility]    Comments:         Anticoagulation Care Providers     Provider Role Specialty Phone number    Kushal Valentin MD [facility] [phone]            See the Encounter Report to view Anticoagulation Flowsheet and Dosing Calendar (Go to Encounters tab in chart review, and find the Anticoagulation  Therapy Visit)        Fidelina Pizano RN

## 2019-09-17 ENCOUNTER — ANTICOAGULATION THERAPY VISIT (OUTPATIENT)
Dept: NURSING | Facility: CLINIC | Age: 84
End: 2019-09-17
Payer: COMMERCIAL

## 2019-09-17 DIAGNOSIS — I48.0 PAROXYSMAL ATRIAL FIBRILLATION (H): ICD-10-CM

## 2019-09-17 DIAGNOSIS — Z79.01 LONG TERM CURRENT USE OF ANTICOAGULANT THERAPY: ICD-10-CM

## 2019-09-17 LAB — INR POINT OF CARE: 2.3 (ref 0.86–1.14)

## 2019-09-17 PROCEDURE — 36416 COLLJ CAPILLARY BLOOD SPEC: CPT

## 2019-09-17 PROCEDURE — 85610 PROTHROMBIN TIME: CPT | Mod: QW

## 2019-09-17 PROCEDURE — 99207 ZZC NO CHARGE NURSE ONLY: CPT

## 2019-09-17 NOTE — PROGRESS NOTES
ANTICOAGULATION FOLLOW-UP CLINIC VISIT    Patient Name:  Nathen Gage  Date:  2019  Contact Type:  Face to Face    SUBJECTIVE:  Patient Findings     Comments:   The patient was assessed for diet, medication, and activity level changes, missed or extra doses, bruising or bleeding, with no problem findings.          Clinical Outcomes     Negatives:   Major bleeding event, Thromboembolic event, Anticoagulation-related hospital admission, Anticoagulation-related ED visit, Anticoagulation-related fatality    Comments:   The patient was assessed for diet, medication, and activity level changes, missed or extra doses, bruising or bleeding, with no problem findings.             OBJECTIVE    INR Protime   Date Value Ref Range Status   2019 2.3 (A) 0.86 - 1.14 Final       ASSESSMENT / PLAN  INR assessment THER    Recheck INR In: 2 WEEKS    INR Location Clinic      Anticoagulation Summary  As of 2019    INR goal:   2.0-2.5   TTR:   57.7 % (3.4 y)   INR used for dosin.3 (2019)   Warfarin maintenance plan:   1.25 mg (2.5 mg x 0.5) every Fri; 2.5 mg (2.5 mg x 1) all other days   Full warfarin instructions:   1.25 mg every Fri; 2.5 mg all other days   Weekly warfarin total:   16.25 mg   No change documented:   Fidelina Pizano RN   Plan last modified:   Fidelina Pizano RN (2019)   Next INR check:   10/1/2019   Target end date:   Indefinite    Indications    Long-term (current) use of anticoagulants [Z79.01] [Z79.01]  Atrial fibrillation (H) [I48.91]             Anticoagulation Episode Summary     INR check location:       Preferred lab:       Send INR reminders to:   [facility]    Comments:         Anticoagulation Care Providers     Provider Role Specialty Phone number    Kushal Valentin MD [facility] [phone]            See the Encounter Report to view Anticoagulation Flowsheet and Dosing Calendar (Go to Encounters tab in chart review, and find the  Anticoagulation Therapy Visit)        Fidelina Pizano RN

## 2019-09-27 ENCOUNTER — HEALTH MAINTENANCE LETTER (OUTPATIENT)
Age: 84
End: 2019-09-27

## 2019-09-30 ENCOUNTER — OFFICE VISIT (OUTPATIENT)
Dept: FAMILY MEDICINE | Facility: CLINIC | Age: 84
End: 2019-09-30
Payer: COMMERCIAL

## 2019-09-30 VITALS
HEART RATE: 70 BPM | TEMPERATURE: 98 F | SYSTOLIC BLOOD PRESSURE: 134 MMHG | DIASTOLIC BLOOD PRESSURE: 71 MMHG | RESPIRATION RATE: 12 BRPM | WEIGHT: 172 LBS | OXYGEN SATURATION: 97 % | BODY MASS INDEX: 25.4 KG/M2

## 2019-09-30 DIAGNOSIS — J01.90 ACUTE SINUSITIS WITH SYMPTOMS > 10 DAYS: Primary | ICD-10-CM

## 2019-09-30 DIAGNOSIS — N40.1 BENIGN LOCALIZED HYPERPLASIA OF PROSTATE WITH URINARY OBSTRUCTION: ICD-10-CM

## 2019-09-30 DIAGNOSIS — N13.8 BENIGN LOCALIZED HYPERPLASIA OF PROSTATE WITH URINARY OBSTRUCTION: ICD-10-CM

## 2019-09-30 DIAGNOSIS — Z23 NEED FOR PROPHYLACTIC VACCINATION AND INOCULATION AGAINST INFLUENZA: ICD-10-CM

## 2019-09-30 PROCEDURE — 90662 IIV NO PRSV INCREASED AG IM: CPT | Performed by: PHYSICIAN ASSISTANT

## 2019-09-30 PROCEDURE — G0008 ADMIN INFLUENZA VIRUS VAC: HCPCS | Performed by: PHYSICIAN ASSISTANT

## 2019-09-30 PROCEDURE — 99213 OFFICE O/P EST LOW 20 MIN: CPT | Mod: 25 | Performed by: PHYSICIAN ASSISTANT

## 2019-09-30 RX ORDER — DOXAZOSIN 4 MG/1
TABLET ORAL
Qty: 90 TABLET | Refills: 0 | Status: SHIPPED | OUTPATIENT
Start: 2019-09-30 | End: 2019-12-27

## 2019-09-30 RX ORDER — AMOXICILLIN 500 MG/1
500 CAPSULE ORAL 2 TIMES DAILY
Qty: 20 CAPSULE | Refills: 0 | Status: SHIPPED | OUTPATIENT
Start: 2019-09-30 | End: 2019-10-22

## 2019-09-30 NOTE — TELEPHONE ENCOUNTER
"Requested Prescriptions   Pending Prescriptions Disp Refills     doxazosin (CARDURA) 4 MG tablet [Pharmacy Med Name: DOXAZOSIN 4MG TABLETS]  Last Written Prescription Date:  07/04/2019  Last Fill Quantity: 90 tablet,  # refills: 0   Last Office Visit: 7/12/2019   Future Office Visit:    Next 5 appointments (look out 90 days)    Sep 30, 2019  3:00 PM CDT  Office visit with Dontae Champion PA-C, CR EXAM ROOM 32  [facility] [address] [place] [id number] [phone]        90 tablet 0     Sig: TAKE 1 TABLET(4 MG) BY MOUTH DAILY       Alpha Blockers Failed - 9/30/2019  3:22 AM        Failed - Blood pressure under 140/90 in past 12 months     BP Readings from Last 3 Encounters:   07/12/19 147/70   01/31/19 128/70   12/26/18 138/78           Passed - Recent (12 mo) or future (30 days) visit within the authorizing provider's specialty     Patient has had an office visit with the authorizing provider or a provider within the authorizing providers department within the previous 12 mos or has a future within next 30 days. See \"Patient Info\" tab in inbasket, or \"Choose Columns\" in Meds & Orders section of the refill encounter.            Passed - Patient does not have Tadalafil, Vardenafil, or Sildenafil on their medication list        Passed - Medication is active on med list        Passed - Patient is 18 years of age or older          "

## 2019-09-30 NOTE — PROGRESS NOTES
Subjective     Nathen Gage is a 83 year old male who presents to clinic today for the following health issues:    HPI   Acute Illness   Acute illness concerns: Sinus issue, ST, Spotty vision on and off  Onset: 2 weeks    Fever: no    Chills/Sweats: no    Headache (location?): no    Sinus Pressure: YES- but improved a bit    Conjunctivitis:  no    Ear Pain: no    Rhinorrhea: no    Congestion: no    Sore Throat: YES- scratchy     Cough: YES    Wheeze: no    Decreased Appetite: no    Nausea: no    Vomiting: no    Diarrhea:  no    Dysuria/Freq.: no    Fatigue/Achiness: YES    Sick/Strep Exposure: no     Therapies Tried and outcome: none        Patient Active Problem List   Diagnosis     Esophageal reflux     Essential hypertension, benign     Testicular hypofunction     Impotence of organic origin     Gout     Actinic keratosis     Sick sinus syndrome (H)     Syncope     Hyponatremia     Advanced directives, counseling/discussion     Pacemaker, artificial     Health Care Home     Atrial fibrillation (H)     Dilated aortic root (H)     Pulmonary hypertension (H)     H/O: GI bleed     Mixed hyperlipidemia     Faintness     Long-term (current) use of anticoagulants [Z79.01]     Acute idiopathic gout of left knee     Presbycusis, unspecified laterality     Chest pain     Pain in joint involving ankle and foot, right     SOB (shortness of breath)     Past Surgical History:   Procedure Laterality Date     IMPLANT PACEMAKER  2011    Pacemaker/cardiac insitu / [employer] Dual chamber, V45     PHACOEMULSIFICATION CLEAR CORNEA WITH STANDARD INTRAOCULAR LENS IMPLANT Right 5/29/2018    Procedure: PHACOEMULSIFICATION CLEAR CORNEA WITH STANDARD INTRAOCULAR LENS IMPLANT;  RIGHT EYE PHACOEMULSIFICATION CLEAR CORNEA WITH STANDARD INTRAOCULAR LENS IMPLANT ;  Surgeon: Mat Echevarria MD;  Location[facility]       Social History     Tobacco Use     Smoking status: Former Smoker     Last attempt to quit: 1/1/1975     Years  since quittin.7     Smokeless tobacco: Never Used   Substance Use Topics     Alcohol use: Yes     Comment: 1- 2 BEERS WEEKLY     Family History   Problem Relation Age of Onset     Alzheimer Disease Brother      Cerebrovascular Disease Father 80     Family History Negative Sister      Family History Negative Son      Family History Negative Daughter      Family History Negative Sister      Family History Negative Daughter      Breast Cancer No family hx of      Prostate Cancer No family hx of          Current Outpatient Medications   Medication Sig Dispense Refill     amoxicillin (AMOXIL) 500 MG capsule Take 1 capsule (500 mg) by mouth 2 times daily for 10 days 20 capsule 0     atorvastatin (LIPITOR) 20 MG tablet TAKE 1 TABLET BY MOUTH EVERY DAY 90 tablet 0     doxazosin (CARDURA) 4 MG tablet TAKE 1 TABLET(4 MG) BY MOUTH DAILY 90 tablet 0     losartan (COZAAR) 100 MG tablet Take 1 tablet (100 mg) by mouth daily 90 tablet 3     pantoprazole (PROTONIX) 40 MG EC tablet TAKE 1 TABLET(40 MG) BY MOUTH DAILY 90 tablet 2     prednisolon-gatiflox-bromfenac, pt own, no charge, 1-0.5-0.075 % opthalmic solution 1 drop by Both Eyelids route 3 times daily        warfarin (JANTOVEN) 2.5 MG tablet Take 1.25mg every Fri / Take 2.5mg all other days OR AS DIRECTED BY INR CLINIC 30 tablet 5     Allergies   Allergen Reactions     Neomycin Sulfate          Reviewed and updated as needed this visit by Provider         Review of Systems   ROS COMP: Constitutional, HEENT, cardiovascular, pulmonary, gi and gu systems are negative, except as otherwise noted.      Objective    /71 (BP Location: Right arm, Patient Position: Chair, Cuff Size: Adult Regular)   Pulse 70   Temp 98  F (36.7  C) (Oral)   Resp 12   Wt 78 kg (172 lb)   SpO2 97%   BMI 25.40 kg/m    Body mass index is 25.4 kg/m .         Physical Exam   GENERAL: healthy, alert and no distress  EYES: Eyes grossly normal to inspection, PERRL and conjunctivae and sclerae  normal  HENT: ear canals and TM's normal, nose and mouth without ulcers or lesions  RESP: lungs clear to auscultation - no rales, rhonchi or wheezes  CV: regular rate and rhythm, normal S1 S2, no S3 or S4, no murmur, click or rub, no peripheral edema and peripheral pulses strong  MS: no gross musculoskeletal defects noted, no edema  SKIN: no suspicious lesions or rashes  NEURO: Normal strength and tone, mentation intact and speech normal  PSYCH: mentation appears normal, affect normal/bright  LYMPH: no cervical, supraclavicular, axillary, or inguinal adenopathy    Diagnostic Test Results:  none         Assessment & Plan     (J01.90) Acute sinusitis with symptoms > 10 days  (primary encounter diagnosis)    Comment: Treat with amoxicillin. Possibly slight improvement already but add antibiotic since symptoms present for 2 weeks.    Plan: amoxicillin (AMOXIL) 500 MG capsule                Patient Instructions     Patient Education     Sinusitis (Antibiotic Treatment)    The sinuses are air-filled spaces within the bones of the face. They connect to the inside of the nose. Sinusitis is an inflammation of the tissue that lines the sinuses. Sinusitis can occur during a cold. It can also happen due to allergies to pollens and other particles in the air. Sinusitis can cause symptoms of sinus congestion and a feeling of fullness. A sinus infection causes fever, headache, and facial pain. There is often green or yellow fluid draining from the nose or into the back of the throat (post-nasal drip). You have been given antibiotics to treat this condition.  Home care    Take the full course of antibiotics as instructed. Do not stop taking them, even when you feel better.    Drink plenty of water, hot tea, and other liquids. This may help thin nasal mucus. It also may help your sinuses drain fluids.    Heat may help soothe painful areas of your face. Use a towel soaked in hot water. Or,  the shower and direct the warm  spray onto your face. Using a vaporizer along with a menthol rub at night may also help soothe symptoms.     An expectorant with guaifenesin may help thin nasal mucus and help your sinuses drain fluids.    You can use an over-the-counter decongestant, unless a similar medicine was prescribed to you. Nasal sprays work the fastest. Use one that contains phenylephrine or oxymetazoline. First blow your nose gently. Then use the spray. Do not use these medicines more often than directed on the label. If you do, your symptoms may get worse. You may also take pills that contain pseudoephedrine. Don t use products that combine multiple medicines. This is because side effects may be increased. Read labels. You can also ask the pharmacist for help. (People with high blood pressure should not use decongestants. They can raise blood pressure.)    Over-the-counter antihistamines may help if allergies contributed to your sinusitis.      Do not use nasal rinses or irrigation during an acute sinus infection, unless your healthcare provider tells you to. Rinsing may spread the infection to other areas in your sinuses.    Use acetaminophen or ibuprofen to control pain, unless another pain medicine was prescribed to you. If you have chronic liver or kidney disease or ever had a stomach ulcer, talk with your healthcare provider before using these medicines. (Aspirin should never be taken by anyone under age 18 who is ill with a fever. It may cause severe liver damage.)    Don't smoke. This can make symptoms worse.  Follow-up care  Follow up with your healthcare provider or our staff if you are not better in 1 week.  When to seek medical advice  Call your healthcare provider if any of these occur:    Facial pain or headache that gets worse    Stiff neck    Unusual drowsiness or confusion    Swelling of your forehead or eyelids    Vision problems, such as blurred or double vision    Fever of 100.4 F (38 C) or higher, or as directed by  your healthcare provider    Seizure    Breathing problems    Symptoms don't go away in 10 days  Prevention  Here are steps you can take to help prevent an infection:    Keep good hand washing habits.    Don t have close contact with people who have sore throats, colds, or other upper respiratory infections.    Don t smoke, and stay away from secondhand smoke.    Stay up to date with of your vaccines.  Date Last Reviewed: 11/1/2017 2000-2018 [employer]. [address] [place] [address], [place] [address]. All rights reserved. This information is not intended as a substitute for professional medical care. Always follow your healthcare professional's instructions.               No follow-ups on file.    Dontae Champion PA-C  [facility]

## 2019-09-30 NOTE — TELEPHONE ENCOUNTER
Prescription approved per [facility] Refill Protocol.      Cody Lezama RN, BSN, PHN

## 2019-09-30 NOTE — PATIENT INSTRUCTIONS
Patient Education     Sinusitis (Antibiotic Treatment)    The sinuses are air-filled spaces within the bones of the face. They connect to the inside of the nose. Sinusitis is an inflammation of the tissue that lines the sinuses. Sinusitis can occur during a cold. It can also happen due to allergies to pollens and other particles in the air. Sinusitis can cause symptoms of sinus congestion and a feeling of fullness. A sinus infection causes fever, headache, and facial pain. There is often green or yellow fluid draining from the nose or into the back of the throat (post-nasal drip). You have been given antibiotics to treat this condition.  Home care    Take the full course of antibiotics as instructed. Do not stop taking them, even when you feel better.    Drink plenty of water, hot tea, and other liquids. This may help thin nasal mucus. It also may help your sinuses drain fluids.    Heat may help soothe painful areas of your face. Use a towel soaked in hot water. Or,  the shower and direct the warm spray onto your face. Using a vaporizer along with a menthol rub at night may also help soothe symptoms.     An expectorant with guaifenesin may help thin nasal mucus and help your sinuses drain fluids.    You can use an over-the-counter decongestant, unless a similar medicine was prescribed to you. Nasal sprays work the fastest. Use one that contains phenylephrine or oxymetazoline. First blow your nose gently. Then use the spray. Do not use these medicines more often than directed on the label. If you do, your symptoms may get worse. You may also take pills that contain pseudoephedrine. Don t use products that combine multiple medicines. This is because side effects may be increased. Read labels. You can also ask the pharmacist for help. (People with high blood pressure should not use decongestants. They can raise blood pressure.)    Over-the-counter antihistamines may help if allergies contributed to your  sinusitis.      Do not use nasal rinses or irrigation during an acute sinus infection, unless your healthcare provider tells you to. Rinsing may spread the infection to other areas in your sinuses.    Use acetaminophen or ibuprofen to control pain, unless another pain medicine was prescribed to you. If you have chronic liver or kidney disease or ever had a stomach ulcer, talk with your healthcare provider before using these medicines. (Aspirin should never be taken by anyone under age 18 who is ill with a fever. It may cause severe liver damage.)    Don't smoke. This can make symptoms worse.  Follow-up care  Follow up with your healthcare provider or our staff if you are not better in 1 week.  When to seek medical advice  Call your healthcare provider if any of these occur:    Facial pain or headache that gets worse    Stiff neck    Unusual drowsiness or confusion    Swelling of your forehead or eyelids    Vision problems, such as blurred or double vision    Fever of 100.4 F (38 C) or higher, or as directed by your healthcare provider    Seizure    Breathing problems    Symptoms don't go away in 10 days  Prevention  Here are steps you can take to help prevent an infection:    Keep good hand washing habits.    Don t have close contact with people who have sore throats, colds, or other upper respiratory infections.    Don t smoke, and stay away from secondhand smoke.    Stay up to date with of your vaccines.  Date Last Reviewed: 11/1/2017 2000-2018 [employer]. [address] [place] [address], [place] [address]. All rights reserved. This information is not intended as a substitute for professional medical care. Always follow your healthcare professional's instructions.

## 2019-10-01 ENCOUNTER — ANTICOAGULATION THERAPY VISIT (OUTPATIENT)
Dept: ANTICOAGULATION | Facility: CLINIC | Age: 84
End: 2019-10-01
Payer: COMMERCIAL

## 2019-10-01 DIAGNOSIS — Z79.01 LONG TERM CURRENT USE OF ANTICOAGULANT THERAPY: ICD-10-CM

## 2019-10-01 LAB — INR POINT OF CARE: 2.8 (ref 0.86–1.14)

## 2019-10-01 PROCEDURE — 85610 PROTHROMBIN TIME: CPT | Mod: QW

## 2019-10-01 PROCEDURE — 36416 COLLJ CAPILLARY BLOOD SPEC: CPT

## 2019-10-01 NOTE — PROGRESS NOTES
ANTICOAGULATION FOLLOW-UP CLINIC VISIT    Patient Name:  Nathen Gage  Date:  10/1/2019  Contact Type:  Face to Face    SUBJECTIVE:  Patient Findings     Positives:   Change in medications (Patient began taking amoxicillin 19 for a sinus infection. Patient lazaro take amoxicillin for 10 days. Concurrent use of AMOXICILLIN and WARFARIN may result in an increased risk of bleeding.  )    Comments:   The patient was assessed for diet and activity level changes, missed or extra doses, bruising or bleeding, with no problem findings.  Patient scheduled in [place] again next week as [place] schedule is full.        Clinical Outcomes     Comments:   The patient was assessed for diet and activity level changes, missed or extra doses, bruising or bleeding, with no problem findings.  Patient scheduled in [place] again next week as [place] schedule is full.           OBJECTIVE    INR Protime   Date Value Ref Range Status   10/01/2019 2.8 (A) 0.86 - 1.14 Final       ASSESSMENT / PLAN  INR assessment SUPRA    Recheck INR In: 1 WEEK    INR Location Clinic      Anticoagulation Summary  As of 10/1/2019    INR goal:   2.0-2.5   TTR:   57.5 % (3.4 y)   INR used for dosin.8! (10/1/2019)   Warfarin maintenance plan:   1.25 mg (2.5 mg x 0.5) every Fri; 2.5 mg (2.5 mg x 1) all other days   Full warfarin instructions:   10/1: 1.25 mg; 10/6: 1.25 mg; Otherwise 1.25 mg every Fri; 2.5 mg all other days   Weekly warfarin total:   16.25 mg   Plan last modified:   Fidelina Pizano RN (2019)   Next INR check:   10/8/2019   Target end date:   Indefinite    Indications    Long-term (current) use of anticoagulants [Z79.01] [Z79.01]  Atrial fibrillation (H) [I48.91]             Anticoagulation Episode Summary     INR check location:       Preferred lab:       Send INR reminders to:   [facility]    Comments:         Anticoagulation Care Providers     Provider Role Specialty Phone number    Kushal Valentin,  MD [facility] [phone]            See the Encounter Report to view Anticoagulation Flowsheet and Dosing Calendar (Go to Encounters tab in chart review, and find the Anticoagulation Therapy Visit)    Dosage adjustment made based on physician directed care plan.    Teresa Castro RN

## 2019-10-08 ENCOUNTER — ANTICOAGULATION THERAPY VISIT (OUTPATIENT)
Dept: ANTICOAGULATION | Facility: CLINIC | Age: 84
End: 2019-10-08
Payer: COMMERCIAL

## 2019-10-08 DIAGNOSIS — I48.91 ATRIAL FIBRILLATION (H): ICD-10-CM

## 2019-10-08 DIAGNOSIS — Z79.01 LONG TERM CURRENT USE OF ANTICOAGULANT THERAPY: ICD-10-CM

## 2019-10-08 LAB — INR POINT OF CARE: 2 (ref 0.86–1.14)

## 2019-10-08 PROCEDURE — 36416 COLLJ CAPILLARY BLOOD SPEC: CPT

## 2019-10-08 PROCEDURE — 99207 ZZC NO CHARGE NURSE ONLY: CPT

## 2019-10-08 PROCEDURE — 85610 PROTHROMBIN TIME: CPT | Mod: QW

## 2019-10-08 NOTE — PROGRESS NOTES
ANTICOAGULATION FOLLOW-UP CLINIC VISIT    Patient Name:  Nathen Gage  Date:  10/8/2019  Contact Type:  Face to Face    SUBJECTIVE:  Patient Findings     Positives:   Change in medications (continues on amoxicillin for 1 more day.  Sinus symptoms have improved significantly)    Comments:   The patient was assessed for diet, and activity level changes, missed or extra doses, bruising or bleeding, with no problem findings.  INR had been stable prior to his sinus infection.  Now that symptoms are resolving and he is almost done with the antibiotic I recommended resuming his previous therapeutic maintenance dose.           Clinical Outcomes     Negatives:   Major bleeding event, Thromboembolic event, Anticoagulation-related hospital admission, Anticoagulation-related ED visit, Anticoagulation-related fatality    Comments:   The patient was assessed for diet, and activity level changes, missed or extra doses, bruising or bleeding, with no problem findings.  INR had been stable prior to his sinus infection.  Now that symptoms are resolving and he is almost done with the antibiotic I recommended resuming his previous therapeutic maintenance dose.              OBJECTIVE    INR Protime   Date Value Ref Range Status   10/08/2019 2.0 (A) 0.86 - 1.14 Final       ASSESSMENT / PLAN  INR assessment THER    Recheck INR In: 2 WEEKS    INR Location Clinic      Anticoagulation Summary  As of 10/8/2019    INR goal:   2.0-2.5   TTR:   57.5 % (3.5 y)   INR used for dosin.0 (10/8/2019)   Warfarin maintenance plan:   1.25 mg (2.5 mg x 0.5) every Fri; 2.5 mg (2.5 mg x 1) all other days   Full warfarin instructions:   1.25 mg every Fri; 2.5 mg all other days   Weekly warfarin total:   16.25 mg   No change documented:   Joanne Bustos RN   Plan last modified:   Fidelina Pizano RN (2019)   Next INR check:   10/22/2019   Target end date:   Indefinite    Indications    Long-term (current) use of anticoagulants [Z79.01]  [Z79.01]  Atrial fibrillation (H) [I48.91]             Anticoagulation Episode Summary     INR check location:       Preferred lab:       Send INR reminders to:   [facility]    Comments:         Anticoagulation Care Providers     Provider Role Specialty Phone number    Kushal Valentin MD [facility] [phone]            See the Encounter Report to view Anticoagulation Flowsheet and Dosing Calendar (Go to Encounters tab in chart review, and find the Anticoagulation Therapy Visit)    Dosage adjustment made based on physician directed care plan.    Joanne Bustos RN

## 2019-10-17 DIAGNOSIS — Z95.0 CARDIAC PACEMAKER IN SITU: Primary | ICD-10-CM

## 2019-10-18 DIAGNOSIS — I10 ESSENTIAL HYPERTENSION, BENIGN: ICD-10-CM

## 2019-10-18 DIAGNOSIS — E78.00 PURE HYPERCHOLESTEROLEMIA: ICD-10-CM

## 2019-10-19 NOTE — TELEPHONE ENCOUNTER
Routing refill request to provider for review/approval because:  Labs not current:    Lab Results   Component Value Date    CHOL 162 04/16/2018     Lab Results   Component Value Date    HDL 66 04/16/2018     Lab Results   Component Value Date    LDL 74 04/16/2018     Lab Results   Component Value Date    TRIG 109 04/16/2018     Lab Results   Component Value Date    CHOLHDLRATIO 2.5 05/26/2015

## 2019-10-20 RX ORDER — ATORVASTATIN CALCIUM 20 MG/1
TABLET, FILM COATED ORAL
Qty: 30 TABLET | Refills: 0 | Status: SHIPPED | OUTPATIENT
Start: 2019-10-20 | End: 2019-11-17

## 2019-10-22 ENCOUNTER — ANTICOAGULATION THERAPY VISIT (OUTPATIENT)
Dept: NURSING | Facility: CLINIC | Age: 84
End: 2019-10-22
Payer: COMMERCIAL

## 2019-10-22 DIAGNOSIS — Z79.01 LONG TERM CURRENT USE OF ANTICOAGULANT THERAPY: ICD-10-CM

## 2019-10-22 DIAGNOSIS — I48.91 ATRIAL FIBRILLATION (H): ICD-10-CM

## 2019-10-22 LAB — INR POINT OF CARE: 1.9 (ref 0.86–1.14)

## 2019-10-22 PROCEDURE — 85610 PROTHROMBIN TIME: CPT | Mod: QW

## 2019-10-22 PROCEDURE — 36416 COLLJ CAPILLARY BLOOD SPEC: CPT

## 2019-10-22 PROCEDURE — 99207 ZZC NO CHARGE NURSE ONLY: CPT

## 2019-10-22 NOTE — PROGRESS NOTES
ANTICOAGULATION FOLLOW-UP CLINIC VISIT    Patient Name:  Nathen Gage  Date:  10/22/2019  Contact Type:  Face to Face    SUBJECTIVE:  Patient Findings     Positives:   Change in health (Pt felt nauseated x 24 hours last week after eating a burger at a restaurant.)        Clinical Outcomes     Negatives:   Major bleeding event, Thromboembolic event, Anticoagulation-related hospital admission, Anticoagulation-related ED visit, Anticoagulation-related fatality           OBJECTIVE    INR Protime   Date Value Ref Range Status   10/22/2019 1.9 (A) 0.86 - 1.14 Final       ASSESSMENT / PLAN  INR assessment SUB    Recheck INR In: 2 WEEKS    INR Location Clinic      Anticoagulation Summary  As of 10/22/2019    INR goal:   2.0-2.5   TTR:   56.9 % (3.5 y)   INR used for dosin.9! (10/22/2019)   Warfarin maintenance plan:   1.25 mg (2.5 mg x 0.5) every Fri; 2.5 mg (2.5 mg x 1) all other days   Full warfarin instructions:   10/22: 3.75 mg; Otherwise 1.25 mg every Fri; 2.5 mg all other days   Weekly warfarin total:   16.25 mg   Plan last modified:   Fidelina Pizano RN (2019)   Next INR check:   2019   Target end date:   Indefinite    Indications    Long-term (current) use of anticoagulants [Z79.01] [Z79.01]  Atrial fibrillation (H) [I48.91]             Anticoagulation Episode Summary     INR check location:       Preferred lab:       Send INR reminders to:   [facility]    Comments:         Anticoagulation Care Providers     Provider Role Specialty Phone number    Kushal Valentin MD [facility] [phone]            See the Encounter Report to view Anticoagulation Flowsheet and Dosing Calendar (Go to Encounters tab in chart review, and find the Anticoagulation Therapy Visit)        Fidelina Pizano RN

## 2019-11-04 ENCOUNTER — ANTICOAGULATION THERAPY VISIT (OUTPATIENT)
Dept: NURSING | Facility: CLINIC | Age: 84
End: 2019-11-04
Payer: COMMERCIAL

## 2019-11-04 DIAGNOSIS — Z79.01 LONG TERM CURRENT USE OF ANTICOAGULANT THERAPY: ICD-10-CM

## 2019-11-04 DIAGNOSIS — I48.91 ATRIAL FIBRILLATION (H): ICD-10-CM

## 2019-11-04 LAB — INR POINT OF CARE: 2.1 (ref 0.86–1.14)

## 2019-11-04 PROCEDURE — 85610 PROTHROMBIN TIME: CPT | Mod: QW

## 2019-11-04 PROCEDURE — 99207 ZZC NO CHARGE NURSE ONLY: CPT

## 2019-11-04 PROCEDURE — 36416 COLLJ CAPILLARY BLOOD SPEC: CPT

## 2019-11-04 NOTE — PROGRESS NOTES
ANTICOAGULATION FOLLOW-UP CLINIC VISIT    Patient Name:  Nathen Gage  Date:  2019  Contact Type:  Face to Face    SUBJECTIVE:  Patient Findings     Comments:   The patient was assessed for diet, medication, and activity level changes, missed or extra doses, bruising or bleeding, with no problem findings.          Clinical Outcomes     Negatives:   Major bleeding event, Thromboembolic event, Anticoagulation-related hospital admission, Anticoagulation-related ED visit, Anticoagulation-related fatality    Comments:   The patient was assessed for diet, medication, and activity level changes, missed or extra doses, bruising or bleeding, with no problem findings.             OBJECTIVE    INR Protime   Date Value Ref Range Status   2019 2.1 (A) 0.86 - 1.14 Final       ASSESSMENT / PLAN  INR assessment THER    Recheck INR In: 2 WEEKS    INR Location Clinic      Anticoagulation Summary  As of 2019    INR goal:   2.0-2.5   TTR:   56.8 % (3.5 y)   INR used for dosin.1 (2019)   Warfarin maintenance plan:   1.25 mg (2.5 mg x 0.5) every Fri; 2.5 mg (2.5 mg x 1) all other days   Full warfarin instructions:   1.25 mg every Fri; 2.5 mg all other days   Weekly warfarin total:   16.25 mg   No change documented:   Fidelina Pizano RN   Plan last modified:   Fidelina Pizano RN (2019)   Next INR check:   2019   Target end date:   Indefinite    Indications    Long-term (current) use of anticoagulants [Z79.01] [Z79.01]  Atrial fibrillation (H) [I48.91]             Anticoagulation Episode Summary     INR check location:       Preferred lab:       Send INR reminders to:   [facility]    Comments:         Anticoagulation Care Providers     Provider Role Specialty Phone number    Kushal Valentin MD [facility] [phone]            See the Encounter Report to view Anticoagulation Flowsheet and Dosing Calendar (Go to Encounters tab in chart review, and find the  Anticoagulation Therapy Visit)        Fidelina Pizano RN

## 2019-11-17 DIAGNOSIS — I10 ESSENTIAL HYPERTENSION, BENIGN: ICD-10-CM

## 2019-11-17 DIAGNOSIS — E78.00 PURE HYPERCHOLESTEROLEMIA: ICD-10-CM

## 2019-11-17 RX ORDER — ATORVASTATIN CALCIUM 20 MG/1
TABLET, FILM COATED ORAL
Qty: 30 TABLET | Refills: 0 | Status: SHIPPED | OUTPATIENT
Start: 2019-11-17 | End: 2019-12-14

## 2019-11-17 NOTE — TELEPHONE ENCOUNTER
Patient has pending appointment as below:    Next 5 appointments (look out 90 days)    Nov 21, 2019  9:50 AM CST  Annual Wellness Visit with Kushal Valentin MD, CR EXAM ROOM 13  [facility] [address] [place] [id number] [phone]   Jan 29, 2020 10:45 AM CST  Return Visit with Daniel Leon MD  [facility] ([facility]) [address] [address] [address] [phone]              Rx refilled per [facility] refill protocol.    Nikki Zhang BSN, RN

## 2019-11-18 ENCOUNTER — ANTICOAGULATION THERAPY VISIT (OUTPATIENT)
Dept: NURSING | Facility: CLINIC | Age: 84
End: 2019-11-18
Payer: COMMERCIAL

## 2019-11-18 DIAGNOSIS — I48.91 ATRIAL FIBRILLATION (H): ICD-10-CM

## 2019-11-18 DIAGNOSIS — Z79.01 LONG TERM CURRENT USE OF ANTICOAGULANT THERAPY: ICD-10-CM

## 2019-11-18 LAB — INR POINT OF CARE: 2.5 (ref 0.86–1.14)

## 2019-11-18 PROCEDURE — 36416 COLLJ CAPILLARY BLOOD SPEC: CPT

## 2019-11-18 PROCEDURE — 99207 ZZC NO CHARGE NURSE ONLY: CPT

## 2019-11-18 PROCEDURE — 85610 PROTHROMBIN TIME: CPT | Mod: QW

## 2019-11-18 NOTE — PROGRESS NOTES
ANTICOAGULATION FOLLOW-UP CLINIC VISIT    Patient Name:  Nathen Gage  Date:  2019  Contact Type:  Face to Face    SUBJECTIVE:  Patient Findings     Positives:   Upcoming invasive procedure (Laser treatment scheduled for L eye on 19 due to cloudiness but not related to cataract as he had those removed years ago.)        Clinical Outcomes     Negatives:   Major bleeding event, Thromboembolic event, Anticoagulation-related hospital admission, Anticoagulation-related ED visit, Anticoagulation-related fatality           OBJECTIVE    INR Protime   Date Value Ref Range Status   2019 2.5 (A) 0.86 - 1.14 Final       ASSESSMENT / PLAN  INR assessment THER    Recheck INR In: 2 WEEKS    INR Location Clinic      Anticoagulation Summary  As of 2019    INR goal:   2.0-2.5   TTR:   57.3 % (3.6 y)   INR used for dosin.5 (2019)   Warfarin maintenance plan:   1.25 mg (2.5 mg x 0.5) every Fri; 2.5 mg (2.5 mg x 1) all other days   Full warfarin instructions:   1.25 mg every Fri; 2.5 mg all other days   Weekly warfarin total:   16.25 mg   No change documented:   Fidelina Pizano RN   Plan last modified:   Fidelina Pizano RN (2019)   Next INR check:   12/3/2019   Priority:   Maintenance   Target end date:   Indefinite    Indications    Long-term (current) use of anticoagulants [Z79.01] [Z79.01]  Atrial fibrillation (H) [I48.91]             Anticoagulation Episode Summary     INR check location:       Preferred lab:       Send INR reminders to:   [facility]    Comments:         Anticoagulation Care Providers     Provider Role Specialty Phone number    Kushal Valentin MD [facility] [phone]            See the Encounter Report to view Anticoagulation Flowsheet and Dosing Calendar (Go to Encounters tab in chart review, and find the Anticoagulation Therapy Visit)        Fidelina Pizano RN

## 2019-11-20 NOTE — PROGRESS NOTES
"Pre-Visit Planning     Future Appointments   Date Time Provider Department Center   11/21/2019  9:50 AM Kushal Valentin MD CRFP CR   11/27/2019  9:30 AM PATE TECH1 SUUMPC P PSA CLIN   12/3/2019  9:40 AM CR ANTICOAGULATION CLINIC CRNUR CR   1/29/2020  7:30 AM RSCCECHO1 RHCVCC RSCC   1/29/2020 10:10 AM RU DCRN RUCVCV UMP PSA CLIN   1/29/2020 10:45 AM Daniel Leon MD [facility] PSA CLIN     Arrival Time for this Appointment:  9:50 AM   Appointment Notes for this encounter:   fasting-losing weight without trying.    Questionnaires Reviewed/Assigned  No additional questionnaires are needed     Patient preferred phone number: [phone]    Spoke to patient via phone. Patient does not have additional questions or concerns.        Visit is preventive. Reviewed purpose of preventive visit with patient.    Health Maintenance Due   Topic Date Due     ANNUAL REVIEW OF HM ORDERS  1935     ZOSTER IMMUNIZATION (1 of 2) 10/22/1986     MEDICARE ANNUAL WELLNESS VISIT  08/19/2009     MICROALBUMIN  06/13/2018     CMP  12/02/2019     Patient is due for:  preventive care visit  No appointment needed.    [employer]  Patient is active on [employer].    Questionnaire Review   Offered information on completing questionnaires via [employer].    Call Summary  \"Thank you for your time today.  If anything comes up before your appointment, please feel free to contact us at [phone].\"  Nalini Trinidad, MICHELLEN, RN, PHN    "

## 2019-11-21 ENCOUNTER — OFFICE VISIT (OUTPATIENT)
Dept: FAMILY MEDICINE | Facility: CLINIC | Age: 84
End: 2019-11-21
Payer: COMMERCIAL

## 2019-11-21 DIAGNOSIS — L72.3 SEBACEOUS CYST: ICD-10-CM

## 2019-11-21 DIAGNOSIS — I10 ESSENTIAL HYPERTENSION, BENIGN: ICD-10-CM

## 2019-11-21 DIAGNOSIS — E78.00 PURE HYPERCHOLESTEROLEMIA: ICD-10-CM

## 2019-11-21 DIAGNOSIS — I48.0 PAROXYSMAL ATRIAL FIBRILLATION (H): ICD-10-CM

## 2019-11-21 DIAGNOSIS — R27.0 ATAXIA: ICD-10-CM

## 2019-11-21 DIAGNOSIS — Z23 ENCOUNTER FOR IMMUNIZATION: Primary | ICD-10-CM

## 2019-11-21 DIAGNOSIS — Z95.0 CARDIAC PACEMAKER IN SITU: ICD-10-CM

## 2019-11-21 PROCEDURE — 99207 C PAF COMPLETED  NO CHARGE: CPT | Mod: 25 | Performed by: FAMILY MEDICINE

## 2019-11-21 PROCEDURE — 99397 PER PM REEVAL EST PAT 65+ YR: CPT | Performed by: FAMILY MEDICINE

## 2019-11-21 PROCEDURE — 80061 LIPID PANEL: CPT | Performed by: FAMILY MEDICINE

## 2019-11-21 PROCEDURE — 82043 UR ALBUMIN QUANTITATIVE: CPT | Performed by: FAMILY MEDICINE

## 2019-11-21 PROCEDURE — 36415 COLL VENOUS BLD VENIPUNCTURE: CPT | Performed by: FAMILY MEDICINE

## 2019-11-21 PROCEDURE — 80053 COMPREHEN METABOLIC PANEL: CPT | Performed by: FAMILY MEDICINE

## 2019-11-21 ASSESSMENT — ENCOUNTER SYMPTOMS: HEADACHES: 1

## 2019-11-21 NOTE — PROGRESS NOTES
Subjective     Nathen Gage is a 84 year old male who presents to clinic today for the following health issues:    Headache         Headache  Onset: 5-6 months    Description:   Location: bilateral in the occipital area  And into his neck, worse with prolonged sitting or head movement   Character: ache   Frequency:  1 every week  Duration:  Half a day    Intensity: mild    Progression of Symptoms:  same and intermittent    Accompanying Signs & Symptoms:  Stiff neck: no  Neck or upper back pain: no  Fever: no  Sinus pressure: no  Nausea or vomiting: no  Dizziness: no  Numbness: no  Weakness: YES  Visual changes: YES    History:   Head trauma: YES- 5 years ago  Family history of migraines: no  Previous tests for headaches: no  Neurologist evaluations: no  Able to do daily activities: no  Wake with a headaches: no  Do headaches wake you up: no  Daily pain medication use: no  Work/school stressors/changes: no    Precipitating factors:   Does light make it worse: no  Does sound make it worse: no    Alleviating factors:  Does sleep help: YES- but not always    Therapies Tried and outcome: nothing      BP Readings from Last 3 Encounters:   11/21/19 (!) 157/78   09/30/19 134/71   07/12/19 147/70    Wt Readings from Last 3 Encounters:   11/21/19 76.6 kg (168 lb 12.8 oz)   09/30/19 78 kg (172 lb)   07/12/19 78 kg (172 lb)                    Reviewed and updated as needed this visit by Provider         Review of Systems   Neurological: Positive for headaches.      ROS COMP: Constitutional, HEENT, cardiovascular, pulmonary, GI, , musculoskeletal, neuro, skin, endocrine and psych systems are negative, except as otherwise noted.      Objective    BP (!) 157/78 (BP Location: Right arm, Patient Position: Chair, Cuff Size: Adult Regular)   Pulse 69   Temp 97.6  F (36.4  C) (Oral)   Wt 76.6 kg (168 lb 12.8 oz)   SpO2 99%   BMI 24.93 kg/m    Body mass index is 24.93 kg/m .  Physical Exam   GENERAL: healthy, alert and no  distress  EYES: Eyes grossly normal to inspection, PERRL and conjunctivae and sclerae normal  HENT: ear canals and TM's normal, nose and mouth without ulcers or lesions  NECK: no adenopathy, no asymmetry, masses, or scars and thyroid normal to palpation  RESP: lungs clear to auscultation - no rales, rhonchi or wheezes  CV: regular rate and rhythm, normal S1 S2, no S3 or S4, no murmur, click or rub, no peripheral edema and peripheral pulses strong  ABDOMEN: soft, nontender, no hepatosplenomegaly, no masses and bowel sounds normal  MS: no gross musculoskeletal defects noted, no edema  SKIN: no suspicious lesions or rashes  NEURO: Normal strength and tone, mentation intact and speech normal  PSYCH: mentation appears normal, affect normal/bright    Diagnostic Test Results:  Labs reviewed in Epic        Assessment & Plan    He has known cervical spondylosis, reviewed xray and exam     1. Encounter for immunization  Update   - C ANNUAL WELLNESS VISIT, PPS, SUBSEQUENT  - REVIEW OF HEALTH MAINTENANCE PROTOCOL ORDERS  - PAF COMPLETED    2. Pure hypercholesterolemia  At goal  - Albumin Random Urine Quantitative with Creat Ratio  - Comprehensive metabolic panel (BMP + Alb, Alk Phos, ALT, AST, Total. Bili, TP)  - Lipid panel reflex to direct LDL Fasting    3. Essential hypertension, benign  Check bp   - Albumin Random Urine Quantitative with Creat Ratio  - Comprehensive metabolic panel (BMP + Alb, Alk Phos, ALT, AST, Total. Bili, TP)  - Lipid panel reflex to direct LDL Fasting    4. Ataxia  Pays careful attention to safety, quit skiing at 83    5. Sebaceous cyst  On right back, 1 cm, will monitor     6. Cardiac pacemaker in situ  Doing well [facility] monitors   - C ANNUAL WELLNESS VISIT, PPS, SUBSEQUENT  - Comprehensive metabolic panel (BMP + Alb, Alk Phos, ALT, AST, Total. Bili, TP)  - Lipid panel reflex to direct LDL Fasting    7. Paroxysmal atrial fibrillation (H)         BMI:   Estimated body mass index is 24.93 kg/m  as  "calculated from the following:    Height as of 7/12/19: 1.753 m (5' 9\").    Weight as of this encounter: 76.6 kg (168 lb 12.8 oz).   Weight management plan: Discussed healthy diet and exercise guidelines        MEDICATIONS:  Continue current medications without change    Return in about 6 months (around 5/21/2020).    Kushal Valentin MD  [facility]    "

## 2019-11-22 VITALS
HEART RATE: 69 BPM | TEMPERATURE: 97.6 F | WEIGHT: 168.8 LBS | SYSTOLIC BLOOD PRESSURE: 140 MMHG | DIASTOLIC BLOOD PRESSURE: 86 MMHG | BODY MASS INDEX: 24.93 KG/M2 | OXYGEN SATURATION: 99 %

## 2019-11-22 LAB
ALBUMIN SERPL-MCNC: 3.7 G/DL (ref 3.4–5)
ALP SERPL-CCNC: 96 U/L (ref 40–150)
ALT SERPL W P-5'-P-CCNC: 20 U/L (ref 0–70)
ANION GAP SERPL CALCULATED.3IONS-SCNC: 8 MMOL/L (ref 3–14)
AST SERPL W P-5'-P-CCNC: 18 U/L (ref 0–45)
BILIRUB SERPL-MCNC: 0.4 MG/DL (ref 0.2–1.3)
BUN SERPL-MCNC: 18 MG/DL (ref 7–30)
CALCIUM SERPL-MCNC: 9.2 MG/DL (ref 8.5–10.1)
CHLORIDE SERPL-SCNC: 100 MMOL/L (ref 94–109)
CHOLEST SERPL-MCNC: 157 MG/DL
CO2 SERPL-SCNC: 25 MMOL/L (ref 20–32)
CREAT SERPL-MCNC: 1 MG/DL (ref 0.66–1.25)
CREAT UR-MCNC: 52 MG/DL
GFR SERPL CREATININE-BSD FRML MDRD: 69 ML/MIN/{1.73_M2}
GLUCOSE SERPL-MCNC: 96 MG/DL (ref 70–99)
HDLC SERPL-MCNC: 58 MG/DL
LDLC SERPL CALC-MCNC: 88 MG/DL
MICROALBUMIN UR-MCNC: <5 MG/L
MICROALBUMIN/CREAT UR: NORMAL MG/G CR (ref 0–17)
NONHDLC SERPL-MCNC: 99 MG/DL
POTASSIUM SERPL-SCNC: 4.8 MMOL/L (ref 3.4–5.3)
PROT SERPL-MCNC: 7.2 G/DL (ref 6.8–8.8)
SODIUM SERPL-SCNC: 133 MMOL/L (ref 133–144)
TRIGL SERPL-MCNC: 55 MG/DL

## 2019-11-27 ENCOUNTER — ANCILLARY PROCEDURE (OUTPATIENT)
Dept: CARDIOLOGY | Facility: CLINIC | Age: 84
End: 2019-11-27
Attending: INTERNAL MEDICINE
Payer: COMMERCIAL

## 2019-11-27 DIAGNOSIS — Z95.0 CARDIAC PACEMAKER IN SITU: ICD-10-CM

## 2019-11-27 PROCEDURE — 93293 PM PHONE R-STRIP DEVICE EVAL: CPT | Performed by: INTERNAL MEDICINE

## 2019-12-03 ENCOUNTER — ANTICOAGULATION THERAPY VISIT (OUTPATIENT)
Dept: NURSING | Facility: CLINIC | Age: 84
End: 2019-12-03
Payer: COMMERCIAL

## 2019-12-03 ENCOUNTER — TELEPHONE (OUTPATIENT)
Dept: FAMILY MEDICINE | Facility: CLINIC | Age: 84
End: 2019-12-03

## 2019-12-03 DIAGNOSIS — I48.91 ATRIAL FIBRILLATION (H): ICD-10-CM

## 2019-12-03 DIAGNOSIS — Z79.01 LONG TERM CURRENT USE OF ANTICOAGULANT THERAPY: ICD-10-CM

## 2019-12-03 LAB — INR POINT OF CARE: 2.6 (ref 0.86–1.14)

## 2019-12-03 PROCEDURE — 36416 COLLJ CAPILLARY BLOOD SPEC: CPT

## 2019-12-03 PROCEDURE — 85610 PROTHROMBIN TIME: CPT | Mod: QW

## 2019-12-03 PROCEDURE — 99207 ZZC NO CHARGE NURSE ONLY: CPT

## 2019-12-03 NOTE — TELEPHONE ENCOUNTER
Patient Quality Outreach      Summary:    Patient is due/failing the following:   BP check    Type of outreach:    Phone, spoke to patient. Appt made for 12/4/19    Questions for provider review:    None                                                                                   Patient has the following on his problem list:     Hypertension   Last three blood pressure readings:  BP Readings from Last 3 Encounters:   11/21/19 (!) 140/86   09/30/19 134/71   07/12/19 147/70     Blood pressure: Failed    HTN Guidelines:  ? 139/89                                                      Fabiana Patel       Chart routed to none.

## 2019-12-05 ENCOUNTER — ALLIED HEALTH/NURSE VISIT (OUTPATIENT)
Dept: FAMILY MEDICINE | Facility: CLINIC | Age: 84
End: 2019-12-05
Payer: COMMERCIAL

## 2019-12-05 DIAGNOSIS — I10 ESSENTIAL HYPERTENSION, BENIGN: Primary | ICD-10-CM

## 2019-12-05 PROCEDURE — 99207 ZZC NO CHARGE NURSE ONLY: CPT

## 2019-12-14 DIAGNOSIS — E78.00 PURE HYPERCHOLESTEROLEMIA: ICD-10-CM

## 2019-12-14 DIAGNOSIS — I10 ESSENTIAL HYPERTENSION, BENIGN: ICD-10-CM

## 2019-12-16 ENCOUNTER — ANTICOAGULATION THERAPY VISIT (OUTPATIENT)
Dept: NURSING | Facility: CLINIC | Age: 84
End: 2019-12-16
Payer: COMMERCIAL

## 2019-12-16 DIAGNOSIS — I48.91 ATRIAL FIBRILLATION (H): ICD-10-CM

## 2019-12-16 DIAGNOSIS — Z79.01 LONG TERM CURRENT USE OF ANTICOAGULANT THERAPY: ICD-10-CM

## 2019-12-16 LAB — INR POINT OF CARE: 2.1 (ref 0.86–1.14)

## 2019-12-16 PROCEDURE — 85610 PROTHROMBIN TIME: CPT | Mod: QW

## 2019-12-16 PROCEDURE — 36416 COLLJ CAPILLARY BLOOD SPEC: CPT

## 2019-12-16 PROCEDURE — 99207 ZZC NO CHARGE NURSE ONLY: CPT

## 2019-12-16 RX ORDER — ATORVASTATIN CALCIUM 20 MG/1
TABLET, FILM COATED ORAL
Qty: 90 TABLET | Refills: 3 | Status: SHIPPED | OUTPATIENT
Start: 2019-12-16 | End: 2020-12-15

## 2019-12-16 NOTE — PROGRESS NOTES
ANTICOAGULATION FOLLOW-UP CLINIC VISIT    Patient Name:  Nathen Gage  Date:  2019  Contact Type:  Face to Face    SUBJECTIVE:  Patient Findings     Positives:   Change in health (Had laser surgery to both eyes on , pt is seeing and feeling well.)        Clinical Outcomes     Negatives:   Major bleeding event, Thromboembolic event, Anticoagulation-related hospital admission, Anticoagulation-related ED visit, Anticoagulation-related fatality           OBJECTIVE    INR Protime   Date Value Ref Range Status   2019 2.1 (A) 0.86 - 1.14 Final       ASSESSMENT / PLAN  INR assessment THER    Recheck INR In: 2 WEEKS    INR Location Clinic      Anticoagulation Summary  As of 2019    INR goal:   2.0-2.5   TTR:   56.4 % (1 y)   INR used for dosin.1 (2019)   Warfarin maintenance plan:   1.25 mg (2.5 mg x 0.5) every Fri; 2.5 mg (2.5 mg x 1) all other days   Full warfarin instructions:   1.25 mg every Fri; 2.5 mg all other days   Weekly warfarin total:   16.25 mg   No change documented:   Fidelina Pizano RN   Plan last modified:   Fidelina Pizano RN (2019)   Next INR check:   2019   Priority:   Maintenance   Target end date:   Indefinite    Indications    Long-term (current) use of anticoagulants [Z79.01] [Z79.01]  Atrial fibrillation (H) [I48.91]             Anticoagulation Episode Summary     INR check location:       Preferred lab:       Send INR reminders to:   [facility]    Comments:         Anticoagulation Care Providers     Provider Role Specialty Phone number    Kushal Valentin MD [facility] [phone]            See the Encounter Report to view Anticoagulation Flowsheet and Dosing Calendar (Go to Encounters tab in chart review, and find the Anticoagulation Therapy Visit)        Fidelina Pizano RN

## 2019-12-16 NOTE — TELEPHONE ENCOUNTER
"Prescription approved per [facility] Refill Protocol.    Julianna Mojica RN  [facility]      Requested Prescriptions   Pending Prescriptions Disp Refills     atorvastatin (LIPITOR) 20 MG tablet [Pharmacy Med Name: ATORVASTATIN 20MG TABLETS] 30 tablet 0     Sig: TAKE 1 TABLET BY MOUTH EVERY DAY       Statins Protocol Passed - 12/14/2019  3:22 AM        Passed - LDL on file in past 12 months     Recent Labs   Lab Test 11/21/19  1040   LDL 88             Passed - No abnormal creatine kinase in past 12 months     No lab results found.             Passed - Recent (12 mo) or future (30 days) visit within the authorizing provider's specialty     Patient has had an office visit with the authorizing provider or a provider within the authorizing providers department within the previous 12 mos or has a future within next 30 days. See \"Patient Info\" tab in inbasket, or \"Choose Columns\" in Meds & Orders section of the refill encounter.              Passed - Medication is active on med list        Passed - Patient is age 18 or older          "

## 2019-12-26 DIAGNOSIS — N13.8 BENIGN LOCALIZED HYPERPLASIA OF PROSTATE WITH URINARY OBSTRUCTION: ICD-10-CM

## 2019-12-26 DIAGNOSIS — N40.1 BENIGN LOCALIZED HYPERPLASIA OF PROSTATE WITH URINARY OBSTRUCTION: ICD-10-CM

## 2019-12-27 RX ORDER — DOXAZOSIN 4 MG/1
TABLET ORAL
Qty: 90 TABLET | Refills: 1 | Status: SHIPPED | OUTPATIENT
Start: 2019-12-27 | End: 2020-06-24

## 2019-12-27 NOTE — TELEPHONE ENCOUNTER
"Prescription approved per [facility] Refill Protocol.  Provider aware of BP on 11/21/19. To follow up in May 2020.     Requested Prescriptions   Pending Prescriptions Disp Refills     doxazosin (CARDURA) 4 MG tablet [Pharmacy Med Name: DOXAZOSIN 4MG TABLETS] 90 tablet 0     Sig: TAKE 1 TABLET(4 MG) BY MOUTH DAILY       Alpha Blockers Failed - 12/26/2019  3:23 AM        Failed - Blood pressure under 140/90 in past 12 months     BP Readings from Last 3 Encounters:   12/05/19 (P) 122/62   11/21/19 (!) 140/86   09/30/19 134/71                 Passed - Recent (12 mo) or future (30 days) visit within the authorizing provider's specialty     Patient has had an office visit with the authorizing provider or a provider within the authorizing providers department within the previous 12 mos or has a future within next 30 days. See \"Patient Info\" tab in inbasket, or \"Choose Columns\" in Meds & Orders section of the refill encounter.              Passed - Patient does not have Tadalafil, Vardenafil, or Sildenafil on their medication list        Passed - Medication is active on med list        Passed - Patient is 18 years of age or older        Angela Ferrell RN Flex        "

## 2019-12-30 ENCOUNTER — DOCUMENTATION ONLY (OUTPATIENT)
Dept: OTHER | Facility: CLINIC | Age: 84
End: 2019-12-30

## 2020-01-02 ENCOUNTER — ANTICOAGULATION THERAPY VISIT (OUTPATIENT)
Dept: NURSING | Facility: CLINIC | Age: 85
End: 2020-01-02
Payer: COMMERCIAL

## 2020-01-02 DIAGNOSIS — I48.91 ATRIAL FIBRILLATION (H): ICD-10-CM

## 2020-01-02 DIAGNOSIS — Z79.01 LONG TERM CURRENT USE OF ANTICOAGULANT THERAPY: ICD-10-CM

## 2020-01-02 LAB — INR POINT OF CARE: 2.8 (ref 0.86–1.14)

## 2020-01-02 PROCEDURE — 36416 COLLJ CAPILLARY BLOOD SPEC: CPT

## 2020-01-02 PROCEDURE — 99207 ZZC NO CHARGE NURSE ONLY: CPT

## 2020-01-02 PROCEDURE — 85610 PROTHROMBIN TIME: CPT | Mod: QW

## 2020-01-02 NOTE — PROGRESS NOTES
ANTICOAGULATION FOLLOW-UP CLINIC VISIT    Patient Name:  Nathen Gage  Date:  2020  Contact Type:  Face to Face    SUBJECTIVE:  Patient Findings     Positives:   Change in diet/appetite (Less greens over the holidays)        Clinical Outcomes     Negatives:   Major bleeding event, Thromboembolic event, Anticoagulation-related hospital admission, Anticoagulation-related ED visit, Anticoagulation-related fatality           OBJECTIVE    INR Protime   Date Value Ref Range Status   2020 2.8 (A) 0.86 - 1.14 Final       ASSESSMENT / PLAN  INR assessment SUPRA    Recheck INR In: 10 DAYS    INR Location Clinic      Anticoagulation Summary  As of 2020    INR goal:   2.0-2.5   TTR:   55.7 % (1 y)   INR used for dosin.8! (2020)   Warfarin maintenance plan:   1.25 mg (2.5 mg x 0.5) every Fri; 2.5 mg (2.5 mg x 1) all other days   Full warfarin instructions:   1.25 mg every Fri; 2.5 mg all other days   Weekly warfarin total:   16.25 mg   No change documented:   Fidelina Pizano RN   Plan last modified:   iFdelina Pizano RN (2019)   Next INR check:   2020   Priority:   High   Target end date:   Indefinite    Indications    Long-term (current) use of anticoagulants [Z79.01] [Z79.01]  Atrial fibrillation (H) [I48.91]             Anticoagulation Episode Summary     INR check location:       Preferred lab:       Send INR reminders to:   [facility]    Comments:         Anticoagulation Care Providers     Provider Role Specialty Phone number    Kushal Valentin MD [facility] [phone]            See the Encounter Report to view Anticoagulation Flowsheet and Dosing Calendar (Go to Encounters tab in chart review, and find the Anticoagulation Therapy Visit)        Fidelina Pizano RN

## 2020-01-13 ENCOUNTER — ANTICOAGULATION THERAPY VISIT (OUTPATIENT)
Dept: NURSING | Facility: CLINIC | Age: 85
End: 2020-01-13
Payer: COMMERCIAL

## 2020-01-13 DIAGNOSIS — I48.91 ATRIAL FIBRILLATION (H): ICD-10-CM

## 2020-01-13 DIAGNOSIS — Z79.01 LONG TERM CURRENT USE OF ANTICOAGULANT THERAPY: ICD-10-CM

## 2020-01-13 LAB — INR POINT OF CARE: 2 (ref 0.86–1.14)

## 2020-01-13 PROCEDURE — 85610 PROTHROMBIN TIME: CPT | Mod: QW

## 2020-01-13 PROCEDURE — 36416 COLLJ CAPILLARY BLOOD SPEC: CPT

## 2020-01-13 PROCEDURE — 99207 ZZC NO CHARGE NURSE ONLY: CPT

## 2020-01-13 NOTE — PROGRESS NOTES
ANTICOAGULATION FOLLOW-UP CLINIC VISIT    Patient Name:  Nathen Gage  Date:  2020  Contact Type:      SUBJECTIVE:  Patient Findings     Positives:   Change in diet/appetite (salad 6 days in the past week and 1 serving of peas on )        Clinical Outcomes     Negatives:   Major bleeding event, Thromboembolic event, Anticoagulation-related hospital admission, Anticoagulation-related ED visit, Anticoagulation-related fatality           OBJECTIVE    INR Protime   Date Value Ref Range Status   2020 2.0 (A) 0.86 - 1.14 Final       ASSESSMENT / PLAN  INR assessment THER    Recheck INR In: 2 WEEKS    INR Location Clinic      Anticoagulation Summary  As of 2020    INR goal:   2.0-2.5   TTR:   54.6 % (1 y)   INR used for dosin.0 (2020)   Warfarin maintenance plan:   1.25 mg (2.5 mg x 0.5) every Fri; 2.5 mg (2.5 mg x 1) all other days   Full warfarin instructions:   1.25 mg every Fri; 2.5 mg all other days   Weekly warfarin total:   16.25 mg   No change documented:   Fidelina Pizano RN   Plan last modified:   Fidelina Pizano RN (2019)   Next INR check:   2020   Priority:   High   Target end date:   Indefinite    Indications    Long-term (current) use of anticoagulants [Z79.01] [Z79.01]  Atrial fibrillation (H) [I48.91]             Anticoagulation Episode Summary     INR check location:       Preferred lab:       Send INR reminders to:   [facility]    Comments:         Anticoagulation Care Providers     Provider Role Specialty Phone number    Kushal Valentin MD [facility] [phone]            See the Encounter Report to view Anticoagulation Flowsheet and Dosing Calendar (Go to Encounters tab in chart review, and find the Anticoagulation Therapy Visit)        Fidelina Pizano RN

## 2020-01-27 ENCOUNTER — ANTICOAGULATION THERAPY VISIT (OUTPATIENT)
Dept: NURSING | Facility: CLINIC | Age: 85
End: 2020-01-27
Payer: COMMERCIAL

## 2020-01-27 DIAGNOSIS — I48.91 ATRIAL FIBRILLATION (H): ICD-10-CM

## 2020-01-27 DIAGNOSIS — Z79.01 LONG TERM CURRENT USE OF ANTICOAGULANT THERAPY: ICD-10-CM

## 2020-01-27 LAB — INR POINT OF CARE: 1.9 (ref 0.86–1.14)

## 2020-01-27 PROCEDURE — 36416 COLLJ CAPILLARY BLOOD SPEC: CPT

## 2020-01-27 PROCEDURE — 85610 PROTHROMBIN TIME: CPT | Mod: QW

## 2020-01-27 PROCEDURE — 99207 ZZC NO CHARGE NURSE ONLY: CPT

## 2020-01-27 NOTE — PROGRESS NOTES
ANTICOAGULATION FOLLOW-UP CLINIC VISIT    Patient Name:  Nathen Gage  Date:  2020  Contact Type:  Face to Face    SUBJECTIVE:  Patient Findings     Positives:   Change in health (L heel pain started 2 years ago, pt did have plantar fasciitis in L foot last year.), Change in activity (Went snowshoeing x 2 days over the weekend)        Clinical Outcomes     Negatives:   Major bleeding event, Thromboembolic event, Anticoagulation-related hospital admission, Anticoagulation-related ED visit, Anticoagulation-related fatality           OBJECTIVE    INR Protime   Date Value Ref Range Status   2020 1.9 (A) 0.86 - 1.14 Final       ASSESSMENT / PLAN  INR assessment SUB    Recheck INR In: 2 WEEKS    INR Location Clinic      Anticoagulation Summary  As of 2020    INR goal:   2.0-2.5   TTR:   50.7 % (1 y)   INR used for dosin.9! (2020)   Warfarin maintenance plan:   1.25 mg (2.5 mg x 0.5) every Fri; 2.5 mg (2.5 mg x 1) all other days   Full warfarin instructions:   : 3.75 mg; Otherwise 1.25 mg every Fri; 2.5 mg all other days   Weekly warfarin total:   16.25 mg   Plan last modified:   Fidelina Pizano RN (2019)   Next INR check:   2020   Priority:   High   Target end date:   Indefinite    Indications    Long-term (current) use of anticoagulants [Z79.01] [Z79.01]  Atrial fibrillation (H) [I48.91]             Anticoagulation Episode Summary     INR check location:       Preferred lab:       Send INR reminders to:   [facility]    Comments:         Anticoagulation Care Providers     Provider Role Specialty Phone number    Kushal Valentin MD [facility] [phone]            See the Encounter Report to view Anticoagulation Flowsheet and Dosing Calendar (Go to Encounters tab in chart review, and find the Anticoagulation Therapy Visit)        Fidelina Pizano RN

## 2020-01-27 NOTE — PROGRESS NOTES
CARDIOLOGY VISIT    REASON FOR VISIT: afib, pacemaker    SUBJECTIVE:  84-year-old male seen for follow-up of paroxysmal atrial fibrillation and pacemaker.  He also has dilated a sending aorta and hypertension.     In 2011 he underwent 2C pacemaker implantation ([employer] Altrua 60 EL) for sick sinus syndrome.  He was found to have A. fib based on mode switching on device checks, starting in 2014.     Echo January 2019 showed EF 60%, mild RV dilation with normal function, no valve disease.     Echo January 2020 showed EF 60%, normal RV, 1+ TR, RVSP 39 mmHg, aorta 4.0 cm.    Pacemaker interrogation January 2020 showed normal function, 30 seconds of A. fib in April, 22% atrial paced, 0% V paced, 5 years battery.    He has been doing well recently.  He is very active for his age.  He just went snowshoeing in the [place] and does a lot of other physical activity.  He does biking in the summer.  He denies any chest pain, lightheadedness, dizziness, or dyspnea.  Blood pressure is not checked outside of clinic.  He denies any issues or bleeding with his warfarin.    MEDICATIONS:  Current Outpatient Medications   Medication     atorvastatin (LIPITOR) 20 MG tablet     doxazosin (CARDURA) 4 MG tablet     losartan (COZAAR) 100 MG tablet     pantoprazole (PROTONIX) 40 MG EC tablet     prednisolon-gatiflox-bromfenac, pt own, no charge, 1-0.5-0.075 % opthalmic solution     warfarin (JANTOVEN) 2.5 MG tablet     No current facility-administered medications for this visit.        ALLERGIES:  Allergies   Allergen Reactions     Neomycin Sulfate        REVIEW OF SYSTEMS:  Constitutional:  No weight loss, fever, chills, weakness or fatigue.  HEENT:  Eyes:  No visual loss, blurred vision, double vision or yellow sclerae. No hearing loss, sneezing, congestion, runny nose or sore throat.  Skin:  No rash or itching.  Cardiovascular: per HPI  Respiratory: per HPI  GI:  No anorexia, nausea, vomiting or diarrhea. No abdominal pain  "or blood.  :  No dysurea, hematuria  Neurologic:  No headache, dizziness, syncope, paralysis, ataxia, numbness or tingling in the extremities. No change in bowel or bladder control.  Musculoskeletal:  No muscle, back pain, joint pain or stiffness.  Hematologic:  No anemia, bleeding or bruising.  Lymphatics:  No enlarged nodes. No history of splenectomy.  Psychiatric:  No history of depression or anxiety.  Endocrine:  No reports of sweating, cold or heat intolerance. No polyuria or polydipsia.  Allergies:  No history of asthma, hives, eczema or rhinitis.    PHYSICAL EXAM:  /68 (BP Location: Right arm, Patient Position: Sitting, Cuff Size: Adult Regular)   Pulse 68   Ht 1.753 m (5' 9.02\")   Wt 77.1 kg (170 lb)   SpO2 99%   BMI 25.09 kg/m      Constitutional: awake, alert, no distress  Eyes: PERRL, sclera nonicteric  ENT: trachea midline  Respiratory: Lungs clear  Cardiovascular: Regular rate and rhythm, no murmurs  GI: nondistended, nontender, bowel sounds present  Lymph/Hematologic: no lymphadenopathy  Skin: dry, no rash  Musculoskeletal: good muscle tone, strength 5/5 in upper and lower extremities  Neurologic: no focal deficits  Neuropsychiatric: appropriate affact    DATA:  Lab:   Recent Labs   Lab Test 11/21/19  1040 04/16/18  0848  05/26/15  0727 12/04/14  0804   CHOL 157 162   < > 133 170   HDL 58 66   < > 54 53   LDL 88 74   < > 66 96   TRIG 55 109   < > 63 105   CHOLHDLRATIO  --   --   --  2.5 3.2    < > = values in this interval not displayed.       ASSESSMENT:  84-year-old male seen for follow-up of A. fib and pacemaker.  He is doing very well for his age, he is very physically active with no concerning symptoms.  He only had 30 seconds of A. fib in the past 1 year on his device check.  He will continue on the warfarin, he has had more A. fib in the past.  Echo today shows preserved ejection fraction with stable aorta at 4.0 cm.  Blood pressure was high during his echo today, he was encouraged " to purchase a home monitor.    RECOMMENDATIONS:  1.  Paroxysmal A. Fib  - Very low burden, continue warfarin    2.  Pacemaker  - Continue routine device checks    3.  Dilated a sending aorta-   - Repeat echo in 2022    4.  Hypertension  - Encouraged purchasing a home blood pressure monitor, goal 130/80 or less    Follow-up in 1 year with SHAY.    Daniel Leon MD  Cardiology - [facility]  Pager:  [phone]  Text Page  January 27, 2020

## 2020-01-29 ENCOUNTER — ANCILLARY PROCEDURE (OUTPATIENT)
Dept: CARDIOLOGY | Facility: CLINIC | Age: 85
End: 2020-01-29
Attending: INTERNAL MEDICINE
Payer: COMMERCIAL

## 2020-01-29 ENCOUNTER — HOSPITAL ENCOUNTER (OUTPATIENT)
Dept: CARDIOLOGY | Facility: CLINIC | Age: 85
Discharge: HOME | End: 2020-01-29
Attending: INTERNAL MEDICINE | Admitting: INTERNAL MEDICINE
Payer: COMMERCIAL

## 2020-01-29 VITALS
HEIGHT: 69 IN | DIASTOLIC BLOOD PRESSURE: 68 MMHG | OXYGEN SATURATION: 99 % | HEART RATE: 68 BPM | WEIGHT: 170 LBS | BODY MASS INDEX: 25.18 KG/M2 | SYSTOLIC BLOOD PRESSURE: 122 MMHG

## 2020-01-29 DIAGNOSIS — I48.0 PAF (PAROXYSMAL ATRIAL FIBRILLATION) (H): ICD-10-CM

## 2020-01-29 DIAGNOSIS — I71.21 ASCENDING AORTIC ANEURYSM (H): ICD-10-CM

## 2020-01-29 DIAGNOSIS — I10 ESSENTIAL HYPERTENSION WITH GOAL BLOOD PRESSURE LESS THAN 140/90: ICD-10-CM

## 2020-01-29 DIAGNOSIS — Z95.0 CARDIAC PACEMAKER IN SITU: Primary | ICD-10-CM

## 2020-01-29 DIAGNOSIS — I49.5 SICK SINUS SYNDROME (H): ICD-10-CM

## 2020-01-29 PROCEDURE — 93306 TTE W/DOPPLER COMPLETE: CPT

## 2020-01-29 PROCEDURE — 99214 OFFICE O/P EST MOD 30 MIN: CPT | Mod: 25 | Performed by: INTERNAL MEDICINE

## 2020-01-29 PROCEDURE — 93306 TTE W/DOPPLER COMPLETE: CPT | Mod: 26 | Performed by: INTERNAL MEDICINE

## 2020-01-29 PROCEDURE — 93280 PM DEVICE PROGR EVAL DUAL: CPT | Performed by: INTERNAL MEDICINE

## 2020-01-29 ASSESSMENT — MIFFLIN-ST. JEOR: SCORE: 1456.74

## 2020-01-29 NOTE — PATIENT INSTRUCTIONS
Recommend purchasing a home blood pressure monitor.  You should check your blood pressure a few times per week at different times of the day.  Generally the arm cuff machines are better than the wrist cuff machines.  A good brand is [employer] (Series 5 or 7 is adequate, Series 3 is more basic, Series 10 has smartphone synching that you may not need).  Cost is $40 to $60.  Blood pressure goal is 130/80 or less.  You can purchase them at [employer], [employer], any pharmacy, or online such as [employer].

## 2020-01-29 NOTE — LETTER
1/29/2020    Kushal Valentin MD  [address]  [facility] [address]    RE: Nathen Gage       Dear Colleague,    I had the pleasure of seeing Nathen Gage in the [facility].    CARDIOLOGY VISIT    REASON FOR VISIT: afib, pacemaker    SUBJECTIVE:  84-year-old male seen for follow-up of paroxysmal atrial fibrillation and pacemaker.  He also has dilated a sending aorta and hypertension.     In 2011 he underwent 2C pacemaker implantation ([employer] Altrua 60 EL) for sick sinus syndrome.  He was found to have A. fib based on mode switching on device checks, starting in 2014.     Echo January 2019 showed EF 60%, mild RV dilation with normal function, no valve disease.     Echo January 2020 showed EF 60%, normal RV, 1+ TR, RVSP 39 mmHg, aorta 4.0 cm.    Pacemaker interrogation January 2020 showed normal function, 30 seconds of A. fib in April, 22% atrial paced, 0% V paced, 5 years battery.    He has been doing well recently.  He is very active for his age.  He just went snowshoeing in the [place] and does a lot of other physical activity.  He does biking in the summer.  He denies any chest pain, lightheadedness, dizziness, or dyspnea.  Blood pressure is not checked outside of clinic.  He denies any issues or bleeding with his warfarin.    MEDICATIONS:  Current Outpatient Medications   Medication     atorvastatin (LIPITOR) 20 MG tablet     doxazosin (CARDURA) 4 MG tablet     losartan (COZAAR) 100 MG tablet     pantoprazole (PROTONIX) 40 MG EC tablet     prednisolon-gatiflox-bromfenac, pt own, no charge, 1-0.5-0.075 % opthalmic solution     warfarin (JANTOVEN) 2.5 MG tablet     No current facility-administered medications for this visit.        ALLERGIES:  Allergies   Allergen Reactions     Neomycin Sulfate        REVIEW OF SYSTEMS:  Constitutional:  No weight loss, fever, chills, weakness or fatigue.  HEENT:  Eyes:  No visual loss, blurred vision, double vision or  "yellow sclerae. No hearing loss, sneezing, congestion, runny nose or sore throat.  Skin:  No rash or itching.  Cardiovascular: per HPI  Respiratory: per HPI  GI:  No anorexia, nausea, vomiting or diarrhea. No abdominal pain or blood.  :  No dysurea, hematuria  Neurologic:  No headache, dizziness, syncope, paralysis, ataxia, numbness or tingling in the extremities. No change in bowel or bladder control.  Musculoskeletal:  No muscle, back pain, joint pain or stiffness.  Hematologic:  No anemia, bleeding or bruising.  Lymphatics:  No enlarged nodes. No history of splenectomy.  Psychiatric:  No history of depression or anxiety.  Endocrine:  No reports of sweating, cold or heat intolerance. No polyuria or polydipsia.  Allergies:  No history of asthma, hives, eczema or rhinitis.    PHYSICAL EXAM:  /68 (BP Location: Right arm, Patient Position: Sitting, Cuff Size: Adult Regular)   Pulse 68   Ht 1.753 m (5' 9.02\")   Wt 77.1 kg (170 lb)   SpO2 99%   BMI 25.09 kg/m       Constitutional: awake, alert, no distress  Eyes: PERRL, sclera nonicteric  ENT: trachea midline  Respiratory: Lungs clear  Cardiovascular: Regular rate and rhythm, no murmurs  GI: nondistended, nontender, bowel sounds present  Lymph/Hematologic: no lymphadenopathy  Skin: dry, no rash  Musculoskeletal: good muscle tone, strength 5/5 in upper and lower extremities  Neurologic: no focal deficits  Neuropsychiatric: appropriate affact    DATA:  Lab:   Recent Labs   Lab Test 11/21/19  1040 04/16/18  0848  05/26/15  0727 12/04/14  0804   CHOL 157 162   < > 133 170   HDL 58 66   < > 54 53   LDL 88 74   < > 66 96   TRIG 55 109   < > 63 105   CHOLHDLRATIO  --   --   --  2.5 3.2    < > = values in this interval not displayed.       ASSESSMENT:  84-year-old male seen for follow-up of A. fib and pacemaker.  He is doing very well for his age, he is very physically active with no concerning symptoms.  He only had 30 seconds of A. fib in the past 1 year on his " device check.  He will continue on the warfarin, he has had more A. fib in the past.  Echo today shows preserved ejection fraction with stable aorta at 4.0 cm.  Blood pressure was high during his echo today, he was encouraged to purchase a home monitor.    RECOMMENDATIONS:  1.  Paroxysmal A. Fib  - Very low burden, continue warfarin    2.  Pacemaker  - Continue routine device checks    3.  Dilated a sending aorta-   - Repeat echo in 2022    4.  Hypertension  - Encouraged purchasing a home blood pressure monitor, goal 130/80 or less    Follow-up in 1 year with SHAY.    Daniel Leon MD  Cardiology - [facility]  Pager:  [phone]  Text Page  January 27, 2020    Thank you for allowing me to participate in the care of your patient.    Sincerely,     Daniel Leon MD     [facility]

## 2020-02-01 DIAGNOSIS — K21.00 GASTROESOPHAGEAL REFLUX DISEASE WITH ESOPHAGITIS: ICD-10-CM

## 2020-02-03 RX ORDER — PANTOPRAZOLE SODIUM 40 MG/1
TABLET, DELAYED RELEASE ORAL
Qty: 90 TABLET | Refills: 3 | Status: SHIPPED | OUTPATIENT
Start: 2020-02-03 | End: 2021-02-08

## 2020-02-03 NOTE — TELEPHONE ENCOUNTER
"Requested Prescriptions   Pending Prescriptions Disp Refills     pantoprazole (PROTONIX) 40 MG EC tablet [Pharmacy Med Name: PANTOPRAZOLE 40MG TABLETS] 90 tablet 2     Sig: TAKE 1 TABLET(40 MG) BY MOUTH DAILY       PPI Protocol Passed - 2/1/2020  3:21 AM        Passed - Not on Clopidogrel (unless Pantoprazole ordered)        Passed - No diagnosis of osteoporosis on record        Passed - Recent (12 mo) or future (30 days) visit within the authorizing provider's specialty     Patient has had an office visit with the authorizing provider or a provider within the authorizing providers department within the previous 12 mos or has a future within next 30 days. See \"Patient Info\" tab in inbasket, or \"Choose Columns\" in Meds & Orders section of the refill encounter.              Passed - Medication is active on med list        Passed - Patient is age 18 or older        Signed Prescriptions:                        Disp   Refills    pantoprazole (PROTONIX) 40 MG EC tablet    90 tab*3        Sig: TAKE 1 TABLET(40 MG) BY MOUTH DAILY  Authorizing Provider: YOKASTA ROA  Ordering User: EARLE KIMBLE      "

## 2020-02-04 LAB
MDC_IDC_LEAD_IMPLANT_DT: NORMAL
MDC_IDC_LEAD_IMPLANT_DT: NORMAL
MDC_IDC_LEAD_LOCATION: NORMAL
MDC_IDC_LEAD_LOCATION: NORMAL
MDC_IDC_LEAD_LOCATION_DETAIL_1: NORMAL
MDC_IDC_LEAD_LOCATION_DETAIL_1: NORMAL
MDC_IDC_LEAD_MFG: NORMAL
MDC_IDC_LEAD_MFG: NORMAL
MDC_IDC_LEAD_MODEL: NORMAL
MDC_IDC_LEAD_MODEL: NORMAL
MDC_IDC_LEAD_POLARITY_TYPE: NORMAL
MDC_IDC_LEAD_POLARITY_TYPE: NORMAL
MDC_IDC_LEAD_SERIAL: NORMAL
MDC_IDC_LEAD_SERIAL: NORMAL
MDC_IDC_MSMT_BATTERY_REMAINING_LONGEVITY: 60 MO
MDC_IDC_MSMT_BATTERY_STATUS: NORMAL
MDC_IDC_MSMT_LEADCHNL_RA_IMPEDANCE_VALUE: 540 OHM
MDC_IDC_MSMT_LEADCHNL_RA_PACING_THRESHOLD_AMPLITUDE: 0.5 V
MDC_IDC_MSMT_LEADCHNL_RA_PACING_THRESHOLD_PULSEWIDTH: 0.5 MS
MDC_IDC_MSMT_LEADCHNL_RA_SENSING_INTR_AMPL: 3 MV
MDC_IDC_MSMT_LEADCHNL_RV_IMPEDANCE_VALUE: 590 OHM
MDC_IDC_MSMT_LEADCHNL_RV_PACING_THRESHOLD_AMPLITUDE: 0.6 V
MDC_IDC_MSMT_LEADCHNL_RV_PACING_THRESHOLD_PULSEWIDTH: 0.4 MS
MDC_IDC_MSMT_LEADCHNL_RV_SENSING_INTR_AMPL: 9.5 MV
MDC_IDC_PG_IMPLANT_DTM: NORMAL
MDC_IDC_PG_MFG: NORMAL
MDC_IDC_PG_MODEL: NORMAL
MDC_IDC_PG_SERIAL: NORMAL
MDC_IDC_PG_TYPE: NORMAL
MDC_IDC_SESS_CLINIC_NAME: NORMAL
MDC_IDC_SESS_DTM: NORMAL
MDC_IDC_SESS_TYPE: NORMAL
MDC_IDC_SET_BRADY_AT_MODE_SWITCH_MODE: NORMAL
MDC_IDC_SET_BRADY_AT_MODE_SWITCH_RATE: 170 {BEATS}/MIN
MDC_IDC_SET_BRADY_HYSTRATE: NORMAL
MDC_IDC_SET_BRADY_LOWRATE: 60 {BEATS}/MIN
MDC_IDC_SET_BRADY_MAX_SENSOR_RATE: 120 {BEATS}/MIN
MDC_IDC_SET_BRADY_MAX_TRACKING_RATE: 120 {BEATS}/MIN
MDC_IDC_SET_BRADY_MODE: NORMAL
MDC_IDC_SET_BRADY_PAV_DELAY_HIGH: 200 MS
MDC_IDC_SET_BRADY_PAV_DELAY_LOW: 300 MS
MDC_IDC_SET_BRADY_SAV_DELAY_HIGH: 200 MS
MDC_IDC_SET_BRADY_SAV_DELAY_LOW: 300 MS
MDC_IDC_SET_LEADCHNL_RA_PACING_AMPLITUDE: 2 V
MDC_IDC_SET_LEADCHNL_RA_PACING_ANODE_ELECTRODE_1: NORMAL
MDC_IDC_SET_LEADCHNL_RA_PACING_ANODE_LOCATION_1: NORMAL
MDC_IDC_SET_LEADCHNL_RA_PACING_CAPTURE_MODE: NORMAL
MDC_IDC_SET_LEADCHNL_RA_PACING_CATHODE_ELECTRODE_1: NORMAL
MDC_IDC_SET_LEADCHNL_RA_PACING_CATHODE_LOCATION_1: NORMAL
MDC_IDC_SET_LEADCHNL_RA_PACING_POLARITY: NORMAL
MDC_IDC_SET_LEADCHNL_RA_PACING_PULSEWIDTH: 0.5 MS
MDC_IDC_SET_LEADCHNL_RA_SENSING_ADAPTATION_MODE: NORMAL
MDC_IDC_SET_LEADCHNL_RA_SENSING_ANODE_ELECTRODE_1: NORMAL
MDC_IDC_SET_LEADCHNL_RA_SENSING_ANODE_LOCATION_1: NORMAL
MDC_IDC_SET_LEADCHNL_RA_SENSING_CATHODE_ELECTRODE_1: NORMAL
MDC_IDC_SET_LEADCHNL_RA_SENSING_CATHODE_LOCATION_1: NORMAL
MDC_IDC_SET_LEADCHNL_RA_SENSING_POLARITY: NORMAL
MDC_IDC_SET_LEADCHNL_RA_SENSING_SENSITIVITY: 0.5 MV
MDC_IDC_SET_LEADCHNL_RV_PACING_AMPLITUDE: 1.2 V
MDC_IDC_SET_LEADCHNL_RV_PACING_ANODE_ELECTRODE_1: NORMAL
MDC_IDC_SET_LEADCHNL_RV_PACING_ANODE_LOCATION_1: NORMAL
MDC_IDC_SET_LEADCHNL_RV_PACING_CAPTURE_MODE: NORMAL
MDC_IDC_SET_LEADCHNL_RV_PACING_CATHODE_ELECTRODE_1: NORMAL
MDC_IDC_SET_LEADCHNL_RV_PACING_CATHODE_LOCATION_1: NORMAL
MDC_IDC_SET_LEADCHNL_RV_PACING_POLARITY: NORMAL
MDC_IDC_SET_LEADCHNL_RV_PACING_PULSEWIDTH: 0.4 MS
MDC_IDC_SET_LEADCHNL_RV_SENSING_ADAPTATION_MODE: NORMAL
MDC_IDC_SET_LEADCHNL_RV_SENSING_ANODE_ELECTRODE_1: NORMAL
MDC_IDC_SET_LEADCHNL_RV_SENSING_ANODE_LOCATION_1: NORMAL
MDC_IDC_SET_LEADCHNL_RV_SENSING_CATHODE_ELECTRODE_1: NORMAL
MDC_IDC_SET_LEADCHNL_RV_SENSING_CATHODE_LOCATION_1: NORMAL
MDC_IDC_SET_LEADCHNL_RV_SENSING_POLARITY: NORMAL
MDC_IDC_SET_LEADCHNL_RV_SENSING_SENSITIVITY: 2.5 MV
MDC_IDC_STAT_AT_DTM_END: NORMAL
MDC_IDC_STAT_AT_DTM_START: NORMAL
MDC_IDC_STAT_AT_MODE_SW_COUNT: 1
MDC_IDC_STAT_AT_MODE_SW_MAX_DURATION: 30 S
MDC_IDC_STAT_BRADY_DTM_END: NORMAL
MDC_IDC_STAT_BRADY_DTM_START: NORMAL
MDC_IDC_STAT_BRADY_RA_PERCENT_PACED: 22 %
MDC_IDC_STAT_BRADY_RV_PERCENT_PACED: 0 %
MDC_IDC_STAT_EPISODE_RECENT_COUNT: 1
MDC_IDC_STAT_EPISODE_RECENT_COUNT_DTM_END: NORMAL
MDC_IDC_STAT_EPISODE_RECENT_COUNT_DTM_START: NORMAL
MDC_IDC_STAT_EPISODE_TYPE: NORMAL

## 2020-02-11 ENCOUNTER — ANTICOAGULATION THERAPY VISIT (OUTPATIENT)
Dept: NURSING | Facility: CLINIC | Age: 85
End: 2020-02-11
Payer: COMMERCIAL

## 2020-02-11 ENCOUNTER — OFFICE VISIT (OUTPATIENT)
Dept: PODIATRY | Facility: CLINIC | Age: 85
End: 2020-02-11
Payer: COMMERCIAL

## 2020-02-11 VITALS
DIASTOLIC BLOOD PRESSURE: 74 MMHG | WEIGHT: 170 LBS | SYSTOLIC BLOOD PRESSURE: 128 MMHG | HEIGHT: 69 IN | BODY MASS INDEX: 25.18 KG/M2

## 2020-02-11 DIAGNOSIS — M72.2 PLANTAR FASCIITIS, LEFT: ICD-10-CM

## 2020-02-11 DIAGNOSIS — I48.91 ATRIAL FIBRILLATION (H): ICD-10-CM

## 2020-02-11 DIAGNOSIS — M79.672 LEFT FOOT PAIN: Primary | ICD-10-CM

## 2020-02-11 DIAGNOSIS — Z79.01 LONG TERM CURRENT USE OF ANTICOAGULANT THERAPY: ICD-10-CM

## 2020-02-11 LAB — INR POINT OF CARE: 2.3 (ref 0.86–1.14)

## 2020-02-11 PROCEDURE — 99207 ZZC NO CHARGE NURSE ONLY: CPT

## 2020-02-11 PROCEDURE — 36416 COLLJ CAPILLARY BLOOD SPEC: CPT

## 2020-02-11 PROCEDURE — 99213 OFFICE O/P EST LOW 20 MIN: CPT | Performed by: PODIATRIST

## 2020-02-11 PROCEDURE — 85610 PROTHROMBIN TIME: CPT | Mod: QW

## 2020-02-11 RX ORDER — PREDNISONE 10 MG/1
10 TABLET ORAL DAILY
Qty: 10 TABLET | Refills: 0 | Status: SHIPPED | OUTPATIENT
Start: 2020-02-11 | End: 2020-02-24

## 2020-02-11 ASSESSMENT — MIFFLIN-ST. JEOR: SCORE: 1456.49

## 2020-02-11 NOTE — PROGRESS NOTES
ANTICOAGULATION FOLLOW-UP CLINIC VISIT    Patient Name:  Nathen Gage  Date:  2020  Contact Type:  Face to Face    SUBJECTIVE:  Patient Findings     Positives:   Change in health (Pt seeing Dr. Napier today for plantar fasciitis)        Clinical Outcomes     Negatives:   Major bleeding event, Thromboembolic event, Anticoagulation-related hospital admission, Anticoagulation-related ED visit, Anticoagulation-related fatality           OBJECTIVE    INR Protime   Date Value Ref Range Status   2020 2.3 (A) 0.86 - 1.14 Final       ASSESSMENT / PLAN  INR assessment THER    Recheck INR In: 2 WEEKS    INR Location Clinic      Anticoagulation Summary  As of 2020    INR goal:   2.0-2.5   TTR:   52.8 % (1 y)   INR used for dosin.3 (2020)   Warfarin maintenance plan:   1.25 mg (2.5 mg x 0.5) every Fri; 2.5 mg (2.5 mg x 1) all other days   Full warfarin instructions:   1.25 mg every Fri; 2.5 mg all other days   Weekly warfarin total:   16.25 mg   No change documented:   Fidelina Pizano RN   Plan last modified:   Fidelina Pizano RN (2019)   Next INR check:   2020   Priority:   Maintenance   Target end date:   Indefinite    Indications    Long-term (current) use of anticoagulants [Z79.01] [Z79.01]  Atrial fibrillation (H) [I48.91]             Anticoagulation Episode Summary     INR check location:       Preferred lab:       Send INR reminders to:   [facility]    Comments:         Anticoagulation Care Providers     Provider Role Specialty Phone number    Kushal Valentin MD [facility] [phone]            See the Encounter Report to view Anticoagulation Flowsheet and Dosing Calendar (Go to Encounters tab in chart review, and find the Anticoagulation Therapy Visit)        Fidelina Pizano RN

## 2020-02-11 NOTE — PATIENT INSTRUCTIONS
Thank you for choosing [facility]!    DR. OLIVA'S CLINIC SCHEDULE  MONDAY AM - [place] TUESDAY - [place]   [address] [address] [address] [address] [place] [address]   [phone] / [phone] / [phone]       WEDNESDAY - [place] FRIDAY AM - [facility]   [address]   [place] [address] [place] [address]   [phone] / [phone]       FRIDAY PM - [place] SCHEDULE SURGERY: [phone]   [address] BILLING QUESTIONS: [phone]   [place] [address] AFTER HOURS: [phone]   [phone] / [phone] APPOINTMENTS: [phone]     Consumer Price Line (CPL) [phone]         FOLLOW UP:  Call in 2 weeks if not improved for Physical Therapy        PLANTAR FASCIITIS  Plantar fasciitis is often referred to as heel spurs or heel pain. Plantar fasciitis is a very common problem that affects people of all foot shapes, age, weight and activity level. Pain may be in the arch or on the weight-bearing surface of the heel. The pain may come on without injury or identifiable cause. Pain is generally present when first getting out of bed in the morning or up from a seated break.     CAUSES  The plantar fascia is a dense fibrous band of tissue that stretches across the bottom surface of the foot. The fascia helps support the foot muscles and arch. Plantar fasciitis is thought to be caused by mechanical strain or overload. Frequent walking without shoes or wearing unsupportive shoes is thought to cause structural overload and ultimately inflammation of the plantar fascia. Some people have heel spurs that can be seen on x-ray. The heel spur is actually a minor component of plantar fascitis and is largely ignored.       SELF TREATMENT   The easiest solution is to stop walking around your home without shoes. Plantar fasciitis is largely a shoe problem. Shoes are  either not being worn often enough or your current shoes are inadequate for your weight, foot structure or activity level. The majority of shoes on the market today are not sufficient to resist development of plantar fasciitis or to promote healing. Assume that your current shoes are inadequate and will need to be replaced. Even high quality shoes wear out with 6 months to one year of frequent use. Weight loss is another option. Losing ten pounds in the next two months may be enough to resolve the problem. Ice applied to the area of pain two to three times per day for ten minutes each session can be very helpful. Warm foot soaks in epsom salts can also relieve pain. This should continue until the problem resolves. Achilles tendon stretching is essential. Stretch multiple times daily to promote healing and to prevent recurrence in the future. Over all stretching of the body is helpful as well such as the calves, thighs and lower back. Normally when one area of the body is tight, other areas are too. Gentle Yoga can be good for this.     Over the counter topical anti inflammatories can be helpful such as biofreeze, bengay, salon pas, ect...  Oral ibuprofen or aleve is recommended as well to try to calm down inflammation.     Night splints can be helpful to gradually stretch the foot at night as a lot of pain is when you get up in the morning. Taking a towel or thera band and stretching the foot back multiple times before you get ou of bed can be beneficial as well.     MEDICAL TREATMENT  Medical treatments often include custom arch supports, cortisone injections, physical therapy, splints to be worn in bed, prescription medications and surgery. The home treatments listed above will be necessary regardless of these advanced medical treatments. Surgery is rarely needed but is very helpful in selected cases.     PROGNOSIS  Plantar fasciitis can last from one day to a lifetime. Some people get intermittent fascitis that  is very short-lived. Others suffer daily for years. Excessive body weight, frequent bare foot walking, long hours on the feet, inadequate shoes, predisposing foot structures and excessive activity such as running are all potential issues that lead to chronic and/or recurring plantar fascitis. Having plantar fasciitis means that you are forever prone to this problem and will require modification of some of the above factors. Most people seek treatment within one to four months. Healing usually requires a similar one to four month time frame. Healing time is relative to the amount of effort spent treating the problem.   Plantar fasciitis is highly recurrent. Risk factors often continue, including return to bare foot walking, inadequate shoes, excessive body weight, excessive activities, etc. Your life style and foot structure may predispose you to recurrent plantar fasciitis. A daily prevention regimen can be very helpful. Ongoing use of shoe inserts, careful attention to appropriate shoes, daily Achilles stretching, etc. may prevent recurrence. Prompt attention at the earliest warning signs of heel pain can resolve the problem in as short as a few days.     EXERCISES  Stair Exercise: Step on the stairs with the ball of your foot and hold your position for at least 15 seconds, then slowly step down with the heels of your foot. You can do this daily and as often as you want.   Picking the Towel: Sit comfortably and then pick the towel up with your toes. You can use any object other than a towel as long as the material can be soft and you can pick it up with your toes.  Rolling the Bottle: Use a small ball or frozen water bottle and then roll it around with your foot.   Flex the Toes: Sit comfortably and then flex your toes by pointing it towards the floor or towards your body. This will relax and flex your foot and exercise your plantar fascia, the calf, and the Achilles tendon. The inability of the foot to stretch  often causes the bunching up of the plantar fascia area leading to the pain.  Calf/Achilles Stretching: Lay on you back and raise one foot, then point your toes towards the floor. See photo below:               Hold each stretch for 10 seconds. Stretch 10 times per set, three sets per day. Morning, afternoon and evening. If your heel pain is very severe in the morning, consider doing the first set of stretches before you get out of bed.      OVER THE COUNTER INSERT RECOMMENDATIONS  SuperFeet   Sofsole Fit Spenco   Power Step   Walk-Fit Arch Cradles     Most of these can be found at your local [place], [employer], or online.  **A good high quality over the counter insert should cost around $40-$50      [place] [address]  [phone]   [address]  [phone] [place]  [address] [place] [address]  [phone]   [place]  [address] [facility] [address]  [phone]   [place]  [address] [facility]  [phone] [place]  [address] [place] [address] [place]  [phone]   [place]  [address]  [phone]   [place]  [address] [place] [address]  [phone] [place]  [address]  [phone]           ACHILLES TENDON LENGTHENING  The Achilles tendon and calf muscles play a critical role in normal foot and ankle function. Tightness of the muscle and tendon complex prohibits the normal amount of ankle flexibility. Inadequate ankle dorsiflexion, also known as equinus, results in compensatory stress throughout the leg and foot.     Unnatural stress on the foot results in arch collapse, foot pronation, plantar fasciitis, pain in the ball of the foot, hammer toes, bunions, arthritis, foot wounds, etc. The knee, hip, and leg muscles may also be affected by ankle stiffness. People with diabetes have additional problems with tightness of the Achilles tendon. The combination of foot numbness and a tight Achilles may lead to pressure sores in the ball of  the foot. Bone breakdown through the midfoot may also occur as a complication of equinus.     Initial treatment might involve stretching exercises. Stretching will keep the muscles loose but you will not likely accomplish making the tendon longer. Daily Achilles stretching is important   nonetheless. Surgical lengthening of the tendon is often required. .   Surgery can be performed in several ways. The tendon can be lengthened at the ankle level (Achilles lengthening) or in the mid calf (Gastroc lengthening). Achilles and gastrocnemius lengthening can be performed as an isolated procedure or in combination with surgical procedures for other conditions.   Lengthening procedures are most commonly performed for treatment of flatfoot deformity, contracture associated with diabetes related forefoot and midfoot ulcers, contracture after injury or stroke and to treat Achilles tendonitis.   The goal of surgery is to make the tendon longer yet still preserve function of the calf muscles. Most surgical patients do not have noticeable weakness once they recover from the surgery.     The main potential hazard of surgery involves tendon rupture during the healing process. Rupture is not common but could potentially occur. The tendon can also become excessively lengthened, especially from the stress of premature walking before the tendon is healed. Tendon adhesion, skin adhesion and nerve irritation or numbness can also occur.    CALF AND ACHILLES TENDON STRECHES   Stretch gently, do not bounce.   Do each set of stretches three times a day while you are having symptoms.   Do each set of stretches once a day after symptoms are relieved.     1. Towel Stretch     Sit on hard surface with one leg straight out in front of you.     Loop a towel around the ball of the foot and pull the towel to your body.     Be sure to keep your leg straight.     You should feel tension in the calf muscle of the straight leg.     Repeat 3 times on each  side for at least 15 seconds each.     2. Standing Calf Stretch     Stand about a foot from a wall and extend one leg behind you.     Keep both feet flat on the floor, toes pointed straight ahead, and the rear knee  straight.     Lean into the wall until you feel tension in the rear leg.     Hold for at least 15 seconds.     Repeat 2 times on each side.     3. Standing Achilles Tendon Stretch     Get into position of Standing Calf Stretch.     Lower the hips downward as you slightly bend the knee of the rear leg.     Keep both feet flat on the floor and toes straight ahead.     Hold for at least 15 seconds.     Repeat 2 times on each side.         BODY WEIGHT AND YOUR FEET  The following information is included in the after visit summary for all patients. Body weight can be a sensitive issue to discuss in clinic, but we think the following information is very important. Although we focus on the feet and ankles, we do support the overall health of our patients.     Many things can cause foot and ankle problems. Foot structure, activity level, foot mechanics and injuries are common causes of pain. One very important issue that often goes unmentioned, is body weight. Extra weight can cause increased stress on muscles, ligaments, bones and tendons. Sometimes just a few extra pounds is all it takes to put one over her/his threshold. Without reducing that stress, it can be difficult to alleviate pain. As Foot & Ankle specialists, our job is addressing the lower extremity problem and possible causes. Regarding extra body weight, we encourage patients to discuss diet and weight management plans with their primary care doctors. It is this team approach that gives you the best opportunity for pain relief and getting you back on your feet.      [facility] has a Comprehensive Weight Management Program. This program includes counseling, education, non-surgical and surgical approaches to weight loss. If you are interested in  learning more either talk to you primary care provider or call [phone].

## 2020-02-11 NOTE — PROGRESS NOTES
PATIENT HISTORY:   aNthen Gage is a 84 year old male who presents to clinic for left heel pain. Notes that it has been going on for about 3 weeks. Deep ache and shooting pain. Can be 9/10 at its worst.  Intermittent throughout the day. Worse after getting up from rest. Has tried shoe inserts with have helped. His inserts are 6 months old.  Denies specific injury. Wondering what is causing the pain and what can be done for it.     Review of Systems:  Patient denies fever, chills, rash, wound, stiffness, numbness, weakness, heart burn, blood in stool, chest pain with activity, calf pain when walking, shortness of breath with activity, chronic cough, easy bleeding/bruising, swelling of ankles, excessive thirst, fatigue, depression, anxiety.  Patient admits to limping at times.     PAST MEDICAL HISTORY:   Past Medical History:   Diagnosis Date     Actinic keratosis      Acute idiopathic gout of left knee 4/27/2016     Atrial fibrillation (H)     on Eliquis     Dilated aortic root (H)      Esophageal reflux      Esophagitis      H/O: GI bleed 2010     Hyperlipidaemia      Hyperlipidemia LDL goal <100      Impotence of organic origin      Presbycusis, unspecified laterality 4/27/2016     Pulmonary hypertension (H)      Sick sinus syndrome (H)     pacemaker implanted 2011     Unspecified essential hypertension         PAST SURGICAL HISTORY:   Past Surgical History:   Procedure Laterality Date     IMPLANT PACEMAKER  2011    Pacemaker/cardiac insitu / [employer] Dual chamber, V45     PHACOEMULSIFICATION CLEAR CORNEA WITH STANDARD INTRAOCULAR LENS IMPLANT Right 5/29/2018    Procedure: PHACOEMULSIFICATION CLEAR CORNEA WITH STANDARD INTRAOCULAR LENS IMPLANT;  RIGHT EYE PHACOEMULSIFICATION CLEAR CORNEA WITH STANDARD INTRAOCULAR LENS IMPLANT ;  Surgeon: Mat Echevarria MD;  Location: [facility]        MEDICATIONS:   Current Outpatient Medications:      atorvastatin (LIPITOR) 20 MG tablet, TAKE 1 TABLET BY MOUTH EVERY  DAY, Disp: 90 tablet, Rfl: 3     doxazosin (CARDURA) 4 MG tablet, TAKE 1 TABLET(4 MG) BY MOUTH DAILY, Disp: 90 tablet, Rfl: 1     losartan (COZAAR) 100 MG tablet, Take 1 tablet (100 mg) by mouth daily, Disp: 90 tablet, Rfl: 3     pantoprazole (PROTONIX) 40 MG EC tablet, TAKE 1 TABLET(40 MG) BY MOUTH DAILY, Disp: 90 tablet, Rfl: 3     prednisolon-gatiflox-bromfenac, pt own, no charge, 1-0.5-0.075 % opthalmic solution, 1 drop by Both Eyelids route 3 times daily , Disp: , Rfl:      warfarin (JANTOVEN) 2.5 MG tablet, Take 1.25mg every Fri / Take 2.5mg all other days OR AS DIRECTED BY INR CLINIC, Disp: 30 tablet, Rfl: 5     ALLERGIES:    Allergies   Allergen Reactions     Neomycin Sulfate         SOCIAL HISTORY:   Social History     Socioeconomic History     Marital status:      Spouse name: Not on file     Number of children: Not on file     Years of education: Not on file     Highest education level: Not on file   Occupational History     Not on file   Social Needs     Financial resource strain: Not on file     Food insecurity:     Worry: Not on file     Inability: Not on file     Transportation needs:     Medical: Not on file     Non-medical: Not on file   Tobacco Use     Smoking status: Former Smoker     Last attempt to quit: 1975     Years since quittin.1     Smokeless tobacco: Never Used   Substance and Sexual Activity     Alcohol use: Yes     Comment: 1- 2 BEERS WEEKLY     Drug use: No     Sexual activity: Not Currently     Partners: Female   Lifestyle     Physical activity:     Days per week: Not on file     Minutes per session: Not on file     Stress: Not on file   Relationships     Social connections:     Talks on phone: Not on file     Gets together: Not on file     Attends Oriental orthodox service: Not on file     Active member of club or organization: Not on file     Attends meetings of clubs or organizations: Not on file     Relationship status: Not on file     Intimate partner violence:     Fear  "of current or ex partner: Not on file     Emotionally abused: Not on file     Physically abused: Not on file     Forced sexual activity: Not on file   Other Topics Concern     Parent/sibling w/ CABG, MI or angioplasty before 65F 55M? No      Service Not Asked     Blood Transfusions Not Asked     Caffeine Concern No     Comment: 1 cup coffee per day     Occupational Exposure Not Asked     Hobby Hazards Not Asked     Sleep Concern No     Stress Concern No     Weight Concern No     Special Diet Yes     Comment: on warfarin      Back Care Not Asked     Exercise Yes     Comment: states he is very active, dancing, skiing, walking      Bike Helmet Yes     Seat Belt Yes     Self-Exams Not Asked   Social History Narrative     Not on file        FAMILY HISTORY:   Family History   Problem Relation Age of Onset     Alzheimer Disease Brother      Cerebrovascular Disease Father 80     Family History Negative Sister      Family History Negative Son      Family History Negative Daughter      Family History Negative Sister      Family History Negative Daughter      Breast Cancer No family hx of      Prostate Cancer No family hx of         EXAM:Vitals: /74   Ht 1.753 m (5' 9\")   Wt 77.1 kg (170 lb)   BMI 25.10 kg/m    BMI= Body mass index is 25.1 kg/m .    General appearance: Patient is alert and fully cooperative with history & exam.  No sign of distress is noted during the visit.     Psychiatric: Affect is pleasant & appropriate.  Patient appears motivated to improve health.     Respiratory: Breathing is regular & unlabored while sitting.     HEENT: Hearing is intact to spoken word.  Speech is clear.  No gross evidence of visual impairment that would impact ambulation.     Dermatologic: Skin is intact to both lower extremities without significant lesions, rash or abrasion.  No paronychia or evidence of soft tissue infection is noted.     Vascular: DP & PT pulses are intact & regular bilaterally.  No significant " edema or varicosities noted.  CFT and skin temperature is normal to both lower extremities.     Neurologic: Lower extremity sensation is intact to light touch.  No evidence of weakness or contracture in the lower extremities.  No evidence of neuropathy.     Musculoskeletal: Patient is ambulatory without assistive device or brace.  Pain on palpation of the left plantar heel.      ASSESSMENT:    Left foot pain  Plantar fasciitis, left     PLAN:  Reviewed patient's chart in [facility]. The potential causes and nature of plantar fasciitis were discussed with the patient.  We reviewed the natural history/prognosis of the condition and risks if left untreated.  These include chronic pain, other sites of pain due to gait changes, and potential plantar fascial rupture.      We discussed possible causes of the condition as it relates to the patients specific situation.      Conservative treatment options were reviewed:  appropriate shoes, avoidance of barefoot walking, inserts/orthoses, stretching, ice, massage, immobilization and NSAIDs.     We also reviewed the options of injection therapy and surgery.  However, it was made clear that surgery is only considered when conservative therapy fails.  The risks and benefits of injection therapy, and surgery were discussed.     After thorough discussion and answering all questions, the patient elected to try oral anti inflammatory. Prednisone was ordered. Also recommend topical pain cream over the counter and stretching and warm foot soaks.  If pain continues, can try injection or physical therapy with iontophoresis.           Jenny Napier DPM, Podiatry/Foot and Ankle Surgery    Weight management plan: Patient was referred to their PCP to discuss a diet and exercise plan.

## 2020-02-11 NOTE — LETTER
2/11/2020         RE: Nathen Gage  [address] [place] [address]        Dear Colleague,    Thank you for referring your patient, Nathen Gage, to the [facility]. Please see a copy of my visit note below.    PATIENT HISTORY:   Nathen Gage is a 84 year old male who presents to clinic for left heel pain. Notes that it has been going on for about 3 weeks. Deep ache and shooting pain. Can be 9/10 at its worst.  Intermittent throughout the day. Worse after getting up from rest. Has tried shoe inserts with have helped. His inserts are 6 months old.  Denies specific injury. Wondering what is causing the pain and what can be done for it.     Review of Systems:  Patient denies fever, chills, rash, wound, stiffness, numbness, weakness, heart burn, blood in stool, chest pain with activity, calf pain when walking, shortness of breath with activity, chronic cough, easy bleeding/bruising, swelling of ankles, excessive thirst, fatigue, depression, anxiety.  Patient admits to limping at times.     PAST MEDICAL HISTORY:   Past Medical History:   Diagnosis Date     Actinic keratosis      Acute idiopathic gout of left knee 4/27/2016     Atrial fibrillation (H)     on Eliquis     Dilated aortic root (H)      Esophageal reflux      Esophagitis      H/O: GI bleed 2010     Hyperlipidaemia      Hyperlipidemia LDL goal <100      Impotence of organic origin      Presbycusis, unspecified laterality 4/27/2016     Pulmonary hypertension (H)      Sick sinus syndrome (H)     pacemaker implanted 2011     Unspecified essential hypertension         PAST SURGICAL HISTORY:   Past Surgical History:   Procedure Laterality Date     IMPLANT PACEMAKER  2011    Pacemaker/cardiac insitu / [employer] Dual chamber, V45     PHACOEMULSIFICATION CLEAR CORNEA WITH STANDARD INTRAOCULAR LENS IMPLANT Right 5/29/2018    Procedure: PHACOEMULSIFICATION CLEAR CORNEA WITH STANDARD INTRAOCULAR LENS IMPLANT;  RIGHT EYE  PHACOEMULSIFICATION CLEAR CORNEA WITH STANDARD INTRAOCULAR LENS IMPLANT ;  Surgeon: Mat Echevarria MD;  Location: [facility]        MEDICATIONS:   Current Outpatient Medications:      atorvastatin (LIPITOR) 20 MG tablet, TAKE 1 TABLET BY MOUTH EVERY DAY, Disp: 90 tablet, Rfl: 3     doxazosin (CARDURA) 4 MG tablet, TAKE 1 TABLET(4 MG) BY MOUTH DAILY, Disp: 90 tablet, Rfl: 1     losartan (COZAAR) 100 MG tablet, Take 1 tablet (100 mg) by mouth daily, Disp: 90 tablet, Rfl: 3     pantoprazole (PROTONIX) 40 MG EC tablet, TAKE 1 TABLET(40 MG) BY MOUTH DAILY, Disp: 90 tablet, Rfl: 3     prednisolon-gatiflox-bromfenac, pt own, no charge, 1-0.5-0.075 % opthalmic solution, 1 drop by Both Eyelids route 3 times daily , Disp: , Rfl:      warfarin (JANTOVEN) 2.5 MG tablet, Take 1.25mg every Fri / Take 2.5mg all other days OR AS DIRECTED BY INR CLINIC, Disp: 30 tablet, Rfl: 5     ALLERGIES:    Allergies   Allergen Reactions     Neomycin Sulfate         SOCIAL HISTORY:   Social History     Socioeconomic History     Marital status:      Spouse name: Not on file     Number of children: Not on file     Years of education: Not on file     Highest education level: Not on file   Occupational History     Not on file   Social Needs     Financial resource strain: Not on file     Food insecurity:     Worry: Not on file     Inability: Not on file     Transportation needs:     Medical: Not on file     Non-medical: Not on file   Tobacco Use     Smoking status: Former Smoker     Last attempt to quit: 1975     Years since quittin.1     Smokeless tobacco: Never Used   Substance and Sexual Activity     Alcohol use: Yes     Comment: 1- 2 BEERS WEEKLY     Drug use: No     Sexual activity: Not Currently     Partners: Female   Lifestyle     Physical activity:     Days per week: Not on file     Minutes per session: Not on file     Stress: Not on file   Relationships     Social connections:     Talks on phone: Not on file     Gets  "together: Not on file     Attends Confucianist service: Not on file     Active member of club or organization: Not on file     Attends meetings of clubs or organizations: Not on file     Relationship status: Not on file     Intimate partner violence:     Fear of current or ex partner: Not on file     Emotionally abused: Not on file     Physically abused: Not on file     Forced sexual activity: Not on file   Other Topics Concern     Parent/sibling w/ CABG, MI or angioplasty before 65F 55M? No      Service Not Asked     Blood Transfusions Not Asked     Caffeine Concern No     Comment: 1 cup coffee per day     Occupational Exposure Not Asked     Hobby Hazards Not Asked     Sleep Concern No     Stress Concern No     Weight Concern No     Special Diet Yes     Comment: on warfarin      Back Care Not Asked     Exercise Yes     Comment: states he is very active, dancing, skiing, walking      Bike Helmet Yes     Seat Belt Yes     Self-Exams Not Asked   Social History Narrative     Not on file        FAMILY HISTORY:   Family History   Problem Relation Age of Onset     Alzheimer Disease Brother      Cerebrovascular Disease Father 80     Family History Negative Sister      Family History Negative Son      Family History Negative Daughter      Family History Negative Sister      Family History Negative Daughter      Breast Cancer No family hx of      Prostate Cancer No family hx of         EXAM:Vitals: /74   Ht 1.753 m (5' 9\")   Wt 77.1 kg (170 lb)   BMI 25.10 kg/m     BMI= Body mass index is 25.1 kg/m .    General appearance: Patient is alert and fully cooperative with history & exam.  No sign of distress is noted during the visit.     Psychiatric: Affect is pleasant & appropriate.  Patient appears motivated to improve health.     Respiratory: Breathing is regular & unlabored while sitting.     HEENT: Hearing is intact to spoken word.  Speech is clear.  No gross evidence of visual impairment that would impact " ambulation.     Dermatologic: Skin is intact to both lower extremities without significant lesions, rash or abrasion.  No paronychia or evidence of soft tissue infection is noted.     Vascular: DP & PT pulses are intact & regular bilaterally.  No significant edema or varicosities noted.  CFT and skin temperature is normal to both lower extremities.     Neurologic: Lower extremity sensation is intact to light touch.  No evidence of weakness or contracture in the lower extremities.  No evidence of neuropathy.     Musculoskeletal: Patient is ambulatory without assistive device or brace.  Pain on palpation of the left plantar heel.      ASSESSMENT:    Left foot pain  Plantar fasciitis, left     PLAN:  Reviewed patient[facility] in [facility]. The potential causes and nature of plantar fasciitis were discussed with the patient.  We reviewed the natural history/prognosis of the condition and risks if left untreated.  These include chronic pain, other sites of pain due to gait changes, and potential plantar fascial rupture.      We discussed possible causes of the condition as it relates to the patients specific situation.      Conservative treatment options were reviewed:  appropriate shoes, avoidance of barefoot walking, inserts/orthoses, stretching, ice, massage, immobilization and NSAIDs.     We also reviewed the options of injection therapy and surgery.  However, it was made clear that surgery is only considered when conservative therapy fails.  The risks and benefits of injection therapy, and surgery were discussed.     After thorough discussion and answering all questions, the patient elected to try oral anti inflammatory. Prednisone was ordered. Also recommend topical pain cream over the counter and stretching and warm foot soaks.  If pain continues, can try injection or physical therapy with iontophoresis.           Jenny Napier DPM, Podiatry/Foot and Ankle Surgery    Weight management plan: Patient was referred to  their PCP to discuss a diet and exercise plan.      Again, thank you for allowing me to participate in the care of your patient.        Sincerely,        Jenny Napier DPM, Podiatry/Foot and Ankle Surgery

## 2020-02-24 ENCOUNTER — ANTICOAGULATION THERAPY VISIT (OUTPATIENT)
Dept: NURSING | Facility: CLINIC | Age: 85
End: 2020-02-24
Payer: COMMERCIAL

## 2020-02-24 DIAGNOSIS — I48.91 ATRIAL FIBRILLATION (H): ICD-10-CM

## 2020-02-24 DIAGNOSIS — Z79.01 LONG TERM CURRENT USE OF ANTICOAGULANT THERAPY: ICD-10-CM

## 2020-02-24 LAB — INR POINT OF CARE: 2.1 (ref 0.86–1.14)

## 2020-02-24 PROCEDURE — 99207 ZZC NO CHARGE NURSE ONLY: CPT

## 2020-02-24 PROCEDURE — 36416 COLLJ CAPILLARY BLOOD SPEC: CPT

## 2020-02-24 PROCEDURE — 85610 PROTHROMBIN TIME: CPT | Mod: QW

## 2020-02-24 NOTE — PROGRESS NOTES
ANTICOAGULATION FOLLOW-UP CLINIC VISIT    Patient Name:  Nathen Gage  Date:  2020  Contact Type:  Face to Face    SUBJECTIVE:  Patient Findings     Positives:   Change in health (saw podiatrist for L plantar faciitis, foot feels better since taking PDN), Change in medications (Took PDN -.  Pt also using OTC pain cream tid)        Clinical Outcomes     Negatives:   Major bleeding event, Thromboembolic event, Anticoagulation-related hospital admission, Anticoagulation-related ED visit, Anticoagulation-related fatality           OBJECTIVE    INR Protime   Date Value Ref Range Status   2020 2.1 (A) 0.86 - 1.14 Final       ASSESSMENT / PLAN  INR assessment THER    Recheck INR In: 2 WEEKS    INR Location Clinic      Anticoagulation Summary  As of 2020    INR goal:   2.0-2.5   TTR:   56.4 % (1 y)   INR used for dosin.1 (2020)   Warfarin maintenance plan:   1.25 mg (2.5 mg x 0.5) every Fri; 2.5 mg (2.5 mg x 1) all other days   Full warfarin instructions:   1.25 mg every Fri; 2.5 mg all other days   Weekly warfarin total:   16.25 mg   No change documented:   Fidelina Pizano RN   Plan last modified:   Fidelina Pizano RN (2019)   Next INR check:   3/10/2020   Priority:   Maintenance   Target end date:   Indefinite    Indications    Long-term (current) use of anticoagulants [Z79.01] [Z79.01]  Atrial fibrillation (H) [I48.91]             Anticoagulation Episode Summary     INR check location:       Preferred lab:       Send INR reminders to:   [facility]    Comments:         Anticoagulation Care Providers     Provider Role Specialty Phone number    Kushal Valentin MD [facility] [phone]            See the Encounter Report to view Anticoagulation Flowsheet and Dosing Calendar (Go to Encounters tab in chart review, and find the Anticoagulation Therapy Visit)        Fidelina Pizano RN

## 2020-02-24 NOTE — TELEPHONE ENCOUNTER
Medication Refilled: Losartan  Last office visit: 2020 with Edward Arboleda  Last Labs/EK2019 CMP  Next office visit: 2021 with SHAY per Dr. Leon. Harika in [facility]  Pharmacy sent to: Iris Mendez RN

## 2020-03-02 ENCOUNTER — TELEPHONE (OUTPATIENT)
Dept: FAMILY MEDICINE | Facility: CLINIC | Age: 85
End: 2020-03-02

## 2020-03-02 DIAGNOSIS — I48.91 ATRIAL FIBRILLATION (H): Primary | ICD-10-CM

## 2020-03-02 DIAGNOSIS — I48.19 PERSISTENT ATRIAL FIBRILLATION (H): ICD-10-CM

## 2020-03-02 NOTE — TELEPHONE ENCOUNTER
Has the patient previously taken warfarin? yes  If yes, for what indication? Atrial Fibrillation    Does the patient have any of the following indications for a higher range of 2.5-3.5:    Mitral position mechanical valve? no    Meryl-Shiley, Ball and Cage or Monoleaflet valve (regardless of position) no    Other (if yes, please explain) no    INR goal range 2.0-2.5 per ENT due to frequent nosebleeds in the past.      Thank you,  Fidelina Pizano RN

## 2020-03-10 ENCOUNTER — ANTICOAGULATION THERAPY VISIT (OUTPATIENT)
Dept: NURSING | Facility: CLINIC | Age: 85
End: 2020-03-10
Payer: COMMERCIAL

## 2020-03-10 DIAGNOSIS — I48.91 ATRIAL FIBRILLATION (H): ICD-10-CM

## 2020-03-10 DIAGNOSIS — Z79.01 LONG TERM CURRENT USE OF ANTICOAGULANT THERAPY: ICD-10-CM

## 2020-03-10 PROBLEM — I48.19 PERSISTENT ATRIAL FIBRILLATION (H): Status: ACTIVE | Noted: 2020-03-10

## 2020-03-10 LAB — INR POINT OF CARE: 2 (ref 0.86–1.14)

## 2020-03-10 PROCEDURE — 36416 COLLJ CAPILLARY BLOOD SPEC: CPT

## 2020-03-10 PROCEDURE — 99207 ZZC NO CHARGE NURSE ONLY: CPT

## 2020-03-10 PROCEDURE — 85610 PROTHROMBIN TIME: CPT | Mod: QW

## 2020-03-10 NOTE — PROGRESS NOTES
ANTICOAGULATION FOLLOW-UP CLINIC VISIT    Patient Name:  Nathen Gage  Date:  3/10/2020  Contact Type:  Face to Face    SUBJECTIVE:  Patient Findings     Positives:   Signs/symptoms of bleeding (Nosebleed on  lasted about 2 minutes)        Clinical Outcomes     Negatives:   Major bleeding event, Thromboembolic event, Anticoagulation-related hospital admission, Anticoagulation-related ED visit, Anticoagulation-related fatality           OBJECTIVE    INR Protime   Date Value Ref Range Status   03/10/2020 2.0 (A) 0.86 - 1.14 Final       ASSESSMENT / PLAN  INR assessment THER    Recheck INR In: 2 WEEKS    INR Location Clinic      Anticoagulation Summary  As of 3/10/2020    INR goal:   2.0-2.5   TTR:   60.5 % (1 y)   INR used for dosin.0 (3/10/2020)   Warfarin maintenance plan:   1.25 mg (2.5 mg x 0.5) every Fri; 2.5 mg (2.5 mg x 1) all other days   Full warfarin instructions:   1.25 mg every Fri; 2.5 mg all other days   Weekly warfarin total:   16.25 mg   No change documented:   Fidelina Pizano RN   Plan last modified:   Fidelina Pizano RN (2019)   Next INR check:   3/24/2020   Priority:   Maintenance   Target end date:   Indefinite    Indications    Long-term (current) use of anticoagulants [Z79.01] [Z79.01]  Atrial fibrillation (H) [I48.91]             Anticoagulation Episode Summary     INR check location:       Preferred lab:       Send INR reminders to:   [facility]    Comments:         Anticoagulation Care Providers     Provider Role Specialty Phone number    Kushal Valentin MD [facility] [phone]            See the Encounter Report to view Anticoagulation Flowsheet and Dosing Calendar (Go to Encounters tab in chart review, and find the Anticoagulation Therapy Visit)        Fidelina Pizano RN

## 2020-03-19 DIAGNOSIS — Z79.01 LONG TERM CURRENT USE OF ANTICOAGULANT THERAPY: ICD-10-CM

## 2020-03-19 DIAGNOSIS — I48.91 ATRIAL FIBRILLATION (H): Primary | ICD-10-CM

## 2020-03-23 ENCOUNTER — TELEPHONE (OUTPATIENT)
Dept: FAMILY MEDICINE | Facility: CLINIC | Age: 85
End: 2020-03-23

## 2020-03-23 NOTE — TELEPHONE ENCOUNTER
Called and spoke to patient, education provided that face to face ACN visits are being cancelled at this time and INR will be moved to lab only.     Patient verbalized understanding, appointment rescheduled. Lab orders are in place.

## 2020-03-24 ENCOUNTER — ANTICOAGULATION THERAPY VISIT (OUTPATIENT)
Dept: FAMILY MEDICINE | Facility: CLINIC | Age: 85
End: 2020-03-24

## 2020-03-24 DIAGNOSIS — Z79.01 LONG TERM CURRENT USE OF ANTICOAGULANT THERAPY: ICD-10-CM

## 2020-03-24 DIAGNOSIS — I48.91 ATRIAL FIBRILLATION (H): ICD-10-CM

## 2020-03-24 DIAGNOSIS — I48.19 PERSISTENT ATRIAL FIBRILLATION (H): ICD-10-CM

## 2020-03-24 LAB
CAPILLARY BLOOD COLLECTION: NORMAL
INR PPP: 1.8 (ref 0.86–1.14)

## 2020-03-24 PROCEDURE — 99207 ZZC NO CHARGE NURSE ONLY: CPT | Performed by: FAMILY MEDICINE

## 2020-03-24 PROCEDURE — 85610 PROTHROMBIN TIME: CPT | Performed by: FAMILY MEDICINE

## 2020-03-24 PROCEDURE — 36416 COLLJ CAPILLARY BLOOD SPEC: CPT | Performed by: FAMILY MEDICINE

## 2020-04-06 ENCOUNTER — ANTICOAGULATION THERAPY VISIT (OUTPATIENT)
Dept: NURSING | Facility: CLINIC | Age: 85
End: 2020-04-06

## 2020-04-06 DIAGNOSIS — Z79.01 LONG TERM CURRENT USE OF ANTICOAGULANT THERAPY: ICD-10-CM

## 2020-04-06 DIAGNOSIS — I48.91 ATRIAL FIBRILLATION (H): ICD-10-CM

## 2020-04-06 DIAGNOSIS — I48.19 PERSISTENT ATRIAL FIBRILLATION (H): ICD-10-CM

## 2020-04-06 LAB
CAPILLARY BLOOD COLLECTION: NORMAL
INR PPP: 2.5 (ref 0.86–1.14)

## 2020-04-06 PROCEDURE — 36416 COLLJ CAPILLARY BLOOD SPEC: CPT | Performed by: FAMILY MEDICINE

## 2020-04-06 PROCEDURE — 85610 PROTHROMBIN TIME: CPT | Performed by: FAMILY MEDICINE

## 2020-04-06 NOTE — PROGRESS NOTES
Anticoagulation Management    Unable to reach Neal today.    Today's INR result of 2.5 is therapeutic (goal INR of 2.0-2.5).  Result received from: Clinic Lab    Follow up required to confirm warfarin dose taken and assess for changes    Left VM to call Fidelina      Anticoagulation clinic to follow up    Fidelina Pizano RN  Transfer return call to: [phone] or Tara after 4:30 at: [phone]

## 2020-04-06 NOTE — PROGRESS NOTES
ANTICOAGULATION MANAGEMENT     Patient Name:  Nathen Gage  Date:  2020    ASSESSMENT /SUBJECTIVE:    Today's INR result of 2.5 is therapeutic. Goal INR of 2.0-2.5      Warfarin dose taken: Warfarin taken as previously instructed    Diet: Possibly a bit less vegetables, he will increase a bit back to baseline intake    Medication changes/ interactions: No new medications/supplements affecting INR    Previous INR: Subtherapeutic     S/S of bleeding or thromboembolism: No    New injury or illness: No    Upcoming surgery, procedure or cardioversion: No    Additional findings: None      PLAN:    Spoke with Nathen regarding INR result and instructed:     Warfarin Dosing Instructions: Continue your current warfarin dose 2.5mg daily    Instructed patient to follow up no later than: 2 weeks  Lab visit scheduled    Education provided: Importance of consistent vitamin K intake and Target INR goal and significance of current INR result      Neal verbalizes understanding and agrees to warfarin dosing plan.    Instructed to call the Anticoagulation Clinic for any changes, questions or concerns. (#[phone])        OBJECTIVE:  INR   Date Value Ref Range Status   2020 2.50 (H) 0.86 - 1.14 Final     Comment:     This test is intended for monitoring Coumadin therapy.  Results are not   accurate in patients with prolonged INR due to factor deficiency.           No question data found.  Anticoagulation Summary  As of 2020    INR goal:   2.0-2.5   TTR:   61.3 % (1 y)   INR used for dosin.50 (2020)   Warfarin maintenance plan:   2.5 mg (2.5 mg x 1) every day   Full warfarin instructions:   2.5 mg every day   Weekly warfarin total:   17.5 mg   Plan last modified:   Siobhan Castro RPH (3/24/2020)   Next INR check:   2020   Priority:   Maintenance   Target end date:   Indefinite    Indications    Long-term (current) use of anticoagulants [Z79.01] [Z79.01]  Atrial fibrillation (H) [I48.91]  Persistent  atrial fibrillation [I48.19]             Anticoagulation Episode Summary     INR check location:       Preferred lab:       Send INR reminders to:   [facility]    Comments:         Anticoagulation Care Providers     Provider Role Specialty Phone number    Kushal Valentin MD [facility] [phone]

## 2020-04-20 DIAGNOSIS — I48.91 ATRIAL FIBRILLATION (H): ICD-10-CM

## 2020-04-20 DIAGNOSIS — Z79.01 LONG TERM CURRENT USE OF ANTICOAGULANT THERAPY: ICD-10-CM

## 2020-04-20 LAB — CAPILLARY BLOOD COLLECTION: NORMAL

## 2020-04-20 PROCEDURE — 85610 PROTHROMBIN TIME: CPT | Performed by: FAMILY MEDICINE

## 2020-04-20 PROCEDURE — 36416 COLLJ CAPILLARY BLOOD SPEC: CPT | Performed by: FAMILY MEDICINE

## 2020-04-21 ENCOUNTER — ANTICOAGULATION THERAPY VISIT (OUTPATIENT)
Dept: NURSING | Facility: CLINIC | Age: 85
End: 2020-04-21
Payer: COMMERCIAL

## 2020-04-21 ENCOUNTER — TRANSFERRED RECORDS (OUTPATIENT)
Dept: HEALTH INFORMATION MANAGEMENT | Facility: CLINIC | Age: 85
End: 2020-04-21

## 2020-04-21 DIAGNOSIS — Z79.01 LONG TERM CURRENT USE OF ANTICOAGULANT THERAPY: Primary | ICD-10-CM

## 2020-04-21 DIAGNOSIS — I48.19 PERSISTENT ATRIAL FIBRILLATION (H): ICD-10-CM

## 2020-04-21 DIAGNOSIS — I48.91 ATRIAL FIBRILLATION (H): ICD-10-CM

## 2020-04-21 LAB — INR PPP: 2.8 (ref 0.86–1.14)

## 2020-04-21 PROCEDURE — 99207 ZZC NO CHARGE NURSE ONLY: CPT

## 2020-04-21 NOTE — PROGRESS NOTES
ANTICOAGULATION FOLLOW-UP     Patient Name:  Nathen Gage  Date:  2020  Contact Type:  Telephone    SUBJECTIVE:  Patient Findings     Comments:   The patient was assessed for   diet, medication,   missed or extra doses,   bruising or bleeding,   with no problem findings.    INR was supratherapeutic yesterday.   Patient will decrease weekly maintenance dose total by 7.1%, back to his previous dosing.  Will follow up in 2 weeks.          Clinical Outcomes     Comments:   The patient was assessed for   diet, medication,   missed or extra doses,   bruising or bleeding,   with no problem findings.    INR was supratherapeutic yesterday.   Patient will decrease weekly maintenance dose total by 7.1%, back to his previous dosing.  Will follow up in 2 weeks.             OBJECTIVE    INR   Date Value Ref Range Status   2020 2.80 (H) 0.86 - 1.14 Final     Comment:     This test is intended for monitoring Coumadin therapy.  Results are not   accurate in patients with prolonged INR due to factor deficiency.         ASSESSMENT / PLAN  INR assessment SUPRA    Recheck INR In: 2 WEEKS    INR Location Clinic lab     Anticoagulation Summary  As of 2020    INR goal:   2.0-2.5   TTR:   61.5 % (1 y)   INR used for dosin.80! (2020)   Warfarin maintenance plan:   1.25 mg (2.5 mg x 0.5) every Fri; 2.5 mg (2.5 mg x 1) all other days   Full warfarin instructions:   1.25 mg every Fri; 2.5 mg all other days   Weekly warfarin total:   16.25 mg   Plan last modified:   Radha Sumner RN (2020)   Next INR check:   2020   Priority:   Maintenance   Target end date:   Indefinite    Indications    Long-term (current) use of anticoagulants [Z79.01] [Z79.01]  Atrial fibrillation (H) [I48.91]  Persistent atrial fibrillation [I48.19]             Anticoagulation Episode Summary     INR check location:       Preferred lab:       Send INR reminders to:   [facility]    Comments:         Anticoagulation Care  Providers     Provider Role Specialty Phone number    Kushal Valentin MD Referring [facility] [phone]            See the Encounter Report to view Anticoagulation Flowsheet and Dosing Calendar (Go to Encounters tab in chart review, and find the Anticoagulation Therapy Visit)    INR was supratherapeutic yesterday.   Patient will decrease weekly maintenance dose total by 7.1%,  back to his previous dosing.  Will follow up in 2 weeks.    Dosage adjustment made based on physician directed care plan.        Radha Sumner RN

## 2020-04-30 DIAGNOSIS — Z79.01 LONG TERM CURRENT USE OF ANTICOAGULANTS WITH INR GOAL OF 2.0-3.0: ICD-10-CM

## 2020-04-30 DIAGNOSIS — I48.0 PAROXYSMAL ATRIAL FIBRILLATION (H): ICD-10-CM

## 2020-04-30 RX ORDER — WARFARIN SODIUM 2.5 MG/1
TABLET ORAL
Qty: 30 TABLET | Refills: 5 | Status: SHIPPED | OUTPATIENT
Start: 2020-04-30 | End: 2020-12-02

## 2020-04-30 NOTE — TELEPHONE ENCOUNTER
Last INR: 04/21/20=2.8  Next INR: 05/04/20    Prescription approved per G Refill Protocol.    Fidelina Pizano RN

## 2020-05-04 ENCOUNTER — ANTICOAGULATION THERAPY VISIT (OUTPATIENT)
Dept: NURSING | Facility: CLINIC | Age: 85
End: 2020-05-04

## 2020-05-04 DIAGNOSIS — Z79.01 LONG TERM CURRENT USE OF ANTICOAGULANT THERAPY: ICD-10-CM

## 2020-05-04 DIAGNOSIS — I48.19 PERSISTENT ATRIAL FIBRILLATION (H): ICD-10-CM

## 2020-05-04 DIAGNOSIS — I48.91 ATRIAL FIBRILLATION (H): ICD-10-CM

## 2020-05-04 LAB
CAPILLARY BLOOD COLLECTION: NORMAL
INR PPP: 2.4 (ref 0.86–1.14)

## 2020-05-04 PROCEDURE — 85610 PROTHROMBIN TIME: CPT | Performed by: FAMILY MEDICINE

## 2020-05-04 PROCEDURE — 36416 COLLJ CAPILLARY BLOOD SPEC: CPT | Performed by: FAMILY MEDICINE

## 2020-05-04 PROCEDURE — 99207 ZZC NO CHARGE NURSE ONLY: CPT

## 2020-05-04 NOTE — PROGRESS NOTES
ANTICOAGULATION FOLLOW-UP CLINIC VISIT    Patient Name:  Nathen Gage  Date:  2020  Contact Type:  Telephone--Left message on machine to inform patient to continue same weekly Warfarin dose and to call INR clinic with any questions, concerns or changes.  Also to schedule next INR in 20    SUBJECTIVE:         OBJECTIVE    INR   Date Value Ref Range Status   2020 2.40 (H) 0.86 - 1.14 Final     Comment:     This test is intended for monitoring Coumadin therapy.  Results are not   accurate in patients with prolonged INR due to factor deficiency.         ASSESSMENT / PLAN  INR assessment THER    Recheck INR In: 2 WEEKS    INR Location Clinic      Anticoagulation Summary  As of 2020    INR goal:   2.0-2.5   TTR:   60.9 % (1 y)   INR used for dosin.40 (2020)   Warfarin maintenance plan:   1.25 mg (2.5 mg x 0.5) every Fri; 2.5 mg (2.5 mg x 1) all other days   Full warfarin instructions:   1.25 mg every Fri; 2.5 mg all other days   Weekly warfarin total:   16.25 mg   No change documented:   Fidelina Pizano RN   Plan last modified:   Radha Sumner RN (2020)   Next INR check:   2020   Priority:   Maintenance   Target end date:   Indefinite    Indications    Long-term (current) use of anticoagulants [Z79.01] [Z79.01]  Atrial fibrillation (H) [I48.91]  Persistent atrial fibrillation [I48.19]             Anticoagulation Episode Summary     INR check location:       Preferred lab:       Send INR reminders to:   [facility]    Comments:         Anticoagulation Care Providers     Provider Role Specialty Phone number    Kushal Valentin MD Referring [facility] [phone]            See the Encounter Report to view Anticoagulation Flowsheet and Dosing Calendar (Go to Encounters tab in chart review, and find the Anticoagulation Therapy Visit)        Fidelina Pizano RN

## 2020-05-05 ENCOUNTER — ANCILLARY PROCEDURE (OUTPATIENT)
Dept: CARDIOLOGY | Facility: CLINIC | Age: 85
End: 2020-05-05
Attending: INTERNAL MEDICINE
Payer: COMMERCIAL

## 2020-05-05 DIAGNOSIS — I49.5 SICK SINUS SYNDROME (H): Primary | ICD-10-CM

## 2020-05-05 DIAGNOSIS — Z95.0 CARDIAC PACEMAKER IN SITU: ICD-10-CM

## 2020-05-05 PROCEDURE — 93293 PM PHONE R-STRIP DEVICE EVAL: CPT | Performed by: INTERNAL MEDICINE

## 2020-05-18 ENCOUNTER — ANTICOAGULATION THERAPY VISIT (OUTPATIENT)
Dept: NURSING | Facility: CLINIC | Age: 85
End: 2020-05-18

## 2020-05-18 ENCOUNTER — TELEPHONE (OUTPATIENT)
Dept: FAMILY MEDICINE | Facility: CLINIC | Age: 85
End: 2020-05-18

## 2020-05-18 DIAGNOSIS — I48.19 PERSISTENT ATRIAL FIBRILLATION (H): ICD-10-CM

## 2020-05-18 DIAGNOSIS — I48.91 ATRIAL FIBRILLATION (H): ICD-10-CM

## 2020-05-18 DIAGNOSIS — Z79.01 LONG TERM CURRENT USE OF ANTICOAGULANT THERAPY: ICD-10-CM

## 2020-05-18 LAB
CAPILLARY BLOOD COLLECTION: NORMAL
INR PPP: 2.5 (ref 0.86–1.14)

## 2020-05-18 PROCEDURE — 85610 PROTHROMBIN TIME: CPT | Performed by: FAMILY MEDICINE

## 2020-05-18 PROCEDURE — 36416 COLLJ CAPILLARY BLOOD SPEC: CPT | Performed by: FAMILY MEDICINE

## 2020-05-18 PROCEDURE — 99207 ZZC NO CHARGE NURSE ONLY: CPT

## 2020-05-18 NOTE — TELEPHONE ENCOUNTER
FYI - Status Update  Who is Calling: patient  Update: please call patient about his next INR  Does caller want a call back: Yes  Okay to leave a detailed message?:  Yes at Home number on file [phone] (home)            Ruthie Crenshaw

## 2020-05-18 NOTE — PROGRESS NOTES
Anticoagulation Management    Unable to reach Neal today x 2    Today's INR result of 2.5 is therapeutic (goal INR of 2.0-2.5)  Result received from: Clinic Lab    Follow up required to confirm warfarin dose taken and assess for changes    left message with pt's wife to have him call Fidelina      Anticoagulation clinic to follow up    Fidelina Pizano RN    ANTICOAGULATION FOLLOW-UP CLINIC VISIT    Patient Name:  Nathen Gage  Date:  2020  Contact Type:  Telephone    SUBJECTIVE:  Patient Findings     Positives:   Change in health (floaters in R eye, saw retinal specialist, will be getting an injection every month), Change in medications (Started getting eye injections in R eye last month, will go monthly)    Comments:   Patient prefers to come in for INR every 2 weeks, he is not comfortable going longer.        Clinical Outcomes     Negatives:   Major bleeding event, Thromboembolic event, Anticoagulation-related hospital admission, Anticoagulation-related ED visit, Anticoagulation-related fatality    Comments:   Patient prefers to come in for INR every 2 weeks, he is not comfortable going longer.           OBJECTIVE    Recent labs: (last 7 days)     20  0927   INR 2.50*       ASSESSMENT / PLAN  INR assessment THER    Recheck INR In: 2 WEEKS    INR Location Clinic      Anticoagulation Summary  As of 2020    INR goal:   2.0-2.5   TTR:   60.9 % (1 y)   INR used for dosin.50 (2020)   Warfarin maintenance plan:   1.25 mg (2.5 mg x 0.5) every Fri; 2.5 mg (2.5 mg x 1) all other days   Full warfarin instructions:   1.25 mg every Fri; 2.5 mg all other days   Weekly warfarin total:   16.25 mg   No change documented:   Fidelina Pizano RN   Plan last modified:   Radha Sumner, RN (2020)   Next INR check:   2020   Priority:   Maintenance   Target end date:   Indefinite    Indications    Long-term (current) use of anticoagulants [Z79.01] [Z79.01]  Atrial fibrillation (H) [I48.91]  Persistent  atrial fibrillation [I48.19]             Anticoagulation Episode Summary     INR check location:       Preferred lab:       Send INR reminders to:   [facility]    Comments:         Anticoagulation Care Providers     Provider Role Specialty Phone number    Kushal Valentin MD Referring [facility] [phone]            See the Encounter Report to view Anticoagulation Flowsheet and Dosing Calendar (Go to Encounters tab in chart review, and find the Anticoagulation Therapy Visit)        Fidelina Pizano RN

## 2020-05-18 NOTE — TELEPHONE ENCOUNTER
Called patient, missed him again, his wife stated he will be back soon.  I will try him before I leave at 4:30 today.  See 05/18/20 [facility] encounter for details.    Fidelina Pizano RN

## 2020-05-20 DIAGNOSIS — I15.9 SECONDARY HYPERTENSION: ICD-10-CM

## 2020-05-20 RX ORDER — LOSARTAN POTASSIUM 100 MG/1
100 TABLET ORAL DAILY
Qty: 90 TABLET | Refills: 3 | Status: SHIPPED | OUTPATIENT
Start: 2020-05-20 | End: 2021-01-06

## 2020-05-20 NOTE — TELEPHONE ENCOUNTER
Medication Refilled: losartan  Last office visit: 1/29/20  Next office visit: Due 1/2021  Pharmacy sent to: Iris Sandoval RN

## 2020-06-01 ENCOUNTER — ANTICOAGULATION THERAPY VISIT (OUTPATIENT)
Dept: NURSING | Facility: CLINIC | Age: 85
End: 2020-06-01

## 2020-06-01 DIAGNOSIS — I48.19 PERSISTENT ATRIAL FIBRILLATION (H): ICD-10-CM

## 2020-06-01 DIAGNOSIS — Z79.01 LONG TERM CURRENT USE OF ANTICOAGULANT THERAPY: ICD-10-CM

## 2020-06-01 DIAGNOSIS — I48.91 ATRIAL FIBRILLATION (H): ICD-10-CM

## 2020-06-01 LAB
CAPILLARY BLOOD COLLECTION: NORMAL
INR PPP: 3 (ref 0.86–1.14)

## 2020-06-01 PROCEDURE — 85610 PROTHROMBIN TIME: CPT | Performed by: FAMILY MEDICINE

## 2020-06-01 PROCEDURE — 99207 ZZC NO CHARGE NURSE ONLY: CPT

## 2020-06-01 PROCEDURE — 36416 COLLJ CAPILLARY BLOOD SPEC: CPT | Performed by: FAMILY MEDICINE

## 2020-06-01 NOTE — PROGRESS NOTES
Anticoagulation Management    Unable to reach Neal today.    Today's INR result of 3.0 is supratherapeutic (goal INR of 2.0-2.5).  Result received from: Clinic Lab    Follow up required to confirm warfarin dose taken and assess for changes    Left message to take reduced dose of warfarin, 1.25 mg tonight.      Anticoagulation clinic to follow up    Fidelina Pizano RN    ANTICOAGULATION FOLLOW-UP CLINIC VISIT    Patient Name:  Nathen Gage  Date:  6/1/2020  Contact Type:  Telephone, I spoke to pt    SUBJECTIVE:  Patient Findings     Positives:   Change in diet/appetite (NO change, eating a small salad 6-7 days per week.  Pt will add in 1 sesrving of broccoli or brussels sprouts each week.)        Clinical Outcomes     Negatives:   Major bleeding event, Thromboembolic event, Anticoagulation-related hospital admission, Anticoagulation-related ED visit, Anticoagulation-related fatality           OBJECTIVE    Recent labs: (last 7 days)     06/01/20  0925   INR 3.00*       ASSESSMENT / PLAN  INR assessment SUPRA    Recheck INR In: 10 DAYS    INR Location Clinic      Anticoagulation Summary  As of 6/1/2020    INR goal:   2.0-2.5   TTR:   57.1 % (1 y)   INR used for dosing:   3.00! (6/1/2020)   Warfarin maintenance plan:   1.25 mg (2.5 mg x 0.5) every Fri; 2.5 mg (2.5 mg x 1) all other days   Full warfarin instructions:   6/1: 1.25 mg; Otherwise 1.25 mg every Fri; 2.5 mg all other days   Weekly warfarin total:   16.25 mg   Plan last modified:   Radha Sumner RN (4/21/2020)   Next INR check:   6/11/2020   Priority:   High   Target end date:   Indefinite    Indications    Long-term (current) use of anticoagulants [Z79.01] [Z79.01]  Atrial fibrillation (H) [I48.91]  Persistent atrial fibrillation [I48.19]             Anticoagulation Episode Summary     INR check location:       Preferred lab:       Send INR reminders to:   [facility]    Comments:         Anticoagulation Care Providers     Provider Role Specialty  Phone number    Kushal Valentin MD Referring Family Practice [phone]            See the Encounter Report to view Anticoagulation Flowsheet and Dosing Calendar (Go to Encounters tab in chart review, and find the Anticoagulation Therapy Visit)        Fidelina Pizano RN

## 2020-06-11 ENCOUNTER — ANTICOAGULATION THERAPY VISIT (OUTPATIENT)
Dept: NURSING | Facility: CLINIC | Age: 85
End: 2020-06-11

## 2020-06-11 DIAGNOSIS — Z79.01 LONG TERM CURRENT USE OF ANTICOAGULANT THERAPY: ICD-10-CM

## 2020-06-11 DIAGNOSIS — I48.19 PERSISTENT ATRIAL FIBRILLATION (H): ICD-10-CM

## 2020-06-11 DIAGNOSIS — I48.91 ATRIAL FIBRILLATION (H): ICD-10-CM

## 2020-06-11 LAB
CAPILLARY BLOOD COLLECTION: NORMAL
INR PPP: 2.3 (ref 0.86–1.14)

## 2020-06-11 PROCEDURE — 36416 COLLJ CAPILLARY BLOOD SPEC: CPT | Performed by: FAMILY MEDICINE

## 2020-06-11 PROCEDURE — 85610 PROTHROMBIN TIME: CPT | Performed by: FAMILY MEDICINE

## 2020-06-11 PROCEDURE — 99207 ZZC NO CHARGE NURSE ONLY: CPT

## 2020-06-11 NOTE — PROGRESS NOTES
ANTICOAGULATION FOLLOW-UP CLINIC VISIT    Patient Name:  Nathen Gage  Date:  2020  Contact Type:  Telephone    SUBJECTIVE:  Patient Findings     Comments:   The patient was assessed for diet, medication, and activity level changes, missed or extra doses, bruising or bleeding, with no problem findings.          Clinical Outcomes     Negatives:   Major bleeding event, Thromboembolic event, Anticoagulation-related hospital admission, Anticoagulation-related ED visit, Anticoagulation-related fatality    Comments:   The patient was assessed for diet, medication, and activity level changes, missed or extra doses, bruising or bleeding, with no problem findings.             OBJECTIVE    Recent labs: (last 7 days)     20  1021   INR 2.30*       ASSESSMENT / PLAN  INR assessment THER    Recheck INR In: 2 WEEKS    INR Location Clinic      Anticoagulation Summary  As of 2020    INR goal:   2.0-2.5   TTR:   55.1 % (1 y)   INR used for dosin.30 (2020)   Warfarin maintenance plan:   1.25 mg (2.5 mg x 0.5) every Fri; 2.5 mg (2.5 mg x 1) all other days   Full warfarin instructions:   1.25 mg every Fri; 2.5 mg all other days   Weekly warfarin total:   16.25 mg   Plan last modified:   Fidelina Pizano RN (2020)   Next INR check:   2020   Priority:   Maintenance   Target end date:   Indefinite    Indications    Long-term (current) use of anticoagulants [Z79.01] [Z79.01]  Atrial fibrillation (H) [I48.91]  Persistent atrial fibrillation [I48.19]             Anticoagulation Episode Summary     INR check location:       Preferred lab:       Send INR reminders to:   [facility]    Comments:         Anticoagulation Care Providers     Provider Role Specialty Phone number    Kushal Valentin MD Referring [facility] [phone]            See the Encounter Report to view Anticoagulation Flowsheet and Dosing Calendar (Go to Encounters tab in chart review, and find the Anticoagulation  Therapy Visit)        Fidelina Pizano RN

## 2020-06-25 ENCOUNTER — ANTICOAGULATION THERAPY VISIT (OUTPATIENT)
Dept: NURSING | Facility: CLINIC | Age: 85
End: 2020-06-25

## 2020-06-25 DIAGNOSIS — Z79.01 LONG TERM CURRENT USE OF ANTICOAGULANT THERAPY: ICD-10-CM

## 2020-06-25 DIAGNOSIS — I48.91 ATRIAL FIBRILLATION (H): ICD-10-CM

## 2020-06-25 DIAGNOSIS — I48.19 PERSISTENT ATRIAL FIBRILLATION (H): ICD-10-CM

## 2020-06-25 LAB
CAPILLARY BLOOD COLLECTION: NORMAL
INR PPP: 2.7 (ref 0.86–1.14)

## 2020-06-25 PROCEDURE — 36416 COLLJ CAPILLARY BLOOD SPEC: CPT | Performed by: FAMILY MEDICINE

## 2020-06-25 PROCEDURE — 99207 ZZC NO CHARGE NURSE ONLY: CPT

## 2020-06-25 PROCEDURE — 85610 PROTHROMBIN TIME: CPT | Performed by: FAMILY MEDICINE

## 2020-06-25 NOTE — PROGRESS NOTES
"ANTICOAGULATION FOLLOW-UP CLINIC VISIT    Patient Name:  Nathen Gage  Date:  2020  Contact Type:  Telephone    SUBJECTIVE:  Patient Findings     Positives:   Signs/symptoms of bleeding (\"slight\" bloody nose 2 days ago, pt held pressure to nose and bleeding stopped instantly.), Change in activity (Less active in the past week), Change in diet/appetite (only apolonia salads last week, he did not have any broccoli.  I informed pt that adding in 1 serving of broccoli or brussels per week would be helpful as he had done 2 weeks ago.  Pt will eat greens tonight.)        Clinical Outcomes     Negatives:   Major bleeding event, Thromboembolic event, Anticoagulation-related hospital admission, Anticoagulation-related ED visit, Anticoagulation-related fatality           OBJECTIVE    Recent labs: (last 7 days)     20  0927   INR 2.70*       ASSESSMENT / PLAN  INR assessment SUPRA    Recheck INR In: 2 WEEKS    INR Location Clinic      Anticoagulation Summary  As of 2020    INR goal:   2.0-2.5   TTR:   53.2 % (1 y)   INR used for dosin.70! (2020)   Warfarin maintenance plan:   1.25 mg (2.5 mg x 0.5) every Fri; 2.5 mg (2.5 mg x 1) all other days   Full warfarin instructions:   1.25 mg every Fri; 2.5 mg all other days   Weekly warfarin total:   16.25 mg   No change documented:   Fidelina Pizano, RN   Plan last modified:   Fidelina Pizano, RN (2020)   Next INR check:   2020   Priority:   High   Target end date:   Indefinite    Indications    Long-term (current) use of anticoagulants [Z79.01] [Z79.01]  Atrial fibrillation (H) [I48.91]  Persistent atrial fibrillation (H) [I48.19]             Anticoagulation Episode Summary     INR check location:       Preferred lab:       Send INR reminders to:   [facility]    Comments:         Anticoagulation Care Providers     Provider Role Specialty Phone number    Kushal Valentin MD Referring [facility] [phone]            See the " Encounter Report to view Anticoagulation Flowsheet and Dosing Calendar (Go to Encounters tab in chart review, and find the Anticoagulation Therapy Visit)        Fidelina Pizano RN

## 2020-06-30 DIAGNOSIS — N13.8 BENIGN LOCALIZED HYPERPLASIA OF PROSTATE WITH URINARY OBSTRUCTION: ICD-10-CM

## 2020-06-30 DIAGNOSIS — N40.1 BENIGN LOCALIZED HYPERPLASIA OF PROSTATE WITH URINARY OBSTRUCTION: ICD-10-CM

## 2020-06-30 RX ORDER — DOXAZOSIN 4 MG/1
TABLET ORAL
Qty: 90 TABLET | Refills: 0 | Status: SHIPPED | OUTPATIENT
Start: 2020-06-30 | End: 2020-10-09

## 2020-06-30 NOTE — TELEPHONE ENCOUNTER
Prescription approved per [facility] [place].  Lorena Mcallister RN, BSN

## 2020-07-09 ENCOUNTER — ANTICOAGULATION THERAPY VISIT (OUTPATIENT)
Dept: NURSING | Facility: CLINIC | Age: 85
End: 2020-07-09

## 2020-07-09 DIAGNOSIS — I48.91 ATRIAL FIBRILLATION (H): ICD-10-CM

## 2020-07-09 DIAGNOSIS — Z79.01 LONG TERM CURRENT USE OF ANTICOAGULANT THERAPY: ICD-10-CM

## 2020-07-09 DIAGNOSIS — I48.19 PERSISTENT ATRIAL FIBRILLATION (H): ICD-10-CM

## 2020-07-09 LAB
CAPILLARY BLOOD COLLECTION: NORMAL
INR PPP: 2.8 (ref 0.86–1.14)

## 2020-07-09 PROCEDURE — 36416 COLLJ CAPILLARY BLOOD SPEC: CPT | Performed by: FAMILY MEDICINE

## 2020-07-09 PROCEDURE — 85610 PROTHROMBIN TIME: CPT | Performed by: FAMILY MEDICINE

## 2020-07-09 PROCEDURE — 99207 ZZC NO CHARGE NURSE ONLY: CPT

## 2020-07-09 NOTE — PROGRESS NOTES
Anticoagulation Management    Unable to reach Neal today x 2 attempts    Today's INR result of 2.8 is supratherapeutic (goal INR of 2.0-2.5).  Result received from: Clinic Lab    Follow up required to confirm warfarin dose taken and assess for changes. 2nd supra INR, may need to decrease warfarin maintenance dose.    Left VM to call Tara with transfer to Fidelina at: [phone]        Anticoagulation clinic to follow up    Fidelina Pizano RN

## 2020-07-09 NOTE — PROGRESS NOTES
Anticoagulation Management    Unable to reach Neal today-3rd attempt    Today's INR result of 2.8 is supratherapeutic (goal INR of 2.0-2.5).  Result received from: Clinic Lab    Follow up required to confirm warfarin dose taken and assess for changes    Left message to take reduced dose of warfarin, 1.25 mg tonight.      Anticoagulation clinic to follow up    Fidelina Pizano RN    ANTICOAGULATION FOLLOW-UP CLINIC VISIT    Patient Name:  Nathen Gage  Date:  7/10/2020  Contact Type:  Telephone-I spoke to patient on 07/10/20    SUBJECTIVE:  Patient Findings     Positives:   Change in activity (A bit less active last week due to the heat), Change in diet/appetite (NO change but pt did not add in 1 serving of broccoli per week.  Pt bought frozen broccoli and agrees to add in 1 cup per week.)    Comments:   2nd supra-INR, patient will add in broccoli versus changing his weekly warfarin dose.  Patient was on 15mg warfarin per week in 2019 and INR went sub-therapeutic x 4 readings so I hesitate to reduce his dose down to that.  Patient is active and eating greens everyday.          Clinical Outcomes     Negatives:   Major bleeding event, Thromboembolic event, Anticoagulation-related hospital admission, Anticoagulation-related ED visit, Anticoagulation-related fatality    Comments:   2nd supra-INR, patient will add in broccoli versus changing his weekly warfarin dose.  Patient was on 15mg warfarin per week in 2019 and INR went sub-therapeutic x 4 readings so I hesitate to reduce his dose down to that.  Patient is active and eating greens everyday.             OBJECTIVE    Recent labs: (last 7 days)     20  0911   INR 2.80*       ASSESSMENT / PLAN  INR assessment SUPRA    Recheck INR In: 2 WEEKS    INR Location Clinic      Anticoagulation Summary  As of 2020    INR goal:   2.0-2.5   TTR:   49.2 % (1 y)   INR used for dosin.80! (2020)   Warfarin maintenance plan:   1.25 mg (2.5 mg x 0.5) every Fri; 2.5 mg  (2.5 mg x 1) all other days   Full warfarin instructions:   7/9: 1.25 mg; Otherwise 1.25 mg every Fri; 2.5 mg all other days   Weekly warfarin total:   16.25 mg   Plan last modified:   Fidelina Pizano RN (7/9/2020)   Next INR check:   7/23/2020   Priority:   High   Target end date:   Indefinite    Indications    Long-term (current) use of anticoagulants [Z79.01] [Z79.01]  Atrial fibrillation (H) [I48.91]  Persistent atrial fibrillation (H) [I48.19]             Anticoagulation Episode Summary     INR check location:       Preferred lab:       Send INR reminders to:   [facility]    Comments:         Anticoagulation Care Providers     Provider Role Specialty Phone number    Kushal Valentin MD Referring [facility] [phone]            See the Encounter Report to view Anticoagulation Flowsheet and Dosing Calendar (Go to Encounters tab in chart review, and find the Anticoagulation Therapy Visit)        Fidelina Pizano RN

## 2020-07-23 ENCOUNTER — ANTICOAGULATION THERAPY VISIT (OUTPATIENT)
Dept: NURSING | Facility: CLINIC | Age: 85
End: 2020-07-23

## 2020-07-23 DIAGNOSIS — I48.91 ATRIAL FIBRILLATION (H): ICD-10-CM

## 2020-07-23 DIAGNOSIS — Z79.01 LONG TERM CURRENT USE OF ANTICOAGULANT THERAPY: ICD-10-CM

## 2020-07-23 DIAGNOSIS — I48.19 PERSISTENT ATRIAL FIBRILLATION (H): ICD-10-CM

## 2020-07-23 LAB — INR PPP: 2.8 (ref 0.86–1.14)

## 2020-07-23 PROCEDURE — 36416 COLLJ CAPILLARY BLOOD SPEC: CPT | Performed by: FAMILY MEDICINE

## 2020-07-23 PROCEDURE — 99207 ZZC NO CHARGE NURSE ONLY: CPT

## 2020-07-23 PROCEDURE — 85610 PROTHROMBIN TIME: CPT | Performed by: FAMILY MEDICINE

## 2020-07-23 NOTE — PROGRESS NOTES
ANTICOAGULATION FOLLOW-UP CLINIC VISIT    Patient Name:  Nathen Gage  Date:  2020  Contact Type:  Telephone    SUBJECTIVE:  Patient Findings     Positives:   Change in diet/appetite (Added in 1 serving per day of V8 juice about 2 weeks ago)    Comments:   Patient denies any identifiable changes that caused the supra-therapeutic INR.   3rd supra INR for no known reason so warfarin maintenance dose decreased 7%.          Clinical Outcomes     Negatives:   Major bleeding event, Thromboembolic event, Anticoagulation-related hospital admission, Anticoagulation-related ED visit, Anticoagulation-related fatality    Comments:   Patient denies any identifiable changes that caused the supra-therapeutic INR.   3rd supra INR for no known reason so warfarin maintenance dose decreased 7%.             OBJECTIVE    Recent labs: (last 7 days)     20  0923   INR 2.80*       ASSESSMENT / PLAN  INR assessment SUPRA    Recheck INR In: 10 DAYS    INR Location Clinic      Anticoagulation Summary  As of 2020    INR goal:   2.0-2.5   TTR:   48.1 % (1 y)   INR used for dosin.80! (2020)   Warfarin maintenance plan:   1.25 mg (2.5 mg x 0.5) every Mon, Thu; 2.5 mg (2.5 mg x 1) all other days   Full warfarin instructions:   1.25 mg every Mon, Thu; 2.5 mg all other days   Weekly warfarin total:   15 mg   Plan last modified:   Fidelina Pizano RN (2020)   Next INR check:   2020   Priority:   High   Target end date:   Indefinite    Indications    Long-term (current) use of anticoagulants [Z79.01] [Z79.01]  Atrial fibrillation (H) [I48.91]  Persistent atrial fibrillation (H) [I48.19]             Anticoagulation Episode Summary     INR check location:       Preferred lab:       [facility]    Comments:         Anticoagulation Care Providers     Provider Role Specialty Phone number    Kushal Valentin MD Referring [facility] [phone]            See the Encounter  Report to view Anticoagulation Flowsheet and Dosing Calendar (Go to Encounters tab in chart review, and find the Anticoagulation Therapy Visit)        Fidelina Pizano RN

## 2020-08-04 ENCOUNTER — ANTICOAGULATION THERAPY VISIT (OUTPATIENT)
Dept: ANTICOAGULATION | Facility: CLINIC | Age: 85
End: 2020-08-04
Payer: COMMERCIAL

## 2020-08-04 ENCOUNTER — ANCILLARY PROCEDURE (OUTPATIENT)
Dept: CARDIOLOGY | Facility: CLINIC | Age: 85
End: 2020-08-04
Attending: INTERNAL MEDICINE
Payer: COMMERCIAL

## 2020-08-04 DIAGNOSIS — I49.5 SICK SINUS SYNDROME (H): Primary | ICD-10-CM

## 2020-08-04 DIAGNOSIS — I48.19 PERSISTENT ATRIAL FIBRILLATION (H): ICD-10-CM

## 2020-08-04 DIAGNOSIS — Z79.01 LONG TERM CURRENT USE OF ANTICOAGULANT THERAPY: ICD-10-CM

## 2020-08-04 DIAGNOSIS — I49.5 SICK SINUS SYNDROME (H): ICD-10-CM

## 2020-08-04 DIAGNOSIS — I48.91 ATRIAL FIBRILLATION (H): ICD-10-CM

## 2020-08-04 LAB
CAPILLARY BLOOD COLLECTION: NORMAL
INR PPP: 2.3 (ref 0.86–1.14)

## 2020-08-04 PROCEDURE — 93293 PM PHONE R-STRIP DEVICE EVAL: CPT | Performed by: INTERNAL MEDICINE

## 2020-08-04 PROCEDURE — 99207 ZZC NO CHARGE NURSE ONLY: CPT | Performed by: FAMILY MEDICINE

## 2020-08-04 PROCEDURE — 85610 PROTHROMBIN TIME: CPT | Performed by: FAMILY MEDICINE

## 2020-08-04 PROCEDURE — 36416 COLLJ CAPILLARY BLOOD SPEC: CPT | Performed by: FAMILY MEDICINE

## 2020-08-04 NOTE — PROGRESS NOTES
ANTICOAGULATION FOLLOW-UP CLINIC VISIT    Patient Name:  Nathen Gage  Date:  2020  Contact Type:  Telephone/ Called patient, he denies any changes. Orders discussed with patient, he agrees with plan.Lab INR appointment scheduled on 20.    SUBJECTIVE:  Patient Findings     Comments:   The patient was assessed for diet, medication, and activity level changes, missed or extra doses, bruising or bleeding, with no problem findings. Maintenance warfarin dosing was reviewed with patient and will remain on the same dose until next INR check. Patient did not have any questions or concerns.             Clinical Outcomes     Negatives:   Major bleeding event, Thromboembolic event, Anticoagulation-related hospital admission, Anticoagulation-related ED visit, Anticoagulation-related fatality    Comments:   The patient was assessed for diet, medication, and activity level changes, missed or extra doses, bruising or bleeding, with no problem findings. Maintenance warfarin dosing was reviewed with patient and will remain on the same dose until next INR check. Patient did not have any questions or concerns.                OBJECTIVE    Recent labs: (last 7 days)     20  1104   INR 2.30*       ASSESSMENT / PLAN  INR assessment THER    Recheck INR In: 2 WEEKS    INR Location Outside lab      Anticoagulation Summary  As of 2020    INR goal:   2.0-2.5   TTR:   47.8 % (1 y)   INR used for dosin.30 (2020)   Warfarin maintenance plan:   1.25 mg (2.5 mg x 0.5) every Mon, Thu; 2.5 mg (2.5 mg x 1) all other days   Full warfarin instructions:   1.25 mg every Mon, Thu; 2.5 mg all other days   Weekly warfarin total:   15 mg   No change documented:   Teresa Castro RN   Plan last modified:   Fidelina Pizano RN (2020)   Next INR check:   2020   Priority:   High   Target end date:   Indefinite    Indications    Long-term (current) use of anticoagulants [Z79.01] [Z79.01]  Atrial fibrillation (H)  [I48.91]  Persistent atrial fibrillation (H) [I48.19]             Anticoagulation Episode Summary     INR check location:       Preferred lab:       Send INR reminders to:   [facility]    Comments:         Anticoagulation Care Providers     Provider Role Specialty Phone number    Kushal Valentin MD Referring [facility] [phone]            See the Encounter Report to view Anticoagulation Flowsheet and Dosing Calendar (Go to Encounters tab in chart review, and find the Anticoagulation Therapy Visit)    Dosage adjustment made based on physician directed care plan.    Teresa Castro RN

## 2020-08-11 NOTE — PROGRESS NOTES
Pre-Visit Planning     Future Appointments   Date Time Provider Department Center   8/12/2020 10:20 AM Kushal Valentin MD [facility] CR   8/18/2020 11:15 AM CR LAB CR[facility] CR   11/3/2020  9:00 AM [facility] [address] [facility]     Arrival Time for this Appointment:  9:55 AM   Appointment Notes for this encounter:   annuall wellness fasting    Questionnaires Reviewed/Assigned  Additional questionnaires assigned    Last OV with provider  11/21/19 Assessment and Plan:  He has known cervical spondylosis, reviewed xray and exam   1. Encounter for immunization  Update   - C ANNUAL WELLNESS VISIT, PPS, SUBSEQUENT  - REVIEW OF HEALTH MAINTENANCE PROTOCOL ORDERS  - PAF COMPLETED  2. Pure hypercholesterolemia  At goal  - Albumin Random Urine Quantitative with Creat Ratio  - Comprehensive metabolic panel (BMP + Alb, Alk Phos, ALT, AST, Total. Bili, TP)  - Lipid panel reflex to direct LDL Fasting  3. Essential hypertension, benign  Check bp   - Albumin Random Urine Quantitative with Creat Ratio  - Comprehensive metabolic panel (BMP + Alb, Alk Phos, ALT, AST, Total. Bili, TP)  - Lipid panel reflex to direct LDL Fasting  4. Ataxia  Pays careful attention to safety, quit skiing at 83  5. Sebaceous cyst  On right back, 1 cm, will monitor   6. Cardiac pacemaker in situ  Doing well [facility] monitors   - C ANNUAL WELLNESS VISIT, PPS, SUBSEQUENT  - Comprehensive metabolic panel (BMP + Alb, Alk Phos, ALT, AST, Total. Bili, TP)  - Lipid panel reflex to direct LDL Fasting  7. Paroxysmal atrial fibrillation (H)    Specialty Visits  2/11/20 Podiatry   ASSESSMENT:    Left foot pain  Plantar fasciitis, left  PLAN:  Reviewed patient's chart in [facility]. The potential causes and nature of plantar fasciitis were discussed with the patient.  We reviewed the natural history/prognosis of the condition and risks if left untreated.  These include chronic pain, other sites of pain due to gait changes, and potential plantar fascial rupture.   We discussed  possible causes of the condition as it relates to the patients specific situation.   Conservative treatment options were reviewed:  appropriate shoes, avoidance of barefoot walking, inserts/orthoses, stretching, ice, massage, immobilization and NSAIDs.  We also reviewed the options of injection therapy and surgery.  However, it was made clear that surgery is only considered when conservative therapy fails.  The risks and benefits of injection therapy, and surgery were discussed.  After thorough discussion and answering all questions, the patient elected to try oral anti inflammatory. Prednisone was ordered. Also recommend topical pain cream over the counter and stretching and warm foot soaks.  If pain continues, can try injection or physical therapy with iontophoresis.      4/21/20 Vitreoretinal Surgery  Imp/Plan:  1. 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 I explained to the patient that they have a central retinal vein occlusion, which is a blockage of the main vein returning blood from the eye to the heart. Based on today s exam, diagnostic studies, and/or review of records, the determination was made for treatment today. The risks, benefits, and alternatives and realistic expectations of the procedure were discussed and the patient elected to proceed. Discussed option of Lucentis vs Avastin vs Eylea. Lucentis injection today. The injection was given and was well-tolerated. Post-injection instructions were reviewed and understood by the patient. Advised to call promptly for any loss of vision or eye pain. Stressed the importance of controlling vascular risk factors. Counselled patient on the risk factors of RVO including high blood pressure.   2. Posterior Vitreous Detachment OD. No retinal tears or retinal detachment seen on clinical exam today. Observation recommended.   3. Posterior Vitreous Detachment OS. No retinal tears or retinal detachment seen on clinical exam today. Observation  recommended.   4. POAG, Mild OU. IOP is stable. Continue current management with Dr. Echevarria.   5. Dry AMD, Early Dry Stage OU. No clinical evidence of choroidal neovascularization seen on exam or testing. Based on today s exam, diagnostic studies, and/or review of records, determination was made for observation and follow-up.   6. 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 Continue current management.  7. Hypertension, Systemic. Full COVID-19 screening performed today. No high risk exposure or other concerns on exam or screening. Patient underwent COVID-19 screening and assessment. Extensive contact precautions taken throughout the office. Comprehensive exam performed. Recommended and stressed the importance of blood pression control.   8. Pseudophakia s/p YAG OU (12/11/2019).  9. PC IOL OU (OD 5/29/2018, OS 4/17/2018).  Other Discussion: there is a small possibility of developing neovascular glaucoma. The patient knows to return if they notice increased eye pain or decreased vision. Findings explained in detail with patient. Patient understands condition, prognosis and need for follow up care. Discussed AREDS supplements, BP Control, UV protection and dark leafy green vegetables.   Follow up: Dr. Leon 4 weeks - OCT Macula OU. Possible Lucentis 0.5 mg PFS OD. Dr. Echevarria Routine.     Patient preferred phone number: [phone]    Sent [employer] message    LINDA Harrell, RN, PHN

## 2020-08-12 ENCOUNTER — OFFICE VISIT (OUTPATIENT)
Dept: FAMILY MEDICINE | Facility: CLINIC | Age: 85
End: 2020-08-12
Payer: COMMERCIAL

## 2020-08-12 VITALS
DIASTOLIC BLOOD PRESSURE: 80 MMHG | HEIGHT: 68 IN | OXYGEN SATURATION: 99 % | WEIGHT: 165.3 LBS | RESPIRATION RATE: 14 BRPM | TEMPERATURE: 97.7 F | BODY MASS INDEX: 25.05 KG/M2 | HEART RATE: 64 BPM | SYSTOLIC BLOOD PRESSURE: 142 MMHG

## 2020-08-12 DIAGNOSIS — R07.2 PRECORDIAL PAIN: ICD-10-CM

## 2020-08-12 DIAGNOSIS — I49.5 SICK SINUS SYNDROME (H): ICD-10-CM

## 2020-08-12 DIAGNOSIS — H91.10 PRESBYCUSIS, UNSPECIFIED LATERALITY: ICD-10-CM

## 2020-08-12 DIAGNOSIS — Z00.00 ENCOUNTER FOR MEDICARE ANNUAL WELLNESS EXAM: Primary | ICD-10-CM

## 2020-08-12 DIAGNOSIS — E78.2 MIXED HYPERLIPIDEMIA: ICD-10-CM

## 2020-08-12 DIAGNOSIS — I48.0 PAROXYSMAL ATRIAL FIBRILLATION (H): ICD-10-CM

## 2020-08-12 DIAGNOSIS — K21.00 GASTROESOPHAGEAL REFLUX DISEASE WITH ESOPHAGITIS: ICD-10-CM

## 2020-08-12 LAB
BASOPHILS # BLD AUTO: 0 10E9/L (ref 0–0.2)
BASOPHILS NFR BLD AUTO: 0.3 %
DIFFERENTIAL METHOD BLD: NORMAL
EOSINOPHIL # BLD AUTO: 0.1 10E9/L (ref 0–0.7)
EOSINOPHIL NFR BLD AUTO: 1.5 %
ERYTHROCYTE [DISTWIDTH] IN BLOOD BY AUTOMATED COUNT: 14.4 % (ref 10–15)
HCT VFR BLD AUTO: 44.2 % (ref 40–53)
HGB BLD-MCNC: 15.2 G/DL (ref 13.3–17.7)
LYMPHOCYTES # BLD AUTO: 1.1 10E9/L (ref 0.8–5.3)
LYMPHOCYTES NFR BLD AUTO: 16.2 %
MCH RBC QN AUTO: 29.8 PG (ref 26.5–33)
MCHC RBC AUTO-ENTMCNC: 34.4 G/DL (ref 31.5–36.5)
MCV RBC AUTO: 87 FL (ref 78–100)
MONOCYTES # BLD AUTO: 0.8 10E9/L (ref 0–1.3)
MONOCYTES NFR BLD AUTO: 11.4 %
NEUTROPHILS # BLD AUTO: 4.7 10E9/L (ref 1.6–8.3)
NEUTROPHILS NFR BLD AUTO: 70.6 %
PLATELET # BLD AUTO: 193 10E9/L (ref 150–450)
RBC # BLD AUTO: 5.1 10E12/L (ref 4.4–5.9)
WBC # BLD AUTO: 6.7 10E9/L (ref 4–11)

## 2020-08-12 PROCEDURE — 80061 LIPID PANEL: CPT | Performed by: FAMILY MEDICINE

## 2020-08-12 PROCEDURE — 36415 COLL VENOUS BLD VENIPUNCTURE: CPT | Performed by: FAMILY MEDICINE

## 2020-08-12 PROCEDURE — 99397 PER PM REEVAL EST PAT 65+ YR: CPT | Performed by: FAMILY MEDICINE

## 2020-08-12 PROCEDURE — 80053 COMPREHEN METABOLIC PANEL: CPT | Performed by: FAMILY MEDICINE

## 2020-08-12 PROCEDURE — 85025 COMPLETE CBC W/AUTO DIFF WBC: CPT | Performed by: FAMILY MEDICINE

## 2020-08-12 ASSESSMENT — MIFFLIN-ST. JEOR: SCORE: 1411.36

## 2020-08-12 NOTE — PATIENT INSTRUCTIONS
Patient Education   Personalized Prevention Plan  You are due for the preventive services outlined below.  Your care team is available to assist you in scheduling these services.  If you have already completed any of these items, please share that information with your care team to update in your medical record.  Health Maintenance Due   Topic Date Due     PHQ-2  01/01/2020     FALL RISK ASSESSMENT  07/12/2020        Patient Education   Personalized Prevention Plan  You are due for the preventive services outlined below.  Your care team is available to assist you in scheduling these services.  If you have already completed any of these items, please share that information with your care team to update in your medical record.  Health Maintenance Due   Topic Date Due     PHQ-2  01/01/2020     FALL RISK ASSESSMENT  07/12/2020

## 2020-08-12 NOTE — PROGRESS NOTES
SUBJECTIVE:   Nathen Gage is a 84 year old male who presents for Preventive Visit.      Are you in the first 12 months of your Medicare coverage?  No    HPI  Do you feel safe in your environment? Yes    Have you ever done Advance Care Planning? (For example, a Health Directive, POLST, or a discussion with a medical provider or your loved ones about your wishes): Yes, patient states has an Advance Care Planning document and will bring a copy to the clinic.      Fall risk  Fallen 2 or more times in the past year?: No  Any fall with injury in the past year?: No    Cognitive Screening   1) Repeat 3 items (Leader, Season, Table)    2) Clock draw: ABNORMAL   3) 3 item recall: Recalls 1 object   Results: 3 items recalled: COGNITIVE IMPAIRMENT LESS LIKELY    Mini-CogTM Copyright S Patricia. Licensed by the author for use in [facility]; reprinted with permission ([email or web address]). All rights reserved.      Do you have sleep apnea, excessive snoring or daytime drowsiness?: no    Reviewed and updated as needed this visit by clinical staff  Tobacco  Allergies         Reviewed and updated as needed this visit by Provider        Social History     Tobacco Use     Smoking status: Former Smoker     Last attempt to quit: 1975     Years since quittin.6     Smokeless tobacco: Never Used   Substance Use Topics     Alcohol use: Yes     Comment: 1- 2 BEERS WEEKLY     If you drink alcohol do you typically have >3 drinks per day or >7 drinks per week? Yes                Current providers sharing in care for this patient include:   Patient Care Team:  Kushal Valentin MD as PCP - General  Kushal Valentin MD as Assigned PCP    The following health maintenance items are reviewed in Epic and correct as of today:  Health Maintenance   Topic Date Due     PHQ-2  2020     FALL RISK ASSESSMENT  2020     INFLUENZA VACCINE (1) 2020     MEDICARE ANNUAL WELLNESS VISIT  2020     CMP   "11/21/2020     MICROALBUMIN  11/21/2020     CREATININE  11/21/2020     ANNUAL REVIEW OF HM ORDERS  11/21/2020     ADVANCE CARE PLANNING  12/30/2024     DTAP/TDAP/TD IMMUNIZATION (3 - Td) 05/17/2027     PNEUMOCOCCAL IMMUNIZATION 65+ LOW/MEDIUM RISK  Completed     ZOSTER IMMUNIZATION  Completed     IPV IMMUNIZATION  Aged Out     MENINGITIS IMMUNIZATION  Aged Out     HEPATITIS B IMMUNIZATION  Aged Out     BP Readings from Last 3 Encounters:   08/12/20 (!) 142/80   02/11/20 128/74   01/29/20 122/68    Wt Readings from Last 3 Encounters:   08/12/20 75 kg (165 lb 4.8 oz)   02/11/20 77.1 kg (170 lb)   01/29/20 77.1 kg (170 lb)                  Pneumonia Vaccine:Adults age 65+ who received Pneumovax (PPSV23) at 65 years or older: Should be given PCV13 > 1 year after their most recent PPSV23    Review of Systems  Constitutional, HEENT, cardiovascular, pulmonary, GI, , musculoskeletal, neuro, skin, endocrine and psych systems are negative, except as otherwise noted.    OBJECTIVE:   There were no vitals taken for this visit. Estimated body mass index is 25.1 kg/m  as calculated from the following:    Height as of 2/11/20: 1.753 m (5' 9\").    Weight as of 2/11/20: 77.1 kg (170 lb).  Physical Exam  GENERAL: healthy, alert and no distress  EYES: Eyes grossly normal to inspection, PERRL and conjunctivae and sclerae normal  HENT: ear canals and TM's normal, nose and mouth without ulcers or lesions  NECK: no adenopathy, no asymmetry, masses, or scars and thyroid normal to palpation  RESP: lungs clear to auscultation - no rales, rhonchi or wheezes  CV: regular rate and rhythm, normal S1 S2, no S3 or S4, no murmur, click or rub, no peripheral edema and peripheral pulses strong  ABDOMEN: soft, nontender, no hepatosplenomegaly, no masses and bowel sounds normal  MS: no gross musculoskeletal defects noted, no edema  SKIN: no suspicious lesions or rashes  NEURO: Normal strength and tone, mentation intact and speech normal  PSYCH: " "mentation appears normal, affect normal/bright    Diagnostic Test Results:  Labs reviewed in Epic    ASSESSMENT / PLAN:   1. Encounter for Medicare annual wellness exam  Has cardiac pacer, hearing loss, aides, chronic atrialfibriallation    2. Precordial pain  Non issues lately    3. Presbycusis, unspecified laterality      4. Gastroesophageal reflux disease with esophagitis  Gi is improved     5. Mixed hyperlipidemia      6. Sick sinus syndrome (H)  pacer    7. Paroxysmal atrial fibrillation (H)  Current Outpatient Medications   Medication     atorvastatin (LIPITOR) 20 MG tablet     doxazosin (CARDURA) 4 MG tablet     losartan (COZAAR) 100 MG tablet     pantoprazole (PROTONIX) 40 MG EC tablet     prednisolon-gatiflox-bromfenac, pt own, no charge, 1-0.5-0.075 % opthalmic solution     warfarin ANTICOAGULANT (COUMADIN) 2.5 MG tablet     No current facility-administered medications for this visit.          COUNSELING:  Reviewed preventive health counseling, as reflected in patient instructions       Regular exercise       Healthy diet/nutrition    Estimated body mass index is 25.1 kg/m  as calculated from the following:    Height as of 2/11/20: 1.753 m (5' 9\").    Weight as of 2/11/20: 77.1 kg (170 lb).    Weight management plan: Discussed healthy diet and exercise guidelines     reports that he quit smoking about 45 years ago. He has never used smokeless tobacco.      Appropriate preventive services were discussed with this patient, including applicable screening as appropriate for cardiovascular disease, diabetes, osteopenia/osteoporosis, and glaucoma.  As appropriate for age/gender, discussed screening for colorectal cancer, prostate cancer, breast cancer, and cervical cancer. Checklist reviewing preventive services available has been given to the patient.    Reviewed patients plan of care and provided an AVS. The Intermediate Care Plan ( asthma action plan, low back pain action plan, and migraine action plan) for " "Nathen meets the Care Plan requirement. This Care Plan has been established and reviewed with the Patient.    Counseling Resources:  ATP IV Guidelines  Pooled Cohorts Equation Calculator  Breast Cancer Risk Calculator  FRAX Risk Assessment  ICSI Preventive Guidelines  Dietary Guidelines for Americans, 2010  USDA's MyPlate  ASA Prophylaxis  Lung CA Screening    Kushal Valentin MD  [facility]    Identified Health Risks:  SUBJECTIVE:   Nathen Gage is a 84 year old male who presents for Preventive Visit.      Are you in the first 12 months of your Medicare Part B coverage?  No    Physical Health:    In general, how would you rate your overall physical health? good    Outside of work, how many days during the week do you exercise? 2-3 days/week    Outside of work, approximately how many minutes a day do you exercise?30-45 minutes  If you drink alcohol do you typically have >3 drinks per day or >7 drinks per week? Yes - AUDIT SCORE:           Do you usually eat at least 4 servings of fruit and vegetables a day, include whole grains & fiber and avoid regularly eating high fat or \"junk\" foods? Yes    Do you have any problems taking medications regularly?      Do you have any side effects from medications?     Needs assistance for the following daily activities:     Which of the following safety concerns are present in your home?       Hearing impairment: Yes, aides     In the past 6 months, have you been bothered by leaking of urine? no    Mental Health:    In general, how would you rate your overall mental or emotional health? good  PHQ-2 Score:      Do you feel safe in your environment? Yes    Have you ever done Advance Care Planning? (For example, a Health Directive, POLST, or a discussion with a medical provider or your loved ones about your wishes): No, advance care planning information given to patient to review.  Patient declined advance care planning discussion at this " time.    Additional concerns to address?  No    Fall risk:           Mini-CogTM Copyright LION Gomez. Licensed by the author for use in [facility]; reprinted with permission ([email or web address]). All rights reserved.      Do you have sleep apnea, excessive snoring or daytime drowsiness?: yes            Reviewed and updated as needed this visit by clinical staff  Tobacco  Allergies  Soc Hx        Reviewed and updated as needed this visit by Provider        Social History     Tobacco Use     Smoking status: Former Smoker     Last attempt to quit: 1975     Years since quittin.6     Smokeless tobacco: Never Used   Substance Use Topics     Alcohol use: Yes     Comment: 1- 2 BEERS WEEKLY                           Current providers sharing in care for this patient include:   Patient Care Team:  Kushal Valentin MD as PCP - General  Kushal Valentin MD as Assigned PCP    The following health maintenance items are reviewed in Epic and correct as of today:  Health Maintenance   Topic Date Due     PHQ-2  2020     FALL RISK ASSESSMENT  2020     INFLUENZA VACCINE (1) 2020     MEDICARE ANNUAL WELLNESS VISIT  2020     CMP  2020     MICROALBUMIN  2020     CREATININE  2020     ANNUAL REVIEW OF HM ORDERS  2020     ADVANCE CARE PLANNING  2024     DTAP/TDAP/TD IMMUNIZATION (3 - Td) 2027     PNEUMOCOCCAL IMMUNIZATION 65+ LOW/MEDIUM RISK  Completed     ZOSTER IMMUNIZATION  Completed     IPV IMMUNIZATION  Aged Out     MENINGITIS IMMUNIZATION  Aged Out     HEPATITIS B IMMUNIZATION  Aged Out     BP Readings from Last 3 Encounters:   20 (!) 142/80   20 128/74   20 122/68    Wt Readings from Last 3 Encounters:   20 75 kg (165 lb 4.8 oz)   20 77.1 kg (170 lb)   20 77.1 kg (170 lb)                  Pneumonia Vaccine:Adults age 65+ who received Pneumovax (PPSV23) at 65 years or older: Should be given PCV13 > 1 year after  "their most recent PPSV23    ROS:  Constitutional, HEENT, cardiovascular, pulmonary, GI, , musculoskeletal, neuro, skin, endocrine and psych systems are negative, except as otherwise noted.    OBJECTIVE:   BP (!) 142/80 (BP Location: Right arm, Patient Position: Chair, Cuff Size: Adult Regular)   Pulse 64   Temp 97.7  F (36.5  C) (Oral)   Resp 14   Ht 1.715 m (5' 7.5\")   Wt 75 kg (165 lb 4.8 oz)   SpO2 99%   BMI 25.51 kg/m   Estimated body mass index is 25.51 kg/m  as calculated from the following:    Height as of this encounter: 1.715 m (5' 7.5\").    Weight as of this encounter: 75 kg (165 lb 4.8 oz).  EXAM:   GENERAL: healthy, alert and no distress  EYES: Eyes grossly normal to inspection, PERRL and conjunctivae and sclerae normal  HENT: ear canals and TM's normal, nose and mouth without ulcers or lesions  NECK: no adenopathy, no asymmetry, masses, or scars and thyroid normal to palpation  RESP: lungs clear to auscultation - no rales, rhonchi or wheezes  CV: regular rate and rhythm, normal S1 S2, no S3 or S4, no murmur, click or rub, no peripheral edema and peripheral pulses strong  ABDOMEN: soft, nontender, no hepatosplenomegaly, no masses and bowel sounds normal  MS: no gross musculoskeletal defects noted, no edema  SKIN: no suspicious lesions or rashes  NEURO: Normal strength and tone, mentation intact and speech normal  PSYCH: mentation appears normal, affect normal/bright    Diagnostic Test Results:  Labs reviewed in Epic    ASSESSMENT / PLAN:   1. Encounter for Medicare annual wellness exam  Doing well, sees [facility] for meds   - Lipid panel reflex to direct LDL Fasting    2. Precordial pain  Not since his event    3. Presbycusis, unspecified laterality  Bilateral aides     4. Gastroesophageal reflux disease with esophagitis  Doing well    5. Mixed hyperlipidemia    - Lipid panel reflex to direct LDL Fasting  - CBC with platelets and differential    6. Sick sinus syndrome (H)  pacer    7. Paroxysmal " "atrial fibrillation (H)    - Lipid panel reflex to direct LDL Fasting  - Comprehensive metabolic panel  - CBC with platelets and differential    COUNSELING:  Guard against falling     Estimated body mass index is 25.51 kg/m  as calculated from the following:    Height as of this encounter: 1.715 m (5' 7.5\").    Weight as of this encounter: 75 kg (165 lb 4.8 oz).    Weight management plan: Discussed healthy diet and exercise guidelines     reports that he quit smoking about 45 years ago. He has never used smokeless tobacco.      Appropriate preventive services were discussed with this patient, including applicable screening as appropriate for cardiovascular disease, diabetes, osteopenia/osteoporosis, and glaucoma.  As appropriate for age/gender, discussed screening for colorectal cancer, prostate cancer, breast cancer, and cervical cancer. Checklist reviewing preventive services available has been given to the patient.    Reviewed patients plan of care and provided an AVS. The Basic Care Plan (routine screening as documented in Health Maintenance) for Nathen meets the Care Plan requirement. This Care Plan has been established and reviewed with the Patient.    Counseling Resources:  ATP IV Guidelines  Pooled Cohorts Equation Calculator  Breast Cancer Risk Calculator  FRAX Risk Assessment  ICSI Preventive Guidelines  Dietary Guidelines for Americans, 2010  USDA's MyPlate  ASA Prophylaxis  Lung CA Screening    Kushal Valentin MD  [facility] [place]"

## 2020-08-13 LAB
ALBUMIN SERPL-MCNC: 4.3 G/DL (ref 3.4–5)
ALP SERPL-CCNC: 102 U/L (ref 40–150)
ALT SERPL W P-5'-P-CCNC: 18 U/L (ref 0–70)
ANION GAP SERPL CALCULATED.3IONS-SCNC: 7 MMOL/L (ref 3–14)
AST SERPL W P-5'-P-CCNC: 13 U/L (ref 0–45)
BILIRUB SERPL-MCNC: 1.1 MG/DL (ref 0.2–1.3)
BUN SERPL-MCNC: 17 MG/DL (ref 7–30)
CALCIUM SERPL-MCNC: 9.5 MG/DL (ref 8.5–10.1)
CHLORIDE SERPL-SCNC: 101 MMOL/L (ref 94–109)
CHOLEST SERPL-MCNC: 164 MG/DL
CO2 SERPL-SCNC: 25 MMOL/L (ref 20–32)
CREAT SERPL-MCNC: 1.03 MG/DL (ref 0.66–1.25)
GFR SERPL CREATININE-BSD FRML MDRD: 66 ML/MIN/{1.73_M2}
GLUCOSE SERPL-MCNC: 91 MG/DL (ref 70–99)
HDLC SERPL-MCNC: 58 MG/DL
LDLC SERPL CALC-MCNC: 87 MG/DL
NONHDLC SERPL-MCNC: 106 MG/DL
POTASSIUM SERPL-SCNC: 4.6 MMOL/L (ref 3.4–5.3)
PROT SERPL-MCNC: 8.1 G/DL (ref 6.8–8.8)
SODIUM SERPL-SCNC: 133 MMOL/L (ref 133–144)
TRIGL SERPL-MCNC: 94 MG/DL

## 2020-08-18 ENCOUNTER — ANTICOAGULATION THERAPY VISIT (OUTPATIENT)
Dept: ANTICOAGULATION | Facility: CLINIC | Age: 85
End: 2020-08-18
Payer: COMMERCIAL

## 2020-08-18 DIAGNOSIS — Z79.01 LONG TERM CURRENT USE OF ANTICOAGULANT THERAPY: ICD-10-CM

## 2020-08-18 DIAGNOSIS — I48.0 PAROXYSMAL ATRIAL FIBRILLATION (H): ICD-10-CM

## 2020-08-18 DIAGNOSIS — I48.91 ATRIAL FIBRILLATION (H): ICD-10-CM

## 2020-08-18 DIAGNOSIS — I48.19 PERSISTENT ATRIAL FIBRILLATION (H): ICD-10-CM

## 2020-08-18 LAB
CAPILLARY BLOOD COLLECTION: NORMAL
INR PPP: 2.1 (ref 0.86–1.14)

## 2020-08-18 PROCEDURE — 85610 PROTHROMBIN TIME: CPT | Performed by: FAMILY MEDICINE

## 2020-08-18 PROCEDURE — 36416 COLLJ CAPILLARY BLOOD SPEC: CPT | Performed by: FAMILY MEDICINE

## 2020-08-18 PROCEDURE — 99207 ZZC NO CHARGE NURSE ONLY: CPT | Performed by: FAMILY MEDICINE

## 2020-08-18 NOTE — PROGRESS NOTES
ANTICOAGULATION FOLLOW-UP CLINIC VISIT    Patient Name:  Nathen Gage  Date:  2020  Contact Type:  Telephone/ discussed with patient    SUBJECTIVE:  Patient Findings     Comments:   The patient was assessed for diet, medication, and activity level changes, missed or extra doses, bruising or bleeding, with no problem findings.          Clinical Outcomes     Negatives:   Major bleeding event, Thromboembolic event, Anticoagulation-related hospital admission, Anticoagulation-related ED visit, Anticoagulation-related fatality    Comments:   The patient was assessed for diet, medication, and activity level changes, missed or extra doses, bruising or bleeding, with no problem findings.             OBJECTIVE    Recent labs: (last 7 days)     20  1106   INR 2.10*       ASSESSMENT / PLAN  INR assessment THER    Recheck INR In: 3 WEEKS    INR Location Clinic      Anticoagulation Summary  As of 2020    INR goal:   2.0-2.5   TTR:   48.5 % (1 y)   INR used for dosin.10 (2020)   Warfarin maintenance plan:   1.25 mg (2.5 mg x 0.5) every Mon, Thu; 2.5 mg (2.5 mg x 1) all other days   Full warfarin instructions:   1.25 mg every Mon, Thu; 2.5 mg all other days   Weekly warfarin total:   15 mg   No change documented:   Joanne Bustos RN   Plan last modified:   Fidelina Pizano RN (2020)   Next INR check:   2020   Priority:   High   Target end date:   Indefinite    Indications    Long-term (current) use of anticoagulants [Z79.01] [Z79.01]  Atrial fibrillation (H) [I48.91]  Persistent atrial fibrillation (H) [I48.19]             Anticoagulation Episode Summary     INR check location:       Preferred lab:       Send INR reminders to:   [facility]    Comments:         Anticoagulation Care Providers     Provider Role Specialty Phone number    Kushal Valentin MD Referring [facility] [phone]            See the Encounter Report to view Anticoagulation Flowsheet and Dosing Calendar  (Go to Encounters tab in chart review, and find the Anticoagulation Therapy Visit)    Dosage adjustment made based on physician directed care plan.    Joanne Bustos RN

## 2020-09-09 ENCOUNTER — ANTICOAGULATION THERAPY VISIT (OUTPATIENT)
Dept: FAMILY MEDICINE | Facility: CLINIC | Age: 85
End: 2020-09-09

## 2020-09-09 DIAGNOSIS — I48.19 PERSISTENT ATRIAL FIBRILLATION (H): ICD-10-CM

## 2020-09-09 DIAGNOSIS — Z79.01 LONG TERM CURRENT USE OF ANTICOAGULANT THERAPY: ICD-10-CM

## 2020-09-09 DIAGNOSIS — I48.0 PAROXYSMAL ATRIAL FIBRILLATION (H): ICD-10-CM

## 2020-09-09 DIAGNOSIS — I48.91 ATRIAL FIBRILLATION (H): ICD-10-CM

## 2020-09-09 LAB
CAPILLARY BLOOD COLLECTION: NORMAL
INR PPP: 2.5 (ref 0.86–1.14)

## 2020-09-09 PROCEDURE — 99207 ZZC NO CHARGE NURSE ONLY: CPT | Performed by: FAMILY MEDICINE

## 2020-09-09 PROCEDURE — 36416 COLLJ CAPILLARY BLOOD SPEC: CPT | Performed by: FAMILY MEDICINE

## 2020-09-09 PROCEDURE — 85610 PROTHROMBIN TIME: CPT | Performed by: FAMILY MEDICINE

## 2020-09-09 NOTE — PROGRESS NOTES
Anticoagulation Management    Unable to reach Neal today.    Today's INR result of 2.5 is therapeutic (goal INR of 2.0-2.5).  Result received from: Clinic Lab    Follow up required to assess for changes     Left message to call [phone]. RN can be reached at [phone].      Anticoagulation clinic to follow up    Darin Muñoz RN    ANTICOAGULATION FOLLOW-UP CLINIC VISIT    Patient Name:  Nathen Gage  Date:  2020  Contact Type:  left detailed message for patient    SUBJECTIVE:  Patient Findings     Comments:   Unable to reach patient        Clinical Outcomes     Comments:   Unable to reach patient           OBJECTIVE    Recent labs: (last 7 days)     20  1051   INR 2.50*       ASSESSMENT / PLAN  INR assessment THER    Recheck INR In: 3 WEEKS    INR Location Clinic      Anticoagulation Summary  As of 2020    INR goal:   2.0-2.5   TTR:   49.5 % (1 y)   INR used for dosin.50 (2020)   Warfarin maintenance plan:   1.25 mg (2.5 mg x 0.5) every Mon, Thu; 2.5 mg (2.5 mg x 1) all other days   Full warfarin instructions:   1.25 mg every Mon, Thu; 2.5 mg all other days   Weekly warfarin total:   15 mg   No change documented:   Darin Muñoz RN   Plan last modified:   Fidelina Pizano RN (2020)   Next INR check:   2020   Priority:   High   Target end date:   Indefinite    Indications    Long-term (current) use of anticoagulants [Z79.01] [Z79.01]  Atrial fibrillation (H) [I48.91]  Persistent atrial fibrillation (H) [I48.19]             Anticoagulation Episode Summary     INR check location:       Preferred lab:       Send INR reminders to:   [facility]    Comments:         Anticoagulation Care Providers     Provider Role Specialty Phone number    Kushal Valentin MD Referring [facility] [phone]            See the Encounter Report to view Anticoagulation Flowsheet and Dosing Calendar (Go to Encounters tab in chart review, and find the  Anticoagulation Therapy Visit)    Patient INR is therapeutic.  Patient will continue to take 15 mg weekly dosing and follow up in 3 weeks or sooner for any problems or concerns.        Darin Muñoz RN

## 2020-09-30 ENCOUNTER — ANTICOAGULATION THERAPY VISIT (OUTPATIENT)
Dept: ANTICOAGULATION | Facility: CLINIC | Age: 85
End: 2020-09-30

## 2020-09-30 DIAGNOSIS — Z79.01 LONG TERM CURRENT USE OF ANTICOAGULANT THERAPY: ICD-10-CM

## 2020-09-30 DIAGNOSIS — I48.0 PAROXYSMAL ATRIAL FIBRILLATION (H): ICD-10-CM

## 2020-09-30 DIAGNOSIS — I48.19 PERSISTENT ATRIAL FIBRILLATION (H): ICD-10-CM

## 2020-09-30 DIAGNOSIS — I48.91 ATRIAL FIBRILLATION (H): ICD-10-CM

## 2020-09-30 LAB
CAPILLARY BLOOD COLLECTION: NORMAL
INR PPP: 2.2 (ref 0.86–1.14)

## 2020-09-30 PROCEDURE — 36416 COLLJ CAPILLARY BLOOD SPEC: CPT | Performed by: FAMILY MEDICINE

## 2020-09-30 PROCEDURE — 85610 PROTHROMBIN TIME: CPT | Performed by: FAMILY MEDICINE

## 2020-09-30 NOTE — PROGRESS NOTES
ANTICOAGULATION FOLLOW-UP CLINIC VISIT    Patient Name:  Nathen Gage  Date:  2020  Contact Type:  Telephone/ discussed with patient    SUBJECTIVE:  Patient Findings     Comments:   The patient was assessed for diet, medication, and activity level changes, missed or extra doses, bruising or bleeding, with no problem findings.          Clinical Outcomes     Negatives:   Major bleeding event, Thromboembolic event, Anticoagulation-related hospital admission, Anticoagulation-related ED visit, Anticoagulation-related fatality    Comments:   The patient was assessed for diet, medication, and activity level changes, missed or extra doses, bruising or bleeding, with no problem findings.             OBJECTIVE    Recent labs: (last 7 days)     20  0927   INR 2.20*       ASSESSMENT / PLAN  INR assessment THER    Recheck INR In: 4 WEEKS    INR Location Clinic      Anticoagulation Summary  As of 2020    INR goal:   2.0-2.5   TTR:   51.8 % (1 y)   INR used for dosin.20 (2020)   Warfarin maintenance plan:   1.25 mg (2.5 mg x 0.5) every Mon, Thu; 2.5 mg (2.5 mg x 1) all other days   Full warfarin instructions:   1.25 mg every Mon, Thu; 2.5 mg all other days   Weekly warfarin total:   15 mg   No change documented:   Joanne Bustos RN   Plan last modified:   Fidelina Pizano RN (2020)   Next INR check:   10/27/2020   Priority:   High   Target end date:   Indefinite    Indications    Long-term (current) use of anticoagulants [Z79.01] [Z79.01]  Atrial fibrillation (H) [I48.91]  Persistent atrial fibrillation (H) [I48.19]             Anticoagulation Episode Summary     INR check location:       Preferred lab:       Send INR reminders to:   [facility]    Comments:         Anticoagulation Care Providers     Provider Role Specialty Phone number    Kushal Valentin MD Referring [facility] [phone]            See the Encounter Report to view Anticoagulation Flowsheet and Dosing  Calendar (Go to Encounters tab in chart review, and find the Anticoagulation Therapy Visit)    Dosage adjustment made based on physician directed care plan.    Joanne Bustos RN

## 2020-10-09 DIAGNOSIS — N40.1 BENIGN LOCALIZED HYPERPLASIA OF PROSTATE WITH URINARY OBSTRUCTION: ICD-10-CM

## 2020-10-09 DIAGNOSIS — N13.8 BENIGN LOCALIZED HYPERPLASIA OF PROSTATE WITH URINARY OBSTRUCTION: ICD-10-CM

## 2020-10-09 RX ORDER — DOXAZOSIN 4 MG/1
TABLET ORAL
Qty: 90 TABLET | Refills: 0 | Status: SHIPPED | OUTPATIENT
Start: 2020-10-09 | End: 2020-12-30

## 2020-10-09 NOTE — TELEPHONE ENCOUNTER
Routing refill request to provider for review/approval because:  Failing bp    Rayna Hall, RN

## 2020-10-20 ENCOUNTER — ANTICOAGULATION THERAPY VISIT (OUTPATIENT)
Dept: ANTICOAGULATION | Facility: CLINIC | Age: 85
End: 2020-10-20

## 2020-10-20 DIAGNOSIS — Z79.01 LONG TERM CURRENT USE OF ANTICOAGULANT THERAPY: ICD-10-CM

## 2020-10-20 DIAGNOSIS — I48.91 ATRIAL FIBRILLATION (H): ICD-10-CM

## 2020-10-20 DIAGNOSIS — I48.0 PAROXYSMAL ATRIAL FIBRILLATION (H): ICD-10-CM

## 2020-10-20 DIAGNOSIS — I48.19 PERSISTENT ATRIAL FIBRILLATION (H): ICD-10-CM

## 2020-10-20 LAB
CAPILLARY BLOOD COLLECTION: NORMAL
INR PPP: 2.3 (ref 0.86–1.14)

## 2020-10-20 PROCEDURE — 85610 PROTHROMBIN TIME: CPT | Performed by: FAMILY MEDICINE

## 2020-10-20 PROCEDURE — 36416 COLLJ CAPILLARY BLOOD SPEC: CPT | Performed by: FAMILY MEDICINE

## 2020-10-20 PROCEDURE — 99207 PR NO CHARGE NURSE ONLY: CPT

## 2020-10-20 NOTE — PROGRESS NOTES
ANTICOAGULATION FOLLOW-UP CLINIC VISIT    Patient Name:  Nathen Gage  Date:  10/20/2020  Contact Type:  Telephone/ discussed with patient    SUBJECTIVE:  Patient Findings     Comments:  The patient was assessed for diet, medication, and activity level changes, missed or extra doses, bruising or bleeding, with no problem findings.          Clinical Outcomes     Negatives:  Major bleeding event, Thromboembolic event, Anticoagulation-related hospital admission, Anticoagulation-related ED visit, Anticoagulation-related fatality    Comments:  The patient was assessed for diet, medication, and activity level changes, missed or extra doses, bruising or bleeding, with no problem findings.             OBJECTIVE    Recent labs: (last 7 days)     10/20/20  0823   INR 2.30*       ASSESSMENT / PLAN  INR assessment THER    Recheck INR In: 4 WEEKS    INR Location Clinic      Anticoagulation Summary  As of 10/20/2020    INR goal:  2.0-2.5   TTR:  56.1 % (1 y)   INR used for dosin.30 (10/20/2020)   Warfarin maintenance plan:  1.25 mg (2.5 mg x 0.5) every Mon, Thu; 2.5 mg (2.5 mg x 1) all other days   Full warfarin instructions:  1.25 mg every Mon, Thu; 2.5 mg all other days   Weekly warfarin total:  15 mg   No change documented:  Joanne Bustos RN   Plan last modified:  Fidelina Pizano RN (2020)   Next INR check:  2020   Priority:  High   Target end date:  Indefinite    Indications    Long-term (current) use of anticoagulants [Z79.01] [Z79.01]  Atrial fibrillation (H) [I48.91]  Persistent atrial fibrillation (H) [I48.19]             Anticoagulation Episode Summary     INR check location:      Preferred lab:      Send INR reminders to:  [facility]    Comments:        Anticoagulation Care Providers     Provider Role Specialty Phone number    Kushal Valentin MD Referring [facility] [phone]            See the Encounter Report to view Anticoagulation Flowsheet and Dosing Calendar (Go to  Encounters tab in chart review, and find the Anticoagulation Therapy Visit)    Dosage adjustment made based on physician directed care plan.    Joanne Bustos RN

## 2020-11-03 ENCOUNTER — ANCILLARY PROCEDURE (OUTPATIENT)
Dept: CARDIOLOGY | Facility: CLINIC | Age: 85
End: 2020-11-03
Attending: INTERNAL MEDICINE
Payer: COMMERCIAL

## 2020-11-03 DIAGNOSIS — I49.5 SICK SINUS SYNDROME (H): Primary | ICD-10-CM

## 2020-11-03 DIAGNOSIS — I49.5 SICK SINUS SYNDROME (H): ICD-10-CM

## 2020-11-03 PROCEDURE — 93293 PM PHONE R-STRIP DEVICE EVAL: CPT | Performed by: INTERNAL MEDICINE

## 2020-11-17 ENCOUNTER — ANTICOAGULATION THERAPY VISIT (OUTPATIENT)
Dept: ANTICOAGULATION | Facility: CLINIC | Age: 85
End: 2020-11-17

## 2020-11-17 DIAGNOSIS — I48.19 PERSISTENT ATRIAL FIBRILLATION (H): ICD-10-CM

## 2020-11-17 DIAGNOSIS — Z79.01 LONG TERM CURRENT USE OF ANTICOAGULANT THERAPY: ICD-10-CM

## 2020-11-17 DIAGNOSIS — I48.91 ATRIAL FIBRILLATION (H): ICD-10-CM

## 2020-11-17 DIAGNOSIS — I10 ESSENTIAL HYPERTENSION, BENIGN: Primary | ICD-10-CM

## 2020-11-17 LAB
CAPILLARY BLOOD COLLECTION: NORMAL
INR PPP: 1.3 (ref 0.86–1.14)

## 2020-11-17 PROCEDURE — 36416 COLLJ CAPILLARY BLOOD SPEC: CPT | Performed by: FAMILY MEDICINE

## 2020-11-17 PROCEDURE — 85610 PROTHROMBIN TIME: CPT | Performed by: FAMILY MEDICINE

## 2020-11-17 PROCEDURE — 99207 PR NO CHARGE NURSE ONLY: CPT | Performed by: FAMILY MEDICINE

## 2020-11-17 PROCEDURE — 82043 UR ALBUMIN QUANTITATIVE: CPT | Performed by: FAMILY MEDICINE

## 2020-11-17 NOTE — PROGRESS NOTES
ANTICOAGULATION FOLLOW-UP CLINIC VISIT    Patient Name:  Nathen Gage  Date:  2020  Contact Type:  Telephone/ Patient called, he denies any changes or missed warfarin doses. Orders discussed with the patient, he agrees with the plan. Lab INR appointment scheduled on 20.     SUBJECTIVE:  Patient Findings     Positives:  Change in activity (Patient reports has not been walking since the weather is colder.)    Comments:  The patient was assessed for diet, medication, and activity level changes, missed or extra doses, bruising or bleeding, with no problem findings. Patient denies any identifiable changes that caused the suptherapeutic INR.             Clinical Outcomes     Negatives:  Major bleeding event, Thromboembolic event, Anticoagulation-related hospital admission, Anticoagulation-related ED visit, Anticoagulation-related fatality    Comments:  The patient was assessed for diet, medication, and activity level changes, missed or extra doses, bruising or bleeding, with no problem findings. Patient denies any identifiable changes that caused the suptherapeutic INR.                OBJECTIVE    Recent labs: (last 7 days)     20  0905   INR 1.30*       ASSESSMENT / PLAN  INR assessment SUB    Recheck INR In: 3 DAYS    INR Location Outside lab      Anticoagulation Summary  As of 2020    INR goal:  2.0-2.5   TTR:  52.8 % (1 y)   INR used for dosin.30 (2020)   Warfarin maintenance plan:  1.25 mg (2.5 mg x 0.5) every Mon, Thu; 2.5 mg (2.5 mg x 1) all other days   Full warfarin instructions:  : 3.75 mg; Otherwise 1.25 mg every Mon, Thu; 2.5 mg all other days   Weekly warfarin total:  15 mg   Plan last modified:  Teresa Castro RN (2020)   Next INR check:  2020   Priority:  High   Target end date:  Indefinite    Indications    Long-term (current) use of anticoagulants [Z79.01] [Z79.01]  Atrial fibrillation (H) [I48.91]  Persistent atrial fibrillation (H) [I48.19]              Anticoagulation Episode Summary     INR check location:      Preferred lab:      Send INR reminders to:  [facility] [place]    Comments:        Anticoagulation Care Providers     Provider Role Specialty Phone number    Kushal Valentin MD Referring Family Medicine [phone]            See the Encounter Report to view Anticoagulation Flowsheet and Dosing Calendar (Go to Encounters tab in chart review, and find the Anticoagulation Therapy Visit)    Dosage adjustment made based on physician directed care plan.    Teresa Castro RN

## 2020-11-17 NOTE — PROGRESS NOTES
Left message to call [phone]. Please transfer call to Teresa at [phone].  Teresa Castro RN

## 2020-11-18 ENCOUNTER — DOCUMENTATION ONLY (OUTPATIENT)
Dept: LAB | Facility: CLINIC | Age: 85
End: 2020-11-18

## 2020-11-18 LAB
CREAT UR-MCNC: 67 MG/DL
MICROALBUMIN UR-MCNC: <5 MG/L
MICROALBUMIN/CREAT UR: NORMAL MG/G CR (ref 0–17)

## 2020-11-18 NOTE — PROGRESS NOTES
..Nathen Gage has an upcoming lab appointment:    Future Appointments   Date Time Provider Department Center   11/20/2020  8:15 AM CR LAB CRLAB CR      Please review Health Maintenance and sign order: Review of Health Maintenance Protocol Orders (HMPO) to authorize patient's due Health Maintenance labs to be drawn.    Health Maintenance Due   Topic     ANNUAL REVIEW OF HM ORDERS      Lane Madrigal

## 2020-11-20 ENCOUNTER — ANTICOAGULATION THERAPY VISIT (OUTPATIENT)
Dept: ANTICOAGULATION | Facility: CLINIC | Age: 85
End: 2020-11-20

## 2020-11-20 DIAGNOSIS — Z79.01 LONG TERM CURRENT USE OF ANTICOAGULANT THERAPY: ICD-10-CM

## 2020-11-20 DIAGNOSIS — I48.91 ATRIAL FIBRILLATION (H): ICD-10-CM

## 2020-11-20 DIAGNOSIS — I48.19 PERSISTENT ATRIAL FIBRILLATION (H): ICD-10-CM

## 2020-11-20 LAB
CAPILLARY BLOOD COLLECTION: NORMAL
INR PPP: 1.5 (ref 0.86–1.14)

## 2020-11-20 PROCEDURE — 85610 PROTHROMBIN TIME: CPT | Performed by: FAMILY MEDICINE

## 2020-11-20 PROCEDURE — 99207 PR NO CHARGE NURSE ONLY: CPT

## 2020-11-20 PROCEDURE — 36416 COLLJ CAPILLARY BLOOD SPEC: CPT | Performed by: FAMILY MEDICINE

## 2020-11-20 NOTE — PROGRESS NOTES
ANTICOAGULATION FOLLOW-UP CLINIC VISIT    Patient Name:  Nathen Gage  Date:  2020  Contact Type:  Telephone/ discussed with patient    SUBJECTIVE:  Patient Findings     Comments:  Continues to have less activity.  Also reviewed with patient the importance of a steady diet of green veggies.  INR has been consistently low, so maintenance dose was increased by 8% today.        Clinical Outcomes     Negatives:  Major bleeding event, Thromboembolic event, Anticoagulation-related hospital admission, Anticoagulation-related ED visit, Anticoagulation-related fatality    Comments:  Continues to have less activity.  Also reviewed with patient the importance of a steady diet of green veggies.  INR has been consistently low, so maintenance dose was increased by 8% today.           OBJECTIVE    Recent labs: (last 7 days)     20  0810   INR 1.50*       ASSESSMENT / PLAN  INR assessment SUB    Recheck INR In: 10 DAYS    INR Location Clinic      Anticoagulation Summary  As of 2020    INR goal:  2.0-2.5   TTR:  52.8 % (1 y)   INR used for dosin.50 (2020)   Warfarin maintenance plan:  1.25 mg (2.5 mg x 0.5) every Thu; 2.5 mg (2.5 mg x 1) all other days   Full warfarin instructions:  : 3.75 mg; Otherwise 1.25 mg every Thu; 2.5 mg all other days   Weekly warfarin total:  16.25 mg   Plan last modified:  Joanne Bustos RN (2020)   Next INR check:  2020   Priority:  High   Target end date:  Indefinite    Indications    Long-term (current) use of anticoagulants [Z79.01] [Z79.01]  Atrial fibrillation (H) [I48.91]  Persistent atrial fibrillation (H) [I48.19]             Anticoagulation Episode Summary     INR check location:      Preferred lab:      Send INR reminders to:  [facility]    Comments:        Anticoagulation Care Providers     Provider Role Specialty Phone number    Kushal Valentin MD Referring Family Medicine [phone]            See the Encounter Report to  view Anticoagulation Flowsheet and Dosing Calendar (Go to Encounters tab in chart review, and find the Anticoagulation Therapy Visit)    Dosage adjustment made based on physician directed care plan.    Joanne Bustos RN

## 2020-12-02 ENCOUNTER — ANTICOAGULATION THERAPY VISIT (OUTPATIENT)
Dept: ANTICOAGULATION | Facility: CLINIC | Age: 85
End: 2020-12-02

## 2020-12-02 ENCOUNTER — TELEPHONE (OUTPATIENT)
Dept: FAMILY MEDICINE | Facility: CLINIC | Age: 85
End: 2020-12-02

## 2020-12-02 DIAGNOSIS — I48.0 PAROXYSMAL ATRIAL FIBRILLATION (H): ICD-10-CM

## 2020-12-02 DIAGNOSIS — I48.19 PERSISTENT ATRIAL FIBRILLATION (H): ICD-10-CM

## 2020-12-02 DIAGNOSIS — I48.91 ATRIAL FIBRILLATION (H): ICD-10-CM

## 2020-12-02 DIAGNOSIS — Z79.01 LONG TERM CURRENT USE OF ANTICOAGULANT THERAPY: ICD-10-CM

## 2020-12-02 DIAGNOSIS — Z79.01 LONG TERM CURRENT USE OF ANTICOAGULANTS WITH INR GOAL OF 2.0-3.0: ICD-10-CM

## 2020-12-02 LAB
CAPILLARY BLOOD COLLECTION: NORMAL
INR PPP: 2.2 (ref 0.86–1.14)

## 2020-12-02 PROCEDURE — 36416 COLLJ CAPILLARY BLOOD SPEC: CPT | Performed by: FAMILY MEDICINE

## 2020-12-02 PROCEDURE — 99207 PR NO CHARGE NURSE ONLY: CPT

## 2020-12-02 PROCEDURE — 85610 PROTHROMBIN TIME: CPT | Performed by: FAMILY MEDICINE

## 2020-12-02 RX ORDER — WARFARIN SODIUM 2.5 MG/1
TABLET ORAL
Qty: 30 TABLET | Refills: 11 | Status: SHIPPED | OUTPATIENT
Start: 2020-12-02 | End: 2021-11-18

## 2020-12-02 NOTE — PROGRESS NOTES
ANTICOAGULATION FOLLOW-UP CLINIC VISIT    Patient Name:  Nathen Gage  Date:  2020  Contact Type:  Telephone/ discussed with patient    SUBJECTIVE:  Patient Findings     Comments:  The patient was assessed for diet, medication, and activity level changes, missed or extra doses, bruising or bleeding, with no problem findings.          Clinical Outcomes     Negatives:  Major bleeding event, Thromboembolic event, Anticoagulation-related hospital admission, Anticoagulation-related ED visit, Anticoagulation-related fatality    Comments:  The patient was assessed for diet, medication, and activity level changes, missed or extra doses, bruising or bleeding, with no problem findings.             OBJECTIVE    Recent labs: (last 7 days)     20  1411   INR 2.20*       ASSESSMENT / PLAN  INR assessment THER    Recheck INR In: 3 WEEKS    INR Location Clinic      Anticoagulation Summary  As of 2020    INR goal:  2.0-2.5   TTR:  53.7 % (1 y)   INR used for dosin.20 (2020)   Warfarin maintenance plan:  1.25 mg (2.5 mg x 0.5) every Thu; 2.5 mg (2.5 mg x 1) all other days   Full warfarin instructions:  1.25 mg every Thu; 2.5 mg all other days   Weekly warfarin total:  16.25 mg   No change documented:  Joanne Bustos RN   Plan last modified:  Joanne Bustos RN (2020)   Next INR check:  2020   Priority:  High   Target end date:  Indefinite    Indications    Long-term (current) use of anticoagulants [Z79.01] [Z79.01]  Atrial fibrillation (H) [I48.91]  Persistent atrial fibrillation (H) [I48.19]             Anticoagulation Episode Summary     INR check location:      Preferred lab:      Send INR reminders to:  [facility] [place]    Comments:        Anticoagulation Care Providers     Provider Role Specialty Phone number    Kushal Valentin MD Referring Family Medicine [phone]            See the Encounter Report to view Anticoagulation Flowsheet and Dosing Calendar (Go to Encounters  tab in chart review, and find the Anticoagulation Therapy Visit)    Dosage adjustment made based on physician directed care plan.    Joanne Bustos RN

## 2020-12-22 ENCOUNTER — ANTICOAGULATION THERAPY VISIT (OUTPATIENT)
Dept: FAMILY MEDICINE | Facility: CLINIC | Age: 85
End: 2020-12-22

## 2020-12-22 DIAGNOSIS — Z79.01 LONG TERM CURRENT USE OF ANTICOAGULANT THERAPY: ICD-10-CM

## 2020-12-22 DIAGNOSIS — I48.91 ATRIAL FIBRILLATION (H): ICD-10-CM

## 2020-12-22 DIAGNOSIS — I48.19 PERSISTENT ATRIAL FIBRILLATION (H): ICD-10-CM

## 2020-12-22 LAB
CAPILLARY BLOOD COLLECTION: NORMAL
INR PPP: 2 (ref 0.86–1.14)

## 2020-12-22 PROCEDURE — 36416 COLLJ CAPILLARY BLOOD SPEC: CPT | Performed by: FAMILY MEDICINE

## 2020-12-22 PROCEDURE — 99207 PR NO CHARGE NURSE ONLY: CPT | Performed by: FAMILY MEDICINE

## 2020-12-22 PROCEDURE — 85610 PROTHROMBIN TIME: CPT | Performed by: FAMILY MEDICINE

## 2020-12-22 NOTE — PROGRESS NOTES
Anticoagulation Management    Unable to reach Neal today.    Today's INR result of 2.0 is therapeutic (goal INR of 2.0-2.5).  Result received from: Clinic Lab    Follow up required to confirm warfarin dose taken and assess for changes    No answer. Left vm to call [phone]. Can transfer to [facility] [place] [place] at [phone]. Left message to continue current dose of warfarin 2.5 mg tonight.     Anticoagulation clinic to follow up    Shawna Sellers RN

## 2020-12-23 NOTE — PROGRESS NOTES
Tried calling again.  Unable to leave message since voicemail is full.  Please transfer to me at [phone] if patient returns call.  Joanne Bustos RN  ANTICOAGULATION FOLLOW-UP CLINIC VISIT    Patient Name:  Nathen Gage  Date:  2020  Contact Type:  Telephone/ discussed with patient    SUBJECTIVE:  Patient Findings     Positives:  Signs/symptoms of bleeding (has had 2 nose bleeds since last check.  None in the last week.  Recommended a saline nasal spray and humidifier to help prevent nose bleeds.)    Comments:  The patient was assessed for diet, medication, and activity level changes, missed or extra doses, or bruising, with no problem findings.          Clinical Outcomes     Negatives:  Major bleeding event, Thromboembolic event, Anticoagulation-related hospital admission, Anticoagulation-related ED visit, Anticoagulation-related fatality    Comments:  The patient was assessed for diet, medication, and activity level changes, missed or extra doses, or bruising, with no problem findings.             OBJECTIVE    Recent labs: (last 7 days)     20  1227   INR 2.00*       ASSESSMENT / PLAN  INR assessment THER    Recheck INR In: 4 WEEKS    INR Location Clinic      Anticoagulation Summary  As of 2020    INR goal:  2.0-2.5   TTR:  54.3 % (1 y)   INR used for dosin.00 (2020)   Warfarin maintenance plan:  1.25 mg (2.5 mg x 0.5) every Thu; 2.5 mg (2.5 mg x 1) all other days   Full warfarin instructions:  1.25 mg every Thu; 2.5 mg all other days   Weekly warfarin total:  16.25 mg   No change documented:  Joanne Bustos RN   Plan last modified:  Joanne Bustos RN (2020)   Next INR check:  2021   Priority:  High   Target end date:  Indefinite    Indications    Long-term (current) use of anticoagulants [Z79.01] [Z79.01]  Atrial fibrillation (H) [I48.91]  Persistent atrial fibrillation (H) [I48.19]             Anticoagulation Episode Summary     INR check location:      Preferred lab:       Send INR reminders to:  [facility] [place]    Comments:        Anticoagulation Care Providers     Provider Role Specialty Phone number    Kushal Valentin MD Referring Family Medicine [phone]            See the Encounter Report to view Anticoagulation Flowsheet and Dosing Calendar (Go to Encounters tab in chart review, and find the Anticoagulation Therapy Visit)    Dosage adjustment made based on physician directed care plan.    Joanne Bustos RN

## 2021-01-06 ENCOUNTER — OFFICE VISIT (OUTPATIENT)
Dept: CARDIOLOGY | Facility: CLINIC | Age: 86
End: 2021-01-06
Attending: INTERNAL MEDICINE
Payer: COMMERCIAL

## 2021-01-06 VITALS
SYSTOLIC BLOOD PRESSURE: 138 MMHG | DIASTOLIC BLOOD PRESSURE: 78 MMHG | HEART RATE: 68 BPM | BODY MASS INDEX: 26.28 KG/M2 | HEIGHT: 68 IN | WEIGHT: 173.4 LBS

## 2021-01-06 DIAGNOSIS — I48.0 PAF (PAROXYSMAL ATRIAL FIBRILLATION) (H): ICD-10-CM

## 2021-01-06 DIAGNOSIS — I15.9 SECONDARY HYPERTENSION: ICD-10-CM

## 2021-01-06 DIAGNOSIS — I49.5 SICK SINUS SYNDROME (H): ICD-10-CM

## 2021-01-06 DIAGNOSIS — Z95.0 CARDIAC PACEMAKER IN SITU: Primary | ICD-10-CM

## 2021-01-06 LAB
MDC_IDC_LEAD_IMPLANT_DT: NORMAL
MDC_IDC_LEAD_IMPLANT_DT: NORMAL
MDC_IDC_LEAD_LOCATION: NORMAL
MDC_IDC_LEAD_LOCATION: NORMAL
MDC_IDC_LEAD_LOCATION_DETAIL_1: NORMAL
MDC_IDC_LEAD_LOCATION_DETAIL_1: NORMAL
MDC_IDC_LEAD_MFG: NORMAL
MDC_IDC_LEAD_MFG: NORMAL
MDC_IDC_LEAD_MODEL: NORMAL
MDC_IDC_LEAD_MODEL: NORMAL
MDC_IDC_LEAD_POLARITY_TYPE: NORMAL
MDC_IDC_LEAD_POLARITY_TYPE: NORMAL
MDC_IDC_LEAD_SERIAL: NORMAL
MDC_IDC_LEAD_SERIAL: NORMAL
MDC_IDC_MSMT_BATTERY_REMAINING_LONGEVITY: 48 MO
MDC_IDC_MSMT_BATTERY_STATUS: NORMAL
MDC_IDC_MSMT_LEADCHNL_RA_IMPEDANCE_VALUE: 580 OHM
MDC_IDC_MSMT_LEADCHNL_RA_PACING_THRESHOLD_AMPLITUDE: 0.5 V
MDC_IDC_MSMT_LEADCHNL_RA_PACING_THRESHOLD_PULSEWIDTH: 0.5 MS
MDC_IDC_MSMT_LEADCHNL_RA_SENSING_INTR_AMPL: 3 MV
MDC_IDC_MSMT_LEADCHNL_RV_IMPEDANCE_VALUE: 580 OHM
MDC_IDC_MSMT_LEADCHNL_RV_PACING_THRESHOLD_AMPLITUDE: 0.6 V
MDC_IDC_MSMT_LEADCHNL_RV_PACING_THRESHOLD_PULSEWIDTH: 0.4 MS
MDC_IDC_MSMT_LEADCHNL_RV_SENSING_INTR_AMPL: 9.8 MV
MDC_IDC_PG_IMPLANT_DTM: NORMAL
MDC_IDC_PG_MFG: NORMAL
MDC_IDC_PG_MODEL: NORMAL
MDC_IDC_PG_SERIAL: NORMAL
MDC_IDC_PG_TYPE: NORMAL
MDC_IDC_SESS_CLINIC_NAME: NORMAL
MDC_IDC_SESS_DTM: NORMAL
MDC_IDC_SESS_TYPE: NORMAL
MDC_IDC_SET_BRADY_AT_MODE_SWITCH_MODE: NORMAL
MDC_IDC_SET_BRADY_AT_MODE_SWITCH_RATE: 170 {BEATS}/MIN
MDC_IDC_SET_BRADY_HYSTRATE: NORMAL
MDC_IDC_SET_BRADY_LOWRATE: 60 {BEATS}/MIN
MDC_IDC_SET_BRADY_MAX_SENSOR_RATE: 120 {BEATS}/MIN
MDC_IDC_SET_BRADY_MAX_TRACKING_RATE: 120 {BEATS}/MIN
MDC_IDC_SET_BRADY_MODE: NORMAL
MDC_IDC_SET_BRADY_PAV_DELAY_HIGH: 200 MS
MDC_IDC_SET_BRADY_PAV_DELAY_LOW: 300 MS
MDC_IDC_SET_BRADY_SAV_DELAY_HIGH: 200 MS
MDC_IDC_SET_BRADY_SAV_DELAY_LOW: 300 MS
MDC_IDC_SET_LEADCHNL_RA_PACING_AMPLITUDE: 2 V
MDC_IDC_SET_LEADCHNL_RA_PACING_CAPTURE_MODE: NORMAL
MDC_IDC_SET_LEADCHNL_RA_PACING_POLARITY: NORMAL
MDC_IDC_SET_LEADCHNL_RA_PACING_PULSEWIDTH: 0.5 MS
MDC_IDC_SET_LEADCHNL_RA_SENSING_ADAPTATION_MODE: NORMAL
MDC_IDC_SET_LEADCHNL_RA_SENSING_POLARITY: NORMAL
MDC_IDC_SET_LEADCHNL_RA_SENSING_SENSITIVITY: 0.5 MV
MDC_IDC_SET_LEADCHNL_RV_PACING_AMPLITUDE: 1.2 V
MDC_IDC_SET_LEADCHNL_RV_PACING_CAPTURE_MODE: NORMAL
MDC_IDC_SET_LEADCHNL_RV_PACING_POLARITY: NORMAL
MDC_IDC_SET_LEADCHNL_RV_PACING_PULSEWIDTH: 0.4 MS
MDC_IDC_SET_LEADCHNL_RV_SENSING_ADAPTATION_MODE: NORMAL
MDC_IDC_SET_LEADCHNL_RV_SENSING_POLARITY: NORMAL
MDC_IDC_SET_LEADCHNL_RV_SENSING_SENSITIVITY: 2.5 MV
MDC_IDC_STAT_AT_DTM_END: NORMAL
MDC_IDC_STAT_AT_DTM_START: NORMAL
MDC_IDC_STAT_AT_MODE_SW_COUNT: 0
MDC_IDC_STAT_AT_MODE_SW_MAX_DURATION: 0 S
MDC_IDC_STAT_BRADY_DTM_END: NORMAL
MDC_IDC_STAT_BRADY_DTM_START: NORMAL
MDC_IDC_STAT_BRADY_RA_PERCENT_PACED: 24 %
MDC_IDC_STAT_BRADY_RV_PERCENT_PACED: 0 %
MDC_IDC_STAT_EPISODE_RECENT_COUNT: 0
MDC_IDC_STAT_EPISODE_RECENT_COUNT_DTM_END: NORMAL
MDC_IDC_STAT_EPISODE_RECENT_COUNT_DTM_START: NORMAL
MDC_IDC_STAT_EPISODE_TYPE: NORMAL

## 2021-01-06 PROCEDURE — 99214 OFFICE O/P EST MOD 30 MIN: CPT | Mod: 25 | Performed by: NURSE PRACTITIONER

## 2021-01-06 PROCEDURE — 93280 PM DEVICE PROGR EVAL DUAL: CPT | Performed by: INTERNAL MEDICINE

## 2021-01-06 RX ORDER — LOSARTAN POTASSIUM 100 MG/1
100 TABLET ORAL DAILY
Qty: 90 TABLET | Refills: 3 | Status: SHIPPED | OUTPATIENT
Start: 2021-01-06 | End: 2022-01-17

## 2021-01-06 SDOH — HEALTH STABILITY: MENTAL HEALTH: HOW OFTEN DO YOU HAVE A DRINK CONTAINING ALCOHOL?: NOT ASKED

## 2021-01-06 SDOH — HEALTH STABILITY: MENTAL HEALTH: HOW OFTEN DO YOU HAVE 6 OR MORE DRINKS ON ONE OCCASION?: NOT ASKED

## 2021-01-06 SDOH — HEALTH STABILITY: MENTAL HEALTH: HOW MANY STANDARD DRINKS CONTAINING ALCOHOL DO YOU HAVE ON A TYPICAL DAY?: NOT ASKED

## 2021-01-06 ASSESSMENT — MIFFLIN-ST. JEOR: SCORE: 1443.1

## 2021-01-06 NOTE — PATIENT INSTRUCTIONS
If you have pacemaker or ICD and have questions or concerns please call the device clinic at [phone]

## 2021-01-22 ENCOUNTER — ANTICOAGULATION THERAPY VISIT (OUTPATIENT)
Dept: NURSING | Facility: CLINIC | Age: 86
End: 2021-01-22
Payer: COMMERCIAL

## 2021-01-22 DIAGNOSIS — I48.91 ATRIAL FIBRILLATION (H): ICD-10-CM

## 2021-01-22 DIAGNOSIS — Z79.01 LONG TERM CURRENT USE OF ANTICOAGULANT THERAPY: ICD-10-CM

## 2021-01-22 DIAGNOSIS — I15.9 SECONDARY HYPERTENSION: ICD-10-CM

## 2021-01-22 DIAGNOSIS — I48.19 PERSISTENT ATRIAL FIBRILLATION (H): ICD-10-CM

## 2021-01-22 LAB
CAPILLARY BLOOD COLLECTION: NORMAL
INR PPP: 2.3 (ref 0.86–1.14)

## 2021-01-22 PROCEDURE — 85610 PROTHROMBIN TIME: CPT | Performed by: NURSE PRACTITIONER

## 2021-01-22 PROCEDURE — 99207 PR NO CHARGE NURSE ONLY: CPT

## 2021-01-22 PROCEDURE — 36416 COLLJ CAPILLARY BLOOD SPEC: CPT | Performed by: NURSE PRACTITIONER

## 2021-01-22 NOTE — PROGRESS NOTES
Anticoagulation Management    Unable to reach Neal today.    Today's INR result of 2.3 is therapeutic (goal INR of 2.0-2.5).  Result received from: Clinic Lab    Follow up required to assess for changes , anymore nosebleeds?    Left message to continue current dose of warfarin 2.5 mg tonight.   Left VM to call Tara with transfer to [facility] at: [phone] on Monday,       Anticoagulation clinic to follow up    Fidelina Pizano RN    ANTICOAGULATION FOLLOW-UP CLINIC VISIT    Patient Name:  Nathen Gage  Date:  2021  Contact Type:  Telephone--spoke to patient on 21    SUBJECTIVE:  Patient Findings     Positives:  Change in health (A bit tired from ), Change in medications (Got 1st Covid-19 vaccine on 21)    Comments:  The patient was assessed for diet, medication, and activity level changes, missed or extra doses, bruising or bleeding, with no problem findings.          Clinical Outcomes     Negatives:  Major bleeding event, Thromboembolic event, Anticoagulation-related hospital admission, Anticoagulation-related ED visit, Anticoagulation-related fatality    Comments:  The patient was assessed for diet, medication, and activity level changes, missed or extra doses, bruising or bleeding, with no problem findings.             OBJECTIVE    Recent labs: (last 7 days)     21  1556   INR 2.30*       ASSESSMENT / PLAN  INR assessment THER    Recheck INR In: 3 WEEKS    INR Location Clinic      Anticoagulation Summary  As of 2021    INR goal:  2.0-2.5   TTR:  60.3 % (1 y)   INR used for dosin.30 (2021)   Warfarin maintenance plan:  1.25 mg (2.5 mg x 0.5) every Thu; 2.5 mg (2.5 mg x 1) all other days   Full warfarin instructions:  1.25 mg every Thu; 2.5 mg all other days   Weekly warfarin total:  16.25 mg   Plan last modified:  Fidelina Pizano RN (2021)   Next INR check:  2021   Priority:  Maintenance   Target end date:  Indefinite    Indications    Long-term (current) use of  anticoagulants [Z79.01] [Z79.01]  Atrial fibrillation (H) [I48.91]  Persistent atrial fibrillation (H) [I48.19]             Anticoagulation Episode Summary     INR check location:      Preferred lab:      [facility] [place]    Comments:        Anticoagulation Care Providers     Provider Role Specialty Phone number    Kushal Valentin MD Referring Family Medicine [phone]            See the Encounter Report to view Anticoagulation Flowsheet and Dosing Calendar (Go to Encounters tab in chart review, and find the Anticoagulation Therapy Visit)        Fidelina Pizano RN

## 2021-02-09 ENCOUNTER — ANTICOAGULATION THERAPY VISIT (OUTPATIENT)
Dept: NURSING | Facility: CLINIC | Age: 86
End: 2021-02-09

## 2021-02-09 ENCOUNTER — DOCUMENTATION ONLY (OUTPATIENT)
Dept: NURSING | Facility: CLINIC | Age: 86
End: 2021-02-09

## 2021-02-09 DIAGNOSIS — I48.91 ATRIAL FIBRILLATION (H): ICD-10-CM

## 2021-02-09 DIAGNOSIS — I48.19 PERSISTENT ATRIAL FIBRILLATION (H): ICD-10-CM

## 2021-02-09 DIAGNOSIS — Z79.01 LONG TERM CURRENT USE OF ANTICOAGULANTS WITH INR GOAL OF 2.0-3.0: ICD-10-CM

## 2021-02-09 DIAGNOSIS — Z79.01 LONG TERM CURRENT USE OF ANTICOAGULANT THERAPY: ICD-10-CM

## 2021-02-09 DIAGNOSIS — I48.91 ATRIAL FIBRILLATION (H): Primary | ICD-10-CM

## 2021-02-09 LAB — INR PPP: 2.2 (ref 0.86–1.14)

## 2021-02-09 PROCEDURE — 99207 PR NO CHARGE NURSE ONLY: CPT

## 2021-02-09 PROCEDURE — 85610 PROTHROMBIN TIME: CPT | Performed by: FAMILY MEDICINE

## 2021-02-09 PROCEDURE — 36416 COLLJ CAPILLARY BLOOD SPEC: CPT | Performed by: FAMILY MEDICINE

## 2021-02-09 NOTE — PROGRESS NOTES
Correction to incomplete note below:  Patient reported on 01/22/21 that he was a bit tired from 1st Covid injection given on 01/23/21.    Fidelina Pizano RN

## 2021-02-09 NOTE — PROGRESS NOTES
ANTICOAGULATION MANAGEMENT      Nathen Gage due for annual renewal of referral to anticoagulation monitoring. Order pended for your review and signature.      ANTICOAGULATION SUMMARY      Warfarin indication(s)     Atrial fibrillation    Heart valve present?  NO       Current goal range   INR: 2.0-2.5     Goal appropriate for indication? cardiologist reduced INR goal range to 2.0-2.5 due to chroinc nosebleeds (changed in 2017)     Current duration of therapy Indefinite/long term therapy   Time in Therapeutic Range (TTR)  (Goal > 60%) 62 %       Office visit with referring provider's group within last year yes on 08/12/2020       Fidelina Pizano RN

## 2021-02-09 NOTE — PROGRESS NOTES
ANTICOAGULATION FOLLOW-UP CLINIC VISIT    Patient Name:  Nathen Gage  Date:  2021  Contact Type:  Telephone    SUBJECTIVE:  Patient Findings     Positives:  Signs/symptoms of bleeding (1 nosebleed about 3 weeks ago, after last INR visit on 21)        Clinical Outcomes     Negatives:  Major bleeding event, Thromboembolic event, Anticoagulation-related hospital admission, Anticoagulation-related ED visit, Anticoagulation-related fatality           OBJECTIVE    Recent labs: (last 7 days)     21  1153   INR 2.20*       ASSESSMENT / PLAN  INR assessment THER    Recheck INR In: 3 WEEKS    INR Location Clinic      Anticoagulation Summary  As of 2021    INR goal:  2.0-2.5   TTR:  62.4 % (1 y)   INR used for dosin.20 (2021)   Warfarin maintenance plan:  1.25 mg (2.5 mg x 0.5) every Thu; 2.5 mg (2.5 mg x 1) all other days   Full warfarin instructions:  1.25 mg every Thu; 2.5 mg all other days   Weekly warfarin total:  16.25 mg   No change documented:  Fidelina Pizano RN   Plan last modified:  Fidelina Pizano RN (2021)   Next INR check:  3/2/2021   Priority:  Maintenance   Target end date:  Indefinite    Indications    Long-term (current) use of anticoagulants [Z79.01] [Z79.01]  Atrial fibrillation (H) [I48.91]  Persistent atrial fibrillation (H) [I48.19]  Long term current use of anticoagulants with INR goal of 2.0-3.0 [Z79.01]             Anticoagulation Episode Summary     INR check location:      Preferred lab:      Send INR reminders to:  [facility]    Comments:        Anticoagulation Care Providers     Provider Role Specialty Phone number    Kushal Valentin MD Referring Family Medicine [phone]            See the Encounter Report to view Anticoagulation Flowsheet and Dosing Calendar (Go to Encounters tab in chart review, and find the Anticoagulation Therapy Visit)        Fidelina Pizano RN

## 2021-03-02 ENCOUNTER — ANTICOAGULATION THERAPY VISIT (OUTPATIENT)
Dept: FAMILY MEDICINE | Facility: CLINIC | Age: 86
End: 2021-03-02

## 2021-03-02 DIAGNOSIS — Z79.01 LONG TERM CURRENT USE OF ANTICOAGULANTS WITH INR GOAL OF 2.0-3.0: ICD-10-CM

## 2021-03-02 DIAGNOSIS — I48.19 PERSISTENT ATRIAL FIBRILLATION (H): ICD-10-CM

## 2021-03-02 DIAGNOSIS — Z79.01 LONG TERM CURRENT USE OF ANTICOAGULANT THERAPY: ICD-10-CM

## 2021-03-02 DIAGNOSIS — I48.91 ATRIAL FIBRILLATION (H): ICD-10-CM

## 2021-03-02 LAB
CAPILLARY BLOOD COLLECTION: NORMAL
INR PPP: 1.7 (ref 0.86–1.14)

## 2021-03-02 PROCEDURE — 99207 PR NO CHARGE NURSE ONLY: CPT | Performed by: FAMILY MEDICINE

## 2021-03-02 PROCEDURE — 85610 PROTHROMBIN TIME: CPT | Performed by: FAMILY MEDICINE

## 2021-03-02 PROCEDURE — 36416 COLLJ CAPILLARY BLOOD SPEC: CPT | Performed by: FAMILY MEDICINE

## 2021-03-02 NOTE — PROGRESS NOTES
ANTICOAGULATION FOLLOW-UP     Patient Name:  Nathen Gage  Date:  3/2/2021  Contact Type:  Telephone    SUBJECTIVE:  Patient Findings     Comments:  Reviewed previous warfarin dosing with patient.  He took warfarin as instructed.  No missed doses,   No increased intake of green vegetables,   No medication changes,   No bleeding/bruising,   No signs/symptoms of a clot.  Patient denies any identifiable changes that caused the INR to be out of therapeutic range.    INR is subtherapeutic today.   Patient will take 3.75 mg today, then resume maintenance dose.   Follow up in 2 weeks.           Clinical Outcomes     Comments:  Reviewed previous warfarin dosing with patient.  He took warfarin as instructed.  No missed doses,   No increased intake of green vegetables,   No medication changes,   No bleeding/bruising,   No signs/symptoms of a clot.  Patient denies any identifiable changes that caused the INR to be out of therapeutic range.    INR is subtherapeutic today.   Patient will take 3.75 mg today, then resume maintenance dose.   Follow up in 2 weeks.              OBJECTIVE    Recent labs: (last 7 days)     21  0913   INR 1.70*       ASSESSMENT / PLAN  INR assessment SUB    Recheck INR In: 2 WEEKS    INR Location Clinic lab     Anticoagulation Summary  As of 3/2/2021    INR goal:  2.0-2.5   TTR:  58.9 % (1 y)   INR used for dosin.70 (3/2/2021)   Warfarin maintenance plan:  1.25 mg (2.5 mg x 0.5) every Thu; 2.5 mg (2.5 mg x 1) all other days   Full warfarin instructions:  3/2: 3.75 mg; Otherwise 1.25 mg every Thu; 2.5 mg all other days   Weekly warfarin total:  16.25 mg   Plan last modified:  Fidelina Pizano RN (2021)   Next INR check:  3/23/2021   Priority:  Maintenance   Target end date:  Indefinite    Indications    Long-term (current) use of anticoagulants [Z79.01] [Z79.01]  Atrial fibrillation (H) [I48.91]  Persistent atrial fibrillation (H) [I48.19]  Long term current use of anticoagulants with INR  goal of 2.0-3.0 [Z79.01]             Anticoagulation Episode Summary     INR check location:      Preferred lab:      Send INR reminders to:  [facility] [place]    Comments:        Anticoagulation Care Providers     Provider Role Specialty Phone number    Kushal Valentin MD Referring Family Medicine [phone]            See the Encounter Report to view Anticoagulation Flowsheet and Dosing Calendar (Go to Encounters tab in chart review, and find the Anticoagulation Therapy Visit)        Dosage adjustment made based on physician directed care plan.      Radha Sumner RN

## 2021-03-16 ENCOUNTER — ANTICOAGULATION THERAPY VISIT (OUTPATIENT)
Dept: ANTICOAGULATION | Facility: CLINIC | Age: 86
End: 2021-03-16

## 2021-03-16 DIAGNOSIS — Z79.01 LONG TERM CURRENT USE OF ANTICOAGULANTS WITH INR GOAL OF 2.0-3.0: ICD-10-CM

## 2021-03-16 DIAGNOSIS — I48.19 PERSISTENT ATRIAL FIBRILLATION (H): ICD-10-CM

## 2021-03-16 DIAGNOSIS — I48.91 ATRIAL FIBRILLATION (H): ICD-10-CM

## 2021-03-16 DIAGNOSIS — Z79.01 LONG TERM CURRENT USE OF ANTICOAGULANT THERAPY: ICD-10-CM

## 2021-03-16 LAB
CAPILLARY BLOOD COLLECTION: NORMAL
INR PPP: 2.1 (ref 0.86–1.14)

## 2021-03-16 PROCEDURE — 36416 COLLJ CAPILLARY BLOOD SPEC: CPT | Performed by: FAMILY MEDICINE

## 2021-03-16 PROCEDURE — 85610 PROTHROMBIN TIME: CPT | Performed by: FAMILY MEDICINE

## 2021-03-16 PROCEDURE — 99207 PR NO CHARGE NURSE ONLY: CPT

## 2021-03-16 NOTE — PROGRESS NOTES
ANTICOAGULATION FOLLOW-UP CLINIC VISIT    Patient Name:  Nathen Gage  Date:  3/16/2021  Contact Type:  Telephone/ discussed with patient    SUBJECTIVE:  Patient Findings     Comments:  The patient was assessed for diet, medication, and activity level changes, missed or extra doses, bruising or bleeding, with no problem findings.          Clinical Outcomes     Negatives:  Major bleeding event, Thromboembolic event, Anticoagulation-related hospital admission, Anticoagulation-related ED visit, Anticoagulation-related fatality    Comments:  The patient was assessed for diet, medication, and activity level changes, missed or extra doses, bruising or bleeding, with no problem findings.             OBJECTIVE    Recent labs: (last 7 days)     21  1030   INR 2.10*       ASSESSMENT / PLAN  INR assessment THER    Recheck INR In: 3 WEEKS    INR Location Clinic      Anticoagulation Summary  As of 3/16/2021    INR goal:  2.0-2.5   TTR:  57.7 % (1 y)   INR used for dosin.10 (3/16/2021)   Warfarin maintenance plan:  1.25 mg (2.5 mg x 0.5) every Fri; 2.5 mg (2.5 mg x 1) all other days   Full warfarin instructions:  1.25 mg every Fri; 2.5 mg all other days   Weekly warfarin total:  16.25 mg   Plan last modified:  Joanne Bustos RN (3/16/2021)   Next INR check:  2021   Priority:  Maintenance   Target end date:  Indefinite    Indications    Long-term (current) use of anticoagulants [Z79.01] [Z79.01]  Atrial fibrillation (H) [I48.91]  Persistent atrial fibrillation (H) [I48.19]  Long term current use of anticoagulants with INR goal of 2.0-3.0 [Z79.01]             Anticoagulation Episode Summary     INR check location:      Preferred lab:      Send INR reminders to:  [facility] [place]    Comments:        Anticoagulation Care Providers     Provider Role Specialty Phone number    Kushal Valentin MD Referring Family Medicine [phone]            See the Encounter Report to view Anticoagulation Flowsheet and  Dosing Calendar (Go to Encounters tab in chart review, and find the Anticoagulation Therapy Visit)    Dosage adjustment made based on physician directed care plan.    Joanne Bustos RN

## 2021-03-29 DIAGNOSIS — N13.8 BENIGN LOCALIZED HYPERPLASIA OF PROSTATE WITH URINARY OBSTRUCTION: ICD-10-CM

## 2021-03-29 DIAGNOSIS — N40.1 BENIGN LOCALIZED HYPERPLASIA OF PROSTATE WITH URINARY OBSTRUCTION: ICD-10-CM

## 2021-03-29 RX ORDER — DOXAZOSIN 4 MG/1
TABLET ORAL
Qty: 90 TABLET | Refills: 1 | Status: SHIPPED | OUTPATIENT
Start: 2021-03-29 | End: 2022-03-21

## 2021-04-06 ENCOUNTER — ANTICOAGULATION THERAPY VISIT (OUTPATIENT)
Dept: NURSING | Facility: CLINIC | Age: 86
End: 2021-04-06

## 2021-04-06 DIAGNOSIS — I48.91 ATRIAL FIBRILLATION (H): ICD-10-CM

## 2021-04-06 DIAGNOSIS — Z79.01 LONG TERM CURRENT USE OF ANTICOAGULANTS WITH INR GOAL OF 2.0-3.0: ICD-10-CM

## 2021-04-06 DIAGNOSIS — Z79.01 LONG TERM CURRENT USE OF ANTICOAGULANT THERAPY: ICD-10-CM

## 2021-04-06 DIAGNOSIS — I48.19 PERSISTENT ATRIAL FIBRILLATION (H): ICD-10-CM

## 2021-04-06 LAB
CAPILLARY BLOOD COLLECTION: NORMAL
INR PPP: 1.6 (ref 0.86–1.14)

## 2021-04-06 PROCEDURE — 36416 COLLJ CAPILLARY BLOOD SPEC: CPT | Performed by: FAMILY MEDICINE

## 2021-04-06 PROCEDURE — 99207 PR NO CHARGE NURSE ONLY: CPT

## 2021-04-06 PROCEDURE — 85610 PROTHROMBIN TIME: CPT | Performed by: FAMILY MEDICINE

## 2021-04-06 NOTE — PROGRESS NOTES
"ANTICOAGULATION FOLLOW-UP CLINIC VISIT    Patient Name:  Nathen Gage  Date:  2021  Contact Type:  Telephone    SUBJECTIVE:  Patient Findings     Positives:  Change in diet/appetite (Pt states he ate \"too many\" greens this past week, I encouraged consistency.)        Clinical Outcomes     Negatives:  Major bleeding event, Thromboembolic event, Anticoagulation-related hospital admission, Anticoagulation-related ED visit, Anticoagulation-related fatality           OBJECTIVE    Recent labs: (last 7 days)     21  0915   INR 1.60*       ASSESSMENT / PLAN  INR assessment SUB    Recheck INR In: 2 WEEKS    INR Location Clinic      Anticoagulation Summary  As of 2021    INR goal:  2.0-2.5   TTR:  56.7 % (1 y)   INR used for dosin.60 (2021)   Warfarin maintenance plan:  1.25 mg (2.5 mg x 0.5) every Fri; 2.5 mg (2.5 mg x 1) all other days   Full warfarin instructions:  : 3.75 mg; Otherwise 1.25 mg every Fri; 2.5 mg all other days   Weekly warfarin total:  16.25 mg   Plan last modified:  Joanne Bustos RN (3/16/2021)   Next INR check:  2021   Priority:  High   Target end date:  Indefinite    Indications    Long-term (current) use of anticoagulants [Z79.01] [Z79.01]  Atrial fibrillation (H) [I48.91]  Persistent atrial fibrillation (H) [I48.19]  Long term current use of anticoagulants with INR goal of 2.0-3.0 [Z79.01]             Anticoagulation Episode Summary     INR check location:      Preferred lab:      Send INR reminders to:  [facility]    Comments:        Anticoagulation Care Providers     Provider Role Specialty Phone number    Kushal Valentin MD Referring Family Medicine [phone]            See the Encounter Report to view Anticoagulation Flowsheet and Dosing Calendar (Go to Encounters tab in chart review, and find the Anticoagulation Therapy Visit)        Fidelina Pizano RN                 "

## 2021-04-07 ENCOUNTER — ANCILLARY PROCEDURE (OUTPATIENT)
Dept: CARDIOLOGY | Facility: CLINIC | Age: 86
End: 2021-04-07
Attending: INTERNAL MEDICINE
Payer: COMMERCIAL

## 2021-04-07 DIAGNOSIS — Z95.0 CARDIAC PACEMAKER IN SITU: ICD-10-CM

## 2021-04-07 DIAGNOSIS — I49.5 SICK SINUS SYNDROME (H): ICD-10-CM

## 2021-04-07 PROCEDURE — 93293 PM PHONE R-STRIP DEVICE EVAL: CPT | Performed by: INTERNAL MEDICINE

## 2021-04-20 ENCOUNTER — ANTICOAGULATION THERAPY VISIT (OUTPATIENT)
Dept: NURSING | Facility: CLINIC | Age: 86
End: 2021-04-20

## 2021-04-20 DIAGNOSIS — I48.19 PERSISTENT ATRIAL FIBRILLATION (H): ICD-10-CM

## 2021-04-20 DIAGNOSIS — Z79.01 LONG TERM CURRENT USE OF ANTICOAGULANTS WITH INR GOAL OF 2.0-3.0: ICD-10-CM

## 2021-04-20 DIAGNOSIS — I48.91 ATRIAL FIBRILLATION (H): ICD-10-CM

## 2021-04-20 DIAGNOSIS — Z79.01 LONG TERM CURRENT USE OF ANTICOAGULANT THERAPY: ICD-10-CM

## 2021-04-20 LAB
CAPILLARY BLOOD COLLECTION: NORMAL
INR PPP: 2.2 (ref 0.86–1.14)

## 2021-04-20 PROCEDURE — 99207 PR NO CHARGE NURSE ONLY: CPT

## 2021-04-20 PROCEDURE — 36416 COLLJ CAPILLARY BLOOD SPEC: CPT | Performed by: FAMILY MEDICINE

## 2021-04-20 PROCEDURE — 85610 PROTHROMBIN TIME: CPT | Performed by: FAMILY MEDICINE

## 2021-04-20 NOTE — PROGRESS NOTES
ANTICOAGULATION FOLLOW-UP CLINIC VISIT    Patient Name:  Nathen Gage  Date:  2021  Contact Type:  Telephone    SUBJECTIVE:  Patient Findings     Positives:  Signs/symptoms of bleeding (2 nosebleeds 2 weeks ago, they did not last long), Change in diet/appetite (NO change, eating small portion of greens everyday, I encouraged consistency.)        Clinical Outcomes     Negatives:  Major bleeding event, Thromboembolic event, Anticoagulation-related hospital admission, Anticoagulation-related ED visit, Anticoagulation-related fatality           OBJECTIVE    Recent labs: (last 7 days)     21  1136   INR 2.20*       ASSESSMENT / PLAN  INR assessment THER    Recheck INR In: 2 WEEKS    INR Location Clinic      Anticoagulation Summary  As of 2021    INR goal:  2.0-2.5   TTR:  57.6 % (1 y)   INR used for dosin.20 (2021)   Warfarin maintenance plan:  1.25 mg (2.5 mg x 0.5) every Fri; 2.5 mg (2.5 mg x 1) all other days   Full warfarin instructions:  1.25 mg every Fri; 2.5 mg all other days   Weekly warfarin total:  16.25 mg   Plan last modified:  Fidelina Pizano RN (2021)   Next INR check:  2021   Priority:  High   Target end date:  Indefinite    Indications    Long-term (current) use of anticoagulants [Z79.01] [Z79.01]  Atrial fibrillation (H) [I48.91]  Persistent atrial fibrillation (H) [I48.19]  Long term current use of anticoagulants with INR goal of 2.0-3.0 [Z79.01]             Anticoagulation Episode Summary     INR check location:      Preferred lab:      Send INR reminders to:  [facility]    Comments:        Anticoagulation Care Providers     Provider Role Specialty Phone number    Kushal Valentin MD Referring Family Medicine [phone]            See the Encounter Report to view Anticoagulation Flowsheet and Dosing Calendar (Go to Encounters tab in chart review, and find the Anticoagulation Therapy Visit)        Fidelina Pizano RN

## 2021-04-30 ENCOUNTER — OFFICE VISIT (OUTPATIENT)
Dept: FAMILY MEDICINE | Facility: CLINIC | Age: 86
End: 2021-04-30
Payer: COMMERCIAL

## 2021-04-30 ENCOUNTER — ANCILLARY PROCEDURE (OUTPATIENT)
Dept: GENERAL RADIOLOGY | Facility: CLINIC | Age: 86
End: 2021-04-30
Attending: PHYSICIAN ASSISTANT
Payer: COMMERCIAL

## 2021-04-30 VITALS
TEMPERATURE: 97.7 F | RESPIRATION RATE: 16 BRPM | OXYGEN SATURATION: 98 % | HEART RATE: 68 BPM | BODY MASS INDEX: 26.88 KG/M2 | SYSTOLIC BLOOD PRESSURE: 118 MMHG | WEIGHT: 174.2 LBS | DIASTOLIC BLOOD PRESSURE: 60 MMHG

## 2021-04-30 DIAGNOSIS — M22.2X1 PATELLOFEMORAL SYNDROME OF BOTH KNEES: ICD-10-CM

## 2021-04-30 DIAGNOSIS — M25.562 ACUTE PAIN OF LEFT KNEE: Primary | ICD-10-CM

## 2021-04-30 DIAGNOSIS — M25.562 ACUTE PAIN OF LEFT KNEE: ICD-10-CM

## 2021-04-30 DIAGNOSIS — M22.2X2 PATELLOFEMORAL SYNDROME OF BOTH KNEES: ICD-10-CM

## 2021-04-30 PROCEDURE — 73562 X-RAY EXAM OF KNEE 3: CPT | Mod: LT | Performed by: RADIOLOGY

## 2021-04-30 PROCEDURE — 99213 OFFICE O/P EST LOW 20 MIN: CPT | Performed by: PHYSICIAN ASSISTANT

## 2021-04-30 ASSESSMENT — PAIN SCALES - GENERAL: PAINLEVEL: SEVERE PAIN (6)

## 2021-04-30 NOTE — PATIENT INSTRUCTIONS
Apply Voltaren gel 4 times a day to help with pain and swelling.    Continue to wear knee brace.    Apply ice 2-3 times a day for 20 minutes at a time.    Consider physical therapy versus orthopedics consult. Call if you want a referral for either of these.

## 2021-04-30 NOTE — PROGRESS NOTES
"    Assessment & Plan     Acute pain of left knee    Patellofemoral syndrome as below.    - XR Knee Left 3 Views; Future      Patellofemoral syndrome of both knees    Will start with Voltaren since it wont interfere with his warfarin. Continue to wear the brace and ice. Consider PT or ortho based on patient preference if not improving in 1-2 weeks. Can call for referral.    - diclofenac (VOLTAREN) 1 % topical gel; Apply 4 g topically 4 times daily        {Provider  Link to [facility] Help Grid :925075}     BMI:   Estimated body mass index is 26.88 kg/m  as calculated from the following:    Height as of 1/6/21: 1.715 m (5' 7.5\").    Weight as of this encounter: 79 kg (174 lb 3.2 oz).       Patient Instructions   Apply Voltaren gel 4 times a day to help with pain and swelling.    Continue to wear knee brace.    Apply ice 2-3 times a day for 20 minutes at a time.    Consider physical therapy versus orthopedics consult. Call if you want a referral for either of these.       No follow-ups on file.    Dontae Champion PA-C  [facility] PIOTR De Jesus is a 85 year old who presents for the following health issues     HPI     Initially had right knee pain which has since resolved.     Musculoskeletal problem/pain  Onset/Duration: Ongoing for about 3 Days  Description  Location: Left Knee  Joint Swelling: no  Redness: no  Pain: YES- Dull Ache with Sharp Pain Upon knee giving out  Warmth: no  Intensity:  6/10  Progression of Symptoms:  same  Accompanying signs and symptoms:   Fevers: no  Numbness/tingling/weakness: YES- Intermittent ache in whole leg   History  Trauma to the area: YES- Was working on [employer] and was kneeling on [employer] (with knee pads) a lot.   Recent illness:  no  Previous similar problem: no  Previous evaluation:  no  Precipitating or alleviating factors:  Aggravating factors include: climbing stairs  Therapies tried and outcome: Using a knee sleeve which has been beneficial. "       Review of Systems   Constitutional, HEENT, cardiovascular, pulmonary, gi and gu systems are negative, except as otherwise noted.        Objective    /60 (BP Location: Right arm, Patient Position: Chair, Cuff Size: Adult Large)   Pulse 68   Temp 97.7  F (36.5  C) (Oral)   Resp 16   Wt 79 kg (174 lb 3.2 oz)   SpO2 98%   BMI 26.88 kg/m    Body mass index is 26.88 kg/m .         Physical Exam   GENERAL: healthy, alert and no distress  EYES: Eyes grossly normal to inspection, PERRL and conjunctivae and sclerae normal  MS: no gross musculoskeletal defects noted, no edema  SKIN: no suspicious lesions or rashes  NEURO: Normal strength and tone, mentation intact and speech normal  PSYCH: mentation appears normal, affect normal/bright  Left knee: There is no erythema, edema, or ecchymosis. Non-tender to palpation. Mild crepitus with ROM. ROM intact. Negative anterior and posterior drawer tests. Negative varus and valgus stress tests. Negative modified Brian.       Xray - Reviewed and interpreted by me.  Patellofemoral degenerative changes but otherwise normal.

## 2021-05-04 ENCOUNTER — ANTICOAGULATION THERAPY VISIT (OUTPATIENT)
Dept: NURSING | Facility: CLINIC | Age: 86
End: 2021-05-04

## 2021-05-04 DIAGNOSIS — Z79.01 LONG TERM CURRENT USE OF ANTICOAGULANT THERAPY: ICD-10-CM

## 2021-05-04 DIAGNOSIS — Z79.01 LONG TERM CURRENT USE OF ANTICOAGULANTS WITH INR GOAL OF 2.0-3.0: ICD-10-CM

## 2021-05-04 DIAGNOSIS — I48.19 PERSISTENT ATRIAL FIBRILLATION (H): ICD-10-CM

## 2021-05-04 DIAGNOSIS — I48.91 ATRIAL FIBRILLATION (H): ICD-10-CM

## 2021-05-04 LAB
CAPILLARY BLOOD COLLECTION: NORMAL
INR PPP: 2.2 (ref 0.86–1.14)

## 2021-05-04 PROCEDURE — 99207 PR NO CHARGE NURSE ONLY: CPT

## 2021-05-04 PROCEDURE — 36416 COLLJ CAPILLARY BLOOD SPEC: CPT | Performed by: FAMILY MEDICINE

## 2021-05-04 PROCEDURE — 85610 PROTHROMBIN TIME: CPT | Performed by: FAMILY MEDICINE

## 2021-05-04 NOTE — PROGRESS NOTES
ANTICOAGULATION FOLLOW-UP CLINIC VISIT    Patient Name:  Nathen Gage  Date:  2021  Contact Type:  Telephone    SUBJECTIVE:  Patient Findings     Positives:  Signs/symptoms of bleeding (1 nosebleed last week that lasted about 5 minutes.), Change in health (Pt was seen on 21 for acute L knee pain from gardening--pt is icing and wearing brace intermittently.), Change in medications (Started using Voltaren topical on 21 for L knee pain--pt using 4 times per day with some relief.)    Comments:  Patient will be traveling so next INR will be in 3 weeks.        Clinical Outcomes     Negatives:  Major bleeding event, Thromboembolic event, Anticoagulation-related hospital admission, Anticoagulation-related ED visit, Anticoagulation-related fatality    Comments:  Patient will be traveling so next INR will be in 3 weeks.           OBJECTIVE    Recent labs: (last 7 days)     21  1037   INR 2.20*       ASSESSMENT / PLAN  INR assessment THER    Recheck INR In: 3 WEEKS    INR Location Clinic      Anticoagulation Summary  As of 2021    INR goal:  2.0-2.5   TTR:  60.6 % (1 y)   INR used for dosin.20 (2021)   Warfarin maintenance plan:  1.25 mg (2.5 mg x 0.5) every Fri; 2.5 mg (2.5 mg x 1) all other days   Full warfarin instructions:  1.25 mg every Fri; 2.5 mg all other days   Weekly warfarin total:  16.25 mg   Plan last modified:  Fidelina Pizano RN (2021)   Next INR check:  2021   Priority:  Maintenance   Target end date:  Indefinite    Indications    Long-term (current) use of anticoagulants [Z79.01] [Z79.01]  Atrial fibrillation (H) [I48.91]  Persistent atrial fibrillation (H) [I48.19]  Long term current use of anticoagulants with INR goal of 2.0-3.0 [Z79.01]             Anticoagulation Episode Summary     INR check location:      Preferred lab:      Send INR reminders to:  [facility]    Comments:        Anticoagulation Care Providers     Provider Role Specialty Phone number     Kushal Valentin MD Referring Family Medicine [phone]            See the Encounter Report to view Anticoagulation Flowsheet and Dosing Calendar (Go to Encounters tab in chart review, and find the Anticoagulation Therapy Visit)        Fidelina Pizano RN

## 2021-05-25 ENCOUNTER — ANTICOAGULATION THERAPY VISIT (OUTPATIENT)
Dept: ANTICOAGULATION | Facility: CLINIC | Age: 86
End: 2021-05-25

## 2021-05-25 DIAGNOSIS — Z79.01 LONG TERM CURRENT USE OF ANTICOAGULANTS WITH INR GOAL OF 2.0-3.0: ICD-10-CM

## 2021-05-25 DIAGNOSIS — I48.19 PERSISTENT ATRIAL FIBRILLATION (H): ICD-10-CM

## 2021-05-25 DIAGNOSIS — Z79.01 LONG TERM CURRENT USE OF ANTICOAGULANT THERAPY: ICD-10-CM

## 2021-05-25 LAB — INR PPP: 2.6 (ref 0.86–1.14)

## 2021-05-25 PROCEDURE — 36416 COLLJ CAPILLARY BLOOD SPEC: CPT | Performed by: FAMILY MEDICINE

## 2021-05-25 PROCEDURE — 99207 PR NO CHARGE NURSE ONLY: CPT

## 2021-05-25 PROCEDURE — 85610 PROTHROMBIN TIME: CPT | Performed by: FAMILY MEDICINE

## 2021-05-25 NOTE — PROGRESS NOTES
ANTICOAGULATION FOLLOW-UP CLINIC VISIT    Patient Name:  Nathen Gage  Date:  2021  Contact Type:  Telephone/ Called patient, he reports a diet change, he denies any other changes. Orders discussed with the patient, he agrees with the plan. Lab INR appointment scheduled on 21.    SUBJECTIVE:  Patient Findings     Positives:  Change in diet/appetite (Patient reports he had a little less green veggies, will resume usual diet)    Comments:  The patient was assessed for medication and activity level changes, missed or extra doses, bruising or bleeding, with no problem findings. Maintenance warfarin dosing was reviewed with patient and will remain on the same dose until next INR check. Patient did not have any questions or concerns.             Clinical Outcomes     Comments:  The patient was assessed for medication and activity level changes, missed or extra doses, bruising or bleeding, with no problem findings. Maintenance warfarin dosing was reviewed with patient and will remain on the same dose until next INR check. Patient did not have any questions or concerns.                OBJECTIVE    Recent labs: (last 7 days)     21  0952   INR 2.60*       ASSESSMENT / PLAN  INR assessment SUPRA    Recheck INR In: 2 WEEKS    INR Location Outside lab      Anticoagulation Summary  As of 2021    INR goal:  2.0-2.5   TTR:  61.0 % (1 y)   INR used for dosin.60 (2021)   Warfarin maintenance plan:  1.25 mg (2.5 mg x 0.5) every Fri; 2.5 mg (2.5 mg x 1) all other days   Full warfarin instructions:  1.25 mg every Fri; 2.5 mg all other days   Weekly warfarin total:  16.25 mg   No change documented:  Teresa Castro RN   Plan last modified:  Fidelina Pizano RN (2021)   Next INR check:  2021   Priority:  Maintenance   Target end date:  Indefinite    Indications    Long-term (current) use of anticoagulants [Z79.01] [Z79.01]  Atrial fibrillation (H) [I48.91]  Persistent atrial fibrillation (H)  [I48.19]  Long term current use of anticoagulants with INR goal of 2.0-3.0 [Z79.01]             Anticoagulation Episode Summary     INR check location:      Preferred lab:      Send INR reminders to:  [facility]    Comments:        Anticoagulation Care Providers     Provider Role Specialty Phone number    Kushal Valentin MD Referring Family Medicine [phone]            See the Encounter Report to view Anticoagulation Flowsheet and Dosing Calendar (Go to Encounters tab in chart review, and find the Anticoagulation Therapy Visit)    Dosage adjustment made based on physician directed care plan.    Teresa Castro RN

## 2021-05-29 NOTE — LETTER
1/6/2021    Kushal Valentin MD  [address]  [facility] [address]    RE: Nathen Gage       Dear Colleague,    I had the pleasure of seeing Nathen Gage in the [facility].    HPI:  Nathen Gage is a 85 year old male who presents to clinic today for annual cardiology appointment.   He is a patient of Dr. Leon and has a medical history of     1. Paroxysmal atrial fibrillation  2. Sick sinus syndrome s/p [employer] dual-chamber permanent pacemaker (2011)  3. Dilated ascending aorta  4. Hypertension    Diagnostics:  Device check (1/2021) revealed A-paced 25%  : 0 %    Stable leads.  Battery life 4 years.   Atrial Arrhythmia: 2 episodes in the last year, EGMs show brief PAT <15 seconds   Ventricular Arrhythmia none.        In January 2020, patient met with Dr. Leon and was doing well and was very active with no CV symptoms    Today he reports feeling good and is activity.   Denies chest pain or pressure, headaches, dizziness, syncope, angina, dyspnea at rest or with exertion, dry cough, palpitations, orthopnea, PND, abdominal pain, abdominal edema, pedal edema, or claudication.  He states he takes his medications as prescribed including his warfarin and denies bleeding, hematuria, hematochezia, epistaxis and signs/symptoms of stroke.         ASSESSMENT AND PLAN    Paroxysmal atrial fibrillation    His device check today was reviewed and did reveal any atrial fibrillation and he is not currently on any AV node blockers.  Continues to have low A. fib burden on his device check    For anticoagulation for CHADS VASC 3 (hypertension, age++) he is currently taking warfarin with goal INR 2-3 mg.  Patient has been therapeutic 60% of the time since 10/2020.  His last hemoglobin was 15.2 (8/2020)    Sick sinus syndrome    S/p [employer] dual-chamber permanent pacemaker (2011)    Normal device check    Stable leads    Follow with the device clinic on a  945 on 6/12 or 6/19 on one of the 15 minute increments.    quarterly basis.     Hypertension    Controlled while taking losartan 100 mg daily    BMP in August was normal      Plan:  Continue current medications  Follow-up with Dr. Leon in 1 year with a device check and BMP prior as we are renewing his losartan.    30 minutes spent on the date of the encounter doing chart review, patient visit and documentation       Patient expresses understanding and agreement with the plan.     I appreciate the chance to help with Nathen CAMACHO Merari Please let me know if you have any questions or concerns.    Zuleyka Eng, APRN, CNP    This note was completed in part using Dragon voice recognition software. Although reviewed after completion, some word and grammatical errors may occur.    Orders Placed This Encounter   Procedures     Basic metabolic panel     Follow-Up with Cardiologist     Orders Placed This Encounter   Medications     Polyethylene Glycol 400 (BLINK TEARS OP)     Sig: Apply to eye as needed     losartan (COZAAR) 100 MG tablet     Sig: Take 1 tablet (100 mg) by mouth daily     Dispense:  90 tablet     Refill:  3     Medications Discontinued During This Encounter   Medication Reason     losartan (COZAAR) 100 MG tablet          Encounter Diagnoses   Name Primary?     PAF (paroxysmal atrial fibrillation) (H)      Secondary hypertension        CURRENT MEDICATIONS:  Current Outpatient Medications   Medication Sig Dispense Refill     atorvastatin (LIPITOR) 20 MG tablet TAKE 1 TABLET BY MOUTH EVERY DAY 90 tablet 2     doxazosin (CARDURA) 4 MG tablet TAKE 1 TABLET(4 MG) BY MOUTH DAILY 90 tablet 1     losartan (COZAAR) 100 MG tablet Take 1 tablet (100 mg) by mouth daily 90 tablet 3     pantoprazole (PROTONIX) 40 MG EC tablet TAKE 1 TABLET(40 MG) BY MOUTH DAILY 90 tablet 3     Polyethylene Glycol 400 (BLINK TEARS OP) Apply to eye as needed       warfarin ANTICOAGULANT (COUMADIN) 2.5 MG tablet TAKE ONE-HALF TABLET BY MOUTH EVERY FRIDAY AND TAKE 1 TABLET ALL OTHER DAYS AS DIRECTED  "30 tablet 11     prednisolon-gatiflox-bromfenac, pt own, no charge, 1-0.5-0.075 % opthalmic solution 1 drop by Both Eyelids route 3 times daily          ALLERGIES     Allergies   Allergen Reactions     Neomycin Sulfate          Review of Systems:  Skin:  Positive for itching   Eyes:  Positive for glasses  ENT:  Positive for hearing loss  Respiratory:  Negative    Cardiovascular:    Positive for  Gastroenterology: Negative    Genitourinary:  Positive for nocturia  Musculoskeletal:  Positive for arthritis  Neurologic:  Negative    Psychiatric:  Negative    Heme/Lymph/Imm:  Positive for allergies;easy bruising  Endocrine:  Negative      Physical Exam:  Vitals: /78   Pulse 68   Ht 1.715 m (5' 7.5\")   Wt 78.7 kg (173 lb 6.4 oz)   BMI 26.76 kg/m    BMI:   Body mass index is 26.76 kg/m .  Physical Exam   Constitutional: He is oriented to person, place, and time. He appears well-developed and well-nourished.   HENT:   Head: Normocephalic.   Cardiovascular: Normal rate and regular rhythm.   Clean dry and intact pacemaker incision on upper left chest   Pulmonary/Chest: Effort normal and breath sounds normal.   Musculoskeletal: Normal range of motion.   Neurological: He is alert and oriented to person, place, and time.   Skin: Skin is warm and dry.   Psychiatric: He has a normal mood and affect. His behavior is normal. Judgment and thought content normal.       Last Comprehensive Metabolic Panel:  Sodium   Date Value Ref Range Status   08/12/2020 133 133 - 144 mmol/L Final     Potassium   Date Value Ref Range Status   08/12/2020 4.6 3.4 - 5.3 mmol/L Final     Chloride   Date Value Ref Range Status   08/12/2020 101 94 - 109 mmol/L Final     Carbon Dioxide   Date Value Ref Range Status   08/12/2020 25 20 - 32 mmol/L Final     Anion Gap   Date Value Ref Range Status   08/12/2020 7 3 - 14 mmol/L Final     Glucose   Date Value Ref Range Status   08/12/2020 91 70 - 99 mg/dL Final     Urea Nitrogen   Date Value Ref Range " Status   08/12/2020 17 7 - 30 mg/dL Final     Creatinine   Date Value Ref Range Status   08/12/2020 1.03 0.66 - 1.25 mg/dL Final     GFR Estimate   Date Value Ref Range Status   08/12/2020 66 >60 mL/min/[1.73_m2] Final     Comment:     Non  GFR Calc  Starting 12/18/2018, serum creatinine based estimated GFR (eGFR) will be   calculated using the Chronic Kidney Disease Epidemiology Collaboration   (CKD-EPI) equation.       Calcium   Date Value Ref Range Status   08/12/2020 9.5 8.5 - 10.1 mg/dL Final     Lab Results   Component Value Date    WBC 6.7 08/12/2020     Lab Results   Component Value Date    RBC 5.10 08/12/2020     Lab Results   Component Value Date    HGB 15.2 08/12/2020     Lab Results   Component Value Date    HCT 44.2 08/12/2020     No components found for: MCT  Lab Results   Component Value Date    MCV 87 08/12/2020     Lab Results   Component Value Date    MCH 29.8 08/12/2020     Lab Results   Component Value Date    MCHC 34.4 08/12/2020     Lab Results   Component Value Date    RDW 14.4 08/12/2020     Lab Results   Component Value Date     08/12/2020     Recent Labs   Lab Test 08/12/20  1029 11/21/19  1040 05/26/15  0727 05/26/15  0727 12/04/14  0804   CHOL 164 157   < > 133 170   HDL 58 58   < > 54 53   LDL 87 88   < > 66 96   TRIG 94 55   < > 63 105   CHOLHDLRATIO  --   --   --  2.5 3.2    < > = values in this interval not displayed.         Thank you for allowing me to participate in the care of your patient.    Sincerely,     TYRESE Max [facility]

## 2021-06-09 ENCOUNTER — ANTICOAGULATION THERAPY VISIT (OUTPATIENT)
Dept: ANTICOAGULATION | Facility: CLINIC | Age: 86
End: 2021-06-09

## 2021-06-09 DIAGNOSIS — Z79.01 LONG TERM CURRENT USE OF ANTICOAGULANT THERAPY: ICD-10-CM

## 2021-06-09 DIAGNOSIS — I48.19 PERSISTENT ATRIAL FIBRILLATION (H): ICD-10-CM

## 2021-06-09 DIAGNOSIS — Z79.01 LONG TERM CURRENT USE OF ANTICOAGULANTS WITH INR GOAL OF 2.0-3.0: ICD-10-CM

## 2021-06-09 LAB
CAPILLARY BLOOD COLLECTION: NORMAL
INR PPP: 2.4 (ref 0.86–1.14)

## 2021-06-09 PROCEDURE — 36416 COLLJ CAPILLARY BLOOD SPEC: CPT | Performed by: FAMILY MEDICINE

## 2021-06-09 PROCEDURE — 99207 PR NO CHARGE NURSE ONLY: CPT

## 2021-06-09 PROCEDURE — 85610 PROTHROMBIN TIME: CPT | Performed by: FAMILY MEDICINE

## 2021-06-09 NOTE — PROGRESS NOTES
ANTICOAGULATION FOLLOW-UP CLINIC VISIT    Patient Name:  Ntahen Gage  Date:  2021  Contact Type:  Telephone/ Called patient, he denies any changes. Orders discussed with the patient, he agrees with the plan. Lab INR appointment scheduled on 21.    SUBJECTIVE:  Patient Findings     Comments:  The patient was assessed for diet, medication, and activity level changes, missed or extra doses, bruising or bleeding, with no problem findings. Maintenance warfarin dosing was reviewed with patient and will remain on the same dose until next INR check. Patient did not have any questions or concerns.           Clinical Outcomes     Negatives:  Major bleeding event, Thromboembolic event, Anticoagulation-related hospital admission, Anticoagulation-related ED visit, Anticoagulation-related fatality    Comments:  The patient was assessed for diet, medication, and activity level changes, missed or extra doses, bruising or bleeding, with no problem findings. Maintenance warfarin dosing was reviewed with patient and will remain on the same dose until next INR check. Patient did not have any questions or concerns.              OBJECTIVE    Recent labs: (last 7 days)     21  0924   INR 2.40*       ASSESSMENT / PLAN  INR assessment THER    Recheck INR In: 3 WEEKS    INR Location Outside lab      Anticoagulation Summary  As of 2021    INR goal:  2.0-2.5   TTR:  62.9 % (1 y)   INR used for dosin.40 (2021)   Warfarin maintenance plan:  1.25 mg (2.5 mg x 0.5) every Fri; 2.5 mg (2.5 mg x 1) all other days   Full warfarin instructions:  1.25 mg every Fri; 2.5 mg all other days   Weekly warfarin total:  16.25 mg   No change documented:  Teresa Castro RN   Plan last modified:  Fidelina Pizano RN (2021)   Next INR check:  2021   Priority:  Maintenance   Target end date:  Indefinite    Indications    Long-term (current) use of anticoagulants [Z79.01] [Z79.01]  Atrial fibrillation (H) [I48.91]  Persistent  atrial fibrillation (H) [I48.19]  Long term current use of anticoagulants with INR goal of 2.0-3.0 [Z79.01]             Anticoagulation Episode Summary     INR check location:      Preferred lab:      Send INR reminders to:  [facility]    Comments:        Anticoagulation Care Providers     Provider Role Specialty Phone number    Kushal Valentin MD Referring Family Medicine [phone]            See the Encounter Report to view Anticoagulation Flowsheet and Dosing Calendar (Go to Encounters tab in chart review, and find the Anticoagulation Therapy Visit)    Dosage adjustment made based on physician directed care plan.    Teresa Castro RN

## 2021-06-09 NOTE — PROGRESS NOTES
Left message to call [phone]. Please transfer call to Teresa at [phone]  Teresa Castro RN, BSN  Anticoagulation Clinic

## 2021-06-29 ENCOUNTER — ANTICOAGULATION THERAPY VISIT (OUTPATIENT)
Dept: NURSING | Facility: CLINIC | Age: 86
End: 2021-06-29

## 2021-06-29 DIAGNOSIS — Z79.01 LONG TERM CURRENT USE OF ANTICOAGULANTS WITH INR GOAL OF 2.0-3.0: ICD-10-CM

## 2021-06-29 DIAGNOSIS — Z79.01 LONG TERM CURRENT USE OF ANTICOAGULANT THERAPY: ICD-10-CM

## 2021-06-29 DIAGNOSIS — I48.19 PERSISTENT ATRIAL FIBRILLATION (H): ICD-10-CM

## 2021-06-29 DIAGNOSIS — I48.91 ATRIAL FIBRILLATION (H): ICD-10-CM

## 2021-06-29 LAB
CAPILLARY BLOOD COLLECTION: NORMAL
INR PPP: 2.4 (ref 0.86–1.14)

## 2021-06-29 PROCEDURE — 85610 PROTHROMBIN TIME: CPT | Performed by: FAMILY MEDICINE

## 2021-06-29 PROCEDURE — 36416 COLLJ CAPILLARY BLOOD SPEC: CPT | Performed by: FAMILY MEDICINE

## 2021-06-29 NOTE — PROGRESS NOTES
ANTICOAGULATION MANAGEMENT     Nathen Gage 85 year old male is on warfarin with therapeutic INR result. (Goal INR 2.0-2.5)    Recent labs: (last 7 days)     06/29/21  0941   INR 2.40*       ASSESSMENT     Source(s): Patient/Caregiver Call       Warfarin doses taken: Warfarin taken as instructed    Diet: No new diet changes identified    New illness, injury, or hospitalization: No    Medication/supplement changes: None noted    Signs or symptoms of bleeding or clotting: No    Previous INR: Therapeutic last visit; previously outside of goal range    Additional findings: None     PLAN     Recommended plan for no diet, medication or health factor changes affecting INR     Dosing Instructions: Continue your current warfarin dose with next INR in 3 weeks       Summary  As of 6/29/2021    Full warfarin instructions:  1.25 mg every Fri; 2.5 mg all other days   Next INR check:  7/20/2021             Telephone call with Nathen whom verbalizes understanding and agrees to plan    Lab visit scheduled    Education provided: None required    Plan made per [facility] anticoagulation protocol    Fidelina Pizano RN  Anticoagulation Clinic  6/29/2021    _______________________________________________________________________     Anticoagulation Episode Summary     Current INR goal:  2.0-2.5   TTR:  65.8 % (1 y)   Target end date:  Indefinite   Send INR reminders to:  [facility]    Indications    Long-term (current) use of anticoagulants [Z79.01] [Z79.01]  Atrial fibrillation (H) [I48.91]  Persistent atrial fibrillation (H) [I48.19]  Long term current use of anticoagulants with INR goal of 2.0-3.0 [Z79.01]           Comments:           Anticoagulation Care Providers     Provider Role Specialty Phone number    Kushal Valentin MD Referring Family Medicine [phone]

## 2021-07-14 ENCOUNTER — ANCILLARY PROCEDURE (OUTPATIENT)
Dept: CARDIOLOGY | Facility: CLINIC | Age: 86
End: 2021-07-14
Attending: INTERNAL MEDICINE
Payer: COMMERCIAL

## 2021-07-14 DIAGNOSIS — Z95.0 CARDIAC PACEMAKER IN SITU: ICD-10-CM

## 2021-07-14 PROCEDURE — 93293 PM PHONE R-STRIP DEVICE EVAL: CPT | Performed by: INTERNAL MEDICINE

## 2021-07-16 ENCOUNTER — DOCUMENTATION ONLY (OUTPATIENT)
Dept: ANTICOAGULATION | Facility: CLINIC | Age: 86
End: 2021-07-16

## 2021-07-16 DIAGNOSIS — I48.91 ATRIAL FIBRILLATION (H): Primary | ICD-10-CM

## 2021-07-16 DIAGNOSIS — I48.19 PERSISTENT ATRIAL FIBRILLATION (H): ICD-10-CM

## 2021-07-16 DIAGNOSIS — Z79.01 LONG TERM CURRENT USE OF ANTICOAGULANT THERAPY: ICD-10-CM

## 2021-07-20 ENCOUNTER — ANTICOAGULATION THERAPY VISIT (OUTPATIENT)
Dept: ANTICOAGULATION | Facility: CLINIC | Age: 86
End: 2021-07-20

## 2021-07-20 ENCOUNTER — LAB (OUTPATIENT)
Dept: LAB | Facility: CLINIC | Age: 86
End: 2021-07-20
Payer: COMMERCIAL

## 2021-07-20 DIAGNOSIS — Z79.01 LONG TERM CURRENT USE OF ANTICOAGULANT THERAPY: ICD-10-CM

## 2021-07-20 DIAGNOSIS — I48.19 PERSISTENT ATRIAL FIBRILLATION (H): ICD-10-CM

## 2021-07-20 DIAGNOSIS — Z79.01 LONG TERM CURRENT USE OF ANTICOAGULANT THERAPY: Primary | ICD-10-CM

## 2021-07-20 DIAGNOSIS — Z79.01 LONG TERM CURRENT USE OF ANTICOAGULANTS WITH INR GOAL OF 2.0-3.0: ICD-10-CM

## 2021-07-20 LAB — INR BLD: 3.3 (ref 0.9–1.1)

## 2021-07-20 PROCEDURE — 36416 COLLJ CAPILLARY BLOOD SPEC: CPT

## 2021-07-20 PROCEDURE — 85610 PROTHROMBIN TIME: CPT

## 2021-07-20 NOTE — PROGRESS NOTES
ANTICOAGULATION MANAGEMENT     Nathen Gage 85 year old male is on warfarin with supratherapeutic INR result. (Goal INR 2.0-2.5)    Recent labs: (last 7 days)     07/20/21  0918   INR 3.3*       ASSESSMENT     Source(s): Patient/Caregiver Call       Warfarin doses taken: Less warfarin taken than planned which may be affecting INR    Diet: Decreased greens/vitamin K in diet; plans to resume previous intake    New illness, injury, or hospitalization: No    Medication/supplement changes: None noted    Signs or symptoms of bleeding or clotting: No    Previous INR: Therapeutic last 2(+) visits    Additional findings: None     PLAN     Recommended plan for temporary change(s) affecting INR     Dosing Instructions: Hold dose then continue your current warfarin dose with next INR in 9 days       Summary  As of 7/20/2021    Full warfarin instructions:  7/20: Hold; Otherwise 1.25 mg every Fri; 2.5 mg all other days   Next INR check:  7/29/2021             Telephone call with Nathen who verbalizes understanding and agrees to plan    Lab visit scheduled    Education provided: Please call back if any changes to your diet, medications or how you've been taking warfarin and Contact [phone]  with any changes, questions or concerns.     Plan made per [facility] anticoagulation protocol    Teresa Castro RN  Anticoagulation Clinic  7/20/2021    _______________________________________________________________________     Anticoagulation Episode Summary     Current INR goal:  2.0-2.5   TTR:  66.5 % (1 y)   Target end date:  Indefinite   Send INR reminders to:  [facility]    Indications    Long-term (current) use of anticoagulants [Z79.01] [Z79.01]  Atrial fibrillation (H) [I48.91]  Persistent atrial fibrillation (H) [I48.19]  Long term current use of anticoagulants with INR goal of 2.0-3.0 [Z79.01]           Comments:           Anticoagulation Care Providers     Provider Role Specialty Phone number    Kushal Valentin,  MD [facility] [phone]

## 2021-07-21 DIAGNOSIS — K21.00 GASTROESOPHAGEAL REFLUX DISEASE WITH ESOPHAGITIS: ICD-10-CM

## 2021-07-21 RX ORDER — PANTOPRAZOLE SODIUM 40 MG/1
TABLET, DELAYED RELEASE ORAL
Qty: 90 TABLET | Refills: 0 | Status: SHIPPED | OUTPATIENT
Start: 2021-07-21 | End: 2021-10-21

## 2021-07-21 NOTE — TELEPHONE ENCOUNTER
Medication is being filled for 1 time refill only due to:  Patient needs to be seen because annual visit due August.  Prescription approved per [facility] Refill Protocol.  Routed to  for scheduling  Nataly Becerra RN, BSN  Message handled by CLINIC NURSE.

## 2021-07-29 ENCOUNTER — LAB (OUTPATIENT)
Dept: LAB | Facility: CLINIC | Age: 86
End: 2021-07-29
Payer: COMMERCIAL

## 2021-07-29 ENCOUNTER — TELEPHONE (OUTPATIENT)
Dept: ANTICOAGULATION | Facility: CLINIC | Age: 86
End: 2021-07-29

## 2021-07-29 ENCOUNTER — ANTICOAGULATION THERAPY VISIT (OUTPATIENT)
Dept: ANTICOAGULATION | Facility: CLINIC | Age: 86
End: 2021-07-29

## 2021-07-29 DIAGNOSIS — Z79.01 LONG TERM CURRENT USE OF ANTICOAGULANT THERAPY: Primary | ICD-10-CM

## 2021-07-29 DIAGNOSIS — Z79.01 LONG TERM CURRENT USE OF ANTICOAGULANTS WITH INR GOAL OF 2.0-3.0: ICD-10-CM

## 2021-07-29 DIAGNOSIS — I48.91 ATRIAL FIBRILLATION (H): ICD-10-CM

## 2021-07-29 DIAGNOSIS — I48.19 PERSISTENT ATRIAL FIBRILLATION (H): ICD-10-CM

## 2021-07-29 DIAGNOSIS — Z79.01 LONG TERM CURRENT USE OF ANTICOAGULANT THERAPY: ICD-10-CM

## 2021-07-29 LAB — INR BLD: 2.3 (ref 0.9–1.1)

## 2021-07-29 PROCEDURE — 85610 PROTHROMBIN TIME: CPT

## 2021-07-29 PROCEDURE — 36415 COLL VENOUS BLD VENIPUNCTURE: CPT

## 2021-07-29 NOTE — PROGRESS NOTES
ANTICOAGULATION MANAGEMENT     Nathen Gage 85 year old male is on warfarin with therapeutic INR result. (Goal INR 2.0-2.5)    Recent labs: (last 7 days)     07/29/21  0838   INR 2.3*       ASSESSMENT     Source(s): Chart Review and Patient/Caregiver Call       Warfarin doses taken: Warfarin taken as instructed    Diet: No new diet changes identified    New illness, injury, or hospitalization: No    Medication/supplement changes: None noted    Signs or symptoms of bleeding or clotting: Yes: Patient states he had 1 nosebleed on 7/20/21 that did not last long with held pressure.    Previous INR: Supratherapeutic    Additional findings: None     PLAN     Recommended plan for no diet, medication or health factor changes affecting INR     Dosing Instructions: Continue your current warfarin dose with next INR in 10 days , patient requested 8/10 versus the suggested 8/12/21.    Summary  As of 7/29/2021    Full warfarin instructions:  1.25 mg every Fri; 2.5 mg all other days   Next INR check:  8/10/2021             Telephone call with Nathen who verbalizes understanding and agrees to plan    Lab visit scheduled    Education provided: Importance of consistent vitamin K intake    Plan made per [facility] anticoagulation protocol    Fidelina Piznao, RN  Anticoagulation Clinic  7/29/2021    _______________________________________________________________________     Anticoagulation Episode Summary     Current INR goal:  2.0-2.5   TTR:  66.9 % (1 y)   Target end date:  Indefinite   Send INR reminders to:  [facility]    Indications    Long-term (current) use of anticoagulants [Z79.01] [Z79.01]  Atrial fibrillation (H) [I48.91]  Persistent atrial fibrillation (H) [I48.19]  Long term current use of anticoagulants with INR goal of 2.0-3.0 [Z79.01]           Comments:           Anticoagulation Care Providers     Provider Role Specialty Phone number    Kushal Valentin MD Referring Family Medicine [phone]

## 2021-08-10 ENCOUNTER — LAB (OUTPATIENT)
Dept: LAB | Facility: CLINIC | Age: 86
End: 2021-08-10
Payer: COMMERCIAL

## 2021-08-10 ENCOUNTER — ANTICOAGULATION THERAPY VISIT (OUTPATIENT)
Dept: ANTICOAGULATION | Facility: CLINIC | Age: 86
End: 2021-08-10

## 2021-08-10 DIAGNOSIS — I48.91 ATRIAL FIBRILLATION (H): ICD-10-CM

## 2021-08-10 DIAGNOSIS — Z79.01 LONG TERM CURRENT USE OF ANTICOAGULANT THERAPY: Primary | ICD-10-CM

## 2021-08-10 DIAGNOSIS — I48.19 PERSISTENT ATRIAL FIBRILLATION (H): ICD-10-CM

## 2021-08-10 DIAGNOSIS — Z79.01 LONG TERM CURRENT USE OF ANTICOAGULANT THERAPY: ICD-10-CM

## 2021-08-10 DIAGNOSIS — Z79.01 LONG TERM CURRENT USE OF ANTICOAGULANTS WITH INR GOAL OF 2.0-3.0: ICD-10-CM

## 2021-08-10 LAB — INR BLD: 2.1 (ref 0.9–1.1)

## 2021-08-10 PROCEDURE — 85610 PROTHROMBIN TIME: CPT

## 2021-08-10 NOTE — PROGRESS NOTES
Anticoagulation Management    Unable to reach Neal today x 2    Today's INR result of 2.1 is therapeutic (goal INR of 2.0-2.5).  Result received from: Clinic Lab    Follow up required to assess for changes     Left VM to continue SAME dose and to call Tara with transfer to [facility] OR route to:   [facility]        Anticoagulation clinic to follow up    Fidelina Pizano RN

## 2021-08-11 NOTE — PROGRESS NOTES
ANTICOAGULATION MANAGEMENT     Nathen Gage 85 year old male is on warfarin with therapeutic INR result. (Goal INR 2.0-2.5)    Recent labs: (last 7 days)     08/10/21  0955   INR 2.1*       ASSESSMENT     Source(s): Chart Review and Patient/Caregiver Call       Warfarin doses taken: Warfarin taken as instructed    Diet: No new diet changes identified    New illness, injury, or hospitalization: No    Medication/supplement changes: None noted    Signs or symptoms of bleeding or clotting: No    Previous INR: Therapeutic last visit; previously outside of goal range    Additional findings: None     PLAN     Recommended plan for no diet, medication or health factor changes affecting INR     Dosing Instructions: Continue your current warfarin dose with next INR in 3 weeks       Summary  As of 8/10/2021    Full warfarin instructions:  1.25 mg every Fri; 2.5 mg all other days   Next INR check:  8/30/2021             Telephone call with Nathen who verbalizes understanding and agrees to plan    Lab visit scheduled    Education provided: Please call back if any changes to your diet, medications or how you've been taking warfarin and Contact [phone]  with any changes, questions or concerns.     Plan made per [facility] anticoagulation protocol    Teresa Castro RN  Anticoagulation Clinic  8/11/2021    _______________________________________________________________________     Anticoagulation Episode Summary     Current INR goal:  2.0-2.5   TTR:  67.3 % (1 y)   Target end date:  Indefinite   Send INR reminders to:  [facility]    Indications    Long-term (current) use of anticoagulants [Z79.01] [Z79.01]  Atrial fibrillation (H) [I48.91]  Persistent atrial fibrillation (H) [I48.19]  Long term current use of anticoagulants with INR goal of 2.0-3.0 [Z79.01]           Comments:           Anticoagulation Care Providers     Provider Role Specialty Phone number    Kushal Valentin MD Referring Family Medicine  [phone]

## 2021-08-11 NOTE — PROGRESS NOTES
Pt called INR nurse back to discuss recent INR results, no answer from nurse. Please call pt back to discuss.

## 2021-08-13 ENCOUNTER — OFFICE VISIT (OUTPATIENT)
Dept: URGENT CARE | Facility: URGENT CARE | Age: 86
End: 2021-08-13
Payer: COMMERCIAL

## 2021-08-13 VITALS
DIASTOLIC BLOOD PRESSURE: 68 MMHG | SYSTOLIC BLOOD PRESSURE: 140 MMHG | OXYGEN SATURATION: 100 % | TEMPERATURE: 98.1 F | HEART RATE: 63 BPM | RESPIRATION RATE: 16 BRPM

## 2021-08-13 DIAGNOSIS — T78.40XA ALLERGIC REACTION, INITIAL ENCOUNTER: ICD-10-CM
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
## 2021-08-13 PROCEDURE — 99213 OFFICE O/P EST LOW 20 MIN: CPT | Performed by: PHYSICIAN ASSISTANT

## 2021-08-13 RX ORDER — TRIAMCINOLONE ACETONIDE 5 MG/G
1 OINTMENT TOPICAL 2 TIMES DAILY
Qty: 30 G | Refills: 2 | Status: SHIPPED | OUTPATIENT
Start: 2021-08-13 | End: 2023-03-15

## 2021-08-13 RX ORDER — CETIRIZINE HYDROCHLORIDE 10 MG/1
10 TABLET ORAL DAILY
Qty: 30 TABLET | Refills: 0 | Status: SHIPPED | OUTPATIENT
Start: 2021-08-13 | End: 2023-06-13

## 2021-08-13 NOTE — PATIENT INSTRUCTIONS
"  Patient Education     Local Reaction to an Insect Sting    You have been stung or bitten by an insect. The insect s venom or body fluid is causing your skin to react in the area where you were stung or bitten. This often causes redness, itching, and swelling. This reaction will often fade over a few hours. But it can last a few days. An insect bite or sting can become infected 1 to 3 days later. So watch for the signs below. Sometimes it is hard to tell the difference between a local reaction to the insect bite or sting and an early infection. Your healthcare provider may give you antibiotics.  Common stinging insects that cause reactions are wasps, bees, yellow jackets, fire ants, and hornets. Common bites are from spiders, mosquitoes, fleas, or ticks. Other types of insects may be more common in different parts of the country or world.  Insect venom causes \"local\" toxic reactions in everyone. Allergic reactions occur only in those who are already sensitive to the venom. The severity of your reaction to the insect sting depends on the dose of venom and the degree to which you are already sensitive to it. Most people think of allergic reactions when someone has a rash or itchy skin. But most stings cause \"localized\" symptoms that are not allergic. These symptoms can include:    Rash, redness, welts, or blisters around the sting or bite    Itching, burning, stinging, or pain    Swelling around the sting area, which may spread and become uncomfortable    Home care  Medicines  The healthcare provider may prescribe medicines to ease swelling, itching, and pain. Follow the provider s instructions when taking these medicines.    Diphenhydramine is an oral antihistamine you can find in stores.. You can use this medicine to reduce itching and swelling. The medicine may make you sleepy. So be careful using it in the daytime or when going to school, working, or driving. Don t use diphenhydramine if you have glaucoma or if " you are a man with trouble urinating because of an enlarged prostate. Other antihistamines may cause less drowsiness. They may be better choices for daytime use. Ask your pharmacist for suggestions.    If you have large areas of localized swelling, you may be prescribed oral corticosteroids, such as prednisone. These can help decrease the swelling and discomfort.    Don t use diphenhydramine cream on your skin. In some people, it can cause more of a localized skin rash due to allergy to the cream.    Calamine lotion or oatmeal baths sometimes help with itching.    You may use acetaminophen or ibuprofen to control pain, unless another pain medicine was prescribed. Talk with your healthcare provider before using these medicines if you have chronic liver or kidney disease. Also talk with your provider if you ve had a stomach ulcer or gastrointestinal (GI) bleeding.    If you had a severe reaction, the provider may prescribe an epinephrine auto-injector. Epinephrine is an emergency medicine that will stop an allergic reaction from getting worse. Before you leave the hospital, be sure that you understand when and how to use this medicine.  General care      If itching is a problem, don t take hot showers or baths. Stay out of direct sunlight. These heat up your skin and will make the itching worse.    Use an ice pack to reduce local areas of redness, swelling, and itching. You can make your own ice pack by putting ice cubes in a bag that seals and wrapping it in a thin towel. Don t put the ice directly on your skin, because it can damage the skin.    Try not to scratch any affected areas to prevent damage to the skin or infection.    If oral antibiotics or oral corticosteroids were prescribed, be sure to take them as directed until finished.  Tips for dealing with insect stings    Be aware that honeybees nest in trees. Wasps and yellow jackets nest in the ground, trees, or roof eaves.    If you are stung by a honeybee, a  stinger may remain in your skin. Wasps, yellow jackets, and hornets don t leave a stinger behind. Move away from the nest area right away. The stinger of a honeybee releases a substance that will attract other bees to you. Once you are away from the nest, remove the stinger as quickly as possible.    After any sting, you may apply ice and take diphenhydramine or another antihistamine. If you develop any of the warning signs below, seek help right away.    If your reaction includes dizziness, fainting, or trouble breathing or swallowing, ask your healthcare provider if you need epinephrine auto-injectors.    Follow-up care  Follow up with your healthcare provider in 2 days, or as advised, if your symptoms don t start to get better.  Call 911  Call 911 if any of these occur:    Trouble breathing or swallowing, or wheezing    New or worsening swelling in the mouth, throat, or tongue    Hoarse voice or trouble speaking    Confusion    Severe drowsiness or trouble awakening    Fainting or loss of consciousness    Rapid heart rate    Low blood pressure    Feeling of doom    Nausea, vomiting, abdominal pain, or diarrhea    Vomiting blood, or large amounts of blood in stool    Seizure  When to seek medical advice  Call your healthcare provider or get medical care right away if any of these occur:    Spreading areas of itching, redness, or swelling    New or worse swelling in the face, eyelids, or lips    Dizziness or weakness  Also call your provider right away if you have signs of infection:    Increasing pain, redness, or swelling    Fever of 100.4 F (38 C) or higher, or as directed by your healthcare provider    Fluid or pus draining from the sting area   [employer] last reviewed this educational content on 10/1/2019    7238-7680 [employer]. All rights reserved. This information is not intended as a substitute for professional medical care. Always follow your healthcare professional's instructions.

## 2021-08-19 NOTE — PROGRESS NOTES
Assessment & Plan     Bug bite, initial encounter  Zyrtec for itching  Triamcinolone for itching  - cetirizine (ZYRTEC) 10 MG tablet; Take 1 tablet (10 mg) by mouth daily  - triamcinolone (KENALOG) 0.5 % external ointment; Apply 1 g topically 2 times daily    Allergic reaction, initial encounter  Zyrtec and triamcinolone  Follow up with PCP as needed  - cetirizine (ZYRTEC) 10 MG tablet; Take 1 tablet (10 mg) by mouth daily  - triamcinolone (KENALOG) 0.5 % external ointment; Apply 1 g topically 2 times daily    No follow-ups on file.    Chung Uribe PA-C  [facility] HARIKA De Jesus is a 85 year old who presents for the following health issues     HPI     Bug bites  Rash on extremities    Review of Systems   Constitutional, HEENT, cardiovascular, pulmonary, gi and gu systems are negative, except as otherwise noted.      Objective    BP (!) 140/68   Pulse 63   Temp 98.1  F (36.7  C) (Oral)   Resp 16   SpO2 100%   There is no height or weight on file to calculate BMI.  Physical Exam   GENERAL: healthy, alert and no distress  MS: no gross musculoskeletal defects noted, no edema  SKIN: Positive for rash on lower extremities, maculopapular rash with bite appearances  NEURO: Normal strength and tone, mentation intact and speech normal  PSYCH: mentation appears normal, affect normal/bright

## 2021-08-30 ENCOUNTER — OFFICE VISIT (OUTPATIENT)
Dept: FAMILY MEDICINE | Facility: CLINIC | Age: 86
End: 2021-08-30
Payer: COMMERCIAL

## 2021-08-30 ENCOUNTER — ANTICOAGULATION THERAPY VISIT (OUTPATIENT)
Dept: ANTICOAGULATION | Facility: CLINIC | Age: 86
End: 2021-08-30

## 2021-08-30 VITALS
HEART RATE: 82 BPM | SYSTOLIC BLOOD PRESSURE: 132 MMHG | HEIGHT: 68 IN | RESPIRATION RATE: 18 BRPM | BODY MASS INDEX: 26.05 KG/M2 | DIASTOLIC BLOOD PRESSURE: 74 MMHG | WEIGHT: 171.9 LBS

## 2021-08-30 DIAGNOSIS — Z79.01 LONG TERM CURRENT USE OF ANTICOAGULANT THERAPY: ICD-10-CM

## 2021-08-30 DIAGNOSIS — I48.19 PERSISTENT ATRIAL FIBRILLATION (H): ICD-10-CM

## 2021-08-30 DIAGNOSIS — E78.5 HYPERLIPIDEMIA LDL GOAL <100: ICD-10-CM

## 2021-08-30 DIAGNOSIS — Z79.01 LONG TERM CURRENT USE OF ANTICOAGULANTS WITH INR GOAL OF 2.0-3.0: ICD-10-CM

## 2021-08-30 DIAGNOSIS — I48.91 ATRIAL FIBRILLATION (H): ICD-10-CM

## 2021-08-30 DIAGNOSIS — Z79.01 LONG TERM CURRENT USE OF ANTICOAGULANT THERAPY: Primary | ICD-10-CM

## 2021-08-30 DIAGNOSIS — I10 BENIGN ESSENTIAL HYPERTENSION: ICD-10-CM

## 2021-08-30 DIAGNOSIS — Z00.00 ENCOUNTER FOR MEDICARE ANNUAL WELLNESS EXAM: Primary | ICD-10-CM

## 2021-08-30 LAB — INR BLD: 2 (ref 0.9–1.1)

## 2021-08-30 PROCEDURE — 85610 PROTHROMBIN TIME: CPT | Performed by: FAMILY MEDICINE

## 2021-08-30 PROCEDURE — 99214 OFFICE O/P EST MOD 30 MIN: CPT | Mod: 25 | Performed by: FAMILY MEDICINE

## 2021-08-30 PROCEDURE — G0438 PPPS, INITIAL VISIT: HCPCS | Performed by: FAMILY MEDICINE

## 2021-08-30 ASSESSMENT — ENCOUNTER SYMPTOMS
FEVER: 0
SORE THROAT: 0
EYE PAIN: 0
JOINT SWELLING: 0
WEAKNESS: 0
ARTHRALGIAS: 0
CHILLS: 0
FREQUENCY: 1
NERVOUS/ANXIOUS: 0
MYALGIAS: 0
COUGH: 0
HEMATOCHEZIA: 0
DIZZINESS: 0
PALPITATIONS: 0
HEMATURIA: 0
CONSTIPATION: 0
HEARTBURN: 0
NAUSEA: 0
SHORTNESS OF BREATH: 1
ABDOMINAL PAIN: 0
PARESTHESIAS: 0
HEADACHES: 0
DYSURIA: 0
DIARRHEA: 0

## 2021-08-30 ASSESSMENT — MIFFLIN-ST. JEOR: SCORE: 1440.26

## 2021-08-30 ASSESSMENT — ACTIVITIES OF DAILY LIVING (ADL): CURRENT_FUNCTION: NO ASSISTANCE NEEDED

## 2021-08-30 NOTE — PROGRESS NOTES
Anticoagulation Management    Unable to reach Neal today.    Today's INR result of 2.0 is therapeutic (goal INR of 2.0-3.0).  Result received from: Clinic Lab    Follow up required to confirm warfarin dose taken and assess for changes    Left VM to continue SAME dose and to call Tara with transfer to [facility] OR route to:   [facility]        Anticoagulation clinic to follow up    Fidelina Pizano RN

## 2021-08-30 NOTE — PROGRESS NOTES
"SUBJECTIVE:   Nathen Gage is a 85 year old male who presents for Preventive Visit.  And follow up atrial fibrillation, anticoagulation, questions about memory and   Current Outpatient Medications   Medication     atorvastatin (LIPITOR) 20 MG tablet     cetirizine (ZYRTEC) 10 MG tablet     diclofenac (VOLTAREN) 1 % topical gel     doxazosin (CARDURA) 4 MG tablet     losartan (COZAAR) 100 MG tablet     pantoprazole (PROTONIX) 40 MG EC tablet     Polyethylene Glycol 400 (BLINK TEARS OP)     prednisolon-gatiflox-bromfenac, pt own, no charge, 1-0.5-0.075 % opthalmic solution     triamcinolone (KENALOG) 0.5 % external ointment     warfarin ANTICOAGULANT (COUMADIN) 2.5 MG tablet     No current facility-administered medications for this visit.           Patient has been advised of split billing requirements and indicates understanding: Yes   Are you in the first 12 months of your Medicare coverage?  No    Healthy Habits:     In general, how would you rate your overall health?  Excellent    Frequency of exercise:  2-3 days/week    Duration of exercise:  15-30 minutes    Do you usually eat at least 4 servings of fruit and vegetables a day, include whole grains    & fiber and avoid regularly eating high fat or \"junk\" foods?  Yes    Taking medications regularly:  Yes    Medication side effects:  None    Ability to successfully perform activities of daily living:  No assistance needed    Home Safety:  Throw rugs in the hallway and lack of grab bars in the bathroom    Hearing Impairment:  Difficulty following a conversation in a noisy restaurant or crowded room, feel that people are mumbling or not speaking clearly, difficult to understand a speaker at a public meeting or [employer] service and difficulty understanding soft or whispered speech    In the past 6 months, have you been bothered by leaking of urine?  No    In general, how would you rate your overall mental or emotional health?  Excellent      PHQ-2 Total Score: 0    " Additional concerns today:  No    Do you feel safe in your environment? Yes    Have you ever done Advance Care Planning? (For example, a Health Directive, POLST, or a discussion with a medical provider or your loved ones about your wishes): No, advance care planning information given to patient to review.  Patient plans to discuss their wishes with loved ones or provider.         Fall risk  Fallen 2 or more times in the past year?: No  Any fall with injury in the past year?: No    Cognitive Screening   1) Repeat 3 items (Leader, Season, Table)    2) Clock draw: NORMAL  3) 3 item recall: Recalls 2 objects   Results: NORMAL clock, 1-2 items recalled: COGNITIVE IMPAIRMENT LESS LIKELY    Mini-CogTM Copyright S Patricia. Licensed by the author for use in [facility]; reprinted with permission (layton@[facility]). All rights reserved.      Discussed exercise and training , gait  Safety     Reviewed and updated as needed this visit by clinical staff  Tobacco  Allergies  Meds   Med Hx  Surg Hx  Fam Hx  Soc Hx        Reviewed and updated as needed this visit by Provider                Social History     Tobacco Use     Smoking status: Former Smoker     Quit date: 1975     Years since quittin.6     Smokeless tobacco: Never Used   Substance Use Topics     Alcohol use: Yes     Comment: 1- 2 BEERS WEEKLY or less     If you drink alcohol do you typically have >3 drinks per day or >7 drinks per week? No    Alcohol Use 2021   Prescreen: >3 drinks/day or >7 drinks/week? No   Prescreen: >3 drinks/day or >7 drinks/week? -       Past Medical History:   Diagnosis Date     Actinic keratosis      Acute idiopathic gout of left knee 2016     Atrial fibrillation (H)     on Eliquis     Dilated aortic root (H)      Esophageal reflux      Esophagitis      H/O: GI bleed 2010     Hyperlipidaemia      Hyperlipidemia LDL goal <100      Impotence of organic origin      Presbycusis, unspecified laterality 2016      Pulmonary hypertension (H)      Sick sinus syndrome (H)     pacemaker implanted      Unspecified essential hypertension        Past Surgical History:   Procedure Laterality Date     IMPLANT PACEMAKER  2011    Pacemaker/cardiac insitu / [employer] Dual chamber, V45     PHACOEMULSIFICATION CLEAR CORNEA WITH STANDARD INTRAOCULAR LENS IMPLANT Right 2018    Procedure: PHACOEMULSIFICATION CLEAR CORNEA WITH STANDARD INTRAOCULAR LENS IMPLANT;  RIGHT EYE PHACOEMULSIFICATION CLEAR CORNEA WITH STANDARD INTRAOCULAR LENS IMPLANT ;  Surgeon: Mat Echevarria MD;  Location: [facility]       Family History   Problem Relation Age of Onset     Alzheimer Disease Brother      Cerebrovascular Disease Father 80     Family History Negative Sister      Family History Negative Son      Family History Negative Daughter      Family History Negative Sister      Family History Negative Daughter      Breast Cancer No family hx of      Prostate Cancer No family hx of        Social History     Tobacco Use     Smoking status: Former Smoker     Quit date: 1975     Years since quittin.6     Smokeless tobacco: Never Used   Substance Use Topics     Alcohol use: Yes     Comment: 1- 2 BEERS WEEKLY or less     Discussed memory, mental exercise, physical wellness and social outlets         Current providers sharing in care for this patient include:   Patient Care Team:  [facility] as PCP - General  Kushal Valentin MD as Assigned PCP  Zuleyka Eng APRN CNP as Assigned Heart and Vascular Provider    The following health maintenance items are reviewed in Epic and correct as of today:  Health Maintenance Due   Topic Date Due     COVID-19 Vaccine (2 - Pfizer 2-dose series) 2021     CMP  2021     FALL RISK ASSESSMENT  2021     CREATININE  2021     INFLUENZA VACCINE (1) 2021     BP Readings from Last 3 Encounters:   21 132/74   21 (!) 140/68   21  "118/60    Wt Readings from Last 3 Encounters:   08/30/21 78 kg (171 lb 14.4 oz)   04/30/21 79 kg (174 lb 3.2 oz)   01/06/21 78.7 kg (173 lb 6.4 oz)                  Pneumonia Vaccine:Adults age 65+ who received Pneumovax (PPSV23) at 65 years or older: Should be given PCV13 > 1 year after their most recent PPSV23        Review of Systems   Constitutional: Negative for chills and fever.   HENT: Positive for hearing loss. Negative for congestion, ear pain and sore throat.    Eyes: Positive for visual disturbance. Negative for pain.   Respiratory: Positive for shortness of breath. Negative for cough.    Cardiovascular: Negative for chest pain, palpitations and peripheral edema.   Gastrointestinal: Negative for abdominal pain, constipation, diarrhea, heartburn, hematochezia and nausea.   Genitourinary: Positive for frequency, impotence and urgency. Negative for discharge, dysuria, genital sores and hematuria.   Musculoskeletal: Negative for arthralgias, joint swelling and myalgias.   Skin: Negative for rash.   Neurological: Negative for dizziness, weakness, headaches and paresthesias.   Psychiatric/Behavioral: Negative for mood changes. The patient is not nervous/anxious.      Constitutional, HEENT, cardiovascular, pulmonary, GI, , musculoskeletal, neuro, skin, endocrine and psych systems are negative, except as otherwise noted.    OBJECTIVE:   /74 (BP Location: Right arm, Patient Position: Sitting, Cuff Size: Adult Regular)   Pulse 82   Resp 18   Ht 1.721 m (5' 7.75\")   Wt 78 kg (171 lb 14.4 oz)   BMI 26.33 kg/m   Estimated body mass index is 26.33 kg/m  as calculated from the following:    Height as of this encounter: 1.721 m (5' 7.75\").    Weight as of this encounter: 78 kg (171 lb 14.4 oz).  Physical Exam  GENERAL: healthy, alert and no distress  EYES: Eyes grossly normal to inspection, PERRL and conjunctivae and sclerae normal  HENT: ear canals and TM's normal, nose and mouth without ulcers or " "lesions  NECK: no adenopathy, no asymmetry, masses, or scars and thyroid normal to palpation  RESP: lungs clear to auscultation - no rales, rhonchi or wheezes  CV: regular rate and rhythm, normal S1 S2, no S3 or S4, no murmur, click or rub, no peripheral edema and peripheral pulses strong  ABDOMEN: soft, nontender, no hepatosplenomegaly, no masses and bowel sounds normal  MS: no gross musculoskeletal defects noted, no edema  SKIN: no suspicious lesions or rashes  NEURO: Normal strength and tone, mentation intact and speech normal  PSYCH: mentation appears normal, affect normal/bright    Diagnostic Test Results:  Labs reviewed in Epic    ASSESSMENT / PLAN:   (Z00.00) Encounter for Medicare annual wellness exam  (primary encounter diagnosis)  Comment:   Plan:       (E78.5) Hyperlipidemia LDL goal <100  Comment:   Plan: Lipid panel reflex to direct LDL Fasting        Check lab    (I10) Benign essential hypertension  Comment:   Plan: COMPREHENSIVE METABOLIC PANEL, Lipid panel         reflex to direct LDL Fasting, OFFICE/OUTPT         VISIT,EST,LEVL III            (Z79.01) Long term current use of anticoagulant therapy  Comment:   Plan: CBC with platelets and differential,         OFFICE/OUTPT VISIT,EST,LEVL III        Get lab, check hepatic and cbc    (Z79.01) Long-term (current) use of anticoagulants [Z79.01]  Comment:   Plan:     (I48.19) Persistent atrial fibrillation (H)  Comment:   Plan: OFFICE/OUTPT VISIT,EST,LEVL III        Vital signs stable.       Patient has been advised of split billing requirements and indicates understanding:   COUNSELING:      Estimated body mass index is 26.33 kg/m  as calculated from the following:    Height as of this encounter: 1.721 m (5' 7.75\").    Weight as of this encounter: 78 kg (171 lb 14.4 oz).    Weight management plan: Discussed healthy diet and exercise guidelines    He reports that he quit smoking about 46 years ago. He has never used smokeless tobacco.      Appropriate " preventive services were discussed with this patient, including applicable screening as appropriate for cardiovascular disease, diabetes, osteopenia/osteoporosis, and glaucoma.  As appropriate for age/gender, discussed screening for colorectal cancer, prostate cancer, breast cancer, and cervical cancer. Checklist reviewing preventive services available has been given to the patient.    Reviewed patients plan of care and provided an AVS. The Basic Care Plan (routine screening as documented in Health Maintenance) for Nathen meets the Care Plan requirement. This Care Plan has been established and reviewed with the Patient.    Counseling Resources:  ATP IV Guidelines  Pooled Cohorts Equation Calculator  Breast Cancer Risk Calculator  Breast Cancer: Medication to Reduce Risk  FRAX Risk Assessment  ICSI Preventive Guidelines  Dietary Guidelines for Americans, 2010  USDA's MyPlate  ASA Prophylaxis  Lung CA Screening    Kushal Valentin MD  [facility]    Identified Health Risks:

## 2021-08-30 NOTE — PATIENT INSTRUCTIONS
Patient Education   Personalized Prevention Plan  You are due for the preventive services outlined below.  Your care team is available to assist you in scheduling these services.  If you have already completed any of these items, please share that information with your care team to update in your medical record.  Health Maintenance Due   Topic Date Due     PHQ-2  01/01/2021     COVID-19 Vaccine (2 - Pfizer 2-dose series) 03/06/2021     Comprehensive Metabolic Panel  08/12/2021     FALL RISK ASSESSMENT  08/12/2021     Annual Wellness Visit  08/12/2021     Creatinine Lab  08/12/2021     Flu Vaccine (1) 09/01/2021

## 2021-08-31 NOTE — PROGRESS NOTES
ANTICOAGULATION MANAGEMENT     Nathen Gage 85 year old male is on warfarin with therapeutic INR result. (Goal INR 2.0-2.5)    Recent labs: (last 7 days)     08/30/21  1511   INR 2.0*       ASSESSMENT     Source(s): Chart Review and Patient/Caregiver Call       Warfarin doses taken: Warfarin taken as instructed    Diet: No new diet changes identified    New illness, injury, or hospitalization: No    Medication/supplement changes: None noted    Signs or symptoms of bleeding or clotting: No    Previous INR: Therapeutic last 2 visits    Additional findings: None     PLAN     Recommended plan for no diet, medication or health factor changes affecting INR     Dosing Instructions: Continue your current warfarin dose with next INR in 2 weeks       Summary  As of 8/30/2021    Full warfarin instructions:  1.25 mg every Fri; 2.5 mg all other days   Next INR check:  9/13/2021             Telephone call with Nathen who verbalizes understanding and agrees to plan    Lab visit scheduled    Education provided: Please call back if any changes to your diet, medications or how you've been taking warfarin and Contact [phone]  with any changes, questions or concerns.     Plan made per [facility] anticoagulation protocol    Teresa Castro RN  Anticoagulation Clinic  8/31/2021    _______________________________________________________________________     Anticoagulation Episode Summary     Current INR goal:  2.0-2.5   TTR:  67.3 % (1 y)   Target end date:  Indefinite   Send INR reminders to:  [facility]    Indications    Long-term (current) use of anticoagulants [Z79.01] [Z79.01]  Atrial fibrillation (H) [I48.91]  Persistent atrial fibrillation (H) [I48.19]  Long term current use of anticoagulants with INR goal of 2.0-3.0 [Z79.01]           Comments:           Anticoagulation Care Providers     Provider Role Specialty Phone number    Kushal Valentin MD Referring Family Medicine [phone]

## 2021-09-13 ENCOUNTER — LAB (OUTPATIENT)
Dept: LAB | Facility: CLINIC | Age: 86
End: 2021-09-13
Payer: COMMERCIAL

## 2021-09-13 ENCOUNTER — ANTICOAGULATION THERAPY VISIT (OUTPATIENT)
Dept: ANTICOAGULATION | Facility: CLINIC | Age: 86
End: 2021-09-13

## 2021-09-13 DIAGNOSIS — I48.19 PERSISTENT ATRIAL FIBRILLATION (H): ICD-10-CM

## 2021-09-13 DIAGNOSIS — Z79.01 LONG TERM CURRENT USE OF ANTICOAGULANT THERAPY: ICD-10-CM

## 2021-09-13 DIAGNOSIS — Z79.01 LONG TERM CURRENT USE OF ANTICOAGULANTS WITH INR GOAL OF 2.0-3.0: ICD-10-CM

## 2021-09-13 DIAGNOSIS — I48.91 ATRIAL FIBRILLATION (H): ICD-10-CM

## 2021-09-13 DIAGNOSIS — I10 BENIGN ESSENTIAL HYPERTENSION: ICD-10-CM

## 2021-09-13 DIAGNOSIS — E78.5 HYPERLIPIDEMIA LDL GOAL <100: ICD-10-CM

## 2021-09-13 DIAGNOSIS — Z79.01 LONG TERM CURRENT USE OF ANTICOAGULANT THERAPY: Primary | ICD-10-CM

## 2021-09-13 LAB
BASOPHILS # BLD AUTO: 0 10E3/UL (ref 0–0.2)
BASOPHILS NFR BLD AUTO: 0 %
EOSINOPHIL # BLD AUTO: 0.2 10E3/UL (ref 0–0.7)
EOSINOPHIL NFR BLD AUTO: 3 %
ERYTHROCYTE [DISTWIDTH] IN BLOOD BY AUTOMATED COUNT: 13.7 % (ref 10–15)
HCT VFR BLD AUTO: 40 % (ref 40–53)
HGB BLD-MCNC: 13.6 G/DL (ref 13.3–17.7)
INR BLD: 2.5 (ref 0.9–1.1)
LYMPHOCYTES # BLD AUTO: 1.1 10E3/UL (ref 0.8–5.3)
LYMPHOCYTES NFR BLD AUTO: 22 %
MCH RBC QN AUTO: 29.8 PG (ref 26.5–33)
MCHC RBC AUTO-ENTMCNC: 34 G/DL (ref 31.5–36.5)
MCV RBC AUTO: 88 FL (ref 78–100)
MONOCYTES # BLD AUTO: 0.7 10E3/UL (ref 0–1.3)
MONOCYTES NFR BLD AUTO: 13 %
NEUTROPHILS # BLD AUTO: 3 10E3/UL (ref 1.6–8.3)
NEUTROPHILS NFR BLD AUTO: 61 %
PLATELET # BLD AUTO: 197 10E3/UL (ref 150–450)
RBC # BLD AUTO: 4.56 10E6/UL (ref 4.4–5.9)
WBC # BLD AUTO: 4.9 10E3/UL (ref 4–11)

## 2021-09-13 PROCEDURE — 85610 PROTHROMBIN TIME: CPT

## 2021-09-13 PROCEDURE — 85025 COMPLETE CBC W/AUTO DIFF WBC: CPT

## 2021-09-13 PROCEDURE — 80053 COMPREHEN METABOLIC PANEL: CPT

## 2021-09-13 PROCEDURE — 36415 COLL VENOUS BLD VENIPUNCTURE: CPT

## 2021-09-13 PROCEDURE — 80061 LIPID PANEL: CPT

## 2021-09-14 LAB
ALBUMIN SERPL-MCNC: 3.9 G/DL (ref 3.4–5)
ALP SERPL-CCNC: 90 U/L (ref 40–150)
ALT SERPL W P-5'-P-CCNC: 18 U/L (ref 0–70)
ANION GAP SERPL CALCULATED.3IONS-SCNC: 7 MMOL/L (ref 3–14)
AST SERPL W P-5'-P-CCNC: 14 U/L (ref 0–45)
BILIRUB SERPL-MCNC: 1 MG/DL (ref 0.2–1.3)
BUN SERPL-MCNC: 13 MG/DL (ref 7–30)
CALCIUM SERPL-MCNC: 9.3 MG/DL (ref 8.5–10.1)
CHLORIDE BLD-SCNC: 101 MMOL/L (ref 94–109)
CHOLEST SERPL-MCNC: 146 MG/DL
CO2 SERPL-SCNC: 25 MMOL/L (ref 20–32)
CREAT SERPL-MCNC: 1.02 MG/DL (ref 0.66–1.25)
FASTING STATUS PATIENT QL REPORTED: YES
GFR SERPL CREATININE-BSD FRML MDRD: 67 ML/MIN/1.73M2
GLUCOSE BLD-MCNC: 96 MG/DL (ref 70–99)
HDLC SERPL-MCNC: 59 MG/DL
LDLC SERPL CALC-MCNC: 70 MG/DL
NONHDLC SERPL-MCNC: 87 MG/DL
POTASSIUM BLD-SCNC: 4.7 MMOL/L (ref 3.4–5.3)
PROT SERPL-MCNC: 7.6 G/DL (ref 6.8–8.8)
SODIUM SERPL-SCNC: 133 MMOL/L (ref 133–144)
TRIGL SERPL-MCNC: 83 MG/DL

## 2021-09-19 DIAGNOSIS — I10 ESSENTIAL HYPERTENSION, BENIGN: ICD-10-CM

## 2021-09-19 DIAGNOSIS — E78.00 PURE HYPERCHOLESTEROLEMIA: ICD-10-CM

## 2021-09-21 ENCOUNTER — TELEPHONE (OUTPATIENT)
Dept: FAMILY MEDICINE | Facility: CLINIC | Age: 86
End: 2021-09-21

## 2021-09-21 RX ORDER — ATORVASTATIN CALCIUM 20 MG/1
TABLET, FILM COATED ORAL
Qty: 90 TABLET | Refills: 0 | Status: SHIPPED | OUTPATIENT
Start: 2021-09-21 | End: 2021-12-20

## 2021-09-21 NOTE — TELEPHONE ENCOUNTER
Reason for Call:  Other call back    Detailed comments: Pt is having emergency eye surgery on 9/24, has changed is f/u appt to a pre op appt for 9/23 and needs a covid test before this procedure.  Pt said he would like a call back from Dr. Valentin as soon as possible.    Phone Number Patient can be reached at: Home number on file [phone] (home)    Best Time: anytime    Can we leave a detailed message on this number? YES    Call taken on 9/21/2021 at 4:54 PM by Taisha Christina

## 2021-09-22 NOTE — TELEPHONE ENCOUNTER
left message for call back, see note below, can get at visit otherwise eye MD orders, f/u with eye MD if ?'s or urgency of order, usually someone from their team will call pt to schedule, confirm with pt  Nataly Becerra RN

## 2021-09-22 NOTE — TELEPHONE ENCOUNTER
Patient and wife calling.  Eye provider told them to have Dr. Valentin do COVID test at preop.  My concern is we would not have COVID result before his scheduled surgery.  Gave information for [facility] to see if they have rapid test.  They will check and do rapid if available.  Advised otherwise calling surgeons office and letting them know result will not be back in time if COVID test done at preop and have them advise what they want for plan.  Patient and wife agree with plan.  Rayna Hall RN

## 2021-09-23 ENCOUNTER — OFFICE VISIT (OUTPATIENT)
Dept: FAMILY MEDICINE | Facility: CLINIC | Age: 86
End: 2021-09-23
Payer: COMMERCIAL

## 2021-09-23 ENCOUNTER — ANTICOAGULATION THERAPY VISIT (OUTPATIENT)
Dept: ANTICOAGULATION | Facility: CLINIC | Age: 86
End: 2021-09-23

## 2021-09-23 VITALS
RESPIRATION RATE: 16 BRPM | HEIGHT: 68 IN | BODY MASS INDEX: 25.93 KG/M2 | WEIGHT: 171.1 LBS | DIASTOLIC BLOOD PRESSURE: 78 MMHG | HEART RATE: 84 BPM | SYSTOLIC BLOOD PRESSURE: 134 MMHG

## 2021-09-23 DIAGNOSIS — I48.91 ATRIAL FIBRILLATION (H): ICD-10-CM

## 2021-09-23 DIAGNOSIS — I48.0 PAROXYSMAL ATRIAL FIBRILLATION (H): ICD-10-CM

## 2021-09-23 DIAGNOSIS — Z01.818 PREOP GENERAL PHYSICAL EXAM: Primary | ICD-10-CM

## 2021-09-23 DIAGNOSIS — Z79.01 LONG TERM CURRENT USE OF ANTICOAGULANTS WITH INR GOAL OF 2.0-3.0: ICD-10-CM

## 2021-09-23 DIAGNOSIS — Z79.01 LONG TERM CURRENT USE OF ANTICOAGULANT THERAPY: Primary | ICD-10-CM

## 2021-09-23 DIAGNOSIS — I48.19 PERSISTENT ATRIAL FIBRILLATION (H): ICD-10-CM

## 2021-09-23 DIAGNOSIS — I70.0 ATHEROSCLEROSIS OF AORTA (H): ICD-10-CM

## 2021-09-23 DIAGNOSIS — Z95.0 CARDIAC PACEMAKER IN SITU: ICD-10-CM

## 2021-09-23 LAB
ERYTHROCYTE [DISTWIDTH] IN BLOOD BY AUTOMATED COUNT: 13.7 % (ref 10–15)
HCT VFR BLD AUTO: 40 % (ref 40–53)
HGB BLD-MCNC: 13.6 G/DL (ref 13.3–17.7)
INR BLD: 2.6 (ref 0.9–1.1)
MCH RBC QN AUTO: 30 PG (ref 26.5–33)
MCHC RBC AUTO-ENTMCNC: 34 G/DL (ref 31.5–36.5)
MCV RBC AUTO: 88 FL (ref 78–100)
PLATELET # BLD AUTO: 228 10E3/UL (ref 150–450)
RBC # BLD AUTO: 4.54 10E6/UL (ref 4.4–5.9)
WBC # BLD AUTO: 6.6 10E3/UL (ref 4–11)

## 2021-09-23 PROCEDURE — 99214 OFFICE O/P EST MOD 30 MIN: CPT | Performed by: FAMILY MEDICINE

## 2021-09-23 PROCEDURE — 85610 PROTHROMBIN TIME: CPT | Performed by: FAMILY MEDICINE

## 2021-09-23 PROCEDURE — 85027 COMPLETE CBC AUTOMATED: CPT | Performed by: FAMILY MEDICINE

## 2021-09-23 PROCEDURE — 36415 COLL VENOUS BLD VENIPUNCTURE: CPT | Performed by: FAMILY MEDICINE

## 2021-09-23 PROCEDURE — 80048 BASIC METABOLIC PNL TOTAL CA: CPT | Performed by: FAMILY MEDICINE

## 2021-09-23 ASSESSMENT — MIFFLIN-ST. JEOR: SCORE: 1432.66

## 2021-09-23 NOTE — PATIENT INSTRUCTIONS

## 2021-09-23 NOTE — PROGRESS NOTES
ANTICOAGULATION MANAGEMENT     Nathen Gage 85 year old male is on warfarin with supratherapeutic INR result. 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    Medication/supplement changes: None noted    Signs or symptoms of bleeding or clotting: No    Previous INR: Therapeutic last 2(+) visits    Additional findings: 9/24/21 eye surgery as mentioned above     Patient also mentioned that some days he get 2-3000 steps in and some days he gets 10,000 steps in.     PLAN     Recommended plan for temporary change(s) affecting INR     Dosing Instructions: Continue your current warfarin dose with next INR in 10 days       Summary  As of 9/23/2021    Full warfarin instructions:  9/23: 1.25 mg; Otherwise 1.25 mg every Fri; 2.5 mg all other days   Next INR check:  10/4/2021             Telephone call with Nathen who verbalizes understanding and agrees to plan    Lab visit scheduled    Education provided: Contact [phone]  with any changes, questions or concerns.     Plan made per [facility] anticoagulation protocol    Fidelina Pizano RN  Anticoagulation Clinic  9/23/2021    _______________________________________________________________________     Anticoagulation Episode Summary     Current INR goal:  2.0-2.5   TTR:  64.6 % (1 y)   Target end date:  Indefinite   Send INR reminders to:  [facility]    Indications    Long-term (current) use of anticoagulants [Z79.01] [Z79.01]  Atrial fibrillation (H) [I48.91]  Persistent atrial fibrillation (H) [I48.19]  Long term current use of anticoagulants with INR goal of 2.0-3.0 [Z79.01]           Comments:           Anticoagulation Care Providers     Provider Role Specialty Phone  number    Kushal Valentin MD [facility] [phone]

## 2021-09-23 NOTE — PROGRESS NOTES
[facility]  [address] [address]  Phone: [phone]  Primary Provider: [facility]  Pre-op Performing Provider: YOKASTA ROA      PREOPERATIVE EVALUATION:  Today's date: 9/23/2021    Nathen Gage is a 85 year old male who presents for a preoperative evaluation.    Surgical Information:  Surgery/Procedure: Retinal Detachment Repair  Surgery Location: [facility]   Surgeon: Dr. Medina  Surgery Date: 09/24/2021  Time of Surgery: 1:15 pm  Where patient plans to recover: At home with family  Fax number for surgical facility: [phone]    Type of Anesthesia Anticipated: Choice    Assessment & Plan     The proposed surgical procedure is considered LOW risk.    Preop general physical exam  For retina surgery       Hold am meds             RECOMMENDATION:  APPROVAL GIVEN to proceed with proposed procedure, without further diagnostic evaluation. INR is 2.6 today     Review of external notes as documented above   Retinal Detachment  For surgery                          Review of Systems  CONSTITUTIONAL: NEGATIVE for fever, chills, change in weight  INTEGUMENTARY/SKIN: NEGATIVE for worrisome rashes, moles or lesions  EYES: NEGATIVE for vision changes or irritation  ENT/MOUTH: NEGATIVE for ear, mouth and throat problems  RESP: NEGATIVE for significant cough or SOB  CV: NEGATIVE for chest pain, palpitations or peripheral edema  GI: NEGATIVE for nausea, abdominal pain, heartburn, or change in bowel habits  : NEGATIVE for frequency, dysuria, or hematuria  MUSCULOSKELETAL: NEGATIVE for significant arthralgias or myalgia  NEURO: NEGATIVE for weakness, dizziness or paresthesias  ENDOCRINE: NEGATIVE for temperature intolerance, skin/hair changes  HEME: NEGATIVE for bleeding problems  PSYCHIATRIC: NEGATIVE for changes in mood or affect    Patient Active Problem List    Diagnosis Date Noted     Long term current use of anticoagulants with  INR goal of 2.0-3.0 02/09/2021     Priority: Medium     Persistent atrial fibrillation (H) 03/10/2020     Priority: Medium     SOB (shortness of breath) 12/01/2018     Priority: Medium     Pain in joint involving ankle and foot, right 09/20/2018     Priority: Medium     Chest pain 12/24/2017     Priority: Medium     Acute idiopathic gout of left knee 04/27/2016     Priority: Medium     Presbycusis, unspecified laterality 04/27/2016     Priority: Medium     Long-term (current) use of anticoagulants [Z79.01] 04/14/2016     Priority: Medium     Faintness 05/12/2015     Priority: Medium     Dilated aortic root (H)      Priority: Medium     Pulmonary hypertension (H)      Priority: Medium     H/O: GI bleed      Priority: Medium     2010 in [place]       Mixed hyperlipidemia      Priority: Medium     Diagnosis updated by automated process. Provider to review and confirm.       Atrial fibrillation (H) 06/11/2014     Priority: Medium     Health Care Home 03/15/2013     Priority: Medium     Sylwia Gonzalez RN-BSN, [facility]  [phone].  A / [facility]      DX V65.8 REPLACED WITH [id number] HEALTH CARE HOME (04/08/2013)       Pacemaker, artificial 02/08/2012     Priority: Medium     Sick sinus syndrome (H) 10/19/2011     Priority: Medium     S/p dual chamber pacemaker 2011       Syncope 10/19/2011     Priority: Medium     Hyponatremia 10/19/2011     Priority: Medium     Actinic keratosis 08/02/2005     Priority: Medium     Gout 08/01/2005     Priority: Medium     Problem list name updated by automated process. 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 Provider to review       Esophageal reflux 11/04/2003     Priority: Medium      Past Medical History:   Diagnosis Date     Actinic  keratosis      Acute idiopathic gout of left knee 4/27/2016     Atrial fibrillation (H)     on Eliquis     Dilated aortic root (H)      Esophageal reflux      Esophagitis      H/O: GI bleed 2010     Hyperlipidaemia      Hyperlipidemia LDL goal <100      Impotence of organic origin      Presbycusis, unspecified laterality 4/27/2016     Pulmonary hypertension (H)      Sick sinus syndrome (H)     pacemaker implanted 2011     Unspecified essential hypertension      Past Surgical History:   Procedure Laterality Date     IMPLANT PACEMAKER  2011    Pacemaker/cardiac insitu / [employer] Dual chamber, V45     PHACOEMULSIFICATION CLEAR CORNEA WITH STANDARD INTRAOCULAR LENS IMPLANT Right 5/29/2018    Procedure: PHACOEMULSIFICATION CLEAR CORNEA WITH STANDARD INTRAOCULAR LENS IMPLANT;  RIGHT EYE PHACOEMULSIFICATION CLEAR CORNEA WITH STANDARD INTRAOCULAR LENS IMPLANT ;  Surgeon: Mat Echevarria MD;  Location: [facility]     Current Outpatient Medications   Medication Sig Dispense Refill     atorvastatin (LIPITOR) 20 MG tablet TAKE 1 TABLET BY MOUTH EVERY DAY 90 tablet 0     cetirizine (ZYRTEC) 10 MG tablet Take 1 tablet (10 mg) by mouth daily 30 tablet 0     diclofenac (VOLTAREN) 1 % topical gel Apply 4 g topically 4 times daily 150 g 0     doxazosin (CARDURA) 4 MG tablet TAKE 1 TABLET(4 MG) BY MOUTH DAILY 90 tablet 1     losartan (COZAAR) 100 MG tablet Take 1 tablet (100 mg) by mouth daily 90 tablet 3     pantoprazole (PROTONIX) 40 MG EC tablet TAKE 1 TABLET(40 MG) BY MOUTH DAILY 90 tablet 0     Polyethylene Glycol 400 (BLINK TEARS OP) Apply to eye as needed       prednisolon-gatiflox-bromfenac, pt own, no charge, 1-0.5-0.075 % opthalmic solution 1 drop by Both Eyelids route 3 times daily        triamcinolone (KENALOG) 0.5 % external ointment Apply 1 g topically 2 times daily 30 g 2     warfarin ANTICOAGULANT (COUMADIN) 2.5 MG tablet TAKE ONE-HALF TABLET BY MOUTH EVERY FRIDAY AND TAKE 1 TABLET ALL OTHER DAYS AS  "DIRECTED 30 tablet 11       Allergies   Allergen Reactions     Neomycin Sulfate         Social History     Tobacco Use     Smoking status: Former Smoker     Quit date: 1975     Years since quittin.7     Smokeless tobacco: Never Used   Substance Use Topics     Alcohol use: Yes     Comment: 1- 2 BEERS WEEKLY or less       History   Drug Use No         Objective     /78 (BP Location: Right arm, Patient Position: Sitting, Cuff Size: Adult Regular)   Pulse 84   Resp 16   Ht 1.715 m (5' 7.5\")   Wt 77.6 kg (171 lb 1.6 oz)   BMI 26.40 kg/m      Physical Exam    GENERAL APPEARANCE: healthy, alert and no distress     EYES: EOMI,  PERRL     HENT: ear canals and TM's normal and nose and mouth without ulcers or lesions     NECK: no adenopathy, no asymmetry, masses, or scars and thyroid normal to palpation     RESP: lungs clear to auscultation - no rales, rhonchi or wheezes     CV: regular rates and rhythm, normal S1 S2, no S3 or S4 and no murmur, click or rub     ABDOMEN:  soft, nontender, no HSM or masses and bowel sounds normal     MS: extremities normal- no gross deformities noted, no evidence of inflammation in joints, FROM in all extremities.     SKIN: no suspicious lesions or rashes     NEURO: Normal strength and tone, sensory exam grossly normal, mentation intact and speech normal     PSYCH: mentation appears normal. and affect normal/bright     LYMPHATICS: No cervical adenopathy    Recent Labs   Lab Test 21  0938 21  0937 21  1511 20  1106 20  1029   HGB  --  13.6  --   --  15.2   PLT  --  197  --   --  193   INR 2.5*  --  2.0*   < >  --    NA  --  133  --   --  133   POTASSIUM  --  4.7  --   --  4.6   CR  --  1.02  --   --  1.03    < > = values in this interval not displayed.        Diagnostics:     EKG required for known coronary heart disease and not completed in the last 90 days.     Revised Cardiac Risk Index (RCRI):  The patient has the following serious " cardiovascular risks for perioperative complications:   - No serious cardiac risks = 0 points     RCRI Interpretation: 1 point: Class II (low risk - 0.9% complication rate)    Fit for surgery consulted with anaesthesia re: the INR        Signed Electronically by: Kushal Valentin MD  Copy of this evaluation report is provided to requesting physician.

## 2021-09-24 LAB
ANION GAP SERPL CALCULATED.3IONS-SCNC: 5 MMOL/L (ref 3–14)
BUN SERPL-MCNC: 21 MG/DL (ref 7–30)
CALCIUM SERPL-MCNC: 9 MG/DL (ref 8.5–10.1)
CHLORIDE BLD-SCNC: 100 MMOL/L (ref 94–109)
CO2 SERPL-SCNC: 26 MMOL/L (ref 20–32)
CREAT SERPL-MCNC: 0.99 MG/DL (ref 0.66–1.25)
GFR SERPL CREATININE-BSD FRML MDRD: 70 ML/MIN/1.73M2
GLUCOSE BLD-MCNC: 85 MG/DL (ref 70–99)
POTASSIUM BLD-SCNC: 4.6 MMOL/L (ref 3.4–5.3)
SODIUM SERPL-SCNC: 131 MMOL/L (ref 133–144)

## 2021-10-04 ENCOUNTER — LAB (OUTPATIENT)
Dept: LAB | Facility: CLINIC | Age: 86
End: 2021-10-04
Payer: COMMERCIAL

## 2021-10-04 ENCOUNTER — ANTICOAGULATION THERAPY VISIT (OUTPATIENT)
Dept: ANTICOAGULATION | Facility: CLINIC | Age: 86
End: 2021-10-04

## 2021-10-04 ENCOUNTER — ALLIED HEALTH/NURSE VISIT (OUTPATIENT)
Dept: FAMILY MEDICINE | Facility: CLINIC | Age: 86
End: 2021-10-04
Payer: COMMERCIAL

## 2021-10-04 DIAGNOSIS — Z79.01 LONG TERM CURRENT USE OF ANTICOAGULANTS WITH INR GOAL OF 2.0-3.0: ICD-10-CM

## 2021-10-04 DIAGNOSIS — I48.91 ATRIAL FIBRILLATION (H): ICD-10-CM

## 2021-10-04 DIAGNOSIS — Z79.01 LONG TERM CURRENT USE OF ANTICOAGULANT THERAPY: ICD-10-CM

## 2021-10-04 DIAGNOSIS — I48.19 PERSISTENT ATRIAL FIBRILLATION (H): ICD-10-CM

## 2021-10-04 DIAGNOSIS — Z79.01 LONG TERM CURRENT USE OF ANTICOAGULANT THERAPY: Primary | ICD-10-CM

## 2021-10-04 DIAGNOSIS — Z23 NEED FOR PROPHYLACTIC VACCINATION AND INOCULATION AGAINST INFLUENZA: Primary | ICD-10-CM

## 2021-10-04 LAB — INR BLD: 2.6 (ref 0.9–1.1)

## 2021-10-04 PROCEDURE — 99207 PR NO CHARGE NURSE ONLY: CPT

## 2021-10-04 PROCEDURE — 36416 COLLJ CAPILLARY BLOOD SPEC: CPT

## 2021-10-04 PROCEDURE — G0008 ADMIN INFLUENZA VIRUS VAC: HCPCS

## 2021-10-04 PROCEDURE — 85610 PROTHROMBIN TIME: CPT

## 2021-10-04 PROCEDURE — 90662 IIV NO PRSV INCREASED AG IM: CPT

## 2021-10-04 NOTE — PROGRESS NOTES
Anticoagulation Management    Unable to reach Neal today.    Today's INR result of 2.6 is supratherapeutic (goal INR of 2.0-2.5).  Result received from: Clinic Lab    Follow up required to confirm warfarin dose taken and assess for changes    Left VM to call Tara with transfer to [facility]:[phone] OR transfer to:[facility]      Anticoagulation clinic to follow up    Fidelina Pizano RN

## 2021-10-04 NOTE — PROGRESS NOTES
ANTICOAGULATION MANAGEMENT     Nathen Gage 85 year old male is on warfarin with supratherapeutic INR result. (Goal INR 2.0-2.5)    Recent labs: (last 7 days)     10/04/21  1058   INR 2.6*       ASSESSMENT     Source(s): Patient/Caregiver Call       Warfarin doses taken: Warfarin taken as instructed    Diet: No new diet changes identified    New illness, injury, or hospitalization: Yes: detached retina 9/24--had surgery to correct it and patient states he was told at the follow up exam that the surgery was successful.    Medication/supplement changes: using 2 eye drops in the a.m. and 1 in the p.m. post retina attachment surgery    Signs or symptoms of bleeding or clotting: No    Previous INR: Supratherapeutic    Additional findings: Intermittent pain and irritation in R eye post surgery     PLAN     Recommended plan for temporary change(s) affecting INR     Dosing Instructions: Partial hold then continue your current warfarin dose with next INR in 2 weeks .  We will consider warfarin MD reduction at next visit, if INR remains supra.    Summary  As of 10/4/2021    Full warfarin instructions:  1.25 mg every Fri; 2.5 mg all other days   Next INR check:               Telephone call with Nathen who agrees to plan and repeated back plan correctly    Lab visit scheduled    Education provided: Contact [phone]  with any changes, questions or concerns.     Plan made per [facility] anticoagulation protocol    Fidelina Pizano RN  Anticoagulation Clinic  10/4/2021    _______________________________________________________________________     Anticoagulation Episode Summary     Current INR goal:  2.0-2.5   TTR:  61.6 % (1 y)   Target end date:  Indefinite   Send INR reminders to:  [facility]    Indications    Long-term (current) use of anticoagulants [Z79.01] [Z79.01]  Atrial fibrillation (H) [I48.91]  Persistent atrial fibrillation (H) [I48.19]  Long term current use of anticoagulants with INR goal of 2.0-3.0  [Z79.01]           Comments:           Anticoagulation Care Providers     Provider Role Specialty Phone number    Kushal Valentin MD Referring Family Medicine [phone]

## 2021-10-18 ENCOUNTER — DOCUMENTATION ONLY (OUTPATIENT)
Dept: LAB | Facility: CLINIC | Age: 86
End: 2021-10-18

## 2021-10-18 ENCOUNTER — ANTICOAGULATION THERAPY VISIT (OUTPATIENT)
Dept: ANTICOAGULATION | Facility: CLINIC | Age: 86
End: 2021-10-18

## 2021-10-18 ENCOUNTER — LAB (OUTPATIENT)
Dept: LAB | Facility: CLINIC | Age: 86
End: 2021-10-18
Payer: COMMERCIAL

## 2021-10-18 DIAGNOSIS — Z79.01 LONG TERM CURRENT USE OF ANTICOAGULANT THERAPY: Primary | ICD-10-CM

## 2021-10-18 DIAGNOSIS — I48.91 ATRIAL FIBRILLATION (H): ICD-10-CM

## 2021-10-18 DIAGNOSIS — I48.19 PERSISTENT ATRIAL FIBRILLATION (H): ICD-10-CM

## 2021-10-18 DIAGNOSIS — Z79.01 LONG TERM CURRENT USE OF ANTICOAGULANTS WITH INR GOAL OF 2.0-3.0: ICD-10-CM

## 2021-10-18 DIAGNOSIS — Z79.01 LONG TERM CURRENT USE OF ANTICOAGULANT THERAPY: ICD-10-CM

## 2021-10-18 LAB — INR BLD: 1.8 (ref 0.9–1.1)

## 2021-10-18 PROCEDURE — 36415 COLL VENOUS BLD VENIPUNCTURE: CPT

## 2021-10-18 PROCEDURE — 85610 PROTHROMBIN TIME: CPT

## 2021-10-18 NOTE — PROGRESS NOTES
Nathen will be traveling today and asked that you please contact him on his cell phone to discuss his results and dosage. Thank You.

## 2021-10-18 NOTE — PROGRESS NOTES
"ANTICOAGULATION MANAGEMENT     Nathen CAMACHO Merari 85 year old male is on warfarin with subtherapeutic INR result. (Goal INR 2.0-2.5)    Recent labs: (last 7 days)     10/18/21  0927   INR 1.8*       ASSESSMENT     Source(s): Patient/Caregiver Call       Warfarin doses taken: Warfarin taken as instructed    Diet: Increased greens/vitamin K in diet; plans to resume previous intake.  Patient states he ate \"a lot\" of spinach over the past week.    New illness, injury, or hospitalization: Detached retina surgery end of 09/2021    Medication/supplement changes: None noted    Signs or symptoms of bleeding or clotting: No    Previous INR: Supratherapeutic    Additional findings: None     PLAN     Recommended plan for temporary change(s) affecting INR     Dosing Instructions: Booster dose then continue your current warfarin dose with next INR in 2 weeks       Summary  As of 10/18/2021    Full warfarin instructions:  10/18: 3.75 mg; Otherwise 1.25 mg every Fri; 2.5 mg all other days   Next INR check:  11/1/2021             Telephone call with Nathen who verbalizes understanding and agrees to plan    Lab visit scheduled    Education provided: Importance of consistent vitamin K intake, Impact of vitamin K foods on INR and Vitamin K content of foods    Plan made per [facility] anticoagulation protocol    Fidelina Pizano RN  Anticoagulation Clinic  10/18/2021    _______________________________________________________________________     Anticoagulation Episode Summary     Current INR goal:  2.0-2.5   TTR:  60.2 % (1 y)   Target end date:  Indefinite   Send INR reminders to:  [facility]    Indications    Long-term (current) use of anticoagulants [Z79.01] [Z79.01]  Atrial fibrillation (H) [I48.91]  Persistent atrial fibrillation (H) [I48.19]  Long term current use of anticoagulants with INR goal of 2.0-3.0 [Z79.01]           Comments:           Anticoagulation Care Providers     Provider Role Specialty Phone number    Barbie, " Kushal Wills MD [facility] [phone]

## 2021-10-18 NOTE — PROGRESS NOTES
Anticoagulation Management    Unable to reach Neal today. Patient informed lab that he wanted to be called on his cell # but we don't have a cell # on file and spouses # is not in service.  I left a VM at home #    Today's INR result of 1.8 is subtherapeutic (goal INR of 2.0-2.5).  Result received from: Clinic Lab    Follow up required to confirm warfarin dose taken and assess for changes    Left message to take a booster dose of warfarin,  3.75 mg tonight. and to call [facility] with transfer to [facility]:[phone] OR transfer to:[facility]        Anticoagulation clinic to follow up    Fidelina Pizano RN

## 2021-10-19 DIAGNOSIS — K21.00 GASTROESOPHAGEAL REFLUX DISEASE WITH ESOPHAGITIS: ICD-10-CM

## 2021-10-20 ENCOUNTER — ANCILLARY PROCEDURE (OUTPATIENT)
Dept: CARDIOLOGY | Facility: CLINIC | Age: 86
End: 2021-10-20
Attending: INTERNAL MEDICINE
Payer: COMMERCIAL

## 2021-10-20 DIAGNOSIS — Z95.0 CARDIAC PACEMAKER: ICD-10-CM

## 2021-10-20 PROCEDURE — 93296 REM INTERROG EVL PM/IDS: CPT | Performed by: INTERNAL MEDICINE

## 2021-10-20 PROCEDURE — 93294 REM INTERROG EVL PM/LDLS PM: CPT | Performed by: INTERNAL MEDICINE

## 2021-10-21 RX ORDER — PANTOPRAZOLE SODIUM 40 MG/1
TABLET, DELAYED RELEASE ORAL
Qty: 90 TABLET | Refills: 1 | Status: SHIPPED | OUTPATIENT
Start: 2021-10-21 | End: 2022-04-18

## 2021-10-21 NOTE — TELEPHONE ENCOUNTER
Prescription approved per [facility] Refill Protocol.    Pending Prescriptions:                       Disp   Refills    pantoprazole (PROTONIX) 40 MG EC tablet [*90 tab*1            Sig: TAKE 1 TABLET(40 MG) BY MOUTH DAILY    Barby Márquez Registered Nurse  [facility]

## 2021-11-01 ENCOUNTER — LAB (OUTPATIENT)
Dept: LAB | Facility: CLINIC | Age: 86
End: 2021-11-01
Payer: COMMERCIAL

## 2021-11-01 ENCOUNTER — ANTICOAGULATION THERAPY VISIT (OUTPATIENT)
Dept: ANTICOAGULATION | Facility: CLINIC | Age: 86
End: 2021-11-01

## 2021-11-01 ENCOUNTER — ALLIED HEALTH/NURSE VISIT (OUTPATIENT)
Dept: FAMILY MEDICINE | Facility: CLINIC | Age: 86
End: 2021-11-01
Payer: COMMERCIAL

## 2021-11-01 DIAGNOSIS — I48.19 PERSISTENT ATRIAL FIBRILLATION (H): ICD-10-CM

## 2021-11-01 DIAGNOSIS — Z79.01 LONG TERM CURRENT USE OF ANTICOAGULANT THERAPY: ICD-10-CM

## 2021-11-01 DIAGNOSIS — Z23 ENCOUNTER FOR ADMINISTRATION OF COVID-19 VACCINE: Primary | ICD-10-CM

## 2021-11-01 DIAGNOSIS — Z79.01 LONG TERM CURRENT USE OF ANTICOAGULANTS WITH INR GOAL OF 2.0-3.0: ICD-10-CM

## 2021-11-01 DIAGNOSIS — I48.91 ATRIAL FIBRILLATION (H): ICD-10-CM

## 2021-11-01 DIAGNOSIS — Z79.01 LONG TERM CURRENT USE OF ANTICOAGULANT THERAPY: Primary | ICD-10-CM

## 2021-11-01 LAB — INR BLD: 2 (ref 0.9–1.1)

## 2021-11-01 PROCEDURE — 85610 PROTHROMBIN TIME: CPT

## 2021-11-01 PROCEDURE — 91300 PR COVID VAC PFIZER DIL RECON 30 MCG/0.3 ML IM: CPT

## 2021-11-01 PROCEDURE — 0004A PR COVID VAC PFIZER DIL RECON 30 MCG/0.3 ML IM: CPT

## 2021-11-01 PROCEDURE — 99207 PR NO CHARGE NURSE ONLY: CPT

## 2021-11-01 PROCEDURE — 36416 COLLJ CAPILLARY BLOOD SPEC: CPT

## 2021-11-01 NOTE — PROGRESS NOTES
ANTICOAGULATION MANAGEMENT     Nathen Gage 86 year old male is on warfarin with therapeutic INR result. 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     Plan made per [facility] anticoagulation protocol    Fidelina Pizano, RN  Anticoagulation Clinic  11/1/2021    _______________________________________________________________________     Anticoagulation Episode Summary     Current INR goal:  2.0-2.5   TTR:  57.3 % (1 y)   Target end date:  Indefinite   Send INR reminders to:  [facility]    Indications    Long-term (current) use of anticoagulants [Z79.01] [Z79.01]  Atrial fibrillation (H) [I48.91]  Persistent atrial fibrillation (H) [I48.19]  Long term current use of anticoagulants with INR goal of 2.0-3.0 [Z79.01]           Comments:           Anticoagulation Care Providers     Provider Role Specialty Phone number    Kushal Valentin MD Referring Family Medicine [phone]

## 2021-11-03 ENCOUNTER — OFFICE VISIT (OUTPATIENT)
Dept: PODIATRY | Facility: CLINIC | Age: 86
End: 2021-11-03
Payer: COMMERCIAL

## 2021-11-03 VITALS
HEIGHT: 68 IN | SYSTOLIC BLOOD PRESSURE: 134 MMHG | DIASTOLIC BLOOD PRESSURE: 76 MMHG | WEIGHT: 177.2 LBS | BODY MASS INDEX: 26.86 KG/M2

## 2021-11-03 DIAGNOSIS — L60.8 CHANGE IN NAIL APPEARANCE: Primary | ICD-10-CM

## 2021-11-03 PROCEDURE — 99213 OFFICE O/P EST LOW 20 MIN: CPT | Performed by: PODIATRIST

## 2021-11-03 ASSESSMENT — MIFFLIN-ST. JEOR: SCORE: 1455.33

## 2021-11-03 NOTE — LETTER
"    11/3/2021         RE: Nathen Gage  [address] [place] [address]        Dear Colleague,    Thank you for referring your patient, Nathen Gage, to the [facility] POD[facility]ATRY. Please see a copy of my visit note below.    ASSESSMENT:  Encounter Diagnosis   Name Primary?     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  This nail is loosening and likely changes on the soft tissues proximally.  This creates a localized injury.  However, he reports the color change existing for at least a year.  Subungual melanoma is always considered.    I explained that this is rare.  Nonetheless, I recommend removal of the nail for evaluation of the nailbed and possible punch biopsy.  This procedure was discussed in detail.  He is concerned about pain.  I explained how we use ethyl chloride prior to injection and that pain should be very manageable.    He is agreeable and will return for a 30-min, procedure only appointment.     Disclaimer: This note consists of symbols derived from keyboarding, dictation and/or voice recognition software. As a result, there may be errors in the script that have gone undetected. Please consider this when interpreting information found in this chart.    Raphael William DPM, FACFAS, [facility] Department of Podiatry/Foot & Ankle Surgery      ____________________________________________________________________    HPI:         Neal presents today concerned about toenail fungus involving the left third toe.  He noted discoloration approximately 1 year ago.  His cardiologist prescribed a topical antifungal, ketoconazole cream.  Neal has not noticed any change.  He denies associated pain.  No drainage or bleeding.  He says that \"it is not getting better.\"    Past Medical History:   Diagnosis Date     Actinic keratosis      Acute " idiopathic gout of left knee 4/27/2016     Atrial fibrillation (H)     on Eliquis     Dilated aortic root (H)      Esophageal reflux      Esophagitis      H/O: GI bleed 2010     Hyperlipidaemia      Hyperlipidemia LDL goal <100      Impotence of organic origin      Presbycusis, unspecified laterality 4/27/2016     Pulmonary hypertension (H)      Sick sinus syndrome (H)     pacemaker implanted 2011     Unspecified essential hypertension    *  *  Past Surgical History:   Procedure Laterality Date     IMPLANT PACEMAKER  2011    Pacemaker/cardiac insitu / [employer] Dual chamber, V45     PHACOEMULSIFICATION CLEAR CORNEA WITH STANDARD INTRAOCULAR LENS IMPLANT Right 5/29/2018    Procedure: PHACOEMULSIFICATION CLEAR CORNEA WITH STANDARD INTRAOCULAR LENS IMPLANT;  RIGHT EYE PHACOEMULSIFICATION CLEAR CORNEA WITH STANDARD INTRAOCULAR LENS IMPLANT ;  Surgeon: Mat Echevarria MD;  Location: [facility]   *  *  Current Outpatient Medications   Medication Sig Dispense Refill     atorvastatin (LIPITOR) 20 MG tablet TAKE 1 TABLET BY MOUTH EVERY DAY 90 tablet 0     cetirizine (ZYRTEC) 10 MG tablet Take 1 tablet (10 mg) by mouth daily 30 tablet 0     diclofenac (VOLTAREN) 1 % topical gel Apply 4 g topically 4 times daily 150 g 0     doxazosin (CARDURA) 4 MG tablet TAKE 1 TABLET(4 MG) BY MOUTH DAILY 90 tablet 1     losartan (COZAAR) 100 MG tablet Take 1 tablet (100 mg) by mouth daily 90 tablet 3     pantoprazole (PROTONIX) 40 MG EC tablet TAKE 1 TABLET(40 MG) BY MOUTH DAILY 90 tablet 1     Polyethylene Glycol 400 (BLINK TEARS OP) Apply to eye as needed       prednisolon-gatiflox-bromfenac, pt own, no charge, 1-0.5-0.075 % opthalmic solution 1 drop by Both Eyelids route 3 times daily        triamcinolone (KENALOG) 0.5 % external ointment Apply 1 g topically 2 times daily 30 g 2     warfarin ANTICOAGULANT (COUMADIN) 2.5 MG tablet TAKE ONE-HALF TABLET BY MOUTH EVERY FRIDAY AND TAKE 1 TABLET ALL OTHER DAYS AS DIRECTED 30 tablet  "11         EXAM:    Vitals: /76   Ht 1.715 m (5' 7.5\")   Wt 80.4 kg (177 lb 3.2 oz)   BMI 27.34 kg/m    BMI: Body mass index is 27.34 kg/m .    Constitutional:  Nathen Gage is in no apparent distress, appears well-nourished.  Cooperative with history and physical exam.    Vascular:  Pedal pulses are palpable for both the DP and PT arteries, left foot.  CFT < 3 sec.  No edema.      Neuro: Light touch sensation is intact to the L4, L5, S1 distributions  No evidence of weakness, spasticity, or contracture in the lower extremities.     Derm: Normal texture and turgor.  No erythema, ecchymosis, or cyanosis.  No open lesions.    All nails on the left foot looks mildly discolored and thickened.  The left third toenail is different.  There is dark hyperpigmentation component to it.  This nail is also noted to be elevating from the nailbed.    Musculoskeletal:    Lower extremity muscle strength is normal.  Flatter foot structure.  Hallux limitus on the left.        Again, thank you for allowing me to participate in the care of your patient.        Sincerely,        Raphael William, JASON    "

## 2021-11-03 NOTE — PATIENT INSTRUCTIONS
Thank you for choosing [facility] Podiatry / Foot & Ankle Surgery!    DR. STROUD'S CLINIC LOCATIONS     [facility] SCHEDULE SURGERY: [phone]   [address] [place] [address] APPOINTMENTS: [phone]   [place] [address] BILLING QUESTIONS: [phone]  [phone] RADIOLOGY: [phone]       [place]    [address]    [place] [address]    [phone]  [phone]      Follow up for a 30 minute appointment for the toenail procedure and possible biopsy    NAIL FUNGUS / ONYCHOMYCOSIS   Nail fungus is not a hygiene problem and will likely not lead to significant medical problems. The nails may get thick causing pain and possibly local skin infection. Treatments include debridement (trimming), oral antifungals, topical antifungals and complete removal of the nail. Most fungal nails are not treated.     Topicals such as tea tree oil can be helpful for surface fungus and may, at best, limit progression. Over the counter creams (such as Lamisil) can also be used however, their effectiveness is also quite low.  Topical treatment with Pen lac is expensive and often not covered by insurance. Pen lac has an approximate 8% success rate. Topical therapy recommendations is to apply twice a day for at least 3-4 months as it takes 9 months for new nail to grow out.     Experts suggest soaking your feet for 15 to 20 minutes in a mixture of 1 cup vinegar to 4 cups warm water. Be sure to rinse well and pat your feet dry when you're done. You can soak your feet like this daily. But if your skin becomes irritated, try soaking only two to three times a week. Vicks VapoRub, as with vinegar, there have been no controlled clinical trials to assess the effectiveness of Vicks VapoRub on nail fungus, but there have been numerous anecdotal reports that it works. There's no consensus on how often to apply this product, so check with your doctor before using it on your nails.      Oral therapies include  Sporanox and Lamisil. Oral therapies are also expensive and not very effective. Side effects such as liver disease are the main concern. Return of fungus is common even if the treatment worked.      Other Tips:  - Penlac nail medication apply daily x 4 months; remove old polish first day of each week  - Antifungal cream/powder (Zeasorb) - apply daily to feet and shoes x 2 months  - Clean shoes with Lysol or in washing machine every few weeks  - Rotate shoe gear; give them 24 hours to dry out between days wearing them  - Clean pair of socks in morning, clean pair in afternoon if your feet sweat  - Shower shoes used in public showers/pool

## 2021-11-03 NOTE — PROGRESS NOTES
"ASSESSMENT:  Encounter Diagnosis   Name Primary?     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  This nail is loosening and likely changes on the soft tissues proximally.  This creates a localized injury.  However, he reports the color change existing for at least a year.  Subungual melanoma is always considered.    I explained that this is rare.  Nonetheless, I recommend removal of the nail for evaluation of the nailbed and possible punch biopsy.  This procedure was discussed in detail.  He is concerned about pain.  I explained how we use ethyl chloride prior to injection and that pain should be very manageable.    He is agreeable and will return for a 30-min, procedure only appointment.     Disclaimer: This note consists of symbols derived from keyboarding, dictation and/or voice recognition software. As a result, there may be errors in the script that have gone undetected. Please consider this when interpreting information found in this chart.    Raphael William DPM, FACFAS, MS    [place] Department of Podiatry/Foot & Ankle Surgery      ____________________________________________________________________    HPI:         Neal presents today concerned about toenail fungus involving the left third toe.  He noted discoloration approximately 1 year ago.  His cardiologist prescribed a topical antifungal, ketoconazole cream.  Neal has not noticed any change.  He denies associated pain.  No drainage or bleeding.  He says that \"it is not getting better.\"    Past Medical History:   Diagnosis Date     Actinic keratosis      Acute idiopathic gout of left knee 4/27/2016     Atrial fibrillation (H)     on Eliquis     Dilated aortic root (H)      Esophageal reflux      Esophagitis      H/O: GI bleed 2010     Hyperlipidaemia      Hyperlipidemia LDL goal <100      Impotence of organic origin      " "Presbycusis, unspecified laterality 4/27/2016     Pulmonary hypertension (H)      Sick sinus syndrome (H)     pacemaker implanted 2011     Unspecified essential hypertension    *  *  Past Surgical History:   Procedure Laterality Date     IMPLANT PACEMAKER  2011    Pacemaker/cardiac insitu / [employer] Dual chamber, V45     PHACOEMULSIFICATION CLEAR CORNEA WITH STANDARD INTRAOCULAR LENS IMPLANT Right 5/29/2018    Procedure: PHACOEMULSIFICATION CLEAR CORNEA WITH STANDARD INTRAOCULAR LENS IMPLANT;  RIGHT EYE PHACOEMULSIFICATION CLEAR CORNEA WITH STANDARD INTRAOCULAR LENS IMPLANT ;  Surgeon: Mat Echevarria MD;  Location: [facility]   *  *  Current Outpatient Medications   Medication Sig Dispense Refill     atorvastatin (LIPITOR) 20 MG tablet TAKE 1 TABLET BY MOUTH EVERY DAY 90 tablet 0     cetirizine (ZYRTEC) 10 MG tablet Take 1 tablet (10 mg) by mouth daily 30 tablet 0     diclofenac (VOLTAREN) 1 % topical gel Apply 4 g topically 4 times daily 150 g 0     doxazosin (CARDURA) 4 MG tablet TAKE 1 TABLET(4 MG) BY MOUTH DAILY 90 tablet 1     losartan (COZAAR) 100 MG tablet Take 1 tablet (100 mg) by mouth daily 90 tablet 3     pantoprazole (PROTONIX) 40 MG EC tablet TAKE 1 TABLET(40 MG) BY MOUTH DAILY 90 tablet 1     Polyethylene Glycol 400 (BLINK TEARS OP) Apply to eye as needed       prednisolon-gatiflox-bromfenac, pt own, no charge, 1-0.5-0.075 % opthalmic solution 1 drop by Both Eyelids route 3 times daily        triamcinolone (KENALOG) 0.5 % external ointment Apply 1 g topically 2 times daily 30 g 2     warfarin ANTICOAGULANT (COUMADIN) 2.5 MG tablet TAKE ONE-HALF TABLET BY MOUTH EVERY FRIDAY AND TAKE 1 TABLET ALL OTHER DAYS AS DIRECTED 30 tablet 11         EXAM:    Vitals: /76   Ht 1.715 m (5' 7.5\")   Wt 80.4 kg (177 lb 3.2 oz)   BMI 27.34 kg/m    BMI: Body mass index is 27.34 kg/m .    Constitutional:  Nathen Gage is in no apparent distress, appears well-nourished.  Cooperative with history and " physical exam.    Vascular:  Pedal pulses are palpable for both the DP and PT arteries, left foot.  CFT < 3 sec.  No edema.      Neuro: Light touch sensation is intact to the L4, L5, S1 distributions  No evidence of weakness, spasticity, or contracture in the lower extremities.     Derm: Normal texture and turgor.  No erythema, ecchymosis, or cyanosis.  No open lesions.    All nails on the left foot looks mildly discolored and thickened.  The left third toenail is different.  There is dark hyperpigmentation component to it.  This nail is also noted to be elevating from the nailbed.    Musculoskeletal:    Lower extremity muscle strength is normal.  Flatter foot structure.  Hallux limitus on the left.

## 2021-11-15 ENCOUNTER — ANTICOAGULATION THERAPY VISIT (OUTPATIENT)
Dept: ANTICOAGULATION | Facility: CLINIC | Age: 86
End: 2021-11-15

## 2021-11-15 ENCOUNTER — LAB (OUTPATIENT)
Dept: LAB | Facility: CLINIC | Age: 86
End: 2021-11-15
Payer: COMMERCIAL

## 2021-11-15 DIAGNOSIS — I48.91 ATRIAL FIBRILLATION (H): ICD-10-CM

## 2021-11-15 DIAGNOSIS — I48.19 PERSISTENT ATRIAL FIBRILLATION (H): ICD-10-CM

## 2021-11-15 DIAGNOSIS — Z79.01 LONG TERM CURRENT USE OF ANTICOAGULANT THERAPY: Primary | ICD-10-CM

## 2021-11-15 DIAGNOSIS — Z79.01 LONG TERM CURRENT USE OF ANTICOAGULANT THERAPY: ICD-10-CM

## 2021-11-15 DIAGNOSIS — Z79.01 LONG TERM CURRENT USE OF ANTICOAGULANTS WITH INR GOAL OF 2.0-3.0: ICD-10-CM

## 2021-11-15 LAB — INR BLD: 1.8 (ref 0.9–1.1)

## 2021-11-15 PROCEDURE — 36415 COLL VENOUS BLD VENIPUNCTURE: CPT

## 2021-11-15 PROCEDURE — 85610 PROTHROMBIN TIME: CPT

## 2021-11-15 NOTE — PROGRESS NOTES
ANTICOAGULATION MANAGEMENT     Nathen Gage 86 year old male is on warfarin with subtherapeutic INR result. (Goal INR 2.0-2.5)    Recent labs: (last 7 days)     11/15/21  1142   INR 1.8*       ASSESSMENT     Source(s): Chart Review and Patient/Caregiver Call       Warfarin doses taken: Warfarin taken as instructed    Diet: No new diet changes identified    New illness, injury, or hospitalization: No    Medication/supplement changes: None noted    Signs or symptoms of bleeding or clotting: No    Previous INR: Therapeutic last visit; previously outside of goal range    Additional findings: 1 tooth extraction scheduled for 11/23/21, pt states dentist did not mention or require a certain INR for the extraction.     Patient also mentioned that he has had less exercise recently.     PLAN     Recommended plan for no diet, medication or health factor changes affecting INR     Dosing Instructions: Continue your current warfarin dose with next INR in 8 days .  Patient states his dentist wanted an INR on the morning of the tooth extraction. No booster dose given due to upcoming extraction but I did request patient avoid greens today due to sub-therapeutic result.    Summary  As of 11/15/2021    Full warfarin instructions:  1.25 mg every Fri; 2.5 mg all other days   Next INR check:  11/23/2021             Telephone call with Nathen who verbalizes understanding and agrees to plan    Lab visit scheduled    Education provided: Contact [phone]  with any changes, questions or concerns.     Plan made per [facility] anticoagulation protocol    Fidelina Pizano RN  Anticoagulation Clinic  11/15/2021    _______________________________________________________________________     Anticoagulation Episode Summary     Current INR goal:  2.0-2.5   TTR:  57.2 % (1 y)   Target end date:  Indefinite   Send INR reminders to:  [facility]    Indications    Long-term (current) use of anticoagulants [Z79.01] [Z79.01]  Atrial fibrillation (H)  [I48.91]  Persistent atrial fibrillation (H) [I48.19]  Long term current use of anticoagulants with INR goal of 2.0-3.0 [Z79.01]           Comments:           Anticoagulation Care Providers     Provider Role Specialty Phone number    Kushal Valentin MD Referring Family Medicine [phone]

## 2021-11-18 ENCOUNTER — OFFICE VISIT (OUTPATIENT)
Dept: PODIATRY | Facility: CLINIC | Age: 86
End: 2021-11-18
Payer: COMMERCIAL

## 2021-11-18 ENCOUNTER — ANTICOAGULATION THERAPY VISIT (OUTPATIENT)
Dept: ANTICOAGULATION | Facility: CLINIC | Age: 86
End: 2021-11-18
Payer: COMMERCIAL

## 2021-11-18 VITALS
BODY MASS INDEX: 25.31 KG/M2 | SYSTOLIC BLOOD PRESSURE: 176 MMHG | WEIGHT: 167 LBS | HEIGHT: 68 IN | DIASTOLIC BLOOD PRESSURE: 76 MMHG

## 2021-11-18 DIAGNOSIS — I48.91 ATRIAL FIBRILLATION (H): ICD-10-CM

## 2021-11-18 DIAGNOSIS — Z79.01 LONG TERM CURRENT USE OF ANTICOAGULANTS WITH INR GOAL OF 2.0-3.0: ICD-10-CM

## 2021-11-18 DIAGNOSIS — I48.19 PERSISTENT ATRIAL FIBRILLATION (H): ICD-10-CM

## 2021-11-18 DIAGNOSIS — I48.0 PAROXYSMAL ATRIAL FIBRILLATION (H): ICD-10-CM

## 2021-11-18 DIAGNOSIS — L60.9 NAIL ABNORMALITY: ICD-10-CM

## 2021-11-18 DIAGNOSIS — Z79.01 LONG TERM CURRENT USE OF ANTICOAGULANT THERAPY: Primary | ICD-10-CM

## 2021-11-18 DIAGNOSIS — L60.8 NAIL DEFORMITY: Primary | ICD-10-CM

## 2021-11-18 PROCEDURE — 11730 AVULSION NAIL PLATE SIMPLE 1: CPT | Mod: T2 | Performed by: PODIATRIST

## 2021-11-18 RX ORDER — WARFARIN SODIUM 2.5 MG/1
TABLET ORAL
Qty: 30 TABLET | Refills: 3 | Status: SHIPPED | OUTPATIENT
Start: 2021-11-18 | End: 2022-02-07

## 2021-11-18 ASSESSMENT — MIFFLIN-ST. JEOR: SCORE: 1409.07

## 2021-11-18 NOTE — TELEPHONE ENCOUNTER
"Requested Prescriptions   Pending Prescriptions Disp Refills     warfarin ANTICOAGULANT (COUMADIN) 2.5 MG tablet [Pharmacy Med Name: WARFARIN SOD 2.5MG TABLETS (GREEN)] 30 tablet 11     Sig: TAKE ONE-HALF TABLET BY MOUTH EVERY FRIDAY AND 1 TABLET EVERY OTHER DAYS       Vitamin K Antagonists Failed - 11/18/2021  7:59 AM        Failed - INR is within goal in the past 6 weeks     Confirm INR is within goal in the past 6 weeks.     Recent Labs   Lab Test 11/15/21  1142   INR 1.8*                       Passed - Recent (12 mo) or future (30 days) visit within the authorizing provider's specialty     Patient has had an office visit with the authorizing provider or a provider within the authorizing providers department within the previous 12 mos or has a future within next 30 days. See \"Patient Info\" tab in inbasket, or \"Choose Columns\" in Meds & Orders section of the refill encounter.              Passed - Medication is active on med list        Passed - Patient is 18 years of age or older           Last office visit 9/23/21.  Warfarin dosing is managed by INR Clinic.  Prescription approved per [facility] Refill Protocol.    "

## 2021-11-18 NOTE — PROGRESS NOTES
"[place] PODIATRY/FOOT & ANKLE SURGERY    Neal follows up for a toenail procedure.  Please refer to the clinic note from 11/3/2021.  We discussed removing the left third toenail to investigate dark pigmentation in or below the nail unit.    Pedal pulses are palpable on the left.    Encounter Diagnoses   Name Primary?     Nail deformity Yes     Nail abnormality        Nail Avulsion Procedure  (non permanent removal)    The procedure was discussed with the Nathen JANICE Colonkoffi, including risk of infection, abnormal nail regrowth, and possible need for an additional future nail procedure.  Post-procedure home cares were explained. These cares are important for preventing infection and aiding in timely healing.   Verbal and written consent was obtained.   The site was marked and the \"Time Out\" called.     The base of the left 3rd toe was injected with 2 cc of  2% Lidocaine plain.  The toe was then prepped with betadine solution.  A tourniquet was applied around the base of the toe for hemostasis.   Next the toe was checked for adequate anesthesia.      The nail was freed from the nail bed and marginal soft tissue attachments with a blunt instrument.Next, the nail was firmly grasped with a hemostat and removed in total.  The underlying nail bed was inspected and no abnormalities seen.     The nailbed was vascular and normal appearing.  No indication for punch biopsy.    Bacitracin ointment was applied to the nail bed, followed by a compressive dressing.  The tourniquet was removed. The foot was kept elevated for several minutes. Nathenjoya Gage tolerated the procedure well.      Nathen JANICE Merari is instructed to watch for, and call if,  increasing redness, drainage, and pain after 2-3 days. Post procedure instructions were provided in the After Visit Summary.       Raphael William DPM, FACWENDY, MS  [facility] Department of Podiatry/Foot & Ankle Surgery      "

## 2021-11-18 NOTE — LETTER
"    11/18/2021         RE: Nathen Gage  [address] [place] [address]        Dear Colleague,    Thank you for referring your patient, Nathen Gage, to the [facility] PODIATRY. Please see a copy of my visit note below.    [place] PODIATRY/FOOT & ANKLE SURGERY    Neal follows up for a toenail procedure.  Please refer to the clinic note from 11/3/2021.  We discussed removing the left third toenail to investigate dark pigmentation in or below the nail unit.    Pedal pulses are palpable on the left.    Encounter Diagnoses   Name Primary?     Nail deformity Yes     Nail abnormality        Nail Avulsion Procedure  (non permanent removal)    The procedure was discussed with the Nathen Gage, including risk of infection, abnormal nail regrowth, and possible need for an additional future nail procedure.  Post-procedure home cares were explained. These cares are important for preventing infection and aiding in timely healing.   Verbal and written consent was obtained.   The site was marked and the \"Time Out\" called.     The base of the left 3rd toe was injected with 2 cc of  2% Lidocaine plain.  The toe was then prepped with betadine solution.  A tourniquet was applied around the base of the toe for hemostasis.   Next the toe was checked for adequate anesthesia.      The nail was freed from the nail bed and marginal soft tissue attachments with a blunt instrument.Next, the nail was firmly grasped with a hemostat and removed in total.  The underlying nail bed was inspected and no abnormalities seen.     The nailbed was vascular and normal appearing.  No indication for punch biopsy.    Bacitracin ointment was applied to the nail bed, followed by a compressive dressing.  The tourniquet was removed. The foot was kept elevated for several minutes. Nathen Gage tolerated the procedure well.      Nathen Gage is instructed to watch for, and call if,  increasing redness, drainage, and " pain after 2-3 days. Post procedure instructions were provided in the After Visit Summary.       Raphael William DPM, FACFAS, MS  [facility] Department of Podiatry/Foot & Ankle Surgery          Again, thank you for allowing me to participate in the care of your patient.        Sincerely,        Raphael William DPM

## 2021-11-18 NOTE — PATIENT INSTRUCTIONS
[place] TOENAIL REMOVAL HOME CARE  1. Keep bandage on until that evening or the day after your procedure. If the bandage falls off, start the soaking process.    2. Some bleeding is normal. If bleeding seems excessive to you, place ice on top of your foot for 15-20 minutes and elevate your foot above heart level.    3. Over the counter pain medication (tylenol / ibuprofen), elevating your foot and ice application is all you will need for pain control.    4. If the bandage feels too tight and your toe is throbbing it is ok to remove the bandage and start soaking.     5. For one to two weeks, soak your foot twice a day in mild skin friendly soap (dish or hand soap) and warm water for 15 minutes. It is ok to soak your foot for a few minutes to loosen the dressing applied in the clinic. After soaking, blot dry and apply a regular band aid.    6. It is normal to experience some discomfort and redness around the nail for several days following the procedure. Drainage will likely appear a red - yellow. This is normal. If your toe is still draining a red - yellow fluid after 2 weeks keep continuing to soak foot another few days.    7. Initial discomfort might last for 2-3 days. You may resume with regular activity as soon as you are comfortable, as long as you keep the wound clean and dry and follow the soaking instruction. It is recommended that you do not enter public swimming pools/hot tubs while your toe is draining.    8. If you are experiencing worsening pain and redness or notice pus after 2-3 days please contact the clinic. Ask to speak with a triage nurse and they will inform our team of your symptoms and we can advise if a follow up is needed.

## 2021-11-23 ENCOUNTER — ANTICOAGULATION THERAPY VISIT (OUTPATIENT)
Dept: ANTICOAGULATION | Facility: CLINIC | Age: 86
End: 2021-11-23

## 2021-11-23 ENCOUNTER — LAB (OUTPATIENT)
Dept: LAB | Facility: CLINIC | Age: 86
End: 2021-11-23
Payer: COMMERCIAL

## 2021-11-23 DIAGNOSIS — Z79.01 LONG TERM CURRENT USE OF ANTICOAGULANT THERAPY: ICD-10-CM

## 2021-11-23 DIAGNOSIS — Z79.01 LONG TERM CURRENT USE OF ANTICOAGULANTS WITH INR GOAL OF 2.0-3.0: ICD-10-CM

## 2021-11-23 DIAGNOSIS — I48.19 PERSISTENT ATRIAL FIBRILLATION (H): ICD-10-CM

## 2021-11-23 DIAGNOSIS — Z79.01 LONG TERM CURRENT USE OF ANTICOAGULANT THERAPY: Primary | ICD-10-CM

## 2021-11-23 DIAGNOSIS — I48.91 ATRIAL FIBRILLATION (H): ICD-10-CM

## 2021-11-23 LAB — INR BLD: 1.9 (ref 0.9–1.1)

## 2021-11-23 PROCEDURE — 36415 COLL VENOUS BLD VENIPUNCTURE: CPT

## 2021-11-23 PROCEDURE — 85610 PROTHROMBIN TIME: CPT

## 2021-11-23 NOTE — PROGRESS NOTES
ANTICOAGULATION MANAGEMENT     Nathen Gage 86 year old male is on warfarin with subtherapeutic INR result. (Goal INR 2.0-2.5)    Recent labs: (last 7 days)     11/23/21  0745   INR 1.9*       ASSESSMENT     Source(s): Chart Review and Patient/Caregiver Call       Warfarin doses taken: Warfarin taken as instructed    Diet: Increased greens/vitamin K in diet; ongoing change    New illness, injury, or hospitalization: No    Medication/supplement changes: None noted    Signs or symptoms of bleeding or clotting: No.  Patient had podiatry appointment on 11/18 and had nail plate removed.  This is healing as expected.    Previous INR: Subtherapeutic (3 out of the last 4 INR checks have been sub therapeutic, so maintenance dose adjusted today)    Additional findings: tooth extraction today     PLAN     Recommended plan for ongoing change(s) affecting INR     Dosing Instructions:  Increase your warfarin dose (7.7% change) with next INR in 2 weeks       Summary  As of 11/23/2021    Full warfarin instructions:  2.5 mg every day   Next INR check:  12/7/2021             Telephone call with Nathen who agrees to plan and repeated back plan correctly    Lab visit scheduled    Education provided: Please call back if any changes to your diet, medications or how you've been taking warfarin    Plan made per [facility] anticoagulation protocol    Joanne Bustos RN  Anticoagulation Clinic  11/23/2021    _______________________________________________________________________     Anticoagulation Episode Summary     Current INR goal:  2.0-2.5   TTR:  57.3 % (1 y)   Target end date:  Indefinite   Send INR reminders to:  [facility]    Indications    Long-term (current) use of anticoagulants [Z79.01] [Z79.01]  Atrial fibrillation (H) [I48.91]  Persistent atrial fibrillation (H) [I48.19]  Long term current use of anticoagulants with INR goal of 2.0-3.0 [Z79.01]           Comments:           Anticoagulation Care Providers     Provider Role  Specialty Phone number    Kushal Valentin MD Referring Family Medicine [phone]

## 2021-12-07 ENCOUNTER — ANTICOAGULATION THERAPY VISIT (OUTPATIENT)
Dept: ANTICOAGULATION | Facility: CLINIC | Age: 86
End: 2021-12-07

## 2021-12-07 ENCOUNTER — LAB (OUTPATIENT)
Dept: LAB | Facility: CLINIC | Age: 86
End: 2021-12-07
Payer: COMMERCIAL

## 2021-12-07 DIAGNOSIS — I48.19 PERSISTENT ATRIAL FIBRILLATION (H): ICD-10-CM

## 2021-12-07 DIAGNOSIS — Z79.01 LONG TERM CURRENT USE OF ANTICOAGULANT THERAPY: ICD-10-CM

## 2021-12-07 DIAGNOSIS — Z79.01 LONG TERM CURRENT USE OF ANTICOAGULANTS WITH INR GOAL OF 2.0-3.0: ICD-10-CM

## 2021-12-07 DIAGNOSIS — Z79.01 LONG TERM CURRENT USE OF ANTICOAGULANT THERAPY: Primary | ICD-10-CM

## 2021-12-07 DIAGNOSIS — I48.91 ATRIAL FIBRILLATION (H): ICD-10-CM

## 2021-12-07 LAB — INR BLD: 1.8 (ref 0.9–1.1)

## 2021-12-07 PROCEDURE — 85610 PROTHROMBIN TIME: CPT

## 2021-12-07 PROCEDURE — 36415 COLL VENOUS BLD VENIPUNCTURE: CPT

## 2021-12-07 NOTE — PROGRESS NOTES
Anticoagulation Management     Attempted to reach Neal to discuss today's INR result, no answer at this time.      Left  requesting callback at patient's earliest convenience.      Anticoagulation clinic to follow up     Sayra Guerrero RN

## 2021-12-07 NOTE — PROGRESS NOTES
ANTICOAGULATION MANAGEMENT     Nathen Gage 86 year old male is on warfarin with subtherapeutic INR result. (Goal INR 2.0-2.5)    Recent labs: (last 7 days)     12/07/21  0845   INR 1.8*       ASSESSMENT     Source(s): Chart Review and Patient/Caregiver Call       Warfarin doses taken: Less warfarin taken than planned which may be affecting INR - patient misunderstood previous instructions and continued 16.25 mg MD    Diet: No new diet changes identified    New illness, injury, or hospitalization: Yes: patient had a tooth extraction (11/23/21) and toe nail removed (11/18/21) - tolerated both procedures well.    Medication/supplement changes: None noted    Signs or symptoms of bleeding or clotting: No    Previous INR: Subtherapeutic x last two INR levels    Additional findings: None     PLAN     Recommended plan for temporary change(s) affecting INR     Dosing Instructions: Booster dose then Increase your warfarin dose (7.5% change) with next INR in 2 weeks       Summary  As of 12/7/2021    Full warfarin instructions:  12/7: 3.75 mg; Otherwise 2.5 mg every day   Next INR check:  12/17/2021             Telephone call with Nathen who verbalizes understanding and agrees to plan and who agrees to plan and repeated back plan correctly    Lab visit scheduled    Education provided: Please call back if any changes to your diet, medications or how you've been taking warfarin, Monitoring for bleeding signs and symptoms, Monitoring for clotting signs and symptoms and Importance of notifying clinic for changes in medications; a sooner lab recheck maybe needed.    Plan made per [facility] anticoagulation protocol    Sayra Guerrero RN  Anticoagulation Clinic  12/7/2021    _______________________________________________________________________     Anticoagulation Episode Summary     Current INR goal:  2.0-2.5   TTR:  55.1 % (1 y)   Target end date:  Indefinite   Send INR reminders to:  [facility]    Indications     Long-term (current) use of anticoagulants [Z79.01] [Z79.01]  Atrial fibrillation (H) [I48.91]  Persistent atrial fibrillation (H) [I48.19]  Long term current use of anticoagulants with INR goal of 2.0-3.0 [Z79.01]           Comments:           Anticoagulation Care Providers     Provider Role Specialty Phone number    Kushal Valentin MD Referring Family Medicine [phone]

## 2021-12-07 NOTE — PROGRESS NOTES
ANTICOAGULATION FOLLOW-UP CLINIC VISIT    Patient Name:  Nathen Gage  Date:  12/3/2019  Contact Type:  Face to Face    SUBJECTIVE:  Patient Findings     Positives:   Upcoming invasive procedure (Laser eye treatment to L eye scheduled for 19), Change in diet/appetite (Pt typically eats greens everyday but he thinks he had less in the past week.)        Clinical Outcomes     Negatives:   Major bleeding event, Thromboembolic event, Anticoagulation-related hospital admission, Anticoagulation-related ED visit, Anticoagulation-related fatality           OBJECTIVE    INR Protime   Date Value Ref Range Status   2019 2.6 (A) 0.86 - 1.14 Final       ASSESSMENT / PLAN  INR assessment SUPRA    Recheck INR In: 2 WEEKS    INR Location Clinic      Anticoagulation Summary  As of 12/3/2019    INR goal:   2.0-2.5   TTR:   54.8 % (1 y)   INR used for dosin.6! (12/3/2019)   Warfarin maintenance plan:   1.25 mg (2.5 mg x 0.5) every Fri; 2.5 mg (2.5 mg x 1) all other days   Full warfarin instructions:   1.25 mg every Fri; 2.5 mg all other days   Weekly warfarin total:   16.25 mg   No change documented:   Fidelina Pizano RN   Plan last modified:   Fidelina Pizano RN (2019)   Next INR check:   2019   Priority:   High   Target end date:   Indefinite    Indications    Long-term (current) use of anticoagulants [Z79.01] [Z79.01]  Atrial fibrillation (H) [I48.91]             Anticoagulation Episode Summary     INR check location:       Preferred lab:       Send INR reminders to:   [facility]    Comments:         Anticoagulation Care Providers     Provider Role Specialty Phone number    Kushal Valentin MD [facility] [phone]            See the Encounter Report to view Anticoagulation Flowsheet and Dosing Calendar (Go to Encounters tab in chart review, and find the Anticoagulation Therapy Visit)        Fidelina Pizano RN                 
No

## 2021-12-17 ENCOUNTER — ANTICOAGULATION THERAPY VISIT (OUTPATIENT)
Dept: ANTICOAGULATION | Facility: CLINIC | Age: 86
End: 2021-12-17

## 2021-12-17 ENCOUNTER — LAB (OUTPATIENT)
Dept: LAB | Facility: CLINIC | Age: 86
End: 2021-12-17
Payer: COMMERCIAL

## 2021-12-17 DIAGNOSIS — Z79.01 LONG TERM CURRENT USE OF ANTICOAGULANTS WITH INR GOAL OF 2.0-3.0: ICD-10-CM

## 2021-12-17 DIAGNOSIS — I48.91 ATRIAL FIBRILLATION (H): ICD-10-CM

## 2021-12-17 DIAGNOSIS — Z79.01 LONG TERM CURRENT USE OF ANTICOAGULANT THERAPY: ICD-10-CM

## 2021-12-17 DIAGNOSIS — I48.19 PERSISTENT ATRIAL FIBRILLATION (H): ICD-10-CM

## 2021-12-17 DIAGNOSIS — Z79.01 LONG TERM CURRENT USE OF ANTICOAGULANT THERAPY: Primary | ICD-10-CM

## 2021-12-17 LAB — INR BLD: 2.3 (ref 0.9–1.1)

## 2021-12-17 PROCEDURE — 85610 PROTHROMBIN TIME: CPT

## 2021-12-17 PROCEDURE — 36416 COLLJ CAPILLARY BLOOD SPEC: CPT

## 2021-12-17 NOTE — PROGRESS NOTES
ANTICOAGULATION MANAGEMENT     Nathen Gage 86 year old male is on warfarin with therapeutic INR result. (Goal INR 2.0-2.5)    Recent labs: (last 7 days)     12/17/21  1320   INR 2.3*       ASSESSMENT     Source(s): Chart Review and Patient/Caregiver Call       Warfarin doses taken: Warfarin taken as instructed    Diet: Decreased greens/vitamin K in diet; plans to resume previous intake--patient was concerned about his INR being too low again.      New illness, injury, or hospitalization: No    Medication/supplement changes: None noted    Signs or symptoms of bleeding or clotting: No    Previous INR: Subtherapeutic    Additional findings: None     PLAN     Recommended plan for temporary change(s) affecting INR     Dosing Instructions: Continue your current warfarin dose with next INR in 10 days       Summary  As of 12/17/2021    Full warfarin instructions:  2.5 mg every day   Next INR check:  12/28/2021             Telephone call with Nathen who agrees to plan and repeated back plan correctly    Lab visit scheduled    Education provided: Importance of consistent vitamin K intake, Impact of vitamin K foods on INR and Vitamin K content of foods    Plan made per [facility] anticoagulation protocol    Fidelina Pizano RN  Anticoagulation Clinic  12/17/2021    _______________________________________________________________________     Anticoagulation Episode Summary     Current INR goal:  2.0-2.5   TTR:  54.0 % (1 y)   Target end date:  Indefinite   Send INR reminders to:  [facility]    Indications    Long-term (current) use of anticoagulants [Z79.01] [Z79.01]  Atrial fibrillation (H) [I48.91]  Persistent atrial fibrillation (H) [I48.19]  Long term current use of anticoagulants with INR goal of 2.0-3.0 [Z79.01]           Comments:           Anticoagulation Care Providers     Provider Role Specialty Phone number    Kushal Valentin MD Referring Family Medicine [phone]

## 2021-12-18 DIAGNOSIS — I10 ESSENTIAL HYPERTENSION, BENIGN: ICD-10-CM

## 2021-12-18 DIAGNOSIS — E78.00 PURE HYPERCHOLESTEROLEMIA: ICD-10-CM

## 2021-12-20 RX ORDER — ATORVASTATIN CALCIUM 20 MG/1
TABLET, FILM COATED ORAL
Qty: 90 TABLET | Refills: 1 | Status: SHIPPED | OUTPATIENT
Start: 2021-12-20 | End: 2022-06-24

## 2021-12-28 ENCOUNTER — DOCUMENTATION ONLY (OUTPATIENT)
Dept: ANTICOAGULATION | Facility: CLINIC | Age: 86
End: 2021-12-28

## 2021-12-28 ENCOUNTER — ANTICOAGULATION THERAPY VISIT (OUTPATIENT)
Dept: ANTICOAGULATION | Facility: CLINIC | Age: 86
End: 2021-12-28

## 2021-12-28 ENCOUNTER — LAB (OUTPATIENT)
Dept: LAB | Facility: CLINIC | Age: 86
End: 2021-12-28
Payer: COMMERCIAL

## 2021-12-28 DIAGNOSIS — I48.19 PERSISTENT ATRIAL FIBRILLATION (H): ICD-10-CM

## 2021-12-28 DIAGNOSIS — Z79.01 LONG TERM CURRENT USE OF ANTICOAGULANT THERAPY: ICD-10-CM

## 2021-12-28 DIAGNOSIS — Z79.01 LONG TERM CURRENT USE OF ANTICOAGULANT THERAPY: Primary | ICD-10-CM

## 2021-12-28 DIAGNOSIS — Z79.01 LONG TERM CURRENT USE OF ANTICOAGULANTS WITH INR GOAL OF 2.0-3.0: ICD-10-CM

## 2021-12-28 DIAGNOSIS — I48.91 ATRIAL FIBRILLATION (H): ICD-10-CM

## 2021-12-28 DIAGNOSIS — I48.91 ATRIAL FIBRILLATION (H): Primary | ICD-10-CM

## 2021-12-28 LAB — INR BLD: 2.4 (ref 0.9–1.1)

## 2021-12-28 PROCEDURE — 36416 COLLJ CAPILLARY BLOOD SPEC: CPT

## 2021-12-28 PROCEDURE — 85610 PROTHROMBIN TIME: CPT

## 2021-12-28 NOTE — PROGRESS NOTES
Anticoagulation Management    Unable to reach Neal today.    Today's INR result of 2.4 is therapeutic (goal INR of 2.0-2.5).  Result received from: Clinic Lab    Follow up required to confirm warfarin dose taken and assess for changes    Left VM to continue SAME DOSE and to call [phone] with transfer to Fidelina Kitchen:[phone] OR transfer to:[facility]      Anticoagulation clinic to follow up    Fidelina Pizano RN       Examination:

1. Bilateral digital diagnostic mammogram

2. Bilateral Limited breast ultrasound.

 

INDICATION: Screening recall from [facility] for bilateral breast 

asymmetries.

 

COMPARISON: Bilateral screening mammogram of December 27, 2019.

 

TECHNIQUE: CC and MLO views of both breasts were obtained with 3-D 

technique and full-field ML views were obtained with 2-D technique. 

Thereafter, targeted ultrasound of both breasts in the upper outer 

quadrants was performed spanning the 9 to 12:00 position on the right and 

the 10:30 to 3:00 position on the left.

 

FINDINGS:

Bilateral mammogram:

Heterogeneously dense breast parenchyma.

 

In the right breast, with the 3-D imaging performed at this visit, an 

asymmetry in the lateral middle third right breast was observed with 

questionable associated architectural distortion (image 27 of 50 on the CC

tomographic series). More centrally in the area of screening recall, no 

persistent mammographic abnormality was appreciated.

 

In the left breast, the 3-D tomographic images suggested a possible 

asymmetry in the lateral posterior left breast (image 30 of 52 on the CC 

tomographic series) that likely correlates with the asymmetry recalled 

from screening, at the approximate 2:00 position 7 cm from the nipple.

 

Targeted ultrasound of the right breast showed an ovoid hypoechoic mass 

with posterior acoustic enhancement at the 9:30 o'clock position 8 cm from

the nipple measuring 7 x 3 mm with thin internal septations. No suspicious

sonographic findings were observed. This likely correlates with the 

mammographic finding pursued for additional imaging and is currently 

favored to represent a probable benign cluster of cysts. The questioned 

distortion could reflect prominent Man's ligaments near the edge of the

patient's fibroglandular envelope. Six-month follow-up right diagnostic 

mammogram recommended for this finding.

 

Targeted ultrasound of the left breast revealed a round hypoechoic solid 

mass with somewhat indistinct margins at the left 2:00 position 8 cm from 

the nipple. It is 3.5 mm in diameter. It is mildly suspicious (BI-RADS 

4A). Ultrasound-guided core needle biopsy is recommended. Nodularity in 

the upper inner quadrant left breast was also seen that on targeted 

ultrasound revealed a 7 mm cluster of microcysts at the 10:30 o'clock 

position 5 cm from the nipple.

 

IMPRESSION:

 

1. Suspicious round 3.5 mm mass in the left breast at the approximate 2:00

position 8 cm from the nipple. Ultrasound-guided core biopsy of this 

finding in the left breast is recommended.

 

2. Probably benign cluster of microcysts in the lateral posterior right 

breast. Right 6 month follow-up diagnostic mammogram of this finding is 

recommended.

 

BI-RADS Category 4

Findings suspicious for malignancy

Biopsy recommended

 

Electronically signed by: Juan Antonio Beasley MD (1/14/2020 5:37 PM) 

[facility]

## 2021-12-28 NOTE — PROGRESS NOTES
ANTICOAGULATION MANAGEMENT      Nathen Gage due for annual renewal of referral to anticoagulation monitoring. Order pended for your review and signature.      ANTICOAGULATION SUMMARY      Warfarin indication(s)     Atrial fibrillation    Heart valve present?  NO       Current goal range   INR: 2.0-3.0     Goal appropriate for indication? Yes, INR 2-3 appropriate for hx of DVT, PE, hypercoagulable state, Afib, LVAD, or bileaflet AVR without risk factors     Current duration of therapy Indefinite/long term therapy   Time in Therapeutic Range (TTR)  (Goal > 60%) 54%       Office visit with referring provider's group within last year yes on 9/23/2021       Fidelina Pizano RN

## 2021-12-28 NOTE — PROGRESS NOTES
ANTICOAGULATION MANAGEMENT     Nathen Gage 86 year old male is on warfarin with therapeutic INR result. (Goal INR 2.0-2.5)    Recent labs: (last 7 days)     12/28/21  1310   INR 2.4*       ASSESSMENT     Source(s): Chart Review and Patient/Caregiver Call       Warfarin doses taken: Warfarin taken as instructed    Diet: No new diet changes identified    New illness, injury, or hospitalization: No    Medication/supplement changes: None noted    Signs or symptoms of bleeding or clotting: No    Previous INR: Therapeutic last visit; previously outside of goal range    Additional findings: None     PLAN     Recommended plan for no diet, medication or health factor changes affecting INR     Dosing Instructions: Continue your current warfarin dose with next INR in 3 weeks       Summary  As of 12/28/2021    Full warfarin instructions:  2.5 mg every day   Next INR check:  1/18/2022             Telephone call with Nathen who verbalizes understanding and agrees to plan    Lab visit scheduled    Education provided: Importance of consistent vitamin K intake, Monitoring for bleeding signs and symptoms and Contact [phone]  with any changes, questions or concerns.     Plan made per [facility] anticoagulation protocol    Fidelina Pizano RN  Anticoagulation Clinic  12/28/2021    _______________________________________________________________________     Anticoagulation Episode Summary     Current INR goal:  2.0-2.5   TTR:  54.0 % (1 y)   Target end date:  Indefinite   Send INR reminders to:  [facility] [facility]    Indications    Long-term (current) use of anticoagulants [Z79.01] [Z79.01]  Atrial fibrillation (H) [I48.91]  Persistent atrial fibrillation (H) [I48.19]  Long term current use of anticoagulants with INR goal of 2.0-3.0 [Z79.01]           Comments:           Anticoagulation Care Providers     Provider Role Specialty Phone number    Kushal Valentin MD Referring Family Medicine [phone]    Leonides Aggarwal,  [facility] Medicine [phone]

## 2022-01-17 DIAGNOSIS — I15.9 SECONDARY HYPERTENSION: ICD-10-CM

## 2022-01-17 RX ORDER — LOSARTAN POTASSIUM 100 MG/1
100 TABLET ORAL DAILY
Qty: 90 TABLET | Refills: 0 | Status: SHIPPED | OUTPATIENT
Start: 2022-01-17 | End: 2022-05-18

## 2022-01-17 NOTE — TELEPHONE ENCOUNTER
Received refill request for:  Losartan  Last OV was: 21 ZORAN Benitez  Labs/EK21 BMP  F/U scheduled: Due 2022, letter sent 22. Note to pharmacy, rx refilled for 90 days;  New script sent to: [facility]

## 2022-01-18 ENCOUNTER — LAB (OUTPATIENT)
Dept: LAB | Facility: CLINIC | Age: 87
End: 2022-01-18
Payer: COMMERCIAL

## 2022-01-18 ENCOUNTER — ANTICOAGULATION THERAPY VISIT (OUTPATIENT)
Dept: ANTICOAGULATION | Facility: CLINIC | Age: 87
End: 2022-01-18

## 2022-01-18 DIAGNOSIS — I48.19 PERSISTENT ATRIAL FIBRILLATION (H): ICD-10-CM

## 2022-01-18 DIAGNOSIS — I15.9 SECONDARY HYPERTENSION: ICD-10-CM

## 2022-01-18 DIAGNOSIS — I48.91 ATRIAL FIBRILLATION (H): ICD-10-CM

## 2022-01-18 DIAGNOSIS — Z79.01 LONG TERM CURRENT USE OF ANTICOAGULANTS WITH INR GOAL OF 2.0-3.0: ICD-10-CM

## 2022-01-18 DIAGNOSIS — Z79.01 LONG TERM CURRENT USE OF ANTICOAGULANT THERAPY: Primary | ICD-10-CM

## 2022-01-18 LAB
ANION GAP SERPL CALCULATED.3IONS-SCNC: 6 MMOL/L (ref 3–14)
BUN SERPL-MCNC: 20 MG/DL (ref 7–30)
CALCIUM SERPL-MCNC: 9.2 MG/DL (ref 8.5–10.1)
CHLORIDE BLD-SCNC: 100 MMOL/L (ref 94–109)
CO2 SERPL-SCNC: 24 MMOL/L (ref 20–32)
CREAT SERPL-MCNC: 1.06 MG/DL (ref 0.66–1.25)
GFR SERPL CREATININE-BSD FRML MDRD: 69 ML/MIN/1.73M2
GLUCOSE BLD-MCNC: 102 MG/DL (ref 70–99)
INR BLD: 2.5 (ref 0.9–1.1)
POTASSIUM BLD-SCNC: 5 MMOL/L (ref 3.4–5.3)
SODIUM SERPL-SCNC: 130 MMOL/L (ref 133–144)

## 2022-01-18 PROCEDURE — 80048 BASIC METABOLIC PNL TOTAL CA: CPT

## 2022-01-18 PROCEDURE — 36415 COLL VENOUS BLD VENIPUNCTURE: CPT

## 2022-01-18 PROCEDURE — 85610 PROTHROMBIN TIME: CPT

## 2022-01-18 NOTE — PROGRESS NOTES
ANTICOAGULATION MANAGEMENT     Nathen Gage 86 year old male is on warfarin with therapeutic INR result. (Goal INR 2.0-2.5)    Recent labs: (last 7 days)     01/18/22  1029   INR 2.5*       ASSESSMENT     Source(s): Chart Review and Patient/Caregiver Call       Warfarin doses taken: Warfarin taken as instructed    Diet: No new diet changes identified    New illness, injury, or hospitalization: No    Medication/supplement changes: None noted    Signs or symptoms of bleeding or clotting: No    Previous INR: Therapeutic last 2(+) visits    Additional findings: briefly chatted with patient before we had some difficulty with the connection.  Called him back and left a detailed message for him with dosing instructions and recommendations for follow up.     PLAN     Recommended plan for no diet, medication or health factor changes affecting INR     Dosing Instructions: Continue your current warfarin dose with next INR in 3 weeks       Summary  As of 1/18/2022    Full warfarin instructions:  2.5 mg every day   Next INR check:  2/8/2022             Telephone call with Nathen detailed message left with dosing instructions and follow up recommendations.    Contact [phone]  to schedule and with any changes, questions or concerns.     Education provided: None required    Plan made per [facility] anticoagulation protocol    Joanne Bustos RN  Anticoagulation Clinic  1/18/2022    _______________________________________________________________________     Anticoagulation Episode Summary     Current INR goal:  2.0-2.5   TTR:  54.0 % (1 y)   Target end date:  Indefinite   Send INR reminders to:  [facility]    Indications    Long-term (current) use of anticoagulants [Z79.01] [Z79.01]  Atrial fibrillation (H) [I48.91]  Persistent atrial fibrillation (H) [I48.19]  Long term current use of anticoagulants with INR goal of 2.0-3.0 [Z79.01]           Comments:           Anticoagulation Care Providers     Provider Role Specialty  Phone number    Kushal Valentin MD Referring Family Medicine [phone]    Leonides Aggarwal PA-C Referring Family Medicine [phone]

## 2022-02-07 ENCOUNTER — LAB (OUTPATIENT)
Dept: LAB | Facility: CLINIC | Age: 87
End: 2022-02-07
Payer: COMMERCIAL

## 2022-02-07 ENCOUNTER — ANTICOAGULATION THERAPY VISIT (OUTPATIENT)
Dept: ANTICOAGULATION | Facility: CLINIC | Age: 87
End: 2022-02-07

## 2022-02-07 DIAGNOSIS — I48.91 ATRIAL FIBRILLATION (H): ICD-10-CM

## 2022-02-07 DIAGNOSIS — Z79.01 LONG TERM CURRENT USE OF ANTICOAGULANT THERAPY: Primary | ICD-10-CM

## 2022-02-07 DIAGNOSIS — Z79.01 LONG TERM CURRENT USE OF ANTICOAGULANTS WITH INR GOAL OF 2.0-3.0: ICD-10-CM

## 2022-02-07 DIAGNOSIS — I48.19 PERSISTENT ATRIAL FIBRILLATION (H): ICD-10-CM

## 2022-02-07 DIAGNOSIS — I48.0 PAROXYSMAL ATRIAL FIBRILLATION (H): ICD-10-CM

## 2022-02-07 LAB — INR BLD: 2.5 (ref 0.9–1.1)

## 2022-02-07 PROCEDURE — 36416 COLLJ CAPILLARY BLOOD SPEC: CPT

## 2022-02-07 PROCEDURE — 85610 PROTHROMBIN TIME: CPT

## 2022-02-07 RX ORDER — WARFARIN SODIUM 2.5 MG/1
TABLET ORAL
Qty: 30 TABLET | Refills: 5 | Status: SHIPPED | OUTPATIENT
Start: 2022-02-07 | End: 2022-08-17

## 2022-02-07 NOTE — PROGRESS NOTES
ANTICOAGULATION MANAGEMENT     Nathen Gage 86 year old male is on warfarin with therapeutic INR result. (Goal INR 2.0-2.5)    Recent labs: (last 7 days)     02/07/22  1306   INR 2.5*       ASSESSMENT     Source(s): Chart Review and Patient/Caregiver Call       Warfarin doses taken: Warfarin taken as instructed    Diet: No new diet changes identified    New illness, injury, or hospitalization: No    Medication/supplement changes: None noted    Signs or symptoms of bleeding or clotting: No    Previous INR: Therapeutic last 2(+) visits    Additional findings: Refill needed today with updated instructions--done     PLAN     Recommended plan for no diet, medication or health factor changes affecting INR     Dosing Instructions: Continue your current warfarin dose with next INR in 3 weeks  (patient does not want to go out more than 3 weeks).    Summary  As of 2/7/2022    Full warfarin instructions:  2.5 mg every day   Next INR check:  2/28/2022             Telephone call with Nathen who verbalizes understanding and agrees to plan    Lab visit scheduled    Education provided: Contact [phone]  with any changes, questions or concerns.     Plan made per [facility] anticoagulation protocol    Fidelina Pizano, RN  Anticoagulation Clinic  2/7/2022    _______________________________________________________________________     Anticoagulation Episode Summary     Current INR goal:  2.0-2.5   TTR:  54.0 % (1 y)   Target end date:  Indefinite   Send INR reminders to:  [facility]    Indications    Long-term (current) use of anticoagulants [Z79.01] [Z79.01]  Atrial fibrillation (H) [I48.91]  Persistent atrial fibrillation (H) [I48.19]  Long term current use of anticoagulants with INR goal of 2.0-3.0 [Z79.01]           Comments:           Anticoagulation Care Providers     Provider Role Specialty Phone number    Kushal Valentin MD Referring Family Medicine [phone]    Leonides Aggarwal PA-C Referring Family  Medicine [phone]

## 2022-02-09 ENCOUNTER — ANCILLARY PROCEDURE (OUTPATIENT)
Dept: CARDIOLOGY | Facility: CLINIC | Age: 87
End: 2022-02-09
Attending: INTERNAL MEDICINE
Payer: COMMERCIAL

## 2022-02-09 ENCOUNTER — TELEPHONE (OUTPATIENT)
Dept: CARDIOLOGY | Facility: CLINIC | Age: 87
End: 2022-02-09

## 2022-02-09 DIAGNOSIS — Z95.0 CARDIAC PACEMAKER: ICD-10-CM

## 2022-02-09 DIAGNOSIS — I49.5 SICK SINUS SYNDROME (H): Primary | ICD-10-CM

## 2022-02-09 PROCEDURE — 93280 PM DEVICE PROGR EVAL DUAL: CPT | Performed by: INTERNAL MEDICINE

## 2022-02-09 NOTE — TELEPHONE ENCOUNTER
Pt had routine PPM annual threshold today. He has a dual chamber [employer] Altrua, implanted in 2011 for SSS. His device cannot do remote checks, so he does phone checks every 3 months. He is not dependent, underlying rhythm is SR 80's today. He A paces 26% and V paces <1%. I reviewed all his previous in-clinic checks and he has always V paced 0% or <1%. 3.5 years estimated battery longevity.     Today there were changes noted in his V lead. There were episodes of noise on V lead and his V threshold was elevated at 4.0V @ 1.0ms. These are both new findings.     No programming changes made, V lead is on auto for outputs, and he does not V pace. Will send update to Dr. Pires.       Device and lead info:           EGM of a noise episode:      Device check report from today:   [employer] Altrua (D) Pacemaker Device Check  Patient seen in clinic for device evaluation and iterative programming.   AP: 26 %    : <1 %    Mode: DDDR     Underlying Rhythm: SR 80    Heart Rate: excellent variability     Sensing: WNL    Pacing Threshold: WNL in A, elevated in V (4.0V @ 1.0ms today)   Impedance: WNL    Battery Status: 3.5 yrs estimated longevity     Device Site: WNL    Atrial Arrhythmia: 0    Ventricular Arrhythmia: 93 episodes in the last year, 40 saved to logbook (12/27/2021 -  Today), 7 EGMs available, all 7 EGMs show noise on V lead. This is a new finding. Pt does not . Will review with Dr. Pires    Setting Change: none    Care Plan: phone check scheduled in 3 months. OV with Dr. Leon on 3/14/2022. Will update Dr. Pires on V lead changes.

## 2022-02-10 LAB
MDC_IDC_LEAD_IMPLANT_DT: NORMAL
MDC_IDC_LEAD_IMPLANT_DT: NORMAL
MDC_IDC_LEAD_LOCATION: NORMAL
MDC_IDC_LEAD_LOCATION: NORMAL
MDC_IDC_LEAD_LOCATION_DETAIL_1: NORMAL
MDC_IDC_LEAD_LOCATION_DETAIL_1: NORMAL
MDC_IDC_LEAD_MFG: NORMAL
MDC_IDC_LEAD_MFG: NORMAL
MDC_IDC_LEAD_MODEL: NORMAL
MDC_IDC_LEAD_MODEL: NORMAL
MDC_IDC_LEAD_POLARITY_TYPE: NORMAL
MDC_IDC_LEAD_POLARITY_TYPE: NORMAL
MDC_IDC_LEAD_SERIAL: NORMAL
MDC_IDC_LEAD_SERIAL: NORMAL
MDC_IDC_MSMT_BATTERY_REMAINING_LONGEVITY: NORMAL
MDC_IDC_MSMT_BATTERY_STATUS: NORMAL
MDC_IDC_MSMT_LEADCHNL_RA_IMPEDANCE_VALUE: 520 OHM
MDC_IDC_MSMT_LEADCHNL_RA_PACING_THRESHOLD_AMPLITUDE: 0.5 V
MDC_IDC_MSMT_LEADCHNL_RA_PACING_THRESHOLD_PULSEWIDTH: 0.5 MS
MDC_IDC_MSMT_LEADCHNL_RA_SENSING_INTR_AMPL: 3 MV
MDC_IDC_MSMT_LEADCHNL_RV_IMPEDANCE_VALUE: 500 OHM
MDC_IDC_MSMT_LEADCHNL_RV_PACING_THRESHOLD_AMPLITUDE: 4 V
MDC_IDC_MSMT_LEADCHNL_RV_PACING_THRESHOLD_PULSEWIDTH: 1 MS
MDC_IDC_MSMT_LEADCHNL_RV_SENSING_INTR_AMPL: 8.9 MV
MDC_IDC_PG_IMPLANT_DTM: NORMAL
MDC_IDC_PG_MFG: NORMAL
MDC_IDC_PG_MODEL: NORMAL
MDC_IDC_PG_SERIAL: NORMAL
MDC_IDC_PG_TYPE: NORMAL
MDC_IDC_SESS_CLINIC_NAME: NORMAL
MDC_IDC_SESS_DTM: NORMAL
MDC_IDC_SESS_TYPE: NORMAL
MDC_IDC_SET_BRADY_AT_MODE_SWITCH_MODE: NORMAL
MDC_IDC_SET_BRADY_AT_MODE_SWITCH_RATE: 170 {BEATS}/MIN
MDC_IDC_SET_BRADY_HYSTRATE: NORMAL
MDC_IDC_SET_BRADY_LOWRATE: 60 {BEATS}/MIN
MDC_IDC_SET_BRADY_MAX_SENSOR_RATE: 120 {BEATS}/MIN
MDC_IDC_SET_BRADY_MAX_TRACKING_RATE: 120 {BEATS}/MIN
MDC_IDC_SET_BRADY_MODE: NORMAL
MDC_IDC_SET_BRADY_PAV_DELAY_HIGH: 200 MS
MDC_IDC_SET_BRADY_PAV_DELAY_LOW: 300 MS
MDC_IDC_SET_BRADY_SAV_DELAY_HIGH: 200 MS
MDC_IDC_SET_BRADY_SAV_DELAY_LOW: 300 MS
MDC_IDC_SET_LEADCHNL_RA_PACING_AMPLITUDE: 2 V
MDC_IDC_SET_LEADCHNL_RA_PACING_CAPTURE_MODE: NORMAL
MDC_IDC_SET_LEADCHNL_RA_PACING_POLARITY: NORMAL
MDC_IDC_SET_LEADCHNL_RA_PACING_PULSEWIDTH: 0.5 MS
MDC_IDC_SET_LEADCHNL_RA_SENSING_ADAPTATION_MODE: NORMAL
MDC_IDC_SET_LEADCHNL_RA_SENSING_POLARITY: NORMAL
MDC_IDC_SET_LEADCHNL_RA_SENSING_SENSITIVITY: 0.5 MV
MDC_IDC_SET_LEADCHNL_RV_PACING_AMPLITUDE: 5 V
MDC_IDC_SET_LEADCHNL_RV_PACING_CAPTURE_MODE: NORMAL
MDC_IDC_SET_LEADCHNL_RV_PACING_POLARITY: NORMAL
MDC_IDC_SET_LEADCHNL_RV_PACING_PULSEWIDTH: 0.4 MS
MDC_IDC_SET_LEADCHNL_RV_SENSING_ADAPTATION_MODE: NORMAL
MDC_IDC_SET_LEADCHNL_RV_SENSING_POLARITY: NORMAL
MDC_IDC_SET_LEADCHNL_RV_SENSING_SENSITIVITY: 2.5 MV
MDC_IDC_STAT_AT_DTM_END: NORMAL
MDC_IDC_STAT_AT_DTM_START: NORMAL
MDC_IDC_STAT_AT_MODE_SW_COUNT: 0
MDC_IDC_STAT_AT_MODE_SW_MAX_DURATION: 0 S
MDC_IDC_STAT_BRADY_DTM_END: NORMAL
MDC_IDC_STAT_BRADY_DTM_START: NORMAL
MDC_IDC_STAT_BRADY_RA_PERCENT_PACED: 26 %
MDC_IDC_STAT_BRADY_RV_PERCENT_PACED: 1 %
MDC_IDC_STAT_EPISODE_RECENT_COUNT: 0
MDC_IDC_STAT_EPISODE_RECENT_COUNT_DTM_END: NORMAL
MDC_IDC_STAT_EPISODE_RECENT_COUNT_DTM_START: NORMAL
MDC_IDC_STAT_EPISODE_TYPE: NORMAL

## 2022-02-28 ENCOUNTER — LAB (OUTPATIENT)
Dept: LAB | Facility: CLINIC | Age: 87
End: 2022-02-28
Payer: COMMERCIAL

## 2022-02-28 ENCOUNTER — ANTICOAGULATION THERAPY VISIT (OUTPATIENT)
Dept: ANTICOAGULATION | Facility: CLINIC | Age: 87
End: 2022-02-28

## 2022-02-28 DIAGNOSIS — I48.19 PERSISTENT ATRIAL FIBRILLATION (H): ICD-10-CM

## 2022-02-28 DIAGNOSIS — Z79.01 LONG TERM CURRENT USE OF ANTICOAGULANTS WITH INR GOAL OF 2.0-3.0: ICD-10-CM

## 2022-02-28 DIAGNOSIS — Z79.01 LONG TERM CURRENT USE OF ANTICOAGULANT THERAPY: Primary | ICD-10-CM

## 2022-02-28 DIAGNOSIS — I48.91 ATRIAL FIBRILLATION (H): ICD-10-CM

## 2022-02-28 LAB — INR BLD: 2.6 (ref 0.9–1.1)

## 2022-02-28 PROCEDURE — 85610 PROTHROMBIN TIME: CPT

## 2022-02-28 PROCEDURE — 36415 COLL VENOUS BLD VENIPUNCTURE: CPT

## 2022-02-28 NOTE — PROGRESS NOTES
ANTICOAGULATION MANAGEMENT     Nathen Gage 86 year old male is on warfarin with supratherapeutic INR result. (Goal INR 2.0-2.5)    Recent labs: (last 7 days)     02/28/22  1027   INR 2.6*       ASSESSMENT       Source(s): Chart Review and Patient/Caregiver Call       Warfarin doses taken: Warfarin taken as instructed    Diet: No new diet changes identified    New illness, injury, or hospitalization: No    Medication/supplement changes: None noted    Signs or symptoms of bleeding or clotting: No    Previous INR: Therapeutic last 2(+) visits    Additional findings: Prior 3 INRs were at the top end of the normal range      PLAN     Recommended plan for ongoing change(s) affecting INR     Dosing Instructions:  Decrease your warfarin dose (7.1% change) with next INR in 2 weeks       Summary  As of 2/28/2022    Full warfarin instructions:  1.25 mg every Mon; 2.5 mg all other days   Next INR check:  3/16/2022             Telephone call with Nathen who verbalizes understanding and agrees to plan    Lab visit scheduled    Education provided: Please call back if any changes to your diet, medications or how you've been taking warfarin    Plan made per [facility] anticoagulation protocol    Darin Muñoz RN  Anticoagulation Clinic  2/28/2022    _______________________________________________________________________     Anticoagulation Episode Summary     Current INR goal:  2.0-2.5   TTR:  51.1 % (1 y)   Target end date:  Indefinite   Send INR reminders to:  [facility]    Indications    Long-term (current) use of anticoagulants [Z79.01] [Z79.01]  Atrial fibrillation (H) [I48.91]  Persistent atrial fibrillation (H) [I48.19]  Long term current use of anticoagulants with INR goal of 2.0-3.0 [Z79.01]           Comments:           Anticoagulation Care Providers     Provider Role Specialty Phone number    Kushal Valentin MD Referring Family Medicine [phone]    Leonides Aggarwal PA-C Referring Family  Medicine [phone]

## 2022-03-09 NOTE — PROGRESS NOTES
CARDIOLOGY VISIT    REASON FOR VISIT: afib, pacemaker    SUBJECTIVE:  86-year-old male seen for follow-up of paroxysmal atrial fibrillation and pacemaker.  He also has dilated a sending aorta and hypertension.      In 2011 he underwent 2C pacemaker implantation ([employer] Altrua 60 EL) for sick sinus syndrome.  He was found to have A. fib based on mode switching on device checks, starting in 2014.      Echo January 2019 showed EF 60%, mild RV dilation with normal function, no valve disease.      Echo January 2020 showed EF 60%, normal RV, 1+ TR, RVSP 39 mmHg, aorta 4.0 cm.     Device check February 2022 showed 26% a paced, less than 1% V paced, 3.5 years battery.    He has been doing well recently.  He has done some walking recently and did some snowshoeing over the winter.  He is looking forward to getting back into dancing now that the pandemic has improved.  He denies any palpitations, chest pain, or edema.  Blood pressure runs 130 or less at home.    MEDICATIONS:  Current Outpatient Medications   Medication     atorvastatin (LIPITOR) 20 MG tablet     cetirizine (ZYRTEC) 10 MG tablet     diclofenac (VOLTAREN) 1 % topical gel     doxazosin (CARDURA) 4 MG tablet     losartan (COZAAR) 100 MG tablet     pantoprazole (PROTONIX) 40 MG EC tablet     Polyethylene Glycol 400 (BLINK TEARS OP)     prednisolon-gatiflox-bromfenac, pt own, no charge, 1-0.5-0.075 % opthalmic solution     triamcinolone (KENALOG) 0.5 % external ointment     warfarin ANTICOAGULANT (COUMADIN) 2.5 MG tablet     No current facility-administered medications for this visit.       ALLERGIES:  Allergies   Allergen Reactions     Neomycin Sulfate        REVIEW OF SYSTEMS:  Constitutional:  No weight loss, fever, chills  HEENT:  Eyes:  No visual loss, blurred vision, double vision or yellow sclerae. No hearing loss, sneezing, congestion, runny nose or sore throat.  Skin:  No rash or itching.  Cardiovascular: per HPI  Respiratory: per HPI  GI:  No  "anorexia, nausea, vomiting or diarrhea. No abdominal pain or blood.  :  No dysurea, hematuria  Neurologic:  No headache, paralysis, ataxia, numbness or tingling in the extremities. No change in bowel or bladder control.  Musculoskeletal:  No muscle pain  Hematologic:  No bleeding or bruising.  Lymphatics:  No enlarged nodes. No history of splenectomy.  Endocrine:  No reports of sweating, cold or heat intolerance. No polyuria or polydipsia.  Allergies:  No history of asthma, hives, eczema or rhinitis.    PHYSICAL EXAM:  /80 (BP Location: Right arm, Patient Position: Sitting, Cuff Size: Adult Regular)   Pulse 64   Ht 1.715 m (5' 7.5\")   Wt 79.2 kg (174 lb 11.2 oz)   SpO2 98%   BMI 26.96 kg/m      Constitutional: awake, alert, no distress  Eyes: PERRL, sclera nonicteric  ENT: trachea midline  Respiratory: Lungs clear  Cardiovascular: Regular rate and rhythm, no murmurs, no edema  GI: nondistended, nontender, bowel sounds present  Lymph/Hematologic: no lymphadenopathy  Skin: dry, no rash  Musculoskeletal: good muscle tone, strength 5/5 in upper and lower extremities  Neurologic: no focal deficits  Neuropsychiatric: appropriate affact    DATA:  Lab: January 2022: Sodium 130, potassium 4.0, creatinine 1.1  Recent Labs   Lab Test 09/13/21  0937 08/12/20  1029 02/05/16  0734 05/26/15  0727 12/04/14  0804   CHOL 146 164   < > 133 170   HDL 59 58   < > 54 53   LDL 70 87   < > 66 96   TRIG 83 94   < > 63 105   CHOLHDLRATIO  --   --   --  2.5 3.2    < > = values in this interval not displayed.     ASSESSMENT:  86-year-old male seen for pacemaker and paroxysmal A. fib.  He is doing well with no concerning symptoms.  He is very active for his age.  Medications will be kept the same.    RECOMMENDATIONS:  1.  Paroxysmal A. Fib  -Continue warfarin    2.  Pacemaker  -Continue routine device checks  -Echo on follow-up in 1 year    Follow-up with SHAY in 1 year after echo.    Daniel Leon MD  Cardiology - [facility] " Heart  Pager:  [phone]  Text Page  March 14, 2022

## 2022-03-14 ENCOUNTER — OFFICE VISIT (OUTPATIENT)
Dept: CARDIOLOGY | Facility: CLINIC | Age: 87
End: 2022-03-14
Payer: COMMERCIAL

## 2022-03-14 VITALS
OXYGEN SATURATION: 98 % | HEART RATE: 64 BPM | DIASTOLIC BLOOD PRESSURE: 80 MMHG | WEIGHT: 174.7 LBS | HEIGHT: 68 IN | BODY MASS INDEX: 26.48 KG/M2 | SYSTOLIC BLOOD PRESSURE: 132 MMHG

## 2022-03-14 DIAGNOSIS — I48.0 PAROXYSMAL ATRIAL FIBRILLATION (H): Primary | ICD-10-CM

## 2022-03-14 DIAGNOSIS — Z95.0 CARDIAC PACEMAKER IN SITU: ICD-10-CM

## 2022-03-14 PROCEDURE — 99214 OFFICE O/P EST MOD 30 MIN: CPT | Performed by: INTERNAL MEDICINE

## 2022-03-14 NOTE — PATIENT INSTRUCTIONS
Please keep your medications same.    We will see you back in 1 years time with another echocardiogram.

## 2022-03-14 NOTE — LETTER
3/14/2022    Leonides Aggarwal PA-C  [id number] [facility] [id number]    RE: Nathen Gage       Dear Colleague,     I had the pleasure of seeing Nathen Gage in the [facility].  CARDIOLOGY VISIT    REASON FOR VISIT: afib, pacemaker    SUBJECTIVE:  86-year-old male seen for follow-up of paroxysmal atrial fibrillation and pacemaker.  He also has dilated a sending aorta and hypertension.      In 2011 he underwent 2C pacemaker implantation ([employer] Altrua 60 EL) for sick sinus syndrome.  He was found to have A. fib based on mode switching on device checks, starting in 2014.      Echo January 2019 showed EF 60%, mild RV dilation with normal function, no valve disease.      Echo January 2020 showed EF 60%, normal RV, 1+ TR, RVSP 39 mmHg, aorta 4.0 cm.     Device check February 2022 showed 26% a paced, less than 1% V paced, 3.5 years battery.    He has been doing well recently.  He has done some walking recently and did some snowshoeing over the winter.  He is looking forward to getting back into dancing now that the pandemic has improved.  He denies any palpitations, chest pain, or edema.  Blood pressure runs 130 or less at home.    MEDICATIONS:  Current Outpatient Medications   Medication     atorvastatin (LIPITOR) 20 MG tablet     cetirizine (ZYRTEC) 10 MG tablet     diclofenac (VOLTAREN) 1 % topical gel     doxazosin (CARDURA) 4 MG tablet     losartan (COZAAR) 100 MG tablet     pantoprazole (PROTONIX) 40 MG EC tablet     Polyethylene Glycol 400 (BLINK TEARS OP)     prednisolon-gatiflox-bromfenac, pt own, no charge, 1-0.5-0.075 % opthalmic solution     triamcinolone (KENALOG) 0.5 % external ointment     warfarin ANTICOAGULANT (COUMADIN) 2.5 MG tablet     No current facility-administered medications for this visit.       ALLERGIES:  Allergies   Allergen Reactions     Neomycin Sulfate        REVIEW OF SYSTEMS:  Constitutional:  No weight loss, fever, chills  HEENT:  Eyes:  No  "visual loss, blurred vision, double vision or yellow sclerae. No hearing loss, sneezing, congestion, runny nose or sore throat.  Skin:  No rash or itching.  Cardiovascular: per HPI  Respiratory: per HPI  GI:  No anorexia, nausea, vomiting or diarrhea. No abdominal pain or blood.  :  No dysurea, hematuria  Neurologic:  No headache, paralysis, ataxia, numbness or tingling in the extremities. No change in bowel or bladder control.  Musculoskeletal:  No muscle pain  Hematologic:  No bleeding or bruising.  Lymphatics:  No enlarged nodes. No history of splenectomy.  Endocrine:  No reports of sweating, cold or heat intolerance. No polyuria or polydipsia.  Allergies:  No history of asthma, hives, eczema or rhinitis.    PHYSICAL EXAM:  /80 (BP Location: Right arm, Patient Position: Sitting, Cuff Size: Adult Regular)   Pulse 64   Ht 1.715 m (5' 7.5\")   Wt 79.2 kg (174 lb 11.2 oz)   SpO2 98%   BMI 26.96 kg/m      Constitutional: awake, alert, no distress  Eyes: PERRL, sclera nonicteric  ENT: trachea midline  Respiratory: Lungs clear  Cardiovascular: Regular rate and rhythm, no murmurs, no edema  GI: nondistended, nontender, bowel sounds present  Lymph/Hematologic: no lymphadenopathy  Skin: dry, no rash  Musculoskeletal: good muscle tone, strength 5/5 in upper and lower extremities  Neurologic: no focal deficits  Neuropsychiatric: appropriate affact    DATA:  Lab: January 2022: Sodium 130, potassium 4.0, creatinine 1.1  Recent Labs   Lab Test 09/13/21  0937 08/12/20  1029 02/05/16  0734 05/26/15  0727 12/04/14  0804   CHOL 146 164   < > 133 170   HDL 59 58   < > 54 53   LDL 70 87   < > 66 96   TRIG 83 94   < > 63 105   CHOLHDLRATIO  --   --   --  2.5 3.2    < > = values in this interval not displayed.     ASSESSMENT:  86-year-old male seen for pacemaker and paroxysmal A. fib.  He is doing well with no concerning symptoms.  He is very active for his age.  Medications will be kept the same.    RECOMMENDATIONS:  1.  " Paroxysmal A. Fib  -Continue warfarin    2.  Pacemaker  -Continue routine device checks  -Echo on follow-up in 1 year    Follow-up with SHAY in 1 year after echo.    Daniel Leon MD  Cardiology - [facility]  Pager:  [phone]  Text Page  March 14, 2022

## 2022-03-16 ENCOUNTER — ANTICOAGULATION THERAPY VISIT (OUTPATIENT)
Dept: ANTICOAGULATION | Facility: CLINIC | Age: 87
End: 2022-03-16

## 2022-03-16 ENCOUNTER — LAB (OUTPATIENT)
Dept: LAB | Facility: CLINIC | Age: 87
End: 2022-03-16
Payer: COMMERCIAL

## 2022-03-16 DIAGNOSIS — I48.19 PERSISTENT ATRIAL FIBRILLATION (H): ICD-10-CM

## 2022-03-16 DIAGNOSIS — Z79.01 LONG TERM CURRENT USE OF ANTICOAGULANT THERAPY: Primary | ICD-10-CM

## 2022-03-16 DIAGNOSIS — Z79.01 LONG TERM CURRENT USE OF ANTICOAGULANTS WITH INR GOAL OF 2.0-3.0: ICD-10-CM

## 2022-03-16 DIAGNOSIS — I48.91 ATRIAL FIBRILLATION (H): ICD-10-CM

## 2022-03-16 LAB — INR BLD: 2.8 (ref 0.9–1.1)

## 2022-03-16 PROCEDURE — 36415 COLL VENOUS BLD VENIPUNCTURE: CPT

## 2022-03-16 PROCEDURE — 85610 PROTHROMBIN TIME: CPT

## 2022-03-16 NOTE — PROGRESS NOTES
ANTICOAGULATION MANAGEMENT     Nathen Gage 86 year old male is on warfarin with supratherapeutic INR result. (Goal INR 2.0-2.5)    Recent labs: (last 7 days)     03/16/22  0826   INR 2.8*       ASSESSMENT       Source(s): Chart Review and Patient/Caregiver Call       Warfarin doses taken: Warfarin taken as instructed    Diet: No new diet changes identified    New illness, injury, or hospitalization: No    Medication/supplement changes: None noted    Signs or symptoms of bleeding or clotting: No    Previous INR: Supratherapeutic    Additional findings: None       PLAN     Recommended plan for no diet, medication or health factor changes affecting INR     Dosing Instructions:  Decrease your warfarin dose (7% change) with next INR in 2 weeks       Summary  As of 3/16/2022    Full warfarin instructions:  1.25 mg every Mon, Thu; 2.5 mg all other days   Next INR check:  3/30/2022             Telephone call with Nathen who verbalizes understanding and agrees to plan    Lab visit scheduled    Education provided: Goal range and significance of current result, Monitoring for bleeding signs and symptoms and Contact [phone]  with any changes, questions or concerns.     Plan made per [facility] anticoagulation protocol    Marci Vazquez RN  Anticoagulation Clinic  3/16/2022    _______________________________________________________________________     Anticoagulation Episode Summary     Current INR goal:  2.0-2.5   TTR:  50.0 % (1 y)   Target end date:  Indefinite   Send INR reminders to:  [facility] [facility]    Indications    Long-term (current) use of anticoagulants [Z79.01] [Z79.01]  Atrial fibrillation (H) [I48.91]  Persistent atrial fibrillation (H) [I48.19]  Long term current use of anticoagulants with INR goal of 2.0-3.0 [Z79.01]           Comments:           Anticoagulation Care Providers     Provider Role Specialty Phone number    Kushal Valentin MD Referring Family Medicine [phone]    Leonides Aggarwal  [facility] [phone]

## 2022-03-18 DIAGNOSIS — N40.1 BENIGN LOCALIZED HYPERPLASIA OF PROSTATE WITH URINARY OBSTRUCTION: ICD-10-CM

## 2022-03-18 DIAGNOSIS — N13.8 BENIGN LOCALIZED HYPERPLASIA OF PROSTATE WITH URINARY OBSTRUCTION: ICD-10-CM

## 2022-03-21 DIAGNOSIS — I10 ESSENTIAL HYPERTENSION, BENIGN: ICD-10-CM

## 2022-03-21 DIAGNOSIS — E78.00 PURE HYPERCHOLESTEROLEMIA: ICD-10-CM

## 2022-03-21 RX ORDER — DOXAZOSIN 4 MG/1
TABLET ORAL
Qty: 90 TABLET | Refills: 1 | Status: SHIPPED | OUTPATIENT
Start: 2022-03-21 | End: 2022-09-12

## 2022-03-21 RX ORDER — ATORVASTATIN CALCIUM 20 MG/1
TABLET, FILM COATED ORAL
Qty: 90 TABLET | Refills: 1 | Status: CANCELLED | OUTPATIENT
Start: 2022-03-21

## 2022-03-21 NOTE — TELEPHONE ENCOUNTER
Routing refill request to provider for review/approval because:  A break in medication    Cale HE RN

## 2022-03-23 RX ORDER — ATORVASTATIN CALCIUM 20 MG/1
TABLET, FILM COATED ORAL
Qty: 90 TABLET | Refills: 1 | OUTPATIENT
Start: 2022-03-23

## 2022-03-29 ENCOUNTER — ANTICOAGULATION THERAPY VISIT (OUTPATIENT)
Dept: ANTICOAGULATION | Facility: CLINIC | Age: 87
End: 2022-03-29

## 2022-03-29 ENCOUNTER — LAB (OUTPATIENT)
Dept: LAB | Facility: CLINIC | Age: 87
End: 2022-03-29
Payer: COMMERCIAL

## 2022-03-29 DIAGNOSIS — I48.91 ATRIAL FIBRILLATION (H): ICD-10-CM

## 2022-03-29 DIAGNOSIS — Z79.01 LONG TERM CURRENT USE OF ANTICOAGULANTS WITH INR GOAL OF 2.0-3.0: ICD-10-CM

## 2022-03-29 DIAGNOSIS — I48.19 PERSISTENT ATRIAL FIBRILLATION (H): ICD-10-CM

## 2022-03-29 DIAGNOSIS — Z79.01 LONG TERM CURRENT USE OF ANTICOAGULANT THERAPY: Primary | ICD-10-CM

## 2022-03-29 LAB — INR BLD: 2.1 (ref 0.9–1.1)

## 2022-03-29 PROCEDURE — 36416 COLLJ CAPILLARY BLOOD SPEC: CPT

## 2022-03-29 PROCEDURE — 85610 PROTHROMBIN TIME: CPT

## 2022-03-29 NOTE — PROGRESS NOTES
ANTICOAGULATION MANAGEMENT     Nathen Gage 86 year old male is on warfarin with therapeutic INR result. (Goal INR 2.0-2.5)    Recent labs: (last 7 days)     03/29/22  0925   INR 2.1*       ASSESSMENT       Source(s): Chart Review    Previous INR was Supratherapeutic    Medication, diet, health changes since last INR chart reviewed; none identified           PLAN     Recommended plan for no diet, medication or health factor changes affecting INR     Dosing Instructions: Continue your current warfarin dose with next INR in 3 weeks       Summary  As of 3/29/2022    Full warfarin instructions:  1.25 mg every Mon, Thu; 2.5 mg all other days   Next INR check:  4/19/2022             Detailed voice message left for Neal with dosing instructions and follow up date.     Contact [phone]  to schedule and with any changes, questions or concerns.     Education provided: Please call back if any changes to your diet, medications or how you've been taking warfarin and Contact [phone]  with any changes, questions or concerns.     Plan made per [facility] anticoagulation protocol    Joanne Bustos RN  Anticoagulation Clinic  3/29/2022    _______________________________________________________________________     Anticoagulation Episode Summary     Current INR goal:  2.0-2.5   TTR:  51.0 % (1 y)   Target end date:  Indefinite   Send INR reminders to:  [facility] [facility]    Indications    Long-term (current) use of anticoagulants [Z79.01] [Z79.01]  Atrial fibrillation (H) [I48.91]  Persistent atrial fibrillation (H) [I48.19]  Long term current use of anticoagulants with INR goal of 2.0-3.0 [Z79.01]           Comments:           Anticoagulation Care Providers     Provider Role Specialty Phone number    Kushal Valentin MD Referring Family Medicine [phone]    Leonides Aggarwal PA-C Referring Family Medicine [phone]

## 2022-04-17 DIAGNOSIS — K21.00 GASTROESOPHAGEAL REFLUX DISEASE WITH ESOPHAGITIS: ICD-10-CM

## 2022-04-18 RX ORDER — PANTOPRAZOLE SODIUM 40 MG/1
TABLET, DELAYED RELEASE ORAL
Qty: 90 TABLET | Refills: 0 | Status: SHIPPED | OUTPATIENT
Start: 2022-04-18 | End: 2022-04-25

## 2022-04-18 NOTE — TELEPHONE ENCOUNTER
Prescription approved per [facility] Refill Protocol.  Cale HE RN

## 2022-04-20 ENCOUNTER — ALLIED HEALTH/NURSE VISIT (OUTPATIENT)
Dept: FAMILY MEDICINE | Facility: CLINIC | Age: 87
End: 2022-04-20

## 2022-04-20 ENCOUNTER — ANTICOAGULATION THERAPY VISIT (OUTPATIENT)
Dept: ANTICOAGULATION | Facility: CLINIC | Age: 87
End: 2022-04-20

## 2022-04-20 ENCOUNTER — LAB (OUTPATIENT)
Dept: LAB | Facility: CLINIC | Age: 87
End: 2022-04-20
Payer: COMMERCIAL

## 2022-04-20 DIAGNOSIS — I48.19 PERSISTENT ATRIAL FIBRILLATION (H): ICD-10-CM

## 2022-04-20 DIAGNOSIS — Z79.01 LONG TERM CURRENT USE OF ANTICOAGULANTS WITH INR GOAL OF 2.0-3.0: ICD-10-CM

## 2022-04-20 DIAGNOSIS — Z79.01 LONG TERM CURRENT USE OF ANTICOAGULANT THERAPY: Primary | ICD-10-CM

## 2022-04-20 DIAGNOSIS — Z23 NEED FOR COVID-19 VACCINE: Primary | ICD-10-CM

## 2022-04-20 LAB — INR BLD: 2 (ref 0.9–1.1)

## 2022-04-20 PROCEDURE — 91305 COVID-19,PF,PFIZER (12+ YRS): CPT

## 2022-04-20 PROCEDURE — 99207 PR NO CHARGE NURSE ONLY: CPT

## 2022-04-20 PROCEDURE — 0054A COVID-19,PF,PFIZER (12+ YRS): CPT

## 2022-04-20 PROCEDURE — 36416 COLLJ CAPILLARY BLOOD SPEC: CPT

## 2022-04-20 PROCEDURE — 85610 PROTHROMBIN TIME: CPT

## 2022-04-20 NOTE — PROGRESS NOTES
ANTICOAGULATION MANAGEMENT     Nathen Gage 86 year old male is on warfarin with therapeutic INR result. (Goal INR 2.0-2.5)    Recent labs: (last 7 days)     04/20/22  0910   INR 2.0*       ASSESSMENT       Source(s): Chart Review and Patient/Caregiver Call       Warfarin doses taken: Warfarin taken as instructed    Diet: Increased greens/vitamin K in diet; plans to resume previous intake    New illness, injury, or hospitalization: No    Medication/supplement changes: None noted    Signs or symptoms of bleeding or clotting: No    Previous INR: Therapeutic last visit; previously outside of goal range    Additional findings: Will be leaving for [place] 5/1/22 for 2 weeks, will be driving.       PLAN     Recommended plan for no diet, medication or health factor changes affecting INR. Checking INR prior to patient leaving on vacation, has had 2 warfarin dose decreases and INR is now at the bottom of his goal range.    Dosing Instructions: continue your current warfarin dose with next INR in 9 days       Summary  As of 4/20/2022    Full warfarin instructions:  1.25 mg every Mon, Thu; 2.5 mg all other days   Next INR check:  4/29/2022             Telephone call with Neal who verbalizes understanding and agrees to plan    Lab visit scheduled    Education provided: Please call back if any changes to your diet, medications or how you've been taking warfarin and Contact [phone]  with any changes, questions or concerns.     Plan made per [facility] anticoagulation protocol    Teresa Castro RN  Anticoagulation Clinic  4/20/2022    _______________________________________________________________________     Anticoagulation Episode Summary     Current INR goal:  2.0-2.5   TTR:  55.8 % (1 y)   Target end date:  Indefinite   Send INR reminders to:  [facility] [facility]    Indications    Long-term (current) use of anticoagulants [Z79.01] [Z79.01]  Atrial fibrillation (H) [I48.91]  Persistent atrial fibrillation (H) [I48.19]  Long  term current use of anticoagulants with INR goal of 2.0-3.0 [Z79.01]           Comments:           Anticoagulation Care Providers     Provider Role Specialty Phone number    Kushal Valentin MD Referring Family Medicine [phone]    Lenoides Aggarwal PA-C Referring Family Medicine [phone]

## 2022-04-22 DIAGNOSIS — K21.00 GASTROESOPHAGEAL REFLUX DISEASE WITH ESOPHAGITIS: ICD-10-CM

## 2022-04-25 RX ORDER — PANTOPRAZOLE SODIUM 40 MG/1
TABLET, DELAYED RELEASE ORAL
Qty: 90 TABLET | Refills: 0 | Status: SHIPPED | OUTPATIENT
Start: 2022-04-25 | End: 2022-10-17

## 2022-04-25 NOTE — TELEPHONE ENCOUNTER
Prescription approved per [facility] Refill Protocol.  Concepción Newberry, RN, BSN, PHN  [facility]

## 2022-04-29 ENCOUNTER — ANTICOAGULATION THERAPY VISIT (OUTPATIENT)
Dept: ANTICOAGULATION | Facility: CLINIC | Age: 87
End: 2022-04-29

## 2022-04-29 ENCOUNTER — LAB (OUTPATIENT)
Dept: LAB | Facility: CLINIC | Age: 87
End: 2022-04-29
Payer: COMMERCIAL

## 2022-04-29 DIAGNOSIS — I48.91 ATRIAL FIBRILLATION (H): ICD-10-CM

## 2022-04-29 DIAGNOSIS — I48.19 PERSISTENT ATRIAL FIBRILLATION (H): ICD-10-CM

## 2022-04-29 DIAGNOSIS — Z79.01 LONG TERM CURRENT USE OF ANTICOAGULANTS WITH INR GOAL OF 2.0-3.0: ICD-10-CM

## 2022-04-29 DIAGNOSIS — Z79.01 LONG TERM CURRENT USE OF ANTICOAGULANT THERAPY: Primary | ICD-10-CM

## 2022-04-29 LAB — INR BLD: 2.2 (ref 0.9–1.1)

## 2022-04-29 PROCEDURE — 36416 COLLJ CAPILLARY BLOOD SPEC: CPT

## 2022-04-29 PROCEDURE — 85610 PROTHROMBIN TIME: CPT

## 2022-04-29 NOTE — PROGRESS NOTES
ANTICOAGULATION MANAGEMENT     Nathen Gage 86 year old male is on warfarin with therapeutic INR result. (Goal INR 2.0-2.5)    Recent labs: (last 7 days)     04/29/22  0809   INR 2.2*       ASSESSMENT       Source(s): Chart Review and Patient/Caregiver Call       Warfarin doses taken: Warfarin taken as instructed    Diet: No new diet changes identified    New illness, injury, or hospitalization: No    Medication/supplement changes: None noted    Signs or symptoms of bleeding or clotting: No    Previous INR: Therapeutic last 2(+) visits    Additional findings: Pt driving to [place], leaving on 4/30/22 x 2 weeks       PLAN     Recommended plan for no diet, medication or health factor changes affecting INR     Dosing Instructions: continue your current warfarin dose with next INR in 3 weeks       Summary  As of 4/29/2022    Full warfarin instructions:  1.25 mg every Mon, Thu; 2.5 mg all other days   Next INR check:  5/20/2022             Telephone call with Neal who agrees to plan and repeated back plan correctly    Lab visit scheduled    Education provided: Contact [phone]  with any changes, questions or concerns.     Plan made per [facility] anticoagulation protocol    Fidelina Pizano, RN  Anticoagulation Clinic  4/29/2022    _______________________________________________________________________     Anticoagulation Episode Summary     Current INR goal:  2.0-2.5   TTR:  55.8 % (1 y)   Target end date:  Indefinite   Send INR reminders to:  [facility]    Indications    Long-term (current) use of anticoagulants [Z79.01] [Z79.01]  Atrial fibrillation (H) [I48.91]  Persistent atrial fibrillation (H) [I48.19]  Long term current use of anticoagulants with INR goal of 2.0-3.0 [Z79.01]           Comments:           Anticoagulation Care Providers     Provider Role Specialty Phone number    Kushal Valentin MD Referring Family Medicine [phone]    Leonides Aggarwal PA-C Referring [facility] Medicine [phone]

## 2022-04-29 NOTE — PROGRESS NOTES
ANTICOAGULATION MANAGEMENT     Nathen Gage 86 year old male is on warfarin with therapeutic INR result. (Goal INR 2.0-2.5)    Recent labs: (last 7 days)     04/29/22  0809   INR 2.2*       ASSESSMENT       Source(s): Chart Review    Previous INR was Therapeutic last 2(+) visits    Medication, diet, health changes since last INR chart reviewed; none identified           PLAN     Unable to reach Neal tineo.    Left  to continue SAME DOSE and to call [phone] with transfer to Fidelina Kitchen:[phone] OR [facility]    Follow up required to assess for changes     Fidelina Pizano, ARTIS  Anticoagulation Clinic  4/29/2022

## 2022-05-18 DIAGNOSIS — I15.9 SECONDARY HYPERTENSION: ICD-10-CM

## 2022-05-18 RX ORDER — LOSARTAN POTASSIUM 100 MG/1
100 TABLET ORAL DAILY
Qty: 90 TABLET | Refills: 3 | Status: SHIPPED | OUTPATIENT
Start: 2022-05-18 | End: 2022-09-12

## 2022-05-20 ENCOUNTER — ANTICOAGULATION THERAPY VISIT (OUTPATIENT)
Dept: ANTICOAGULATION | Facility: CLINIC | Age: 87
End: 2022-05-20

## 2022-05-20 ENCOUNTER — LAB (OUTPATIENT)
Dept: LAB | Facility: CLINIC | Age: 87
End: 2022-05-20
Payer: COMMERCIAL

## 2022-05-20 DIAGNOSIS — Z79.01 LONG TERM CURRENT USE OF ANTICOAGULANTS WITH INR GOAL OF 2.0-3.0: ICD-10-CM

## 2022-05-20 DIAGNOSIS — Z79.01 LONG TERM CURRENT USE OF ANTICOAGULANT THERAPY: Primary | ICD-10-CM

## 2022-05-20 DIAGNOSIS — I48.19 PERSISTENT ATRIAL FIBRILLATION (H): ICD-10-CM

## 2022-05-20 DIAGNOSIS — I48.91 ATRIAL FIBRILLATION (H): ICD-10-CM

## 2022-05-20 LAB — INR BLD: 2.1 (ref 0.9–1.1)

## 2022-05-20 PROCEDURE — 36416 COLLJ CAPILLARY BLOOD SPEC: CPT

## 2022-05-20 PROCEDURE — 85610 PROTHROMBIN TIME: CPT

## 2022-05-20 NOTE — PROGRESS NOTES
"ANTICOAGULATION MANAGEMENT     Nathen Gage 86 year old male is on warfarin with therapeutic INR result. (Goal INR 2.0-2.5)    Recent labs: (last 7 days)     05/20/22  0822   INR 2.1*         ASSESSMENT       Source(s): Chart Review and Patient/Caregiver Call       Warfarin doses taken: Warfarin taken as instructed    Diet: No new diet changes identified    New illness, injury, or hospitalization: No    Medication/supplement changes: None noted    Signs or symptoms of bleeding or clotting: No    Previous INR: Therapeutic last 2(+) visits    Additional findings: None       PLAN     Recommended plan for no diet, medication or health factor changes affecting INR     Dosing Instructions: continue your current warfarin dose with next INR in 3 weeks       Summary  As of 5/20/2022    Full warfarin instructions:  1.25 mg every Mon, Thu; 2.5 mg all other days   Next INR check:  6/2/2022             Telephone call with Neal who agrees to plan and repeated back plan correctly    Patient elected to schedule next visit in 2 weeks instead of 3. \"It always seems to go out of range if I go 3 weeks\"    Education provided: Contact [phone]  with any changes, questions or concerns.     Plan made per [facility] anticoagulation protocol    Radha Sumner RN  Anticoagulation Clinic  5/20/2022    _______________________________________________________________________     Anticoagulation Episode Summary     Current INR goal:  2.0-2.5   TTR:  55.8 % (1 y)   Target end date:  Indefinite   Send INR reminders to:  [facility]    Indications    Persistent atrial fibrillation (H) [I48.19]  Long term current use of anticoagulants with INR goal of 2.0-3.0 [Z79.01]           Comments:           Anticoagulation Care Providers     Provider Role Specialty Phone number    Kushal Valentin MD Referring Family Medicine [phone]    Leonides Aggarwal PA-C Referring Family Medicine [phone]          If returning call, transfer " to Radha [phone] or route to [facility] [[id number]]

## 2022-06-01 ENCOUNTER — ANCILLARY PROCEDURE (OUTPATIENT)
Dept: CARDIOLOGY | Facility: CLINIC | Age: 87
End: 2022-06-01
Attending: INTERNAL MEDICINE
Payer: COMMERCIAL

## 2022-06-01 DIAGNOSIS — I49.5 SICK SINUS SYNDROME (H): ICD-10-CM

## 2022-06-01 DIAGNOSIS — Z95.0 CARDIAC PACEMAKER: ICD-10-CM

## 2022-06-01 PROCEDURE — 93293 PM PHONE R-STRIP DEVICE EVAL: CPT | Performed by: INTERNAL MEDICINE

## 2022-06-02 ENCOUNTER — ANTICOAGULATION THERAPY VISIT (OUTPATIENT)
Dept: ANTICOAGULATION | Facility: CLINIC | Age: 87
End: 2022-06-02

## 2022-06-02 ENCOUNTER — TELEPHONE (OUTPATIENT)
Dept: FAMILY MEDICINE | Facility: CLINIC | Age: 87
End: 2022-06-02

## 2022-06-02 ENCOUNTER — LAB (OUTPATIENT)
Dept: LAB | Facility: CLINIC | Age: 87
End: 2022-06-02
Payer: COMMERCIAL

## 2022-06-02 DIAGNOSIS — Z79.01 LONG TERM CURRENT USE OF ANTICOAGULANTS WITH INR GOAL OF 2.0-3.0: ICD-10-CM

## 2022-06-02 DIAGNOSIS — I48.19 PERSISTENT ATRIAL FIBRILLATION (H): Primary | ICD-10-CM

## 2022-06-02 DIAGNOSIS — I48.19 PERSISTENT ATRIAL FIBRILLATION (H): ICD-10-CM

## 2022-06-02 LAB — INR BLD: 1.8 (ref 0.9–1.1)

## 2022-06-02 PROCEDURE — 36416 COLLJ CAPILLARY BLOOD SPEC: CPT

## 2022-06-02 PROCEDURE — 85610 PROTHROMBIN TIME: CPT

## 2022-06-02 NOTE — PROGRESS NOTES
PLAN     Unable to reach Neal today.    Left message to continue current dose of warfarin 1.25 mg tonight. Request call back for assessment.    Follow up required to confirm warfarin dose taken and assess for changes and discuss dosing instructions and confirm understanding of instructions    Joanne Bustos RN  Anticoagulation Clinic  6/2/2022

## 2022-06-02 NOTE — TELEPHONE ENCOUNTER
Reason for Call:  Other returning call    Detailed comments: patient is returning liza     Phone Number Patient can be reached at: Home number on file [phone] (home)    Best Time: anytime    Can we leave a detailed message on this number? YES    Call taken on 6/2/2022 at 3:42 PM by Myranda Chan

## 2022-06-02 NOTE — PROGRESS NOTES
ANTICOAGULATION MANAGEMENT     Nathen Gage 86 year old male is on warfarin with therapeutic INR result. (Goal INR 2.0-2.5)    Recent labs: (last 7 days)     06/02/22  0753   INR 1.8*       ASSESSMENT       Source(s): Chart Review and Patient/Caregiver Call       Warfarin doses taken: Warfarin taken as instructed    Diet: No new diet changes identified    New illness, injury, or hospitalization: No    Medication/supplement changes: None noted    Signs or symptoms of bleeding or clotting: No    Previous INR: Therapeutic last 2(+) visits    Additional findings: None       PLAN     Recommended plan for no diet, medication or health factor changes affecting INR     Dosing Instructions: continue your current warfarin dose with next INR in 2 weeks       Summary  As of 6/2/2022    Full warfarin instructions:  1.25 mg every Mon, Thu; 2.5 mg all other days   Next INR check:  6/16/2022             Telephone call with Neal who agrees to plan and repeated back plan correctly    Lab visit scheduled    Education provided: Please call back if any changes to your diet, medications or how you've been taking warfarin    Plan made per [facility] anticoagulation protocol    Joanne Bustos RN  Anticoagulation Clinic  6/2/2022    _______________________________________________________________________     Anticoagulation Episode Summary     Current INR goal:  2.0-2.5   TTR:  57.0 % (1 y)   Target end date:  Indefinite   Send INR reminders to:  [facility]    Indications    Persistent atrial fibrillation (H) [I48.19]  Long term current use of anticoagulants with INR goal of 2.0-3.0 [Z79.01]           Comments:           Anticoagulation Care Providers     Provider Role Specialty Phone number    Kushal Valentin MD Referring Family Medicine [phone]    Leonides Aggarwal PA-C Referring Family Medicine [phone]

## 2022-06-02 NOTE — PROGRESS NOTES
ANTICOAGULATION MANAGEMENT     Nathen Gage 86 year old male is on warfarin with subtherapeutic INR result. (Goal INR 2.0-2.5)    Recent labs: (last 7 days)     06/02/22  0753   INR 1.8*       ASSESSMENT       Source(s): Chart Review    Previous INR was Therapeutic last 2(+) visits    Medication, diet, health changes since last INR chart reviewed; none identified           PLAN     Unable to reach Neal today.      Follow up required to confirm warfarin dose taken and assess for changes and discuss dosing instructions and confirm understanding of instructions    Joanne Bustos RN  Anticoagulation Clinic  6/2/2022

## 2022-06-16 ENCOUNTER — ANTICOAGULATION THERAPY VISIT (OUTPATIENT)
Dept: ANTICOAGULATION | Facility: CLINIC | Age: 87
End: 2022-06-16

## 2022-06-16 ENCOUNTER — LAB (OUTPATIENT)
Dept: LAB | Facility: CLINIC | Age: 87
End: 2022-06-16
Payer: COMMERCIAL

## 2022-06-16 DIAGNOSIS — I48.19 PERSISTENT ATRIAL FIBRILLATION (H): ICD-10-CM

## 2022-06-16 DIAGNOSIS — Z79.01 LONG TERM CURRENT USE OF ANTICOAGULANTS WITH INR GOAL OF 2.0-3.0: ICD-10-CM

## 2022-06-16 DIAGNOSIS — I48.19 PERSISTENT ATRIAL FIBRILLATION (H): Primary | ICD-10-CM

## 2022-06-16 LAB — INR BLD: 1.7 (ref 0.9–1.1)

## 2022-06-16 PROCEDURE — 85610 PROTHROMBIN TIME: CPT

## 2022-06-16 PROCEDURE — 36416 COLLJ CAPILLARY BLOOD SPEC: CPT

## 2022-06-16 NOTE — PROGRESS NOTES
ANTICOAGULATION MANAGEMENT     Nathen Gage 86 year old male is on warfarin with subtherapeutic INR result. (Goal INR 2.0-2.5)    Recent labs: (last 7 days)     06/16/22  1358   INR 1.7*       ASSESSMENT       Source(s): Chart Review and Patient/Caregiver Call       Warfarin doses taken: Warfarin taken as instructed    Diet: No new diet changes identified--still eating greens everyday--either broccoli or apolonia and V8 every morning, this is not a new routine. Patient may want to cut back on vitamin K intake but will not make any changes at this time.    New illness, injury, or hospitalization: No    Medication/supplement changes: None noted    Signs or symptoms of bleeding or clotting: No    Previous INR: Subtherapeutic    Additional findings: pt has been more active walking and biking       PLAN     Recommended plan for ongoing change(s) affecting INR     Dosing Instructions: Increase your warfarin dose (8% change) with next INR in 2 weeks       Summary  As of 6/16/2022    Full warfarin instructions:  1.25 mg every Mon; 2.5 mg all other days   Next INR check:  6/30/2022             Telephone call with Neal who agrees to plan and repeated back plan correctly    Lab visit scheduled    Education provided: Importance of consistent vitamin K intake, Impact of vitamin K foods on INR, Vitamin K content of foods and Contact [phone]  with any changes, questions or concerns.     Plan made per [facility] anticoagulation protocol    Fidelina Pizano RN  Anticoagulation Clinic  6/16/2022    _______________________________________________________________________     Anticoagulation Episode Summary     Current INR goal:  2.0-2.5   TTR:  53.1 % (1 y)   Target end date:  Indefinite   Send INR reminders to:  [facility]    Indications    Persistent atrial fibrillation (H) [I48.19]  Long term current use of anticoagulants with INR goal of 2.0-3.0 [Z79.01]           Comments:           Anticoagulation Care Providers      Provider Role Specialty Phone number    Kushal Valentin MD Referring Family Medicine [phone]    Leonides Aggarwal PA-C Referring Family Medicine [phone]

## 2022-06-22 DIAGNOSIS — E78.00 PURE HYPERCHOLESTEROLEMIA: ICD-10-CM

## 2022-06-22 DIAGNOSIS — I10 ESSENTIAL HYPERTENSION, BENIGN: ICD-10-CM

## 2022-06-24 RX ORDER — ATORVASTATIN CALCIUM 20 MG/1
TABLET, FILM COATED ORAL
Qty: 90 TABLET | Refills: 0 | Status: SHIPPED | OUTPATIENT
Start: 2022-06-24 | End: 2022-09-12

## 2022-06-24 NOTE — TELEPHONE ENCOUNTER
Prescription approved per [facility] Refill Protocol.    Rayna Hall RN

## 2022-06-30 ENCOUNTER — LAB (OUTPATIENT)
Dept: LAB | Facility: CLINIC | Age: 87
End: 2022-06-30
Payer: COMMERCIAL

## 2022-06-30 ENCOUNTER — ANTICOAGULATION THERAPY VISIT (OUTPATIENT)
Dept: ANTICOAGULATION | Facility: CLINIC | Age: 87
End: 2022-06-30

## 2022-06-30 DIAGNOSIS — Z79.01 LONG TERM CURRENT USE OF ANTICOAGULANTS WITH INR GOAL OF 2.0-3.0: ICD-10-CM

## 2022-06-30 DIAGNOSIS — I48.19 PERSISTENT ATRIAL FIBRILLATION (H): ICD-10-CM

## 2022-06-30 DIAGNOSIS — I48.19 PERSISTENT ATRIAL FIBRILLATION (H): Primary | ICD-10-CM

## 2022-06-30 LAB — INR BLD: 2.6 (ref 0.9–1.1)

## 2022-06-30 PROCEDURE — 36416 COLLJ CAPILLARY BLOOD SPEC: CPT

## 2022-06-30 PROCEDURE — 85610 PROTHROMBIN TIME: CPT

## 2022-06-30 NOTE — PROGRESS NOTES
ANTICOAGULATION MANAGEMENT     Nathen Gage 86 year old male is on warfarin with supratherapeutic INR result. (Goal INR 2.0-2.5)    Recent labs: (last 7 days)     06/30/22  0921   INR 2.6*       ASSESSMENT       Source(s): Chart Review and Patient/Caregiver Call       Warfarin doses taken: Warfarin taken as instructed    Diet: No new diet changes identified    New illness, injury, or hospitalization: No    Medication/supplement changes: None noted    Signs or symptoms of bleeding or clotting: No    Previous INR: Subtherapeutic    Additional findings: None       PLAN     Recommended plan for no diet, medication or health factor changes affecting INR     Dosing Instructions: continue your current warfarin dose with next INR in 2 weeks       Summary  As of 6/30/2022    Full warfarin instructions:  1.25 mg every Mon; 2.5 mg all other days   Next INR check:  7/14/2022             Telephone call with Neal who verbalizes understanding and agrees to plan. Did discuss the potential need to change tablet strength in the future if INR remains out of range to allow for more refined dosing adjustments    Lab visit scheduled    Education provided: Contact [phone]  with any changes, questions or concerns.     Plan made with [facility] Pharmacist Ericka Hernandez, RN  Anticoagulation Clinic  6/30/2022    _______________________________________________________________________     Anticoagulation Episode Summary     Current INR goal:  2.0-2.5   TTR:  51.4 % (1 y)   Target end date:  Indefinite   Send INR reminders to:  [facility]    Indications    Persistent atrial fibrillation (H) [I48.19]  Long term current use of anticoagulants with INR goal of 2.0-3.0 [Z79.01]           Comments:           Anticoagulation Care Providers     Provider Role Specialty Phone number    Kushal Valentin MD Referring Family Medicine [phone]    Leonides Aggarwal PA-C Referring Family Medicine [phone]

## 2022-07-14 ENCOUNTER — ANTICOAGULATION THERAPY VISIT (OUTPATIENT)
Dept: ANTICOAGULATION | Facility: CLINIC | Age: 87
End: 2022-07-14

## 2022-07-14 ENCOUNTER — LAB (OUTPATIENT)
Dept: LAB | Facility: CLINIC | Age: 87
End: 2022-07-14
Payer: COMMERCIAL

## 2022-07-14 DIAGNOSIS — Z79.01 LONG TERM CURRENT USE OF ANTICOAGULANTS WITH INR GOAL OF 2.0-3.0: ICD-10-CM

## 2022-07-14 DIAGNOSIS — I48.19 PERSISTENT ATRIAL FIBRILLATION (H): ICD-10-CM

## 2022-07-14 DIAGNOSIS — I48.19 PERSISTENT ATRIAL FIBRILLATION (H): Primary | ICD-10-CM

## 2022-07-14 LAB — INR BLD: 2.6 (ref 0.9–1.1)

## 2022-07-14 PROCEDURE — 85610 PROTHROMBIN TIME: CPT

## 2022-07-14 PROCEDURE — 36416 COLLJ CAPILLARY BLOOD SPEC: CPT

## 2022-07-14 NOTE — PROGRESS NOTES
ANTICOAGULATION MANAGEMENT     Nathen Gage 86 year old male is on warfarin with supratherapeutic INR result. (Goal INR 2.0-2.5)    Recent labs: (last 7 days)     07/14/22  0924   INR 2.6*       ASSESSMENT       Source(s): Chart Review and Patient/Caregiver Call       Warfarin doses taken: Warfarin taken as instructed    Diet: Decreased greens/vitamin K in diet; plans to resume previous intake    New illness, injury, or hospitalization: No    Medication/supplement changes: None noted    Signs or symptoms of bleeding or clotting: Yes: scrape on his arm after hitting it on a door.  This is healing well.    Previous INR: Supratherapeutic    Additional findings: None       PLAN     Recommended plan for temporary change(s) affecting INR     Dosing Instructions: continue your current warfarin dose with next INR in 2 weeks       Summary  As of 7/14/2022    Full warfarin instructions:  1.25 mg every Mon; 2.5 mg all other days   Next INR check:  8/4/2022             Telephone call with Neal who agrees to plan and repeated back plan correctly    Lab visit scheduled    Education provided: Please call back if any changes to your diet, medications or how you've been taking warfarin    Plan made per [facility] anticoagulation protocol    Joanne Bustos RN  Anticoagulation Clinic  7/14/2022    _______________________________________________________________________     Anticoagulation Episode Summary     Current INR goal:  2.0-2.5   TTR:  50.9 % (1 y)   Target end date:  Indefinite   Send INR reminders to:  [facility]    Indications    Persistent atrial fibrillation (H) [I48.19]  Long term current use of anticoagulants with INR goal of 2.0-3.0 [Z79.01]           Comments:           Anticoagulation Care Providers     Provider Role Specialty Phone number    Kushal Valentin MD Referring Family Medicine [phone]    Leonides Aggarwal PA-C Referring Family Medicine [phone]

## 2022-07-14 NOTE — PROGRESS NOTES
ANTICOAGULATION MANAGEMENT     Nathen Gage 86 year old male is on warfarin with supratherapeutic INR result. (Goal INR 2.0-2.5)    Recent labs: (last 7 days)     07/14/22  0924   INR 2.6*       ASSESSMENT       Source(s): Chart Review    Previous INR was Supratherapeutic    Medication, diet, health changes since last INR chart reviewed; none identified           PLAN     Unable to reach Neal today.    Left message for patient to call INR Clinic to discuss results.    Follow up required to confirm warfarin dose taken and assess for changes    Joanne Bustos RN  Anticoagulation Clinic  7/14/2022

## 2022-08-04 ENCOUNTER — LAB (OUTPATIENT)
Dept: LAB | Facility: CLINIC | Age: 87
End: 2022-08-04
Payer: COMMERCIAL

## 2022-08-04 ENCOUNTER — ANTICOAGULATION THERAPY VISIT (OUTPATIENT)
Dept: ANTICOAGULATION | Facility: CLINIC | Age: 87
End: 2022-08-04

## 2022-08-04 DIAGNOSIS — Z79.01 LONG TERM CURRENT USE OF ANTICOAGULANTS WITH INR GOAL OF 2.0-3.0: ICD-10-CM

## 2022-08-04 DIAGNOSIS — I48.19 PERSISTENT ATRIAL FIBRILLATION (H): ICD-10-CM

## 2022-08-04 DIAGNOSIS — I48.19 PERSISTENT ATRIAL FIBRILLATION (H): Primary | ICD-10-CM

## 2022-08-04 LAB — INR BLD: 1.9 (ref 0.9–1.1)

## 2022-08-04 PROCEDURE — 36416 COLLJ CAPILLARY BLOOD SPEC: CPT

## 2022-08-04 PROCEDURE — 85610 PROTHROMBIN TIME: CPT

## 2022-08-04 NOTE — PROGRESS NOTES
ANTICOAGULATION MANAGEMENT     Nathen Gage 86 year old male is on warfarin with subtherapeutic INR result. (Goal INR 2.0-2.5)    Recent labs: (last 7 days)     08/04/22  0830   INR 1.9*       ASSESSMENT       Source(s): Chart Review and Patient/Caregiver Call       Warfarin doses taken: Warfarin taken as instructed    Diet: Increased greens/vitamin K in diet; plans to resume previous intake--patient has been eating a larger portion of apolonia, he will go back to his usual regimen.    New illness, injury, or hospitalization: No    Medication/supplement changes: None noted    Signs or symptoms of bleeding or clotting: No    Previous INR: Supratherapeutic    Additional findings: None       PLAN     Recommended plan for temporary change(s) affecting INR     Dosing Instructions: Continue your current warfarin dose with next INR in 2 weeks       Summary  As of 8/4/2022    Full warfarin instructions:  1.25 mg every Mon; 2.5 mg all other days   Next INR check:  8/18/2022             Telephone call with Neal who verbalizes understanding and agrees to plan    Lab visit scheduled    Education provided: Importance of consistent vitamin K intake, Impact of vitamin K foods on INR and Contact [phone]  with any changes, questions or concerns.     Plan made per [facility] anticoagulation protocol    Fidelina Pizano RN  Anticoagulation Clinic  8/4/2022    _______________________________________________________________________     Anticoagulation Episode Summary     Current INR goal:  2.0-2.5   TTR:  53.0 % (1 y)   Target end date:  Indefinite   Send INR reminders to:  [facility]    Indications    Persistent atrial fibrillation (H) [I48.19]  Long term current use of anticoagulants with INR goal of 2.0-3.0 [Z79.01]           Comments:           Anticoagulation Care Providers     Provider Role Specialty Phone number    Kushal Valentin MD Referring Family Medicine [phone]    Leonides Aggarwal PA-C Referring  Family Medicine [phone]

## 2022-08-04 NOTE — PROGRESS NOTES
ANTICOAGULATION MANAGEMENT     Nathen Gage 86 year old male is on warfarin with subtherapeutic INR result. (Goal INR 2.0-2.5)    Recent labs: (last 7 days)     08/04/22  0830   INR 1.9*       ASSESSMENT       Source(s): Chart Review    Previous INR was Supratherapeutic    Medication, diet, health changes since last INR chart reviewed; none identified           PLAN     Unable to reach Neal tineo.    Left  to call [phone] with transfer to [facility] OR [facility]    Follow up required to confirm warfarin dose taken and assess for changes    Fidelina Pizano, ART[facility]  Anticoagulation Clinic  8/4/2022

## 2022-08-08 NOTE — PROGRESS NOTES
ANTICOAGULATION FOLLOW-UP CLINIC VISIT    Patient Name:  Nathen Gage  Date:  3/24/2020  Contact Type:  Telephone    SUBJECTIVE:  Patient Findings     Positives:   Signs/symptoms of bleeding (slight bloody nose a week ago), Change in diet/appetite (eating more greens/vegetables )    Comments:   Assessed for S/S bleeding, clotting, medication, diet, health, activity and alcohol changes.          Clinical Outcomes     Negatives:   Major bleeding event, Thromboembolic event, Anticoagulation-related hospital admission, Anticoagulation-related ED visit, Anticoagulation-related fatality    Comments:   Assessed for S/S bleeding, clotting, medication, diet, health, activity and alcohol changes.             OBJECTIVE    INR   Date Value Ref Range Status   2020 1.80 (H) 0.86 - 1.14 Final     Comment:     This test is intended for monitoring Coumadin therapy.  Results are not   accurate in patients with prolonged INR due to factor deficiency.         ASSESSMENT / PLAN  INR assessment SUB    Recheck INR In: 2 WEEKS    INR Location Clinic      Anticoagulation Summary  As of 3/24/2020    INR goal:   2.0-2.5   TTR:   60.4 % (1 y)   INR used for dosin.80! (3/24/2020)   Warfarin maintenance plan:   2.5 mg (2.5 mg x 1) every day   Full warfarin instructions:   2.5 mg every day   Weekly warfarin total:   17.5 mg   Plan last modified:   Siobhan Castro [facility] (3/24/2020)   Next INR check:   2020   Priority:   Maintenance   Target end date:   Indefinite    Indications    Long-term (current) use of anticoagulants [Z79.01] [Z79.01]  Atrial fibrillation (H) [I48.91]  Persistent atrial fibrillation [I48.19]             Anticoagulation Episode Summary     INR check location:       Preferred lab:       Send INR reminders to:   [facility]    Comments:         Anticoagulation Care Providers     Provider Role Specialty Phone number    Kushal Valentin MD Referring [facility] [phone]            See  the Encounter Report to view Anticoagulation Flowsheet and Dosing Calendar (Go to Encounters tab in chart review, and find the Anticoagulation Therapy Visit)    Has been eating more greens since eating at home, plans to continue.  Since has been on low end of range will increase ~7% and recheck in 2 weeks.    Siobhan Castro, [facility]                  4 = No assist / stand by assistance

## 2022-08-17 ENCOUNTER — DOCUMENTATION ONLY (OUTPATIENT)
Dept: ANTICOAGULATION | Facility: CLINIC | Age: 87
End: 2022-08-17

## 2022-08-17 DIAGNOSIS — I48.0 PAROXYSMAL ATRIAL FIBRILLATION (H): ICD-10-CM

## 2022-08-17 DIAGNOSIS — Z79.01 LONG TERM CURRENT USE OF ANTICOAGULANTS WITH INR GOAL OF 2.0-3.0: ICD-10-CM

## 2022-08-17 RX ORDER — WARFARIN SODIUM 2.5 MG/1
TABLET ORAL
Qty: 90 TABLET | Refills: 1 | Status: SHIPPED | OUTPATIENT
Start: 2022-08-17 | End: 2023-04-25

## 2022-08-17 NOTE — PROGRESS NOTES
Fax received from [facility] requesting warfarin refill.    Annika Caputo RN

## 2022-08-17 NOTE — PROGRESS NOTES
ANTICOAGULATION MANAGEMENT:  Medication Refill    Anticoagulation Summary  As of 8/4/2022    Warfarin maintenance plan:  1.25 mg (2.5 mg x 0.5) every Mon; 2.5 mg (2.5 mg x 1) all other days   Next INR check:  8/18/2022   Target end date:  Indefinite    Indications    Persistent atrial fibrillation (H) [I48.19]  Long term current use of anticoagulants with INR goal of 2.0-3.0 [Z79.01]             Anticoagulation Care Providers     Provider Role Specialty Phone number    Kushal Valentin MD Referring Family Medicine [phone]    Leonides Aggarwal PA-C Referring Family Medicine [phone]          Visit with referring provider/group within last year: Yes    [facility] referral signed within last year: Yes    Nathen meets all criteria for refill (current [facility] referral, office visit with referring provider/group in last year, lab monitoring up to date or not exceeding 2 weeks overdue). Rx instructions and quantity supplied updated to match patient's current dosing plan. Warfarin 90 day supply with 1 refill granted per [facility] protocol     Marci Vazquez RN  Anticoagulation Clinic

## 2022-08-18 ENCOUNTER — ANTICOAGULATION THERAPY VISIT (OUTPATIENT)
Dept: ANTICOAGULATION | Facility: CLINIC | Age: 87
End: 2022-08-18

## 2022-08-18 ENCOUNTER — LAB (OUTPATIENT)
Dept: LAB | Facility: CLINIC | Age: 87
End: 2022-08-18
Payer: COMMERCIAL

## 2022-08-18 DIAGNOSIS — Z79.01 LONG TERM CURRENT USE OF ANTICOAGULANTS WITH INR GOAL OF 2.0-3.0: ICD-10-CM

## 2022-08-18 DIAGNOSIS — I48.19 PERSISTENT ATRIAL FIBRILLATION (H): ICD-10-CM

## 2022-08-18 DIAGNOSIS — I48.19 PERSISTENT ATRIAL FIBRILLATION (H): Primary | ICD-10-CM

## 2022-08-18 LAB — INR BLD: 2.3 (ref 0.9–1.1)

## 2022-08-18 PROCEDURE — 36416 COLLJ CAPILLARY BLOOD SPEC: CPT

## 2022-08-18 PROCEDURE — 85610 PROTHROMBIN TIME: CPT

## 2022-08-18 NOTE — PROGRESS NOTES
ANTICOAGULATION MANAGEMENT     Nathen Gage 86 year old male is on warfarin with therapeutic INR result. (Goal INR 2.0-2.5)    Recent labs: (last 7 days)     08/18/22  0826   INR 2.3*       ASSESSMENT       Source(s): Chart Review    Previous INR was Subtherapeutic    Medication, diet, health changes since last INR chart reviewed; none identified           PLAN     Unable to reach Neal today.    Left message on home# to inform patient to continue same weekly Warfarin dose and to call INR clinic with any questions, concerns or changes.  Also to schedule next INR in 2 weeks      Fidelina Pizano RN  Anticoagulation Clinic  8/18/2022

## 2022-08-29 ENCOUNTER — ANTICOAGULATION THERAPY VISIT (OUTPATIENT)
Dept: ANTICOAGULATION | Facility: CLINIC | Age: 87
End: 2022-08-29

## 2022-08-29 ENCOUNTER — LAB (OUTPATIENT)
Dept: LAB | Facility: CLINIC | Age: 87
End: 2022-08-29
Payer: COMMERCIAL

## 2022-08-29 DIAGNOSIS — I48.19 PERSISTENT ATRIAL FIBRILLATION (H): ICD-10-CM

## 2022-08-29 DIAGNOSIS — I48.19 PERSISTENT ATRIAL FIBRILLATION (H): Primary | ICD-10-CM

## 2022-08-29 DIAGNOSIS — Z79.01 LONG TERM CURRENT USE OF ANTICOAGULANTS WITH INR GOAL OF 2.0-3.0: ICD-10-CM

## 2022-08-29 LAB — INR BLD: 3.1 (ref 0.9–1.1)

## 2022-08-29 PROCEDURE — 85610 PROTHROMBIN TIME: CPT

## 2022-08-29 PROCEDURE — 36416 COLLJ CAPILLARY BLOOD SPEC: CPT

## 2022-08-29 NOTE — PROGRESS NOTES
ANTICOAGULATION MANAGEMENT     Nathen Gage 86 year old male is on warfarin with supratherapeutic INR result. (Goal INR 2.0-2.5)    Recent labs: (last 7 days)     08/29/22  1044   INR 3.1*       ASSESSMENT       Source(s): Chart Review and Patient/Caregiver Call       Warfarin doses taken: Warfarin taken as instructed    Diet: Decreased greens/vitamin K in diet; plans to resume previous intake, however pt will be out of town for 10 days    New illness, injury, or hospitalization: No    Medication/supplement changes: None noted    Signs or symptoms of bleeding or clotting: No    Previous INR: Therapeutic last visit; previously outside of goal range    Additional findings: Pt will be out of town ~8/31- 9/10.        PLAN     Recommended plan for temporary change(s) affecting INR     Dosing Instructions: hold dose then decrease your warfarin dose (7.7% change) with next INR in 2 weeks       Summary  As of 8/29/2022    Full warfarin instructions:  8/29: Hold; Otherwise 1.25 mg every Mon, Thu; 2.5 mg all other days   Next INR check:  9/12/2022             Telephone call with Neal who agrees to plan and repeated back plan correctly    Check at provider office visit    Education provided: Goal range and significance of current result, Importance of following up at instructed interval and Importance of taking warfarin as instructed    Plan made per [facility] anticoagulation protocol    Mable Miranda RN  Anticoagulation Clinic  8/29/2022    _______________________________________________________________________     Anticoagulation Episode Summary     Current INR goal:  2.0-2.5   TTR:  49.8 % (1 y)   Target end date:  Indefinite   Send INR reminders to:  [facility]    Indications    Persistent atrial fibrillation (H) [I48.19]  Long term current use of anticoagulants with INR goal of 2.0-3.0 [Z79.01]           Comments:           Anticoagulation Care Providers     Provider Role Specialty Phone number    Kushal Valentin  MD Johann Referring Family Medicine [phone]    Leonides Aggarwal PA-C Referring [facility] [phone]

## 2022-09-12 ENCOUNTER — OFFICE VISIT (OUTPATIENT)
Dept: FAMILY MEDICINE | Facility: CLINIC | Age: 87
End: 2022-09-12
Payer: COMMERCIAL

## 2022-09-12 ENCOUNTER — ANTICOAGULATION THERAPY VISIT (OUTPATIENT)
Dept: ANTICOAGULATION | Facility: CLINIC | Age: 87
End: 2022-09-12

## 2022-09-12 VITALS
HEART RATE: 78 BPM | OXYGEN SATURATION: 99 % | DIASTOLIC BLOOD PRESSURE: 80 MMHG | RESPIRATION RATE: 12 BRPM | BODY MASS INDEX: 26.85 KG/M2 | TEMPERATURE: 97.8 F | WEIGHT: 174 LBS | SYSTOLIC BLOOD PRESSURE: 164 MMHG

## 2022-09-12 DIAGNOSIS — Z79.01 LONG TERM CURRENT USE OF ANTICOAGULANTS WITH INR GOAL OF 2.0-3.0: ICD-10-CM

## 2022-09-12 DIAGNOSIS — L72.3 SEBACEOUS CYST: ICD-10-CM

## 2022-09-12 DIAGNOSIS — I48.19 PERSISTENT ATRIAL FIBRILLATION (H): Primary | ICD-10-CM

## 2022-09-12 DIAGNOSIS — I48.0 PAROXYSMAL ATRIAL FIBRILLATION (H): Primary | ICD-10-CM

## 2022-09-12 DIAGNOSIS — I10 ESSENTIAL HYPERTENSION, BENIGN: ICD-10-CM

## 2022-09-12 DIAGNOSIS — Z00.00 ENCOUNTER FOR MEDICARE ANNUAL WELLNESS EXAM: ICD-10-CM

## 2022-09-12 DIAGNOSIS — I48.19 PERSISTENT ATRIAL FIBRILLATION (H): ICD-10-CM

## 2022-09-12 DIAGNOSIS — E78.00 PURE HYPERCHOLESTEROLEMIA: ICD-10-CM

## 2022-09-12 DIAGNOSIS — N40.1 BENIGN LOCALIZED HYPERPLASIA OF PROSTATE WITH URINARY OBSTRUCTION: ICD-10-CM

## 2022-09-12 DIAGNOSIS — I15.9 SECONDARY HYPERTENSION: ICD-10-CM

## 2022-09-12 DIAGNOSIS — N13.8 BENIGN LOCALIZED HYPERPLASIA OF PROSTATE WITH URINARY OBSTRUCTION: ICD-10-CM

## 2022-09-12 DIAGNOSIS — I70.0 ATHEROSCLEROSIS OF AORTA (H): ICD-10-CM

## 2022-09-12 DIAGNOSIS — L57.0 AK (ACTINIC KERATOSIS): ICD-10-CM

## 2022-09-12 DIAGNOSIS — L60.3 BRITTLE NAILS: ICD-10-CM

## 2022-09-12 DIAGNOSIS — I27.20 PULMONARY HYPERTENSION (H): ICD-10-CM

## 2022-09-12 LAB
CREAT UR-MCNC: 36 MG/DL
ERYTHROCYTE [DISTWIDTH] IN BLOOD BY AUTOMATED COUNT: 14.4 % (ref 10–15)
HCT VFR BLD AUTO: 44.2 % (ref 40–53)
HGB BLD-MCNC: 15 G/DL (ref 13.3–17.7)
INR BLD: 1.9 (ref 0.9–1.1)
MCH RBC QN AUTO: 30.5 PG (ref 26.5–33)
MCHC RBC AUTO-ENTMCNC: 33.9 G/DL (ref 31.5–36.5)
MCV RBC AUTO: 90 FL (ref 78–100)
MICROALBUMIN UR-MCNC: <5 MG/L
MICROALBUMIN/CREAT UR: NORMAL MG/G{CREAT}
PLATELET # BLD AUTO: 197 10E3/UL (ref 150–450)
RBC # BLD AUTO: 4.92 10E6/UL (ref 4.4–5.9)
WBC # BLD AUTO: 5.6 10E3/UL (ref 4–11)

## 2022-09-12 PROCEDURE — 36416 COLLJ CAPILLARY BLOOD SPEC: CPT | Performed by: PHYSICIAN ASSISTANT

## 2022-09-12 PROCEDURE — G0439 PPPS, SUBSEQ VISIT: HCPCS | Performed by: PHYSICIAN ASSISTANT

## 2022-09-12 PROCEDURE — 36415 COLL VENOUS BLD VENIPUNCTURE: CPT | Performed by: PHYSICIAN ASSISTANT

## 2022-09-12 PROCEDURE — 85610 PROTHROMBIN TIME: CPT | Performed by: PHYSICIAN ASSISTANT

## 2022-09-12 PROCEDURE — 80053 COMPREHEN METABOLIC PANEL: CPT | Performed by: PHYSICIAN ASSISTANT

## 2022-09-12 PROCEDURE — 82043 UR ALBUMIN QUANTITATIVE: CPT | Performed by: PHYSICIAN ASSISTANT

## 2022-09-12 PROCEDURE — 80061 LIPID PANEL: CPT | Performed by: PHYSICIAN ASSISTANT

## 2022-09-12 PROCEDURE — 99214 OFFICE O/P EST MOD 30 MIN: CPT | Mod: 25 | Performed by: PHYSICIAN ASSISTANT

## 2022-09-12 PROCEDURE — 85027 COMPLETE CBC AUTOMATED: CPT | Performed by: PHYSICIAN ASSISTANT

## 2022-09-12 RX ORDER — LOSARTAN POTASSIUM 100 MG/1
100 TABLET ORAL DAILY
Qty: 90 TABLET | Refills: 3 | Status: SHIPPED | OUTPATIENT
Start: 2022-09-12 | End: 2023-05-25

## 2022-09-12 RX ORDER — ATORVASTATIN CALCIUM 20 MG/1
20 TABLET, FILM COATED ORAL DAILY
Qty: 90 TABLET | Refills: 3 | Status: SHIPPED | OUTPATIENT
Start: 2022-09-12 | End: 2023-04-04

## 2022-09-12 RX ORDER — METOPROLOL SUCCINATE 25 MG/1
25 TABLET, EXTENDED RELEASE ORAL AT BEDTIME
Qty: 90 TABLET | Refills: 1 | Status: SHIPPED | OUTPATIENT
Start: 2022-09-12 | End: 2023-03-14

## 2022-09-12 RX ORDER — DOXAZOSIN 4 MG/1
TABLET ORAL
Qty: 90 TABLET | Refills: 1 | Status: SHIPPED | OUTPATIENT
Start: 2022-09-12 | End: 2023-03-14

## 2022-09-12 RX ORDER — CICLOPIROX 80 MG/ML
SOLUTION TOPICAL
Qty: 6.6 ML | Refills: 5 | Status: SHIPPED | OUTPATIENT
Start: 2022-09-12 | End: 2023-03-15

## 2022-09-12 NOTE — PROGRESS NOTES
(I48.0) Paroxysmal atrial fibrillation (H)  (primary encounter diagnosis)  Comment: followed by cardiology as well. bp elevated today. Elevated at home. Will add on metoprolol and recheck bp over phone and pulse in 1 week.   Plan: CBC with platelets            (I27.20) Pulmonary hypertension (H)  Comment: as above   Plan:     (I70.0) Atherosclerosis of aorta (H)  Comment: as above   Plan:     (E78.00) Pure hypercholesterolemia  Comment: as above. Also stable on lipitor. Labs pending. Not fasting today.   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 However, first recommending avoiding nightly alcohol and caffeine. If no improvement with this, consider increasing dose.   Plan: doxazosin (CARDURA) 4 MG tablet, metoprolol         succinate ER (TOPROL XL) 25 MG 24 hr tablet        Medication use and side effects discussed with the patient. Patient is in complete understanding and agreement with plan.       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 Possible mild onychomycosis considered. Topical ciclopirox and if no improvement in 3 weeks may stop and otc biotin considered. Will check cbc. If anemia, consider checking for iron deficiency.   Plan: ciclopirox (PENLAC) 8 % external solution, CBC         with platelets            (I48.19)  Persistent atrial fibrillation (H)  Comment: as above.   Plan:     (Z79.01) Long term current use of anticoagulants with INR goal of 2.0-3.0  Comment:   Plan:     (L57.0) AK (actinic keratosis)  Comment: present on exam. Seeing derm today. Recommending discussing with derm.   Plan:     sebaceous cyst.   Comment: present on exam and reassured. Recommending discsussing with derm.   Plan:     (Z00.00) Encounter for Medicare annual wellness exam  Comment: stable wellness.   Plan: [place] AN[place] WELL[place] VISIT, PPS, SUBSEQUENT              Subjective      Neal is a 86 year old, presenting for the following health issues:  Recheck Medication, Prostate Problem (Urinary frequency 2-3x/night x couple years sx getting worse), Derm Problem (Lump behind right ear, no pain, has changed in size), and Nail Problem (Nails are chipping, wants to know if deficient in anything?)     HPI      Lumps  Patient has noticed a lump in the right groin which has been present for several years and a lump on his right neck which he noticed 6-8 months ago. He does not feel they are increasing in size and there is no pain associated with the lumps. He has no concerns about them other than wanting to have them checked. He did have a lump on the back of his neck years ago which he was able to express a small amount of white material from.      Skin Changes  Has noticed some white spots on the dorsum of his left hand. Has an appointment with Dermatology later today. Has had actinic keratosis in the past which were frozen.      Nocturia  Has had for years but feels it is increasing in frequency. He does take doxazosin for BPH. Reports having a cappuccino at night and 2-3 nights a week he will have a beer.      Hyperlipidemia Follow-Up       Are you regularly taking any medication or supplement to lower your cholesterol?   Yes- Lipitor    Are you having muscle aches or other side effects that you think could be caused by your cholesterol lowering medication?  " No     Hypertension Follow-up       Do you check your blood pressure regularly outside of the clinic? Yes     Are you following a low salt diet? Yes-does not add salt    Are your blood pressures ever more than 140 on the top number (systolic) OR more than 90 on the bottom number (diastolic), for example 140/90? Yes          How many servings of fruits and vegetables do you eat daily?  2-3    On average, how many sweetened beverages do you drink each day (Examples: soda, juice, sweet tea, etc.  Do NOT count diet or artificially sweetened beverages)?   0    How many days per week do you exercise enough to make your heart beat faster? 7    How many minutes a day do you exercise enough to make your heart beat faster? 30 - 60    How many days per week do you miss taking your medication? 0     Annual Wellness Visit     Patient has been advised of split billing requirements and indicates understanding: Yes   Are you in the first 12 months of your Medicare Part B coverage?  No     Physical Health:    In general, how would you rate your overall physical health? excellent    Outside of work, how many days during the week do you exercise?6-7 days/week    Outside of work, approximately how many minutes a day do you exercise?30-45 minutes    If you drink alcohol do you typically have >3 drinks per day or >7 drinks per week? No    Do you usually eat at least 4 servings of fruit and vegetables a day, include whole grains & fiber and avoid regularly eating high fat or \"junk\" foods? NO    Do you have any problems taking medications regularly? No    Do you have any side effects from medications? none    Needs assistance for the following daily activities: no assistance needed    Which of the following safety concerns are present in your home?  none identified     Hearing impairment: Yes, has hearing aids    In the past 6 months, have you been bothered by leaking of urine? no     Mental Health:    In general, how would you rate your " overall mental or emotional health? good    PHQ-2 Score:  0     Do you feel safe in your environment? Yes     Have you ever done Advance Care Planning? (For example, a Health Directive, POLST, or a discussion with a medical provider or your loved ones about your wishes)? Yes, advance care planning is on file.     Fall risk:  Fallen 2 or more times in the past year?: No  Any fall with injury in the past year?: No     Cognitive Screenin) Repeat 3 items (Leader, Season, Table)    2) Clock draw: NORMAL  3) 3 item recall: Recalls 1 object   Results: NORMAL clock, 1-2 items recalled: COGNITIVE IMPAIRMENT LESS LIKELY     Mini-CogTM Copyright S Patricia. Licensed by the author for use in [employer]; reprinted with permission (layton@[facility]). All rights reserved.       Do you have sleep apnea, excessive snoring or daytime drowsiness?: snoring     Current providers sharing in care for this patient include:   Patient Care Team:  Leonides Aggarwal PA-C as PCP - General (Family Medicine)  Raphael William DPM as Assigned Musculoskeletal Provider  Daniel Leon MD as Assigned Heart and Vascular Provider     Patient has been advised of split billing requirements and indicates understanding: Yes     Review of Systems   Constitutional, HEENT, cardiovascular, pulmonary, GI, , musculoskeletal, neuro, skin, endocrine and psych systems are negative, except as otherwise noted.              Objective       BP (!) 164/80   Pulse 78   Temp 97.8  F (36.6  C) (Oral)   Resp 12   Wt 78.9 kg (174 lb)   SpO2 99%   BMI 26.85 kg/m    Body mass index is 26.85 kg/m .  Physical Exam   GENERAL: healthy, alert and no distress  NECK: no adenopathy, no asymmetry, small, hard, mobile, well-circumscribed lump on the right neck near the hair follicles. No surrounding erythema. No drainage from the site. No associated tenderness with palpation.  RESP: lungs clear to auscultation - no rales, rhonchi or wheezes  CV:  regular rate and rhythm, normal S1 S2, no S3 or S4, no murmur  ABDOMEN: soft, nontender, no hepatosplenomegaly, no masses and bowel sounds normal  : Right groin with small, mobile, well-circumscribed lump near the hair follicles. No surrounding erythema. No drainage from the site. No associated tenderness with palpation.  SKIN: several small white plaques on the dorsum of the left hand. No surrounding erythema. No associated tenderness.  NEURO: Normal strength and tone, mentation intact and speech normal  PSYCH: mentation appears normal, affect normal/bright

## 2022-09-12 NOTE — PROGRESS NOTES
Assessment & Plan     Pulmonary hypertension (H)  Essential hypertension, benign  Blood pressure elevated today. Seems to be elevated at home as well. Discussed having him continue with the losartan daily but will also have him start metoprolol 25 mg once daily for additional blood pressure control. Patient to check blood pressures once daily and will follow up with him in 1-2 weeks.   - atorvastatin (LIPITOR) 20 MG tablet; Take 1 tablet (20 mg) by mouth daily  - Albumin Random Urine Quantitative with Creat Ratio  - COMPREHENSIVE METABOLIC PANEL    Atherosclerosis of aorta (H)  Pure hypercholesterolemia  Will check non-fasting lipid panel today. Refilled medication. No new side effects.  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 Discussed with him that his increased nocturia is likely related to caffeine and alcohol use late at night. Encouraged him to cut out the drinks completely or have them earlier in the evening to see if this helps with the nocturia. If no change with liquid restriction/adjustment, may consider increasing doxazosin in the future.  - doxazosin (CARDURA) 4 MG tablet; TAKE 1 TABLET(4 MG) BY MOUTH DAILY  - metoprolol succinate ER (TOPROL XL) 25 MG 24 hr tablet; Take 1 tablet (25 mg) by mouth At Bedtime    Secondary hypertension  See above.   - losartan (COZAAR) 100 MG tablet; Take 1 tablet (100 mg) by mouth daily  - Albumin Random Urine Quantitative with Creat Ratio  - COMPREHENSIVE METABOLIC PANEL    Paroxysmal atrial fibrillation (H)  Normal rate and rhythm today. No concerns.   - CBC with platelets    Brittle nails  Could be secondary to aging. There is some yellowness to the nails so will treat with ciclopirox x 3 weeks. If not change then patient can discontinue the  ciclopirox. We did discuss that he does not have to treat the brittle nails unless it is bothersome. Discussed having him start biotin to see if this makes a difference. Could also consider iron supplementation for potential iron deficiency. Will check CBC today to look for any evidence of anemia.  - ciclopirox (PENLAC) 8 % external solution; Apply to adjacent skin and affected nails daily.  Remove with alcohol every 7 days, then repeat.  - CBC with platelets; Future  - CBC with platelets    Persistent atrial fibrillation (H)  Long term current use of anticoagulants with INR goal of 2.0-3.0  Follows Cardiology annual. Last seen by them in March 2022 and doing well at that time. Taking Warfarin. No recent issues with bleeding. Will check routine INR today.  - INR point of care    No follow-ups on file.    DILLAN Hurd  [employer] [facility]    [facility] LAVON De Jesus is a 86 year old, presenting for the following health issues:  Recheck Medication, Prostate Problem (Urinary frequency 2-3x/night x couple years sx getting worse), Derm Problem (Lump behind right ear, no pain, has changed in size), and Nail Problem (Nails are chipping, wants to know if deficient in anything?)    HPI     Lumps  Patient has noticed a lump in the right groin which has been present for several years and a lump on his right neck which he noticed 6-8 months ago. He does not feel they are increasing in size and there is no pain associated with the lumps. He has no concerns about them other than wanting to have them checked. He did have a lump on the back of his neck years ago which he was able to express a small amount of white material from.     Skin Changes  Has noticed some white spots on the dorsum of his left hand. Has an appointment with Dermatology later today. Has had actinic keratosis in the past which were frozen.     Nocturia  Has had for years but feels it is increasing in  "frequency. He does take doxazosin for BPH. Reports having a cappuccino at night and 2-3 nights a week he will have a beer.     Hyperlipidemia Follow-Up      Are you regularly taking any medication or supplement to lower your cholesterol?   Yes- Lipitor    Are you having muscle aches or other side effects that you think could be caused by your cholesterol lowering medication?  No    Hypertension Follow-up      Do you check your blood pressure regularly outside of the clinic? Yes     Are you following a low salt diet? Yes-does not add salt    Are your blood pressures ever more than 140 on the top number (systolic) OR more than 90 on the bottom number (diastolic), for example 140/90? Yes        How many servings of fruits and vegetables do you eat daily?  2-3    On average, how many sweetened beverages do you drink each day (Examples: soda, juice, sweet tea, etc.  Do NOT count diet or artificially sweetened beverages)?   0    How many days per week do you exercise enough to make your heart beat faster? 7    How many minutes a day do you exercise enough to make your heart beat faster? 30 - 60    How many days per week do you miss taking your medication? 0    Annual Wellness Visit    Patient has been advised of split billing requirements and indicates understanding: Yes   Are you in the first 12 months of your Medicare Part B coverage?  No    Physical Health:    In general, how would you rate your overall physical health? excellent    Outside of work, how many days during the week do you exercise?6-7 days/week    Outside of work, approximately how many minutes a day do you exercise?30-45 minutes    If you drink alcohol do you typically have >3 drinks per day or >7 drinks per week? No    Do you usually eat at least 4 servings of fruit and vegetables a day, include whole grains & fiber and avoid regularly eating high fat or \"junk\" foods? NO    Do you have any problems taking medications regularly? No    Do you have any " side effects from medications? none    Needs assistance for the following daily activities: no assistance needed    Which of the following safety concerns are present in your home?  none identified     Hearing impairment: Yes, has hearing aids    In the past 6 months, have you been bothered by leaking of urine? no    Mental Health:    In general, how would you rate your overall mental or emotional health? good  PHQ-2 Score:  0    Do you feel safe in your environment? Yes    Have you ever done Advance Care Planning? (For example, a Health Directive, POLST, or a discussion with a medical provider or your loved ones about your wishes)? Yes, advance care planning is on file.    Fall risk:  Fallen 2 or more times in the past year?: No  Any fall with injury in the past year?: No    Cognitive Screenin) Repeat 3 items (Leader, Season, Table)    2) Clock draw: NORMAL  3) 3 item recall: Recalls 1 object   Results: NORMAL clock, 1-2 items recalled: COGNITIVE IMPAIRMENT LESS LIKELY    Mini-CogTM Copyright S Patricia. Licensed by the author for use in [facility]; reprinted with permission (layton@[facility]). All rights reserved.      Do you have sleep apnea, excessive snoring or daytime drowsiness?: snoring    Current providers sharing in care for this patient include:   Patient Care Team:  Leonides Aggarwal PA-C as PCP - General (Family Medicine)  Raphael William DPM as Assigned Musculoskeletal Provider  Daniel Leon MD as Assigned Heart and Vascular Provider    Patient has been advised of split billing requirements and indicates understanding: Yes    Review of Systems   Constitutional, HEENT, cardiovascular, pulmonary, GI, , musculoskeletal, neuro, skin, endocrine and psych systems are negative, except as otherwise noted.        Objective    BP (!) 164/80   Pulse 78   Temp 97.8  F (36.6  C) (Oral)   Resp 12   Wt 78.9 kg (174 lb)   SpO2 99%   BMI 26.85 kg/m    Body mass index is  26.85 kg/m .  Physical Exam   GENERAL: healthy, alert and no distress  NECK: no adenopathy, no asymmetry, small, hard, mobile, well-circumscribed lump on the right neck near the hair follicles. No surrounding erythema. No drainage from the site. No associated tenderness with palpation.  RESP: lungs clear to auscultation - no rales, rhonchi or wheezes  CV: regular rate and rhythm, normal S1 S2, no S3 or S4, no murmur  ABDOMEN: soft, nontender, no hepatosplenomegaly, no masses and bowel sounds normal  : Right groin with small, mobile, well-circumscribed lump near the hair follicles. No surrounding erythema. No drainage from the site. No associated tenderness with palpation.  SKIN: several small white plaques on the dorsum of the left hand. No surrounding erythema. No associated tenderness.  NEURO: Normal strength and tone, mentation intact and speech normal  PSYCH: mentation appears normal, affect normal/bright

## 2022-09-12 NOTE — PROGRESS NOTES
ANTICOAGULATION MANAGEMENT     Nathen Gage 86 year old male is on warfarin with subtherapeutic INR result. (Goal INR 2.0-2.5)    Recent labs: (last 7 days)     09/12/22  1050   INR 1.9*       ASSESSMENT       Source(s): Chart Review and Patient/Caregiver Call       Warfarin doses taken: Warfarin taken as instructed    Diet: Increased greens/vitamin K in diet; plans to resume previous intake--patient states he ate 2 large servings of broccoli over the weekend since his INR was high last time.    New illness, injury, or hospitalization: No    Medication/supplement changes: PCP added Metoprolol today, patient has not picked it up, yet.    Signs or symptoms of bleeding or clotting: No    Previous INR: Supratherapeutic    Additional findings: warfarin maintenance dose was decreased 7% on 8/29/22.    Patient was also more active while traveling to [place] last week, he logged 6582-5923 steps per day.       PLAN     Recommended plan for temporary change(s) affecting INR     Dosing Instructions: Continue your current warfarin dose with next INR in 2 weeks. I did not have patient take a booster dose today due to fluctuating INR.    Summary  As of 9/12/2022    Full warfarin instructions:  1.25 mg every Mon, Thu; 2.5 mg all other days   Next INR check:  9/27/2022             Telephone call with Neal who verbalizes understanding and agrees to plan    Lab visit scheduled    Education provided: Importance of consistent vitamin K intake, Impact of vitamin K foods on INR, Contact [phone]  with any changes, questions or concerns.  and how exercise/increased activity can affect INR, also importance of not changing his vitamin K intake based on his INR.    Plan made per [facility] anticoagulation protocol    Fidelina Pizano, RN  Anticoagulation Clinic  9/12/2022    _______________________________________________________________________     Anticoagulation Episode Summary     Current INR goal:  2.0-2.5   TTR:  47.6 % (1 y)   Target end  date:  Indefinite   Send INR reminders to:  [facility]    Indications    Persistent atrial fibrillation (H) [I48.19]  Long term current use of anticoagulants with INR goal of 2.0-3.0 [Z79.01]           Comments:           Anticoagulation Care Providers     Provider Role Specialty Phone number    Kushal Valentin MD Referring Family Medicine [phone]    Leonides Aggarwal PA-C Referring Family Medicine [phone]

## 2022-09-12 NOTE — PROGRESS NOTES
ANTICOAGULATION MANAGEMENT     Nathen Gage 86 year old male is on warfarin with subtherapeutic INR result. (Goal INR 2.0-2.5)    Recent labs: (last 7 days)     09/12/22  1050   INR 1.9*       ASSESSMENT       Source(s): Chart Review    Previous INR was Supratherapeutic    Medication, diet, health changes since last INR     Warfarin maintenance dose reduced 7% on 8/29/22    Patient saw PCP today, Metoprolol dose was increased    Patient also seeing dermatologist today           PLAN     Unable to reach Neal today.    Left  to call [phone] with transfer to [facility] OR [facility]    Follow up required to confirm warfarin dose taken and assess for changes    Fidelina Pizano, ARTIS  Anticoagulation Clinic  9/12/2022

## 2022-09-12 NOTE — PATIENT INSTRUCTIONS
Discuss neck lump, groin lump, and spots on left hand with your Dermatologist later today.     For the frequent urination at night. Try to have the cappuccino earlier in the evening as the caffeine will cause you to pee more frequently. Additionally, if you can cut out the evening beer that you occasionally have this also can make you need to pee more frequently.      We are also going to start a new blood pressure medication to take at night. We will call you in 1-2 weeks to see how you blood pressures are running on this. For now, check blood pressure once daily ~2 hours after you take your morning losartan dose.     For your nails, we will try a topical antifungal medication. If no improvement in 3 weeks, you can stop and attempt over the counter biotin supplement once daily if you would like. Otherwise, no absolute need to treat the brittleness.       Patient Education   Personalized Prevention Plan  You are due for the preventive services outlined below.  Your care team is available to assist you in scheduling these services.  If you have already completed any of these items, please share that information with your care team to update in your medical record.  Health Maintenance Due   Topic Date Due     Kidney Microalbumin Urine Test  11/17/2021     Flu Vaccine (1) 09/01/2022     Comprehensive Metabolic Panel  09/13/2022

## 2022-09-13 LAB
ALBUMIN SERPL-MCNC: 4.3 G/DL (ref 3.4–5)
ALP SERPL-CCNC: 96 U/L (ref 40–150)
ALT SERPL W P-5'-P-CCNC: 20 U/L (ref 0–70)
ANION GAP SERPL CALCULATED.3IONS-SCNC: 5 MMOL/L (ref 3–14)
AST SERPL W P-5'-P-CCNC: 17 U/L (ref 0–45)
BILIRUB SERPL-MCNC: 0.6 MG/DL (ref 0.2–1.3)
BUN SERPL-MCNC: 19 MG/DL (ref 7–30)
CALCIUM SERPL-MCNC: 9.4 MG/DL (ref 8.5–10.1)
CHLORIDE BLD-SCNC: 102 MMOL/L (ref 94–109)
CHOLEST SERPL-MCNC: 191 MG/DL
CO2 SERPL-SCNC: 26 MMOL/L (ref 20–32)
CREAT SERPL-MCNC: 1.04 MG/DL (ref 0.66–1.25)
FASTING STATUS PATIENT QL REPORTED: ABNORMAL
GFR SERPL CREATININE-BSD FRML MDRD: 70 ML/MIN/1.73M2
GLUCOSE BLD-MCNC: 100 MG/DL (ref 70–99)
HDLC SERPL-MCNC: 56 MG/DL
LDLC SERPL CALC-MCNC: 107 MG/DL
NONHDLC SERPL-MCNC: 135 MG/DL
POTASSIUM BLD-SCNC: 4.9 MMOL/L (ref 3.4–5.3)
PROT SERPL-MCNC: 7.8 G/DL (ref 6.8–8.8)
SODIUM SERPL-SCNC: 133 MMOL/L (ref 133–144)
TRIGL SERPL-MCNC: 141 MG/DL

## 2022-09-14 DIAGNOSIS — E78.00 PURE HYPERCHOLESTEROLEMIA: ICD-10-CM

## 2022-09-14 DIAGNOSIS — I10 ESSENTIAL HYPERTENSION, BENIGN: ICD-10-CM

## 2022-09-14 DIAGNOSIS — N40.1 BENIGN LOCALIZED HYPERPLASIA OF PROSTATE WITH URINARY OBSTRUCTION: ICD-10-CM

## 2022-09-14 DIAGNOSIS — N13.8 BENIGN LOCALIZED HYPERPLASIA OF PROSTATE WITH URINARY OBSTRUCTION: ICD-10-CM

## 2022-09-14 RX ORDER — DOXAZOSIN 4 MG/1
TABLET ORAL
Qty: 90 TABLET | Refills: 1 | OUTPATIENT
Start: 2022-09-14

## 2022-09-14 RX ORDER — ATORVASTATIN CALCIUM 20 MG/1
TABLET, FILM COATED ORAL
Qty: 90 TABLET | Refills: 3 | OUTPATIENT
Start: 2022-09-14

## 2022-09-14 NOTE — TELEPHONE ENCOUNTER
Duplicate- Rx sent to pharmacy by provider on 9/12/22    Routing refill request to provider for review/approval because:  Labs out of range:  Blood pressure    BP Readings from Last 3 Encounters:   09/12/22 (!) 164/80   03/14/22 132/80   11/18/21 (!) 176/76     Kanika FOSTER RN, BSN, PHN  [facility]

## 2022-09-27 ENCOUNTER — LAB (OUTPATIENT)
Dept: LAB | Facility: CLINIC | Age: 87
End: 2022-09-27
Payer: COMMERCIAL

## 2022-09-27 ENCOUNTER — ANTICOAGULATION THERAPY VISIT (OUTPATIENT)
Dept: ANTICOAGULATION | Facility: CLINIC | Age: 87
End: 2022-09-27

## 2022-09-27 DIAGNOSIS — Z79.01 LONG TERM CURRENT USE OF ANTICOAGULANTS WITH INR GOAL OF 2.0-3.0: ICD-10-CM

## 2022-09-27 DIAGNOSIS — I48.19 PERSISTENT ATRIAL FIBRILLATION (H): ICD-10-CM

## 2022-09-27 DIAGNOSIS — I48.19 PERSISTENT ATRIAL FIBRILLATION (H): Primary | ICD-10-CM

## 2022-09-27 LAB — INR BLD: 2.2 (ref 0.9–1.1)

## 2022-09-27 PROCEDURE — 36416 COLLJ CAPILLARY BLOOD SPEC: CPT

## 2022-09-27 PROCEDURE — 85610 PROTHROMBIN TIME: CPT

## 2022-09-27 NOTE — PROGRESS NOTES
ANTICOAGULATION MANAGEMENT     Nathen Gage 86 year old male is on warfarin with therapeutic INR result. (Goal INR 2.0-2.5)    Recent labs: (last 7 days)     09/27/22  0910   INR 2.2*       ASSESSMENT       Source(s): Chart Review and Patient/Caregiver Call       Warfarin doses taken: Warfarin taken as instructed    Diet: No new diet changes identified    New illness, injury, or hospitalization: No    Medication/supplement changes: None noted    Signs or symptoms of bleeding or clotting: No    Previous INR: Subtherapeutic    Additional findings: Upcoming surgery/procedure Patient reports will cyst removed from neck 9/29/22.       PLAN     Recommended plan for no diet, medication or health factor changes affecting INR     Dosing Instructions: Continue your current warfarin dose with next INR in 3 weeks       Summary  As of 9/27/2022    Full warfarin instructions:  1.25 mg every Mon, Thu; 2.5 mg all other days   Next INR check:  10/18/2022             Telephone call with Neal who verbalizes understanding and agrees to plan    Lab visit scheduled    Education provided: Please call back if any changes to your diet, medications or how you've been taking warfarin and Contact [phone]  with any changes, questions or concerns.     Plan made per [facility] anticoagulation protocol    Teresa Castro RN  Anticoagulation Clinic  9/27/2022    _______________________________________________________________________     Anticoagulation Episode Summary     Current INR goal:  2.0-2.5   TTR:  49.9 % (1 y)   Target end date:  Indefinite   Send INR reminders to:  [facility]    Indications    Persistent atrial fibrillation (H) [I48.19]  Long term current use of anticoagulants with INR goal of 2.0-3.0 [Z79.01]           Comments:           Anticoagulation Care Providers     Provider Role Specialty Phone number    Kushal Valentin MD Referring Family Medicine [phone]    Leonides Aggarwal PA-C Referring Family  Medicine [phone]

## 2022-09-28 ENCOUNTER — ANCILLARY PROCEDURE (OUTPATIENT)
Dept: CARDIOLOGY | Facility: CLINIC | Age: 87
End: 2022-09-28
Attending: INTERNAL MEDICINE
Payer: COMMERCIAL

## 2022-09-28 DIAGNOSIS — Z95.0 CARDIAC PACEMAKER IN SITU: ICD-10-CM

## 2022-09-28 PROCEDURE — 93293 PM PHONE R-STRIP DEVICE EVAL: CPT | Performed by: INTERNAL MEDICINE

## 2022-09-29 ENCOUNTER — TELEPHONE (OUTPATIENT)
Dept: FAMILY MEDICINE | Facility: CLINIC | Age: 87
End: 2022-09-29

## 2022-09-29 NOTE — TELEPHONE ENCOUNTER
Pt dropped off list of recent BP readings for Provider review. Per provider, advised Pt that his provider has reviewed his BP readings and state they all look good.   Pt was advised that if he notices any difference in how he feels or changes in his BP readings, to let us know.  Pt confirmed he understood.    Last BP reading, taken 9/29/22 2 hrs after getting up in the morning > 127/69, Pulse 60    Judith Harvey/EMT-B  [facility]

## 2022-09-30 ENCOUNTER — LAB (OUTPATIENT)
Dept: URGENT CARE | Facility: URGENT CARE | Age: 87
End: 2022-09-30
Payer: COMMERCIAL

## 2022-09-30 DIAGNOSIS — Z20.822 SUSPECTED COVID-19 VIRUS INFECTION: ICD-10-CM

## 2022-09-30 LAB — SARS-COV-2 RNA RESP QL NAA+PROBE: NEGATIVE

## 2022-09-30 PROCEDURE — U0005 INFEC AGEN DETEC AMPLI PROBE: HCPCS

## 2022-09-30 PROCEDURE — U0003 INFECTIOUS AGENT DETECTION BY NUCLEIC ACID (DNA OR RNA); SEVERE ACUTE RESPIRATORY SYNDROME CORONAVIRUS 2 (SARS-COV-2) (CORONAVIRUS DISEASE [COVID-19]), AMPLIFIED PROBE TECHNIQUE, MAKING USE OF HIGH THROUGHPUT TECHNOLOGIES AS DESCRIBED BY CMS-2020-01-R: HCPCS

## 2022-10-09 ENCOUNTER — HEALTH MAINTENANCE LETTER (OUTPATIENT)
Age: 87
End: 2022-10-09

## 2022-10-12 ENCOUNTER — ANCILLARY PROCEDURE (OUTPATIENT)
Dept: CARDIOLOGY | Facility: CLINIC | Age: 87
End: 2022-10-12
Attending: INTERNAL MEDICINE
Payer: COMMERCIAL

## 2022-10-12 DIAGNOSIS — Z95.0 CARDIAC PACEMAKER IN SITU: ICD-10-CM

## 2022-10-12 DIAGNOSIS — I49.5 SICK SINUS SYNDROME (H): Primary | ICD-10-CM

## 2022-10-12 PROCEDURE — 93280 PM DEVICE PROGR EVAL DUAL: CPT | Performed by: INTERNAL MEDICINE

## 2022-10-14 DIAGNOSIS — K21.00 GASTROESOPHAGEAL REFLUX DISEASE WITH ESOPHAGITIS: ICD-10-CM

## 2022-10-17 RX ORDER — PANTOPRAZOLE SODIUM 40 MG/1
TABLET, DELAYED RELEASE ORAL
Qty: 90 TABLET | Refills: 0 | Status: SHIPPED | OUTPATIENT
Start: 2022-10-17 | End: 2022-11-01

## 2022-10-17 NOTE — TELEPHONE ENCOUNTER
Prescription approved per [facility] Refill Protocol.    Talat Hunter RN on 10/17/2022 at 1:43 PM

## 2022-10-18 ENCOUNTER — ANTICOAGULATION THERAPY VISIT (OUTPATIENT)
Dept: ANTICOAGULATION | Facility: CLINIC | Age: 87
End: 2022-10-18

## 2022-10-18 ENCOUNTER — LAB (OUTPATIENT)
Dept: LAB | Facility: CLINIC | Age: 87
End: 2022-10-18
Payer: COMMERCIAL

## 2022-10-18 ENCOUNTER — ALLIED HEALTH/NURSE VISIT (OUTPATIENT)
Dept: FAMILY MEDICINE | Facility: CLINIC | Age: 87
End: 2022-10-18
Payer: COMMERCIAL

## 2022-10-18 DIAGNOSIS — Z23 NEED FOR PROPHYLACTIC VACCINATION AND INOCULATION AGAINST INFLUENZA: ICD-10-CM

## 2022-10-18 DIAGNOSIS — I48.19 PERSISTENT ATRIAL FIBRILLATION (H): ICD-10-CM

## 2022-10-18 DIAGNOSIS — Z79.01 LONG TERM CURRENT USE OF ANTICOAGULANTS WITH INR GOAL OF 2.0-3.0: ICD-10-CM

## 2022-10-18 DIAGNOSIS — I48.19 PERSISTENT ATRIAL FIBRILLATION (H): Primary | ICD-10-CM

## 2022-10-18 DIAGNOSIS — Z23 HIGH PRIORITY FOR 2019-NCOV VACCINE: ICD-10-CM

## 2022-10-18 LAB — INR BLD: 2.6 (ref 0.9–1.1)

## 2022-10-18 PROCEDURE — 91312 COVID-19,PF,PFIZER BOOSTER BIVALENT: CPT

## 2022-10-18 PROCEDURE — G0008 ADMIN INFLUENZA VIRUS VAC: HCPCS | Mod: 59

## 2022-10-18 PROCEDURE — 36416 COLLJ CAPILLARY BLOOD SPEC: CPT

## 2022-10-18 PROCEDURE — 90662 IIV NO PRSV INCREASED AG IM: CPT

## 2022-10-18 PROCEDURE — 0124A COVID-19,PF,PFIZER BOOSTER BIVALENT: CPT

## 2022-10-18 PROCEDURE — 85610 PROTHROMBIN TIME: CPT

## 2022-10-18 PROCEDURE — 99207 PR NO CHARGE NURSE ONLY: CPT

## 2022-10-18 NOTE — PROGRESS NOTES
ANTICOAGULATION MANAGEMENT     Nathen Gage 86 year old male is on warfarin with therapeutic INR result. (Goal INR 2.0-2.5)    Recent labs: (last 7 days)     10/18/22  0903   INR 2.6*       ASSESSMENT       Source(s): Chart Review    Previous INR was Therapeutic last visit; previously outside of goal range    Medication and diet since last INR chart reviewed; none identified     Patient had COVID test on 9/29/22 (negative).  Will need to verify with him if he has been feeling better at this time.           PLAN     Unable to reach Neal today.    Left message for patient to call INR Clinic to discuss results.    Follow up required to confirm warfarin dose taken and assess for changes    Joanne Bustos RN  Anticoagulation Clinic  10/18/2022

## 2022-10-18 NOTE — PROGRESS NOTES
ANTICOAGULATION MANAGEMENT     Nathen Gage 86 year old male is on warfarin with supra therapeutic INR result. (Goal INR 2.0-2.5)    Recent labs: (last 7 days)     10/18/22  0903   INR 2.6*       ASSESSMENT       Source(s): Chart Review and Patient/Caregiver Call       Warfarin doses taken: Warfarin taken as instructed    Diet: No new diet changes identified    New illness, injury, or hospitalization: Patient reports that he was exposed to COVID, but did not develop symptoms.      Medication/supplement changes: None noted    Signs or symptoms of bleeding or clotting: No    Previous INR: Therapeutic last visit; previously outside of goal range    Additional findings: None       PLAN     Recommended plan for no diet, medication or health factor changes affecting INR     Dosing Instructions: Continue your current warfarin dose with next INR in 2 weeks       Summary  As of 10/18/2022    Full warfarin instructions:  1.25 mg every Mon, Thu; 2.5 mg all other days   Next INR check:  11/1/2022             Telephone call with Neal who agrees to plan and repeated back plan correctly    Lab visit scheduled    Education provided: Please call back if any changes to your diet, medications or how you've been taking warfarin    Plan made per [facility] anticoagulation protocol    Joanne Bustos RN  Anticoagulation Clinic  10/18/2022    _______________________________________________________________________     Anticoagulation Episode Summary     Current INR goal:  2.0-2.5   TTR:  51.8 % (1 y)   Target end date:  Indefinite   Send INR reminders to:  [facility]    Indications    Persistent atrial fibrillation (H) [I48.19]  Long term current use of anticoagulants with INR goal of 2.0-3.0 [Z79.01]           Comments:           Anticoagulation Care Providers     Provider Role Specialty Phone number    Kushal Valentin MD Referring Family Medicine [phone]    Leonides Aggarwal PA-C Referring Family Medicine  [phone]

## 2022-10-21 LAB
MDC_IDC_LEAD_IMPLANT_DT: NORMAL
MDC_IDC_LEAD_IMPLANT_DT: NORMAL
MDC_IDC_LEAD_LOCATION: NORMAL
MDC_IDC_LEAD_LOCATION: NORMAL
MDC_IDC_LEAD_LOCATION_DETAIL_1: NORMAL
MDC_IDC_LEAD_LOCATION_DETAIL_1: NORMAL
MDC_IDC_LEAD_MFG: NORMAL
MDC_IDC_LEAD_MFG: NORMAL
MDC_IDC_LEAD_MODEL: NORMAL
MDC_IDC_LEAD_MODEL: NORMAL
MDC_IDC_LEAD_POLARITY_TYPE: NORMAL
MDC_IDC_LEAD_POLARITY_TYPE: NORMAL
MDC_IDC_LEAD_SERIAL: NORMAL
MDC_IDC_LEAD_SERIAL: NORMAL
MDC_IDC_MSMT_BATTERY_STATUS: NORMAL
MDC_IDC_MSMT_LEADCHNL_RA_IMPEDANCE_VALUE: 560 OHM
MDC_IDC_MSMT_LEADCHNL_RA_PACING_THRESHOLD_AMPLITUDE: 0.8 V
MDC_IDC_MSMT_LEADCHNL_RA_PACING_THRESHOLD_PULSEWIDTH: 0.5 MS
MDC_IDC_MSMT_LEADCHNL_RA_SENSING_INTR_AMPL: 3 MV
MDC_IDC_MSMT_LEADCHNL_RV_IMPEDANCE_VALUE: 640 OHM
MDC_IDC_MSMT_LEADCHNL_RV_PACING_THRESHOLD_AMPLITUDE: 2.1 V
MDC_IDC_MSMT_LEADCHNL_RV_PACING_THRESHOLD_PULSEWIDTH: 0.4 MS
MDC_IDC_MSMT_LEADCHNL_RV_SENSING_INTR_AMPL: 8.2 MV
MDC_IDC_PG_IMPLANT_DTM: NORMAL
MDC_IDC_PG_MFG: NORMAL
MDC_IDC_PG_MODEL: NORMAL
MDC_IDC_PG_SERIAL: NORMAL
MDC_IDC_PG_TYPE: NORMAL
MDC_IDC_SESS_CLINIC_NAME: NORMAL
MDC_IDC_SESS_DTM: NORMAL
MDC_IDC_SESS_TYPE: NORMAL
MDC_IDC_SET_BRADY_AT_MODE_SWITCH_MODE: NORMAL
MDC_IDC_SET_BRADY_AT_MODE_SWITCH_RATE: 170 {BEATS}/MIN
MDC_IDC_SET_BRADY_HYSTRATE: NORMAL
MDC_IDC_SET_BRADY_LOWRATE: 60 {BEATS}/MIN
MDC_IDC_SET_BRADY_MAX_SENSOR_RATE: 120 {BEATS}/MIN
MDC_IDC_SET_BRADY_MAX_TRACKING_RATE: 120 {BEATS}/MIN
MDC_IDC_SET_BRADY_MODE: NORMAL
MDC_IDC_SET_BRADY_PAV_DELAY_HIGH: 200 MS
MDC_IDC_SET_BRADY_PAV_DELAY_LOW: 300 MS
MDC_IDC_SET_BRADY_SAV_DELAY_HIGH: 200 MS
MDC_IDC_SET_BRADY_SAV_DELAY_LOW: 300 MS
MDC_IDC_SET_LEADCHNL_RA_PACING_AMPLITUDE: 2 V
MDC_IDC_SET_LEADCHNL_RA_PACING_CAPTURE_MODE: NORMAL
MDC_IDC_SET_LEADCHNL_RA_PACING_POLARITY: NORMAL
MDC_IDC_SET_LEADCHNL_RA_PACING_PULSEWIDTH: 0.5 MS
MDC_IDC_SET_LEADCHNL_RA_SENSING_ADAPTATION_MODE: NORMAL
MDC_IDC_SET_LEADCHNL_RA_SENSING_POLARITY: NORMAL
MDC_IDC_SET_LEADCHNL_RA_SENSING_SENSITIVITY: 0.5 MV
MDC_IDC_SET_LEADCHNL_RV_PACING_AMPLITUDE: 3.5 V
MDC_IDC_SET_LEADCHNL_RV_PACING_CAPTURE_MODE: NORMAL
MDC_IDC_SET_LEADCHNL_RV_PACING_POLARITY: NORMAL
MDC_IDC_SET_LEADCHNL_RV_PACING_PULSEWIDTH: 0.4 MS
MDC_IDC_SET_LEADCHNL_RV_SENSING_ADAPTATION_MODE: NORMAL
MDC_IDC_SET_LEADCHNL_RV_SENSING_POLARITY: NORMAL
MDC_IDC_SET_LEADCHNL_RV_SENSING_SENSITIVITY: 2.5 MV
MDC_IDC_STAT_AT_DTM_END: NORMAL
MDC_IDC_STAT_AT_DTM_START: NORMAL
MDC_IDC_STAT_AT_MODE_SW_COUNT: 0
MDC_IDC_STAT_AT_MODE_SW_MAX_DURATION: 0 S
MDC_IDC_STAT_BRADY_DTM_END: NORMAL
MDC_IDC_STAT_BRADY_DTM_START: NORMAL
MDC_IDC_STAT_BRADY_RA_PERCENT_PACED: 21 %
MDC_IDC_STAT_BRADY_RV_PERCENT_PACED: 4 %
MDC_IDC_STAT_EPISODE_RECENT_COUNT: 0
MDC_IDC_STAT_EPISODE_RECENT_COUNT_DTM_END: NORMAL
MDC_IDC_STAT_EPISODE_RECENT_COUNT_DTM_START: NORMAL
MDC_IDC_STAT_EPISODE_TYPE: NORMAL

## 2022-10-31 DIAGNOSIS — K21.00 GASTROESOPHAGEAL REFLUX DISEASE WITH ESOPHAGITIS: ICD-10-CM

## 2022-11-01 ENCOUNTER — ANTICOAGULATION THERAPY VISIT (OUTPATIENT)
Dept: ANTICOAGULATION | Facility: CLINIC | Age: 87
End: 2022-11-01

## 2022-11-01 ENCOUNTER — LAB (OUTPATIENT)
Dept: LAB | Facility: CLINIC | Age: 87
End: 2022-11-01
Payer: COMMERCIAL

## 2022-11-01 DIAGNOSIS — Z79.01 LONG TERM CURRENT USE OF ANTICOAGULANTS WITH INR GOAL OF 2.0-3.0: ICD-10-CM

## 2022-11-01 DIAGNOSIS — I48.19 PERSISTENT ATRIAL FIBRILLATION (H): ICD-10-CM

## 2022-11-01 DIAGNOSIS — I48.19 PERSISTENT ATRIAL FIBRILLATION (H): Primary | ICD-10-CM

## 2022-11-01 LAB — INR BLD: 2.1 (ref 0.9–1.1)

## 2022-11-01 PROCEDURE — 36416 COLLJ CAPILLARY BLOOD SPEC: CPT

## 2022-11-01 PROCEDURE — 85610 PROTHROMBIN TIME: CPT

## 2022-11-01 RX ORDER — PANTOPRAZOLE SODIUM 40 MG/1
TABLET, DELAYED RELEASE ORAL
Qty: 90 TABLET | Refills: 2 | Status: SHIPPED | OUTPATIENT
Start: 2022-11-01 | End: 2023-09-08

## 2022-11-01 NOTE — TELEPHONE ENCOUNTER
Prescription approved per [facility] Refill Protocol.  Cale HE RN 11/1/2022 at 3:02 PM

## 2022-11-14 ENCOUNTER — ANCILLARY PROCEDURE (OUTPATIENT)
Dept: CARDIOLOGY | Facility: CLINIC | Age: 87
End: 2022-11-14
Attending: INTERNAL MEDICINE
Payer: COMMERCIAL

## 2022-11-14 DIAGNOSIS — I49.5 SICK SINUS SYNDROME (H): ICD-10-CM

## 2022-11-14 DIAGNOSIS — Z95.0 CARDIAC PACEMAKER IN SITU: ICD-10-CM

## 2022-11-21 ENCOUNTER — ANTICOAGULATION THERAPY VISIT (OUTPATIENT)
Dept: ANTICOAGULATION | Facility: CLINIC | Age: 87
End: 2022-11-21

## 2022-11-21 ENCOUNTER — LAB (OUTPATIENT)
Dept: LAB | Facility: CLINIC | Age: 87
End: 2022-11-21
Payer: COMMERCIAL

## 2022-11-21 ENCOUNTER — DOCUMENTATION ONLY (OUTPATIENT)
Dept: ANTICOAGULATION | Facility: CLINIC | Age: 87
End: 2022-11-21

## 2022-11-21 DIAGNOSIS — I48.19 PERSISTENT ATRIAL FIBRILLATION (H): Primary | ICD-10-CM

## 2022-11-21 DIAGNOSIS — Z79.01 LONG TERM CURRENT USE OF ANTICOAGULANTS WITH INR GOAL OF 2.0-3.0: ICD-10-CM

## 2022-11-21 DIAGNOSIS — I48.19 PERSISTENT ATRIAL FIBRILLATION (H): ICD-10-CM

## 2022-11-21 LAB — INR BLD: 1.9 (ref 0.9–1.1)

## 2022-11-21 PROCEDURE — 85610 PROTHROMBIN TIME: CPT

## 2022-11-21 PROCEDURE — 36416 COLLJ CAPILLARY BLOOD SPEC: CPT

## 2022-11-21 NOTE — PROGRESS NOTES
ANTICOAGULATION MANAGEMENT     Nathen Gage 87 year old male is on warfarin with subtherapeutic INR result. (Goal INR 2.0-2.5)    Recent labs: (last 7 days)     11/21/22  0952   INR 1.9*       ASSESSMENT       Source(s): Chart Review and Patient/Caregiver Call       Warfarin doses taken: Warfarin taken as instructed    Diet: No new diet changes identified    New illness, injury, or hospitalization: No    Medication/supplement changes: None noted    Signs or symptoms of bleeding or clotting: No    Previous INR: Therapeutic last visit; previously outside of goal range    Additional findings: None       PLAN     Recommended plan for no diet, medication or health factor changes affecting INR     Dosing Instructions: Continue your current warfarin dose with next INR in 2 weeks       Summary  As of 11/21/2022    Full warfarin instructions:  1.25 mg every Mon, Thu; 2.5 mg all other days; Starting 11/21/2022   Next INR check:  12/5/2022             Telephone call with Neal who verbalizes understanding and agrees to plan    Lab visit scheduled    Education provided:     Dietary considerations: importance of consistent vitamin K intake and skip greens today then resume usual regimen    Plan made per [facility] anticoagulation protocol    Fidelina Pizano RN  Anticoagulation Clinic  11/21/2022    _______________________________________________________________________     Anticoagulation Episode Summary     Current INR goal:  2.0-2.5   TTR:  57.6 % (1 y)   Target end date:  Indefinite   Send INR reminders to:  [facility]    Indications    Persistent atrial fibrillation (H) [I48.19]  Long term current use of anticoagulants with INR goal of 2.0-3.0 [Z79.01]           Comments:           Anticoagulation Care Providers     Provider Role Specialty Phone number    Kushal Valentin MD Referring Family Medicine [phone]    Leonides Aggarwal PA-C Referring Family Medicine [phone]

## 2022-11-21 NOTE — PROGRESS NOTES
ANTICOAGULATION CLINIC REFERRAL RENEWAL REQUEST       An annual renewal order is required for all patients referred to [facility]ticoagulation [facility].?  Please review and sign the pended referral order for Nathen Gage.       ANTICOAGULATION SUMMARY      Warfarin indication(s)   Atrial Fibrillation    Mechanical heart valve present?  NO       Current goal range   INR: 2.0-2.5     Goal appropriate for indication? Goal of 2.0-2.5 verified with cardiologist , on 3/10/2017 due to frequent nosebleeds at higher goal range     Time in Therapeutic Range (TTR)  (Goal > 60%) 58%       Office visit with referring provider's group within last year yes on 09/12/2022       Fidelina Pizano, RN  [facility] Anticoagulation [facility]

## 2022-12-05 ENCOUNTER — LAB (OUTPATIENT)
Dept: LAB | Facility: CLINIC | Age: 87
End: 2022-12-05
Payer: COMMERCIAL

## 2022-12-05 ENCOUNTER — ANTICOAGULATION THERAPY VISIT (OUTPATIENT)
Dept: ANTICOAGULATION | Facility: CLINIC | Age: 87
End: 2022-12-05

## 2022-12-05 ENCOUNTER — TELEPHONE (OUTPATIENT)
Dept: FAMILY MEDICINE | Facility: CLINIC | Age: 87
End: 2022-12-05

## 2022-12-05 DIAGNOSIS — Z79.01 LONG TERM CURRENT USE OF ANTICOAGULANTS WITH INR GOAL OF 2.0-3.0: ICD-10-CM

## 2022-12-05 DIAGNOSIS — I48.19 PERSISTENT ATRIAL FIBRILLATION (H): Primary | ICD-10-CM

## 2022-12-05 DIAGNOSIS — I48.19 PERSISTENT ATRIAL FIBRILLATION (H): ICD-10-CM

## 2022-12-05 LAB — INR BLD: 1.9 (ref 0.9–1.1)

## 2022-12-05 PROCEDURE — 85610 PROTHROMBIN TIME: CPT

## 2022-12-05 PROCEDURE — 36416 COLLJ CAPILLARY BLOOD SPEC: CPT

## 2022-12-05 NOTE — TELEPHONE ENCOUNTER
Reason for Call:  Other returning call    Detailed comments: [facility]     Phone Number Patient can be reached at: Home number on file [phone] (home)    Best Time: anytime    Can we leave a detailed message on this number? YES    Call taken on 12/5/2022 at 1:07 PM by Ana Liu

## 2022-12-05 NOTE — PROGRESS NOTES
ANTICOAGULATION MANAGEMENT     Nathen Gage 87 year old male is on warfarin with subtherapeutic INR result. (Goal INR 2.0-2.5)    Recent labs: (last 7 days)     12/05/22  0918   INR 1.9*       ASSESSMENT       Source(s): Chart Review and Patient/Caregiver Call       Warfarin doses taken: Warfarin taken as instructed    Diet: Decreased greens/vitamin K in diet; ongoing change    New illness, injury, or hospitalization: No    Medication/supplement changes: None noted    Signs or symptoms of bleeding or clotting: No    Previous INR: Subtherapeutic    Additional findings: Making smallest maintenance dose increase possible today.       PLAN     Recommended plan for ongoing change(s) affecting INR     Dosing Instructions: Increase your warfarin dose (8.3% change) with next INR in 2 weeks       Summary  As of 12/5/2022    Full warfarin instructions:  1.25 mg every Thu; 2.5 mg all other days; Starting 12/5/2022   Next INR check:  12/19/2022             Telephone call with Neal who verbalizes understanding and agrees to plan    Lab visit scheduled    Education provided:     Taking warfarin: may need to change tablet strength in the future if unable to find therapeutic INR with current tablet strength    Dietary considerations: importance of consistent vitamin K intake    Plan made per [facility] anticoagulation protocol    Kristine Castro RN  Anticoagulation Clinic  12/5/2022    _______________________________________________________________________     Anticoagulation Episode Summary     Current INR goal:  2.0-2.5   TTR:  57.6 % (1 y)   Target end date:  Indefinite   Send INR reminders to:  [facility]    Indications    Persistent atrial fibrillation (H) [I48.19]  Long term current use of anticoagulants with INR goal of 2.0-3.0 [Z79.01]           Comments:           Anticoagulation Care Providers     Provider Role Specialty Phone number    Kushal Valentin MD Referring Family Medicine [phone]     Leonides Aggarwal PA-C [facility] [phone]

## 2022-12-05 NOTE — TELEPHONE ENCOUNTER
Spoke with patient. See [facility] note for detailing.    Kristine Castro RN  [facility] Anticoagulation [facility]  [phone]

## 2022-12-19 ENCOUNTER — ANTICOAGULATION THERAPY VISIT (OUTPATIENT)
Dept: ANTICOAGULATION | Facility: CLINIC | Age: 87
End: 2022-12-19

## 2022-12-19 ENCOUNTER — LAB (OUTPATIENT)
Dept: LAB | Facility: CLINIC | Age: 87
End: 2022-12-19
Payer: COMMERCIAL

## 2022-12-19 DIAGNOSIS — I48.19 PERSISTENT ATRIAL FIBRILLATION (H): Primary | ICD-10-CM

## 2022-12-19 DIAGNOSIS — I48.19 PERSISTENT ATRIAL FIBRILLATION (H): ICD-10-CM

## 2022-12-19 DIAGNOSIS — Z79.01 LONG TERM CURRENT USE OF ANTICOAGULANTS WITH INR GOAL OF 2.0-3.0: ICD-10-CM

## 2022-12-19 LAB — INR BLD: 3.3 (ref 0.9–1.1)

## 2022-12-19 PROCEDURE — 85610 PROTHROMBIN TIME: CPT

## 2022-12-19 PROCEDURE — 36416 COLLJ CAPILLARY BLOOD SPEC: CPT

## 2022-12-19 NOTE — PROGRESS NOTES
ANTICOAGULATION MANAGEMENT     Nathen Gage 87 year old male is on warfarin with supratherapeutic INR result. (Goal INR 2.0-2.5)    Recent labs: (last 7 days)     12/19/22  0928   INR 3.3*       ASSESSMENT       Source(s): Chart Review and Patient/Caregiver Call       Warfarin doses taken: Warfarin taken as instructed    Diet: No new diet changes identified    New illness, injury, or hospitalization: No    Medication/supplement changes: None noted    Signs or symptoms of bleeding or clotting: No    Previous INR: Subtherapeutic    Additional findings: warfarin maintenance dose was increased at last visit due to 2 subtherapeutic INRs, since no changes and INR now supratherapeutic, I decreased warfarin maintenance dose.       PLAN     Recommended plan for no diet, medication or health factor changes affecting INR     Dosing Instructions: hold dose then decrease your warfarin dose (7% change) with next INR in 2 weeks       Summary  As of 12/19/2022    Full warfarin instructions:  12/19: Hold; Otherwise 1.25 mg every Mon, Thu; 2.5 mg all other days; Starting 12/19/2022   Next INR check:  1/2/2023             Telephone call with Neal who agrees to plan and repeated back plan correctly    Lab visit scheduled    Education provided:     Dietary considerations: importance of consistent vitamin K intake    Plan made per [facility] anticoagulation protocol    Fidelina Pizano RN  Anticoagulation Clinic  12/19/2022    _______________________________________________________________________     Anticoagulation Episode Summary     Current INR goal:  2.0-2.5   TTR:  57.0 % (1 y)   Target end date:  Indefinite   Send INR reminders to:  [facility]    Indications    Persistent atrial fibrillation (H) [I48.19]  Long term current use of anticoagulants with INR goal of 2.0-3.0 [Z79.01]           Comments:           Anticoagulation Care Providers     Provider Role Specialty Phone number    Kushal Valentin MD Referring Family  Medicine [phone]    Leonides Aggarwal PA-C [facility] [phone]

## 2022-12-20 ENCOUNTER — TELEPHONE (OUTPATIENT)
Dept: CARDIOLOGY | Facility: CLINIC | Age: 87
End: 2022-12-20

## 2022-12-20 ENCOUNTER — ANCILLARY PROCEDURE (OUTPATIENT)
Dept: CARDIOLOGY | Facility: CLINIC | Age: 87
End: 2022-12-20
Attending: INTERNAL MEDICINE
Payer: COMMERCIAL

## 2022-12-20 DIAGNOSIS — Z95.0 CARDIAC PACEMAKER IN SITU: ICD-10-CM

## 2022-12-20 DIAGNOSIS — I49.5 SICK SINUS SYNDROME (H): ICD-10-CM

## 2022-12-20 NOTE — TELEPHONE ENCOUNTER
[facility] Call Center    Phone Message    May a detailed message be left on voicemail: yes     Reason for Call: Other: Ron calling to request that his TTM device check scheduled for 1/24/22 be rescheduled due to conflict. He is available that same day between 8am to 9am or after 1pm. Please call him back to discuss scheduling options.     Action Taken: Message routed to:  Other: Cardiology    Travel Screening: Not Applicable

## 2023-01-02 ENCOUNTER — ANTICOAGULATION THERAPY VISIT (OUTPATIENT)
Dept: ANTICOAGULATION | Facility: CLINIC | Age: 88
End: 2023-01-02

## 2023-01-02 ENCOUNTER — LAB (OUTPATIENT)
Dept: LAB | Facility: CLINIC | Age: 88
End: 2023-01-02
Payer: COMMERCIAL

## 2023-01-02 DIAGNOSIS — I48.19 PERSISTENT ATRIAL FIBRILLATION (H): ICD-10-CM

## 2023-01-02 DIAGNOSIS — Z79.01 LONG TERM CURRENT USE OF ANTICOAGULANTS WITH INR GOAL OF 2.0-3.0: ICD-10-CM

## 2023-01-02 DIAGNOSIS — I48.19 PERSISTENT ATRIAL FIBRILLATION (H): Primary | ICD-10-CM

## 2023-01-02 LAB — INR BLD: 2.8 (ref 0.9–1.1)

## 2023-01-02 PROCEDURE — 85610 PROTHROMBIN TIME: CPT

## 2023-01-02 PROCEDURE — 36416 COLLJ CAPILLARY BLOOD SPEC: CPT

## 2023-01-02 NOTE — PROGRESS NOTES
ANTICOAGULATION MANAGEMENT     Nathen Gage 87 year old male is on warfarin with supratherapeutic INR result. (Goal INR 2.0-2.5)    Recent labs: (last 7 days)     01/02/23  1006   INR 2.8*       ASSESSMENT       Source(s): Chart Review and Patient/Caregiver Call       Warfarin doses taken: Warfarin taken as instructed - confirmed maintenance dose    Diet: No new diet changes identified    New illness, injury, or hospitalization: No    Medication/supplement changes: None noted    Signs or symptoms of bleeding or clotting: No    Previous INR: Supratherapeutic    Additional findings: Neal is still supratherapeutic today after maintenance dose decrease at last INR check. Will decrease him again today per protocol.     Neal has had consistently high and low INR readings, informed him we may need to change his tablet strength in the future to allow for more fine-tuned dosing.       PLAN     Recommended plan for no diet, medication or health factor changes affecting INR     Dosing Instructions: decrease your warfarin dose (8.3% change) with next INR in 2 weeks       Summary  As of 1/2/2023    Full warfarin instructions:  1.25 mg every Mon, Wed, Fri; 2.5 mg all other days   Next INR check:  1/16/2023             Telephone call with Neal who verbalizes understanding and agrees to plan    Lab visit scheduled    Education provided:     Please call back if any changes to your diet, medications or how you've been taking warfarin    Plan made per [facility] anticoagulation protocol    Kristine Castro RN  Anticoagulation Clinic  1/2/2023    _______________________________________________________________________     Anticoagulation Episode Summary     Current INR goal:  2.0-2.5   TTR:  53.1 % (1 y)   Target end date:  Indefinite   Send INR reminders to:  [facility]    Indications    Persistent atrial fibrillation (H) [I48.19]  Long term current use of anticoagulants with INR goal of 2.0-3.0 [Z79.01]           Comments:            Anticoagulation Care Providers     Provider Role Specialty Phone number    Kushal Valentin MD Referring Family Medicine [phone]    Leonides Aggarwal PA-C Referring Family Medicine [phone]

## 2023-01-16 ENCOUNTER — ANTICOAGULATION THERAPY VISIT (OUTPATIENT)
Dept: ANTICOAGULATION | Facility: CLINIC | Age: 88
End: 2023-01-16

## 2023-01-16 ENCOUNTER — LAB (OUTPATIENT)
Dept: LAB | Facility: CLINIC | Age: 88
End: 2023-01-16
Payer: COMMERCIAL

## 2023-01-16 DIAGNOSIS — I48.19 PERSISTENT ATRIAL FIBRILLATION (H): ICD-10-CM

## 2023-01-16 DIAGNOSIS — Z79.01 LONG TERM CURRENT USE OF ANTICOAGULANTS WITH INR GOAL OF 2.0-3.0: ICD-10-CM

## 2023-01-16 DIAGNOSIS — I48.19 PERSISTENT ATRIAL FIBRILLATION (H): Primary | ICD-10-CM

## 2023-01-16 LAB — INR BLD: 1.9 (ref 0.9–1.1)

## 2023-01-16 PROCEDURE — 85610 PROTHROMBIN TIME: CPT

## 2023-01-16 PROCEDURE — 36416 COLLJ CAPILLARY BLOOD SPEC: CPT

## 2023-01-17 NOTE — PROGRESS NOTES
ANTICOAGULATION MANAGEMENT     Nathen Gage 87 year old male is on warfarin with subtherapeutic INR result. (Goal INR 2.0-2.5)    Recent labs: (last 7 days)     01/16/23  1003   INR 1.9*       ASSESSMENT       Source(s): Chart Review and Patient/Caregiver Call       Warfarin doses taken: Warfarin taken as instructed.  Warfarin dose was adjusted at last INR check and patient did take as instructed.    Diet: No new diet changes identified    New illness, injury, or hospitalization: No    Medication/supplement changes: None noted    Signs or symptoms of bleeding or clotting: No    Previous INR: Supratherapeutic    Additional findings: None       PLAN     Recommended plan for no diet, medication or health factor changes affecting INR     Dosing Instructions: Continue your current warfarin dose with next INR in 2 weeks       Summary  As of 1/16/2023    Full warfarin instructions:  1.25 mg every Mon, Wed, Fri; 2.5 mg all other days   Next INR check:  1/30/2023             Telephone call with Neal who agrees to plan and repeated back plan correctly    Lab visit scheduled    Education provided:     Please call back if any changes to your diet, medications or how you've been taking warfarin    Plan made per [facility] anticoagulation protocol    Joanne Bustos, RN  Anticoagulation Clinic  1/17/2023    _______________________________________________________________________     Anticoagulation Episode Summary     Current INR goal:  2.0-2.5   TTR:  51.2 % (1 y)   Target end date:  Indefinite   Send INR reminders to:  [facility]    Indications    Persistent atrial fibrillation (H) [I48.19]  Long term current use of anticoagulants with INR goal of 2.0-3.0 [Z79.01]           Comments:           Anticoagulation Care Providers     Provider Role Specialty Phone number    Kushal Valentin MD Referring Family Medicine [phone]    Leonides Aggarwal PA-C Referring Family Medicine [phone]

## 2023-01-24 ENCOUNTER — ANCILLARY PROCEDURE (OUTPATIENT)
Dept: CARDIOLOGY | Facility: CLINIC | Age: 88
End: 2023-01-24
Attending: INTERNAL MEDICINE
Payer: COMMERCIAL

## 2023-01-24 DIAGNOSIS — Z95.0 CARDIAC PACEMAKER IN SITU: ICD-10-CM

## 2023-01-24 DIAGNOSIS — I49.5 SICK SINUS SYNDROME (H): ICD-10-CM

## 2023-01-24 PROCEDURE — 93293 PM PHONE R-STRIP DEVICE EVAL: CPT | Performed by: INTERNAL MEDICINE

## 2023-01-30 ENCOUNTER — LAB (OUTPATIENT)
Dept: LAB | Facility: CLINIC | Age: 88
End: 2023-01-30
Payer: COMMERCIAL

## 2023-01-30 ENCOUNTER — ANTICOAGULATION THERAPY VISIT (OUTPATIENT)
Dept: ANTICOAGULATION | Facility: CLINIC | Age: 88
End: 2023-01-30

## 2023-01-30 DIAGNOSIS — Z79.01 LONG TERM CURRENT USE OF ANTICOAGULANTS WITH INR GOAL OF 2.0-3.0: ICD-10-CM

## 2023-01-30 DIAGNOSIS — I48.19 PERSISTENT ATRIAL FIBRILLATION (H): ICD-10-CM

## 2023-01-30 DIAGNOSIS — I48.19 PERSISTENT ATRIAL FIBRILLATION (H): Primary | ICD-10-CM

## 2023-01-30 LAB — INR BLD: 2 (ref 0.9–1.1)

## 2023-01-30 PROCEDURE — 36415 COLL VENOUS BLD VENIPUNCTURE: CPT

## 2023-01-30 PROCEDURE — 85610 PROTHROMBIN TIME: CPT

## 2023-01-30 NOTE — PROGRESS NOTES
ANTICOAGULATION MANAGEMENT     Nathen Gage 87 year old male is on warfarin with therapeutic INR result. (Goal INR 2.0-2.5)    Recent labs: (last 7 days)     01/30/23  1025   INR 2.0*       ASSESSMENT       Source(s): Chart Review and Patient/Caregiver Call       Warfarin doses taken: Warfarin taken as instructed    Diet: No new diet changes identified    New illness, injury, or hospitalization: No    Medication/supplement changes: None noted    Signs or symptoms of bleeding or clotting: No    Previous INR: Subtherapeutic    Additional findings: None       PLAN     Recommended plan for no diet, medication or health factor changes affecting INR     Dosing Instructions: Continue your current warfarin dose with next INR in 3 weeks       Summary  As of 1/30/2023    Full warfarin instructions:  1.25 mg every Mon, Wed, Fri; 2.5 mg all other days   Next INR check:  2/20/2023             Telephone call with Neal who verbalizes understanding and agrees to plan    Lab visit scheduled    Education provided:     Please call back if any changes to your diet, medications or how you've been taking warfarin    Plan made per [facility] anticoagulation protocol    Kristine Castro RN  Anticoagulation Clinic  1/30/2023    _______________________________________________________________________     Anticoagulation Episode Summary     Current INR goal:  2.0-2.5   TTR:  47.5 % (1 y)   Target end date:  Indefinite   Send INR reminders to:  [facility]    Indications    Persistent atrial fibrillation (H) [I48.19]  Long term current use of anticoagulants with INR goal of 2.0-3.0 [Z79.01]           Comments:           Anticoagulation Care Providers     Provider Role Specialty Phone number    Kushal Valentin MD Referring Family Medicine [phone]    Leonides Aggarwal PA-C Referring Family Medicine [phone]

## 2023-02-20 ENCOUNTER — LAB (OUTPATIENT)
Dept: LAB | Facility: CLINIC | Age: 88
End: 2023-02-20
Payer: COMMERCIAL

## 2023-02-20 ENCOUNTER — ANTICOAGULATION THERAPY VISIT (OUTPATIENT)
Dept: ANTICOAGULATION | Facility: CLINIC | Age: 88
End: 2023-02-20

## 2023-02-20 DIAGNOSIS — I48.19 PERSISTENT ATRIAL FIBRILLATION (H): Primary | ICD-10-CM

## 2023-02-20 DIAGNOSIS — I48.19 PERSISTENT ATRIAL FIBRILLATION (H): ICD-10-CM

## 2023-02-20 DIAGNOSIS — Z79.01 LONG TERM CURRENT USE OF ANTICOAGULANTS WITH INR GOAL OF 2.0-3.0: ICD-10-CM

## 2023-02-20 LAB — INR BLD: 2.2 (ref 0.9–1.1)

## 2023-02-20 PROCEDURE — 36415 COLL VENOUS BLD VENIPUNCTURE: CPT

## 2023-02-20 PROCEDURE — 85610 PROTHROMBIN TIME: CPT

## 2023-02-20 NOTE — PROGRESS NOTES
ANTICOAGULATION MANAGEMENT     Nathen Gage 87 year old male is on warfarin with therapeutic INR result. (Goal INR 2.0-2.5)    Recent labs: (last 7 days)     02/20/23  1027   INR 2.2*       ASSESSMENT       Source(s): Chart Review and Patient/Caregiver Call       Warfarin doses taken: Warfarin taken as instructed    Diet: No new diet changes identified    New illness, injury, or hospitalization: No    Medication/supplement changes: None noted    Signs or symptoms of bleeding or clotting: No    Previous INR: Therapeutic last visit; previously outside of goal range    Additional findings: None       PLAN     Recommended plan for no diet, medication or health factor changes affecting INR     Dosing Instructions: Continue your current warfarin dose with next INR in 4 weeks       Summary  As of 2/20/2023    Full warfarin instructions:  1.25 mg every Mon, Wed, Fri; 2.5 mg all other days   Next INR check:  3/20/2023             Telephone call with Neal who verbalizes understanding and agrees to plan    Lab visit scheduled    Education provided:     Please call back if any changes to your diet, medications or how you've been taking warfarin    Plan made per [facility] anticoagulation protocol    Kristine Castro RN  Anticoagulation Clinic  2/20/2023    _______________________________________________________________________     Anticoagulation Episode Summary     Current INR goal:  2.0-2.5   TTR:  50.9 % (1 y)   Target end date:  Indefinite   Send INR reminders to:  [facility]    Indications    Persistent atrial fibrillation (H) [I48.19]  Long term current use of anticoagulants with INR goal of 2.0-3.0 [Z79.01]           Comments:           Anticoagulation Care Providers     Provider Role Specialty Phone number    Kushal Valentin MD Referring Family Medicine [phone]    Leonides Aggarwal PA-C Referring Family Medicine [phone]

## 2023-03-07 ENCOUNTER — HOSPITAL ENCOUNTER (OUTPATIENT)
Dept: CARDIOLOGY | Facility: CLINIC | Age: 88
Discharge: HOME | End: 2023-03-07
Attending: INTERNAL MEDICINE | Admitting: INTERNAL MEDICINE
Payer: COMMERCIAL

## 2023-03-07 DIAGNOSIS — Z95.0 CARDIAC PACEMAKER IN SITU: ICD-10-CM

## 2023-03-07 DIAGNOSIS — I48.0 PAROXYSMAL ATRIAL FIBRILLATION (H): ICD-10-CM

## 2023-03-07 LAB — LVEF ECHO: NORMAL

## 2023-03-07 PROCEDURE — 93306 TTE W/DOPPLER COMPLETE: CPT | Mod: 26 | Performed by: INTERNAL MEDICINE

## 2023-03-07 PROCEDURE — 93306 TTE W/DOPPLER COMPLETE: CPT

## 2023-03-09 ENCOUNTER — ANCILLARY PROCEDURE (OUTPATIENT)
Dept: CARDIOLOGY | Facility: CLINIC | Age: 88
End: 2023-03-09
Attending: INTERNAL MEDICINE
Payer: COMMERCIAL

## 2023-03-09 DIAGNOSIS — Z95.0 CARDIAC PACEMAKER IN SITU: ICD-10-CM

## 2023-03-09 DIAGNOSIS — I49.5 SICK SINUS SYNDROME (H): ICD-10-CM

## 2023-03-11 NOTE — PROGRESS NOTES
"HISTORY OF PRESENT ILLNESS:    This is a 87 year old male who follows with Dr Leon at [facility]  His past medical history includes:  Atrial fibrillation, sick sinus syndrome s/p PPM, hypertension, dilated ascending aorta, hyperlipidemia, prostate enlargement with urinary obstruction    Mr Gage received a dual-chamber PPM for bradydysrhythmia, sick sinus syndrome with sinus pause (2011)      Paroxysmal atrial fibrillation was noted on device interrogations in 2014 and has been on chronic warfarin.    ECHO (2017)  LVEF 55-60%, mild MR, mild-moderate TR, RVSP 28 mmHg, and mild ascending aorta dilatation.    ECHO (1/2020) showed LVEF 60%, normal RV function, 1+ TR, RVSP 39 mmHg, ascending aorta 4.0 cm.    Device interrogation (10/2022) showed 21% A-paced  4% V-paced  Underlying rhythm Sinus bradycardia.  There were 172 VT episodes with EGMs showing 1-6 seconds \"noise\" on the V lead  Isometric exercises did not induce noise  His device is now being interrogated frequently as it is approaching MAGDY.  Phone pacer interrogation (3/9/23) showed normal pacemaker function.    Our visit today is for annual review    Mr Gage is active without any significant limitations  He is able to do all his household chores and snow removal without problems  He denies chest pain, significant shortness of breath, palpitations, orthopnea, or peripheral edema.  He states that he was started on the Metoprolol 6 months ago due to ongoing hypertension.  He checks his blood pressure frequently at home and states that his SBP is staying > 140 mmHg on a regular basis.        VITAL SIGNS:  BP:  138/62  Pulse:  68  Weight: 178 lbs  (BMI: 27)      IMPRESSION AND PLAN    S/p PPM (2011)  -21% A-paced, 4% V-paced  -some noise noted in ventricular lead  -nearing MAGDY with need for generator change soon  -continue device cares    Paroxysmal Atrial Fibrillation  -denies any significant palpitaitons  -on chronic Warfarin  (CHADS2 Vasc " score:3)    Hypertension:  -on Losartan 100 mg, Metoprolol XR 25 mg  -BP elevated at home  (goal SBP < 130 mmHg)  -will increase Metoprolol XR to 50 mg/day  -return in 1 month with BP check with RN  -he will call with any intolerance    Hyperlipidemia:  -on Atorvastatin 20 mg   -    Mild Ascending Aorta Dilatation    Mild-moderate pulmonary hypertension  -denies sleep apnea    The total time for the visit today was 30 minutes which includes patient visit, reviewing of records, discussion, and placing of orders of the outpatient coordination of cardiovascular care as described.  The level of medical decision making during this visit was of moderate complexity.  Thank you for allowing me to participate in their care.    Orders Placed This Encounter   Procedures     Follow-Up with Cardiology Nurse       Orders Placed This Encounter   Medications     metoprolol succinate ER (TOPROL XL) 50 MG 24 hr tablet     Sig: Take 1 tablet (50 mg) by mouth daily     Dispense:  90 tablet     Refill:  1       Medications Discontinued During This Encounter   Medication Reason     metoprolol succinate ER (TOPROL XL) 25 MG 24 hr tablet      diclofenac (VOLTAREN) 1 % topical gel      ciclopirox (PENLAC) 8 % external solution      triamcinolone (KENALOG) 0.5 % external ointment      prednisolon-gatiflox-bromfenac, pt own, no charge, 1-0.5-0.075 % opthalmic solution          Encounter Diagnoses   Name Primary?     Paroxysmal atrial fibrillation (H)      Cardiac pacemaker in situ      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      Benign essential hypertension Yes       CURRENT MEDICATIONS:  Current Outpatient Medications   Medication Sig Dispense Refill     atorvastatin (LIPITOR) 20 MG tablet Take 1 tablet (20 mg) by mouth daily 90 tablet 3     cetirizine (ZYRTEC) 10 MG tablet Take 1 tablet (10 mg) by mouth daily 30 tablet 0     doxazosin (CARDURA) 4 MG tablet TAKE 1 TABLET(4 MG) BY MOUTH DAILY 90 tablet 1      losartan (COZAAR) 100 MG tablet Take 1 tablet (100 mg) by mouth daily 90 tablet 3     metoprolol succinate ER (TOPROL XL) 50 MG 24 hr tablet Take 1 tablet (50 mg) by mouth daily 90 tablet 1     pantoprazole (PROTONIX) 40 MG EC tablet TAKE 1 TABLET(40 MG) BY MOUTH DAILY 90 tablet 2     Polyethylene Glycol 400 (BLINK TEARS OP) Apply to eye as needed       warfarin ANTICOAGULANT (COUMADIN) 2.5 MG tablet Take 1.25 to 2.5mg (1/2 to 1 tabs) by mouth daily OR AS DIRECTED.  Adjust dose based on INR. 90 tablet 1       ALLERGIES     Allergies   Allergen Reactions     Neomycin Sulfate        PAST MEDICAL HISTORY:  Past Medical History:   Diagnosis Date     Actinic keratosis      Acute idiopathic gout of left knee 4/27/2016     Atrial fibrillation (H)     on Eliquis     Dilated aortic root (H)      Esophageal reflux      Esophagitis      H/O: GI bleed 2010     Hyperlipidaemia      Hyperlipidemia LDL goal <100      Impotence of organic origin      Presbycusis, unspecified laterality 4/27/2016     Pulmonary hypertension (H)      Sick sinus syndrome (H)     pacemaker implanted 2011     Unspecified essential hypertension        PAST SURGICAL HISTORY:  Past Surgical History:   Procedure Laterality Date     IMPLANT PACEMAKER  2011    Pacemaker/cardiac insitu / [employer] Dual chamber, V45     PHACOEMULSIFICATION CLEAR CORNEA WITH STANDARD INTRAOCULAR LENS IMPLANT Right 5/29/2018    Procedure: PHACOEMULSIFICATION CLEAR CORNEA WITH STANDARD INTRAOCULAR LENS IMPLANT;  RIGHT EYE PHACOEMULSIFICATION CLEAR CORNEA WITH STANDARD INTRAOCULAR LENS IMPLANT ;  Surgeon: Mat Echevarria MD;  Location: [facility]       FAMILY HISTORY:  Family History   Problem Relation Age of Onset     Alzheimer Disease Brother      Cerebrovascular Disease Father 80     Family History Negative Sister      Family History Negative Son      Family History Negative Daughter      Family History Negative Sister      Family History Negative Daughter      Breast  "Cancer No family hx of      Prostate Cancer No family hx of        SOCIAL HISTORY:  Social History     Socioeconomic History     Marital status:      Spouse name: None     Number of children: None     Years of education: None     Highest education level: None   Tobacco Use     Smoking status: Former     Types: Cigarettes     Quit date: 1975     Years since quittin.2     Smokeless tobacco: Never   Substance and Sexual Activity     Alcohol use: Yes     Comment: 1- 2 BEERS WEEKLY     Drug use: No     Sexual activity: Not Currently     Partners: Female   Other Topics Concern     Parent/sibling w/ CABG, MI or angioplasty before 65F 55M? No     Caffeine Concern No     Comment: 1 cup coffee per day     Sleep Concern No     Stress Concern No     Weight Concern No     Special Diet Yes     Comment: on warfarin      Exercise Yes     Comment: states he is very active, dancing, skiing, walking      Bike Helmet Yes     Seat Belt Yes       Review of Systems:  Skin:          Eyes:         ENT:         Respiratory:  Positive for dyspnea on exertion     Cardiovascular:  Negative      Gastroenterology:        Genitourinary:         Musculoskeletal:         Neurologic:         Psychiatric:         Heme/Lymph/Imm:         Endocrine:           Physical Exam:  Vitals: /62 (BP Location: Right arm, Patient Position: Sitting, Cuff Size: Adult Regular)   Pulse 68   Ht 1.715 m (5' 7.5\")   Wt 80.7 kg (178 lb)   BMI 27.47 kg/m      Constitutional:  in no acute distress        Skin:  warm and dry to the touch   pacemaker incision in the left infraclavicular area was well-healed      Head:  normocephalic        Eyes:           Lymph:      ENT:  no pallor or cyanosis        Neck:  no carotid bruit        Respiratory:  clear to auscultation;normal respiratory excursion         Cardiac: regular rhythm;normal S1 and S2   S4   systolic murmur;LUSB;grade 1        pulses full and equal                                        GI:  " abdomen soft        Extremities and Muscular Skeletal:  no edema              Neurological:  no gross motor deficits        Psych:  Alert and Oriented x 3;affect appropriate, oriented to time, person and place          CC  Daniel Leon MD  [facility]  [address] [place] [address]

## 2023-03-13 DIAGNOSIS — N40.1 BENIGN LOCALIZED HYPERPLASIA OF PROSTATE WITH URINARY OBSTRUCTION: ICD-10-CM

## 2023-03-13 DIAGNOSIS — N13.8 BENIGN LOCALIZED HYPERPLASIA OF PROSTATE WITH URINARY OBSTRUCTION: ICD-10-CM

## 2023-03-14 RX ORDER — METOPROLOL SUCCINATE 25 MG/1
TABLET, EXTENDED RELEASE ORAL
Qty: 90 TABLET | Refills: 1 | Status: SHIPPED | OUTPATIENT
Start: 2023-03-14 | End: 2023-03-15

## 2023-03-14 RX ORDER — DOXAZOSIN 4 MG/1
TABLET ORAL
Qty: 90 TABLET | Refills: 1 | Status: SHIPPED | OUTPATIENT
Start: 2023-03-14 | End: 2023-04-04

## 2023-03-14 NOTE — TELEPHONE ENCOUNTER
Routing refill request to provider for review/approval because:  Patient does not have a blood pressure below 140/90 on file in the past 12 months.     BP Readings from Last 3 Encounters:   09/12/22 (!) 164/80   03/14/22 132/80   11/18/21 (!) 176/76     Ileana MIDDLETON RN   [facility]

## 2023-03-15 ENCOUNTER — OFFICE VISIT (OUTPATIENT)
Dept: CARDIOLOGY | Facility: CLINIC | Age: 88
End: 2023-03-15
Attending: INTERNAL MEDICINE
Payer: COMMERCIAL

## 2023-03-15 VITALS
DIASTOLIC BLOOD PRESSURE: 62 MMHG | BODY MASS INDEX: 26.98 KG/M2 | WEIGHT: 178 LBS | HEIGHT: 68 IN | HEART RATE: 68 BPM | SYSTOLIC BLOOD PRESSURE: 138 MMHG

## 2023-03-15 DIAGNOSIS — N13.8 BENIGN LOCALIZED HYPERPLASIA OF PROSTATE WITH URINARY OBSTRUCTION: ICD-10-CM

## 2023-03-15 DIAGNOSIS — Z95.0 CARDIAC PACEMAKER IN SITU: ICD-10-CM

## 2023-03-15 DIAGNOSIS — N40.1 BENIGN LOCALIZED HYPERPLASIA OF PROSTATE WITH URINARY OBSTRUCTION: ICD-10-CM

## 2023-03-15 DIAGNOSIS — I10 BENIGN ESSENTIAL HYPERTENSION: Primary | ICD-10-CM

## 2023-03-15 DIAGNOSIS — I48.0 PAROXYSMAL ATRIAL FIBRILLATION (H): ICD-10-CM

## 2023-03-15 PROCEDURE — 99214 OFFICE O/P EST MOD 30 MIN: CPT | Performed by: NURSE PRACTITIONER

## 2023-03-15 RX ORDER — METOPROLOL SUCCINATE 50 MG/1
50 TABLET, EXTENDED RELEASE ORAL DAILY
Qty: 90 TABLET | Refills: 1 | Status: SHIPPED | OUTPATIENT
Start: 2023-03-15 | End: 2023-09-11

## 2023-03-15 NOTE — PATIENT INSTRUCTIONS
Increase your Metoprolol XR to 50 mg/day  (new pill)  Return in 1 month with nurse for blood pressure check    It was a pleasure seeing you today     Please do not hesitate to call my nurse team with any questions or concerns:  [phone]    Scheduling number:  [phone]    TYRESE Jaimes, CNP

## 2023-03-15 NOTE — LETTER
"3/15/2023    Leonides Aggarwal PA-C  [address] [facility] [address]    RE: Nathen Colonkoffi       Dear Colleague,     I had the pleasure of seeing Nathen Gage in the [facility].  HISTORY OF PRESENT ILLNESS:    This is a 87 year old male who follows with Dr Leon at [facility]  His past medical history includes:  Atrial fibrillation, sick sinus syndrome s/p PPM, hypertension, dilated ascending aorta, hyperlipidemia, prostate enlargement with urinary obstruction    Mr Gage received a dual-chamber PPM for bradydysrhythmia, sick sinus syndrome with sinus pause (2011)      Paroxysmal atrial fibrillation was noted on device interrogations in 2014 and has been on chronic warfarin.    ECHO (2017)  LVEF 55-60%, mild MR, mild-moderate TR, RVSP 28 mmHg, and mild ascending aorta dilatation.    ECHO (1/2020) showed LVEF 60%, normal RV function, 1+ TR, RVSP 39 mmHg, ascending aorta 4.0 cm.    Device interrogation (10/2022) showed 21% A-paced  4% V-paced  Underlying rhythm Sinus bradycardia.  There were 172 VT episodes with EGMs showing 1-6 seconds \"noise\" on the V lead  Isometric exercises did not induce noise  His device is now being interrogated frequently as it is approaching MAGDY.  Phone pacer interrogation (3/9/23) showed normal pacemaker function.    Our visit today is for annual review    Mr Gage is active without any significant limitations  He is able to do all his household chores and snow removal without problems  He denies chest pain, significant shortness of breath, palpitations, orthopnea, or peripheral edema.  He states that he was started on the Metoprolol 6 months ago due to ongoing hypertension.  He checks his blood pressure frequently at home and states that his SBP is staying > 140 mmHg on a regular basis.        VITAL SIGNS:  BP:  138/62  Pulse:  68  Weight: 178 lbs  (BMI: 27)      IMPRESSION AND PLAN    S/p PPM (2011)  -21% A-paced, 4% V-paced  -some noise " noted in ventricular lead  -nearing MAGDY with need for generator change soon  -continue device cares    Paroxysmal Atrial Fibrillation  -denies any significant palpitaitons  -on chronic Warfarin  (CHADS2 Vasc score:3)    Hypertension:  -on Losartan 100 mg, Metoprolol XR 25 mg  -BP elevated at home  (goal SBP < 130 mmHg)  -will increase Metoprolol XR to 50 mg/day  -return in 1 month with BP check with RN  -he will call with any intolerance    Hyperlipidemia:  -on Atorvastatin 20 mg   -    Mild Ascending Aorta Dilatation    Mild-moderate pulmonary hypertension  -denies sleep apnea    The total time for the visit today was 30 minutes which includes patient visit, reviewing of records, discussion, and placing of orders of the outpatient coordination of cardiovascular care as described.  The level of medical decision making during this visit was of moderate complexity.  Thank you for allowing me to participate in their care.    Orders Placed This Encounter   Procedures     Follow-Up with Cardiology Nurse       Orders Placed This Encounter   Medications     metoprolol succinate ER (TOPROL XL) 50 MG 24 hr tablet     Sig: Take 1 tablet (50 mg) by mouth daily     Dispense:  90 tablet     Refill:  1       Medications Discontinued During This Encounter   Medication Reason     metoprolol succinate ER (TOPROL XL) 25 MG 24 hr tablet      diclofenac (VOLTAREN) 1 % topical gel      ciclopirox (PENLAC) 8 % external solution      triamcinolone (KENALOG) 0.5 % external ointment      prednisolon-gatiflox-bromfenac, pt own, no charge, 1-0.5-0.075 % opthalmic solution          Encounter Diagnoses   Name Primary?     Paroxysmal atrial fibrillation (H)      Cardiac pacemaker in situ      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      Benign essential hypertension Yes       CURRENT MEDICATIONS:  Current Outpatient Medications   Medication Sig Dispense Refill     atorvastatin (LIPITOR) 20 MG tablet Take 1 tablet  (20 mg) by mouth daily 90 tablet 3     cetirizine (ZYRTEC) 10 MG tablet Take 1 tablet (10 mg) by mouth daily 30 tablet 0     doxazosin (CARDURA) 4 MG tablet TAKE 1 TABLET(4 MG) BY MOUTH DAILY 90 tablet 1     losartan (COZAAR) 100 MG tablet Take 1 tablet (100 mg) by mouth daily 90 tablet 3     metoprolol succinate ER (TOPROL XL) 50 MG 24 hr tablet Take 1 tablet (50 mg) by mouth daily 90 tablet 1     pantoprazole (PROTONIX) 40 MG EC tablet TAKE 1 TABLET(40 MG) BY MOUTH DAILY 90 tablet 2     Polyethylene Glycol 400 (BLINK TEARS OP) Apply to eye as needed       warfarin ANTICOAGULANT (COUMADIN) 2.5 MG tablet Take 1.25 to 2.5mg (1/2 to 1 tabs) by mouth daily OR AS DIRECTED.  Adjust dose based on INR. 90 tablet 1       ALLERGIES     Allergies   Allergen Reactions     Neomycin Sulfate        PAST MEDICAL HISTORY:  Past Medical History:   Diagnosis Date     Actinic keratosis      Acute idiopathic gout of left knee 4/27/2016     Atrial fibrillation (H)     on Eliquis     Dilated aortic root (H)      Esophageal reflux      Esophagitis      H/O: GI bleed 2010     Hyperlipidaemia      Hyperlipidemia LDL goal <100      Impotence of organic origin      Presbycusis, unspecified laterality 4/27/2016     Pulmonary hypertension (H)      Sick sinus syndrome (H)     pacemaker implanted 2011     Unspecified essential hypertension        PAST SURGICAL HISTORY:  Past Surgical History:   Procedure Laterality Date     IMPLANT PACEMAKER  2011    Pacemaker/cardiac insitu / [employer] Dual chamber, V45     PHACOEMULSIFICATION CLEAR CORNEA WITH STANDARD INTRAOCULAR LENS IMPLANT Right 5/29/2018    Procedure: PHACOEMULSIFICATION CLEAR CORNEA WITH STANDARD INTRAOCULAR LENS IMPLANT;  RIGHT EYE PHACOEMULSIFICATION CLEAR CORNEA WITH STANDARD INTRAOCULAR LENS IMPLANT ;  Surgeon: Mat Echevarria MD;  Location: [facility]       FAMILY HISTORY:  Family History   Problem Relation Age of Onset     Alzheimer Disease Brother      Cerebrovascular  "Disease Father 80     Family History Negative Sister      Family History Negative Son      Family History Negative Daughter      Family History Negative Sister      Family History Negative Daughter      Breast Cancer No family hx of      Prostate Cancer No family hx of        SOCIAL HISTORY:  Social History     Socioeconomic History     Marital status:      Spouse name: None     Number of children: None     Years of education: None     Highest education level: None   Tobacco Use     Smoking status: Former     Types: Cigarettes     Quit date: 1975     Years since quittin.2     Smokeless tobacco: Never   Substance and Sexual Activity     Alcohol use: Yes     Comment: 1- 2 BEERS WEEKLY     Drug use: No     Sexual activity: Not Currently     Partners: Female   Other Topics Concern     Parent/sibling w/ CABG, MI or angioplasty before 65F 55M? No     Caffeine Concern No     Comment: 1 cup coffee per day     Sleep Concern No     Stress Concern No     Weight Concern No     Special Diet Yes     Comment: on warfarin      Exercise Yes     Comment: states he is very active, dancing, skiing, walking      Bike Helmet Yes     Seat Belt Yes       Review of Systems:  Skin:          Eyes:         ENT:         Respiratory:  Positive for dyspnea on exertion     Cardiovascular:  Negative      Gastroenterology:        Genitourinary:         Musculoskeletal:         Neurologic:         Psychiatric:         Heme/Lymph/Imm:         Endocrine:           Physical Exam:  Vitals: /62 (BP Location: Right arm, Patient Position: Sitting, Cuff Size: Adult Regular)   Pulse 68   Ht 1.715 m (5' 7.5\")   Wt 80.7 kg (178 lb)   BMI 27.47 kg/m      Constitutional:  in no acute distress        Skin:  warm and dry to the touch   pacemaker incision in the left infraclavicular area was well-healed      Head:  normocephalic        Eyes:           Lymph:      ENT:  no pallor or cyanosis        Neck:  no carotid bruit        Respiratory: "  clear to auscultation;normal respiratory excursion         Cardiac: regular rhythm;normal S1 and S2   S4   systolic murmur;LUSB;grade 1        pulses full and equal                                        GI:  abdomen soft        Extremities and Muscular Skeletal:  no edema              Neurological:  no gross motor deficits        Psych:  Alert and Oriented x 3;affect appropriate, oriented to time, person and place          CC  Daniel Leon MD  [facility]  [address] [place] [address]    Thank you for allowing me to participate in the care of your patient.      Sincerely,     TYRESE [facility]

## 2023-03-20 ENCOUNTER — LAB (OUTPATIENT)
Dept: LAB | Facility: CLINIC | Age: 88
End: 2023-03-20
Payer: COMMERCIAL

## 2023-03-20 ENCOUNTER — ANTICOAGULATION THERAPY VISIT (OUTPATIENT)
Dept: ANTICOAGULATION | Facility: CLINIC | Age: 88
End: 2023-03-20

## 2023-03-20 DIAGNOSIS — Z79.01 LONG TERM CURRENT USE OF ANTICOAGULANTS WITH INR GOAL OF 2.0-3.0: ICD-10-CM

## 2023-03-20 DIAGNOSIS — I48.19 PERSISTENT ATRIAL FIBRILLATION (H): ICD-10-CM

## 2023-03-20 DIAGNOSIS — I48.19 PERSISTENT ATRIAL FIBRILLATION (H): Primary | ICD-10-CM

## 2023-03-20 LAB — INR BLD: 1.8 (ref 0.9–1.1)

## 2023-03-20 PROCEDURE — 36416 COLLJ CAPILLARY BLOOD SPEC: CPT

## 2023-03-20 PROCEDURE — 85610 PROTHROMBIN TIME: CPT

## 2023-03-20 NOTE — RESULT ENCOUNTER NOTE
Results and recommendations routed Via My Chart with call back information if needed.   Stable ECHO  LVEF 55%, mild-mod tricuspid regurgitation, pulmonic valvular regurgitation, normal ascending aorta size  Pls let him know  Thanks, JS

## 2023-03-20 NOTE — PROGRESS NOTES
ANTICOAGULATION MANAGEMENT     Nathen Gage 87 year old male is on warfarin with subtherapeutic INR result. (Goal INR 2.0-2.5)    Recent labs: (last 7 days)     03/20/23  0915   INR 1.8*       ASSESSMENT       Source(s): Chart Review    Previous INR was Therapeutic last 2(+) visits    Medication, diet, health changes since last INR     3/15 cardiology office visit- will need generator changed soon  Metoprolol increased            PLAN     Unable to reach Neal today / wife Nichol informed me that Neal's memory is not as sharp lately.    Left  to call [phone] with transfer to [facility] OR [facility]    Follow up required to confirm warfarin dose taken and assess for changes    Fidelina Pizano, RN  Anticoagulation Clinic  3/20/2023

## 2023-03-20 NOTE — PROGRESS NOTES
ANTICOAGULATION MANAGEMENT     Nathen Gage 87 year old male is on warfarin with subtherapeutic INR result. (Goal INR 2.0-2.5)    Recent labs: (last 7 days)     03/20/23  0915   INR 1.8*       ASSESSMENT       Source(s): Chart Review and Patient/Caregiver Call       Warfarin doses taken: Warfarin taken as instructed    Diet: Increased greens/vitamin K in diet; plans to resume previous intake    New illness, injury, or hospitalization: No    Medication/supplement changes: see note below regarding Metoprolol     Signs or symptoms of bleeding or clotting: No    Previous INR: Therapeutic last 2(+) visits    Additional findings: see note below regarding cardiology visit         PLAN     Recommended plan for temporary change(s) affecting INR     Dosing Instructions: booster dose then continue your current warfarin dose with next INR in 2 weeks       Summary  As of 3/20/2023    Full warfarin instructions:  3/20: 2.5 mg; Otherwise 1.25 mg every Mon, Wed, Fri; 2.5 mg all other days   Next INR check:  4/4/2023             Telephone call with Neal who verbalizes understanding and agrees to plan    Check at provider office visit    Education provided:     Dietary considerations: importance of consistent vitamin K intake, impact of vitamin K foods on INR and vitamin K content of foods    Plan made per [facility] anticoagulation protocol    Fidelina Pizano, RN  Anticoagulation Clinic  3/20/2023    _______________________________________________________________________     Anticoagulation Episode Summary     Current INR goal:  2.0-2.5   TTR:  54.8 % (1 y)   Target end date:  Indefinite   Send INR reminders to:  [facility]    Indications    Persistent atrial fibrillation (H) [I48.19]  Long term current use of anticoagulants with INR goal of 2.0-3.0 [Z79.01]           Comments:           Anticoagulation Care Providers     Provider Role Specialty Phone number    Kushal Valentin MD Referring Family Medicine [phone]     Leonides Aggarwal PA-C [facility], [phone]

## 2023-04-04 ENCOUNTER — OFFICE VISIT (OUTPATIENT)
Dept: FAMILY MEDICINE | Facility: CLINIC | Age: 88
End: 2023-04-04
Payer: COMMERCIAL

## 2023-04-04 ENCOUNTER — ANTICOAGULATION THERAPY VISIT (OUTPATIENT)
Dept: ANTICOAGULATION | Facility: CLINIC | Age: 88
End: 2023-04-04

## 2023-04-04 VITALS
OXYGEN SATURATION: 99 % | RESPIRATION RATE: 14 BRPM | SYSTOLIC BLOOD PRESSURE: 150 MMHG | DIASTOLIC BLOOD PRESSURE: 70 MMHG | TEMPERATURE: 97.9 F | HEART RATE: 61 BPM

## 2023-04-04 DIAGNOSIS — I27.20 PULMONARY HYPERTENSION (H): ICD-10-CM

## 2023-04-04 DIAGNOSIS — N13.8 BENIGN LOCALIZED HYPERPLASIA OF PROSTATE WITH URINARY OBSTRUCTION: ICD-10-CM

## 2023-04-04 DIAGNOSIS — E78.00 PURE HYPERCHOLESTEROLEMIA: ICD-10-CM

## 2023-04-04 DIAGNOSIS — E78.5 HYPERLIPIDEMIA LDL GOAL <100: ICD-10-CM

## 2023-04-04 DIAGNOSIS — I10 ESSENTIAL HYPERTENSION, BENIGN: Primary | ICD-10-CM

## 2023-04-04 DIAGNOSIS — I48.19 PERSISTENT ATRIAL FIBRILLATION (H): ICD-10-CM

## 2023-04-04 DIAGNOSIS — R41.89 COGNITIVE CHANGE: ICD-10-CM

## 2023-04-04 DIAGNOSIS — Z79.01 LONG TERM CURRENT USE OF ANTICOAGULANTS WITH INR GOAL OF 2.0-3.0: ICD-10-CM

## 2023-04-04 DIAGNOSIS — N40.1 BENIGN LOCALIZED HYPERPLASIA OF PROSTATE WITH URINARY OBSTRUCTION: ICD-10-CM

## 2023-04-04 DIAGNOSIS — G25.3 INVOLUNTARY MUSCLE JERKS WHILE SLEEPING: ICD-10-CM

## 2023-04-04 DIAGNOSIS — I48.19 PERSISTENT ATRIAL FIBRILLATION (H): Primary | ICD-10-CM

## 2023-04-04 LAB — INR BLD: 2.1 (ref 0.9–1.1)

## 2023-04-04 PROCEDURE — 85610 PROTHROMBIN TIME: CPT | Performed by: PHYSICIAN ASSISTANT

## 2023-04-04 PROCEDURE — 99214 OFFICE O/P EST MOD 30 MIN: CPT | Performed by: PHYSICIAN ASSISTANT

## 2023-04-04 PROCEDURE — 36416 COLLJ CAPILLARY BLOOD SPEC: CPT | Performed by: PHYSICIAN ASSISTANT

## 2023-04-04 RX ORDER — DOXAZOSIN 8 MG/1
8 TABLET ORAL AT BEDTIME
Qty: 90 TABLET | Refills: 1 | Status: SHIPPED | OUTPATIENT
Start: 2023-04-04 | End: 2023-09-12

## 2023-04-04 RX ORDER — ROSUVASTATIN CALCIUM 5 MG/1
5 TABLET, COATED ORAL DAILY
Qty: 90 TABLET | Refills: 3 | Status: SHIPPED | OUTPATIENT
Start: 2023-04-04 | End: 2024-03-21

## 2023-04-04 NOTE — PROGRESS NOTES
Assessment & Plan     Pulmonary hypertension (H)  Followed by cardiology. bp elevated today and at home. Was borderline at cardiologist office as well. Will increase doxazosin. Monitoring for orthostatic hypotension side effects. If not occurring and improved on home check in 2 weeks, maintain. Otherwise, if needed, consider change of losartan to valsartan 320 mg .     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 Patient is in complete understanding and agreement with plan.     Essential hypertension, benign  For memory, consider possible side effect of lipitor. Also, ldl was elevated at last check. Will change to crestor and recheck lipids in ~1 month. Also have see neurology for consult regardless of if improvement in memory seen or not with change.   - rosuvastatin (CRESTOR) 5 MG tablet; Take 1 tablet (5 mg) by mouth daily    Cognitive change  As above   - Adult Neurology  Referral; Future    Involuntary muscle jerks while sleeping  Reported. Reassured and monitoring. Does not sound as rls.    Pure hypercholesterolemia  As above   - rosuvastatin (CRESTOR) 5 MG tablet; Take 1 tablet (5 mg) by mouth daily    Hyperlipidemia LDL goal <100  As above   - Lipid panel reflex to direct LDL Fasting; Future    Persistent atrial fibrillation (H)    - INR point of care        Leonides Aggarwal PA-C  [facility] De Jesus is a 87 year old, presenting for the following health issues:  Recheck Medication        4/4/2023    10:26 AM   Additional Questions   Roomed by Genie Cortes CMA   Accompanied by wife     HPI     Hyperlipidemia Follow-Up      Are you regularly taking any medication or supplement to lower your cholesterol?   Yes- atorvastatin    Are you having muscle aches or other side effects that you think  could be caused by your cholesterol lowering medication?  No    Hypertension Follow-up      Do you check your blood pressure regularly outside of the clinic? Yes     Are you following a low salt diet? Yes    Are your blood pressures ever more than 140 on the top number (systolic) OR more   than 90 on the bottom number (diastolic), for example 140/90? Yes    Other concerns:     Memory: states for months has noticed worsening memory. Forgetting why went into room and where keys were. Also forgetting dance steps he has known for years at dance class. Wife and patient has noted. No known family history of dementia.     States still urinating 1-3 times nightly. No change since last visit. Has limited caffeine and alcohol in evening hours. Past history of bph. No worsening symptoms.       Was seen by cardiology ~2 weeks ago and stable. No change to regimen made.     Wife states for years intermittent arm jerking when sleeping. No change. Patient not aware of this.     Review of Systems   Constitutional, HEENT, cardiovascular, pulmonary, GI, , musculoskeletal, neuro, skin, endocrine and psych systems are negative, except as otherwise noted.      Objective    BP (!) 150/70   Pulse 61   Temp 97.9  F (36.6  C) (Oral)   Resp 14   SpO2 99%   There is no height or weight on file to calculate BMI.  Physical Exam   GENERAL: healthy, alert and no distress  RESP: lungs clear to auscultation - no rales, rhonchi or wheezes  CV: regular paced rates and rhythm  PSYCH: mentation appears normal, affect normal/bright    Six Item Cognitive Impairment Test   (6CIT):      What year is it?                               Correct - 0 points    What month is it?                               Correct - 0 points      Give the patient an address to remember with five components:   [place]fredo [place]far[place] ( first and last name - 2 components)   [address]  (number and name of street - 2 components)   [place] ( city - 1 component)      About what time  is it (within the hour)? Correct - 0 points    Count backwards from 20 to 1:   Correct - 0 points    Say the months of the year in reverse: Correct - 0 points    Repeat the address phrase:   2 errors - 4 points    Total 6CIT Score:      4/28    Interpretation: The 6CIT uses an inverse score and questions are weighted to produce a total out of 28. Scores of 0-7 are considered normal and 8 or more significant.    Advantages The test has high sensitivity without compromising specificity even in mild dementia. It is easy to translate linguistically and culturally.  Disadvantages The main disadvantage is in the scoring and weighting of the test, which is initially confusing, however computer models have simplified this greatly.    Probability Statistics: At the 7/8 cut off: Overall figures sensitivity 90% specificity 100%, in mild dementia sensitivity = 78% , specificity = 100%    Copyright 2000 [facility], [facility], . Courtesy of Dr. Demarco Jimenez

## 2023-04-04 NOTE — PROGRESS NOTES
ANTICOAGULATION MANAGEMENT     Nathen Gage 87 year old male is on warfarin with therapeutic INR result. (Goal INR 2.0-2.5)    Recent labs: (last 7 days)     04/04/23  1120   INR 2.1*       ASSESSMENT       Source(s): Chart Review and Patient/Caregiver Call       Warfarin doses taken: Warfarin taken as instructed    Diet: No new diet changes identified    New illness, injury, or hospitalization: No    Medication/supplement changes: Doxazosin dosage increased, no interaction per Uptodate. Also added crestor to medication list today - monitor for increased effects per Uptodate though analysis does not present strong case study support.    Signs or symptoms of bleeding or clotting: No    Previous INR: Subtherapeutic    Additional findings: Per office visit note today, Neal is still experiencing some memory issues          PLAN     Recommended plan for no diet, medication or health factor changes affecting INR     Dosing Instructions: Continue your current warfarin dose with next INR in 3 weeks       Summary  As of 4/4/2023    Full warfarin instructions:  1.25 mg every Mon, Wed, Fri; 2.5 mg all other days   Next INR check:  4/25/2023             Telephone call with Neal who verbalizes understanding and agrees to plan    Lab visit scheduled    Education provided:     Please call back if any changes to your diet, medications or how you've been taking warfarin    Plan made per [facility] anticoagulation protocol    Kristine Castro RN  Anticoagulation Clinic  4/4/2023    _______________________________________________________________________     Anticoagulation Episode Summary     Current INR goal:  2.0-2.5   TTR:  52.6 % (1 y)   Target end date:  Indefinite   Send INR reminders to:  [facility]    Indications    Persistent atrial fibrillation (H) [I48.19]  Long term current use of anticoagulants with INR goal of 2.0-3.0 [Z79.01]           Comments:           Anticoagulation Care Providers     Provider Role  Specialty Phone number    Kushal Valentin MD Referring Family Medicine [phone]    Leonides Aggarwal PA-C Referring Family Medicine [phone]

## 2023-04-13 ENCOUNTER — ALLIED HEALTH/NURSE VISIT (OUTPATIENT)
Dept: CARDIOLOGY | Facility: CLINIC | Age: 88
End: 2023-04-13
Attending: NURSE PRACTITIONER
Payer: COMMERCIAL

## 2023-04-13 ENCOUNTER — DOCUMENTATION ONLY (OUTPATIENT)
Dept: CARDIOLOGY | Facility: CLINIC | Age: 88
End: 2023-04-13

## 2023-04-13 ENCOUNTER — ANCILLARY PROCEDURE (OUTPATIENT)
Dept: CARDIOLOGY | Facility: CLINIC | Age: 88
End: 2023-04-13
Payer: COMMERCIAL

## 2023-04-13 VITALS — HEART RATE: 64 BPM | SYSTOLIC BLOOD PRESSURE: 132 MMHG | DIASTOLIC BLOOD PRESSURE: 72 MMHG

## 2023-04-13 DIAGNOSIS — I49.5 SICK SINUS SYNDROME (H): ICD-10-CM

## 2023-04-13 DIAGNOSIS — Z95.0 CARDIAC PACEMAKER IN SITU: ICD-10-CM

## 2023-04-13 DIAGNOSIS — I10 BENIGN ESSENTIAL HYPERTENSION: ICD-10-CM

## 2023-04-13 PROCEDURE — 99207 PR NO CHARGE LOS: CPT

## 2023-04-13 NOTE — PROGRESS NOTES
ALLIED HEALTH BLOOD PRESSURE CHECK     Last office visit: 3/15/23    Previous blood pressure: 138/62 mm Hg  Previous heart rate: 68 bpm      Time of visit: 9:50    Morning medications were taken at: 6:15 am     Today's blood pressure: 132/72 mm Hg  Today's heart rate: 64 bpm       Additional Comments: Pt is unsure of what dosage he is taking for his medications. He thought his primary decreased his metoprolol back down to 25 mg but there are no notes within the chart stating this occurred. Pt also said his losartan was switched out for a different medication. I gave him Meghna's phone number to call to verify medications and dosages.       Results routed to: Meghna Jaimes      Ordering Provider: Josefa Mcfarland  In clinic Provider: Dr. Olson   Spoke to Gilbert regarding Loni washing her hair.  She may remove the collar to wash her hair.  Collar should be put back on after showering.  Gilbert has no additional questions.

## 2023-04-13 NOTE — PROGRESS NOTES
ALLIED HEALTH BLOOD PRESSURE CHECK     Last office visit: 3/15/23    Previous blood pressure: 138/62 mm Hg  Previous heart rate: 68 bpm      Time of visit: 9:50    Morning medications were taken at: 6:15 am     Today's blood pressure: 132/72 mm Hg  Today's heart rate: 64 bpm       Additional Comments: Pt is unsure of what dosage he is taking for his medications. He thought his primary decreased his metoprolol back down to 25 mg but there are no notes within the chart stating this occurred. Pt also said his losartan was switched out for a different medication. I gave him Meghna's phone number to call to verify medications and dosages.       Results routed to: Meghna Jaimes      Ordering Provider: Josefa Mcfarland  In clinic Provider: Dr. Olson

## 2023-04-13 NOTE — NURSING NOTE
Patient had nurse BP check today and was not clear of his medication changes.  Called patient and reviewed all his medications.  He was taking both rosuvastatin and atorvastatin and stopped his losartan .  Reviewed all medications with patient and wife.    Rosuvastatin 5mg   Doxazosin 7mg   Toprol XL 50mg   Pantoprazole 40mg  Losartan 100mg daily (stopped after he saw Alex Aggarwal on 4-4-23.  Will resume today.  Warfarin as directed      Will forward to Josefa and PCP for update.

## 2023-04-17 NOTE — PROGRESS NOTES
See Jasen note above.  Keep Metoprolol at 50 mg/day  He increased his Cardura  Let him know  Thanks, jS

## 2023-04-21 NOTE — PROGRESS NOTES
Left detailed message for patient to continue on Cardura 8mg (PCP) and 50mg of Toprol call back in any questions or concerns. [phone].

## 2023-04-25 ENCOUNTER — ANTICOAGULATION THERAPY VISIT (OUTPATIENT)
Dept: ANTICOAGULATION | Facility: CLINIC | Age: 88
End: 2023-04-25

## 2023-04-25 ENCOUNTER — LAB (OUTPATIENT)
Dept: LAB | Facility: CLINIC | Age: 88
End: 2023-04-25
Payer: COMMERCIAL

## 2023-04-25 DIAGNOSIS — Z79.01 LONG TERM CURRENT USE OF ANTICOAGULANTS WITH INR GOAL OF 2.0-3.0: ICD-10-CM

## 2023-04-25 DIAGNOSIS — I48.19 PERSISTENT ATRIAL FIBRILLATION (H): ICD-10-CM

## 2023-04-25 DIAGNOSIS — I48.0 PAROXYSMAL ATRIAL FIBRILLATION (H): ICD-10-CM

## 2023-04-25 DIAGNOSIS — I48.19 PERSISTENT ATRIAL FIBRILLATION (H): Primary | ICD-10-CM

## 2023-04-25 LAB — INR BLD: 2.2 (ref 0.9–1.1)

## 2023-04-25 PROCEDURE — 85610 PROTHROMBIN TIME: CPT

## 2023-04-25 PROCEDURE — 36416 COLLJ CAPILLARY BLOOD SPEC: CPT

## 2023-04-25 RX ORDER — WARFARIN SODIUM 2.5 MG/1
TABLET ORAL
Qty: 90 TABLET | Refills: 1 | Status: SHIPPED | OUTPATIENT
Start: 2023-04-25 | End: 2023-09-19

## 2023-04-25 NOTE — TELEPHONE ENCOUNTER
ANTICOAGULATION MANAGEMENT:  Medication Refill    Anticoagulation Summary  As of 4/4/2023    Warfarin maintenance plan:  1.25 mg (2.5 mg x 0.5) every Mon, Wed, Fri; 2.5 mg (2.5 mg x 1) all other days   Next INR check:  4/25/2023   Target end date:  Indefinite    Indications    Persistent atrial fibrillation (H) [I48.19]  Long term current use of anticoagulants with INR goal of 2.0-3.0 [Z79.01]             Anticoagulation Care Providers     Provider Role Specialty Phone number    Kushal Valentin MD Referring Family Medicine [phone]    Leonides Aggarwal PA-C Referring Family Medicine [phone]          Visit with referring provider/group within last year: Yes    [facility] referral signed within last year: Yes    Nathen meets all criteria for refill (current [facility] referral, office visit with referring provider/group in last year, lab monitoring up to date or not exceeding 2 weeks overdue). Rx instructions and quantity match patient's current dosing plan. Warfarin 90 day supply with 1 refill granted per [facility] protocol     Fidelina Pizano RN  Anticoagulation Clinic

## 2023-04-25 NOTE — PROGRESS NOTES
ANTICOAGULATION MANAGEMENT     Nathen Gage 87 year old male is on warfarin with therapeutic INR result. (Goal INR 2.0-2.5)    Recent labs: (last 7 days)     04/25/23  1033   INR 2.2*       ASSESSMENT       Source(s): Chart Review and Patient/Caregiver Call       Warfarin doses taken: Warfarin taken as instructed    Diet: No new diet changes identified    Medication/supplement changes: None noted    New illness, injury, or hospitalization: No    Signs or symptoms of bleeding or clotting: No    Previous result: Therapeutic last visit; previously outside of goal range    Additional findings: None         PLAN     Recommended plan for no diet, medication or health factor changes affecting INR     Dosing Instructions: Continue your current warfarin dose with next INR in 4 weeks       Summary  As of 4/25/2023    Full warfarin instructions:  1.25 mg every Mon, Wed, Fri; 2.5 mg all other days   Next INR check:  5/23/2023             Telephone call with Neal who verbalizes understanding and agrees to plan    Lab visit scheduled    Education provided:     Please call back if any changes to your diet, medications or how you've been taking warfarin    Plan made per [facility] anticoagulation protocol    Kristine Castro RN  Anticoagulation Clinic  4/25/2023    _______________________________________________________________________     Anticoagulation Episode Summary     Current INR goal:  2.0-2.5   TTR:  52.6 % (1 y)   Target end date:  Indefinite   Send INR reminders to:  [facility]    Indications    Persistent atrial fibrillation (H) [I48.19]  Long term current use of anticoagulants with INR goal of 2.0-3.0 [Z79.01]           Comments:           Anticoagulation Care Providers     Provider Role Specialty Phone number    Kushal Valentin MD Referring Family Medicine [phone]    Leonides Aggarwal PA-C Referring Family Medicine [phone]

## 2023-05-05 ENCOUNTER — DOCUMENTATION ONLY (OUTPATIENT)
Dept: LAB | Facility: CLINIC | Age: 88
End: 2023-05-05
Payer: COMMERCIAL

## 2023-05-05 DIAGNOSIS — E78.5 HYPERLIPIDEMIA LDL GOAL <100: Primary | ICD-10-CM

## 2023-05-05 NOTE — PROGRESS NOTES
See below. I am assuming this is for his lipids. In chart, this states it is needing to be collected. Does this need to be reordered?    -wendy biggs, pac

## 2023-05-05 NOTE — PROGRESS NOTES
Pt has a lab only coming up and currently no labs. Please review and place future orders.   Questions or concerns, please have your care team contact pt directly.    Thank You  Maylin SCHREIBER

## 2023-05-11 ENCOUNTER — TRANSFERRED RECORDS (OUTPATIENT)
Dept: HEALTH INFORMATION MANAGEMENT | Facility: CLINIC | Age: 88
End: 2023-05-11
Payer: COMMERCIAL

## 2023-05-15 ENCOUNTER — ANTICOAGULATION THERAPY VISIT (OUTPATIENT)
Dept: ANTICOAGULATION | Facility: CLINIC | Age: 88
End: 2023-05-15

## 2023-05-15 ENCOUNTER — LAB (OUTPATIENT)
Dept: LAB | Facility: CLINIC | Age: 88
End: 2023-05-15
Payer: COMMERCIAL

## 2023-05-15 DIAGNOSIS — I48.19 PERSISTENT ATRIAL FIBRILLATION (H): Primary | ICD-10-CM

## 2023-05-15 DIAGNOSIS — E78.5 HYPERLIPIDEMIA LDL GOAL <100: ICD-10-CM

## 2023-05-15 DIAGNOSIS — I48.19 PERSISTENT ATRIAL FIBRILLATION (H): ICD-10-CM

## 2023-05-15 DIAGNOSIS — Z79.01 LONG TERM CURRENT USE OF ANTICOAGULANTS WITH INR GOAL OF 2.0-3.0: ICD-10-CM

## 2023-05-15 LAB
CHOLEST SERPL-MCNC: 157 MG/DL
HDLC SERPL-MCNC: 54 MG/DL
INR BLD: 1.8 (ref 0.9–1.1)
LDLC SERPL CALC-MCNC: 90 MG/DL
NONHDLC SERPL-MCNC: 103 MG/DL
TRIGL SERPL-MCNC: 66 MG/DL

## 2023-05-15 PROCEDURE — 36415 COLL VENOUS BLD VENIPUNCTURE: CPT

## 2023-05-15 PROCEDURE — 80061 LIPID PANEL: CPT

## 2023-05-15 PROCEDURE — 85610 PROTHROMBIN TIME: CPT

## 2023-05-15 NOTE — PROGRESS NOTES
ANTICOAGULATION MANAGEMENT     Nathen Gage 87 year old male is on warfarin with subtherapeutic INR result. (Goal INR 2.0-2.5)    Recent labs: (last 7 days)     05/15/23  0858   INR 1.8*       ASSESSMENT       Source(s): Chart Review and Patient/Caregiver Call       Warfarin doses taken: Warfarin taken as instructed    Diet: No new diet changes identified    Medication/supplement changes: None noted    New illness, injury, or hospitalization: No    Signs or symptoms of bleeding or clotting: Yes: patient reports 1 bloody nose on 5/12/23, he states it did not last long.    Previous result: Therapeutic last 2(+) visits    Additional findings: None         PLAN     Recommended plan for no diet, medication or health factor changes affecting INR     Dosing Instructions: Continue your current warfarin dose with next INR in 2 weeks       Summary  As of 5/15/2023    Full warfarin instructions:  1.25 mg every Mon, Wed, Fri; 2.5 mg all other days   Next INR check:  5/30/2023             Telephone call with Neal who verbalizes understanding and agrees to plan    Lab visit scheduled    Education provided:     None required    Plan made per [facility] anticoagulation protocol    Fidelina Pizano RN  Anticoagulation Clinic  5/15/2023    _______________________________________________________________________     Anticoagulation Episode Summary     Current INR goal:  2.0-2.5   TTR:  49.8 % (1 y)   Target end date:  Indefinite   Send INR reminders to:  [facility]    Indications    Persistent atrial fibrillation (H) [I48.19]  Long term current use of anticoagulants with INR goal of 2.0-3.0 [Z79.01]           Comments:           Anticoagulation Care Providers     Provider Role Specialty Phone number    Kushal Valentin MD Referring Family Medicine [phone]    Leonides Aggarwal PA-C Referring Family Medicine [phone]

## 2023-05-23 ENCOUNTER — TELEPHONE (OUTPATIENT)
Dept: FAMILY MEDICINE | Facility: CLINIC | Age: 88
End: 2023-05-23

## 2023-05-23 ENCOUNTER — ANTICOAGULATION THERAPY VISIT (OUTPATIENT)
Dept: ANTICOAGULATION | Facility: CLINIC | Age: 88
End: 2023-05-23

## 2023-05-23 ENCOUNTER — LAB (OUTPATIENT)
Dept: LAB | Facility: CLINIC | Age: 88
End: 2023-05-23
Payer: COMMERCIAL

## 2023-05-23 DIAGNOSIS — I48.19 PERSISTENT ATRIAL FIBRILLATION (H): ICD-10-CM

## 2023-05-23 DIAGNOSIS — I48.19 PERSISTENT ATRIAL FIBRILLATION (H): Primary | ICD-10-CM

## 2023-05-23 DIAGNOSIS — Z79.01 LONG TERM CURRENT USE OF ANTICOAGULANTS WITH INR GOAL OF 2.0-3.0: ICD-10-CM

## 2023-05-23 LAB — INR BLD: 1.9 (ref 0.9–1.1)

## 2023-05-23 PROCEDURE — 85610 PROTHROMBIN TIME: CPT

## 2023-05-23 PROCEDURE — 36416 COLLJ CAPILLARY BLOOD SPEC: CPT

## 2023-05-23 NOTE — PROGRESS NOTES
ANTICOAGULATION MANAGEMENT     Nathen Gage 87 year old male is on warfarin with subtherapeutic INR result. (Goal INR 2.0-2.5)    Recent labs: (last 7 days)     05/23/23  0947   INR 1.9*       ASSESSMENT       Source(s): Chart Review and Patient/Caregiver Call       Warfarin doses taken: Warfarin taken as instructed    Diet: No new diet changes identified - if anything, same amount or slightly less. Has greens about every day and continues low sodium V8    Medication/supplement changes: None noted    New illness, injury, or hospitalization: No    Signs or symptoms of bleeding or clotting: No    Previous result: Subtherapeutic    Additional findings: Neal's INR was scheduled for next Tuesday, but he walked in today. With INR still sub, will increase maintenance dose and have him return 2 weeks from now for a recheck.         PLAN     Recommended plan for no diet, medication or health factor changes affecting INR     Dosing Instructions: Increase your warfarin dose (9.1% change) with next INR in 2 weeks       Summary  As of 5/23/2023    Full warfarin instructions:  1.25 mg every Mon, Fri; 2.5 mg all other days   Next INR check:  6/6/2023             Telephone call with Neal who verbalizes understanding and agrees to plan    Lab visit scheduled    Education provided:     Please call back if any changes to your diet, medications or how you've been taking warfarin    Dietary considerations: impact of vitamin K foods on INR and importance of notifying [facility] to changes in diet    Plan made per [facility] anticoagulation protocol    Kristine Castro RN  Anticoagulation Clinic  5/23/2023    _______________________________________________________________________     Anticoagulation Episode Summary     Current INR goal:  2.0-2.5   TTR:  47.7 % (1 y)   Target end date:  Indefinite   Send INR reminders to:  [facility]    Indications    Persistent atrial fibrillation (H) [I48.19]  Long term current use of  anticoagulants with INR goal of 2.0-3.0 [Z79.01]           Comments:           Anticoagulation Care Providers     Provider Role Specialty Phone number    Kushal Valentin MD Referring Family Medicine [phone]    Leonides Aggarwal PA-C Referring Family Medicine [phone]

## 2023-05-25 DIAGNOSIS — I15.9 SECONDARY HYPERTENSION: ICD-10-CM

## 2023-05-25 RX ORDER — LOSARTAN POTASSIUM 100 MG/1
100 TABLET ORAL DAILY
Qty: 90 TABLET | Refills: 3 | Status: SHIPPED | OUTPATIENT
Start: 2023-05-25 | End: 2024-06-18

## 2023-06-02 ENCOUNTER — TELEPHONE (OUTPATIENT)
Dept: FAMILY MEDICINE | Facility: CLINIC | Age: 88
End: 2023-06-02
Payer: COMMERCIAL

## 2023-06-02 NOTE — TELEPHONE ENCOUNTER
General Call    Contacts       Type Contact Phone/Fax    06/02/2023 04:07 PM CDT Phone (Incoming) IsaiahNeal rain JANICE (Self) [phone] (H)        Reason for Call:  Patient called and wanted to update INR Nurse that you are running low on Warfarin/per patient he picked up a 90 day supply on 4/25/23 and is down to just 5 pills. Patient has looked all over for medication and can not find. Please contact patient for next for next route of care    What are your questions or concerns:  Return call for next route of care    Date of last appointment with provider: 5/23/23    Could we send this information to you in [employer] or would you prefer to receive a phone call?:   Patient would prefer a phone call   Okay to leave a detailed message?: Yes at Home number on file [phone] (home)

## 2023-06-02 NOTE — TELEPHONE ENCOUNTER
Called and spoke with patient and pharmacy.  Pharmacy is able to do a once per year override for lost medication and insurance will cover.  They will fill this for patient now.  Notified patient.

## 2023-06-06 ENCOUNTER — LAB (OUTPATIENT)
Dept: LAB | Facility: CLINIC | Age: 88
End: 2023-06-06
Payer: COMMERCIAL

## 2023-06-06 ENCOUNTER — ANTICOAGULATION THERAPY VISIT (OUTPATIENT)
Dept: ANTICOAGULATION | Facility: CLINIC | Age: 88
End: 2023-06-06

## 2023-06-06 DIAGNOSIS — Z79.01 LONG TERM CURRENT USE OF ANTICOAGULANTS WITH INR GOAL OF 2.0-3.0: ICD-10-CM

## 2023-06-06 DIAGNOSIS — I48.19 PERSISTENT ATRIAL FIBRILLATION (H): Primary | ICD-10-CM

## 2023-06-06 DIAGNOSIS — I48.19 PERSISTENT ATRIAL FIBRILLATION (H): ICD-10-CM

## 2023-06-06 LAB — INR BLD: 2.2 (ref 0.9–1.1)

## 2023-06-06 PROCEDURE — 36416 COLLJ CAPILLARY BLOOD SPEC: CPT

## 2023-06-06 PROCEDURE — 85610 PROTHROMBIN TIME: CPT

## 2023-06-06 NOTE — PROGRESS NOTES
ANTICOAGULATION MANAGEMENT     Nathen Gage 87 year old male is on warfarin with therapeutic INR result. (Goal INR 2.0-2.5)    Recent labs: (last 7 days)     06/06/23  1004   INR 2.2*       ASSESSMENT       Source(s): Chart Review and Patient/Caregiver Call       Warfarin doses taken: Warfarin taken as instructed    Diet: No new diet changes identified    Medication/supplement changes: None noted    New illness, injury, or hospitalization: No    Signs or symptoms of bleeding or clotting: No    Previous result: Subtherapeutic    Additional findings: None         PLAN     Recommended plan for no diet, medication or health factor changes affecting INR     Dosing Instructions: Continue your current warfarin dose with next INR in 3 weeks       Summary  As of 6/6/2023    Full warfarin instructions:  1.25 mg every Mon, Fri; 2.5 mg all other days   Next INR check:  6/27/2023             Telephone call with Neal who verbalizes understanding and agrees to plan    Lab visit scheduled    Education provided:     Please call back if any changes to your diet, medications or how you've been taking warfarin    Contact [phone]  with any changes, questions or concerns.     Plan made per [facility] anticoagulation protocol    Teresa Castro RN  Anticoagulation Clinic  6/6/2023    _______________________________________________________________________     Anticoagulation Episode Summary     Current INR goal:  2.0-2.5   TTR:  49.7 % (1 y)   Target end date:  Indefinite   Send INR reminders to:  [facility]    Indications    Persistent atrial fibrillation (H) [I48.19]  Long term current use of anticoagulants with INR goal of 2.0-3.0 [Z79.01]           Comments:           Anticoagulation Care Providers     Provider Role Specialty Phone number    Kushal Valentin MD Referring Family Medicine [phone]    Leonides Aggarwal PA-C Referring Family Medicine [phone]

## 2023-06-08 ENCOUNTER — DOCUMENTATION ONLY (OUTPATIENT)
Dept: CARDIOLOGY | Facility: CLINIC | Age: 88
End: 2023-06-08

## 2023-06-08 ENCOUNTER — ANCILLARY PROCEDURE (OUTPATIENT)
Dept: CARDIOLOGY | Facility: CLINIC | Age: 88
End: 2023-06-08
Payer: COMMERCIAL

## 2023-06-08 DIAGNOSIS — Z45.010 PACEMAKER AT END OF BATTERY LIFE: ICD-10-CM

## 2023-06-08 DIAGNOSIS — I49.5 SICK SINUS SYNDROME (H): ICD-10-CM

## 2023-06-08 DIAGNOSIS — Z95.0 CARDIAC PACEMAKER IN SITU: ICD-10-CM

## 2023-06-08 DIAGNOSIS — I49.5 SICK SINUS SYNDROME (H): Primary | ICD-10-CM

## 2023-06-08 DIAGNOSIS — T82.110A PACEMAKER LEAD MALFUNCTION: ICD-10-CM

## 2023-06-08 PROCEDURE — 93293 PM PHONE R-STRIP DEVICE EVAL: CPT | Performed by: INTERNAL MEDICINE

## 2023-06-08 NOTE — PROGRESS NOTES
"Heart Care Device Change-Out Checklist (MAGDY Checklist)    Device Data  Pacemaker and Dual    :  [employer]  Model:  S606 Pipe  Serial Number:  [id number]  Implant location: Left Chest    Implant Date: 10/19/2011  MAGDY Date:  Sometime between 4/13/23 and 6/8/23  Device Diagnosis:  SSS    Device Alert(s):  No      Lead Data   Last Interrogation Date: 10/12/22    RV lead threshold varies, as high as 4V@1ms at times    Old Leads Present/Abandoned: No    Lead Alert(s):  No    Lead Issues/Concerns: yes, RV lead w/ elevated threshold and noise (not reproducible with isometrics) as of 2/9/22. Dr Pires made aware - \"monitor\" as PPM placed for SSS and  <5% (historical underlying SB/R 50-80ss)    Diagnostic Information  Intrinsic Rhythm:  SB 50s (per 10/12/22)    Atrial Fibrillation: history of Paroxysmal Atrial Fibrillation but no episodes of AF have been logged since prior to 2015 (only brief PAT episodes)  Takes Anticoagulant or LAAO? Yes,  Warfarin  CHADS-score: 3 (age, HTN)    Pacing Percentages  Atrial Pacing 21% and Ventricle Pacing 4%  Pacemaker Dependent? No    History of VT/VF therapy    ATP: No  Appropriate Shocks:  N/A    Ejection Fraction  Last EF Date:  3/7/23    By Echocardiogram  Last EF Measurement:  55-60%      Special Instructions/Timeframe for change-out:  Unsure of when exactly MAGDY was triggered. Gen change should be scheduled prior to 7/13/23 to ensure battery does not reach EOL    Routed to Viviane to assist with scheduling of H&P and gen change.    Device RN: ARTIS Irizarry     "

## 2023-06-13 ENCOUNTER — OFFICE VISIT (OUTPATIENT)
Dept: FAMILY MEDICINE | Facility: CLINIC | Age: 88
End: 2023-06-13
Payer: COMMERCIAL

## 2023-06-13 VITALS
SYSTOLIC BLOOD PRESSURE: 122 MMHG | OXYGEN SATURATION: 97 % | BODY MASS INDEX: 26.68 KG/M2 | DIASTOLIC BLOOD PRESSURE: 68 MMHG | TEMPERATURE: 98.1 F | WEIGHT: 170 LBS | HEIGHT: 67 IN | HEART RATE: 69 BPM | RESPIRATION RATE: 16 BRPM

## 2023-06-13 DIAGNOSIS — H60.332 ACUTE SWIMMER'S EAR OF LEFT SIDE: Primary | ICD-10-CM

## 2023-06-13 PROCEDURE — 99213 OFFICE O/P EST LOW 20 MIN: CPT | Performed by: PHYSICIAN ASSISTANT

## 2023-06-13 RX ORDER — CIPROFLOXACIN AND DEXAMETHASONE 3; 1 MG/ML; MG/ML
4 SUSPENSION/ DROPS AURICULAR (OTIC) 2 TIMES DAILY
Qty: 2.8 ML | Refills: 0 | Status: SHIPPED | OUTPATIENT
Start: 2023-06-13 | End: 2023-06-20

## 2023-06-13 NOTE — PROGRESS NOTES
"  Assessment & Plan     Acute swimmer's ear of left side  Patient has allergy to neomycin so CiproDex used instead. He has a clear otitis externa on exam. Follow up if not improving.  - ciprofloxacin-dexamethasone (CIPRODEX) 0.3-0.1 % otic suspension; Place 4 drops Into the left ear 2 times daily for 7 days    Prescription drug management      Shannon Khan PA-C  [facility] De Jesus is a 87 year old, presenting for the following health issues:  Ear Problem        6/13/2023     7:45 AM   Additional Questions   Roomed by Carolina LAINEZ CMA     History of Present Illness       Reason for visit:  Left ear problem  Symptom onset:  1-2 weeks ago  Symptoms include:  Plugged - hard to get hearing aid in  Symptom intensity:  Moderate  Symptom progression:  Staying the same    He eats 2-3 servings of fruits and vegetables daily.He consumes 0 sweetened beverage(s) daily.He exercises with enough effort to increase his heart rate 10 to 19 minutes per day.  He exercises with enough effort to increase his heart rate 5 days per week.   He is taking medications regularly.     Patient reports the left ear feels more swollen. No significant pain.    Review of Systems   GENERAL:  No fevers         Objective    /68   Pulse 69   Temp 98.1  F (36.7  C) (Oral)   Resp 16   Ht 1.702 m (5' 7\")   Wt 77.1 kg (170 lb)   SpO2 97%   BMI 26.63 kg/m    Body mass index is 26.63 kg/m .  Physical Exam   GENERAL: No acute distress  HEENT: Normocephalic, PERRL, Right canal patent, right TM non-erythematous and non-bulging. Left canal swollen and erythematous with white/yellow discharge. Unable to see left TM.  NEURO: Alert and non-focal              "

## 2023-06-26 NOTE — PROGRESS NOTES
HPI:  Nathen Gage is a 85 year old male who presents to clinic today for annual cardiology appointment.   He is a patient of Dr. Leon and has a medical history of     1. Paroxysmal atrial fibrillation  2. Sick sinus syndrome s/p [employer] dual-chamber permanent pacemaker (2011)  3. Dilated ascending aorta  4. Hypertension    Diagnostics:  Device check (1/2021) revealed A-paced 25%  : 0 %    Stable leads.  Battery life 4 years.   Atrial Arrhythmia: 2 episodes in the last year, EGMs show brief PAT <15 seconds   Ventricular Arrhythmia none.        In January 2020, patient met with Dr. Leon and was doing well and was very active with no CV symptoms    Today he reports feeling good and is activity.   Denies chest pain or pressure, headaches, dizziness, syncope, angina, dyspnea at rest or with exertion, dry cough, palpitations, orthopnea, PND, abdominal pain, abdominal edema, pedal edema, or claudication.  He states he takes his medications as prescribed including his warfarin and denies bleeding, hematuria, hematochezia, epistaxis and signs/symptoms of stroke.         ASSESSMENT AND PLAN    Paroxysmal atrial fibrillation    His device check today was reviewed and did reveal any atrial fibrillation and he is not currently on any AV node blockers.  Continues to have low A. fib burden on his device check    For anticoagulation for CHADS VASC 3 (hypertension, age++) he is currently taking warfarin with goal INR 2-3 mg.  Patient has been therapeutic 60% of the time since 10/2020.  His last hemoglobin was 15.2 (8/2020)    Sick sinus syndrome    S/p [employer] dual-chamber permanent pacemaker (2011)    Normal device check    Stable leads    Follow with the device clinic on a quarterly basis.     Hypertension    Controlled while taking losartan 100 mg daily    BMP in August was normal      Plan:  Continue current medications  Follow-up with Dr. Leon in 1 year with a device check and BMP prior as  normal appearance , without tenderness upon palpation , no deformities , trachea midline , Thyroid normal size , no masses , thyroid nontender we are renewing his losartan.    30 minutes spent on the date of the encounter doing chart review, patient visit and documentation       Patient expresses understanding and agreement with the plan.     I appreciate the chance to help with Nathen Gage Please let me know if you have any questions or concerns.    TYRESE Vazquez, CNP    This note was completed in part using Dragon voice recognition software. Although reviewed after completion, some word and grammatical errors may occur.    Orders Placed This Encounter   Procedures     Basic metabolic panel     Follow-Up with Cardiologist     Orders Placed This Encounter   Medications     Polyethylene Glycol 400 (BLINK TEARS OP)     Sig: Apply to eye as needed     losartan (COZAAR) 100 MG tablet     Sig: Take 1 tablet (100 mg) by mouth daily     Dispense:  90 tablet     Refill:  3     Medications Discontinued During This Encounter   Medication Reason     losartan (COZAAR) 100 MG tablet          Encounter Diagnoses   Name Primary?     PAF (paroxysmal atrial fibrillation) (H)      Secondary hypertension        CURRENT MEDICATIONS:  Current Outpatient Medications   Medication Sig Dispense Refill     atorvastatin (LIPITOR) 20 MG tablet TAKE 1 TABLET BY MOUTH EVERY DAY 90 tablet 2     doxazosin (CARDURA) 4 MG tablet TAKE 1 TABLET(4 MG) BY MOUTH DAILY 90 tablet 1     losartan (COZAAR) 100 MG tablet Take 1 tablet (100 mg) by mouth daily 90 tablet 3     pantoprazole (PROTONIX) 40 MG EC tablet TAKE 1 TABLET(40 MG) BY MOUTH DAILY 90 tablet 3     Polyethylene Glycol 400 (BLINK TEARS OP) Apply to eye as needed       warfarin ANTICOAGULANT (COUMADIN) 2.5 MG tablet TAKE ONE-HALF TABLET BY MOUTH EVERY FRIDAY AND TAKE 1 TABLET ALL OTHER DAYS AS DIRECTED 30 tablet 11     prednisolon-gatiflox-bromfenac, pt own, no charge, 1-0.5-0.075 % opthalmic solution 1 drop by Both Eyelids route 3 times daily          ALLERGIES     Allergies   Allergen Reactions     Neomycin Sulfate   "        Review of Systems:  Skin:  Positive for itching   Eyes:  Positive for glasses  ENT:  Positive for hearing loss  Respiratory:  Negative    Cardiovascular:    Positive for  Gastroenterology: Negative    Genitourinary:  Positive for nocturia  Musculoskeletal:  Positive for arthritis  Neurologic:  Negative    Psychiatric:  Negative    Heme/Lymph/Imm:  Positive for allergies;easy bruising  Endocrine:  Negative      Physical Exam:  Vitals: /78   Pulse 68   Ht 1.715 m (5' 7.5\")   Wt 78.7 kg (173 lb 6.4 oz)   BMI 26.76 kg/m    BMI:   Body mass index is 26.76 kg/m .  Physical Exam   Constitutional: He is oriented to person, place, and time. He appears well-developed and well-nourished.   HENT:   Head: Normocephalic.   Cardiovascular: Normal rate and regular rhythm.   Clean dry and intact pacemaker incision on upper left chest   Pulmonary/Chest: Effort normal and breath sounds normal.   Musculoskeletal: Normal range of motion.   Neurological: He is alert and oriented to person, place, and time.   Skin: Skin is warm and dry.   Psychiatric: He has a normal mood and affect. His behavior is normal. Judgment and thought content normal.       Last Comprehensive Metabolic Panel:  Sodium   Date Value Ref Range Status   08/12/2020 133 133 - 144 mmol/L Final     Potassium   Date Value Ref Range Status   08/12/2020 4.6 3.4 - 5.3 mmol/L Final     Chloride   Date Value Ref Range Status   08/12/2020 101 94 - 109 mmol/L Final     Carbon Dioxide   Date Value Ref Range Status   08/12/2020 25 20 - 32 mmol/L Final     Anion Gap   Date Value Ref Range Status   08/12/2020 7 3 - 14 mmol/L Final     Glucose   Date Value Ref Range Status   08/12/2020 91 70 - 99 mg/dL Final     Urea Nitrogen   Date Value Ref Range Status   08/12/2020 17 7 - 30 mg/dL Final     Creatinine   Date Value Ref Range Status   08/12/2020 1.03 0.66 - 1.25 mg/dL Final     GFR Estimate   Date Value Ref Range Status   08/12/2020 66 >60 mL/min/[1.73_m2] Final     " Comment:     Non  GFR Calc  Starting 12/18/2018, serum creatinine based estimated GFR (eGFR) will be   calculated using the Chronic Kidney Disease Epidemiology Collaboration   (CKD-EPI) equation.       Calcium   Date Value Ref Range Status   08/12/2020 9.5 8.5 - 10.1 mg/dL Final     Lab Results   Component Value Date    WBC 6.7 08/12/2020     Lab Results   Component Value Date    RBC 5.10 08/12/2020     Lab Results   Component Value Date    HGB 15.2 08/12/2020     Lab Results   Component Value Date    HCT 44.2 08/12/2020     No components found for: MCT  Lab Results   Component Value Date    MCV 87 08/12/2020     Lab Results   Component Value Date    MCH 29.8 08/12/2020     Lab Results   Component Value Date    MCHC 34.4 08/12/2020     Lab Results   Component Value Date    RDW 14.4 08/12/2020     Lab Results   Component Value Date     08/12/2020     Recent Labs   Lab Test 08/12/20  1029 11/21/19  1040 05/26/15  0727 05/26/15  0727 12/04/14  0804   CHOL 164 157   < > 133 170   HDL 58 58   < > 54 53   LDL 87 88   < > 66 96   TRIG 94 55   < > 63 105   CHOLHDLRATIO  --   --   --  2.5 3.2    < > = values in this interval not displayed.       CC  Daniel Leon MD  No address on file

## 2023-06-27 ENCOUNTER — ANTICOAGULATION THERAPY VISIT (OUTPATIENT)
Dept: ANTICOAGULATION | Facility: CLINIC | Age: 88
End: 2023-06-27

## 2023-06-27 ENCOUNTER — LAB (OUTPATIENT)
Dept: LAB | Facility: CLINIC | Age: 88
End: 2023-06-27
Payer: COMMERCIAL

## 2023-06-27 ENCOUNTER — TELEPHONE (OUTPATIENT)
Dept: FAMILY MEDICINE | Facility: CLINIC | Age: 88
End: 2023-06-27

## 2023-06-27 DIAGNOSIS — I48.19 PERSISTENT ATRIAL FIBRILLATION (H): ICD-10-CM

## 2023-06-27 DIAGNOSIS — Z79.01 LONG TERM CURRENT USE OF ANTICOAGULANTS WITH INR GOAL OF 2.0-3.0: ICD-10-CM

## 2023-06-27 DIAGNOSIS — I48.19 PERSISTENT ATRIAL FIBRILLATION (H): Primary | ICD-10-CM

## 2023-06-27 LAB — INR BLD: 2.6 (ref 0.9–1.1)

## 2023-06-27 PROCEDURE — 85610 PROTHROMBIN TIME: CPT

## 2023-06-27 PROCEDURE — 36416 COLLJ CAPILLARY BLOOD SPEC: CPT

## 2023-06-27 NOTE — TELEPHONE ENCOUNTER
[facility] called and the patient is there and they need a faxed copy of the INR.    Fax [phone]    Patient is there now.    Nohemi You RN    [facility] Anticoagulation Clinic

## 2023-06-27 NOTE — PROGRESS NOTES
ANTICOAGULATION MANAGEMENT     Nathen Gage 87 year old male is on warfarin with supratherapeutic INR result. (Goal INR 2.0-2.5)    Recent labs: (last 7 days)     06/27/23  1013   INR 2.6*       ASSESSMENT       Source(s): Chart Review and Patient/Caregiver Call       Warfarin doses taken: Warfarin taken as instructed    Diet: Decreased greens/vitamin K in diet; plans to resume previous intake    Medication/supplement changes: None noted    New illness, injury, or hospitalization: Yes: Patient had tooth extracted today.    Signs or symptoms of bleeding or clotting: No, patient reports he just removed the gauze and it did not have any blood on it    Previous result: Therapeutic last visit; previously outside of goal range    Additional findings: Upcoming surgery/procedure Pacemaker generator replacement on 7/11/23. Patient will let INR Clinic know if he needs to hold his warfarin for the procedure         PLAN     Recommended plan for temporary change(s) affecting INR     Dosing Instructions: Continue your current warfarin dose with next INR in 1-2 weeks       Summary  As of 6/27/2023    Full warfarin instructions:  1.25 mg every Mon, Fri; 2.5 mg all other days   Next INR check:  7/11/2023             Telephone call with Neal who verbalizes understanding and agrees to plan    Lab visit scheduled    Education provided:     Please call back if any changes to your diet, medications or how you've been taking warfarin    Contact [phone]  with any changes, questions or concerns.     Plan made per [facility] anticoagulation protocol    Teresa Castro RN  Anticoagulation Clinic  6/27/2023    _______________________________________________________________________     Anticoagulation Episode Summary     Current INR goal:  2.0-2.5   TTR:  52.5 % (1 y)   Target end date:  Indefinite   Send INR reminders to:  [facility]    Indications    Persistent atrial fibrillation (H) [I48.19]  Long term current use of  anticoagulants with INR goal of 2.0-3.0 [Z79.01]           Comments:           Anticoagulation Care Providers     Provider Role Specialty Phone number    Kushal Valentin MD Referring Family Medicine [phone]    Leonides Aggarwal PA-C Referring Family Medicine [phone]

## 2023-06-28 ENCOUNTER — TELEPHONE (OUTPATIENT)
Dept: CARDIOLOGY | Facility: CLINIC | Age: 88
End: 2023-06-28
Payer: COMMERCIAL

## 2023-06-28 ENCOUNTER — TELEPHONE (OUTPATIENT)
Dept: FAMILY MEDICINE | Facility: CLINIC | Age: 88
End: 2023-06-28
Payer: COMMERCIAL

## 2023-06-28 NOTE — TELEPHONE ENCOUNTER
General Call    Contacts       Type Contact Phone/Fax    06/28/2023 09:10 AM CDT Phone (Incoming) Nichol Gage (Emergency Contact) [phone]        Reason for Call:  Patient's spouse Nichol is retuning call for Anticoagulation Nurse to let her know that patient's surgery on 7/7/23 he will be stopping Warefarin/however the cardiologist will be in contact before then/call if any questions    What are your questions or concerns:  Relaying message about upcoming surgery    Date of last appointment with provider: NA    Could we send this information to you in [facility] or would you prefer to receive a phone call?:   Patient would prefer a phone call   Okay to leave a detailed message?: Yes at Home number on file [phone] (home)

## 2023-06-28 NOTE — TELEPHONE ENCOUNTER
I spoke to wife Nichol, cardiology office will call with medication hold instructions prior to the pacemaker generator replacement scheduled on 7/11/22.  I informed Nichol, per cardiology message today, he will likely hold warfarin 2-3 days prior to the procedure but that we can discuss in more detail with INR result on 7/5/23.    Fidelina Pizano RN  Anticoagulation Clinic

## 2023-06-28 NOTE — TELEPHONE ENCOUNTER
Received a VM from patient's wife regarding what meds should be held prior to his upcoming PPM procedure in July.    Called back and spoke with patient and his wife. They are aware that someone will call them three business days prior to his scheduled procedure to review all of the preprocedure instructions, including med holds. Patient takes warfarin for a history of pAF, CHADs of 3... that being said it is likely they will recommend his warfarin be held 2-3 days prior to the procedure. They know to expect a call that will go over instructions clearly and in detail.  ELLA WISDOM

## 2023-06-29 ENCOUNTER — OFFICE VISIT (OUTPATIENT)
Dept: PODIATRY | Facility: CLINIC | Age: 88
End: 2023-06-29
Payer: COMMERCIAL

## 2023-06-29 VITALS — DIASTOLIC BLOOD PRESSURE: 70 MMHG | SYSTOLIC BLOOD PRESSURE: 110 MMHG

## 2023-06-29 DIAGNOSIS — L60.8 NAIL DEFORMITY: ICD-10-CM

## 2023-06-29 DIAGNOSIS — L60.8 CHANGE IN NAIL APPEARANCE: Primary | ICD-10-CM

## 2023-06-29 PROCEDURE — 99213 OFFICE O/P EST LOW 20 MIN: CPT | Performed by: PODIATRIST

## 2023-06-29 NOTE — PROGRESS NOTES
ASSESSMENT:  Encounter Diagnoses   Name Primary?     Change in nail appearance, left third toenail Yes     Nail deformity      MEDICAL DECISION MAKING:  Similar to the last time I treated him, we are dealing with hyperpigmentation involving a nail unit.  I explained that in most cases, this is related to mild bleeding from repetitive trauma or irritation.  This is supported by the thickness of the nail and the length of the second toe.    Options include taking a wait and watch approach.  If with nail growth, there is no proximal clearing he should return to clinic.  If the hyperpigmentation darkens or there are other changes including pain or drainage she should return.    He also has the option of having the nail plate removed for peace of mind.  Removing the nail plate will allow for evaluation of the nailbed and punch biopsy if indicated.    He will consider his options and will likely take a wait and watch approach for now.    Should he opt to have the nail removed, he is asked to make a 30-minute appointment for the procedure.    Disclaimer: This note consists of symbols derived from keyboarding, dictation and/or voice recognition software. As a result, there may be errors in the script that have gone undetected. Please consider this when interpreting information found in this chart.    Raphael William DPM, FACFAS, MS    [place] [facility] of Podiatry/Foot & Ankle Surgery      ____________________________________________________________________    HPI:       Neal presents today concerned about discoloration of his left second toenail.  This is similar to his situation he had in November 2021 involving the third toenail.  There is hyperpigmentation of the nail plate or below the nail.  For definitive diagnosis, he elected to return to clinic for a nail avulsion.  The nail bed appeared healthy and no punch biopsy was indicated.    He does not recollect any recent injury of this toe.  No pain.  No  drainage.  *  Past Medical History:   Diagnosis Date     Actinic keratosis      Acute idiopathic gout of left knee 4/27/2016     Atrial fibrillation (H)     on Eliquis     Dilated aortic root (H)      Esophageal reflux      Esophagitis      H/O: GI bleed 2010     Hyperlipidaemia      Hyperlipidemia LDL goal <100      Impotence of organic origin      Presbycusis, unspecified laterality 4/27/2016     Pulmonary hypertension (H)      Sick sinus syndrome (H)     pacemaker implanted 2011     Unspecified essential hypertension    *  *  Past Surgical History:   Procedure Laterality Date     IMPLANT PACEMAKER  2011    Pacemaker/cardiac insitu / [employer] Dual chamber, V45     PHACOEMULSIFICATION CLEAR CORNEA WITH STANDARD INTRAOCULAR LENS IMPLANT Right 5/29/2018    Procedure: PHACOEMULSIFICATION CLEAR CORNEA WITH STANDARD INTRAOCULAR LENS IMPLANT;  RIGHT EYE PHACOEMULSIFICATION CLEAR CORNEA WITH STANDARD INTRAOCULAR LENS IMPLANT ;  Surgeon: Mat Echevarria MD;  Location: [facility]   *  *  Current Outpatient Medications   Medication Sig Dispense Refill     doxazosin (CARDURA) 8 MG tablet Take 1 tablet (8 mg) by mouth At Bedtime 90 tablet 1     losartan (COZAAR) 100 MG tablet Take 1 tablet (100 mg) by mouth daily 90 tablet 3     metoprolol succinate ER (TOPROL XL) 50 MG 24 hr tablet Take 1 tablet (50 mg) by mouth daily 90 tablet 1     pantoprazole (PROTONIX) 40 MG EC tablet TAKE 1 TABLET(40 MG) BY MOUTH DAILY 90 tablet 2     Polyethylene Glycol 400 (BLINK TEARS OP) Apply to eye as needed       rosuvastatin (CRESTOR) 5 MG tablet Take 1 tablet (5 mg) by mouth daily 90 tablet 3     warfarin ANTICOAGULANT (COUMADIN) 2.5 MG tablet Take 1.25 to 2.5mg (1/2 to 1 tabs) by mouth daily OR AS DIRECTED.  Adjust dose based on INR. 90 tablet 1         EXAM:    Vitals: /70   BMI: There is no height or weight on file to calculate BMI.    Constitutional:  Nathen Gage is in no apparent distress, appears well-nourished.   Cooperative with history and physical exam.    Vascular:  Pedal pulses are palpable for both the DP and PT arteries.  CFT < 3 sec.  No edema.      Neuro: Light touch sensation is intact to the L4, L5, S1 distributions  No evidence of weakness, spasticity, or contracture in the lower extremities.     Derm: Toenails on the left foot are thickened.  The left second toenail is slightly hyperpigmented, dark.  This is generalized.    Musculoskeletal:    Lower extremity muscle strength is normal.  Pes planus.  Hallux limitus on the left.

## 2023-06-29 NOTE — LETTER
6/29/2023         RE: Nathen Gage  [address]   [place] [address]        Dear Colleague,    Thank you for referring your patient, Nathen Gage, to the [facility]. Please see a copy of my visit note below.    ASSESSMENT:  Encounter Diagnoses   Name Primary?     Change in nail appearance, left third toenail Yes     Nail deformity      MEDICAL DECISION MAKING:  Similar to the last time I treated him, we are dealing with hyperpigmentation involving a nail unit.  I explained that in most cases, this is related to mild bleeding from repetitive trauma or irritation.  This is supported by the thickness of the nail and the length of the second toe.    Options include taking a wait and watch approach.  If with nail growth, there is no proximal clearing he should return to clinic.  If the hyperpigmentation darkens or there are other changes including pain or drainage she should return.    He also has the option of having the nail plate removed for peace of mind.  Removing the nail plate will allow for evaluation of the nailbed and punch biopsy if indicated.    He will consider his options and will likely take a wait and watch approach for now.    Should he opt to have the nail removed, he is asked to make a 30-minute appointment for the procedure.    Disclaimer: This note consists of symbols derived from keyboarding, dictation and/or voice recognition software. As a result, there may be errors in the script that have gone undetected. Please consider this when interpreting information found in this chart.    Raphael William DPM, FACFAS, [facility] Department of Podiatry/Foot & Ankle Surgery      ____________________________________________________________________    HPI:       Neal presents today concerned about discoloration of his left second toenail.  This is similar to his situation he had in November 2021 involving the third toenail.  There is hyperpigmentation of  the nail plate or below the nail.  For definitive diagnosis, he elected to return to clinic for a nail avulsion.  The nail bed appeared healthy and no punch biopsy was indicated.    He does not recollect any recent injury of this toe.  No pain.  No drainage.  *  Past Medical History:   Diagnosis Date     Actinic keratosis      Acute idiopathic gout of left knee 4/27/2016     Atrial fibrillation (H)     on Eliquis     Dilated aortic root (H)      Esophageal reflux      Esophagitis      H/O: GI bleed 2010     Hyperlipidaemia      Hyperlipidemia LDL goal <100      Impotence of organic origin      Presbycusis, unspecified laterality 4/27/2016     Pulmonary hypertension (H)      Sick sinus syndrome (H)     pacemaker implanted 2011     Unspecified essential hypertension    *  *  Past Surgical History:   Procedure Laterality Date     IMPLANT PACEMAKER  2011    Pacemaker/cardiac insitu / [employer] Dual chamber, V45     PHACOEMULSIFICATION CLEAR CORNEA WITH STANDARD INTRAOCULAR LENS IMPLANT Right 5/29/2018    Procedure: PHACOEMULSIFICATION CLEAR CORNEA WITH STANDARD INTRAOCULAR LENS IMPLANT;  RIGHT EYE PHACOEMULSIFICATION CLEAR CORNEA WITH STANDARD INTRAOCULAR LENS IMPLANT ;  Surgeon: Mat Echevarria MD;  Location: [facility]   *  *  Current Outpatient Medications   Medication Sig Dispense Refill     doxazosin (CARDURA) 8 MG tablet Take 1 tablet (8 mg) by mouth At Bedtime 90 tablet 1     losartan (COZAAR) 100 MG tablet Take 1 tablet (100 mg) by mouth daily 90 tablet 3     metoprolol succinate ER (TOPROL XL) 50 MG 24 hr tablet Take 1 tablet (50 mg) by mouth daily 90 tablet 1     pantoprazole (PROTONIX) 40 MG EC tablet TAKE 1 TABLET(40 MG) BY MOUTH DAILY 90 tablet 2     Polyethylene Glycol 400 (BLINK TEARS OP) Apply to eye as needed       rosuvastatin (CRESTOR) 5 MG tablet Take 1 tablet (5 mg) by mouth daily 90 tablet 3     warfarin ANTICOAGULANT (COUMADIN) 2.5 MG tablet Take 1.25 to 2.5mg (1/2 to 1 tabs) by mouth  daily OR AS DIRECTED.  Adjust dose based on INR. 90 tablet 1         EXAM:    Vitals: /70   BMI: There is no height or weight on file to calculate BMI.    Constitutional:  Nathen Gage is in no apparent distress, appears well-nourished.  Cooperative with history and physical exam.    Vascular:  Pedal pulses are palpable for both the DP and PT arteries.  CFT < 3 sec.  No edema.      Neuro: Light touch sensation is intact to the L4, L5, S1 distributions  No evidence of weakness, spasticity, or contracture in the lower extremities.     Derm: Toenails on the left foot are thickened.  The left second toenail is slightly hyperpigmented, dark.  This is generalized.    Musculoskeletal:    Lower extremity muscle strength is normal.  Pes planus.  Hallux limitus on the left.          Again, thank you for allowing me to participate in the care of your patient.        Sincerely,        Raphael William DPM

## 2023-07-05 ENCOUNTER — LAB (OUTPATIENT)
Dept: LAB | Facility: CLINIC | Age: 88
End: 2023-07-05
Payer: COMMERCIAL

## 2023-07-05 ENCOUNTER — ANTICOAGULATION THERAPY VISIT (OUTPATIENT)
Dept: ANTICOAGULATION | Facility: CLINIC | Age: 88
End: 2023-07-05

## 2023-07-05 DIAGNOSIS — I48.19 PERSISTENT ATRIAL FIBRILLATION (H): ICD-10-CM

## 2023-07-05 DIAGNOSIS — I49.5 SICK SINUS SYNDROME (H): Primary | ICD-10-CM

## 2023-07-05 DIAGNOSIS — Z79.01 LONG TERM CURRENT USE OF ANTICOAGULANTS WITH INR GOAL OF 2.0-3.0: ICD-10-CM

## 2023-07-05 DIAGNOSIS — I48.19 PERSISTENT ATRIAL FIBRILLATION (H): Primary | ICD-10-CM

## 2023-07-05 DIAGNOSIS — I48.0 PAROXYSMAL ATRIAL FIBRILLATION (H): ICD-10-CM

## 2023-07-05 DIAGNOSIS — Z45.010 PACEMAKER AT END OF BATTERY LIFE: ICD-10-CM

## 2023-07-05 LAB — INR BLD: 2.4 (ref 0.9–1.1)

## 2023-07-05 PROCEDURE — 36416 COLLJ CAPILLARY BLOOD SPEC: CPT

## 2023-07-05 PROCEDURE — 85610 PROTHROMBIN TIME: CPT

## 2023-07-05 RX ORDER — CEFAZOLIN SODIUM 2 G/100ML
2 INJECTION, SOLUTION INTRAVENOUS
Status: CANCELLED | OUTPATIENT
Start: 2023-07-05

## 2023-07-05 RX ORDER — LIDOCAINE 40 MG/G
CREAM TOPICAL
Status: CANCELLED | OUTPATIENT
Start: 2023-07-05

## 2023-07-05 RX ORDER — SODIUM CHLORIDE 450 MG/100ML
INJECTION, SOLUTION INTRAVENOUS CONTINUOUS
Status: CANCELLED | OUTPATIENT
Start: 2023-07-05

## 2023-07-05 NOTE — PROGRESS NOTES
Called to review below instructions with pt: VM left with device RN phone number.   GF RN      Pre-procedure instructions for device generator change: 7/11/2023 8:30am  Arrive by 6:30    Anticoagulation: warfarin: hold 3 days prior to procedure 7/8 , 7/9 , 7/10 , 7/11    Contrast allergy: no    NPO 8 hours prior to arrival time  May have clear liquids 2 hours prior to arrival time    -Shower the morning of the procedure, and then put on a clean shirt in order to help prevent infection.     -Post-procedure transportation and 24 hours monitoring set up. Please call before coming in if plans change.  With limited bed availability due to COVID, overnight hospital stays will be allowed for clinical reasons only.    -No driving for 24 hours post procedure due to sedation.     -INR check scheduled: day of procedure  (order entered into Epic)     -Take temperature the morning of the procedure and call [facility] at [phone] if it is above 100.0.  Also call [facility] if pt has any new cold symptoms evening prior or AM of procedure.     -Review arrival time and location: [facility] Anjelica Wilcox @ 06:30    Pt verbalized understanding of instructions.

## 2023-07-05 NOTE — PROGRESS NOTES
ANTICOAGULATION MANAGEMENT     Nathen Gage 87 year old male is on warfarin with therapeutic INR result. (Goal INR 2.0-2.5)    Recent labs: (last 7 days)     07/05/23  1324   INR 2.4*       ASSESSMENT       Source(s): Chart Review and Patient/Caregiver Call       Warfarin doses taken: Warfarin taken as instructed    Diet: No new diet changes identified    Medication/supplement changes: None noted    New illness, injury, or hospitalization: No    Signs or symptoms of bleeding or clotting: No    Previous result: Supratherapeutic    Additional findings: Upcoming surgery/procedure pacemaker generator replacement.  Per 6/28/23 cardiology note, patient will receive a call about 3 days prior to the procedure to let him know how long to hold his warfarin.         PLAN     Recommended plan for no diet, medication or health factor changes affecting INR     Dosing Instructions: Continue your current warfarin dose with next INR in 2 weeks (about 1 week after procedure)      Summary  As of 7/5/2023    Full warfarin instructions:  1.25 mg every Mon, Fri; 2.5 mg all other days   Next INR check:  7/18/2023             Telephone call with Neal who agrees to plan and repeated back plan correctly    Lab visit scheduled    Education provided:     Please call back if any changes to your diet, medications or how you've been taking warfarin    Plan made per [facility] anticoagulation protocol    Joanne Bustos RN  Anticoagulation Clinic  7/5/2023    _______________________________________________________________________     Anticoagulation Episode Summary     Current INR goal:  2.0-2.5   TTR:  53.0 % (1 y)   Target end date:  Indefinite   Send INR reminders to:  [facility]    Indications    Persistent atrial fibrillation (H) [I48.19]  Long term current use of anticoagulants with INR goal of 2.0-3.0 [Z79.01]           Comments:           Anticoagulation Care Providers     Provider Role Specialty Phone number    Kushal Valentin,  MD Referring Family Medicine [phone]    Leonides Aggarwal PA-C Referring Family Medicine [phone]

## 2023-07-07 NOTE — PROGRESS NOTES
[facility] calendar update with cardiology plan     Anticoagulation: warfarin: hold 3 days prior to procedure 7/8 , 7/9 , 7/10 , 7/11 (day of procedure so hold was not documented on calendar)    Sonam Diaz, RoseD, BCPS

## 2023-07-10 ENCOUNTER — OFFICE VISIT (OUTPATIENT)
Dept: CARDIOLOGY | Facility: CLINIC | Age: 88
End: 2023-07-10
Payer: COMMERCIAL

## 2023-07-10 ENCOUNTER — TELEPHONE (OUTPATIENT)
Dept: MEDSURG UNIT | Facility: CLINIC | Age: 88
End: 2023-07-10

## 2023-07-10 VITALS
BODY MASS INDEX: 27.31 KG/M2 | OXYGEN SATURATION: 99 % | SYSTOLIC BLOOD PRESSURE: 146 MMHG | WEIGHT: 174 LBS | DIASTOLIC BLOOD PRESSURE: 72 MMHG | HEART RATE: 59 BPM | HEIGHT: 67 IN

## 2023-07-10 DIAGNOSIS — I48.0 PAROXYSMAL ATRIAL FIBRILLATION (H): ICD-10-CM

## 2023-07-10 DIAGNOSIS — I49.5 SICK SINUS SYNDROME (H): ICD-10-CM

## 2023-07-10 DIAGNOSIS — Z45.010 PACEMAKER AT END OF BATTERY LIFE: Primary | ICD-10-CM

## 2023-07-10 PROCEDURE — 93000 ELECTROCARDIOGRAM COMPLETE: CPT | Performed by: INTERNAL MEDICINE

## 2023-07-10 PROCEDURE — 2894A EKG 12-LEAD COMPLETE W/READ - CLINICS: CPT | Performed by: INTERNAL MEDICINE

## 2023-07-10 PROCEDURE — 99214 OFFICE O/P EST MOD 30 MIN: CPT | Mod: 57 | Performed by: INTERNAL MEDICINE

## 2023-07-10 NOTE — PROGRESS NOTES
"PHYSICIAN NOTE:  This visit was completed in person at the [facility].      I had the pleasure of seeing . Neal Gage for discussion of pacemaker generator replacement.      The patient is a pleasant 87-year-old male with the following medical issues:  1. Syncope due to bradycardia/asystole, SSS.  Dual-chamber pacemaker implanted in 10/2011 ([employer]).  Elevated RV threshold and occasional \"noise\" in the past year.  2. Paroxysmal atrial fibrillation noted on device interrogations.  [id number] at least 3, on warfarin.  3. Hypertension.  4. Dyslipidemia.  5. Normal LV function, EF 55 to 60%.    Neal's pacemaker reached [facility] in early June.  He is feeling well.  He lives with his wife in [place].  He is modestly physically active.  No chest pain, unusual dyspnea, orthopnea or PND.  He has not had syncope since pacemaker implantation back in 2011.    Neal quit smoking in 1975.  He drinks occ. ETOH.        PHYSICAL EXAMINATION:  Vital signs: 146/72, 59, 78.9 kg, BMI 27.2  General:  in no apparent distress  ENT/Mouth:  no nasal discharge.  Eyes:  normal conjunctivae.   Neck:  no thyromegaly or lymphadenopathy.  Chest/Lungs:  patient is not dyspneic.  Lungs CTA, without rales or wheezing  Cardiovascular: Nicely healed left pectoral incision.  Normal JVP, rhythm is regular.  No gallop, murmur or rub.    Abdomen:  no abdominal distention.    Extremities:  no edema  Skin:  no xanthelasma.    Neurologic:  alert & oriented x 3.  No tremor.    Vascular:  2+ carotids without bruits.  2+ radials.        DIAGNOSTIC STUDIES:  Laboratory studies: Cholesterol 157, LDL 90, HDL 54, triglycerides 66, sodium 133, potassium 4.9, creatinine 1.04  ECG: Sinus rhythm, left axis.  Echocardiogram (03/2023): EF 55-60 %, 1-2+ TR, 1-2+ PI  Device interrogation (last full interrogation, 10/2022). Ap 21%,  4%.      IMPRESSION:  1. Sick sinus syndrome with history of syncope.  Pacemaker at [facility]DY.  2. Paroxysmal atrial " [de-identified] : 2/7/22 sinus rhythm normal fibrillation.  Low burden arrhythmia.  Appropriately on anticoagulation (warfarin).  3. Hypertension.    RECOMMENDATIONS:  1. Replace pacemaker generator.  The patient is not pacemaker dependent.  The procedure, technical aspects, risks/benefits were discussed with the patient in detail.  I quoted at 2% likelihood of significant complication, most importantly infection.  He understands and agrees to proceed.  2. Hold warfarin tonight, he has also held it the previous 2 days.  3. Consider replacement of the RV lead.  There is evidence of lead malfunction, though the pacing threshold remains acceptable and the device paces in the RV minimally.  I informed Neal there is a possibility we will replace this lead, though my initial inclination is to only replace the device.  We will gather more information about RV lead function during the procedure.    It was my pleasure seeing this delightful patient.  Please feel free to call with any questions.     Scott Messer MD, [facility]          Orders Placed This Encounter   Procedures     EKG 12-lead complete w/read - Clinics (performed today)       No orders of the defined types were placed in this encounter.      There are no discontinued medications.      Encounter Diagnosis   Name Primary?     Paroxysmal atrial fibrillation (H) Yes       CURRENT MEDICATIONS:  Current Outpatient Medications   Medication Sig Dispense Refill     doxazosin (CARDURA) 8 MG tablet Take 1 tablet (8 mg) by mouth At Bedtime 90 tablet 1     losartan (COZAAR) 100 MG tablet Take 1 tablet (100 mg) by mouth daily 90 tablet 3     metoprolol succinate ER (TOPROL XL) 50 MG 24 hr tablet Take 1 tablet (50 mg) by mouth daily 90 tablet 1     pantoprazole (PROTONIX) 40 MG EC tablet TAKE 1 TABLET(40 MG) BY MOUTH DAILY 90 tablet 2     Polyethylene Glycol 400 (BLINK TEARS OP) Apply to eye as needed       rosuvastatin (CRESTOR) 5 MG tablet Take 1 tablet (5 mg) by mouth daily 90 tablet 3     warfarin ANTICOAGULANT  (COUMADIN) 2.5 MG tablet Take 1.25 to 2.5mg (1/2 to 1 tabs) by mouth daily OR AS DIRECTED.  Adjust dose based on INR. 90 tablet 1       ALLERGIES     Allergies   Allergen Reactions     Neomycin Sulfate        PAST MEDICAL HISTORY:  Past Medical History:   Diagnosis Date     Actinic keratosis      Acute idiopathic gout of left knee 4/27/2016     Atrial fibrillation (H)     on Eliquis     Dilated aortic root (H)      Esophageal reflux      Esophagitis      H/O: GI bleed 2010     Hyperlipidaemia      Hyperlipidemia LDL goal <100      Impotence of organic origin      Presbycusis, unspecified laterality 4/27/2016     Pulmonary hypertension (H)      Sick sinus syndrome (H)     pacemaker implanted 2011     Unspecified essential hypertension        PAST SURGICAL HISTORY:  Past Surgical History:   Procedure Laterality Date     IMPLANT PACEMAKER  2011    Pacemaker/cardiac insitu / [employer] Dual chamber, V45     PHACOEMULSIFICATION CLEAR CORNEA WITH STANDARD INTRAOCULAR LENS IMPLANT Right 5/29/2018    Procedure: PHACOEMULSIFICATION CLEAR CORNEA WITH STANDARD INTRAOCULAR LENS IMPLANT;  RIGHT EYE PHACOEMULSIFICATION CLEAR CORNEA WITH STANDARD INTRAOCULAR LENS IMPLANT ;  Surgeon: Mat Echevarria MD;  Location: [facility]       FAMILY HISTORY:  Family History   Problem Relation Age of Onset     Alzheimer Disease Brother      Cerebrovascular Disease Father 80     Family History Negative Sister      Family History Negative Son      Family History Negative Daughter      Family History Negative Sister      Family History Negative Daughter      Breast Cancer No family hx of      Prostate Cancer No family hx of        SOCIAL HISTORY:  Social History     Socioeconomic History     Marital status:      Spouse name: None     Number of children: None     Years of education: None     Highest education level: None   Tobacco Use     Smoking status: Former     Types: Cigarettes     Quit date: 1/1/1975     Years since quitting:  "48.5     Smokeless tobacco: Never   Vaping Use     Vaping Use: Never used   Substance and Sexual Activity     Alcohol use: Yes     Comment: 1- 2 BEERS WEEKLY     Drug use: Never     Sexual activity: Not Currently     Partners: Female   Other Topics Concern     Parent/sibling w/ CABG, MI or angioplasty before 65F 55M? No     Caffeine Concern No     Comment: 1 cup coffee per day     Sleep Concern No     Stress Concern No     Weight Concern No     Special Diet Yes     Comment: on warfarin      Exercise Yes     Comment: states he is very active, dancing, skiing, walking      Bike Helmet Yes     Seat Belt Yes       Review of Systems:  Skin:          Eyes:         ENT:         Respiratory:          Cardiovascular:         Gastroenterology:        Genitourinary:         Musculoskeletal:         Neurologic:         Psychiatric:         Heme/Lymph/Imm:         Endocrine:           Physical Exam:  Vitals: BP (!) 146/72 (BP Location: Right arm, Patient Position: Sitting)   Pulse 59   Ht 1.702 m (5' 7\")   Wt 78.9 kg (174 lb)   SpO2 99%   BMI 27.25 kg/m      Constitutional:           Skin:             Head:           Eyes:           Lymph:      ENT:           Neck:           Respiratory:            Cardiac:                                                           GI:           Extremities and Muscular Skeletal:                 Neurological:           Psych:           CC  No referring provider defined for this encounter.              "

## 2023-07-10 NOTE — PROGRESS NOTES
Spoke with patient and confirmed that he is aware of all instructions below. His home landline has been down which is why he did not call back sooner. He has not taken his warfarin since his dose on Friday night.  ELLA WISDOM

## 2023-07-10 NOTE — LETTER
"7/10/2023    Leonides Aggarwal PA-C  [address] [facility] [address]    RE: Nathen Gage       Dear Colleague,     I had the pleasure of seeing Nathen Gage in the [facility].  PHYSICIAN NOTE:  This visit was completed in person at the [facility].      I had the pleasure of seeing Mr. Neal Gage for discussion of pacemaker generator replacement.      The patient is a pleasant 87-year-old male with the following medical issues:  Syncope due to bradycardia/asystole, SSS.  Dual-chamber pacemaker implanted in 10/2011 ([employer]).  Elevated RV threshold and occasional \"noise\" in the past year.  Paroxysmal atrial fibrillation noted on device interrogations.  [id number] at least 3, on warfarin.  Hypertension.  Dyslipidemia.  Normal LV function, EF 55 to 60%.    Neal's pacemaker reached MAGDY in early June.  He is feeling well.  He lives with his wife in [place].  He is modestly physically active.  No chest pain, unusual dyspnea, orthopnea or PND.  He has not had syncope since pacemaker implantation back in 2011.    Neal quit smoking in 1975.  He drinks occ. ETOH.        PHYSICAL EXAMINATION:  Vital signs: 146/72, 59, 78.9 kg, BMI 27.2  General:  in no apparent distress  ENT/Mouth:  no nasal discharge.  Eyes:  normal conjunctivae.   Neck:  no thyromegaly or lymphadenopathy.  Chest/Lungs:  patient is not dyspneic.  Lungs CTA, without rales or wheezing  Cardiovascular: Nicely healed left pectoral incision.  Normal JVP, rhythm is regular.  No gallop, murmur or rub.    Abdomen:  no abdominal distention.    Extremities:  no edema  Skin:  no xanthelasma.    Neurologic:  alert & oriented x 3.  No tremor.    Vascular:  2+ carotids without bruits.  2+ radials.        DIAGNOSTIC STUDIES:  Laboratory studies: Cholesterol 157, LDL 90, HDL 54, triglycerides 66, sodium 133, potassium 4.9, creatinine 1.04  ECG: Sinus rhythm, left axis.  Echocardiogram (03/2023): EF 55-60 %, 1-2+ " TR, 1-2+ PI  Device interrogation (last full interrogation, 10/2022). Ap 21%,  4%.      IMPRESSION:  Sick sinus syndrome with history of syncope.  Pacemaker at MAGDY.  Paroxysmal atrial fibrillation.  Low burden arrhythmia.  Appropriately on anticoagulation (warfarin).  Hypertension.    RECOMMENDATIONS:  Replace pacemaker generator.  The patient is not pacemaker dependent.  The procedure, technical aspects, risks/benefits were discussed with the patient in detail.  I quoted at 2% likelihood of significant complication, most importantly infection.  He understands and agrees to proceed.  Hold warfarin tonight, he has also held it the previous 2 days.  Consider replacement of the RV lead.  There is evidence of lead malfunction, though the pacing threshold remains acceptable and the device paces in the RV minimally.  I informed Neal there is a possibility we will replace this lead, though my initial inclination is to only replace the device.  We will gather more information about RV lead function during the procedure.    It was my pleasure seeing this delightful patient.  Please feel free to call with any questions.     Scott Messer MD, [facility]          Orders Placed This Encounter   Procedures    EKG 12-lead complete w/read - Clinics (performed today)       No orders of the defined types were placed in this encounter.      There are no discontinued medications.      Encounter Diagnosis   Name Primary?    Paroxysmal atrial fibrillation (H) Yes       CURRENT MEDICATIONS:  Current Outpatient Medications   Medication Sig Dispense Refill    doxazosin (CARDURA) 8 MG tablet Take 1 tablet (8 mg) by mouth At Bedtime 90 tablet 1    losartan (COZAAR) 100 MG tablet Take 1 tablet (100 mg) by mouth daily 90 tablet 3    metoprolol succinate ER (TOPROL XL) 50 MG 24 hr tablet Take 1 tablet (50 mg) by mouth daily 90 tablet 1    pantoprazole (PROTONIX) 40 MG EC tablet TAKE 1 TABLET(40 MG) BY MOUTH DAILY 90 tablet 2    Polyethylene  Glycol 400 (BLINK TEARS OP) Apply to eye as needed      rosuvastatin (CRESTOR) 5 MG tablet Take 1 tablet (5 mg) by mouth daily 90 tablet 3    warfarin ANTICOAGULANT (COUMADIN) 2.5 MG tablet Take 1.25 to 2.5mg (1/2 to 1 tabs) by mouth daily OR AS DIRECTED.  Adjust dose based on INR. 90 tablet 1       ALLERGIES     Allergies   Allergen Reactions    Neomycin Sulfate        PAST MEDICAL HISTORY:  Past Medical History:   Diagnosis Date    Actinic keratosis     Acute idiopathic gout of left knee 4/27/2016    Atrial fibrillation (H)     on Eliquis    Dilated aortic root (H)     Esophageal reflux     Esophagitis     H/O: GI bleed 2010    Hyperlipidaemia     Hyperlipidemia LDL goal <100     Impotence of organic origin     Presbycusis, unspecified laterality 4/27/2016    Pulmonary hypertension (H)     Sick sinus syndrome (H)     pacemaker implanted 2011    Unspecified essential hypertension        PAST SURGICAL HISTORY:  Past Surgical History:   Procedure Laterality Date    IMPLANT PACEMAKER  2011    Pacemaker/cardiac insitu / [employer] Dual chamber, V45    PHACOEMULSIFICATION CLEAR CORNEA WITH STANDARD INTRAOCULAR LENS IMPLANT Right 5/29/2018    Procedure: PHACOEMULSIFICATION CLEAR CORNEA WITH STANDARD INTRAOCULAR LENS IMPLANT;  RIGHT EYE PHACOEMULSIFICATION CLEAR CORNEA WITH STANDARD INTRAOCULAR LENS IMPLANT ;  Surgeon: Mat Echevarria MD;  Location: [facility]       FAMILY HISTORY:  Family History   Problem Relation Age of Onset    Alzheimer Disease Brother     Cerebrovascular Disease Father 80    Family History Negative Sister     Family History Negative Son     Family History Negative Daughter     Family History Negative Sister     Family History Negative Daughter     Breast Cancer No family hx of     Prostate Cancer No family hx of        SOCIAL HISTORY:  Social History     Socioeconomic History    Marital status:      Spouse name: None    Number of children: None    Years of education: None    Highest  "education level: None   Tobacco Use    Smoking status: Former     Types: Cigarettes     Quit date: 1975     Years since quittin.5    Smokeless tobacco: Never   Vaping Use    Vaping Use: Never used   Substance and Sexual Activity    Alcohol use: Yes     Comment: 1- 2 BEERS WEEKLY    Drug use: Never    Sexual activity: Not Currently     Partners: Female   Other Topics Concern    Parent/sibling w/ CABG, MI or angioplasty before 65F 55M? No    Caffeine Concern No     Comment: 1 cup coffee per day    Sleep Concern No    Stress Concern No    Weight Concern No    Special Diet Yes     Comment: on warfarin     Exercise Yes     Comment: states he is very active, dancing, skiing, walking     Bike Helmet Yes    Seat Belt Yes       Review of Systems:  Skin:          Eyes:         ENT:         Respiratory:          Cardiovascular:         Gastroenterology:        Genitourinary:         Musculoskeletal:         Neurologic:         Psychiatric:         Heme/Lymph/Imm:         Endocrine:           Physical Exam:  Vitals: BP (!) 146/72 (BP Location: Right arm, Patient Position: Sitting)   Pulse 59   Ht 1.702 m (5' 7\")   Wt 78.9 kg (174 lb)   SpO2 99%   BMI 27.25 kg/m      Constitutional:           Skin:             Head:           Eyes:           Lymph:      ENT:           Neck:           Respiratory:            Cardiac:                                                           GI:           Extremities and Muscular Skeletal:                 Neurological:           Psych:           CC  No referring provider defined for this encounter.    Thank you for allowing me to participate in the care of your patient.      Sincerely,     Scott Messer MD      [facility]  "

## 2023-07-11 ENCOUNTER — DOCUMENTATION ONLY (OUTPATIENT)
Dept: ANTICOAGULATION | Facility: CLINIC | Age: 88
End: 2023-07-11

## 2023-07-11 ENCOUNTER — HOSPITAL ENCOUNTER (OUTPATIENT)
Facility: CLINIC | Age: 88
Discharge: HOME | End: 2023-07-11
Admitting: INTERNAL MEDICINE
Payer: COMMERCIAL

## 2023-07-11 VITALS
HEART RATE: 62 BPM | TEMPERATURE: 97.9 F | BODY MASS INDEX: 26.92 KG/M2 | HEIGHT: 67 IN | DIASTOLIC BLOOD PRESSURE: 81 MMHG | SYSTOLIC BLOOD PRESSURE: 145 MMHG | OXYGEN SATURATION: 99 % | RESPIRATION RATE: 16 BRPM | WEIGHT: 171.5 LBS

## 2023-07-11 DIAGNOSIS — Z45.010 PACEMAKER AT END OF BATTERY LIFE: ICD-10-CM

## 2023-07-11 DIAGNOSIS — T82.110A PACEMAKER LEAD MALFUNCTION: ICD-10-CM

## 2023-07-11 DIAGNOSIS — I49.5 SICK SINUS SYNDROME (H): Primary | ICD-10-CM

## 2023-07-11 DIAGNOSIS — I48.0 PAROXYSMAL ATRIAL FIBRILLATION (H): ICD-10-CM

## 2023-07-11 DIAGNOSIS — Z95.0 CARDIAC PACEMAKER IN SITU: Primary | ICD-10-CM

## 2023-07-11 LAB
ANION GAP SERPL CALCULATED.3IONS-SCNC: 12 MMOL/L (ref 7–15)
BUN SERPL-MCNC: 17 MG/DL (ref 8–23)
CALCIUM SERPL-MCNC: 9.5 MG/DL (ref 8.8–10.2)
CHLORIDE SERPL-SCNC: 99 MMOL/L (ref 98–107)
CREAT SERPL-MCNC: 1.06 MG/DL (ref 0.67–1.17)
DEPRECATED HCO3 PLAS-SCNC: 22 MMOL/L (ref 22–29)
ERYTHROCYTE [DISTWIDTH] IN BLOOD BY AUTOMATED COUNT: 14.2 % (ref 10–15)
GFR SERPL CREATININE-BSD FRML MDRD: 68 ML/MIN/1.73M2
GLUCOSE SERPL-MCNC: 98 MG/DL (ref 70–99)
HCT VFR BLD AUTO: 39 % (ref 40–53)
HGB BLD-MCNC: 13 G/DL (ref 13.3–17.7)
MCH RBC QN AUTO: 29.6 PG (ref 26.5–33)
MCHC RBC AUTO-ENTMCNC: 33.3 G/DL (ref 31.5–36.5)
MCV RBC AUTO: 89 FL (ref 78–100)
PLATELET # BLD AUTO: 202 10E3/UL (ref 150–450)
POTASSIUM SERPL-SCNC: 4.6 MMOL/L (ref 3.4–5.3)
RBC # BLD AUTO: 4.39 10E6/UL (ref 4.4–5.9)
SODIUM SERPL-SCNC: 133 MMOL/L (ref 136–145)
WBC # BLD AUTO: 4.2 10E3/UL (ref 4–11)

## 2023-07-11 PROCEDURE — 80048 BASIC METABOLIC PNL TOTAL CA: CPT | Performed by: INTERNAL MEDICINE

## 2023-07-11 PROCEDURE — 99152 MOD SED SAME PHYS/QHP 5/>YRS: CPT | Performed by: INTERNAL MEDICINE

## 2023-07-11 PROCEDURE — 258N000002 HC RX IP 258 OP 250: Performed by: INTERNAL MEDICINE

## 2023-07-11 PROCEDURE — 33228 REMV&REPLC PM GEN DUAL LEAD: CPT | Performed by: INTERNAL MEDICINE

## 2023-07-11 PROCEDURE — 85027 COMPLETE CBC AUTOMATED: CPT | Performed by: INTERNAL MEDICINE

## 2023-07-11 PROCEDURE — 33213 INSERT PULSE GEN DUAL LEADS: CPT | Performed by: INTERNAL MEDICINE

## 2023-07-11 PROCEDURE — 250N000011 HC RX IP 250 OP 636: Mod: JZ | Performed by: INTERNAL MEDICINE

## 2023-07-11 PROCEDURE — 36591 DRAW BLOOD OFF VENOUS DEVICE: CPT

## 2023-07-11 PROCEDURE — 272N000001 HC OR GENERAL SUPPLY STERILE: Performed by: INTERNAL MEDICINE

## 2023-07-11 PROCEDURE — 96365 THER/PROPH/DIAG IV INF INIT: CPT | Mod: XU

## 2023-07-11 PROCEDURE — 999N000071 HC STATISTIC HEART CATH LAB OR EP LAB

## 2023-07-11 PROCEDURE — C1785 PMKR, DUAL, RATE-RESP: HCPCS | Performed by: INTERNAL MEDICINE

## 2023-07-11 PROCEDURE — 36415 COLL VENOUS BLD VENIPUNCTURE: CPT | Performed by: INTERNAL MEDICINE

## 2023-07-11 PROCEDURE — 250N000009 HC RX 250: Performed by: INTERNAL MEDICINE

## 2023-07-11 DEVICE — PCMKR CARD ACCOLADE MRI EL DR: Type: IMPLANTABLE DEVICE | Status: FUNCTIONAL

## 2023-07-11 RX ORDER — LIDOCAINE 40 MG/G
CREAM TOPICAL
Status: DISCONTINUED | OUTPATIENT
Start: 2023-07-11 | End: 2023-07-11 | Stop reason: HOSPADM

## 2023-07-11 RX ORDER — SODIUM CHLORIDE 450 MG/100ML
INJECTION, SOLUTION INTRAVENOUS CONTINUOUS
Status: DISCONTINUED | OUTPATIENT
Start: 2023-07-11 | End: 2023-07-11 | Stop reason: HOSPADM

## 2023-07-11 RX ORDER — BUPIVACAINE HYDROCHLORIDE 2.5 MG/ML
INJECTION, SOLUTION EPIDURAL; INFILTRATION; INTRACAUDAL
Status: DISCONTINUED | OUTPATIENT
Start: 2023-07-11 | End: 2023-07-11 | Stop reason: HOSPADM

## 2023-07-11 RX ORDER — NALOXONE HYDROCHLORIDE 0.4 MG/ML
0.2 INJECTION, SOLUTION INTRAMUSCULAR; INTRAVENOUS; SUBCUTANEOUS
Status: DISCONTINUED | OUTPATIENT
Start: 2023-07-11 | End: 2023-07-11 | Stop reason: HOSPADM

## 2023-07-11 RX ORDER — NALOXONE HYDROCHLORIDE 0.4 MG/ML
0.4 INJECTION, SOLUTION INTRAMUSCULAR; INTRAVENOUS; SUBCUTANEOUS
Status: DISCONTINUED | OUTPATIENT
Start: 2023-07-11 | End: 2023-07-11 | Stop reason: HOSPADM

## 2023-07-11 RX ORDER — CEFAZOLIN SODIUM 2 G/100ML
2 INJECTION, SOLUTION INTRAVENOUS
Status: COMPLETED | OUTPATIENT
Start: 2023-07-11 | End: 2023-07-11

## 2023-07-11 RX ORDER — ACETAMINOPHEN 325 MG/1
650 TABLET ORAL EVERY 4 HOURS PRN
Status: DISCONTINUED | OUTPATIENT
Start: 2023-07-11 | End: 2023-07-11 | Stop reason: HOSPADM

## 2023-07-11 RX ORDER — FENTANYL CITRATE 50 UG/ML
INJECTION, SOLUTION INTRAMUSCULAR; INTRAVENOUS
Status: DISCONTINUED | OUTPATIENT
Start: 2023-07-11 | End: 2023-07-11 | Stop reason: HOSPADM

## 2023-07-11 RX ADMIN — CEFAZOLIN SODIUM 2 G: 2 INJECTION, SOLUTION INTRAVENOUS at 08:01

## 2023-07-11 RX ADMIN — SODIUM CHLORIDE: 4.5 INJECTION, SOLUTION INTRAVENOUS at 07:33

## 2023-07-11 ASSESSMENT — ACTIVITIES OF DAILY LIVING (ADL)
ADLS_ACUITY_SCORE: 37

## 2023-07-11 NOTE — DISCHARGE INSTRUCTIONS
Pacemaker Generator Change Discharge Instructions    After you go home:    Have an adult stay with you until tomorrow.  You may resume your normal diet.       For 24 hours - due to the sedation you received:  Relax and take it easy.  Do NOT make any important or legal decisions.  Do NOT drive or operate machines at home or at work.  Do NOT drink alcohol.    Care of Chest Incision:    Keep the bandage on at least 3 days. You may remove the dressing on Saturday, July 15th. Change it only if it gets loose or soaked. If you need to change it, use 4x4-inch gauze and a large clear bandage.   There is a pressure dressing (gauze & tape) - 24 hours after your procedure you may remove ONLY the top dressing. Leave the bottom dressing on.  Leave the strips of tape on. They will fall off on their own, or we will remove them at your first check-up.  Check your wound daily for signs of infection, such as increased redness, severe swelling or draining. Fever may also be a sign of infection. Call us if you see any of these signs.  If there are no signs of infection, you may shower after the bandage comes off in 3 days. If you take a tub bath, keep the wound dry.  No soaking the incision (swimming pool, bathtub, hot tub) for 2 weeks.  You may have mild to medium pain for 3 to 5 days. Take Acetaminophen (Tylenol) or Ibuprofen (Advil) for the pain. If the pain persists or is severe, call us.    Activity:    You can begin to use your arm as it feels comfortable to you.  No driving for one day & limit to necessary driving for one week.    Bleeding:    If you start bleeding from the incision site, sit down and press firmly on the site for 10 minutes.   Once bleeding stops, call [facility] as soon as you can.       Call 911 right away if you have heavy bleeding or bleeding that does not stop.      Medicines:    Take your medications, including blood thinners, unless your provider tells you not to.  If you have stopped any other  medicines, check with your provider about when to restart them.  Restart Warfarin tonight    Follow Up Appointments:    Follow up with Device Clinic at [facility] of patient preference in 7-10 days.    Call the clinic if:    You have a large or growing hard lump around the site.  The site is red, swollen, hot or tender.  Blood or fluid is draining from the site.  You have chills or a fever greater than 101 F (38 C).  You feel dizzy or light-headed.  Questions or concerns.      Telling others about your device:    Before you leave the hospital, you will receive a temporary ID card. A permanent card will be mailed to you about 6 to 8 weeks later. Always carry the ID card with you. It has important details about your device.  You may also get a Medical Alert bracelet or tag that says you have a pacemaker or a defibrillator (ICD).  Go to [email or web address].   Always tell doctors, dentists and other care providers that you have a device implanted in you.  Let us know before you plan any surgeries. Your care team must take special steps to keep you safe during certain procedures. These steps will depend on the type of device you have. Your provider will need to see your ID card. They may need to call us for instructions.    Device Safety:    Please refer to device  s booklet for further information.        [facility] [place]:    [phone] [facility] (7 days a week)

## 2023-07-11 NOTE — PROGRESS NOTES
"Dictated.    Successful placement of dual-chamber pacemaker ([employer]).    There has been a question about RV lead function.  RV pacing is in general minimal in this patient, historically 3-6%, and AV conduction is excellent.  AV Wenckebach cycle length was 150 bpm today.  During pocket manipulation there was brief noise signals both in the bipolar and unipolar sensing configuration.  However, this could be \"covered\" easily by programming RV lead sensitivity to 2.5 mV.  Sensed R waves are 10-11 mV.    We decided not to revise the RV lead.  No apparent complication.  EBL 10 mL.      Plan:  -Resume warfarin tonight  -Keep incision dry till Saturday  -Follow-up in the device clinic in 7-10 days  "

## 2023-07-11 NOTE — PROGRESS NOTES
Care Suites Admission Nursing Note    Patient Information  Name: Nathen Gage  Age: 87 year old  Reason for admission: Gen change  Care Suites arrival time: 0645    Visitor Information  Name: Nichol - Wife     Patient Admission/Assessment   Pre-procedure assessment complete: Yes  If abnormal assessment/labs, provider notified: No. Pt Na was 133, which is baseline for Pt.  NPO: Yes  Medications held per instructions/orders: Yes  Consent: deferred  If applicable, pregnancy test status: NA  Patient oriented to room: Yes  Education/questions answered: Yes  Plan/other: Per orders. Pt A/O, VSS.     Discharge Planning  Discharge name/phone number: Nichol [phone]  Overnight post sedation caregiver: Nichol  Discharge location: home    Concepción Kohli RN

## 2023-07-11 NOTE — PROGRESS NOTES
Care Suites Discharge Nursing Note    Patient Information  Name: Nathen Gaeg  Age: 87 year old    Discharge Education:  Discharge instructions reviewed: Yes  Additional education/resources provided: Yes  Patient/patient representative verbalizes understanding: Yes  Patient discharging on new medications: Yes  Medication education completed: Yes    Discharge Plans:   Discharge location: home  Discharge ride contacted: Yes  Approximate discharge time: 1210    Discharge Criteria:  Discharge criteria met and vital signs stable: Yes    Patient Belongs:  Patient belongings returned to patient: Yes    Concepción Kohli RN

## 2023-07-11 NOTE — PROGRESS NOTES
ANTICOAGULATION  MANAGEMENT: Discharge Review    Nathen Gage chart reviewed for anticoagulation continuity of care    Outpatient surgery/procedure on 7/11/23 for pacemaker generator replacement dual.    Discharge disposition: Home    Results:    Recent labs: (last 7 days)     07/05/23  1324   INR 2.4*     Anticoagulation inpatient management:     not applicable     Anticoagulation discharge instructions:     Warfarin dosing: home regimen continued   Bridging: No   INR goal change: No      Medication changes affecting anticoagulation: No    Additional factors affecting anticoagulation: Yes: Pt held warfarin x3 prior to procedure.      PLAN     No adjustment to anticoagulation plan needed    Patient not contacted - pt already has follow up INR appointment scheduled.     No adjustment to Anticoagulation Calendar was required    Aaron Moore RN

## 2023-07-11 NOTE — PRE-PROCEDURE
GENERAL PRE-PROCEDURE:   Procedure:  Pacemaker generator replacement, possible lead revision  Date/Time:  7/11/2023 8:53 AM    Written consent obtained?: Yes    Risks and benefits: Risks, benefits and alternatives were discussed    Consent given by:  Patient  Patient states understanding of procedure being performed: Yes    Patient's understanding of procedure matches consent: Yes    Procedure consent matches procedure scheduled: Yes    Expected level of sedation:  Moderate  Appropriately NPO:  Yes  ASA Class:  2  Mallampati  :  Grade 2- soft palate, base of uvula, tonsillar pillars, and portion of posterior pharyngeal wall visible  Lungs:  Lungs clear with good breath sounds bilaterally  Heart:  Normal heart sounds and rate  History & Physical reviewed:  History and physical reviewed and no updates needed  Statement of review:  I have reviewed the lab findings, diagnostic data, medications, and the plan for sedation

## 2023-07-11 NOTE — PROGRESS NOTES
Care Suites Post Procedure Note    Patient Information  Name: Nathen Gage  Age: 87 year old    Post Procedure  Time patient returned to Care Suites: 0940  Concerns/abnormal assessment: None at this time.  If abnormal assessment, provider notified: N/A  Plan/Other: Per orders.    Per Dr. Messer' note:  Plan:  -Resume warfarin tonight  -Keep incision dry till Saturday  -Follow-up in the device clinic in 7-10 days    Lisa Gaines RN       1003  [employer] rep here.  Education on new generator device completed.  New home monitor device, PPM booklet and card given to Nichol (wife).

## 2023-07-12 ENCOUNTER — TELEPHONE (OUTPATIENT)
Dept: CARDIOLOGY | Facility: CLINIC | Age: 88
End: 2023-07-12
Payer: COMMERCIAL

## 2023-07-12 NOTE — TELEPHONE ENCOUNTER
Post device implant discharge phone call.    PPM Generator change    Reviewed the following:  -Remove pressure dressing at noon today  - Remove other dressing 3 days after implant. May shower after outer dressing removed.   - Leave steri-strips in place, will be removed at 1 week device check  - Limit driving for: 1 week   - Watch for redness, drainage, warmth, or fever. Call device clinic if any signs of infection.     1 week device check scheduled: 7/19/2023 10:00    Pt states understanding of all instructions.   Saima WISDOM

## 2023-07-13 ENCOUNTER — TELEPHONE (OUTPATIENT)
Dept: CARDIOLOGY | Facility: CLINIC | Age: 88
End: 2023-07-13

## 2023-07-13 ENCOUNTER — ANCILLARY PROCEDURE (OUTPATIENT)
Dept: CARDIOLOGY | Facility: CLINIC | Age: 88
End: 2023-07-13
Attending: INTERNAL MEDICINE
Payer: COMMERCIAL

## 2023-07-13 DIAGNOSIS — I49.5 SICK SINUS SYNDROME (H): ICD-10-CM

## 2023-07-13 DIAGNOSIS — Z95.0 CARDIAC PACEMAKER IN SITU: ICD-10-CM

## 2023-07-13 DIAGNOSIS — I49.5 SICK SINUS SYNDROME (H): Primary | ICD-10-CM

## 2023-07-13 NOTE — TELEPHONE ENCOUNTER
[facility] Call Center    Phone Message    May a detailed message be left on voicemail: yes     Reason for Call: Other:    Patient would like call back regarding symptoms bruising on arm, drainage on tape, arm and chest. Patient had pace camila replaced on Tuesday.      Action Taken: Other: Cardiology     Travel Screening: Not Applicable     Thank you!  [facility]

## 2023-07-13 NOTE — TELEPHONE ENCOUNTER
"Called and spoke with patient and his wife.  They stated that when patient was in the hospital post PPM generator change on 7/11/23 he had a pressure dressing on that went all the way across his chest.  They stated that this was pulled tightly and that when they removed it there was something where the tape was.  They said it was \"some type of matter\".  Attempted to reassure patient that it was likely some residual adhesive or scabbing from irritation from the bandage.  They stated that the bandage over the pacemaker looked good with nothing concerning.  Asked if they could send in a photo so it could be looked at.  They attempted to send in a photo x2 and the transmission did not come through.  They would be more comfortable if someone was able to look at it and stated they would be willing to drive to [place].  Courtesy check scheduled today to look at questionable area.     Patient came into clinic.  On the right chest there are a few small blisters and skin tears from the pressure dressing bandage.  There are also a couple of small skin tears on the left shoulder.  Reassured patient that these look CDI with no signs of infection and will heal in time, though some small scabs may develop first.  Also reassured patient that the pressure damage did a great job of preventing a hematoma and that his pacemaker site looks good.  Patient and wife were appreciative of reassurance.     ARTIS Coronel                   "

## 2023-07-17 ENCOUNTER — TELEPHONE (OUTPATIENT)
Dept: CARDIOLOGY | Facility: CLINIC | Age: 88
End: 2023-07-17
Payer: COMMERCIAL

## 2023-07-17 NOTE — TELEPHONE ENCOUNTER
"Alert received from patient's PPM for, \"Right ventricular pacing lead impedance out of range. & An automatic safety switch from bipolar to unipolar occurred on Jul 14, 2023 due to an RV Pace Impedance measurement of <200 ?.\"    Patient has had known RV lead issues since 2/2022 - noise and elevated threshold. RV lead was NOT revised at gen change.  Per Dr. Messer's gen change note on 7/11/23:  \"Given historically infrequent RV pacing, excellent AV conduction (initial indication for PM placement was SSS) and adequate chronic RV lead function, I felt there was no immediate need for RV lead replacement.\"    Although patient has known RV lead issues, change in impedance is a new finding... Please refer to below for lead trends since gen change.  RV lead Impedance Sense Threshold   7/11/23 637ohms 10.8mV 1V@0.4ms   7/13/23 773ohms 12.2mV 2.8V@0.4ms   7/14/23 <200ohms 10.1mV 1V@0.4ms     Patient is scheduled for a 1 week check on 7/19/23. Will assess lead in both bipolar and unipolar configuration at that time. Depending on lead, may be able to keep sensing in bipolar configuration (if sensing level is appropriate) and place threshold/impedance in unipolar. Keeping sensing in unipolar is an option as patient does not have an ICD and is not PPM dependent (if sensing remains in unipolar, please assess for noise and adjust sensing as necessary). Will route update to Dr. Messer pending findings on 7/19.    ELLA RN  "

## 2023-07-18 ENCOUNTER — TELEPHONE (OUTPATIENT)
Dept: FAMILY MEDICINE | Facility: CLINIC | Age: 88
End: 2023-07-18

## 2023-07-18 ENCOUNTER — ANTICOAGULATION THERAPY VISIT (OUTPATIENT)
Dept: ANTICOAGULATION | Facility: CLINIC | Age: 88
End: 2023-07-18

## 2023-07-18 ENCOUNTER — LAB (OUTPATIENT)
Dept: LAB | Facility: CLINIC | Age: 88
End: 2023-07-18
Payer: COMMERCIAL

## 2023-07-18 DIAGNOSIS — I48.19 PERSISTENT ATRIAL FIBRILLATION (H): ICD-10-CM

## 2023-07-18 DIAGNOSIS — Z79.01 LONG TERM CURRENT USE OF ANTICOAGULANTS WITH INR GOAL OF 2.0-3.0: ICD-10-CM

## 2023-07-18 DIAGNOSIS — I48.19 PERSISTENT ATRIAL FIBRILLATION (H): Primary | ICD-10-CM

## 2023-07-18 LAB — INR BLD: 1.7 (ref 0.9–1.1)

## 2023-07-18 PROCEDURE — 36416 COLLJ CAPILLARY BLOOD SPEC: CPT

## 2023-07-18 PROCEDURE — 85610 PROTHROMBIN TIME: CPT

## 2023-07-18 NOTE — PROGRESS NOTES
ANTICOAGULATION MANAGEMENT     Nathen Gage 87 year old male is on warfarin with subtherapeutic INR result. (Goal INR 2.0-2.5)    Recent labs: (last 7 days)     07/18/23  1005   INR 1.7*       ASSESSMENT       Source(s): Chart Review and Patient/Caregiver Call       Warfarin doses taken: Held for 3 days (7/8-7/10/23) for pacemaker replacement  recently which may be affecting INR    Diet: Increased greens/vitamin K in diet; plans to resume previous intake    Medication/supplement changes: None noted    New illness, injury, or hospitalization: No    Signs or symptoms of bleeding or clotting: No    Previous result: Therapeutic last visit; previously outside of goal range    Additional findings: None         PLAN     Recommended plan for temporary change(s) affecting INR     Dosing Instructions: booster dose then continue your current warfarin dose with next INR in 2 weeks       Summary  As of 7/18/2023    Full warfarin instructions:  7/18: 3.75 mg; Otherwise 1.25 mg every Mon, Fri; 2.5 mg all other days   Next INR check:  7/31/2023             Telephone call with Neal who agrees to plan and repeated back plan correctly    Lab visit scheduled    Education provided:     Dietary considerations: importance of consistent vitamin K intake    Plan made per [facility] anticoagulation protocol    Fidelina Pizano, RN  Anticoagulation Clinic  7/18/2023    _______________________________________________________________________     Anticoagulation Episode Summary     Current INR goal:  2.0-2.5   TTR:  54.9 % (1 y)   Target end date:  Indefinite   Send INR reminders to:  [facility]    Indications    Persistent atrial fibrillation (H) [I48.19]  Long term current use of anticoagulants with INR goal of 2.0-3.0 [Z79.01]           Comments:           Anticoagulation Care Providers     Provider Role Specialty Phone number    Kushal Valentin MD Referring Family Medicine [phone]    Leonides Aggarwal PA-C Referring  Family Medicine [phone]

## 2023-07-18 NOTE — TELEPHONE ENCOUNTER
Patient Returning Call    Reason for call:  INR    Information relayed to patient:  CALL BACK    Patient has additional questions:  Yes    What are your questions/concerns:  PLEASE CALL PATIENT    Who does the patient want to speak with:  INR NURSE    Is an  needed?:  No      Could we send this information to you in [facility] or would you prefer to receive a phone call?:   No preference   Okay to leave a detailed message?: Yes at Home number on file [phone] (home)

## 2023-07-19 ENCOUNTER — ANCILLARY PROCEDURE (OUTPATIENT)
Dept: CARDIOLOGY | Facility: CLINIC | Age: 88
End: 2023-07-19
Attending: INTERNAL MEDICINE
Payer: COMMERCIAL

## 2023-07-19 DIAGNOSIS — I49.5 SICK SINUS SYNDROME (H): Primary | ICD-10-CM

## 2023-07-19 DIAGNOSIS — Z95.0 CARDIAC PACEMAKER IN SITU: ICD-10-CM

## 2023-07-19 PROCEDURE — 93280 PM DEVICE PROGR EVAL DUAL: CPT | Performed by: INTERNAL MEDICINE

## 2023-07-20 LAB
MDC_IDC_LEAD_IMPLANT_DT: NORMAL
MDC_IDC_LEAD_IMPLANT_DT: NORMAL
MDC_IDC_LEAD_LOCATION: NORMAL
MDC_IDC_LEAD_LOCATION: NORMAL
MDC_IDC_LEAD_LOCATION_DETAIL_1: NORMAL
MDC_IDC_LEAD_LOCATION_DETAIL_1: NORMAL
MDC_IDC_LEAD_MFG: NORMAL
MDC_IDC_LEAD_MFG: NORMAL
MDC_IDC_LEAD_MODEL: NORMAL
MDC_IDC_LEAD_MODEL: NORMAL
MDC_IDC_LEAD_POLARITY_TYPE: NORMAL
MDC_IDC_LEAD_POLARITY_TYPE: NORMAL
MDC_IDC_LEAD_SERIAL: NORMAL
MDC_IDC_LEAD_SERIAL: NORMAL
MDC_IDC_MSMT_BATTERY_DTM: NORMAL
MDC_IDC_MSMT_BATTERY_REMAINING_LONGEVITY: 174 MO
MDC_IDC_MSMT_BATTERY_REMAINING_PERCENTAGE: 100 %
MDC_IDC_MSMT_BATTERY_STATUS: NORMAL
MDC_IDC_MSMT_LEADCHNL_RA_IMPEDANCE_VALUE: 609 OHM
MDC_IDC_MSMT_LEADCHNL_RA_PACING_THRESHOLD_AMPLITUDE: 0.5 V
MDC_IDC_MSMT_LEADCHNL_RA_PACING_THRESHOLD_PULSEWIDTH: 0.4 MS
MDC_IDC_MSMT_LEADCHNL_RV_IMPEDANCE_VALUE: 489 OHM
MDC_IDC_MSMT_LEADCHNL_RV_LEAD_CHANNEL_STATUS: NORMAL
MDC_IDC_MSMT_LEADCHNL_RV_PACING_THRESHOLD_AMPLITUDE: 0.8 V
MDC_IDC_MSMT_LEADCHNL_RV_PACING_THRESHOLD_PULSEWIDTH: 0.4 MS
MDC_IDC_PG_IMPLANT_DTM: NORMAL
MDC_IDC_PG_MFG: NORMAL
MDC_IDC_PG_MODEL: NORMAL
MDC_IDC_PG_SERIAL: NORMAL
MDC_IDC_PG_TYPE: NORMAL
MDC_IDC_SESS_CLINIC_NAME: NORMAL
MDC_IDC_SESS_DTM: NORMAL
MDC_IDC_SESS_TYPE: NORMAL
MDC_IDC_SET_BRADY_AT_MODE_SWITCH_MODE: NORMAL
MDC_IDC_SET_BRADY_AT_MODE_SWITCH_RATE: 170 {BEATS}/MIN
MDC_IDC_SET_BRADY_LOWRATE: 60 {BEATS}/MIN
MDC_IDC_SET_BRADY_MAX_SENSOR_RATE: 120 {BEATS}/MIN
MDC_IDC_SET_BRADY_MAX_TRACKING_RATE: 120 {BEATS}/MIN
MDC_IDC_SET_BRADY_MODE: NORMAL
MDC_IDC_SET_BRADY_PAV_DELAY_HIGH: 200 MS
MDC_IDC_SET_BRADY_PAV_DELAY_LOW: 300 MS
MDC_IDC_SET_BRADY_SAV_DELAY_HIGH: 200 MS
MDC_IDC_SET_BRADY_SAV_DELAY_LOW: 300 MS
MDC_IDC_SET_LEADCHNL_RA_PACING_AMPLITUDE: 2 V
MDC_IDC_SET_LEADCHNL_RA_PACING_CAPTURE_MODE: NORMAL
MDC_IDC_SET_LEADCHNL_RA_PACING_POLARITY: NORMAL
MDC_IDC_SET_LEADCHNL_RA_PACING_PULSEWIDTH: 0.4 MS
MDC_IDC_SET_LEADCHNL_RA_SENSING_ADAPTATION_MODE: NORMAL
MDC_IDC_SET_LEADCHNL_RA_SENSING_POLARITY: NORMAL
MDC_IDC_SET_LEADCHNL_RA_SENSING_SENSITIVITY: 0.5 MV
MDC_IDC_SET_LEADCHNL_RV_PACING_AMPLITUDE: 1.3 V
MDC_IDC_SET_LEADCHNL_RV_PACING_CAPTURE_MODE: NORMAL
MDC_IDC_SET_LEADCHNL_RV_PACING_POLARITY: NORMAL
MDC_IDC_SET_LEADCHNL_RV_PACING_PULSEWIDTH: 0.4 MS
MDC_IDC_SET_LEADCHNL_RV_SENSING_ADAPTATION_MODE: NORMAL
MDC_IDC_SET_LEADCHNL_RV_SENSING_POLARITY: NORMAL
MDC_IDC_SET_LEADCHNL_RV_SENSING_SENSITIVITY: 5 MV
MDC_IDC_SET_ZONE_DETECTION_INTERVAL: 353 MS
MDC_IDC_SET_ZONE_TYPE: NORMAL
MDC_IDC_SET_ZONE_VENDOR_TYPE: NORMAL
MDC_IDC_STAT_BRADY_DTM_END: NORMAL
MDC_IDC_STAT_BRADY_DTM_START: NORMAL
MDC_IDC_STAT_BRADY_RA_PERCENT_PACED: 0 %
MDC_IDC_STAT_BRADY_RV_PERCENT_PACED: 0 %
MDC_IDC_STAT_EPISODE_RECENT_COUNT: 0
MDC_IDC_STAT_EPISODE_RECENT_COUNT_DTM_END: NORMAL
MDC_IDC_STAT_EPISODE_RECENT_COUNT_DTM_START: NORMAL
MDC_IDC_STAT_EPISODE_TYPE: NORMAL
MDC_IDC_STAT_EPISODE_VENDOR_TYPE: NORMAL

## 2023-07-31 ENCOUNTER — LAB (OUTPATIENT)
Dept: LAB | Facility: CLINIC | Age: 88
End: 2023-07-31
Payer: COMMERCIAL

## 2023-07-31 ENCOUNTER — ANTICOAGULATION THERAPY VISIT (OUTPATIENT)
Dept: ANTICOAGULATION | Facility: CLINIC | Age: 88
End: 2023-07-31

## 2023-07-31 DIAGNOSIS — I48.19 PERSISTENT ATRIAL FIBRILLATION (H): Primary | ICD-10-CM

## 2023-07-31 DIAGNOSIS — Z79.01 LONG TERM CURRENT USE OF ANTICOAGULANTS WITH INR GOAL OF 2.0-3.0: ICD-10-CM

## 2023-07-31 DIAGNOSIS — I48.19 PERSISTENT ATRIAL FIBRILLATION (H): ICD-10-CM

## 2023-07-31 LAB — INR BLD: 2.7 (ref 0.9–1.1)

## 2023-07-31 PROCEDURE — 85610 PROTHROMBIN TIME: CPT

## 2023-07-31 PROCEDURE — 36416 COLLJ CAPILLARY BLOOD SPEC: CPT

## 2023-07-31 NOTE — PROGRESS NOTES
ANTICOAGULATION MANAGEMENT     Nathen Gage 87 year old male is on warfarin with supratherapeutic INR result. (Goal INR 2.0-2.5)    Recent labs: (last 7 days)     07/31/23  1042   INR 2.7*       ASSESSMENT     Source(s): Chart Review and Patient/Caregiver Call     Warfarin doses taken: Booster dose(s) recently taken which may be affecting INR  Diet: Decreased greens/vitamin K in diet; plans to resume previous intake (lower spinach intake over the last two days)  Medication/supplement changes: None noted  New illness, injury, or hospitalization: No  Signs or symptoms of bleeding or clotting: No  Previous result: Subtherapeutic  Additional findings: 7/11/23 pacemaker replaced - incision site still slightly tender       PLAN     Recommended plan for temporary change(s) affecting INR     Dosing Instructions: Continue your current warfarin dose and increase greens for 2 days with next INR in 2 weeks       Summary  As of 7/31/2023      Full warfarin instructions:  1.25 mg every Mon, Fri; 2.5 mg all other days   Next INR check:  8/14/2023               Telephone call with Neal who verbalizes understanding and agrees to plan    Lab visit scheduled    Education provided:   Please call back if any changes to your diet, medications or how you've been taking warfarin  Symptom monitoring: monitoring for bleeding signs and symptoms and monitoring for clotting signs and symptoms    Plan made per [facility] anticoagulation protocol    Sayra Guerrero RN  Anticoagulation Clinic  7/31/2023    _______________________________________________________________________     Anticoagulation Episode Summary       Current INR goal:  2.0-2.5   TTR:  53.1 % (1 y)   Target end date:  Indefinite   Send INR reminders to:  [facility]    Indications    Persistent atrial fibrillation (H) [I48.19]  Long term current use of anticoagulants with INR goal of 2.0-3.0 [Z79.01]             Comments:               Anticoagulation Care Providers        Provider Role Specialty Phone number    Kushal Valentin MD Referring Family Medicine [phone]    Leonides Aggarwal PA-C Referring Family Medicine [phone]

## 2023-08-14 ENCOUNTER — LAB (OUTPATIENT)
Dept: LAB | Facility: CLINIC | Age: 88
End: 2023-08-14
Payer: COMMERCIAL

## 2023-08-14 ENCOUNTER — ANTICOAGULATION THERAPY VISIT (OUTPATIENT)
Dept: ANTICOAGULATION | Facility: CLINIC | Age: 88
End: 2023-08-14

## 2023-08-14 DIAGNOSIS — I48.19 PERSISTENT ATRIAL FIBRILLATION (H): Primary | ICD-10-CM

## 2023-08-14 DIAGNOSIS — I10 ESSENTIAL HYPERTENSION, BENIGN: Primary | ICD-10-CM

## 2023-08-14 DIAGNOSIS — I48.19 PERSISTENT ATRIAL FIBRILLATION (H): ICD-10-CM

## 2023-08-14 DIAGNOSIS — Z79.01 LONG TERM CURRENT USE OF ANTICOAGULANTS WITH INR GOAL OF 2.0-3.0: ICD-10-CM

## 2023-08-14 LAB
ALBUMIN SERPL BCG-MCNC: 4.4 G/DL (ref 3.5–5.2)
ALP SERPL-CCNC: 84 U/L (ref 40–129)
ALT SERPL W P-5'-P-CCNC: 14 U/L (ref 0–70)
ANION GAP SERPL CALCULATED.3IONS-SCNC: 10 MMOL/L (ref 7–15)
AST SERPL W P-5'-P-CCNC: 21 U/L (ref 0–45)
BILIRUB SERPL-MCNC: 0.5 MG/DL
BUN SERPL-MCNC: 22.2 MG/DL (ref 8–23)
CALCIUM SERPL-MCNC: 9.3 MG/DL (ref 8.8–10.2)
CHLORIDE SERPL-SCNC: 98 MMOL/L (ref 98–107)
CREAT SERPL-MCNC: 1.14 MG/DL (ref 0.67–1.17)
CREAT UR-MCNC: 76 MG/DL
DEPRECATED HCO3 PLAS-SCNC: 25 MMOL/L (ref 22–29)
GFR SERPL CREATININE-BSD FRML MDRD: 63 ML/MIN/1.73M2
GLUCOSE SERPL-MCNC: 119 MG/DL (ref 70–99)
INR BLD: 3.9 (ref 0.9–1.1)
MICROALBUMIN UR-MCNC: <12 MG/L
MICROALBUMIN/CREAT UR: NORMAL MG/G{CREAT}
POTASSIUM SERPL-SCNC: 4.9 MMOL/L (ref 3.4–5.3)
PROT SERPL-MCNC: 7 G/DL (ref 6.4–8.3)
SODIUM SERPL-SCNC: 133 MMOL/L (ref 136–145)

## 2023-08-14 PROCEDURE — 85610 PROTHROMBIN TIME: CPT

## 2023-08-14 PROCEDURE — 36415 COLL VENOUS BLD VENIPUNCTURE: CPT

## 2023-08-14 PROCEDURE — 82570 ASSAY OF URINE CREATININE: CPT

## 2023-08-14 PROCEDURE — 82043 UR ALBUMIN QUANTITATIVE: CPT

## 2023-08-14 PROCEDURE — 80053 COMPREHEN METABOLIC PANEL: CPT

## 2023-08-14 NOTE — PROGRESS NOTES
ANTICOAGULATION MANAGEMENT     Nathen Gage 87 year old male is on warfarin with supratherapeutic INR result. (Goal INR 2.0-2.5)    Recent labs: (last 7 days)     08/14/23  1021   INR 3.9*       ASSESSMENT     Source(s): Chart Review and Patient/Caregiver Call     Warfarin doses taken: More warfarin taken than planned which may be affecting INR  Diet: No new diet changes identified  Medication/supplement changes: None noted  New illness, injury, or hospitalization: No  Signs or symptoms of bleeding or clotting: No  Previous result: Supratherapeutic  Additional findings: None       PLAN     Recommended plan for temporary change(s) affecting INR     Dosing Instructions: hold dose then continue your current warfarin dose with next INR in 1-2 weeks.  Patient will hold warfarin 8/15/23 since he already took his warfarin 8/14/23        Summary  As of 8/14/2023      Full warfarin instructions:  8/15: Hold; Otherwise 1.25 mg every Mon, Fri; 2.5 mg all other days   Next INR check:  8/21/2023               Telephone call with Neal who agrees to plan and repeated back plan correctly    Lab visit scheduled    Education provided:   Please call back if any changes to your diet, medications or how you've been taking warfarin  Symptom monitoring: monitoring for bleeding signs and symptoms  Contact [phone]  with any changes, questions or concerns.     Plan made per [facility] anticoagulation protocol    Teresa Castro RN  Anticoagulation Clinic  8/14/2023    _______________________________________________________________________     Anticoagulation Episode Summary       Current INR goal:  2.0-2.5   TTR:  51.1 % (1 y)   Target end date:  Indefinite   Send INR reminders to:  [facility]    Indications    Persistent atrial fibrillation (H) [I48.19]  Long term current use of anticoagulants with INR goal of 2.0-3.0 [Z79.01]             Comments:               Anticoagulation Care Providers       Provider Role Specialty Phone  number    Kushal Valentin MD Referring Family Medicine [phone]    Leonides Aggarwal PA-C Referring Family Medicine [phone]

## 2023-08-23 ENCOUNTER — ANTICOAGULATION THERAPY VISIT (OUTPATIENT)
Dept: ANTICOAGULATION | Facility: CLINIC | Age: 88
End: 2023-08-23

## 2023-08-23 ENCOUNTER — LAB (OUTPATIENT)
Dept: LAB | Facility: CLINIC | Age: 88
End: 2023-08-23
Payer: COMMERCIAL

## 2023-08-23 DIAGNOSIS — I48.19 PERSISTENT ATRIAL FIBRILLATION (H): Primary | ICD-10-CM

## 2023-08-23 DIAGNOSIS — I48.19 PERSISTENT ATRIAL FIBRILLATION (H): ICD-10-CM

## 2023-08-23 DIAGNOSIS — Z79.01 LONG TERM CURRENT USE OF ANTICOAGULANTS WITH INR GOAL OF 2.0-3.0: ICD-10-CM

## 2023-08-23 LAB — INR BLD: 3.5 (ref 0.9–1.1)

## 2023-08-23 PROCEDURE — 36416 COLLJ CAPILLARY BLOOD SPEC: CPT

## 2023-08-23 PROCEDURE — 85610 PROTHROMBIN TIME: CPT

## 2023-08-23 NOTE — PROGRESS NOTES
ANTICOAGULATION MANAGEMENT     Nathen Gaeg 87 year old male is on warfarin with supratherapeutic INR result. (Goal INR 2.0-2.5)    Recent labs: (last 7 days)     08/23/23  1011   INR 3.5*       ASSESSMENT     Source(s): Chart Review and Patient/Caregiver Call     Warfarin doses taken: Patient is unsure if he took 1.25 mg or 3.75 mg on Mon and Fri.  Already took warfarin today.  Diet: No new diet changes identified  Medication/supplement changes: None noted  New illness, injury, or hospitalization: No  Signs or symptoms of bleeding or clotting: No  Previous result: Supratherapeutic  Additional findings: None       PLAN     Recommended plan for no diet, medication or health factor changes affecting INR. Patient may have taken to much warfarin which would have continued to keep INR supra therapeutic so smaller warfarin maintenance dose adjustment done today.    Dosing Instructions: hold dose then decrease your warfarin dose (8.3% change) with next INR in 1 week       Summary  As of 8/23/2023      Full warfarin instructions:  8/23: 2.5 mg; 8/24: Hold; Otherwise 1.25 mg every Mon, Wed, Fri; 2.5 mg all other days   Next INR check:  9/1/2023               Telephone call with Neal  and his wife who agrees to plan and repeated back plan correctly    Lab visit scheduled    Education provided:   Please call back if any changes to your diet, medications or how you've been taking warfarin  Goal range and lab monitoring: goal range and significance of current result  Symptom monitoring: monitoring for bleeding signs and symptoms    Plan made per [facility] anticoagulation protocol    Joanne Bustos RN  Anticoagulation Clinic  8/23/2023    _______________________________________________________________________     Anticoagulation Episode Summary       Current INR goal:  2.0-2.5   TTR:  49.1 % (1 y)   Target end date:  Indefinite   Send INR reminders to:  [facility]    Indications    Persistent atrial fibrillation (H)  [I48.19]  Long term current use of anticoagulants with INR goal of 2.0-3.0 [Z79.01]             Comments:               Anticoagulation Care Providers       Provider Role Specialty Phone number    Kushal Valentin MD Referring Family Medicine [phone]    Leonides Aggarwal PA-C Referring Family Medicine [phone]

## 2023-09-01 ENCOUNTER — LAB (OUTPATIENT)
Dept: LAB | Facility: CLINIC | Age: 88
End: 2023-09-01
Payer: COMMERCIAL

## 2023-09-01 ENCOUNTER — ANTICOAGULATION THERAPY VISIT (OUTPATIENT)
Dept: ANTICOAGULATION | Facility: CLINIC | Age: 88
End: 2023-09-01

## 2023-09-01 DIAGNOSIS — Z79.01 LONG TERM CURRENT USE OF ANTICOAGULANTS WITH INR GOAL OF 2.0-3.0: ICD-10-CM

## 2023-09-01 DIAGNOSIS — I48.19 PERSISTENT ATRIAL FIBRILLATION (H): Primary | ICD-10-CM

## 2023-09-01 DIAGNOSIS — I48.19 PERSISTENT ATRIAL FIBRILLATION (H): ICD-10-CM

## 2023-09-01 LAB — INR BLD: 1.8 (ref 0.9–1.1)

## 2023-09-01 PROCEDURE — 85610 PROTHROMBIN TIME: CPT

## 2023-09-01 PROCEDURE — 36416 COLLJ CAPILLARY BLOOD SPEC: CPT

## 2023-09-01 NOTE — PROGRESS NOTES
ANTICOAGULATION MANAGEMENT     Nathne Gage 87 year old male is on warfarin with subtherapeutic INR result. (Goal INR 2.0-2.5)    Recent labs: (last 7 days)     09/01/23  1308   INR 1.8*       ASSESSMENT     Source(s): Chart Review and Patient/Caregiver Call     Warfarin doses taken: Warfarin taken as instructed  Diet: No new diet changes identified  Medication/supplement changes: None noted  New illness, injury, or hospitalization: No  Signs or symptoms of bleeding or clotting: No  Previous result: Supratherapeutic.  May need to consider a warfarin maintenance dose adjustment at next INR check if INR continues to be sub therapeutic.    Additional findings: None       PLAN     Recommended plan for no diet, medication or health factor changes affecting INR     Dosing Instructions: Continue your current warfarin dose with next INR in 10 days       Summary  As of 9/1/2023      Full warfarin instructions:  1.25 mg every Mon, Wed, Fri; 2.5 mg all other days   Next INR check:  9/12/2023               Telephone call with Neal who agrees to plan and repeated back plan correctly    Check at provider office visit    Education provided:   Please call back if any changes to your diet, medications or how you've been taking warfarin    Plan made per [facility] anticoagulation protocol    Joanne Bustos RN  Anticoagulation Clinic  9/1/2023    _______________________________________________________________________     Anticoagulation Episode Summary       Current INR goal:  2.0-2.5   TTR:  49.8 % (1 y)   Target end date:  Indefinite   Send INR reminders to:  [facility]    Indications    Persistent atrial fibrillation (H) [I48.19]  Long term current use of anticoagulants with INR goal of 2.0-3.0 [Z79.01]             Comments:               Anticoagulation Care Providers       Provider Role Specialty Phone number    Kushal Valentin MD Referring Family Medicine [phone]    Leonides Aggarwal PA-C Referring Family  Medicine [phone]

## 2023-09-06 DIAGNOSIS — K21.00 GASTROESOPHAGEAL REFLUX DISEASE WITH ESOPHAGITIS: ICD-10-CM

## 2023-09-08 ENCOUNTER — TELEPHONE (OUTPATIENT)
Dept: FAMILY MEDICINE | Facility: CLINIC | Age: 88
End: 2023-09-08

## 2023-09-08 ENCOUNTER — OFFICE VISIT (OUTPATIENT)
Dept: FAMILY MEDICINE | Facility: CLINIC | Age: 88
End: 2023-09-08
Payer: COMMERCIAL

## 2023-09-08 VITALS
WEIGHT: 174 LBS | OXYGEN SATURATION: 99 % | HEIGHT: 67 IN | HEART RATE: 76 BPM | DIASTOLIC BLOOD PRESSURE: 73 MMHG | TEMPERATURE: 98.1 F | SYSTOLIC BLOOD PRESSURE: 133 MMHG | BODY MASS INDEX: 27.31 KG/M2 | RESPIRATION RATE: 16 BRPM

## 2023-09-08 DIAGNOSIS — L08.9 INFECTED SEBACEOUS CYST: ICD-10-CM

## 2023-09-08 DIAGNOSIS — L08.9 INFECTED SEBACEOUS CYST: Primary | ICD-10-CM

## 2023-09-08 DIAGNOSIS — L72.3 INFECTED SEBACEOUS CYST: Primary | ICD-10-CM

## 2023-09-08 DIAGNOSIS — L72.3 INFECTED SEBACEOUS CYST: ICD-10-CM

## 2023-09-08 PROCEDURE — 10060 I&D ABSCESS SIMPLE/SINGLE: CPT | Performed by: PHYSICIAN ASSISTANT

## 2023-09-08 RX ORDER — CEPHALEXIN 500 MG/1
500 CAPSULE ORAL 3 TIMES DAILY
Qty: 21 CAPSULE | Refills: 0 | Status: SHIPPED | OUTPATIENT
Start: 2023-09-08 | End: 2023-09-08

## 2023-09-08 RX ORDER — CEPHALEXIN 500 MG/1
500 CAPSULE ORAL 3 TIMES DAILY
Qty: 21 CAPSULE | Refills: 0 | Status: SHIPPED | OUTPATIENT
Start: 2023-09-08 | End: 2024-05-07

## 2023-09-08 RX ORDER — PANTOPRAZOLE SODIUM 40 MG/1
TABLET, DELAYED RELEASE ORAL
Qty: 90 TABLET | Refills: 2 | Status: SHIPPED | OUTPATIENT
Start: 2023-09-08 | End: 2024-07-31

## 2023-09-08 NOTE — Clinical Note
AYDE, saw pt today for I&D of infected sebaceous cyst--see notes. Patient has AWV with you next week, 9/12, please reassess at visit.  Thanks,  Kristine

## 2023-09-08 NOTE — PROGRESS NOTES
"  Assessment & Plan     Infected sebaceous cyst  Follow-up at [facility] tomorrow for packing removal and replacement.  Monitor for increased bleeding secondary to anticoagulation  Has follow-up with PCP next week and can recheck cyst  - cephALEXin (KEFLEX) 500 MG capsule; Take 1 capsule (500 mg) by mouth 3 times daily for 7 days  - Incision and drainage             BMI:   Estimated body mass index is 27.25 kg/m  as calculated from the following:    Height as of this encounter: 1.702 m (5' 7\").    Weight as of this encounter: 78.9 kg (174 lb).           ELOY Valverde [facility] LAVON De Jesus is a 87 year old, presenting for the following health issues:  Derm Problem      9/8/2023     7:12 AM   Additional Questions   Roomed by Teresa       History of Present Illness       Reason for visit:  Sore    He eats 2-3 servings of fruits and vegetables daily.He consumes 4 sweetened beverage(s) daily.He exercises with enough effort to increase his heart rate 20 to 29 minutes per day.  He exercises with enough effort to increase his heart rate 5 days per week.   He is taking medications regularly.       Derm concern  Onset/Duration: 1 year but worse for past 3 days  Description  Location: back  Character: painful, red  Itching: no  Intensity:  mild  Progression of Symptoms:  worsening  Accompanying signs and symptoms:   Fever: No  Body aches or joint pain: No  Sore throat symptoms: No  Recent cold symptoms: No  History:           Previous episodes of similar rash: None  New exposures:  None  Recent travel: No  Exposure to similar rash: No  Precipitating or alleviating factors: none  Therapies tried and outcome: none    Cyst has been flared up for past 3 days per pt.   No discharge or drainage previously noted.       Review of Systems   Constitutional, HEENT, cardiovascular, pulmonary, gi and gu systems are negative, except as otherwise noted.      Objective    /73 (BP Location: " "Right arm, Patient Position: Chair, Cuff Size: Adult Regular)   Pulse 76   Temp 98.1  F (36.7  C) (Oral)   Resp 16   Ht 1.702 m (5' 7\")   Wt 78.9 kg (174 lb)   SpO2 99%   BMI 27.25 kg/m    Body mass index is 27.25 kg/m .  Physical Exam   GENERAL APPEARANCE: healthy, alert, and no distress  SKIN: 1.3 fluctuant, erythematous, raised cyst with superficial pustules right posterior upper back     Effected area cleaned with betadine x 3 then wiped away with alcoholol.  1 pecent lidocaine with epineprhine used to infiltrate the area with good anethesia.  Number 11 scapel used to make a stab incion.  Purlent drainage and cottage cheese like debris removed. 1/2 inch packing placed to area with bacitracin.  Appropraite wound care dressing applied.  Pt tolerated preocedure well.                    "

## 2023-09-08 NOTE — TELEPHONE ENCOUNTER
Patient calling.  [facility] states they did not receive RX.  Below shows RX received.  Resent.  See confirmation of receipt below.  Rayna Hall RN    E-Prescribing Status: Receipt confirmed by pharmacy (9/8/2023  7:56 AM CDT)     E-Prescribing Status: Receipt confirmed by pharmacy (9/8/2023 12:47 PM CDT)

## 2023-09-09 ENCOUNTER — OFFICE VISIT (OUTPATIENT)
Dept: URGENT CARE | Facility: URGENT CARE | Age: 88
End: 2023-09-09
Payer: COMMERCIAL

## 2023-09-09 VITALS
DIASTOLIC BLOOD PRESSURE: 73 MMHG | SYSTOLIC BLOOD PRESSURE: 154 MMHG | HEART RATE: 60 BPM | OXYGEN SATURATION: 99 % | WEIGHT: 173.8 LBS | TEMPERATURE: 97.8 F | BODY MASS INDEX: 27.22 KG/M2 | RESPIRATION RATE: 18 BRPM

## 2023-09-09 DIAGNOSIS — L02.212 ABSCESS OF BACK: Primary | ICD-10-CM

## 2023-09-09 PROCEDURE — 99207 PR BUNDLED PROCEDURE IN GLOBAL PKG: CPT | Performed by: FAMILY MEDICINE

## 2023-09-09 ASSESSMENT — PAIN SCALES - GENERAL: PAINLEVEL: NO PAIN (0)

## 2023-09-09 NOTE — PROGRESS NOTES
"SUBJECTIVE:   Nathen Gage is a 87 year old male who is here to undergo removal and replacing of 1 1/2 \" packing gauze in the wound on the right upper back.  Yesterday, patient underwent an incision and drainage of a red, raised cyst with superficial pustules at the [facility] and had 1 2 \" packing gauze placed.  Patient was also started on Cephalexin.      .    Past Medical History:   Diagnosis Date    Actinic keratosis     Acute idiopathic gout of left knee 2016    Atrial fibrillation (H)     on Eliquis    Dilated aortic root (H)     Esophageal reflux     Esophagitis     H/O: GI bleed     Hyperlipidaemia     Hyperlipidemia LDL goal <100     Impotence of organic origin     Presbycusis, unspecified laterality 2016    Pulmonary hypertension (H)     Sick sinus syndrome (H)     pacemaker implanted     Unspecified essential hypertension      Current Outpatient Medications   Medication Sig Dispense Refill    cephALEXin (KEFLEX) 500 MG capsule Take 1 capsule (500 mg) by mouth 3 times daily 21 capsule 0    doxazosin (CARDURA) 8 MG tablet Take 1 tablet (8 mg) by mouth At Bedtime 90 tablet 1    losartan (COZAAR) 100 MG tablet Take 1 tablet (100 mg) by mouth daily 90 tablet 3    metoprolol succinate ER (TOPROL XL) 50 MG 24 hr tablet Take 1 tablet (50 mg) by mouth daily 90 tablet 1    pantoprazole (PROTONIX) 40 MG EC tablet TAKE 1 TABLET(40 MG) BY MOUTH DAILY 90 tablet 2    Polyethylene Glycol 400 (BLINK TEARS OP) Apply to eye as needed      rosuvastatin (CRESTOR) 5 MG tablet Take 1 tablet (5 mg) by mouth daily 90 tablet 3    warfarin ANTICOAGULANT (COUMADIN) 2.5 MG tablet Take 1.25 to 2.5mg (1/2 to 1 tabs) by mouth daily OR AS DIRECTED.  Adjust dose based on INR. 90 tablet 1     Social History     Tobacco Use    Smoking status: Former     Types: Cigarettes     Quit date: 1975     Years since quittin.7    Smokeless tobacco: Never   Substance Use Topics    Alcohol use: " Yes     Comment: 1- 2 BEERS WEEKLY       ROS:  CONSTITUTIONAL:negative for fever  INTEGUMENTARY/SKIN:  positive for a wound on the right upper back from an incision and drainage yesterday.      OBJECTIVE:  BP (!) 154/73   Pulse 60   Temp 97.8  F (36.6  C) (Oral)   Resp 18   Wt 78.8 kg (173 lb 12.8 oz)   SpO2 99%   BMI 27.22 kg/m    GENERAL APPEARANCE: healthy, alert and no distress  SKIN: the right upper back has an open wound filled with iodorom packing gauze.  There is no active bleeding.  No surrounding erythema is present.  No red streaks.      ASSESSMENT:  Abscess on the right upper back.  Patient is here for replacement of the packing gauze.      PLAN:  I removed the packing gauze and placed new iodoform packing gauze into the wound.    Patient tolerated the procedure well.  Telfa gauze was placed over the wound and the gauze was held in place with paper tape.    Neal to follow up with Primary Care provider regarding elevated blood pressure.    Follow up in 3 days to have the gauze packing removed and replaced.      Follow up if any fevers/spreading redness appears.      Continue the antibiotic Cephalexin

## 2023-09-11 DIAGNOSIS — N40.1 BENIGN LOCALIZED HYPERPLASIA OF PROSTATE WITH URINARY OBSTRUCTION: ICD-10-CM

## 2023-09-11 DIAGNOSIS — N13.8 BENIGN LOCALIZED HYPERPLASIA OF PROSTATE WITH URINARY OBSTRUCTION: ICD-10-CM

## 2023-09-11 RX ORDER — METOPROLOL SUCCINATE 50 MG/1
50 TABLET, EXTENDED RELEASE ORAL DAILY
Qty: 90 TABLET | Refills: 1 | Status: SHIPPED | OUTPATIENT
Start: 2023-09-11 | End: 2024-03-08

## 2023-09-12 ENCOUNTER — ANTICOAGULATION THERAPY VISIT (OUTPATIENT)
Dept: ANTICOAGULATION | Facility: CLINIC | Age: 88
End: 2023-09-12

## 2023-09-12 ENCOUNTER — OFFICE VISIT (OUTPATIENT)
Dept: FAMILY MEDICINE | Facility: CLINIC | Age: 88
End: 2023-09-12
Attending: PHYSICIAN ASSISTANT
Payer: COMMERCIAL

## 2023-09-12 VITALS
BODY MASS INDEX: 26.55 KG/M2 | OXYGEN SATURATION: 100 % | HEART RATE: 60 BPM | RESPIRATION RATE: 18 BRPM | WEIGHT: 175.2 LBS | HEIGHT: 68 IN | DIASTOLIC BLOOD PRESSURE: 78 MMHG | TEMPERATURE: 97.6 F | SYSTOLIC BLOOD PRESSURE: 130 MMHG

## 2023-09-12 DIAGNOSIS — Z79.01 LONG TERM CURRENT USE OF ANTICOAGULANTS WITH INR GOAL OF 2.0-3.0: ICD-10-CM

## 2023-09-12 DIAGNOSIS — I48.19 PERSISTENT ATRIAL FIBRILLATION (H): ICD-10-CM

## 2023-09-12 DIAGNOSIS — I48.19 PERSISTENT ATRIAL FIBRILLATION (H): Primary | ICD-10-CM

## 2023-09-12 DIAGNOSIS — N13.8 BENIGN LOCALIZED HYPERPLASIA OF PROSTATE WITH URINARY OBSTRUCTION: ICD-10-CM

## 2023-09-12 DIAGNOSIS — Z00.00 ENCOUNTER FOR MEDICARE ANNUAL WELLNESS EXAM: Primary | ICD-10-CM

## 2023-09-12 DIAGNOSIS — L72.3 INFECTED SEBACEOUS CYST: ICD-10-CM

## 2023-09-12 DIAGNOSIS — N40.1 BENIGN LOCALIZED HYPERPLASIA OF PROSTATE WITH URINARY OBSTRUCTION: ICD-10-CM

## 2023-09-12 DIAGNOSIS — L08.9 INFECTED SEBACEOUS CYST: ICD-10-CM

## 2023-09-12 LAB — INR BLD: 2.2 (ref 0.9–1.1)

## 2023-09-12 PROCEDURE — 36415 COLL VENOUS BLD VENIPUNCTURE: CPT | Performed by: PHYSICIAN ASSISTANT

## 2023-09-12 PROCEDURE — G0439 PPPS, SUBSEQ VISIT: HCPCS | Performed by: PHYSICIAN ASSISTANT

## 2023-09-12 PROCEDURE — 99213 OFFICE O/P EST LOW 20 MIN: CPT | Mod: 25 | Performed by: PHYSICIAN ASSISTANT

## 2023-09-12 PROCEDURE — 85610 PROTHROMBIN TIME: CPT | Performed by: PHYSICIAN ASSISTANT

## 2023-09-12 RX ORDER — DOXAZOSIN 8 MG/1
8 TABLET ORAL AT BEDTIME
Qty: 90 TABLET | Refills: 1 | Status: SHIPPED | OUTPATIENT
Start: 2023-09-12 | End: 2024-03-28

## 2023-09-12 SDOH — ECONOMIC STABILITY: FOOD INSECURITY: WITHIN THE PAST 12 MONTHS, THE FOOD YOU BOUGHT JUST DIDN'T LAST AND YOU DIDN'T HAVE MONEY TO GET MORE.: PATIENT DECLINED

## 2023-09-12 SDOH — ECONOMIC STABILITY: TRANSPORTATION INSECURITY
IN THE PAST 12 MONTHS, HAS LACK OF TRANSPORTATION KEPT YOU FROM MEETINGS, WORK, OR FROM GETTING THINGS NEEDED FOR DAILY LIVING?: PATIENT DECLINED

## 2023-09-12 SDOH — HEALTH STABILITY: PHYSICAL HEALTH: ON AVERAGE, HOW MANY MINUTES DO YOU ENGAGE IN EXERCISE AT THIS LEVEL?: PATIENT DECLINED

## 2023-09-12 SDOH — ECONOMIC STABILITY: INCOME INSECURITY: HOW HARD IS IT FOR YOU TO PAY FOR THE VERY BASICS LIKE FOOD, HOUSING, MEDICAL CARE, AND HEATING?: PATIENT DECLINED

## 2023-09-12 SDOH — ECONOMIC STABILITY: INCOME INSECURITY: IN THE LAST 12 MONTHS, WAS THERE A TIME WHEN YOU WERE NOT ABLE TO PAY THE MORTGAGE OR RENT ON TIME?: PATIENT REFUSED

## 2023-09-12 SDOH — HEALTH STABILITY: PHYSICAL HEALTH
ON AVERAGE, HOW MANY DAYS PER WEEK DO YOU ENGAGE IN MODERATE TO STRENUOUS EXERCISE (LIKE A BRISK WALK)?: PATIENT DECLINED

## 2023-09-12 SDOH — ECONOMIC STABILITY: TRANSPORTATION INSECURITY
IN THE PAST 12 MONTHS, HAS THE LACK OF TRANSPORTATION KEPT YOU FROM MEDICAL APPOINTMENTS OR FROM GETTING MEDICATIONS?: PATIENT DECLINED

## 2023-09-12 SDOH — ECONOMIC STABILITY: FOOD INSECURITY: WITHIN THE PAST 12 MONTHS, YOU WORRIED THAT YOUR FOOD WOULD RUN OUT BEFORE YOU GOT MONEY TO BUY MORE.: PATIENT DECLINED

## 2023-09-12 ASSESSMENT — SOCIAL DETERMINANTS OF HEALTH (SDOH)
HOW OFTEN DO YOU GET TOGETHER WITH FRIENDS OR RELATIVES?: PATIENT DECLINED
HOW OFTEN DO YOU ATTEND CHURCH OR RELIGIOUS SERVICES?: PATIENT DECLINED
IN A TYPICAL WEEK, HOW MANY TIMES DO YOU TALK ON THE PHONE WITH FAMILY, FRIENDS, OR NEIGHBORS?: PATIENT DECLINED
DO YOU BELONG TO ANY CLUBS OR ORGANIZATIONS SUCH AS CHURCH GROUPS UNIONS, FRATERNAL OR ATHLETIC GROUPS, OR SCHOOL GROUPS?: PATIENT DECLINED
ARE YOU MARRIED, WIDOWED, DIVORCED, SEPARATED, NEVER MARRIED, OR LIVING WITH A PARTNER?: PATIENT DECLINED

## 2023-09-12 ASSESSMENT — ENCOUNTER SYMPTOMS
PALPITATIONS: 0
HEMATOCHEZIA: 0
DIZZINESS: 1
HEMATURIA: 0
FEVER: 0
NAUSEA: 0
HEADACHES: 0
DIARRHEA: 0
CONSTIPATION: 0
COUGH: 0
JOINT SWELLING: 0
CHILLS: 0
ABDOMINAL PAIN: 0
SORE THROAT: 0
DYSURIA: 0
WEAKNESS: 0
EYE PAIN: 0
NERVOUS/ANXIOUS: 0
HEARTBURN: 0
MYALGIAS: 0
FREQUENCY: 1
PARESTHESIAS: 0
ARTHRALGIAS: 1
SHORTNESS OF BREATH: 0

## 2023-09-12 ASSESSMENT — LIFESTYLE VARIABLES
HOW OFTEN DO YOU HAVE SIX OR MORE DRINKS ON ONE OCCASION: PATIENT DECLINED
AUDIT-C TOTAL SCORE: -1
HOW MANY STANDARD DRINKS CONTAINING ALCOHOL DO YOU HAVE ON A TYPICAL DAY: PATIENT DECLINED
SKIP TO QUESTIONS 9-10: 0
HOW OFTEN DO YOU HAVE A DRINK CONTAINING ALCOHOL: PATIENT DECLINED

## 2023-09-12 ASSESSMENT — PAIN SCALES - GENERAL: PAINLEVEL: NO PAIN (0)

## 2023-09-12 ASSESSMENT — ACTIVITIES OF DAILY LIVING (ADL): CURRENT_FUNCTION: NO ASSISTANCE NEEDED

## 2023-09-12 NOTE — PATIENT INSTRUCTIONS
Patient Education   Personalized Prevention Plan  You are due for the preventive services outlined below.  Your care team is available to assist you in scheduling these services.  If you have already completed any of these items, please share that information with your care team to update in your medical record.  Health Maintenance Due   Topic Date Due     COVID-19 Vaccine (6 - Pfizer series) 02/18/2023     Flu Vaccine (1) 09/01/2023     Annual Wellness Visit  09/12/2023

## 2023-09-12 NOTE — PROGRESS NOTES
"SUBJECTIVE:   Neal is a 87 year old who presents for Preventive Visit.      9/12/2023    10:01 AM   Additional Questions   Roomed by Tamika Montero       Are you in the first 12 months of your Medicare coverage?  No    Healthy Habits:     In general, how would you rate your overall health?  Good    Frequency of exercise:  6-7 days/week    Duration of exercise:  Less than 15 minutes    Do you usually eat at least 4 servings of fruit and vegetables a day, include whole grains    & fiber and avoid regularly eating high fat or \"junk\" foods?  Yes    Taking medications regularly:  Yes    Medication side effects:  Not applicable and None    Ability to successfully perform activities of daily living:  No assistance needed    Home Safety:  No safety concerns identified    Hearing Impairment:  No hearing concerns    In the past 6 months, have you been bothered by leaking of urine?  No    In general, how would you rate your overall mental or emotional health?  Excellent    Additional concerns today:  No      On 9/8/23 had infected seb cyst with I&D. Repacked at  on 9/9/23. Started on 7 days keflex. Denies any pain no known worsening redness or drainage. Taking abx as prescribed without side effects.     History of a fib and sick sinus syndrome. Followed by cardiology. Stable on pacemaker. Denies symptoms.       Have you ever done Advance Care Planning? (For example, a Health Directive, POLST, or a discussion with a medical provider or your loved ones about your wishes): No, advance care planning information given to patient to review.  Patient declined advance care planning discussion at this time.    Fall risk  Fallen 2 or more times in the past year?: No  Any fall with injury in the past year?: No    Cognitive Screening   1) Repeat 3 items (Leader, Season, Table)    2) Clock draw: NORMAL  3) 3 item recall: Recalls 2 objects   Results: NORMAL clock, 1-2 items recalled: COGNITIVE IMPAIRMENT LESS LIKELY    Mini-CogTM " Copyright LION Gomez. Licensed by the author for use in [facility]; reprinted with permission (layton@[facility]). All rights reserved.      Do you have sleep apnea, excessive snoring or daytime drowsiness? : no    Reviewed and updated as needed this visit by clinical staff   Tobacco  Allergies  Meds  Problems  Med Hx  Surg Hx  Fam Hx          Reviewed and updated as needed this visit by Provider   Tobacco  Allergies  Meds  Problems  Med Hx  Surg Hx  Fam Hx         Social History     Tobacco Use     Smoking status: Former     Types: Cigarettes     Quit date: 1975     Years since quittin.7     Smokeless tobacco: Never   Substance Use Topics     Alcohol use: Yes     Comment: 1- 2 BEERS WEEKLY             2023    10:18 AM   Alcohol Use   Prescreen: >3 drinks/day or >7 drinks/week? No          No data to display              Do you have a current opioid prescription? No  Do you use any other controlled substances or medications that are not prescribed by a provider? None          Current providers sharing in care for this patient include:   Patient Care Team:  Leonides Aggarwal PA-C as PCP - General (Family Medicine)  Leonides Aggarwal PA-C as Assigned PCP  Nito Andino MD as MD (Neurology)  Raphael William DPM as Assigned Surgical Provider  Scott Messer MD as Assigned Heart and Vascular Provider    The following health maintenance items are reviewed in Epic and correct as of today:  Health Maintenance   Topic Date Due     COVID-19 Vaccine (6 - Pfizer series) 2023     INFLUENZA VACCINE (1) 2023     CMP  2024     MICROALBUMIN  2024     CREATININE  2024     ANNUAL REVIEW OF HM ORDERS  2024     MEDICARE ANNUAL WELLNESS VISIT  2024     FALL RISK ASSESSMENT  2024     DTAP/TDAP/TD IMMUNIZATION (2 - Td or Tdap) 2027     ADVANCE CARE PLANNING  2028     PHQ-2 (once per calendar year)   Completed     Pneumococcal Vaccine: 65+ Years  Completed     ZOSTER IMMUNIZATION  Completed     IPV IMMUNIZATION  Aged Out     HPV IMMUNIZATION  Aged Out     MENINGITIS IMMUNIZATION  Aged Out     Patient Active Problem List   Diagnosis     Esophageal reflux     Essential hypertension, benign     Testicular hypofunction     Impotence of organic origin     Gout     Actinic keratosis     Sick sinus syndrome (H)     Syncope     Hyponatremia     Pacemaker, artificial     Health Care Home     Atrial fibrillation (H)     Dilated aortic root (H)     Pulmonary hypertension (H)     H/O: GI bleed     Mixed hyperlipidemia     Faintness     Long-term (current) use of anticoagulants [Z79.01]     Acute idiopathic gout of left knee     Presbycusis, unspecified laterality     Chest pain     Pain in joint involving ankle and foot, right     SOB (shortness of breath)     Persistent atrial fibrillation (H)     Long term current use of anticoagulants with INR goal of 2.0-3.0     Past Surgical History:   Procedure Laterality Date     EP LEAD REPAIR FOR SINGLE ICD N/A 7/11/2023    Procedure: Pacemaker or Implantable Cardioverter Defibrillator Lead Repair-One Lead;  Surgeon: Scott Messer MD;  Location:  [facility] C[facility]     EP PACEMAKER GENERATOR REPLACEMENT- DUAL N/A 7/11/2023    Procedure: Pacemaker Generator Replacement Dual;  Surgeon: Scott Messer MD;  Location: [facility] CARDIAC CATH LAB     IMPLANT PACEMAKER  2011    Pacemaker/cardiac insitu / [employer] Dual chamber, V45     PHACOEMULSIFICATION CLEAR CORNEA WITH STANDARD INTRAOCULAR LENS IMPLANT Right 5/29/2018    Procedure: PHACOEMULSIFICATION CLEAR CORNEA WITH STANDARD INTRAOCULAR LENS IMPLANT;  RIGHT EYE PHACOEMULSIFICATION CLEAR CORNEA WITH STANDARD INTRAOCULAR LENS IMPLANT ;  Surgeon: Mat Echevarria MD;  Location: [facility]       Social History     Tobacco Use     Smoking status: Former     Types: Cigarettes     Quit date:  1975     Years since quittin.7     Smokeless tobacco: Never   Substance Use Topics     Alcohol use: Yes     Comment: 1- 2 BEERS WEEKLY     Family History   Problem Relation Age of Onset     Alzheimer Disease Brother      Cerebrovascular Disease Father 80     Family History Negative Sister      Family History Negative Son      Family History Negative Daughter      Family History Negative Sister      Family History Negative Daughter      Breast Cancer No family hx of      Prostate Cancer No family hx of          Current Outpatient Medications   Medication Sig Dispense Refill     cephALEXin (KEFLEX) 500 MG capsule Take 1 capsule (500 mg) by mouth 3 times daily 21 capsule 0     doxazosin (CARDURA) 8 MG tablet Take 1 tablet (8 mg) by mouth At Bedtime 90 tablet 1     losartan (COZAAR) 100 MG tablet Take 1 tablet (100 mg) by mouth daily 90 tablet 3     metoprolol succinate ER (TOPROL XL) 50 MG 24 hr tablet Take 1 tablet (50 mg) by mouth daily 90 tablet 1     pantoprazole (PROTONIX) 40 MG EC tablet TAKE 1 TABLET(40 MG) BY MOUTH DAILY 90 tablet 2     Polyethylene Glycol 400 (BLINK TEARS OP) Apply to eye as needed       rosuvastatin (CRESTOR) 5 MG tablet Take 1 tablet (5 mg) by mouth daily 90 tablet 3     warfarin ANTICOAGULANT (COUMADIN) 2.5 MG tablet Take 1.25 to 2.5mg (1/2 to 1 tabs) by mouth daily OR AS DIRECTED.  Adjust dose based on INR. 90 tablet 1     Allergies   Allergen Reactions     Neomycin Sulfate              Review of Systems   Constitutional:  Negative for chills and fever.   HENT:  Positive for hearing loss. Negative for congestion, ear pain and sore throat.    Eyes:  Negative for pain and visual disturbance.   Respiratory:  Negative for cough and shortness of breath.    Cardiovascular:  Negative for chest pain, palpitations and peripheral edema.   Gastrointestinal:  Negative for abdominal pain, constipation, diarrhea, heartburn, hematochezia and nausea.   Genitourinary:  Positive for frequency.  "Negative for dysuria, genital sores, hematuria, impotence, penile discharge and urgency.   Musculoskeletal:  Positive for arthralgias. Negative for joint swelling and myalgias.   Skin:  Negative for rash.   Neurological:  Positive for dizziness. Negative for weakness, headaches and paresthesias.   Psychiatric/Behavioral:  Negative for mood changes. The patient is not nervous/anxious.        OBJECTIVE:   /78 (BP Location: Left arm, Patient Position: Chair, Cuff Size: Adult Regular)   Pulse 60   Temp 97.6  F (36.4  C) (Oral)   Resp 18   Ht 1.727 m (5' 8\")   Wt 79.5 kg (175 lb 3.2 oz)   SpO2 100%   BMI 26.64 kg/m   Estimated body mass index is 26.64 kg/m  as calculated from the following:    Height as of this encounter: 1.727 m (5' 8\").    Weight as of this encounter: 79.5 kg (175 lb 3.2 oz).  Physical Exam  GENERAL: healthy, alert and no distress  RESP: lungs clear to auscultation - no rales, rhonchi or wheezes  CV: regular paced rates and rhythm  SKIN: s/p I&D cyst noted on right upper back without erythema or warmth. No drainage.   PSYCH: mentation appears normal, affect normal/bright    Diagnostic Test Results:  none     ASSESSMENT / PLAN:   (Z00.00) Encounter for Medicare annual wellness exam  (primary encounter diagnosis)  Comment: stable wellness.   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 To maintain. Annual rechecks.   Plan: doxazosin (CARDURA) 8 MG tablet        Medication use and side effects discussed with the patient. Patient is in complete understanding and agreement with plan.       (I48.19) Persistent atrial fibrillation (H)  Comment: followed by cardiology. Stable.   Plan:     (L72.3,  L08.9) Infected sebaceous cyst  Comment: appears well healing. No further packing felt indicated. Pressure dressing today. Change in 48 hours. Avoid submerging in water until wound closed. Continued abx until finished. Follow up prn. " "  Plan:     Patient has been advised of split billing requirements and indicates understanding: Yes      COUNSELING:  Reviewed preventive health counseling, as reflected in patient instructions       Regular exercise       Healthy diet/nutrition      BMI:   Estimated body mass index is 26.64 kg/m  as calculated from the following:    Height as of this encounter: 1.727 m (5' 8\").    Weight as of this encounter: 79.5 kg (175 lb 3.2 oz).         He reports that he quit smoking about 48 years ago. His smoking use included cigarettes. He has never used smokeless tobacco.      Appropriate preventive services were discussed with this patient, including applicable screening as appropriate for cardiovascular disease, diabetes, osteopenia/osteoporosis, and glaucoma.  As appropriate for age/gender, discussed screening for colorectal cancer, prostate cancer, breast cancer, and cervical cancer. Checklist reviewing preventive services available has been given to the patient.    Reviewed patients plan of care and provided an AVS. The Basic Care Plan (routine screening as documented in Health Maintenance) for Nathen meets the Care Plan requirement. This Care Plan has been established and reviewed with the Patient.    Leonides Aggarwal PA-C  [facility]    Identified Health Risks:  I have reviewed Opioid Use Disorder and Substance Use Disorder risk factors and made any needed referrals.   "

## 2023-09-12 NOTE — PROGRESS NOTES
ANTICOAGULATION MANAGEMENT     Nathen Gage 87 year old male is on warfarin with therapeutic INR result. (Goal INR 2.0-2.5)    Recent labs: (last 7 days)     09/12/23  1202   INR 2.2*       ASSESSMENT     Source(s): Chart Review and Patient/Caregiver Call     Warfarin doses taken: Warfarin taken as instructed  Diet: No new diet changes identified  Medication/supplement changes: Keflex 9/8/23 x 7 days  New illness, injury, or hospitalization: Yes: office visit on 9/7/23 and urgent care visit on 9/9/23 for back abscess. Wellness visit today--visit notes not complete but patient denies any changes.  ASSESSMENT     Signs or symptoms of bleeding or clotting: No  Previous result: Subtherapeutic  Additional findings: None       PLAN     Recommended plan for temporary change(s) affecting INR     Dosing Instructions: Continue your current warfarin dose with next INR in 3 weeks       Summary  As of 9/12/2023      Full warfarin instructions:  1.25 mg every Mon, Wed, Fri; 2.5 mg all other days   Next INR check:  10/3/2023               Telephone call with Neal who agrees to plan and repeated back plan correctly    Lab visit scheduled    Education provided:   Contact [phone]  with any changes, questions or concerns.     Plan made per [facility] anticoagulation protocol    Fidelina Pizano RN  Anticoagulation Clinic  9/12/2023    _______________________________________________________________________     Anticoagulation Episode Summary       Current INR goal:  2.0-2.5   TTR:  50.3 % (1 y)   Target end date:  Indefinite   Send INR reminders to:  [facility]    Indications    Persistent atrial fibrillation (H) [I48.19]  Long term current use of anticoagulants with INR goal of 2.0-3.0 [Z79.01]             Comments:               Anticoagulation Care Providers       Provider Role Specialty Phone number    Kushal Valentin MD Referring Family Medicine [phone]    Leonides Aggarwal PA-C Referring Family Medicine  [phone]

## 2023-09-13 NOTE — PROGRESS NOTES
Anticoagulation Management    Unable to reach Neal today.    Today's INR result of 2.6 is supratherapeutic (goal INR of 2.0-2.5).  Result received from: Clinic Lab    Follow up required to confirm warfarin dose taken and assess for changes    Left VM to call Tara with transfer to Fidelina Kitchen:[phone] OR transfer to:[facility]    Anticoagulation clinic to follow up    Fidelina Pizano RN       Weekly Staffing Report      Date Admitted: 8/23/2023 :   Staffing Date: 9/13/2023     Patient Active Problem List   Diagnosis    Cardiac arrest    Uncontrolled type 2 diabetes mellitus with hyperglycemia    Atrial flutter    Severe sepsis    Acute hypoxemic respiratory failure    Constipation    Abnormal urinalysis    Obstructive sleep apnea syndrome    Acute deep vein thrombosis (DVT) of brachial vein of right upper extremity    Quadriplegia    Intolerance of continuous positive airway pressure (CPAP) ventilation    Complex sleep apnea syndrome    Open wound of left hip    Pressure injury of right ischium, stage 4    Osteomyelitis    Sacral decubitus ulcer, stage II    Chronic blood loss anemia          Team Members Present:       Nursing Present     Yes [x]    No []    Physical Therapy Present    Yes [x]    No []    Occupational Therapy Present     Yes [x]    No []    Speech Therapy Present    Yes []    No []    NA [x]    Dietary Present    Yes [x]    No []        Physician Present   [] Dm Acevedo    [] Thairy Reyes    [] SHRAVAN Soares    [] SIDRA Donohue     [] SHAWN Avila      Family Present    [] Adult Children    [] Spouse    [] POA    [] Friend/ Caregiver    [] Other       Interdisciplinary Meeting Notes:   Patient did not want to contact family as they were not in the room. Stated he would update. Dr. Mann present. Dr. Mann present. PT- Passive ROM. OT- does not see . Appetite good. Medically- started on aspirin low dose daily due to atrial flutter. Due to recent bleed will not do full coagulation. Medication changes done. Abx changed due to nephrotoxicity. All questions answered at this time                Please see Documented PT/OT/ST, Dietary, Nursing, and Physician notes for detailed treatment information.

## 2023-09-19 DIAGNOSIS — I48.0 PAROXYSMAL ATRIAL FIBRILLATION (H): ICD-10-CM

## 2023-09-19 DIAGNOSIS — Z79.01 LONG TERM CURRENT USE OF ANTICOAGULANTS WITH INR GOAL OF 2.0-3.0: ICD-10-CM

## 2023-09-19 DIAGNOSIS — I48.19 PERSISTENT ATRIAL FIBRILLATION (H): Primary | ICD-10-CM

## 2023-09-19 RX ORDER — WARFARIN SODIUM 2.5 MG/1
TABLET ORAL
Qty: 90 TABLET | Refills: 1 | Status: SHIPPED | OUTPATIENT
Start: 2023-09-19 | End: 2024-05-07

## 2023-09-26 DIAGNOSIS — I77.810 DILATED AORTIC ROOT (H): Primary | ICD-10-CM

## 2023-09-26 DIAGNOSIS — I15.9 SECONDARY HYPERTENSION: ICD-10-CM

## 2023-10-03 ENCOUNTER — ANTICOAGULATION THERAPY VISIT (OUTPATIENT)
Dept: ANTICOAGULATION | Facility: CLINIC | Age: 88
End: 2023-10-03

## 2023-10-03 ENCOUNTER — LAB (OUTPATIENT)
Dept: LAB | Facility: CLINIC | Age: 88
End: 2023-10-03
Payer: COMMERCIAL

## 2023-10-03 ENCOUNTER — TELEPHONE (OUTPATIENT)
Dept: CARDIOLOGY | Facility: CLINIC | Age: 88
End: 2023-10-03

## 2023-10-03 DIAGNOSIS — I48.19 PERSISTENT ATRIAL FIBRILLATION (H): Primary | ICD-10-CM

## 2023-10-03 DIAGNOSIS — I48.19 PERSISTENT ATRIAL FIBRILLATION (H): ICD-10-CM

## 2023-10-03 DIAGNOSIS — Z79.01 LONG TERM CURRENT USE OF ANTICOAGULANTS WITH INR GOAL OF 2.0-3.0: ICD-10-CM

## 2023-10-03 LAB — INR BLD: 2.7 (ref 0.9–1.1)

## 2023-10-03 PROCEDURE — 85610 PROTHROMBIN TIME: CPT

## 2023-10-03 PROCEDURE — 36416 COLLJ CAPILLARY BLOOD SPEC: CPT

## 2023-10-03 NOTE — PROGRESS NOTES
ANTICOAGULATION MANAGEMENT     Nathen Gage 87 year old male is on warfarin with supratherapeutic INR result. (Goal INR 2.0-2.5)    Recent labs: (last 7 days)     10/03/23  0942   INR 2.7*       ASSESSMENT     Source(s): Chart Review and Patient/Caregiver Call     Warfarin doses taken: Warfarin taken as instructed  Diet: No new diet changes identified  Medication/supplement changes: None noted  New illness, injury, or hospitalization: No  Signs or symptoms of bleeding or clotting: No  Previous result: Therapeutic last visit; previously outside of goal range  Additional findings: None       PLAN     Recommended plan for no diet, medication or health factor changes affecting INR     Dosing Instructions: Continue your current warfarin dose with next INR in 1-2 weeks       Summary  As of 10/3/2023      Full warfarin instructions:  1.25 mg every Mon, Wed, Fri; 2.5 mg all other days   Next INR check:  10/17/2023               Telephone call with Neal who verbalizes understanding and agrees to plan    Lab visit scheduled    Education provided:   Please call back if any changes to your diet, medications or how you've been taking warfarin  Contact [phone]  with any changes, questions or concerns.     Plan made per [facility] anticoagulation protocol    Teresa Castro RN  Anticoagulation Clinic  10/3/2023    _______________________________________________________________________     Anticoagulation Episode Summary       Current INR goal:  2.0-2.5   TTR:  48.9 % (1 y)   Target end date:  Indefinite   Send INR reminders to:  [facility]    Indications    Persistent atrial fibrillation (H) [I48.19]  Long term current use of anticoagulants with INR goal of 2.0-3.0 [Z79.01]             Comments:               Anticoagulation Care Providers       Provider Role Specialty Phone number    Kushal Valentin MD Referring Family Medicine [phone]    Leonides Aggarwal PA-C Referring Family Medicine [phone]

## 2023-10-03 NOTE — TELEPHONE ENCOUNTER
VM received: Pt would like to reschedule his remote on 10/20/2023 as he will not be home  Returned call : had to leave a VM for pt to call back and reschedule.  Awaiting call back  Saima WISDOM

## 2023-10-17 ENCOUNTER — LAB (OUTPATIENT)
Dept: LAB | Facility: CLINIC | Age: 88
End: 2023-10-17
Payer: COMMERCIAL

## 2023-10-17 ENCOUNTER — ANTICOAGULATION THERAPY VISIT (OUTPATIENT)
Dept: ANTICOAGULATION | Facility: CLINIC | Age: 88
End: 2023-10-17

## 2023-10-17 ENCOUNTER — ALLIED HEALTH/NURSE VISIT (OUTPATIENT)
Dept: FAMILY MEDICINE | Facility: CLINIC | Age: 88
End: 2023-10-17
Payer: COMMERCIAL

## 2023-10-17 DIAGNOSIS — Z23 HIGH PRIORITY FOR 2019-NCOV VACCINE: ICD-10-CM

## 2023-10-17 DIAGNOSIS — Z23 NEED FOR PROPHYLACTIC VACCINATION AND INOCULATION AGAINST INFLUENZA: Primary | ICD-10-CM

## 2023-10-17 DIAGNOSIS — Z79.01 LONG TERM CURRENT USE OF ANTICOAGULANTS WITH INR GOAL OF 2.0-3.0: ICD-10-CM

## 2023-10-17 DIAGNOSIS — I48.19 PERSISTENT ATRIAL FIBRILLATION (H): Primary | ICD-10-CM

## 2023-10-17 DIAGNOSIS — I48.19 PERSISTENT ATRIAL FIBRILLATION (H): ICD-10-CM

## 2023-10-17 LAB — INR BLD: 2.3 (ref 0.9–1.1)

## 2023-10-17 PROCEDURE — 90480 ADMN SARSCOV2 VAC 1/ONLY CMP: CPT

## 2023-10-17 PROCEDURE — 85610 PROTHROMBIN TIME: CPT

## 2023-10-17 PROCEDURE — 99207 PR NO CHARGE NURSE ONLY: CPT

## 2023-10-17 PROCEDURE — 36416 COLLJ CAPILLARY BLOOD SPEC: CPT

## 2023-10-17 PROCEDURE — G0008 ADMIN INFLUENZA VIRUS VAC: HCPCS

## 2023-10-17 PROCEDURE — 91320 SARSCV2 VAC 30MCG TRS-SUC IM: CPT

## 2023-10-17 PROCEDURE — 90662 IIV NO PRSV INCREASED AG IM: CPT

## 2023-10-17 NOTE — PROGRESS NOTES
ANTICOAGULATION MANAGEMENT     Nathen Gage 87 year old male is on warfarin with therapeutic INR result. (Goal INR 2.0-2.5)    Recent labs: (last 7 days)     10/17/23  1011   INR 2.3*       ASSESSMENT     Source(s): Chart Review and Patient/Caregiver Call     Warfarin doses taken: Warfarin taken as instructed  Diet: No new diet changes identified  Medication/supplement changes: None noted  New illness, injury, or hospitalization: No  Signs or symptoms of bleeding or clotting: No  Previous result: Supratherapeutic  Additional findings: None       PLAN     Recommended plan for no diet, medication or health factor changes affecting INR     Dosing Instructions: Continue your current warfarin dose with next INR in 3 weeks       Summary  As of 10/17/2023      Full warfarin instructions:  1.25 mg every Mon, Wed, Fri; 2.5 mg all other days   Next INR check:  11/7/2023               Telephone call with Neal who verbalizes understanding and agrees to plan    Lab visit scheduled    Education provided:   Please call back if any changes to your diet, medications or how you've been taking warfarin  Contact [phone]  with any changes, questions or concerns.     Plan made per [facility] anticoagulation protocol    Shawna Sellers RN  Anticoagulation Clinic  10/17/2023    _______________________________________________________________________     Anticoagulation Episode Summary       Current INR goal:  2.0-2.5   TTR:  48.1% (1 y)   Target end date:  Indefinite   Send INR reminders to:  [facility]    Indications    Persistent atrial fibrillation (H) [I48.19]  Long term current use of anticoagulants with INR goal of 2.0-3.0 [Z79.01]             Comments:               Anticoagulation Care Providers       Provider Role Specialty Phone number    Kushal Valentin MD Referring Family Medicine [phone]    Leonides Aggarwal PA-C Referring Family Medicine [phone]

## 2023-10-24 ENCOUNTER — DOCUMENTATION ONLY (OUTPATIENT)
Dept: ANTICOAGULATION | Facility: CLINIC | Age: 88
End: 2023-10-24

## 2023-10-24 ENCOUNTER — ANCILLARY PROCEDURE (OUTPATIENT)
Dept: CARDIOLOGY | Facility: CLINIC | Age: 88
End: 2023-10-24
Attending: INTERNAL MEDICINE
Payer: COMMERCIAL

## 2023-10-24 DIAGNOSIS — Z79.01 LONG TERM CURRENT USE OF ANTICOAGULANTS WITH INR GOAL OF 2.0-3.0: Primary | ICD-10-CM

## 2023-10-24 DIAGNOSIS — Z95.0 CARDIAC PACEMAKER IN SITU: ICD-10-CM

## 2023-10-24 LAB
MDC_IDC_EPISODE_DTM: NORMAL
MDC_IDC_EPISODE_DURATION: 11 S
MDC_IDC_EPISODE_DURATION: 12 S
MDC_IDC_EPISODE_DURATION: 13 S
MDC_IDC_EPISODE_DURATION: 14 S
MDC_IDC_EPISODE_ID: NORMAL
MDC_IDC_EPISODE_TYPE: NORMAL
MDC_IDC_EPISODE_TYPE_INDUCED: NO
MDC_IDC_LEAD_CONNECTION_STATUS: NORMAL
MDC_IDC_LEAD_CONNECTION_STATUS: NORMAL
MDC_IDC_LEAD_IMPLANT_DT: NORMAL
MDC_IDC_LEAD_IMPLANT_DT: NORMAL
MDC_IDC_LEAD_LOCATION: NORMAL
MDC_IDC_LEAD_LOCATION: NORMAL
MDC_IDC_LEAD_LOCATION_DETAIL_1: NORMAL
MDC_IDC_LEAD_LOCATION_DETAIL_1: NORMAL
MDC_IDC_LEAD_MFG: NORMAL
MDC_IDC_LEAD_MFG: NORMAL
MDC_IDC_LEAD_MODEL: NORMAL
MDC_IDC_LEAD_MODEL: NORMAL
MDC_IDC_LEAD_POLARITY_TYPE: NORMAL
MDC_IDC_LEAD_POLARITY_TYPE: NORMAL
MDC_IDC_LEAD_SERIAL: NORMAL
MDC_IDC_LEAD_SERIAL: NORMAL
MDC_IDC_MSMT_BATTERY_DTM: NORMAL
MDC_IDC_MSMT_BATTERY_REMAINING_LONGEVITY: 168 MO
MDC_IDC_MSMT_BATTERY_REMAINING_PERCENTAGE: 100 %
MDC_IDC_MSMT_BATTERY_STATUS: NORMAL
MDC_IDC_MSMT_LEADCHNL_RA_IMPEDANCE_VALUE: 635 OHM
MDC_IDC_MSMT_LEADCHNL_RA_PACING_THRESHOLD_AMPLITUDE: 0.5 V
MDC_IDC_MSMT_LEADCHNL_RA_PACING_THRESHOLD_PULSEWIDTH: 0.4 MS
MDC_IDC_MSMT_LEADCHNL_RV_IMPEDANCE_VALUE: 490 OHM
MDC_IDC_MSMT_LEADCHNL_RV_PACING_THRESHOLD_AMPLITUDE: 1 V
MDC_IDC_MSMT_LEADCHNL_RV_PACING_THRESHOLD_PULSEWIDTH: 0.4 MS
MDC_IDC_PG_IMPLANT_DTM: NORMAL
MDC_IDC_PG_MFG: NORMAL
MDC_IDC_PG_MODEL: NORMAL
MDC_IDC_PG_SERIAL: NORMAL
MDC_IDC_PG_TYPE: NORMAL
MDC_IDC_SESS_CLINIC_NAME: NORMAL
MDC_IDC_SESS_DTM: NORMAL
MDC_IDC_SESS_TYPE: NORMAL
MDC_IDC_SET_BRADY_AT_MODE_SWITCH_MODE: NORMAL
MDC_IDC_SET_BRADY_AT_MODE_SWITCH_RATE: 170 {BEATS}/MIN
MDC_IDC_SET_BRADY_LOWRATE: 60 {BEATS}/MIN
MDC_IDC_SET_BRADY_MAX_SENSOR_RATE: 120 {BEATS}/MIN
MDC_IDC_SET_BRADY_MAX_TRACKING_RATE: 120 {BEATS}/MIN
MDC_IDC_SET_BRADY_MODE: NORMAL
MDC_IDC_SET_BRADY_PAV_DELAY_HIGH: 200 MS
MDC_IDC_SET_BRADY_PAV_DELAY_LOW: 300 MS
MDC_IDC_SET_BRADY_SAV_DELAY_HIGH: 200 MS
MDC_IDC_SET_BRADY_SAV_DELAY_LOW: 300 MS
MDC_IDC_SET_LEADCHNL_RA_PACING_AMPLITUDE: 2 V
MDC_IDC_SET_LEADCHNL_RA_PACING_CAPTURE_MODE: NORMAL
MDC_IDC_SET_LEADCHNL_RA_PACING_POLARITY: NORMAL
MDC_IDC_SET_LEADCHNL_RA_PACING_PULSEWIDTH: 0.4 MS
MDC_IDC_SET_LEADCHNL_RA_SENSING_ADAPTATION_MODE: NORMAL
MDC_IDC_SET_LEADCHNL_RA_SENSING_POLARITY: NORMAL
MDC_IDC_SET_LEADCHNL_RA_SENSING_SENSITIVITY: 0.5 MV
MDC_IDC_SET_LEADCHNL_RV_PACING_AMPLITUDE: 3.5 V
MDC_IDC_SET_LEADCHNL_RV_PACING_CAPTURE_MODE: NORMAL
MDC_IDC_SET_LEADCHNL_RV_PACING_POLARITY: NORMAL
MDC_IDC_SET_LEADCHNL_RV_PACING_PULSEWIDTH: 0.4 MS
MDC_IDC_SET_LEADCHNL_RV_SENSING_ADAPTATION_MODE: NORMAL
MDC_IDC_SET_LEADCHNL_RV_SENSING_POLARITY: NORMAL
MDC_IDC_SET_LEADCHNL_RV_SENSING_SENSITIVITY: 5 MV
MDC_IDC_SET_ZONE_DETECTION_INTERVAL: 353 MS
MDC_IDC_SET_ZONE_STATUS: NORMAL
MDC_IDC_SET_ZONE_TYPE: NORMAL
MDC_IDC_SET_ZONE_VENDOR_TYPE: NORMAL
MDC_IDC_STAT_AT_BURDEN_PERCENT: 0 %
MDC_IDC_STAT_AT_DTM_END: NORMAL
MDC_IDC_STAT_AT_DTM_START: NORMAL
MDC_IDC_STAT_BRADY_DTM_END: NORMAL
MDC_IDC_STAT_BRADY_DTM_START: NORMAL
MDC_IDC_STAT_BRADY_RA_PERCENT_PACED: 68 %
MDC_IDC_STAT_BRADY_RV_PERCENT_PACED: 17 %
MDC_IDC_STAT_EPISODE_RECENT_COUNT: 0
MDC_IDC_STAT_EPISODE_RECENT_COUNT: 0
MDC_IDC_STAT_EPISODE_RECENT_COUNT: 6
MDC_IDC_STAT_EPISODE_RECENT_COUNT_DTM_END: NORMAL
MDC_IDC_STAT_EPISODE_RECENT_COUNT_DTM_START: NORMAL
MDC_IDC_STAT_EPISODE_TYPE: NORMAL
MDC_IDC_STAT_EPISODE_VENDOR_TYPE: NORMAL
MDC_IDC_STAT_EPISODE_VENDOR_TYPE: NORMAL

## 2023-10-24 PROCEDURE — 93294 REM INTERROG EVL PM/LDLS PM: CPT | Performed by: INTERNAL MEDICINE

## 2023-10-24 PROCEDURE — 93296 REM INTERROG EVL PM/IDS: CPT | Performed by: INTERNAL MEDICINE

## 2023-10-24 NOTE — PROGRESS NOTES
ANTICOAGULATION CLINIC REFERRAL RENEWAL REQUEST       An annual renewal order is required for all patients referred to [facility].?  Please review and sign the pended referral order for Nathen Gage.       ANTICOAGULATION SUMMARY      Warfarin indication(s)   Atrial Fibrillation    Mechanical heart valve present?  NO       Current goal range   INR 2.0-2.5     Goal appropriate for indication? Lower INR goal range due to frequent nosebleeds at higher range    Time in Therapeutic Range (TTR)  (Goal > 60%) 48.1%       Office visit with referring provider's group within last year yes on 9/12/23       Joanne Bustos RN  [facility]

## 2023-11-07 ENCOUNTER — ANTICOAGULATION THERAPY VISIT (OUTPATIENT)
Dept: ANTICOAGULATION | Facility: CLINIC | Age: 88
End: 2023-11-07

## 2023-11-07 ENCOUNTER — LAB (OUTPATIENT)
Dept: LAB | Facility: CLINIC | Age: 88
End: 2023-11-07
Payer: COMMERCIAL

## 2023-11-07 DIAGNOSIS — Z79.01 LONG TERM CURRENT USE OF ANTICOAGULANTS WITH INR GOAL OF 2.0-3.0: ICD-10-CM

## 2023-11-07 LAB — INR BLD: 2.1 (ref 0.9–1.1)

## 2023-11-07 PROCEDURE — 36416 COLLJ CAPILLARY BLOOD SPEC: CPT

## 2023-11-07 PROCEDURE — 85610 PROTHROMBIN TIME: CPT

## 2023-11-07 NOTE — PROGRESS NOTES
ANTICOAGULATION MANAGEMENT     Nathen Gage 88 year old male is on warfarin with therapeutic INR result. (Goal INR 2.0-2.5)    Recent labs: (last 7 days)     11/07/23  1035   INR 2.1*       ASSESSMENT     Source(s): Chart Review  Previous INR was Therapeutic last visit; previously outside of goal range  Left a detailed voicemail message with dosing and when to recheck INR. Pt to call back if questions, updates or concerns.         PLAN       Dosing Instructions: Continue your current warfarin dose with next INR in 4 weeks       Summary  As of 11/7/2023      Full warfarin instructions:  1.25 mg every Mon, Wed, Fri; 2.5 mg all other days   Next INR check:  12/5/2023               Detailed voice message left for Neal with dosing instructions and follow up date.     Contact [phone]  to schedule and with any changes, questions or concerns.     Education provided:   Please call back if any changes to your diet, medications or how you've been taking warfarin  Contact [phone]  with any changes, questions or concerns.     Plan made per [facility] anticoagulation protocol    Bertha Hill RN  Anticoagulation Clinic  11/7/2023    _______________________________________________________________________     Anticoagulation Episode Summary       Current INR goal:  2.0-2.5   TTR:  49.2% (1 y)   Target end date:  Indefinite   Send INR reminders to:  [facility]    Indications    Persistent atrial fibrillation (H) [I48.19]  Long term current use of anticoagulants with INR goal of 2.0-3.0 [Z79.01]             Comments:               Anticoagulation Care Providers       Provider Role Specialty Phone number    Kushal Valentin MD Referring Family Medicine [phone]    Leonides Aggarwal PA-C Referring Family Medicine [phone]

## 2023-11-20 DIAGNOSIS — I15.9 SECONDARY HYPERTENSION: ICD-10-CM

## 2023-11-20 RX ORDER — LOSARTAN POTASSIUM 100 MG/1
100 TABLET ORAL DAILY
Qty: 90 TABLET | Refills: 3 | OUTPATIENT
Start: 2023-11-20

## 2023-11-28 ENCOUNTER — OFFICE VISIT (OUTPATIENT)
Dept: FAMILY MEDICINE | Facility: CLINIC | Age: 88
End: 2023-11-28
Payer: COMMERCIAL

## 2023-11-28 VITALS
HEIGHT: 68 IN | TEMPERATURE: 97.4 F | OXYGEN SATURATION: 98 % | SYSTOLIC BLOOD PRESSURE: 128 MMHG | RESPIRATION RATE: 16 BRPM | BODY MASS INDEX: 26.16 KG/M2 | WEIGHT: 172.6 LBS | HEART RATE: 60 BPM | DIASTOLIC BLOOD PRESSURE: 77 MMHG

## 2023-11-28 DIAGNOSIS — S46.911A STRAIN OF RIGHT SHOULDER, INITIAL ENCOUNTER: Primary | ICD-10-CM

## 2023-11-28 DIAGNOSIS — R41.89 COGNITIVE CHANGE: ICD-10-CM

## 2023-11-28 PROCEDURE — 99214 OFFICE O/P EST MOD 30 MIN: CPT | Performed by: PHYSICIAN ASSISTANT

## 2023-11-28 ASSESSMENT — PAIN SCALES - GENERAL: PAINLEVEL: NO PAIN (0)

## 2023-11-28 NOTE — PROGRESS NOTES
"  Assessment & Plan     Strain of right shoulder, initial encounter  Exam benign. With improved symptoms and DEEDEE, likely strain. Ice and rest discussed. May alternative with heat also.  If not resolved in 2-3 weeks, will have see formal physical therapy.     Cognitive change  Wife is concerned. However 6 cit is normal today. 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 Possible impairment in hearing of wife or possible \"selective hearing\" concerns. Reassured but if progressing, can see neuropsych for formal assessment in future.       Leonides Aggarwal PA-C  [facility]    David De Jesus is a 88 year old, presenting for the following health issues:  Musculoskeletal Problem        11/28/2023     9:28 AM   Additional Questions   Roomed by Tamika Montero       Musculoskeletal Problem  This is a new problem. The current episode started more than 1 month ago. The problem has been gradually improving. The symptoms are aggravated by exertion. He has tried rest for the symptoms.   History of Present Illness       Reason for visit:  Right arm  Symptom onset:  More than a month  Symptom intensity:  Mild  Symptom progression:  Improving  Had these symptoms before:  No    He eats 0-1 servings of fruits and vegetables daily.He consumes 1 sweetened beverage(s) daily.He exercises with enough effort to increase his heart rate 10 to 19 minutes per day.  He exercises with enough effort to increase his heart rate 3 or less days per week.   He is taking medications regularly.       Right shoulder. Started after doing 100 reps of bench press machine. Light weight. No acute pain during. Starting hours after. Has improved. No treatments tried. Worse with lifting coffee mug. No other pain.       PT has concerns for short term memory loss.      Review of Systems   Constitutional, HEENT, cardiovascular, pulmonary, GI, , musculoskeletal, neuro, skin, endocrine and psych systems are negative, except as " "otherwise noted.      Objective    /77 (BP Location: Right arm, Patient Position: Sitting, Cuff Size: Adult Large)   Pulse 60   Temp 97.4  F (36.3  C) (Oral)   Resp 16   Ht 1.735 m (5' 8.3\")   Wt 78.3 kg (172 lb 9.6 oz)   SpO2 98%   BMI 26.01 kg/m    Body mass index is 26.01 kg/m .  Physical Exam   ORTHO:   SHOULDER Exam-Right   Inspection: no swelling, no bruising, no discoloration, no obvious deformity, no asymmetry, no glenohumeral joint anterior bulge, no distal clavicle elevation, no muscle atrophy, no scapular winging   Tenderness of: SC joint- no, clavicle(prox-mid)- no, clavicle-(mid-distal)- no, AC joint- no, acromion- no, anterior capsule- no, prox bicep tendon- no, greater tuberosity- no, prox humerus- no, supraspinatous- no, infraspinatous- no, superior trapezious- no, rhomboids- no   Range of Motion: Active- forward flexion- normal, abduction- normal, external rotation- normal, internal rotation- normal  Range of Motion: Passive- forward flexion- normal, abduction- normal, external rotation- normal, internal rotation- normal   Strength: forward flexion- 5/5, abduction- 5/5, internal rotation- 5/5, external rotation- 5/5 and bicep- full   Special tests: Neers- negative, Osborne(supraspinatous)- negative, Positive painful arc- negative, cross arm adduction- negative, Speeds- negative, and lift off and empty can negative.         PSYCH: mentation appears normal and affect normal/bright    Six Item Cognitive Impairment Test   (6CIT):    What year is it?                               Correct - 0 points  What month is it?                               Correct - 0 points    Give the patient an address to remember with five components:   Alfredo Mcfar[place] ( first and last name - 2 components)   [address]  (number and name of street - 2 components)   [place] ( city - 1 component)    About what time is it (within the hour)? Correct - 0 points  Count backwards from 20 to 1:   Correct - 0 points  Say " the months of the year in reverse: Correct - 0 points  Repeat the address phrase:   Correct - 0 points    Total 6CIT Score:      0/28    Interpretation: The 6CIT uses an inverse score and questions are weighted to produce a total out of 28. Scores of 0-7 are considered normal and 8 or more significant.    Advantages The test has high sensitivity without compromising specificity even in mild dementia. It is easy to translate linguistically and culturally.  Disadvantages The main disadvantage is in the scoring and weighting of the test, which is initially confusing, however computer models have simplified this greatly.    Probability Statistics: At the 7/8 cut off: Overall figures sensitivity 90% specificity 100%, in mild dementia sensitivity = 78% , specificity = 100%    Copyright 2000 [facility], [facility], [place]. Courtesy of Dr. Demarco Jimenez

## 2023-12-05 ENCOUNTER — LAB (OUTPATIENT)
Dept: LAB | Facility: CLINIC | Age: 88
End: 2023-12-05
Payer: COMMERCIAL

## 2023-12-05 ENCOUNTER — ANTICOAGULATION THERAPY VISIT (OUTPATIENT)
Dept: ANTICOAGULATION | Facility: CLINIC | Age: 88
End: 2023-12-05

## 2023-12-05 DIAGNOSIS — Z79.01 LONG TERM CURRENT USE OF ANTICOAGULANTS WITH INR GOAL OF 2.0-3.0: ICD-10-CM

## 2023-12-05 DIAGNOSIS — I48.19 PERSISTENT ATRIAL FIBRILLATION (H): Primary | ICD-10-CM

## 2023-12-05 LAB — INR BLD: 1.9 (ref 0.9–1.1)

## 2023-12-05 PROCEDURE — 85610 PROTHROMBIN TIME: CPT

## 2023-12-05 PROCEDURE — 36416 COLLJ CAPILLARY BLOOD SPEC: CPT

## 2023-12-05 NOTE — PROGRESS NOTES
ANTICOAGULATION MANAGEMENT     Nathen Gage 88 year old male is on warfarin with subtherapeutic INR result. (Goal INR 2.0-2.5)    Recent labs: (last 7 days)     12/05/23  1024   INR 1.9*       ASSESSMENT     Source(s): Chart Review and Patient/Caregiver Call     Warfarin doses taken: Warfarin taken as instructed  Diet: Increased greens/vitamin K in diet; plans to resume previous intake  Medication/supplement changes: None noted  New illness, injury, or hospitalization: No  Signs or symptoms of bleeding or clotting: No  Previous result: Therapeutic last 2(+) visits  Additional findings: None       PLAN     Recommended plan for temporary change(s) affecting INR     Dosing Instructions: Continue your current warfarin dose with next INR in 2 weeks       Summary  As of 12/5/2023      Full warfarin instructions:  1.25 mg every Mon, Wed, Fri; 2.5 mg all other days   Next INR check:  12/19/2023               Telephone call with Neal who agrees to plan and repeated back plan correctly    Lab visit scheduled    Education provided:   Please call back if any changes to your diet, medications or how you've been taking warfarin  Goal range and lab monitoring: goal range and significance of current result  Dietary considerations: importance of consistent vitamin K intake and impact of vitamin K foods on INR    Plan made per [facility] anticoagulation protocol    Joanne Bustos RN  Anticoagulation Clinic  12/5/2023    _______________________________________________________________________     Anticoagulation Episode Summary       Current INR goal:  2.0-2.5   TTR:  51.9% (1 y)   Target end date:  Indefinite   Send INR reminders to:  [facility]    Indications    Persistent atrial fibrillation (H) [I48.19]  Long term current use of anticoagulants with INR goal of 2.0-3.0 [Z79.01]             Comments:               Anticoagulation Care Providers       Provider Role Specialty Phone number    Kushal Valentin MD Referring  Family Medicine [phone]    Leonides Aggarwal PA-C Referring Family Medicine [phone]

## 2023-12-19 ENCOUNTER — LAB (OUTPATIENT)
Dept: LAB | Facility: CLINIC | Age: 88
End: 2023-12-19
Payer: COMMERCIAL

## 2023-12-19 ENCOUNTER — ANTICOAGULATION THERAPY VISIT (OUTPATIENT)
Dept: ANTICOAGULATION | Facility: CLINIC | Age: 88
End: 2023-12-19

## 2023-12-19 DIAGNOSIS — Z79.01 LONG TERM CURRENT USE OF ANTICOAGULANTS WITH INR GOAL OF 2.0-3.0: ICD-10-CM

## 2023-12-19 DIAGNOSIS — I48.19 PERSISTENT ATRIAL FIBRILLATION (H): Primary | ICD-10-CM

## 2023-12-19 LAB — INR BLD: 2 (ref 0.9–1.1)

## 2023-12-19 PROCEDURE — 36416 COLLJ CAPILLARY BLOOD SPEC: CPT

## 2023-12-19 PROCEDURE — 85610 PROTHROMBIN TIME: CPT

## 2023-12-19 NOTE — PROGRESS NOTES
ANTICOAGULATION MANAGEMENT     Nathen Gage 88 year old male is on warfarin with therapeutic INR result. (Goal INR 2.0-2.5)    Recent labs: (last 7 days)     12/19/23  0931   INR 2.0*       ASSESSMENT     Source(s): Chart Review and Patient/Caregiver Call     Warfarin doses taken: Warfarin taken as instructed  Diet: No new diet changes identified--patient reports he is eating the same greens but reduced the portion size, this is an on-going change.  Medication/supplement changes: None noted  New illness, injury, or hospitalization: No  Signs or symptoms of bleeding or clotting: No  Previous result: Subtherapeutic  Additional findings: None       PLAN     Recommended plan for ongoing change(s) affecting INR     Dosing Instructions: Continue your current warfarin dose with next INR in 3 weeks       Summary  As of 12/19/2023      Full warfarin instructions:  1.25 mg every Mon, Wed, Fri; 2.5 mg all other days   Next INR check:  1/9/2024               Telephone call with Neal who verbalizes understanding and agrees to plan    Lab visit scheduled    Education provided:   Dietary considerations: importance of consistent vitamin K intake    Plan made per [facility] anticoagulation protocol    Fidelina Pizano, RN  Anticoagulation Clinic  12/19/2023    _______________________________________________________________________     Anticoagulation Episode Summary       Current INR goal:  2.0-2.5   TTR:  50.5% (1 y)   Target end date:  Indefinite   Send INR reminders to:  [facility]    Indications    Persistent atrial fibrillation (H) [I48.19]  Long term current use of anticoagulants with INR goal of 2.0-3.0 [Z79.01]             Comments:               Anticoagulation Care Providers       Provider Role Specialty Phone number    Kushal Valentin MD Referring Family Medicine [phone]    Leonides Aggarwal PA-C Referring Family Medicine [phone]

## 2023-12-21 NOTE — PROGRESS NOTES
ANTICOAGULATION MANAGEMENT     Nathen Gage 85 year old male is on warfarin with therapeutic INR result. (Goal INR 2.0-2.5)    Recent labs: (last 7 days)     09/13/21  0938   INR 2.5*       ASSESSMENT     Source(s): Patient/Caregiver Call       Warfarin doses taken: Warfarin taken as instructed    Diet: Decreased greens/vitamin K in diet; plans to resume previous intake    New illness, injury, or hospitalization: No    Medication/supplement changes: None noted    Signs or symptoms of bleeding or clotting: No    Previous INR: Therapeutic last 2(+) visits    Additional findings: None     PLAN     Recommended plan for no diet, medication or health factor changes affecting INR     Dosing Instructions: Continue your current warfarin dose with next INR in 2 weeks.  Since INR is at top of range, next INR in 2 weeks; however, I informed patient that if INR remains therapeutic, we should try to stretch out the visits to at least 3 weeks, patient verbalized understanding.     Summary  As of 9/13/2021    Full warfarin instructions:  1.25 mg every Fri; 2.5 mg all other days   Next INR check:  9/27/2021             Telephone call with Nathen who verbalizes understanding and agrees to plan    Lab visit scheduled    Education provided: Importance of consistent vitamin K intake    Plan made per [facility] anticoagulation protocol    Fidelina Pizano RN  Anticoagulation Clinic  9/13/2021    _______________________________________________________________________     Anticoagulation Episode Summary     Current INR goal:  2.0-2.5   TTR:  67.4 % (1 y)   Target end date:  Indefinite   Send INR reminders to:  [facility]    Indications    Long-term (current) use of anticoagulants [Z79.01] [Z79.01]  Atrial fibrillation (H) [I48.91]  Persistent atrial fibrillation (H) [I48.19]  Long term current use of anticoagulants with INR goal of 2.0-3.0 [Z79.01]           Comments:           Anticoagulation Care Providers     Provider Role Specialty  Phone number    Kushal Valentin MD Referring Family Medicine [phone]               yes

## 2024-01-09 ENCOUNTER — LAB (OUTPATIENT)
Dept: LAB | Facility: CLINIC | Age: 89
End: 2024-01-09
Payer: COMMERCIAL

## 2024-01-09 ENCOUNTER — ANTICOAGULATION THERAPY VISIT (OUTPATIENT)
Dept: ANTICOAGULATION | Facility: CLINIC | Age: 89
End: 2024-01-09

## 2024-01-09 DIAGNOSIS — I48.19 PERSISTENT ATRIAL FIBRILLATION (H): Primary | ICD-10-CM

## 2024-01-09 DIAGNOSIS — Z79.01 LONG TERM CURRENT USE OF ANTICOAGULANTS WITH INR GOAL OF 2.0-3.0: ICD-10-CM

## 2024-01-09 LAB — INR BLD: 2.1 (ref 0.9–1.1)

## 2024-01-09 PROCEDURE — 85610 PROTHROMBIN TIME: CPT

## 2024-01-09 PROCEDURE — 36415 COLL VENOUS BLD VENIPUNCTURE: CPT

## 2024-01-09 NOTE — PROGRESS NOTES
ANTICOAGULATION MANAGEMENT     Nathen Gage 88 year old male is on warfarin with therapeutic INR result. (Goal INR 2.0-2.5)    Recent labs: (last 7 days)     01/09/24  0934   INR 2.1*       ASSESSMENT     Source(s): Chart Review and Patient/Caregiver Call     Warfarin doses taken: Warfarin taken as instructed  Diet: No new diet changes identified  Medication/supplement changes: None noted  New illness, injury, or hospitalization: No  Signs or symptoms of bleeding or clotting: No  Previous result: Therapeutic last visit; previously outside of goal range  Additional findings: None       PLAN     Recommended plan for no diet, medication or health factor changes affecting INR     Dosing Instructions: Continue your current warfarin dose with next INR in 4 weeks       Summary  As of 1/9/2024      Full warfarin instructions:  1.25 mg every Mon, Wed, Fri; 2.5 mg all other days   Next INR check:  2/6/2024               Telephone call with Neal who verbalizes understanding and agrees to plan    Lab visit scheduled    Education provided:   Please call back if any changes to your diet, medications or how you've been taking warfarin  Contact [phone]  with any changes, questions or concerns.     Plan made per [facility] anticoagulation protocol    Teresa Castro RN  Anticoagulation Clinic  1/9/2024    _______________________________________________________________________     Anticoagulation Episode Summary       Current INR goal:  2.0-2.5   TTR:  55.7% (1 y)   Target end date:  Indefinite   Send INR reminders to:  [facility]    Indications    Persistent atrial fibrillation (H) [I48.19]  Long term current use of anticoagulants with INR goal of 2.0-3.0 [Z79.01]             Comments:               Anticoagulation Care Providers       Provider Role Specialty Phone number    Kushal Valentin MD Referring Family Medicine [phone]    Leonides Aggarwal PA-C Referring Family Medicine [phone]

## 2024-02-06 ENCOUNTER — LAB (OUTPATIENT)
Dept: LAB | Facility: CLINIC | Age: 89
End: 2024-02-06
Payer: COMMERCIAL

## 2024-02-06 ENCOUNTER — ANTICOAGULATION THERAPY VISIT (OUTPATIENT)
Dept: ANTICOAGULATION | Facility: CLINIC | Age: 89
End: 2024-02-06

## 2024-02-06 DIAGNOSIS — I48.19 PERSISTENT ATRIAL FIBRILLATION (H): Primary | ICD-10-CM

## 2024-02-06 DIAGNOSIS — Z79.01 LONG TERM CURRENT USE OF ANTICOAGULANTS WITH INR GOAL OF 2.0-3.0: ICD-10-CM

## 2024-02-06 LAB — INR BLD: 2.6 (ref 0.9–1.1)

## 2024-02-06 PROCEDURE — 36415 COLL VENOUS BLD VENIPUNCTURE: CPT

## 2024-02-06 PROCEDURE — 85610 PROTHROMBIN TIME: CPT

## 2024-02-06 NOTE — PROGRESS NOTES
ANTICOAGULATION MANAGEMENT     Nathen Gage 88 year old male is on warfarin with supratherapeutic INR result. (Goal INR 2.0-2.5)    Recent labs: (last 7 days)     02/06/24  0921   INR 2.6*       ASSESSMENT     Source(s): Chart Review and Patient/Caregiver Call     Warfarin doses taken: Warfarin taken as instructed  Diet: No new diet changes identified - tries to keep things consistent and has V8 daily  Medication/supplement changes: None noted  New illness, injury, or hospitalization: No  Signs or symptoms of bleeding or clotting: No  Previous result: Therapeutic last 2(+) visits  Additional findings: None       PLAN     Recommended plan for no diet, medication or health factor changes affecting INR     Dosing Instructions: Continue your current warfarin dose with next INR in 2 weeks       Summary  As of 2/6/2024      Full warfarin instructions:  1.25 mg every Mon, Wed, Fri; 2.5 mg all other days   Next INR check:  2/20/2024               Telephone call with Neal who verbalizes understanding and agrees to plan    Lab visit scheduled    Education provided:   Please call back if any changes to your diet, medications or how you've been taking warfarin    Plan made per [facility] anticoagulation protocol    Kristine Castro RN  Anticoagulation Clinic  2/6/2024    _______________________________________________________________________     Anticoagulation Episode Summary       Current INR goal:  2.0-2.5   TTR:  58.4% (1 y)   Target end date:  Indefinite   Send INR reminders to:  [facility]    Indications    Persistent atrial fibrillation (H) [I48.19]  Long term current use of anticoagulants with INR goal of 2.0-3.0 [Z79.01]             Comments:               Anticoagulation Care Providers       Provider Role Specialty Phone number    Kushal Valentin MD Referring Family Medicine [phone]    Leonides Aggarwal PA-C Referring Family Medicine [phone]

## 2024-02-20 ENCOUNTER — ANCILLARY PROCEDURE (OUTPATIENT)
Dept: CARDIOLOGY | Facility: CLINIC | Age: 89
End: 2024-02-20
Attending: INTERNAL MEDICINE
Payer: COMMERCIAL

## 2024-02-20 ENCOUNTER — LAB (OUTPATIENT)
Dept: LAB | Facility: CLINIC | Age: 89
End: 2024-02-20
Payer: COMMERCIAL

## 2024-02-20 ENCOUNTER — ANTICOAGULATION THERAPY VISIT (OUTPATIENT)
Dept: ANTICOAGULATION | Facility: CLINIC | Age: 89
End: 2024-02-20

## 2024-02-20 DIAGNOSIS — Z95.0 CARDIAC PACEMAKER IN SITU: ICD-10-CM

## 2024-02-20 DIAGNOSIS — Z79.01 LONG TERM CURRENT USE OF ANTICOAGULANTS WITH INR GOAL OF 2.0-3.0: ICD-10-CM

## 2024-02-20 DIAGNOSIS — I48.19 PERSISTENT ATRIAL FIBRILLATION (H): Primary | ICD-10-CM

## 2024-02-20 LAB — INR BLD: 2.7 (ref 0.9–1.1)

## 2024-02-20 PROCEDURE — 36416 COLLJ CAPILLARY BLOOD SPEC: CPT

## 2024-02-20 PROCEDURE — 85610 PROTHROMBIN TIME: CPT

## 2024-02-20 PROCEDURE — 93294 REM INTERROG EVL PM/LDLS PM: CPT | Performed by: INTERNAL MEDICINE

## 2024-02-20 PROCEDURE — 93296 REM INTERROG EVL PM/IDS: CPT | Performed by: INTERNAL MEDICINE

## 2024-02-20 NOTE — PROGRESS NOTES
ANTICOAGULATION MANAGEMENT     Nathen Gage 88 year old male is on warfarin with supratherapeutic INR result. (Goal INR 2.0-2.5)    Recent labs: (last 7 days)     02/20/24  0916   INR 2.7*       ASSESSMENT     Source(s): Chart Review and Patient/Caregiver Call     Warfarin doses taken: Warfarin taken as instructed  Diet: Decreased greens/vitamin K in diet; plans to resume previous intake, patient reports he had less spinach  Medication/supplement changes: None noted  New illness, injury, or hospitalization: No  Signs or symptoms of bleeding or clotting: No  Previous result: Supratherapeutic  Additional findings: None       PLAN     Recommended plan for temporary change(s) affecting INR     Dosing Instructions: Continue your current warfarin dose with next INR in 2 weeks       Summary  As of 2/20/2024      Full warfarin instructions:  1.25 mg every Mon, Wed, Fri; 2.5 mg all other days   Next INR check:  3/5/2024               Telephone call with Neal who verbalizes understanding and agrees to plan    Lab visit scheduled    Education provided:   Please call back if any changes to your diet, medications or how you've been taking warfarin  Dietary considerations: importance of consistent vitamin K intake and vitamin K content of foods  Contact [phone]  with any changes, questions or concerns.     Plan made per [facility] anticoagulation protocol    Teresa Castro RN  Anticoagulation Clinic  2/20/2024    _______________________________________________________________________     Anticoagulation Episode Summary       Current INR goal:  2.0-2.5   TTR:  54.5% (1 y)   Target end date:  Indefinite   Send INR reminders to:  [facility]    Indications    Persistent atrial fibrillation (H) [I48.19]  Long term current use of anticoagulants with INR goal of 2.0-3.0 [Z79.01]             Comments:               Anticoagulation Care Providers       Provider Role Specialty Phone number    Kushal Valentin MD Referring  Family Medicine [phone]    Leonides Aggarwal PA-C Referring Family Medicine [phone]

## 2024-02-23 LAB
MDC_IDC_EPISODE_DTM: NORMAL
MDC_IDC_EPISODE_DURATION: 13 S
MDC_IDC_EPISODE_DURATION: 14 S
MDC_IDC_EPISODE_DURATION: 15 S
MDC_IDC_EPISODE_DURATION: 18 S
MDC_IDC_EPISODE_ID: NORMAL
MDC_IDC_EPISODE_TYPE: NORMAL
MDC_IDC_EPISODE_TYPE_INDUCED: NO
MDC_IDC_LEAD_CONNECTION_STATUS: NORMAL
MDC_IDC_LEAD_CONNECTION_STATUS: NORMAL
MDC_IDC_LEAD_IMPLANT_DT: NORMAL
MDC_IDC_LEAD_IMPLANT_DT: NORMAL
MDC_IDC_LEAD_LOCATION: NORMAL
MDC_IDC_LEAD_LOCATION: NORMAL
MDC_IDC_LEAD_LOCATION_DETAIL_1: NORMAL
MDC_IDC_LEAD_LOCATION_DETAIL_1: NORMAL
MDC_IDC_LEAD_MFG: NORMAL
MDC_IDC_LEAD_MFG: NORMAL
MDC_IDC_LEAD_MODEL: NORMAL
MDC_IDC_LEAD_MODEL: NORMAL
MDC_IDC_LEAD_POLARITY_TYPE: NORMAL
MDC_IDC_LEAD_POLARITY_TYPE: NORMAL
MDC_IDC_LEAD_SERIAL: NORMAL
MDC_IDC_LEAD_SERIAL: NORMAL
MDC_IDC_MSMT_BATTERY_DTM: NORMAL
MDC_IDC_MSMT_BATTERY_REMAINING_LONGEVITY: 138 MO
MDC_IDC_MSMT_BATTERY_REMAINING_PERCENTAGE: 100 %
MDC_IDC_MSMT_BATTERY_STATUS: NORMAL
MDC_IDC_MSMT_LEADCHNL_RA_IMPEDANCE_VALUE: 636 OHM
MDC_IDC_MSMT_LEADCHNL_RA_PACING_THRESHOLD_AMPLITUDE: 0.4 V
MDC_IDC_MSMT_LEADCHNL_RA_PACING_THRESHOLD_PULSEWIDTH: 0.4 MS
MDC_IDC_MSMT_LEADCHNL_RV_IMPEDANCE_VALUE: 309 OHM
MDC_IDC_MSMT_LEADCHNL_RV_PACING_THRESHOLD_AMPLITUDE: 1.4 V
MDC_IDC_MSMT_LEADCHNL_RV_PACING_THRESHOLD_PULSEWIDTH: 0.4 MS
MDC_IDC_PG_IMPLANT_DTM: NORMAL
MDC_IDC_PG_MFG: NORMAL
MDC_IDC_PG_MODEL: NORMAL
MDC_IDC_PG_SERIAL: NORMAL
MDC_IDC_PG_TYPE: NORMAL
MDC_IDC_SESS_CLINIC_NAME: NORMAL
MDC_IDC_SESS_DTM: NORMAL
MDC_IDC_SESS_TYPE: NORMAL
MDC_IDC_SET_BRADY_AT_MODE_SWITCH_MODE: NORMAL
MDC_IDC_SET_BRADY_AT_MODE_SWITCH_RATE: 170 {BEATS}/MIN
MDC_IDC_SET_BRADY_LOWRATE: 60 {BEATS}/MIN
MDC_IDC_SET_BRADY_MAX_SENSOR_RATE: 120 {BEATS}/MIN
MDC_IDC_SET_BRADY_MAX_TRACKING_RATE: 120 {BEATS}/MIN
MDC_IDC_SET_BRADY_MODE: NORMAL
MDC_IDC_SET_BRADY_PAV_DELAY_HIGH: 200 MS
MDC_IDC_SET_BRADY_PAV_DELAY_LOW: 300 MS
MDC_IDC_SET_BRADY_SAV_DELAY_HIGH: 200 MS
MDC_IDC_SET_BRADY_SAV_DELAY_LOW: 300 MS
MDC_IDC_SET_LEADCHNL_RA_PACING_AMPLITUDE: 2 V
MDC_IDC_SET_LEADCHNL_RA_PACING_CAPTURE_MODE: NORMAL
MDC_IDC_SET_LEADCHNL_RA_PACING_POLARITY: NORMAL
MDC_IDC_SET_LEADCHNL_RA_PACING_PULSEWIDTH: 0.4 MS
MDC_IDC_SET_LEADCHNL_RA_SENSING_ADAPTATION_MODE: NORMAL
MDC_IDC_SET_LEADCHNL_RA_SENSING_POLARITY: NORMAL
MDC_IDC_SET_LEADCHNL_RA_SENSING_SENSITIVITY: 0.5 MV
MDC_IDC_SET_LEADCHNL_RV_PACING_AMPLITUDE: 3.5 V
MDC_IDC_SET_LEADCHNL_RV_PACING_CAPTURE_MODE: NORMAL
MDC_IDC_SET_LEADCHNL_RV_PACING_POLARITY: NORMAL
MDC_IDC_SET_LEADCHNL_RV_PACING_PULSEWIDTH: 0.4 MS
MDC_IDC_SET_LEADCHNL_RV_SENSING_ADAPTATION_MODE: NORMAL
MDC_IDC_SET_LEADCHNL_RV_SENSING_POLARITY: NORMAL
MDC_IDC_SET_LEADCHNL_RV_SENSING_SENSITIVITY: 5 MV
MDC_IDC_SET_ZONE_DETECTION_INTERVAL: 353 MS
MDC_IDC_SET_ZONE_STATUS: NORMAL
MDC_IDC_SET_ZONE_TYPE: NORMAL
MDC_IDC_SET_ZONE_VENDOR_TYPE: NORMAL
MDC_IDC_STAT_AT_BURDEN_PERCENT: 0 %
MDC_IDC_STAT_AT_DTM_END: NORMAL
MDC_IDC_STAT_AT_DTM_START: NORMAL
MDC_IDC_STAT_BRADY_DTM_END: NORMAL
MDC_IDC_STAT_BRADY_DTM_START: NORMAL
MDC_IDC_STAT_BRADY_RA_PERCENT_PACED: 62 %
MDC_IDC_STAT_BRADY_RV_PERCENT_PACED: 22 %
MDC_IDC_STAT_EPISODE_RECENT_COUNT: 0
MDC_IDC_STAT_EPISODE_RECENT_COUNT: 0
MDC_IDC_STAT_EPISODE_RECENT_COUNT: 15
MDC_IDC_STAT_EPISODE_RECENT_COUNT_DTM_END: NORMAL
MDC_IDC_STAT_EPISODE_RECENT_COUNT_DTM_START: NORMAL
MDC_IDC_STAT_EPISODE_TYPE: NORMAL
MDC_IDC_STAT_EPISODE_VENDOR_TYPE: NORMAL
MDC_IDC_STAT_EPISODE_VENDOR_TYPE: NORMAL

## 2024-03-05 ENCOUNTER — ANTICOAGULATION THERAPY VISIT (OUTPATIENT)
Dept: ANTICOAGULATION | Facility: CLINIC | Age: 89
End: 2024-03-05

## 2024-03-05 ENCOUNTER — LAB (OUTPATIENT)
Dept: LAB | Facility: CLINIC | Age: 89
End: 2024-03-05
Payer: COMMERCIAL

## 2024-03-05 DIAGNOSIS — I48.19 PERSISTENT ATRIAL FIBRILLATION (H): Primary | ICD-10-CM

## 2024-03-05 DIAGNOSIS — Z79.01 LONG TERM CURRENT USE OF ANTICOAGULANTS WITH INR GOAL OF 2.0-3.0: ICD-10-CM

## 2024-03-05 LAB — INR BLD: 1.7 (ref 0.9–1.1)

## 2024-03-05 PROCEDURE — 85610 PROTHROMBIN TIME: CPT

## 2024-03-05 NOTE — PROGRESS NOTES
ANTICOAGULATION MANAGEMENT     Nathen Gage 88 year old male is on warfarin with subtherapeutic INR result. (Goal INR 2.0-2.5)    Recent labs: (last 7 days)     03/05/24  0828   INR 1.7*       ASSESSMENT     Source(s): Chart Review and Patient/Caregiver Call     Warfarin doses taken: Warfarin taken as instructed  Diet: Increased greens/vitamin K in diet; plans to resume previous intake.  Ate spinach for the last 4-5 days.  Will try eating either spinach or broccoli every other day for a more consistent diet.  Will eat lighter colored lettuce on the other days he is not eating spinach or broccoli.  Medication/supplement changes: None noted  New illness, injury, or hospitalization: No  Signs or symptoms of bleeding or clotting: No  Previous result: Supratherapeutic  Additional findings: None       PLAN     Recommended plan for temporary change(s) affecting INR     Dosing Instructions: Continue your current warfarin dose with next INR in 2 weeks       Summary  As of 3/5/2024      Full warfarin instructions:  1.25 mg every Mon, Wed, Fri; 2.5 mg all other days   Next INR check:  3/19/2024               Telephone call with Neal who agrees to plan and repeated back plan correctly    Lab visit scheduled    Education provided:   Please call back if any changes to your diet, medications or how you've been taking warfarin  Goal range and lab monitoring: goal range and significance of current result  Dietary considerations: importance of consistent vitamin K intake, impact of vitamin K foods on INR, and vitamin K content of foods  Symptom monitoring: monitoring for bleeding signs and symptoms, monitoring for clotting signs and symptoms, and monitoring for stroke signs and symptoms    Plan made per [facility] anticoagulation protocol    Joanne Bustos RN  Anticoagulation Clinic  3/5/2024    _______________________________________________________________________     Anticoagulation Episode Summary       Current INR goal:  2.0-2.5    TTR:  52.7% (1 y)   Target end date:  Indefinite   Send INR reminders to:  [facility]    Indications    Persistent atrial fibrillation (H) [I48.19]  Long term current use of anticoagulants with INR goal of 2.0-3.0 [Z79.01]             Comments:               Anticoagulation Care Providers       Provider Role Specialty Phone number    Kushal Valentin MD Referring [facility] Medicine [phone]    Leonides Aggarwal PA-C Referring [facility] [phone]

## 2024-03-05 NOTE — PROGRESS NOTES
ANTICOAGULATION MANAGEMENT     Nathen Gage 88 year old male is on warfarin with subtherapeutic INR result. (Goal INR 2.0-2.5)    Recent labs: (last 7 days)     03/05/24  0828   INR 1.7*       ASSESSMENT     Source(s): Chart Review  Previous INR was Supratherapeutic  Medication, diet, health changes since last INR chart reviewed; none identified         PLAN     Unable to reach Neal today.    Spoke briefly with patient's wife who reports he will be back home this afternoon to discuss result.    Follow up required to confirm warfarin dose taken and assess for changes    Joanne Bustos, ARTIS  Anticoagulation Clinic  3/5/2024

## 2024-03-06 ENCOUNTER — TELEPHONE (OUTPATIENT)
Dept: FAMILY MEDICINE | Facility: CLINIC | Age: 89
End: 2024-03-06
Payer: COMMERCIAL

## 2024-03-06 DIAGNOSIS — R41.89 COGNITIVE CHANGE: Primary | ICD-10-CM

## 2024-03-06 NOTE — TELEPHONE ENCOUNTER
Called patient, informed of message from provider. He will wait for call to schedule, number also given, to call them. Ruth Behrens.

## 2024-03-06 NOTE — TELEPHONE ENCOUNTER
Reason for Call:  Appointment Request    Patient requesting this type of appt: Chronic Diease Management/Medication/Follow-Up    Requested provider: Leonides Aggarwal    Reason patient unable to be scheduled: Not within requested timeframe    When does patient want to be seen/preferred time: 3-7 days    Comments: Patient is requesting to schedule a follow-up with his primary care provider. He states his memory concern is getting worse and would like a referral as discussed at last appointment. Does not wish to schedule with a different provider.     Could we send this information to you in [facility] or would you prefer to receive a phone call?:   Patient would prefer a phone call   Okay to leave a detailed message?: Yes at Home number on file [phone] (home)    Call taken on 3/6/2024 at 12:43 PM by Ally Kumar

## 2024-03-06 NOTE — TELEPHONE ENCOUNTER
Was discussed at his last visit if symptoms are progressing to have him see neuropsychology.  I have placed this referral.  They should call in 1-2 business days.  No appointment needed for this at this time.  However if any new symptoms have developed, he will need an appointment.  And I would encourage him if I have no openings to see when extenders.

## 2024-03-07 ENCOUNTER — TELEPHONE (OUTPATIENT)
Dept: NEUROPSYCHOLOGY | Facility: CLINIC | Age: 89
End: 2024-03-07
Payer: COMMERCIAL

## 2024-03-07 NOTE — TELEPHONE ENCOUNTER
[facility] Call Center    Phone Message    May a detailed message be left on voicemail: yes     Reason for Call: Appointment Intake    Referring Provider Name: Leonides Aggarwal PA-C  Diagnosis and/or Symptoms: Memory Change/Loss/Problem-progressive memory loss. possible dementia-Cognitive change    Please review referral as Dx of cognitive change requires review per scheduling protocols    Action Taken: Message routed to:  [facility] Neuropsychology    Travel Screening: Not Applicable

## 2024-03-07 NOTE — TELEPHONE ENCOUNTER
Hello,    Please schedule patient per the following protocol:    Timeline: First Available  Location: ANY LOCATION  Preferred Provider: ANY PROVIDER  If Preferred Provider is not available:   Visit Type: NEUROPSYCH EVAL or Neuropsych Interview Only (testing scheduled shortly after interview)  Special Instructions:    Rachna Daniel  Psychometrist

## 2024-03-07 NOTE — TELEPHONE ENCOUNTER
Spoke with patient and wife Nichol. Scheduled interview only on 6/3 at 2pm with Dr. Olivier. They are aware that testing portion will be scheduled after interview is complete.    Gallito Arias on 3/7/2024 at 4:02 PM

## 2024-03-08 DIAGNOSIS — N40.1 BENIGN LOCALIZED HYPERPLASIA OF PROSTATE WITH URINARY OBSTRUCTION: ICD-10-CM

## 2024-03-08 DIAGNOSIS — N13.8 BENIGN LOCALIZED HYPERPLASIA OF PROSTATE WITH URINARY OBSTRUCTION: ICD-10-CM

## 2024-03-08 RX ORDER — METOPROLOL SUCCINATE 50 MG/1
50 TABLET, EXTENDED RELEASE ORAL DAILY
Qty: 90 TABLET | Refills: 2 | Status: SHIPPED | OUTPATIENT
Start: 2024-03-08

## 2024-03-19 ENCOUNTER — ANTICOAGULATION THERAPY VISIT (OUTPATIENT)
Dept: ANTICOAGULATION | Facility: CLINIC | Age: 89
End: 2024-03-19

## 2024-03-19 ENCOUNTER — LAB (OUTPATIENT)
Dept: LAB | Facility: CLINIC | Age: 89
End: 2024-03-19
Payer: COMMERCIAL

## 2024-03-19 DIAGNOSIS — I48.19 PERSISTENT ATRIAL FIBRILLATION (H): Primary | ICD-10-CM

## 2024-03-19 DIAGNOSIS — Z79.01 LONG TERM CURRENT USE OF ANTICOAGULANTS WITH INR GOAL OF 2.0-3.0: ICD-10-CM

## 2024-03-19 LAB — INR BLD: 2.1 (ref 0.9–1.1)

## 2024-03-19 PROCEDURE — 85610 PROTHROMBIN TIME: CPT

## 2024-03-19 PROCEDURE — 36415 COLL VENOUS BLD VENIPUNCTURE: CPT

## 2024-03-19 NOTE — PROGRESS NOTES
ANTICOAGULATION MANAGEMENT     Nathen Gage 88 year old male is on warfarin with therapeutic INR result. (Goal INR 2.0-2.5)    Recent labs: (last 7 days)     03/19/24  0830   INR 2.1*       ASSESSMENT     Source(s): Chart Review and Patient/Caregiver Call     Warfarin doses taken: Warfarin taken as instructed  Diet:  Cut back on his spinach intake.  Substituted apolonia lettuce in some of his salads.  Medication/supplement changes: None noted  New illness, injury, or hospitalization: No  Signs or symptoms of bleeding or clotting: No  Previous result: Subtherapeutic  Additional findings: None       PLAN     Recommended plan for ongoing change(s) affecting INR     Dosing Instructions: Continue your current warfarin dose with next INR in 3 weeks       Summary  As of 3/19/2024      Full warfarin instructions:  1.25 mg every Mon, Wed, Fri; 2.5 mg all other days   Next INR check:  4/9/2024               Telephone call with Neal who agrees to plan and repeated back plan correctly    Lab visit scheduled    Education provided:   Please call back if any changes to your diet, medications or how you've been taking warfarin    Plan made per [facility] anticoagulation protocol    Joanne Bustos RN  Anticoagulation Clinic  3/19/2024    _______________________________________________________________________     Anticoagulation Episode Summary       Current INR goal:  2.0-2.5   TTR:  53.6% (1 y)   Target end date:  Indefinite   Send INR reminders to:  [facility]    Indications    Persistent atrial fibrillation (H) [I48.19]  Long term current use of anticoagulants with INR goal of 2.0-3.0 [Z79.01]             Comments:               Anticoagulation Care Providers       Provider Role Specialty Phone number    Kushal Valentin MD Referring Family Medicine [phone]    Leonides Aggarwal PA-C Referring Family Medicine [phone]

## 2024-03-20 DIAGNOSIS — I10 ESSENTIAL HYPERTENSION, BENIGN: ICD-10-CM

## 2024-03-20 DIAGNOSIS — E78.00 PURE HYPERCHOLESTEROLEMIA: ICD-10-CM

## 2024-03-20 RX ORDER — ROSUVASTATIN CALCIUM 5 MG/1
5 TABLET, COATED ORAL DAILY
Qty: 90 TABLET | Refills: 3 | OUTPATIENT
Start: 2024-03-20

## 2024-03-21 DIAGNOSIS — I10 ESSENTIAL HYPERTENSION, BENIGN: ICD-10-CM

## 2024-03-21 DIAGNOSIS — E78.00 PURE HYPERCHOLESTEROLEMIA: ICD-10-CM

## 2024-03-21 RX ORDER — ROSUVASTATIN CALCIUM 5 MG/1
5 TABLET, COATED ORAL DAILY
Qty: 90 TABLET | Refills: 0 | Status: SHIPPED | OUTPATIENT
Start: 2024-03-21 | End: 2024-06-24

## 2024-03-28 DIAGNOSIS — N40.1 BENIGN LOCALIZED HYPERPLASIA OF PROSTATE WITH URINARY OBSTRUCTION: ICD-10-CM

## 2024-03-28 DIAGNOSIS — N13.8 BENIGN LOCALIZED HYPERPLASIA OF PROSTATE WITH URINARY OBSTRUCTION: ICD-10-CM

## 2024-03-28 RX ORDER — DOXAZOSIN 8 MG/1
TABLET ORAL
Qty: 90 TABLET | Refills: 0 | Status: SHIPPED | OUTPATIENT
Start: 2024-03-28 | End: 2024-06-26

## 2024-04-09 ENCOUNTER — ANTICOAGULATION THERAPY VISIT (OUTPATIENT)
Dept: ANTICOAGULATION | Facility: CLINIC | Age: 89
End: 2024-04-09

## 2024-04-09 ENCOUNTER — LAB (OUTPATIENT)
Dept: LAB | Facility: CLINIC | Age: 89
End: 2024-04-09
Payer: COMMERCIAL

## 2024-04-09 DIAGNOSIS — Z79.01 LONG TERM CURRENT USE OF ANTICOAGULANTS WITH INR GOAL OF 2.0-3.0: ICD-10-CM

## 2024-04-09 DIAGNOSIS — I48.19 PERSISTENT ATRIAL FIBRILLATION (H): Primary | ICD-10-CM

## 2024-04-09 LAB — INR BLD: 2.4 (ref 0.9–1.1)

## 2024-04-09 PROCEDURE — 85610 PROTHROMBIN TIME: CPT

## 2024-04-09 PROCEDURE — 36416 COLLJ CAPILLARY BLOOD SPEC: CPT

## 2024-04-09 NOTE — PROGRESS NOTES
ANTICOAGULATION MANAGEMENT     Nathen Gage 88 year old male is on warfarin with therapeutic INR result. (Goal INR 2.0-2.5)    Recent labs: (last 7 days)     04/09/24  0832   INR 2.4*       ASSESSMENT     Source(s): Chart Review and Patient/Caregiver Call     Warfarin doses taken: Warfarin taken as instructed  Diet: No new diet changes identified  Medication/supplement changes: None noted  New illness, injury, or hospitalization: No  Signs or symptoms of bleeding or clotting: No  Previous result: Therapeutic last 2(+) visits  Additional findings: None       PLAN     Recommended plan for no diet, medication or health factor changes affecting INR     Dosing Instructions: Continue your current warfarin dose with next INR in 4 weeks       Summary  As of 4/9/2024      Full warfarin instructions:  1.25 mg every Mon, Wed, Fri; 2.5 mg all other days   Next INR check:  5/7/2024               Telephone call with Neal who agrees to plan and repeated back plan correctly    Lab visit scheduled    Education provided:   Please call back if any changes to your diet, medications or how you've been taking warfarin    Plan made per [facility] anticoagulation protocol    Joanne Bustos RN  Anticoagulation Clinic  4/9/2024    _______________________________________________________________________     Anticoagulation Episode Summary       Current INR goal:  2.0-2.5   TTR:  56.4% (1 y)   Target end date:  Indefinite   Send INR reminders to:  [facility]    Indications    Persistent atrial fibrillation (H) [I48.19]  Long term current use of anticoagulants with INR goal of 2.0-3.0 [Z79.01]             Comments:               Anticoagulation Care Providers       Provider Role Specialty Phone number    Kushal Valentin MD Referring Family Medicine [phone]    Leonides Aggarwal PA-C Referring Family Medicine [phone]

## 2024-04-11 ENCOUNTER — TELEPHONE (OUTPATIENT)
Dept: NEUROPSYCHOLOGY | Facility: CLINIC | Age: 89
End: 2024-04-11
Payer: COMMERCIAL

## 2024-04-11 NOTE — TELEPHONE ENCOUNTER
Health Call Center    Phone Message    May a detailed message be left on voicemail: yes     Reason for Call: Other: Patient would like a later time since they are traveling from [place] and don't want to deal with traffic. Please call back to discuss.      Action Taken: Other: Neurosych    Travel Screening: Not Applicable

## 2024-04-11 NOTE — TELEPHONE ENCOUNTER
Left Voicemail (1st Attempt) and Sent Mychart (1st Attempt) for the patient to call back and schedule the following:    Appointment type: NEUROPSYCHOLOGICAL TEST ONLY  Provider:  only  Return date: June 20, 2023 at 8 AM  Specialty phone number: [phone]  Additional appointment(s) needed:   -Virtual appointment interview prior with ALESSIA Olivier: already scheduled   Additonal Notes:     Please offer the patient June 20th at 8:00 AM with Dr. Olivier. This is a testing only appointment. OK per Dr. Olivier.       Bertha Murillo on 4/11/2024 at 1:23 PM

## 2024-04-12 ENCOUNTER — TELEPHONE (OUTPATIENT)
Dept: NEUROPSYCHOLOGY | Facility: CLINIC | Age: 89
End: 2024-04-12
Payer: COMMERCIAL

## 2024-04-12 NOTE — TELEPHONE ENCOUNTER
Left Voicemail (1st Attempt) and Sent Mychart (1st Attempt) for the patient to call back and schedule the following:    Appointment type: Neuro Eval  Provider: Dr. Olivier  Return date: July, 2024  Specialty phone number: [phone]  Additional appointment(s) needed:   Additonal Notes:     **  offer the following for scheduling with Dr. Olivier.    Interview Only Appointment 7/1 @ 9:00 AM and Testing only appointment on 7/2 at 12:30 PM. OK by HILARIO Vo to use ASIF slot.    If the patient accepts these times, please cancel the 6/3 interview only appointment the patient currently has. **

## 2024-04-12 NOTE — TELEPHONE ENCOUNTER
[facility]    Phone Message    May a detailed message be left on voicemail: yes     Reason for Call: Other: patient returning call to schedule with Dr. Olivier.  Writer can not schedule manually.  Please call him back.      Action Taken: Message routed to:  [facility]    Travel Screening: Not Applicable

## 2024-04-15 NOTE — TELEPHONE ENCOUNTER
Patient confirmed scheduled appointment:  Date: 7/1  Time: 9AM  Visit type: Interview Only appointment -virtual   Provider: ALESSIA OLIVIER   Location: [facility]-Virtual pt aware  Testing/imaging: n/a  Additional notes: Per provider request     Patient confirmed scheduled appointment:  Date: 7/2  Time: 12:30 PM  Visit type: Testing only appointment   Provider: Soy   Location: [facility]- Pt aware in person appt  Testing/imaging: n/a  Additional notes:    Interview Only Appointment 7/1 @ 9:00 AM (ALESSIA Olivier) and Testing only appointment on 7/2 at 12:30 PM. OK by HILARIO Vo to use ASIF slot.

## 2024-05-06 NOTE — PROGRESS NOTES
CARDIOLOGY VISIT    REASON FOR VISIT: A-fib, pacemaker    SUBJECTIVE:  88-year-old male seen for follow-up of paroxysmal atrial fibrillation and pacemaker.  He also has dilated a sending aorta and hypertension.      2011 he underwent 2C pacemaker implantation ([employer] Altrua 60 EL) for sick sinus syndrome.  He was found to have A. fib based on mode switching on device checks, starting in 2014.       Echo 2023 showed EF 60%, 1-2+ TR, aorta 4.0 cm.    2023 pacemaker generator was changed ([employer] Accolade MRI DR L331).    Device check February 2024 showed 62% a paced, 22% V paced, underlying rhythm sinus bradycardia, no arrhythmias, battery 11 years.    He has been doing well recently.  [facility] and has been doing a lot of packing and feels well during this.  He will walk about 3 times per week over a mile.  He remains quite socially engaged.  He denies any lightheadedness, dizziness, palpitations, chest pain, or edema.    MEDICATIONS:  Current Outpatient Medications   Medication Sig Dispense Refill    cephALEXin (KEFLEX) 500 MG capsule Take 1 capsule (500 mg) by mouth 3 times daily 21 capsule 0    doxazosin (CARDURA) 8 MG tablet TAKE 1 TABLET(8 MG) BY MOUTH AT BEDTIME 90 tablet 0    losartan (COZAAR) 100 MG tablet Take 1 tablet (100 mg) by mouth daily 90 tablet 3    metoprolol succinate ER (TOPROL XL) 50 MG 24 hr tablet Take 1 tablet (50 mg) by mouth daily 90 tablet 2    pantoprazole (PROTONIX) 40 MG EC tablet TAKE 1 TABLET(40 MG) BY MOUTH DAILY 90 tablet 2    Polyethylene Glycol 400 (BLINK TEARS OP) Apply to eye as needed      rosuvastatin (CRESTOR) 5 MG tablet TAKE 1 TABLET(5 MG) BY MOUTH DAILY 90 tablet 0    warfarin ANTICOAGULANT (COUMADIN) 2.5 MG tablet TAKE 1/2 TO 1 TABLET(1.25 TO 2.5 MG) BY MOUTH DAILY OR AS DIRECTED. ADJUST DOSE BASED ON INR 90 tablet 1     No current facility-administered medications for this visit.       ALLERGIES:  Allergies   Allergen Reactions  "   Neomycin Sulfate        REVIEW OF SYSTEMS:  Constitutional:  No weight loss, fever, chills  HEENT:  Eyes:  No visual loss, blurred vision, double vision or yellow sclerae. No hearing loss, sneezing, congestion, runny nose or sore throat.  Skin:  No rash or itching.  Cardiovascular: per HPI  Respiratory: per HPI  GI:  No anorexia, nausea, vomiting or diarrhea. No abdominal pain or blood.  :  No dysurea, hematuria  Neurologic:  No headache, paralysis, ataxia, numbness or tingling in the extremities. No change in bowel or bladder control.  Musculoskeletal:  No muscle pain  Hematologic:  No bleeding or bruising.  Lymphatics:  No enlarged nodes. No history of splenectomy.  Endocrine:  No reports of sweating, cold or heat intolerance. No polyuria or polydipsia.  Allergies:  No history of asthma, hives, eczema or rhinitis.    PHYSICAL EXAM:  /70 (BP Location: Right arm, Patient Position: Sitting, Cuff Size: Adult Regular)   Pulse 64   Ht 1.727 m (5' 8\")   Wt 80.2 kg (176 lb 14.4 oz)   SpO2 99%   BMI 26.90 kg/m    Constitutional: awake, alert, no distress  Eyes: PERRL, sclera nonicteric  ENT: trachea midline  Respiratory: Lungs clear   Cardiovascular: regular rate and rhythm, no murmurs  GI: nondistended, nontender, bowel sounds present  Lymph/Hematologic: no lymphadenopathy  Skin: dry, no rash  Musculoskeletal: good muscle tone, strength 5/5 in upper and lower extremities  Neurologic: no focal deficits  Neuropsychiatric: appropriate affact    DATA:  Lab:   Recent Labs   Lab Test 03/05/24  0828 05/15/23  0858   CHOL 156 157   HDL 49 54   LDL 87 90   TRIG 102 66     ASSESSMENT:  88-year-old male seen for A-fib and pacemaker.  He is doing very well for his age, he is quite active and has no cardiac symptoms.  Pacemaker is functioning normally.    RECOMMENDATIONS:  1.  Paroxysmal A-fib  -Continue rate control and warfarin    2.  Pacemaker  -Continue routine device checks    Follow-up in 1 year with " SHAY.    Daniel Leon MD  Cardiology - [facility]  Pager:  [phone]  Text Page  May 8, 2024

## 2024-05-07 ENCOUNTER — LAB (OUTPATIENT)
Dept: LAB | Facility: CLINIC | Age: 89
End: 2024-05-07
Payer: COMMERCIAL

## 2024-05-07 ENCOUNTER — ANTICOAGULATION THERAPY VISIT (OUTPATIENT)
Dept: ANTICOAGULATION | Facility: CLINIC | Age: 89
End: 2024-05-07

## 2024-05-07 DIAGNOSIS — Z79.01 LONG TERM CURRENT USE OF ANTICOAGULANTS WITH INR GOAL OF 2.0-3.0: ICD-10-CM

## 2024-05-07 DIAGNOSIS — I48.19 PERSISTENT ATRIAL FIBRILLATION (H): ICD-10-CM

## 2024-05-07 DIAGNOSIS — I48.19 PERSISTENT ATRIAL FIBRILLATION (H): Primary | ICD-10-CM

## 2024-05-07 LAB — INR BLD: 2.1 (ref 0.9–1.1)

## 2024-05-07 PROCEDURE — 85610 PROTHROMBIN TIME: CPT

## 2024-05-07 PROCEDURE — 36416 COLLJ CAPILLARY BLOOD SPEC: CPT

## 2024-05-07 RX ORDER — WARFARIN SODIUM 2.5 MG/1
TABLET ORAL
Qty: 90 TABLET | Refills: 1 | Status: SHIPPED | OUTPATIENT
Start: 2024-05-07 | End: 2024-07-26

## 2024-05-07 NOTE — PROGRESS NOTES
Patient called to report no changes or concerns other than moving to a [address] [place] [address] in [place].    Next INR scheduled on 6/11/24.    Fidelina Pizano RN  Anticoagulation Clinic

## 2024-05-07 NOTE — PROGRESS NOTES
ANTICOAGULATION MANAGEMENT     Nathen Gage 88 year old male is on warfarin with therapeutic INR result. (Goal INR 2.0-2.5)    Recent labs: (last 7 days)     05/07/24  0816   INR 2.1*       ASSESSMENT     Source(s): Chart Review  Previous INR was Therapeutic last 2(+) visits  Medication, diet, health changes since last INR chart reviewed; none identified         PLAN     Recommended plan for no diet, medication or health factor changes affecting INR     Dosing Instructions: Continue your current warfarin dose with next INR in 5 weeks       Summary  As of 5/7/2024      Full warfarin instructions:  1.25 mg every Mon, Wed, Fri; 2.5 mg all other days   Next INR check:  6/11/2024               Detailed voice message left for Neal with dosing instructions and follow up date.     Contact [phone]  to schedule and with any changes, questions or concerns.     Education provided:   Taking warfarin: Importance of taking warfarin as instructed    Plan made per [facility] anticoagulation protocol    Fidelina Pizano, RN  Anticoagulation Clinic  5/7/2024    _______________________________________________________________________     Anticoagulation Episode Summary       Current INR goal:  2.0-2.5   TTR:  57.1% (1 y)   Target end date:  Indefinite   Send INR reminders to:  [facility]    Indications    Persistent atrial fibrillation (H) [I48.19]  Long term current use of anticoagulants with INR goal of 2.0-3.0 [Z79.01]             Comments:               Anticoagulation Care Providers       Provider Role Specialty Phone number    Kushal Valentin MD Referring Family Medicine [phone]    Leonides Aggarwal PA-C Referring Family Medicine [phone]

## 2024-05-07 NOTE — TELEPHONE ENCOUNTER
ANTICOAGULATION MANAGEMENT:  Medication Refill    Anticoagulation Summary  As of 5/7/2024      Warfarin maintenance plan:  1.25 mg (2.5 mg x 0.5) every Mon, Wed, Fri; 2.5 mg (2.5 mg x 1) all other days   Next INR check:     Target end date:  Indefinite    Indications    Persistent atrial fibrillation (H) [I48.19]  Long term current use of anticoagulants with INR goal of 2.0-3.0 [Z79.01]                 Anticoagulation Care Providers       Provider Role Specialty Phone number    Kushal Valentin MD Referring Family Medicine [phone]    Leonides Aggarwal PA-C Referring [facility] [phone]            Refill Criteria    Visit with referring provider/group: Meets criteria: office visit within referring provider group in the last 1 year on 11/28/2023    [facility] referral last signed: 10/24/2023; within last year: Yes    Lab monitoring not exceeding 2 weeks overdue: Yes    Nathen meets all criteria for refill. Rx instructions and quantity supplied updated to match patient's current dosing plan. Warfarin 90 day supply with 1 refill granted per [facility] protocol     Fidelina Pizano, RN  Anticoagulation Clinic

## 2024-05-08 ENCOUNTER — OFFICE VISIT (OUTPATIENT)
Dept: CARDIOLOGY | Facility: CLINIC | Age: 89
End: 2024-05-08
Payer: COMMERCIAL

## 2024-05-08 VITALS
BODY MASS INDEX: 26.81 KG/M2 | HEIGHT: 68 IN | HEART RATE: 64 BPM | OXYGEN SATURATION: 99 % | WEIGHT: 176.9 LBS | SYSTOLIC BLOOD PRESSURE: 128 MMHG | DIASTOLIC BLOOD PRESSURE: 70 MMHG

## 2024-05-08 DIAGNOSIS — I15.9 SECONDARY HYPERTENSION: ICD-10-CM

## 2024-05-08 DIAGNOSIS — I77.810 DILATED AORTIC ROOT (H): ICD-10-CM

## 2024-05-08 DIAGNOSIS — I48.0 PAROXYSMAL ATRIAL FIBRILLATION (H): ICD-10-CM

## 2024-05-08 DIAGNOSIS — Z95.0 CARDIAC PACEMAKER IN SITU: Primary | ICD-10-CM

## 2024-05-08 PROCEDURE — 99214 OFFICE O/P EST MOD 30 MIN: CPT | Performed by: INTERNAL MEDICINE

## 2024-05-08 NOTE — LETTER
5/8/2024    Leonides Aggarwal PA-C  [address] [facility] [id number]    RE: Nathen Gage       Dear Colleague,     I had the pleasure of seeing Nathen Gage in the [facility].  CARDIOLOGY VISIT    REASON FOR VISIT: A-fib, pacemaker    SUBJECTIVE:  88-year-old male seen for follow-up of paroxysmal atrial fibrillation and pacemaker.  He also has dilated a sending aorta and hypertension.      2011 he underwent 2C pacemaker implantation ([employer] 60 EL) for sick sinus syndrome.  He was found to have A. fib based on mode switching on device checks, starting in 2014.       Echo 2023 showed EF 60%, 1-2+ TR, aorta 4.0 cm.    2023 pacemaker generator was changed ([employer]  L331).    Device check February 2024 showed 62% a paced, 22% V paced, underlying rhythm sinus bradycardia, no arrhythmias, battery 11 years.    He has been doing well recently.  [facility] and has been doing a lot of packing and feels well during this.  He will walk about 3 times per week over a mile.  He remains quite socially engaged.  He denies any lightheadedness, dizziness, palpitations, chest pain, or edema.    MEDICATIONS:  Current Outpatient Medications   Medication Sig Dispense Refill    cephALEXin (KEFLEX) 500 MG capsule Take 1 capsule (500 mg) by mouth 3 times daily 21 capsule 0    doxazosin (CARDURA) 8 MG tablet TAKE 1 TABLET(8 MG) BY MOUTH AT BEDTIME 90 tablet 0    losartan (COZAAR) 100 MG tablet Take 1 tablet (100 mg) by mouth daily 90 tablet 3    metoprolol succinate ER (TOPROL XL) 50 MG 24 hr tablet Take 1 tablet (50 mg) by mouth daily 90 tablet 2    pantoprazole (PROTONIX) 40 MG EC tablet TAKE 1 TABLET(40 MG) BY MOUTH DAILY 90 tablet 2    Polyethylene Glycol 400 (BLINK TEARS OP) Apply to eye as needed      rosuvastatin (CRESTOR) 5 MG tablet TAKE 1 TABLET(5 MG) BY MOUTH DAILY 90 tablet 0    warfarin ANTICOAGULANT (COUMADIN) 2.5 MG tablet TAKE 1/2  "TO 1 TABLET(1.25 TO 2.5 MG) BY MOUTH DAILY OR AS DIRECTED. ADJUST DOSE BASED ON INR 90 tablet 1     No current facility-administered medications for this visit.       ALLERGIES:  Allergies   Allergen Reactions    Neomycin Sulfate        REVIEW OF SYSTEMS:  Constitutional:  No weight loss, fever, chills  HEENT:  Eyes:  No visual loss, blurred vision, double vision or yellow sclerae. No hearing loss, sneezing, congestion, runny nose or sore throat.  Skin:  No rash or itching.  Cardiovascular: per HPI  Respiratory: per HPI  GI:  No anorexia, nausea, vomiting or diarrhea. No abdominal pain or blood.  :  No dysurea, hematuria  Neurologic:  No headache, paralysis, ataxia, numbness or tingling in the extremities. No change in bowel or bladder control.  Musculoskeletal:  No muscle pain  Hematologic:  No bleeding or bruising.  Lymphatics:  No enlarged nodes. No history of splenectomy.  Endocrine:  No reports of sweating, cold or heat intolerance. No polyuria or polydipsia.  Allergies:  No history of asthma, hives, eczema or rhinitis.    PHYSICAL EXAM:  /70 (BP Location: Right arm, Patient Position: Sitting, Cuff Size: Adult Regular)   Pulse 64   Ht 1.727 m (5' 8\")   Wt 80.2 kg (176 lb 14.4 oz)   SpO2 99%   BMI 26.90 kg/m    Constitutional: awake, alert, no distress  Eyes: PERRL, sclera nonicteric  ENT: trachea midline  Respiratory: Lungs clear   Cardiovascular: regular rate and rhythm, no murmurs  GI: nondistended, nontender, bowel sounds present  Lymph/Hematologic: no lymphadenopathy  Skin: dry, no rash  Musculoskeletal: good muscle tone, strength 5/5 in upper and lower extremities  Neurologic: no focal deficits  Neuropsychiatric: appropriate affact    DATA:  Lab:   Recent Labs   Lab Test 03/05/24  0828 05/15/23  0858   CHOL 156 157   HDL 49 54   LDL 87 90   TRIG 102 66     ASSESSMENT:  88-year-old male seen for A-fib and pacemaker.  He is doing very well for his age, he is quite active and has no cardiac " symptoms.  Pacemaker is functioning normally.    RECOMMENDATIONS:  1.  Paroxysmal A-fib  -Continue rate control and warfarin    2.  Pacemaker  -Continue routine device checks    Follow-up in 1 year with SHAY.    Daniel Leon MD  Cardiology - [facility]  Pager:  [phone]  Text Page  May 8, 2024    Thank you for allowing me to participate in the care of your patient.      Sincerely,     Daniel Leon MD   [facility]  cc: Daniel Leon MD

## 2024-06-02 NOTE — PROGRESS NOTES
Nathen Gage is a 88 year old year old male is being evaluated via a billable video visit.      If the video visit is dropped, the invitation should be resent by: Text to cell phone: [phone]    Will anyone else be joining your video visit? No    Video-Visit Details    Type of service:  Video Visit    Originating Location (pt. Location): Home    Distant Location (provider location):  Off-site    Platform used for Video Visit: AmWell    Patient has given verbal consent for Video visit? Yes    Video Start Time (time video started): 8:55 AM    Video End Time (time video stopped): 9:22 AM    Empirical studies have demonstrated that video/hybrid formats are appropriate and effective means for delivering neuropsychological services to the patient.    RE: Nathen Gage  MR#: [id number]  : 1935  DOS: 2024    CLINICAL INTERVIEW    REASON FOR REFERRAL:  Nathen Gage is a 88 year old male with approximately 14 years of education. The patient was referred for a neuropsychological evaluation by Leonides Aggarwal PA-C to assess his cognitive and emotional functioning secondary to memory difficulties. The evaluation was requested in order to document his current level of functioning, assist with the differential diagnosis and provide appropriate recommendations to the patient, family and treatment team. The patient was informed that the evaluation included multiple measures of performance and symptom validity, and he was encouraged to provide his best effort at all times.  The nature of the neuropsychological evaluation was also discussed, including limits of confidentiality (for suspected child or elder abuse, potential homicide or suicide, and court orders). The patient was also informed that the report would be placed on the electronic medical records system.  The patient was also given an opportunity to ask questions. The patient indicated that he understood the information and consented to participate in  "the evaluation. This interview was conducted using telehealth methods     PROCEDURES:   Clinical interview    CLINICAL INTERVIEW: On a medical history questionnaire completed by the patient, the patient endorsed memory difficulties.  He indicated he has not had a previous neuropsychological evaluation.  He denied needing assistance with daily activities.  He also denied any history of mental health intervention.  Further, he denied any history of concussion, stroke, central nervous system infection, seizures, brain tumor, brain surgery, chronic pain/fatigue, or sleep disorder.  He characterized his sleep and appetite as \"good\" and noted that he exercises regularly.  He also denied any history of developmental delays or learning/attention difficulties.  On this form he indicated that he is retired.    The patient was interviewed via video platform and he was interviewed with his wife, Nichol.  On interview, the patient and his wife stated that they have been concerned about his memory.  The patient reported that he has noticed memory issues for at least one year, and his wife added that it may have been longer.  The patient said that he will do things such as walk into her room and not remember why and he has a hard time recalling conversations.  His wife added that he recently purchased a bath mat and does not remember doing that.  The patient also said that he has difficulty remembering names, which is particularly challenging since they recently moved.  The patient said that he thought his memory was stable, and his wife thought it might be somewhat worse over the past 6-8 months.  The patient also noted difficulty with word finding and attention/concentration.  Functionally, the patient and his wife denied difficulty with driving or financial management.  His wife stated that she is doing more with the finances than before.  The patient also denied difficulty with medication management, and uses a pillbox.  In " terms of basic household chores, they denied particular difficulty with the patient and his wife stated that he is able to do chores such as cleaning without difficulty.    The patient reported that his mood was good and he denied any depression or anxiety.  He also denied any difficulty with sleep or appetite.  He confirmed that he is not working with any mental health providers.  He also denied any current or previous suicidal or homicidal ideation, and denied any history of suicide attempts.  He also denied any history of hallucinations or delusions.  The patient said that he enjoys craft beers and will have approximately 2-3 beers per week.  He stated that he quit cigarette smoking in 1975 and never used illicit substances.    Medically, they noted that the patient has generally been healthy, although he takes medication for hypercholesterolemia.  He also confirmed his history of atrial fibrillation and indicated that he uses a pacemaker and goes to the INR clinic.  He also noted difficulty with hearing and said he uses hearing aids all the time.  He reported he wears eyeglasses all the time as well.  The patient noted a significant family medical history for a stroke in his father and dementia in his brother and sister.  In terms of other medical issues, the patient denied any history of hypothyroidism, cancer, hypertension, liver disease, or kidney disease.  The patient indicated that the medications listed in the records are up-to-date.    The patient reported that after he graduated from high school he served in the [employer] and then spent 2 years at the [employer].  He stated that he then transferred to [employer] where he earned a certificate in mechanical drafting.  His wife noted they have been  for 56 years and have 3 children, including 2 daughters and one son.  They noted that their 2 daughters live nearby and their son lives in [place].  As noted above, the patient  reported that they recently moved from a 4 bedroom home into a townhouse.  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  He denied ever being in special education classes or having to repeat a grade.    PLAN: [facility] [place] [place]. Full report to follow.    Diagnosis Codes: R41.3, I48.91  Procedure Code: 48771    Thank you for referring this interesting individual. If you have any questions, please feel free to contact me.      Mingo Olivier, PhD, ABPP, LP  Professor and Licensed Psychologist [id number]  Board Certified Clinical Neuropsychologist

## 2024-06-04 ENCOUNTER — TRANSFERRED RECORDS (OUTPATIENT)
Dept: HEALTH INFORMATION MANAGEMENT | Facility: CLINIC | Age: 89
End: 2024-06-04

## 2024-06-04 ENCOUNTER — TELEPHONE (OUTPATIENT)
Dept: CARDIOLOGY | Facility: CLINIC | Age: 89
End: 2024-06-04

## 2024-06-04 ENCOUNTER — ANCILLARY PROCEDURE (OUTPATIENT)
Dept: CARDIOLOGY | Facility: CLINIC | Age: 89
End: 2024-06-04
Attending: INTERNAL MEDICINE
Payer: COMMERCIAL

## 2024-06-04 DIAGNOSIS — Z95.0 CARDIAC PACEMAKER IN SITU: ICD-10-CM

## 2024-06-04 PROCEDURE — 93294 REM INTERROG EVL PM/LDLS PM: CPT | Performed by: INTERNAL MEDICINE

## 2024-06-04 PROCEDURE — 93296 REM INTERROG EVL PM/IDS: CPT | Performed by: INTERNAL MEDICINE

## 2024-06-04 NOTE — TELEPHONE ENCOUNTER
Nurys Leon,    Sending this as an FYI as your pt had his first episode of NSVT logged on his device today.  1 EGM for review shows Vs>As for NVST lasting 14 beats, rates 190-205 bpm. Episode occured 3/11/24 at 1:47 pm. EF: 55%. Pt is full code and taking metoprolol. LM, unable to speak to pt to discuss sxs. Would you recommend any changes? Thank you for your time!

## 2024-06-05 LAB
MDC_IDC_EPISODE_DTM: NORMAL
MDC_IDC_EPISODE_DURATION: 14 S
MDC_IDC_EPISODE_DURATION: 17 S
MDC_IDC_EPISODE_ID: NORMAL
MDC_IDC_EPISODE_TYPE: NORMAL
MDC_IDC_EPISODE_TYPE_INDUCED: NO
MDC_IDC_EPISODE_TYPE_INDUCED: NO
MDC_IDC_LEAD_CONNECTION_STATUS: NORMAL
MDC_IDC_LEAD_CONNECTION_STATUS: NORMAL
MDC_IDC_LEAD_IMPLANT_DT: NORMAL
MDC_IDC_LEAD_IMPLANT_DT: NORMAL
MDC_IDC_LEAD_LOCATION: NORMAL
MDC_IDC_LEAD_LOCATION: NORMAL
MDC_IDC_LEAD_LOCATION_DETAIL_1: NORMAL
MDC_IDC_LEAD_LOCATION_DETAIL_1: NORMAL
MDC_IDC_LEAD_MFG: NORMAL
MDC_IDC_LEAD_MFG: NORMAL
MDC_IDC_LEAD_MODEL: NORMAL
MDC_IDC_LEAD_MODEL: NORMAL
MDC_IDC_LEAD_POLARITY_TYPE: NORMAL
MDC_IDC_LEAD_POLARITY_TYPE: NORMAL
MDC_IDC_LEAD_SERIAL: NORMAL
MDC_IDC_LEAD_SERIAL: NORMAL
MDC_IDC_MSMT_BATTERY_DTM: NORMAL
MDC_IDC_MSMT_BATTERY_REMAINING_LONGEVITY: 144 MO
MDC_IDC_MSMT_BATTERY_REMAINING_PERCENTAGE: 100 %
MDC_IDC_MSMT_BATTERY_STATUS: NORMAL
MDC_IDC_MSMT_LEADCHNL_RA_IMPEDANCE_VALUE: 638 OHM
MDC_IDC_MSMT_LEADCHNL_RA_PACING_THRESHOLD_AMPLITUDE: 0.5 V
MDC_IDC_MSMT_LEADCHNL_RA_PACING_THRESHOLD_PULSEWIDTH: 0.4 MS
MDC_IDC_MSMT_LEADCHNL_RV_IMPEDANCE_VALUE: 443 OHM
MDC_IDC_MSMT_LEADCHNL_RV_PACING_THRESHOLD_AMPLITUDE: 1.5 V
MDC_IDC_MSMT_LEADCHNL_RV_PACING_THRESHOLD_PULSEWIDTH: 0.4 MS
MDC_IDC_PG_IMPLANT_DTM: NORMAL
MDC_IDC_PG_MFG: NORMAL
MDC_IDC_PG_MODEL: NORMAL
MDC_IDC_PG_SERIAL: NORMAL
MDC_IDC_PG_TYPE: NORMAL
MDC_IDC_SESS_CLINIC_NAME: NORMAL
MDC_IDC_SESS_DTM: NORMAL
MDC_IDC_SESS_TYPE: NORMAL
MDC_IDC_SET_BRADY_AT_MODE_SWITCH_MODE: NORMAL
MDC_IDC_SET_BRADY_AT_MODE_SWITCH_RATE: 170 {BEATS}/MIN
MDC_IDC_SET_BRADY_LOWRATE: 60 {BEATS}/MIN
MDC_IDC_SET_BRADY_MAX_SENSOR_RATE: 120 {BEATS}/MIN
MDC_IDC_SET_BRADY_MAX_TRACKING_RATE: 120 {BEATS}/MIN
MDC_IDC_SET_BRADY_MODE: NORMAL
MDC_IDC_SET_BRADY_PAV_DELAY_HIGH: 200 MS
MDC_IDC_SET_BRADY_PAV_DELAY_LOW: 300 MS
MDC_IDC_SET_BRADY_SAV_DELAY_HIGH: 200 MS
MDC_IDC_SET_BRADY_SAV_DELAY_LOW: 300 MS
MDC_IDC_SET_LEADCHNL_RA_PACING_AMPLITUDE: 2 V
MDC_IDC_SET_LEADCHNL_RA_PACING_CAPTURE_MODE: NORMAL
MDC_IDC_SET_LEADCHNL_RA_PACING_POLARITY: NORMAL
MDC_IDC_SET_LEADCHNL_RA_PACING_PULSEWIDTH: 0.4 MS
MDC_IDC_SET_LEADCHNL_RA_SENSING_ADAPTATION_MODE: NORMAL
MDC_IDC_SET_LEADCHNL_RA_SENSING_POLARITY: NORMAL
MDC_IDC_SET_LEADCHNL_RA_SENSING_SENSITIVITY: 0.5 MV
MDC_IDC_SET_LEADCHNL_RV_PACING_AMPLITUDE: 3.5 V
MDC_IDC_SET_LEADCHNL_RV_PACING_CAPTURE_MODE: NORMAL
MDC_IDC_SET_LEADCHNL_RV_PACING_POLARITY: NORMAL
MDC_IDC_SET_LEADCHNL_RV_PACING_PULSEWIDTH: 0.4 MS
MDC_IDC_SET_LEADCHNL_RV_SENSING_ADAPTATION_MODE: NORMAL
MDC_IDC_SET_LEADCHNL_RV_SENSING_POLARITY: NORMAL
MDC_IDC_SET_LEADCHNL_RV_SENSING_SENSITIVITY: 5 MV
MDC_IDC_SET_ZONE_DETECTION_INTERVAL: 353 MS
MDC_IDC_SET_ZONE_STATUS: NORMAL
MDC_IDC_SET_ZONE_TYPE: NORMAL
MDC_IDC_SET_ZONE_VENDOR_TYPE: NORMAL
MDC_IDC_STAT_AT_BURDEN_PERCENT: 0 %
MDC_IDC_STAT_AT_DTM_END: NORMAL
MDC_IDC_STAT_AT_DTM_START: NORMAL
MDC_IDC_STAT_BRADY_DTM_END: NORMAL
MDC_IDC_STAT_BRADY_DTM_START: NORMAL
MDC_IDC_STAT_BRADY_RA_PERCENT_PACED: 63 %
MDC_IDC_STAT_BRADY_RV_PERCENT_PACED: 34 %
MDC_IDC_STAT_EPISODE_RECENT_COUNT: 0
MDC_IDC_STAT_EPISODE_RECENT_COUNT: 0
MDC_IDC_STAT_EPISODE_RECENT_COUNT: 17
MDC_IDC_STAT_EPISODE_RECENT_COUNT_DTM_END: NORMAL
MDC_IDC_STAT_EPISODE_RECENT_COUNT_DTM_START: NORMAL
MDC_IDC_STAT_EPISODE_TYPE: NORMAL
MDC_IDC_STAT_EPISODE_VENDOR_TYPE: NORMAL
MDC_IDC_STAT_EPISODE_VENDOR_TYPE: NORMAL

## 2024-06-11 ENCOUNTER — TELEPHONE (OUTPATIENT)
Dept: FAMILY MEDICINE | Facility: CLINIC | Age: 89
End: 2024-06-11

## 2024-06-11 ENCOUNTER — ANTICOAGULATION THERAPY VISIT (OUTPATIENT)
Dept: ANTICOAGULATION | Facility: CLINIC | Age: 89
End: 2024-06-11

## 2024-06-11 ENCOUNTER — LAB (OUTPATIENT)
Dept: LAB | Facility: CLINIC | Age: 89
End: 2024-06-11
Payer: COMMERCIAL

## 2024-06-11 DIAGNOSIS — Z79.01 LONG TERM CURRENT USE OF ANTICOAGULANTS WITH INR GOAL OF 2.0-3.0: ICD-10-CM

## 2024-06-11 DIAGNOSIS — I48.19 PERSISTENT ATRIAL FIBRILLATION (H): Primary | ICD-10-CM

## 2024-06-11 LAB — INR BLD: 2.1 (ref 0.9–1.1)

## 2024-06-11 PROCEDURE — 36415 COLL VENOUS BLD VENIPUNCTURE: CPT

## 2024-06-11 PROCEDURE — 85610 PROTHROMBIN TIME: CPT

## 2024-06-11 NOTE — TELEPHONE ENCOUNTER
Patient Quality Outreach    Patient is due for the following:   Diabetes -  Eye Exam    Next Steps:   No follow up needed at this time.    Type of outreach:    Chart review performed, no outreach needed. and patient has been seen with [facility] 06-04-24      Questions for provider review:    None           Zuleyka Castro MA

## 2024-06-11 NOTE — PROGRESS NOTES
ANTICOAGULATION MANAGEMENT     Nathen Gage 88 year old male is on warfarin with therapeutic INR result. (Goal INR 2.0-2.5)    Recent labs: (last 7 days)     06/11/24  0820   INR 2.1*       ASSESSMENT     Source(s): Chart Review  Previous INR was Therapeutic last 2(+) visits  Medication, diet, health changes since last INR chart reviewed; none identified         PLAN     Recommended plan for no diet, medication or health factor changes affecting INR     Dosing Instructions: Continue your current warfarin dose with next INR in 6 weeks       Summary  As of 6/11/2024      Full warfarin instructions:  1.25 mg every Mon, Wed, Fri; 2.5 mg all other days   Next INR check:  7/23/2024               Detailed voice message left for Neal with dosing instructions and follow up date.     Contact [phone]  to schedule and with any changes, questions or concerns.     Education provided:   Please call back if any changes to your diet, medications or how you've been taking warfarin    Plan made per [facility] anticoagulation protocol    Joanne Bustos RN  Anticoagulation Clinic  6/11/2024    _______________________________________________________________________     Anticoagulation Episode Summary       Current INR goal:  2.0-2.5   TTR:  62.6% (1 y)   Target end date:  Indefinite   Send INR reminders to:  [facility]    Indications    Persistent atrial fibrillation (H) [I48.19]  Long term current use of anticoagulants with INR goal of 2.0-3.0 [Z79.01]             Comments:               Anticoagulation Care Providers       Provider Role Specialty Phone number    Kushal Valentin MD Referring Family Medicine [phone]    Leonides Aggarwal PA-C Referring Family Medicine [phone]

## 2024-06-17 NOTE — PROGRESS NOTES
RE: Nathen Gage  MR#: [id number]  : 1935  DOS: 2024    NEUROPSYCHOLOGICAL CONSULTATION    REASON FOR REFERRAL:  Nathen Gage is a 88 year old male with approximately 14 years of education. The patient was referred for a neuropsychological evaluation by Leonides Aggarwal PA-C to assess his cognitive and emotional functioning secondary to memory difficulties. The evaluation was requested in order to document his current level of functioning, assist with the differential diagnosis and provide appropriate recommendations to the patient, family and treatment team. The patient was informed that the evaluation included multiple measures of performance and symptom validity, and he was encouraged to provide his best effort at all times.  The nature of the neuropsychological evaluation was also discussed, including limits of confidentiality (for suspected child or elder abuse, potential homicide or suicide, and court orders). The patient was also informed that the report would be placed on the electronic medical records system.  The patient was also given an opportunity to ask questions. The patient indicated that he understood the information and consented to participate in the evaluation.    PROCEDURES:   Review of records and clinical interview,  Mental Status-orientation, Wide Range Achievement Test-4, Wechsler Adult Intelligence Scale-IV, Clock Drawing Test, Verbal Fluency Test, Geriatric Depression Scale, Geriatric Anxiety Scale, Johnathon Complex Figure Test, Trailmaking Test, TOMM, Stroop Test, Wechsler Memory Scale-IV (selected subtests) and RBANS    REVIEW OF RECORDS: Records stated that the patient  has a past medical history of Actinic keratosis, Acute idiopathic gout of left knee (2016), Atrial fibrillation (H), Dilated aortic root (H24), Esophageal reflux, Esophagitis, H/O: GI bleed (), Hyperlipidaemia, Hyperlipidemia LDL goal <100, Impotence of organic origin, Presbycusis, unspecified  "laterality (4/27/2016), Pulmonary hypertension (H), Sick sinus syndrome (H), and Unspecified essential hypertension.. Records noted that the patient has a current medication list which includes the following prescription(s): doxazosin, losartan, metoprolol succinate er, pantoprazole, polyethylene glycol 400, rosuvastatin, and warfarin anticoagulant. Records from 3/6/24 noted the patient s memory concerns were worsening. Records from 11/28/23 noted that the patient s wife was concerns with cognitive changes but  6 cit is normal today. 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 Possible impairment in hearing of wife or possible  selective hearing  concerns. Reassured but if progressing, can see neuropsych for formal assessment in future.  No recent neuroimaging scan of the brain reports were found in the records. Records from 5/8/24 noted that he has a history of  paroxysmal atrial fibrillation and pacemaker.  He also has dilated a sending aorta and hypertension. 2011 he underwent 2C pacemaker implantation ([employer] Altrua 60 EL) for sick sinus syndrome.  He was found to have A. fib based on mode switching on device checks, starting in 2014.  Echo 2023 showed EF 60%, 1-2+ TR, aorta 4.0 cm. 2023 pacemaker generator was changed ([employer] Accolade MRI DR L331). Device check February 2024 showed 62% a paced, 22% V paced, underlying rhythm sinus bradycardia, no arrhythmias, battery 11 years. He has been doing well recently.  [place] and has been doing a lot of packing and feels well during this.  He will walk about 3 times per week over a mile.  He remains quite socially engaged.  He denies any lightheadedness, dizziness, palpitations, chest pain, or edema.  a CT scan of the head report dated 6/2/2014 noted \"No acute abnormality.2. Generalized atrophy of the brain. 3. No change.\"    From 7/1/24:  CLINICAL INTERVIEW: On a medical history questionnaire completed by the " "patient, the patient endorsed memory difficulties.  He indicated he has not had a previous neuropsychological evaluation.  He denied needing assistance with daily activities.  He also denied any history of mental health intervention.  Further, he denied any history of concussion, stroke, central nervous system infection, seizures, brain tumor, brain surgery, chronic pain/fatigue, or sleep disorder.  He characterized his sleep and appetite as \"good\" and noted that he exercises regularly.  He also denied any history of developmental delays or learning/attention difficulties.  On this form he indicated that he is retired.    The patient was interviewed via video platform and he was interviewed with his wife, Nichol.  On interview, the patient and his wife stated that they have been concerned about his memory.  The patient reported that he has noticed memory issues for at least one year, and his wife added that it may have been longer.  The patient said that he will do things such as walk into her room and not remember why and he has a hard time recalling conversations.  His wife added that he recently purchased a bath mat and does not remember doing that.  The patient also said that he has difficulty remembering names, which is particularly challenging since they recently moved.  The patient said that he thought his memory was stable, and his wife thought it might be somewhat worse over the past 6-8 months.  The patient also noted difficulty with word finding and attention/concentration.  Functionally, the patient and his wife denied difficulty with driving or financial management.  His wife stated that she is doing more with the finances than before.  The patient also denied difficulty with medication management, and uses a pillbox.  In terms of basic household chores, they denied particular difficulty with the patient and his wife stated that he is able to do chores such as cleaning without difficulty.    The " patient reported that his mood was good and he denied any depression or anxiety.  He also denied any difficulty with sleep or appetite.  He confirmed that he is not working with any mental health providers.  He also denied any current or previous suicidal or homicidal ideation, and denied any history of suicide attempts.  He also denied any history of hallucinations or delusions.  The patient said that he enjoys craft beers and will have approximately 2-3 beers per week.  He stated that he quit cigarette smoking in 1975 and never used illicit substances.    Medically, they noted that the patient has generally been healthy, although he takes medication for hypercholesterolemia.  He also confirmed his history of atrial fibrillation and indicated that he uses a pacemaker and [facility].  He also noted difficulty with hearing and said he uses hearing aids all the time.  He reported he wears eyeglasses all the time as well.  The patient noted a significant family medical history for a stroke in his father and dementia in his brother and sister.  In terms of other medical issues, the patient denied any history of hypothyroidism, cancer, hypertension, liver disease, or kidney disease.  The patient indicated that the medications listed in the records are up-to-date.    The patient reported that after he graduated from high school he served in the [employer] and then spent 2 years at the [employer].  He stated that he then transferred to [employer] where he earned a certificate in mechanical drafting.  His wife noted they have been  for 56 years and have 3 children, including 2 daughters and one son.  They noted that their 2 daughters live nearby and their son lives in [place].  As noted above, the patient reported that they recently moved from a 4 bedroom home into a townhouse.  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  He denied ever being in special education classes or having to repeat a grade.    BEHAVIORAL OBSERVATIONS:  On examination, the patient was casually but appropriately dressed and groomed. The patient was cooperative with the evaluation and was talkative.  Speech was normal in volume, rate and tone.  The patient also appeared to put forth his best effort on all tasks. Thus, the results of this evaluation are a reasonably valid reflection of the patient's current level of functioning. There were no indications of hallucinations, delusions or unusual thought processes and the patient was generally well oriented to personal information, place, and time. The patient was a good historian, with a self-report consistent with medical records. Affect was also generally appropriate.      RESULTS: Formal testing was conducted in person by a psychometrist. Formal performance validity measures were within expected limits and consistent with the above noted observations.  Psychometric estimates of the patient's premorbid global cognitive functioning based upon single word reading ability were in the average range, which is consistent with his educational and occupational history.    Measures of attention/concentration were generally within expected limits.  For example, a digit span task was in the average range.  Likewise, a visual scanning task that integrates attention was in the average range.  Speed of information processing was variable, however.  For example, psychomotor speed was in the average range.  However, motor-free processing speed ranged from low average to average.    Measures of the patient's memory functioning were poorer than expected.  For example, immediate and delayed verbal recall on a word list learning task were in the exceptionally low range, and he could not recall any information following a delay.  A recognition format did not improve his performance.  However,  on a more structured story memory task, immediate and delayed recall were better and in the low average range.  Verbal recognition memory was in the average range.  Visual memory was variable.  Specifically, immediate recall of a complex figure drawing was in the average range, while delayed recall was in the high average range.  However, visual recognition memory for the figure was in the exceptionally low range.    Expressive language functioning was within expected limits.  For example, confrontation naming was in the average range and completed without error.  Further, semantic and phonemic fluency were in the average range.  Receptive language functioning, based upon his performance during the interview, was also within expected limits.    Visual spatial and visual constructional abilities were likewise within expected limits.  For example, motor-free line orientation judgment was within expected limits.  Further, there was no evidence for visual spatial or visual constructional difficulties on complex figure drawing or clock drawing tasks.    Measures of the patient's executive functioning were variable, but he had particular difficulty with cognitive flexibility and planning/organizational ability.  Specifically, a complex visual scanning task that integrates cognitive flexibility was in the exceptionally low range and could not be completed by the patient.  Further, there was evidence for mild planning and organizational difficulties on complex figure drawing and clock drawing tasks.  However, a measure of response inhibition that integrates processing speed was better and in the average range.    Assessment of the patient's emotional functioning was completed utilizing the clinical interview and self-report measures of depression and anxiety.  On the self-report measures, the patient endorsed mild anxiety symptoms and minimal depressive symptoms.  However, during the interview he denied depression or  anxiety.    SUMMARY:  In summary, the neuropsychological assessment results indicated no evidence for significant change or decline in global cognitive functioning.  Further, attention/concentration, language functioning, visual-spatial abilities were within expected limits.  However, memory functioning was significantly poorer than expected.  There was evidence for variability with encoding information, but the patient tended to benefit from higher levels of structure on memory tasks.  In contrast, on a less structured verbal memory task, encoding of new information was in the impaired range, and he could not recall any information following a delay.  Visual memory was also variable.  Thus, there was no consistent pattern of rapid forgetting previously learned information, but encoding new information was impacted by his organizational ability.  Additionally, other aspects of executive functioning were poorer than expected, including cognitive flexibility and planning.  He also endorsed mild anxiety symptoms on formal personality assessment.  However, the pattern of results indicated that emotional distress is unlikely to be the major contributing factor to his memory and executive functioning difficulties.  The results indicated that the patient met criteria for mild cognitive impairment (MCI), given the lack of evidence for significant impairments in daily functional activities. However, he does not meet criteria for dementia. In terms of etiology, the pattern results indicated that an amnestic, Alzheimer's type process is less likely.  The pattern of results is more consistent with encoding, processing speed, and executive functioning difficulties commonly seen in a vascular etiology.  This is also consistent with his history of vascular risk factors, including atrial fibrillation.    RECOMMENDATIONS:  1. Given these results, a referral for neurological consultation is recommended to assist with the differential  diagnosis and treatment planning.  2. Related, a referral for further neuroimaging scans, such as an MRI scan of the brain, likely would be helpful for clarifying the differential diagnosis.  3. A full medical evaluation of any treatable causes of cognitive change is also recommended, if this has not already been accomplished.  Evaluation of any infectious processes, vitamin deficiencies or other metabolic abnormalities is recommended, to rule out these as possible contributors.   4. A referral for cognitive rehabilitation therapy, such as with a speech therapist, is recommended.One option for this service is  [facility] at [email or web address]  5. The patient may find the following attention and organizational strategies helpful:     Use cell phone reminders to help monitor upcoming appointments and due dates as well as other important tasks (e.g., when to take medications). Set the reminder to go off several times prior to the event with advanced notice (e.g., one week before, two days before, the day before, and the morning of the event).     The patietn should attempt to reduce distractions at home. Having a clutter-free work environment and removing or at least making it harder to access potential distractions would help optimize her attention. The patient might also consider facing away from high traffic areas and using earplugs or noise cancellation headphones when desiring a quiet work environment.    Taking short breaks during the day may help optimize and maintain concentration.     Make to-do lists and prioritize tasks. Break down large tasks into smaller steps and check off each small step when completed.   6. In order to promote learning and memorization of new verbal material, it is recommended that the patient add structure to the material she is learning to promote subsequent recall (e.g., use mnemonic devices,  acronyms, make up a story or song). Additionally, she is encouraged to use visual aids, such as flash cards, diagrams, charts, etc.   7. Given his mild anxiety symptoms, referral for psychotherapeutic intervention may be beneficial. A highly structured cognitive-behavioral intervention focused on coping and stress management would likely be the most helpful. One option for this service is through the [facility] at [email or web address] or [phone].  8.Given the impact on cognitive functioning associated with hearing loss, the patient is encouraged to continue being evaluated for hearing changes.  9. The patient is strongly encouraged to work closely with treating healthcare providers to closely manage vascular risk factors such as atrial fibrillation.   10. Working closely with treating healthcare providers to develop a medically appropriate exercise program is recommended, given the mental health and physical benefits of regular exercise.  11. The results of the evaluation now constitute a baseline of the patient's cognitive functioning.  Given the evidence for significant deficits in cognitive functioning, a referral for a neuropsychological reevaluation in approximately one year is recommended in order to clarify the differential diagnosis, document any changes in cognitive functioning, and provide further recommendations and prognosis to the patient, family and treatment team.    Results and recommendations were discussed with the patient and his wife via telephone on 7/2/2024 and their questions were answered.  I encouraged them to follow-up with their treating healthcare providers to help facilitate the recommendations noted in this report.  Thank you for referring this interesting patient.  Please feel free to contact me if you have any questions.    Mingo Olivier, PhD, ABPP, LP  Professor and Licensed Psychologist [id number]  Board  Certified Clinical Neuropsychologist    Time Spent:      Minutes Date Code Units   Total Time-Neuropsychologist Professional 212 7/2/24 64367 1     7/2/24 62042 3   Neuropsychologist Admin/scoring 0 7/2/24 43320 0     7/2/24 76002 0   Diagnostic Interview (billed on 6/3/24) 27 7/1/24 14250 1   Psychometrician Time-Test Administration/Score 173 7/2/24 35933 1     7/2/24 79393 5   Diagnosis R41.3 I48.91 G31.84 F41.9

## 2024-06-18 DIAGNOSIS — I15.9 SECONDARY HYPERTENSION: ICD-10-CM

## 2024-06-18 RX ORDER — LOSARTAN POTASSIUM 100 MG/1
100 TABLET ORAL DAILY
Qty: 90 TABLET | Refills: 3 | Status: SHIPPED | OUTPATIENT
Start: 2024-06-18

## 2024-06-24 DIAGNOSIS — I10 ESSENTIAL HYPERTENSION, BENIGN: ICD-10-CM

## 2024-06-24 DIAGNOSIS — E78.00 PURE HYPERCHOLESTEROLEMIA: ICD-10-CM

## 2024-06-24 RX ORDER — ROSUVASTATIN CALCIUM 5 MG/1
5 TABLET, COATED ORAL DAILY
Qty: 90 TABLET | Refills: 0 | Status: SHIPPED | OUTPATIENT
Start: 2024-06-24 | End: 2024-09-13

## 2024-06-26 DIAGNOSIS — N40.1 BENIGN LOCALIZED HYPERPLASIA OF PROSTATE WITH URINARY OBSTRUCTION: ICD-10-CM

## 2024-06-26 DIAGNOSIS — N13.8 BENIGN LOCALIZED HYPERPLASIA OF PROSTATE WITH URINARY OBSTRUCTION: ICD-10-CM

## 2024-06-26 RX ORDER — DOXAZOSIN 8 MG/1
TABLET ORAL
Qty: 90 TABLET | Refills: 0 | Status: SHIPPED | OUTPATIENT
Start: 2024-06-26 | End: 2024-09-13

## 2024-07-01 ENCOUNTER — VIRTUAL VISIT (OUTPATIENT)
Dept: NEUROPSYCHOLOGY | Facility: CLINIC | Age: 89
End: 2024-07-01
Attending: PHYSICIAN ASSISTANT
Payer: COMMERCIAL

## 2024-07-01 DIAGNOSIS — R41.3 MEMORY LOSS: Primary | ICD-10-CM

## 2024-07-01 DIAGNOSIS — I48.91 ATRIAL FIBRILLATION, UNSPECIFIED TYPE (H): ICD-10-CM

## 2024-07-01 PROCEDURE — 99207 PR PSYCHIATRIC DIAGNOSTIC EVALUATION: CPT | Mod: 95

## 2024-07-02 ENCOUNTER — OFFICE VISIT (OUTPATIENT)
Dept: NEUROPSYCHOLOGY | Facility: CLINIC | Age: 89
End: 2024-07-02
Attending: PHYSICIAN ASSISTANT
Payer: COMMERCIAL

## 2024-07-02 DIAGNOSIS — I48.91 ATRIAL FIBRILLATION, UNSPECIFIED TYPE (H): ICD-10-CM

## 2024-07-02 DIAGNOSIS — G31.84 MCI (MILD COGNITIVE IMPAIRMENT): ICD-10-CM

## 2024-07-02 DIAGNOSIS — F41.9 ANXIETY: ICD-10-CM

## 2024-07-02 DIAGNOSIS — R41.3 MEMORY LOSS: Primary | ICD-10-CM

## 2024-07-02 PROCEDURE — 96132 NRPSYC TST EVAL PHYS/QHP 1ST: CPT | Performed by: PSYCHOLOGIST

## 2024-07-02 PROCEDURE — 90791 PSYCH DIAGNOSTIC EVALUATION: CPT | Performed by: PSYCHOLOGIST

## 2024-07-02 PROCEDURE — 96138 PSYCL/NRPSYC TECH 1ST: CPT | Performed by: PSYCHOLOGIST

## 2024-07-02 PROCEDURE — 96133 NRPSYC TST EVAL PHYS/QHP EA: CPT | Performed by: PSYCHOLOGIST

## 2024-07-02 PROCEDURE — 96139 PSYCL/NRPSYC TST TECH EA: CPT | Performed by: PSYCHOLOGIST

## 2024-07-02 NOTE — PROGRESS NOTES
The patient was seen for neuropsychological evaluation at the request of Leonides Aggarwal PA-C for the purposes of diagnostic clarification and treatment planning. 173 minutes of test administration and scoring were provided by this writer. Please see Dr. Mingo Olivier's report for a full interpretation of the findings.    Mishel Shipley  Psychometrist

## 2024-07-02 NOTE — LETTER
2024      RE: Nathen Gage  [address] [facility] [address]   MR#: [id number]  : 1935  DOS: 2024    NEUROPSYCHOLOGICAL CONSULTATION    REASON FOR REFERRAL:  Nathen Gage is a 88 year old male with approximately 14 years of education. The patient was referred for a neuropsychological evaluation by Leonides Aggarwal PA-C to assess his cognitive and emotional functioning secondary to memory difficulties. The evaluation was requested in order to document his current level of functioning, assist with the differential diagnosis and provide appropriate recommendations to the patient, family and treatment team. The patient was informed that the evaluation included multiple measures of performance and symptom validity, and he was encouraged to provide his best effort at all times.  The nature of the neuropsychological evaluation was also discussed, including limits of confidentiality (for suspected child or elder abuse, potential homicide or suicide, and court orders). The patient was also informed that the report would be placed on the electronic medical records system.  The patient was also given an opportunity to ask questions. The patient indicated that he understood the information and consented to participate in the evaluation.    PROCEDURES:   Review of records and clinical interview,  Mental Status-orientation, Wide Range Achievement Test-4, Wechsler Adult Intelligence Scale-IV, Clock Drawing Test, Verbal Fluency Test, Geriatric Depression Scale, Geriatric Anxiety Scale, Johnathon Complex Figure Test, Trailmaking Test, TOMM, Stroop Test, Wechsler Memory Scale-IV (selected subtests) and RBANS    REVIEW OF RECORDS: Records stated that the patient  has a past medical history of Actinic keratosis, Acute idiopathic gout of left knee (2016), Atrial fibrillation (H), Dilated aortic root (H24), Esophageal reflux, Esophagitis, H/O: GI bleed (), Hyperlipidaemia, Hyperlipidemia LDL goal  "<100, Impotence of organic origin, Presbycusis, unspecified laterality (4/27/2016), Pulmonary hypertension (H), Sick sinus syndrome (H), and Unspecified essential hypertension.. Records noted that the patient has a current medication list which includes the following prescription(s): doxazosin, losartan, metoprolol succinate er, pantoprazole, polyethylene glycol 400, rosuvastatin, and warfarin anticoagulant. Records from 3/6/24 noted the patient s memory concerns were worsening. Records from 11/28/23 noted that the patient s wife was concerns with cognitive changes but  6 cit is normal today. 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 Possible impairment in hearing of wife or possible  selective hearing  concerns. Reassured but if progressing, can see neuropsych for formal assessment in future.  No recent neuroimaging scan of the brain reports were found in the records. Records from 5/8/24 noted that he has a history of  paroxysmal atrial fibrillation and pacemaker.  He also has dilated a sending aorta and hypertension. 2011 he underwent 2C pacemaker implantation ([employer] Altrua 60 EL) for sick sinus syndrome.  He was found to have A. fib based on mode switching on device checks, starting in 2014.  Echo 2023 showed EF 60%, 1-2+ TR, aorta 4.0 cm. 2023 pacemaker generator was changed ([employer] Accolade MRI DR L331). Device check February 2024 showed 62% a paced, 22% V paced, underlying rhythm sinus bradycardia, no arrhythmias, battery 11 years. He has been doing well recently.  He will be moving into a [facility] and has been doing a lot of packing and feels well during this.  He will walk about 3 times per week over a mile.  He remains quite socially engaged.  He denies any lightheadedness, dizziness, palpitations, chest pain, or edema.  a CT scan of the head report dated 6/2/2014 noted \"No acute abnormality.2. Generalized atrophy of the brain. 3. No change.\"    From 7/1/24:  CLINICAL " "INTERVIEW: On a medical history questionnaire completed by the patient, the patient endorsed memory difficulties.  He indicated he has not had a previous neuropsychological evaluation.  He denied needing assistance with daily activities.  He also denied any history of mental health intervention.  Further, he denied any history of concussion, stroke, central nervous system infection, seizures, brain tumor, brain surgery, chronic pain/fatigue, or sleep disorder.  He characterized his sleep and appetite as \"good\" and noted that he exercises regularly.  He also denied any history of developmental delays or learning/attention difficulties.  On this form he indicated that he is retired.    The patient was interviewed via video platform and he was interviewed with his wife, Nichol.  On interview, the patient and his wife stated that they have been concerned about his memory.  The patient reported that he has noticed memory issues for at least one year, and his wife added that it may have been longer.  The patient said that he will do things such as walk into her room and not remember why and he has a hard time recalling conversations.  His wife added that he recently purchased a bath mat and does not remember doing that.  The patient also said that he has difficulty remembering names, which is particularly challenging since they recently moved.  The patient said that he thought his memory was stable, and his wife thought it might be somewhat worse over the past 6-8 months.  The patient also noted difficulty with word finding and attention/concentration.  Functionally, the patient and his wife denied difficulty with driving or financial management.  His wife stated that she is doing more with the finances than before.  The patient also denied difficulty with medication management, and uses a pillbox.  In terms of basic household chores, they denied particular difficulty with the patient and his wife stated that he is able " to do chores such as cleaning without difficulty.    The patient reported that his mood was good and he denied any depression or anxiety.  He also denied any difficulty with sleep or appetite.  He confirmed that he is not working with any mental health providers.  He also denied any current or previous suicidal or homicidal ideation, and denied any history of suicide attempts.  He also denied any history of hallucinations or delusions.  The patient said that he enjoys craft beers and will have approximately 2-3 beers per week.  He stated that he quit cigarette smoking in 1975 and never used illicit substances.    Medically, they noted that the patient has generally been healthy, although he takes medication for hypercholesterolemia.  He also confirmed his history of atrial fibrillation and indicated that he uses a pacemaker and goes to the INR clinic.  He also noted difficulty with hearing and said he uses hearing aids all the time.  He reported he wears eyeglasses all the time as well.  The patient noted a significant family medical history for a stroke in his father and dementia in his brother and sister.  In terms of other medical issues, the patient denied any history of hypothyroidism, cancer, hypertension, liver disease, or kidney disease.  The patient indicated that the medications listed in the records are up-to-date.    The patient reported that after he graduated from high school he served in the [employer] and then spent 2 years at the [employer].  He stated that he then transferred to [employer] where he earned a certificate in mechanical drafting.  His wife noted they have been  for 56 years and have 3 children, including 2 daughters and one son.  They noted that their 2 daughters live nearby and their son lives in [place].  As noted above, the patient reported that they recently moved from a 4 bedroom home into a townhouse.  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  He denied ever being in special education classes or having to repeat a grade.    BEHAVIORAL OBSERVATIONS:  On examination, the patient was casually but appropriately dressed and groomed. The patient was cooperative with the evaluation and was talkative.  Speech was normal in volume, rate and tone.  The patient also appeared to put forth his best effort on all tasks. Thus, the results of this evaluation are a reasonably valid reflection of the patient's current level of functioning. There were no indications of hallucinations, delusions or unusual thought processes and the patient was generally well oriented to personal information, place, and time. The patient was a good historian, with a self-report consistent with medical records. Affect was also generally appropriate.      RESULTS: Formal testing was conducted in person by a psychometrist. Formal performance validity measures were within expected limits and consistent with the above noted observations.  Psychometric estimates of the patient's premorbid global cognitive functioning based upon single word reading ability were in the average range, which is consistent with his educational and occupational history.    Measures of attention/concentration were generally within expected limits.  For example, a digit span task was in the average range.  Likewise, a visual scanning task that integrates attention was in the average range.  Speed of information processing was variable, however.  For example, psychomotor speed was in the average range.  However, motor-free processing speed ranged from low average to average.    Measures of the patient's memory functioning were poorer than expected.  For example, immediate and delayed verbal recall on a word list learning task were in the exceptionally low range, and he could not recall any information following a delay.  A  recognition format did not improve his performance.  However, on a more structured story memory task, immediate and delayed recall were better and in the low average range.  Verbal recognition memory was in the average range.  Visual memory was variable.  Specifically, immediate recall of a complex figure drawing was in the average range, while delayed recall was in the high average range.  However, visual recognition memory for the figure was in the exceptionally low range.    Expressive language functioning was within expected limits.  For example, confrontation naming was in the average range and completed without error.  Further, semantic and phonemic fluency were in the average range.  Receptive language functioning, based upon his performance during the interview, was also within expected limits.    Visual spatial and visual constructional abilities were likewise within expected limits.  For example, motor-free line orientation judgment was within expected limits.  Further, there was no evidence for visual spatial or visual constructional difficulties on complex figure drawing or clock drawing tasks.    Measures of the patient's executive functioning were variable, but he had particular difficulty with cognitive flexibility and planning/organizational ability.  Specifically, a complex visual scanning task that integrates cognitive flexibility was in the exceptionally low range and could not be completed by the patient.  Further, there was evidence for mild planning and organizational difficulties on complex figure drawing and clock drawing tasks.  However, a measure of response inhibition that integrates processing speed was better and in the average range.    Assessment of the patient's emotional functioning was completed utilizing the clinical interview and self-report measures of depression and anxiety.  On the self-report measures, the patient endorsed mild anxiety symptoms and minimal depressive symptoms.   However, during the interview he denied depression or anxiety.    SUMMARY:  In summary, the neuropsychological assessment results indicated no evidence for significant change or decline in global cognitive functioning.  Further, attention/concentration, language functioning, visual-spatial abilities were within expected limits.  However, memory functioning was significantly poorer than expected.  There was evidence for variability with encoding information, but the patient tended to benefit from higher levels of structure on memory tasks.  In contrast, on a less structured verbal memory task, encoding of new information was in the impaired range, and he could not recall any information following a delay.  Visual memory was also variable.  Thus, there was no consistent pattern of rapid forgetting previously learned information, but encoding new information was impacted by his organizational ability.  Additionally, other aspects of executive functioning were poorer than expected, including cognitive flexibility and planning.  He also endorsed mild anxiety symptoms on formal personality assessment.  However, the pattern of results indicated that emotional distress is unlikely to be the major contributing factor to his memory and executive functioning difficulties.  The results indicated that the patient met criteria for mild cognitive impairment (MCI), given the lack of evidence for significant impairments in daily functional activities. However, he does not meet criteria for dementia. In terms of etiology, the pattern results indicated that an amnestic, Alzheimer's type process is less likely.  The pattern of results is more consistent with encoding, processing speed, and executive functioning difficulties commonly seen in a vascular etiology.  This is also consistent with his history of vascular risk factors, including atrial fibrillation.    RECOMMENDATIONS:  1. Given these results, a referral for neurological  consultation is recommended to assist with the differential diagnosis and treatment planning.  2. Related, a referral for further neuroimaging scans, such as an MRI scan of the brain, likely would be helpful for clarifying the differential diagnosis.  3. A full medical evaluation of any treatable causes of cognitive change is also recommended, if this has not already been accomplished.  Evaluation of any infectious processes, vitamin deficiencies or other metabolic abnormalities is recommended, to rule out these as possible contributors.   4. A referral for cognitive rehabilitation therapy, such as with a speech therapist, is recommended.One option for this service is  [employer] at [email or web address]  5. The patient may find the following attention and organizational strategies helpful:   ? Use cell phone reminders to help monitor upcoming appointments and due dates as well as other important tasks (e.g., when to take medications). Set the reminder to go off several times prior to the event with advanced notice (e.g., one week before, two days before, the day before, and the morning of the event).   ? The patietn should attempt to reduce distractions at home. Having a clutter-free work environment and removing or at least making it harder to access potential distractions would help optimize her attention. The patient might also consider facing away from high traffic areas and using earplugs or noise cancellation headphones when desiring a quiet work environment.  ? Taking short breaks during the day may help optimize and maintain concentration.   ? Make to-do lists and prioritize tasks. Break down large tasks into smaller steps and check off each small step when completed.   6. In order to promote learning and memorization of new verbal material, it is recommended that the patient add structure to the material she is  learning to promote subsequent recall (e.g., use mnemonic devices, acronyms, make up a story or song). Additionally, she is encouraged to use visual aids, such as flash cards, diagrams, charts, etc.   7. Given his mild anxiety symptoms, referral for psychotherapeutic intervention may be beneficial. A highly structured cognitive-behavioral intervention focused on coping and stress management would likely be the most helpful. One option for this service is through the [facility] at [email or web address] or [phone].  8.Given the impact on cognitive functioning associated with hearing loss, the patient is encouraged to continue being evaluated for hearing changes.  9. The patient is strongly encouraged to work closely with treating healthcare providers to closely manage vascular risk factors such as atrial fibrillation.   10. Working closely with treating healthcare providers to develop a medically appropriate exercise program is recommended, given the mental health and physical benefits of regular exercise.  11. The results of the evaluation now constitute a baseline of the patient's cognitive functioning.  Given the evidence for significant deficits in cognitive functioning, a referral for a neuropsychological reevaluation in approximately one year is recommended in order to clarify the differential diagnosis, document any changes in cognitive functioning, and provide further recommendations and prognosis to the patient, family and treatment team.    Results and recommendations were discussed with the patient and his wife via telephone on 7/2/2024 and their questions were answered.  I encouraged them to follow-up with their treating healthcare providers to help facilitate the recommendations noted in this report.  Thank you for referring this interesting patient.  Please feel free to contact me if you have any questions.    Mingo Olivier  PhD, ABPP, LP  Professor and Licensed Psychologist [id number]  Board Certified Clinical Neuropsychologist    Time Spent:      Minutes Date Code Units   Total Time-Neuropsychologist Professional 212 7/2/24 36100 1     7/2/24 23185 3   Neuropsychologist Admin/scoring 0 7/2/24 17526 0     7/2/24 16214 0   Diagnostic Interview (billed on 6/3/24) 27 7/1/24 14196 1   Psychometrician Time-Test Administration/Score 173 7/2/24 43813 1     7/2/24 72993 5   Diagnosis R41.3 I48.91 G31.84 F41.9

## 2024-07-18 ENCOUNTER — ANTICOAGULATION THERAPY VISIT (OUTPATIENT)
Dept: ANTICOAGULATION | Facility: CLINIC | Age: 89
End: 2024-07-18

## 2024-07-18 ENCOUNTER — LAB (OUTPATIENT)
Dept: LAB | Facility: CLINIC | Age: 89
End: 2024-07-18
Payer: COMMERCIAL

## 2024-07-18 DIAGNOSIS — Z79.01 LONG TERM CURRENT USE OF ANTICOAGULANTS WITH INR GOAL OF 2.0-3.0: ICD-10-CM

## 2024-07-18 DIAGNOSIS — I48.19 PERSISTENT ATRIAL FIBRILLATION (H): Primary | ICD-10-CM

## 2024-07-18 LAB — INR BLD: 2.4 (ref 0.9–1.1)

## 2024-07-18 PROCEDURE — 85610 PROTHROMBIN TIME: CPT

## 2024-07-18 PROCEDURE — 36416 COLLJ CAPILLARY BLOOD SPEC: CPT

## 2024-07-18 NOTE — PROGRESS NOTES
ANTICOAGULATION MANAGEMENT     Nathen Gage 88 year old male is on warfarin with therapeutic INR result. (Goal INR 2.0-2.5)    Recent labs: (last 7 days)     07/18/24  0821   INR 2.4*       ASSESSMENT     Source(s): Chart Review  Previous INR was Therapeutic last 2(+) visits  Medication, diet, health changes since last INR chart reviewed; none identified         PLAN     Recommended plan for no diet, medication or health factor changes affecting INR     Dosing Instructions: Continue your current warfarin dose with next INR in 4 weeks       Summary  As of 7/18/2024      Full warfarin instructions:  1.25 mg every Mon, Wed, Fri; 2.5 mg all other days   Next INR check:  8/15/2024               Detailed voice message left for Neal with dosing instructions and follow up date.     Contact [phone] to schedule and with any changes, questions or concerns.     Education provided: Please call back if any changes to your diet, medications or how you've been taking warfarin    Plan made per [facility] anticoagulation protocol    Jenna Chan RN  Anticoagulation Clinic  7/18/2024    _______________________________________________________________________     Anticoagulation Episode Summary       Current INR goal:  2.0-2.5   TTR:  66.8% (1 y)   Target end date:  Indefinite   Send INR reminders to:  [facility]    Indications    Persistent atrial fibrillation (H) [I48.19]  Long term current use of anticoagulants with INR goal of 2.0-3.0 [Z79.01]             Comments:               Anticoagulation Care Providers       Provider Role Specialty Phone number    Kushal Valentin MD Referring Family Medicine [phone]    Leonides Aggarwal PA-C Referring Family Medicine [phone]

## 2024-07-26 ENCOUNTER — APPOINTMENT (OUTPATIENT)
Dept: CT IMAGING | Facility: CLINIC | Age: 89
DRG: 871 | End: 2024-07-26
Attending: EMERGENCY MEDICINE
Payer: COMMERCIAL

## 2024-07-26 ENCOUNTER — APPOINTMENT (OUTPATIENT)
Dept: CT IMAGING | Facility: CLINIC | Age: 89
DRG: 871 | End: 2024-07-26
Payer: COMMERCIAL

## 2024-07-26 ENCOUNTER — DOCUMENTATION ONLY (OUTPATIENT)
Dept: CARDIOLOGY | Facility: CLINIC | Age: 89
End: 2024-07-26

## 2024-07-26 ENCOUNTER — HOSPITAL ENCOUNTER (INPATIENT)
Facility: CLINIC | Age: 89
LOS: 2 days | Discharge: HOME | DRG: 871 | End: 2024-07-28
Attending: EMERGENCY MEDICINE | Admitting: INTERNAL MEDICINE
Payer: COMMERCIAL

## 2024-07-26 DIAGNOSIS — J18.9 COMMUNITY ACQUIRED PNEUMONIA OF LEFT LUNG, UNSPECIFIED PART OF LUNG: ICD-10-CM

## 2024-07-26 DIAGNOSIS — D72.829 LEUKOCYTOSIS, UNSPECIFIED TYPE: ICD-10-CM

## 2024-07-26 DIAGNOSIS — I95.9 TRANSIENT HYPOTENSION: ICD-10-CM

## 2024-07-26 DIAGNOSIS — E87.20 LACTIC ACIDOSIS: ICD-10-CM

## 2024-07-26 DIAGNOSIS — A41.9 SEVERE SEPSIS (H): ICD-10-CM

## 2024-07-26 DIAGNOSIS — R65.20 SEVERE SEPSIS (H): ICD-10-CM

## 2024-07-26 LAB
ALBUMIN SERPL BCG-MCNC: 3.8 G/DL (ref 3.5–5.2)
ALBUMIN UR-MCNC: NEGATIVE MG/DL
ALP SERPL-CCNC: 69 U/L (ref 40–150)
ALT SERPL W P-5'-P-CCNC: 14 U/L (ref 0–70)
ANION GAP SERPL CALCULATED.3IONS-SCNC: 13 MMOL/L (ref 7–15)
APPEARANCE UR: CLEAR
AST SERPL W P-5'-P-CCNC: 24 U/L (ref 0–45)
ATRIAL RATE - MUSE: 79 BPM
BASOPHILS # BLD AUTO: 0 10E3/UL (ref 0–0.2)
BASOPHILS NFR BLD AUTO: 0 %
BILIRUB SERPL-MCNC: 1.2 MG/DL
BILIRUB UR QL STRIP: NEGATIVE
BUN SERPL-MCNC: 35.4 MG/DL (ref 8–23)
CALCIUM SERPL-MCNC: 9.1 MG/DL (ref 8.8–10.4)
CHLORIDE SERPL-SCNC: 97 MMOL/L (ref 98–107)
COLOR UR AUTO: YELLOW
CREAT SERPL-MCNC: 1.31 MG/DL (ref 0.67–1.17)
DIASTOLIC BLOOD PRESSURE - MUSE: NORMAL MMHG
EGFRCR SERPLBLD CKD-EPI 2021: 53 ML/MIN/1.73M2
EOSINOPHIL # BLD AUTO: 0 10E3/UL (ref 0–0.7)
EOSINOPHIL NFR BLD AUTO: 0 %
ERYTHROCYTE [DISTWIDTH] IN BLOOD BY AUTOMATED COUNT: 14.1 % (ref 10–15)
FLUAV RNA SPEC QL NAA+PROBE: NEGATIVE
FLUBV RNA RESP QL NAA+PROBE: NEGATIVE
GLUCOSE SERPL-MCNC: 186 MG/DL (ref 70–99)
GLUCOSE UR STRIP-MCNC: NEGATIVE MG/DL
HCO3 SERPL-SCNC: 22 MMOL/L (ref 22–29)
HCT VFR BLD AUTO: 38.6 % (ref 40–53)
HGB BLD-MCNC: 12.8 G/DL (ref 13.3–17.7)
HGB UR QL STRIP: NEGATIVE
HOLD SPECIMEN: NORMAL
HYALINE CASTS: 5 /LPF
IMM GRANULOCYTES # BLD: 0.1 10E3/UL
IMM GRANULOCYTES NFR BLD: 0 %
INR PPP: 2.4 (ref 0.85–1.15)
INR PPP: 2.8 (ref 0.85–1.15)
INTERPRETATION ECG - MUSE: NORMAL
KETONES UR STRIP-MCNC: NEGATIVE MG/DL
LACTATE SERPL-SCNC: 1.1 MMOL/L (ref 0.7–2)
LACTATE SERPL-SCNC: 3 MMOL/L (ref 0.7–2)
LACTATE SERPL-SCNC: 3.1 MMOL/L (ref 0.7–2)
LEUKOCYTE ESTERASE UR QL STRIP: NEGATIVE
LYMPHOCYTES # BLD AUTO: 0.5 10E3/UL (ref 0.8–5.3)
LYMPHOCYTES NFR BLD AUTO: 4 %
MCH RBC QN AUTO: 29.7 PG (ref 26.5–33)
MCHC RBC AUTO-ENTMCNC: 33.2 G/DL (ref 31.5–36.5)
MCV RBC AUTO: 90 FL (ref 78–100)
MONOCYTES # BLD AUTO: 0.3 10E3/UL (ref 0–1.3)
MONOCYTES NFR BLD AUTO: 3 %
MUCOUS THREADS #/AREA URNS LPF: PRESENT /LPF
NEUTROPHILS # BLD AUTO: 12.5 10E3/UL (ref 1.6–8.3)
NEUTROPHILS NFR BLD AUTO: 93 %
NITRATE UR QL: NEGATIVE
NRBC # BLD AUTO: 0 10E3/UL
NRBC BLD AUTO-RTO: 0 /100
P AXIS - MUSE: 34 DEGREES
PH UR STRIP: 6.5 [PH] (ref 5–7)
PLATELET # BLD AUTO: 188 10E3/UL (ref 150–450)
POTASSIUM SERPL-SCNC: 3.9 MMOL/L (ref 3.4–5.3)
PR INTERVAL - MUSE: 174 MS
PROCALCITONIN SERPL IA-MCNC: 11.67 NG/ML
PROT SERPL-MCNC: 6.7 G/DL (ref 6.4–8.3)
QRS DURATION - MUSE: 98 MS
QT - MUSE: 384 MS
QTC - MUSE: 440 MS
R AXIS - MUSE: -34 DEGREES
RBC # BLD AUTO: 4.31 10E6/UL (ref 4.4–5.9)
RBC URINE: 1 /HPF
RSV RNA SPEC NAA+PROBE: NEGATIVE
SARS-COV-2 RNA RESP QL NAA+PROBE: NEGATIVE
SODIUM SERPL-SCNC: 132 MMOL/L (ref 135–145)
SP GR UR STRIP: 1.02 (ref 1–1.03)
SYSTOLIC BLOOD PRESSURE - MUSE: NORMAL MMHG
T AXIS - MUSE: 32 DEGREES
TROPONIN T SERPL HS-MCNC: 13 NG/L
UROBILINOGEN UR STRIP-MCNC: NORMAL MG/DL
VENTRICULAR RATE- MUSE: 79 BPM
WBC # BLD AUTO: 13.5 10E3/UL (ref 4–11)
WBC URINE: 2 /HPF

## 2024-07-26 PROCEDURE — 83605 ASSAY OF LACTIC ACID: CPT | Performed by: EMERGENCY MEDICINE

## 2024-07-26 PROCEDURE — 36415 COLL VENOUS BLD VENIPUNCTURE: CPT

## 2024-07-26 PROCEDURE — 70450 CT HEAD/BRAIN W/O DYE: CPT

## 2024-07-26 PROCEDURE — 120N000001 HC R&B MED SURG/OB

## 2024-07-26 PROCEDURE — 87637 SARSCOV2&INF A&B&RSV AMP PRB: CPT | Performed by: EMERGENCY MEDICINE

## 2024-07-26 PROCEDURE — 51702 INSERT TEMP BLADDER CATH: CPT

## 2024-07-26 PROCEDURE — 85610 PROTHROMBIN TIME: CPT | Performed by: INTERNAL MEDICINE

## 2024-07-26 PROCEDURE — 71260 CT THORAX DX C+: CPT

## 2024-07-26 PROCEDURE — 258N000003 HC RX IP 258 OP 636: Performed by: EMERGENCY MEDICINE

## 2024-07-26 PROCEDURE — 96361 HYDRATE IV INFUSION ADD-ON: CPT

## 2024-07-26 PROCEDURE — 85610 PROTHROMBIN TIME: CPT

## 2024-07-26 PROCEDURE — 99222 1ST HOSP IP/OBS MODERATE 55: CPT | Performed by: INTERNAL MEDICINE

## 2024-07-26 PROCEDURE — 36415 COLL VENOUS BLD VENIPUNCTURE: CPT | Performed by: INTERNAL MEDICINE

## 2024-07-26 PROCEDURE — 93005 ELECTROCARDIOGRAM TRACING: CPT

## 2024-07-26 PROCEDURE — 250N000011 HC RX IP 250 OP 636: Performed by: INTERNAL MEDICINE

## 2024-07-26 PROCEDURE — 84484 ASSAY OF TROPONIN QUANT: CPT | Performed by: EMERGENCY MEDICINE

## 2024-07-26 PROCEDURE — 36415 COLL VENOUS BLD VENIPUNCTURE: CPT | Performed by: EMERGENCY MEDICINE

## 2024-07-26 PROCEDURE — 258N000003 HC RX IP 258 OP 636: Performed by: INTERNAL MEDICINE

## 2024-07-26 PROCEDURE — 250N000009 HC RX 250: Performed by: EMERGENCY MEDICINE

## 2024-07-26 PROCEDURE — 85004 AUTOMATED DIFF WBC COUNT: CPT

## 2024-07-26 PROCEDURE — 87040 BLOOD CULTURE FOR BACTERIA: CPT | Performed by: EMERGENCY MEDICINE

## 2024-07-26 PROCEDURE — 250N000011 HC RX IP 250 OP 636: Performed by: EMERGENCY MEDICINE

## 2024-07-26 PROCEDURE — 81001 URINALYSIS AUTO W/SCOPE: CPT

## 2024-07-26 PROCEDURE — 99285 EMERGENCY DEPT VISIT HI MDM: CPT | Mod: 25

## 2024-07-26 PROCEDURE — 82040 ASSAY OF SERUM ALBUMIN: CPT

## 2024-07-26 PROCEDURE — 250N000013 HC RX MED GY IP 250 OP 250 PS 637: Performed by: INTERNAL MEDICINE

## 2024-07-26 PROCEDURE — 258N000003 HC RX IP 258 OP 636

## 2024-07-26 PROCEDURE — 84145 PROCALCITONIN (PCT): CPT | Performed by: EMERGENCY MEDICINE

## 2024-07-26 PROCEDURE — 83605 ASSAY OF LACTIC ACID: CPT

## 2024-07-26 PROCEDURE — 96360 HYDRATION IV INFUSION INIT: CPT | Mod: 59

## 2024-07-26 RX ORDER — ACETAMINOPHEN 325 MG/1
650 TABLET ORAL EVERY 4 HOURS PRN
Status: DISCONTINUED | OUTPATIENT
Start: 2024-07-26 | End: 2024-07-28 | Stop reason: HOSPADM

## 2024-07-26 RX ORDER — PANTOPRAZOLE SODIUM 40 MG/1
40 TABLET, DELAYED RELEASE ORAL DAILY
Status: DISCONTINUED | OUTPATIENT
Start: 2024-07-27 | End: 2024-07-28 | Stop reason: HOSPADM

## 2024-07-26 RX ORDER — LIDOCAINE 40 MG/G
CREAM TOPICAL
Status: DISCONTINUED | OUTPATIENT
Start: 2024-07-26 | End: 2024-07-28 | Stop reason: HOSPADM

## 2024-07-26 RX ORDER — WARFARIN SODIUM 2.5 MG/1
1.25 TABLET ORAL
Status: ON HOLD | COMMUNITY
End: 2024-07-28

## 2024-07-26 RX ORDER — ACETAMINOPHEN 650 MG/1
650 SUPPOSITORY RECTAL EVERY 4 HOURS PRN
Status: DISCONTINUED | OUTPATIENT
Start: 2024-07-26 | End: 2024-07-28 | Stop reason: HOSPADM

## 2024-07-26 RX ORDER — DOXYCYCLINE 100 MG/10ML
100 INJECTION, POWDER, LYOPHILIZED, FOR SOLUTION INTRAVENOUS EVERY 12 HOURS
Status: DISCONTINUED | OUTPATIENT
Start: 2024-07-26 | End: 2024-07-28 | Stop reason: HOSPADM

## 2024-07-26 RX ORDER — METOPROLOL SUCCINATE 50 MG/1
50 TABLET, EXTENDED RELEASE ORAL DAILY
Status: DISCONTINUED | OUTPATIENT
Start: 2024-07-26 | End: 2024-07-28 | Stop reason: HOSPADM

## 2024-07-26 RX ORDER — SODIUM CHLORIDE 9 MG/ML
INJECTION, SOLUTION INTRAVENOUS CONTINUOUS
Status: DISCONTINUED | OUTPATIENT
Start: 2024-07-26 | End: 2024-07-28

## 2024-07-26 RX ORDER — ONDANSETRON 2 MG/ML
4 INJECTION INTRAMUSCULAR; INTRAVENOUS EVERY 6 HOURS PRN
Status: DISCONTINUED | OUTPATIENT
Start: 2024-07-26 | End: 2024-07-28 | Stop reason: HOSPADM

## 2024-07-26 RX ORDER — ROSUVASTATIN CALCIUM 5 MG/1
5 TABLET, COATED ORAL DAILY
Status: DISCONTINUED | OUTPATIENT
Start: 2024-07-27 | End: 2024-07-28 | Stop reason: HOSPADM

## 2024-07-26 RX ORDER — AMOXICILLIN 250 MG
2 CAPSULE ORAL 2 TIMES DAILY PRN
Status: DISCONTINUED | OUTPATIENT
Start: 2024-07-26 | End: 2024-07-28 | Stop reason: HOSPADM

## 2024-07-26 RX ORDER — DOXAZOSIN 4 MG/1
8 TABLET ORAL DAILY
Status: DISCONTINUED | OUTPATIENT
Start: 2024-07-26 | End: 2024-07-28 | Stop reason: HOSPADM

## 2024-07-26 RX ORDER — WARFARIN SODIUM 2.5 MG/1
2.5 TABLET ORAL
Status: ON HOLD | COMMUNITY
End: 2024-07-28

## 2024-07-26 RX ORDER — IOPAMIDOL 755 MG/ML
500 INJECTION, SOLUTION INTRAVASCULAR ONCE
Status: COMPLETED | OUTPATIENT
Start: 2024-07-26 | End: 2024-07-26

## 2024-07-26 RX ORDER — CEFTRIAXONE 2 G/1
2 INJECTION, POWDER, FOR SOLUTION INTRAMUSCULAR; INTRAVENOUS EVERY 24 HOURS
Status: DISCONTINUED | OUTPATIENT
Start: 2024-07-27 | End: 2024-07-28 | Stop reason: HOSPADM

## 2024-07-26 RX ORDER — CALCIUM CARBONATE 500 MG/1
1000 TABLET, CHEWABLE ORAL 4 TIMES DAILY PRN
Status: DISCONTINUED | OUTPATIENT
Start: 2024-07-26 | End: 2024-07-28 | Stop reason: HOSPADM

## 2024-07-26 RX ORDER — ONDANSETRON 4 MG/1
4 TABLET, ORALLY DISINTEGRATING ORAL EVERY 6 HOURS PRN
Status: DISCONTINUED | OUTPATIENT
Start: 2024-07-26 | End: 2024-07-28 | Stop reason: HOSPADM

## 2024-07-26 RX ORDER — AMOXICILLIN 250 MG
1 CAPSULE ORAL 2 TIMES DAILY PRN
Status: DISCONTINUED | OUTPATIENT
Start: 2024-07-26 | End: 2024-07-28 | Stop reason: HOSPADM

## 2024-07-26 RX ORDER — CEFTRIAXONE 2 G/1
2 INJECTION, POWDER, FOR SOLUTION INTRAMUSCULAR; INTRAVENOUS ONCE
Status: COMPLETED | OUTPATIENT
Start: 2024-07-26 | End: 2024-07-26

## 2024-07-26 RX ADMIN — METOPROLOL SUCCINATE 50 MG: 50 TABLET, EXTENDED RELEASE ORAL at 20:26

## 2024-07-26 RX ADMIN — CEFTRIAXONE 2 G: 2 INJECTION, POWDER, FOR SOLUTION INTRAMUSCULAR; INTRAVENOUS at 17:11

## 2024-07-26 RX ADMIN — SODIUM CHLORIDE: 9 INJECTION, SOLUTION INTRAVENOUS at 18:40

## 2024-07-26 RX ADMIN — SODIUM CHLORIDE 500 ML: 9 INJECTION, SOLUTION INTRAVENOUS at 11:59

## 2024-07-26 RX ADMIN — DOXYCYCLINE 100 MG: 100 INJECTION, POWDER, LYOPHILIZED, FOR SOLUTION INTRAVENOUS at 17:50

## 2024-07-26 RX ADMIN — DOXAZOSIN 8 MG: 4 TABLET ORAL at 20:25

## 2024-07-26 RX ADMIN — ACETAMINOPHEN 650 MG: 325 TABLET, FILM COATED ORAL at 18:44

## 2024-07-26 RX ADMIN — SODIUM CHLORIDE, POTASSIUM CHLORIDE, SODIUM LACTATE AND CALCIUM CHLORIDE 1000 ML: 600; 310; 30; 20 INJECTION, SOLUTION INTRAVENOUS at 15:30

## 2024-07-26 RX ADMIN — IOPAMIDOL 81 ML: 755 INJECTION, SOLUTION INTRAVENOUS at 16:32

## 2024-07-26 RX ADMIN — SODIUM CHLORIDE 100 ML: 9 INJECTION, SOLUTION INTRAVENOUS at 16:32

## 2024-07-26 ASSESSMENT — ACTIVITIES OF DAILY LIVING (ADL)
ADLS_ACUITY_SCORE: 28
ADLS_ACUITY_SCORE: 37
ADLS_ACUITY_SCORE: 24
ADLS_ACUITY_SCORE: 37
ADLS_ACUITY_SCORE: 30
ADLS_ACUITY_SCORE: 30
ADLS_ACUITY_SCORE: 37

## 2024-07-26 ASSESSMENT — COLUMBIA-SUICIDE SEVERITY RATING SCALE - C-SSRS
1. IN THE PAST MONTH, HAVE YOU WISHED YOU WERE DEAD OR WISHED YOU COULD GO TO SLEEP AND NOT WAKE UP?: NO
6. HAVE YOU EVER DONE ANYTHING, STARTED TO DO ANYTHING, OR PREPARED TO DO ANYTHING TO END YOUR LIFE?: NO
2. HAVE YOU ACTUALLY HAD ANY THOUGHTS OF KILLING YOURSELF IN THE PAST MONTH?: NO

## 2024-07-26 NOTE — ED NOTES
[facility]  ED Nurse Handoff Report    ED Chief complaint: Altered Mental Status  . ED Diagnosis:   Final diagnoses:   Leukocytosis, unspecified type   Lactic acidosis   Transient hypotension       Allergies:   Allergies   Allergen Reactions    Neomycin Sulfate        Code Status: Full Code    Activity level - Baseline/Home:  independent.  Activity Level - Current:   standby.   Lift room needed: No.   Bariatric: No   Needed: No   Isolation: No.   Infection: Not Applicable.     Respiratory status: Room air    Vital Signs (within 30 minutes):   Vitals:    07/26/24 1205 07/26/24 1215 07/26/24 1230 07/26/24 1535   BP: 133/63 129/59  (!) 172/90   Pulse:  67     Resp:       Temp:       TempSrc:       SpO2: 93% 93% 95% 94%   Weight:       Height:           Cardiac Rhythm:  ,      Pain level:    Patient confused: No.   Patient Falls Risk: nonskid shoes/slippers when out of bed, patient and family education, and assistive device/personal items within reach.   Elimination Status: Has voided     Patient Report - Initial Complaint: altered mental status.   Nathen Gage is a 88 year old male who presents emergency department via EMS for the evaluation of altered mental status.  Past medical history includes but is not limited to atrial fibrillation, pulmonary hypertension, gout, GERD.  Per patient's wife patient experienced approximately 5 minutes of altered mental status, where he did not respond or hold conversation.  Patient's wife is unsure that if the patient truly lost consciousness.  Per EMS report patient systolic blood pressure upon arrival was in the 70s.  This did recover fairly quickly and patient started to respond to verbal commands.  Patient denies any recent fever, chills, diarrhea.  Patient does report that 2 days ago he had multiple episodes of vomiting to which he states is likely related to some food that he ate.   Focused Assessment: Neuro  CognitiveCognitive/Neuro/Behavioral WDL: .WDL except (Pt had brief 5 min episode of AMS where his eyes closed and he was not responding to questions. EMS noted hypotension upon arrival which resolved without intervention along with improvement back to baseline orientation.)      Abnormal Results:   Labs Ordered and Resulted from Time of ED Arrival to Time of ED Departure   INR - Abnormal       Result Value    INR 2.40 (*)    COMPREHENSIVE METABOLIC PANEL - Abnormal    Sodium 132 (*)     Potassium 3.9      Carbon Dioxide (CO2) 22      Anion Gap 13      Urea Nitrogen 35.4 (*)     Creatinine 1.31 (*)     GFR Estimate 53 (*)     Calcium 9.1      Chloride 97 (*)     Glucose 186 (*)     Alkaline Phosphatase 69      AST 24      ALT 14      Protein Total 6.7      Albumin 3.8      Bilirubin Total 1.2     ROUTINE UA WITH MICROSCOPIC REFLEX TO CULTURE - Abnormal    Color Urine Yellow      Appearance Urine Clear      Glucose Urine Negative      Bilirubin Urine Negative      Ketones Urine Negative      Specific Gravity Urine 1.019      Blood Urine Negative      pH Urine 6.5      Protein Albumin Urine Negative      Urobilinogen Urine Normal      Nitrite Urine Negative      Leukocyte Esterase Urine Negative      Mucus Urine Present (*)     RBC Urine 1      WBC Urine 2      Hyaline Casts Urine 5 (*)    LACTIC ACID WHOLE BLOOD - Abnormal    Lactic Acid 3.1 (*)    CBC WITH PLATELETS AND DIFFERENTIAL - Abnormal    WBC Count 13.5 (*)     RBC Count 4.31 (*)     Hemoglobin 12.8 (*)     Hematocrit 38.6 (*)     MCV 90      MCH 29.7      MCHC 33.2      RDW 14.1      Platelet Count 188      % Neutrophils 93      % Lymphocytes 4      % Monocytes 3      % Eosinophils 0      % Basophils 0      % Immature Granulocytes 0      NRBCs per 100 WBC 0      Absolute Neutrophils 12.5 (*)     Absolute Lymphocytes 0.5 (*)     Absolute Monocytes 0.3      Absolute Eosinophils 0.0      Absolute Basophils 0.0      Absolute Immature Granulocytes 0.1       Absolute NRBCs 0.0     LACTIC ACID WHOLE BLOOD - Abnormal    Lactic Acid 3.0 (*)    TROPONIN T, HIGH SENSITIVITY - Normal    Troponin T, High Sensitivity 13     PROCALCITONIN   BLOOD CULTURE        CT Head w/o Contrast   Final Result   IMPRESSION:      1. No evidence of acute intracranial hemorrhage, mass, or herniation.   2. Diffuse parenchymal volume loss and white matter changes likely due   to chronic microvascular ischemic change.         DENNIS ALEMAN MD            SYSTEM ID:  [id number]      CT Chest/Abdomen/Pelvis w Contrast    (Results Pending)       Treatments provided: labs, imaging, see MAR  Family Comments: wife at bedside  OBS brochure/video discussed/provided to patient:  N/A  ED Medications:   Medications   lactated ringers BOLUS 1,000 mL (has no administration in time range)   sodium chloride 0.9% BOLUS 500 mL (0 mLs Intravenous Stopped 7/26/24 1514)   sodium chloride 0.9% BOLUS 500 mL (500 mLs Intravenous Not Given 7/26/24 1518)       Drips infusing:  No  For the majority of the shift this patient was Green.   Interventions performed were n/a.    Sepsis treatment initiated: No    Cares/treatment/interventions/medications to be completed following ED care: n/a    ED Nurse Name: Jenna Akins RN  4:27 PM   RECEIVING UNIT ED HANDOFF REVIEW    Above ED Nurse Handoff Report was reviewed: Yes  Reviewed by: Betzaida Pryor RN on July 26, 2024 at 5:22 PM   JONH Fang called the ED to inform them the note was read: Yes

## 2024-07-26 NOTE — ED PROVIDER NOTES
Emergency Department Note      History of Present Illness     Chief Complaint   Altered Mental Status      HPI   Nathen Gage is a 88 year old male who presents emergency department via EMS for the evaluation of altered mental status.  Past medical history includes but is not limited to atrial fibrillation, pulmonary hypertension, gout, GERD.  Per patient's wife patient experienced approximately 5 minutes of altered mental status, where he did not respond or hold conversation.  Patient's wife is unsure that if the patient truly lost consciousness.  Per EMS report patient systolic blood pressure upon arrival was in the 70s.  This did recover fairly quickly and patient started to respond to verbal commands.  Patient denies any recent fever, chills, diarrhea.  Patient does report that 2 days ago he had multiple episodes of vomiting to which he states is likely related to some food that he ate.     Independent Historian   None    Review of External Notes   None    Past Medical History     Medical History and Problem List   Acute idopathic gout of left knee  Atrial fibrillation  Dilated aortic root  Esophageal reflux  Esophagitis   GI bleed  Hyperlipidemia   Pulmonary hypertension   Sick sinus syndrome     Medications   doxazosin (CARDURA) 8 MG tablet  losartan (COZAAR) 100 MG tablet  metoprolol succinate ER (TOPROL XL) 50 MG 24 hr tablet  pantoprazole (PROTONIX) 40 MG EC tablet  Polyethylene Glycol 400 (BLINK TEARS OP)  rosuvastatin (CRESTOR) 5 MG tablet  warfarin ANTICOAGULANT (COUMADIN) 2.5 MG tablet    Surgical History   Lead repair for single ICD  Pacemaker generator replacement   Implant pacemaker   Phacoemulsification clear cornea with standard intraocular lens implant     Physical Exam     Patient Vitals for the past 24 hrs:   BP Temp Temp src Pulse Resp SpO2 Height Weight   07/26/24 1535 (!) 172/90 -- -- -- -- 94 % -- --   07/26/24 1230 -- -- -- -- -- 95 % -- --   07/26/24 1215 129/59 -- -- 67 -- 93 % -- --  "  07/26/24 1205 133/63 -- -- -- -- 93 % -- --   07/26/24 1200 133/63 -- -- 73 -- 94 % -- --   07/26/24 1135 128/69 -- -- 78 25 -- -- --   07/26/24 1115 135/61 -- -- 82 -- -- -- --   07/26/24 1106 133/58 -- -- 84 30 94 % -- --   07/26/24 1051 (!) 144/73 97.8  F (36.6  C) Oral -- 18 95 % 1.727 m (5' 8\") 73 kg (161 lb)     Physical Exam  General: Awake, alert, non-toxic.  Head:  Scalp is NC/AT  Eyes:  Conjunctiva normal, PERRL  ENT:  The external nose and ears are normal.   Neck:  Normal range of motion without rigidity.  CV:  Regular rate and rhythm    No pathologic murmur, rubs, or gallops.  Resp:  Breath sounds are clear bilaterally    Non-labored, no retractions or accessory muscle use  Abdomen: Abdomen is soft, no distension, no tenderness, no masses, no CVA ttp  MS:  No lower extremity edema/swelling. No midline cervical, thoracic, or lumbar tenderness.  Extremities without joint swelling or redness.  Skin:  Warm and dry, No rash or lesions noted.  Neuro:  Alert and oriented. GCS 15 5/5 strength BL to all joints of upper and lower extremities.  Normal and symmetric sensation to touch BL in arms, legs, and face.  CNII-XII intact.  Normal finger to nose testing BL. Gait normal.  Psych: Awake. Alert. Normal affect. Appropriate interactions.     Diagnostics     Lab Results   Labs Ordered and Resulted from Time of ED Arrival to Time of ED Departure   INR - Abnormal       Result Value    INR 2.40 (*)    COMPREHENSIVE METABOLIC PANEL - Abnormal    Sodium 132 (*)     Potassium 3.9      Carbon Dioxide (CO2) 22      Anion Gap 13      Urea Nitrogen 35.4 (*)     Creatinine 1.31 (*)     GFR Estimate 53 (*)     Calcium 9.1      Chloride 97 (*)     Glucose 186 (*)     Alkaline Phosphatase 69      AST 24      ALT 14      Protein Total 6.7      Albumin 3.8      Bilirubin Total 1.2     ROUTINE UA WITH MICROSCOPIC REFLEX TO CULTURE - Abnormal    Color Urine Yellow      Appearance Urine Clear      Glucose Urine Negative      " Bilirubin Urine Negative      Ketones Urine Negative      Specific Gravity Urine 1.019      Blood Urine Negative      pH Urine 6.5      Protein Albumin Urine Negative      Urobilinogen Urine Normal      Nitrite Urine Negative      Leukocyte Esterase Urine Negative      Mucus Urine Present (*)     RBC Urine 1      WBC Urine 2      Hyaline Casts Urine 5 (*)    LACTIC ACID WHOLE BLOOD - Abnormal    Lactic Acid 3.1 (*)    CBC WITH PLATELETS AND DIFFERENTIAL - Abnormal    WBC Count 13.5 (*)     RBC Count 4.31 (*)     Hemoglobin 12.8 (*)     Hematocrit 38.6 (*)     MCV 90      MCH 29.7      MCHC 33.2      RDW 14.1      Platelet Count 188      % Neutrophils 93      % Lymphocytes 4      % Monocytes 3      % Eosinophils 0      % Basophils 0      % Immature Granulocytes 0      NRBCs per 100 WBC 0      Absolute Neutrophils 12.5 (*)     Absolute Lymphocytes 0.5 (*)     Absolute Monocytes 0.3      Absolute Eosinophils 0.0      Absolute Basophils 0.0      Absolute Immature Granulocytes 0.1      Absolute NRBCs 0.0     LACTIC ACID WHOLE BLOOD - Abnormal    Lactic Acid 3.0 (*)    TROPONIN T, HIGH SENSITIVITY - Normal    Troponin T, High Sensitivity 13     BLOOD CULTURE     Imaging   CT Head w/o Contrast   Final Result   IMPRESSION:      1. No evidence of acute intracranial hemorrhage, mass, or herniation.   2. Diffuse parenchymal volume loss and white matter changes likely due   to chronic microvascular ischemic change.         DENNIS ALEMAN MD            SYSTEM ID:  [id number]      CT Chest/Abdomen/Pelvis w Contrast    (Results Pending)     EKG   ECG taken at 1133, ECG read at 1304  Normal sinus rhythm   Left axis deviation  Left ventricular hypertrophy (R in aVL, Se product, Romhilt-Funes)   Abnormal ECG    No significant changes as compared to prior, dated 7/10/23.  Rate 79 bpm. MA interval 174 ms. QRS duration 98 ms. QT/QTc 384/440 ms. P-R-T axes 34 -34 32.    Independent Interpretation   None    ED Course      Medications  Administered   Medications   sodium chloride 0.9% BOLUS 500 mL (500 mLs Intravenous Not Given 7/26/24 1518)   lactated ringers BOLUS 1,000 mL (has no administration in time range)   sodium chloride 0.9% BOLUS 500 mL (0 mLs Intravenous Stopped 7/26/24 1514)     Procedures   Procedures     Discussion of Management   None    ED Course        Optional/Additional Documentation  None    Medical Decision Making / Diagnosis     CMS Diagnoses: The Lactic acid level is elevated due to dehydration, at this time there is no sign of severe sepsis or septic shock. and None    [facility]       None    [facility]   Nathen Gage is a 88 year old male who presents emergency department for the evaluation of altered mental status.  Upon initial examination the patient is awake and alert.  Neurological exam is intact.  There is no electrolyte abnormality observed on the patient's chemistry.  Patient's creatinine is mildly elevated at 1.31 and GFR is down to 53.  Lactic acid was elevated at 3.1.  Patient was given 1 L of IV fluids.  Repeat lactic acid was still elevated at 3.  WBC count is also elevated at 13.5.  I did elect to perform a head CT which was thankfully negative for any acute infarction or hemorrhage.  Urinalysis is negative for any UTI.  CT chest abdomen pelvis pending.  Blood cultures were obtained.  At this time I am unable to find the source causing this potential infection, however given these findings I feel it is in the patient's best interest to be admitted to the hospital for further evaluation and workup.  These findings will be discussed with the hospitalist by my attending.  All questions and concerns were addressed and patient verbalized understanding.  All test results and radiological findings were discussed with the patient patient verbalized understanding.  Patient was admitted to the hospital for further evaluation workup.     Disposition   The patient was admitted to the hospital.     Diagnosis   No diagnosis found.      Discharge Medications   New Prescriptions    No medications on file     TYRESE Ulloa CNP, Casey, APRN CNP  07/26/24 9695

## 2024-07-26 NOTE — ED TRIAGE NOTES
"Patient was at friends house where he had a brief moment of sitting with eyes closed and not responding. Ems arrived and patient BP was 70/30, IV 20 g started by EMS to right arm. No fluids given. Blood sugar 208. With recheck of /60 and patient was alert and oriented for EMS.     Noted  that weds patient ate ribs at a restaurant and notes that when he awakened Thursday  morning there was vomit all over the bed \"states never seen such a mess\" pt does remember throwing up. Since then has eaten and drank ok. Wife now at side and confirms story.      Triage Assessment (Adult)       Row Name 07/26/24 1057          Triage Assessment    Airway WDL WDL        Respiratory WDL    Respiratory WDL WDL        Skin Circulation/Temperature WDL    Skin Circulation/Temperature WDL WDL        Cardiac WDL    Cardiac WDL WDL        Cognitive/Neuro/Behavioral WDL    Cognitive/Neuro/Behavioral WDL X  slow to respond at times     Level of Consciousness alert     Orientation oriented x 4     Mood/Behavior calm;cooperative        Pupils (CN II)    Pupil PERRLA yes     Pupil Size Left 2 mm     Pupil Size Right 2 mm                     "

## 2024-07-26 NOTE — PHARMACY-ANTICOAGULATION SERVICE
Clinical Pharmacy - Warfarin Dosing Consult     Pharmacy has been consulted to manage this patient s warfarin therapy.  Indication: Atrial Fibrillation  Therapy Goal: INR 2-3  Warfarin Prior to Admission: Yes  Warfarin PTA Regimen: 1.25 mg MWF and 2.5 mg ROW  Significant drug interaction: Doxycycline  Recent documented change in oral intake/nutrition: Unknown  Dose Comments: INR 2.40. Had dose this AM PTA. No more dosing tonight    INR   Date Value Ref Range Status   07/26/2024 2.40 (H) 0.85 - 1.15 Final   07/18/2024 2.4 (H) 0.9 - 1.1 Final     Had  warfarin 1.25 mg in AM today PTA.  Pharmacy will monitor Nathen Gage daily and order warfarin doses to achieve specified goal.      Please contact pharmacy as soon as possible if the warfarin needs to be held for a procedure or if the warfarin goals change.

## 2024-07-26 NOTE — PHARMACY-ADMISSION MEDICATION HISTORY
Pharmacy Intern Admission Medication History    Admission medication history is complete. The information provided in this note is only as accurate as the sources available at the time of the update.    Information Source(s): Patient, Family member, and CareEverywhere/SureScripts via in-person    Pertinent Information: Pt confirmed taking all the medications below this morning and yesterday HS. He said he takes 4 pills in the morning and 2 pills in the evening but doesn't know which one is AM or PM    Changes made to PTA medication list:  Added: None  Deleted: None  Changed: BLINK PRN->TID    Allergies reviewed with patient and updates made in EHR: yes    Medication History Completed By: Adam Jordan 7/26/2024 4:25 PM    PTA Med List   Medication Sig Last Dose    doxazosin (CARDURA) 8 MG tablet TAKE 1 TABLET(8 MG) BY MOUTH AT BEDTIME Past Week at pm    losartan (COZAAR) 100 MG tablet Take 1 tablet (100 mg) by mouth daily Unknown at Last dose was either this morning or last night    metoprolol succinate ER (TOPROL XL) 50 MG 24 hr tablet Take 1 tablet (50 mg) by mouth daily Unknown at Last dose was either this morning or last night    pantoprazole (PROTONIX) 40 MG EC tablet TAKE 1 TABLET(40 MG) BY MOUTH DAILY Unknown at Last dose was either this morning or last night    Polyethylene Glycol 400 (BLINK TEARS OP) Place 1 drop into both eyes 3 times daily 7/26/2024 at am    rosuvastatin (CRESTOR) 5 MG tablet TAKE 1 TABLET(5 MG) BY MOUTH DAILY Unknown at Last dose was either this morning or last night    warfarin ANTICOAGULANT (COUMADIN) 2.5 MG tablet Take 1.25 mg by mouth Every Mon, Wed, Fri Morning 7/26/2024 at am    warfarin ANTICOAGULANT (COUMADIN) 2.5 MG tablet Take 2.5 mg by mouth four times a week On Tuesday, Thursday, Saturday, and Sunday Past Week at am

## 2024-07-26 NOTE — H&P
[facility] History [facility] Physical  Department of Internal Medicine    Patient Name: Nathen Gage MRN# [id number]   Age: 88 year old YOB: 1935     Date of Admission:7/26/2024    Primary care provider: [facility]  Date of Service: 7/26/2024           Assessment and Plan:   Nathen Gage is a 88 year old male with past medical history of atrial fibrillation on chronic anticoagulation with warfarin, GERD, hypertension, gout who presented to the ED with an altered mental status via EMS.  Patient per EMS was hypotensive with blood pressure in the 70s initially and later recovered quickly.  Altered mental status, Etiology is unspecified.  Suspect due to hypotension and orthostasis.  CT of the head was negative for acute intracranial abnormalities.  UA was negative for evidence of UTI.  Patient was awake alert oriented x 3 and was conversant prior arrival to the ED.  Blood work showed a lactic acid of 3.1, leukocytosis.  He was afebrile, not tachycardic.  Satting okay.  His procalcitonin later noted to be significantly elevated at 11.67.  CT of the chest abdomen and pelvis showing left lower lung patchy opacities suspicious for pneumonia.  Acute altered mental status is now resolved.  Community-acquired pneumonia.  CT of the chest obtained reviewed.  Patient has significant procalcitonin level suggesting high risk of systemic infection.  Has mild leukocytosis.  Lactic acid 3.1 initially.  Started on Rocephin.  Received Rocephin 2 g and doxycycline 100 mg IV in the ED.  Will continue same antibiotics.  Blood cultures has been obtained.  Follow-up cultures    Increased lactic acid level.  Suspect secondary to hypotension.  Received 2.5 L of normal saline and LR bolus in the ED.  Lactic acid 1 patient admitted 1.1.  Normal saline decreased from 125 to 75 cc/h.  Transient hypotension, resolved with IV fluids  History of GERD, continue PTA PPI  Increased serum creatinine level.   Suspect prerenal due to transient hypotension.  Avoid NSAIDs.  Hold losartan.  Recheck BMP in AM.  Hyponatremia, acute, suspect due to GI loss.  Sodium level in the .  Will monitor.  Benign essential hypertension, hold losartan  History of atrial fibrillation with RVR.  Currently in sinus rhythm.  Continue metoprolol succinate with hold parameters.  Chronic anticoagulation with warfarin.  INR 2.4.  Continue warfarin with pharmacy dosing and INR monitoring.  History of gout arthritis  History of dyslipidemia.  Continue statin      CODE: Full code  Diet/IVF: Normal saline at 25 cc/h, cardiac diet, no caffeine  DVT ppx: Warfarin with pharmacy dosing  Disposition/Admission Status: Inpatient    Shaun Das MD  Internal Medicine Staff Hospitalist  [facility]  Pager: [phone]       Chief Complaint:   Altered mental status         HPI:   Patient is an 88-year-old male from home was brought to the ED via EMS.  History is obtained from interviewing the patient and the patient's daughter, from the ED documentation and chart.  Patient reportedly was noted to have an episode of unresponsiveness which lasted for about 5 minutes.  Patient states he went to friend's house and had breakfast when they were about to leave he was not feeling well.  He states he ate some foods and vegetables.  Patient reportedly 2 days ago had episodes of vomiting from reportedly eating some food.  He denies any fever or chills.  Denies any diarrhea.  Denies any abdominal pain.  He denies any chest pain or shortness of breath or cough.  Per EMS report when they arrived patient's blood pressure was in the 70s.  His blood pressure when checked later was within normal limits.  According to the family patient was not responding to questions for about 5 minutes.  His eyes were closed.  But the wife reportedly said she was not sure if he was unconscious, she simply stated that he was not responding to questions verbally.   There was no any seizure-like activity status reported.  No history of seizures.  Patient told me that in the recent past he was admitted with similar episode of unresponsive state.  Upon arrival to the ED patient was conversant, in no distress.  Blood pressure was within normal limits and not tachycardic.  Nor was afebrile.  EKG obtained showed no acute abnormalities.  CT of the head was without any acute intracranial abnormalities.  Blood work showed WBC count that is slightly elevated.  UA did not show any evidence of UTI.  Lactic acid was 3.1 and he was given initially 1 L of normal saline and later he was given a liter of LR and lactic acid came down to 3.1.  He received an additional 500 mL and when he was admitted his lactate was 1.1.  He did receive 2 g of ceftriaxone and 100 mg of IV doxycycline in the ED for community-acquired pneumonia with CT of the chest abdomen and pelvis showed left lower lobe opacities suggestive of pneumonia as well as an increased procalcitonin level of 11.67.  When I saw the patient on the floor, patient was awake alert oriented x 4.  He does not have any signs of distress.  Satting okay on room air.  On exam he had coarse crackles left posterior lower lung fields.  It was a trace pedal and pretibial edema bilaterally.           Past Medical History:     Past Medical History:   Diagnosis Date    Actinic keratosis     Acute idiopathic gout of left knee 4/27/2016    Atrial fibrillation (H)     on Eliquis    Dilated aortic root (H24)     Esophageal reflux     Esophagitis     H/O: GI bleed 2010    Hyperlipidaemia     Hyperlipidemia LDL goal <100     Impotence of organic origin     Presbycusis, unspecified laterality 4/27/2016    Pulmonary hypertension (H)     Sick sinus syndrome (H)     pacemaker implanted 2011    Unspecified essential hypertension      Reviewed         Past Surgical History:     Past Surgical History:   Procedure Laterality Date    EP LEAD REPAIR FOR SINGLE ICD N/A  2023    Procedure: Pacemaker or Implantable Cardioverter Defibrillator Lead Repair-One Lead;  Surgeon: Scott Messer MD;  Location:  [facility]    EP PACEMAKER GENERATOR REPLACEMENT- DUAL N/A 2023    Procedure: Pacemaker Generator Replacement Dual;  Surgeon: Scott Messer MD;  Location:  [facility]    IMPLANT PACEMAKER  2011    Pacemaker/cardiac insitu / [employer] Dual chamber, V45    PHACOEMULSIFICATION CLEAR CORNEA WITH STANDARD INTRAOCULAR LENS IMPLANT Right 2018    Procedure: PHACOEMULSIFICATION CLEAR CORNEA WITH STANDARD INTRAOCULAR LENS IMPLANT;  RIGHT EYE PHACOEMULSIFICATION CLEAR CORNEA WITH STANDARD INTRAOCULAR LENS IMPLANT ;  Surgeon: Mat Echevarria MD;  Location[facility]              Social History:   Reviewed  Social History     Socioeconomic History    Marital status:      Spouse name: Not on file    Number of children: Not on file    Years of education: Not on file    Highest education level: Not on file   Occupational History    Not on file   Tobacco Use    Smoking status: Former     Current packs/day: 0.00     Types: Cigarettes     Quit date: 1975     Years since quittin.6    Smokeless tobacco: Never   Vaping Use    Vaping status: Never Used   Substance and Sexual Activity    Alcohol use: Yes     Comment: 1- 2 BEERS WEEKLY    Drug use: Never    Sexual activity: Not Currently     Partners: Female   Other Topics Concern    Parent/sibling w/ CABG, MI or angioplasty before 65F 55M? No     Service Not Asked    Blood Transfusions Not Asked    Caffeine Concern No     Comment: 1 cup coffee per day    Occupational Exposure Not Asked    Hobby Hazards Not Asked    Sleep Concern No    Stress Concern No    Weight Concern No    Special Diet Yes     Comment: on warfarin     Back Care Not Asked    Exercise Yes     Comment: states he is very active, dancing, skiing, walking     Bike Helmet Yes    Seat Belt  Yes    Self-Exams Not Asked   Social History Narrative    Not on file     Social Determinants of Health     Financial Resource Strain: Unknown (11/28/2023)    Financial Resource Strain     Within the past 12 months, have you or your family members you live with been unable to get utilities (heat, electricity) when it was really needed?: Patient refused   Food Insecurity: Unknown (11/28/2023)    Food Insecurity     Within the past 12 months, did you worry that your food would run out before you got money to buy more?: Patient refused     Within the past 12 months, did the food you bought just not last and you didn t have money to get more?: Patient refused   Transportation Needs: Unknown (11/28/2023)    Transportation Needs     Within the past 12 months, has lack of transportation kept you from medical appointments, getting your medicines, non-medical meetings or appointments, work, or from getting things that you need?: Patient refused   Physical Activity: Patient Declined (9/12/2023)    Exercise Vital Sign     Days of Exercise per Week: Patient declined     Minutes of Exercise per Session: Patient declined   Stress: Patient Declined (9/12/2023)    [employer] - Occupational Stress Questionnaire     Feeling of Stress : Patient declined   Social Connections: Unknown (9/12/2023)    Social Connection and Isolation Panel [NHANES]     Frequency of Communication with Friends and Family: Patient declined     Frequency of Social Gatherings with Friends and Family: Patient declined     Attends Jewish Services: Patient declined     Active Member of Clubs or Organizations: Patient declined     Attends Club or Organization Meetings: Not on file     Marital Status: Patient declined   Interpersonal Safety: Not on file   Housing Stability: Unknown (11/28/2023)    Housing Stability     Do you have housing? : Patient refused     Are you worried about losing your housing?: Patient refused             Family History:     Family History   Problem Relation Age of Onset    Alzheimer Disease Brother     Cerebrovascular Disease Father 80    Family History Negative Sister     Family History Negative Son     Family History Negative Daughter     Family History Negative Sister     Family History Negative Daughter     Breast Cancer No family hx of     Prostate Cancer No family hx of      Reviewed         Immunizations:     Immunization History   Administered Date(s) Administered    COVID-19 12+ (2023-24) (Pfizer) 10/17/2023    COVID-19 Bivalent 12+ (Pfizer) 10/18/2022    COVID-19 MONOVALENT 12+ (Pfizer) 01/23/2021, 02/13/2021, 11/01/2021    COVID-19 Monovalent 12+ (Pfizer 2022) 04/20/2022    Influenza (H1N1) 12/22/2009    Influenza (High Dose) 3 valent vaccine 10/28/2016, 10/31/2017, 10/16/2018, 09/30/2019    Influenza (IIV3) PF 11/28/2000, 11/18/2004, 11/11/2005, 10/25/2006, 11/15/2007, 10/17/2008, 09/30/2009, 09/15/2010, 10/03/2011, 10/04/2012, 09/24/2013    Influenza Vaccine 65+ (Fluzone HD) 09/30/2020, 10/04/2021, 10/18/2022, 10/17/2023    Influenza Vaccine >6 months,quad, PF 10/16/2014, 10/13/2015    Pneumo Conj 13-V (2010&after) 05/17/2017    Pneumococcal 23 valent 07/30/2008    TD,PF 7+ (Tenivac) 04/10/2006    TDAP Vaccine (Adacel) 05/17/2017    Td (Adult), Adsorbed 10/26/1996    Zoster recombinant adjuvanted (SHINGRIX) 12/13/2019, 03/12/2020              Allergies:      Allergies   Allergen Reactions    Neomycin Sulfate             Medications:     Current Facility-Administered Medications:     cefTRIAXone (ROCEPHIN) 2 g vial to attach to  ml bag for ADULTS or NS 50 ml bag for PEDS, 2 g, Intravenous, Once, Sameer Solomon MD, 2 g at 07/26/24 1711    doxycycline (VIBRAMYCIN) 100 mg vial to attach to  mL bag, 100 mg, Intravenous, Q12H, Sameer Solomon MD    sodium chloride 0.9 % bag 100 mL for CT scan flush use, 100 mL, As instructed, Q1H PRN, Sameer Solomon MD, 100 mL at 07/26/24  "1195    Current Outpatient Medications:     doxazosin (CARDURA) 8 MG tablet, TAKE 1 TABLET(8 MG) BY MOUTH AT BEDTIME, Disp: 90 tablet, Rfl: 0    losartan (COZAAR) 100 MG tablet, Take 1 tablet (100 mg) by mouth daily, Disp: 90 tablet, Rfl: 3    metoprolol succinate ER (TOPROL XL) 50 MG 24 hr tablet, Take 1 tablet (50 mg) by mouth daily, Disp: 90 tablet, Rfl: 2    pantoprazole (PROTONIX) 40 MG EC tablet, TAKE 1 TABLET(40 MG) BY MOUTH DAILY, Disp: 90 tablet, Rfl: 2    Polyethylene Glycol 400 (BLINK TEARS OP), Place 1 drop into both eyes 3 times daily, Disp: , Rfl:     rosuvastatin (CRESTOR) 5 MG tablet, TAKE 1 TABLET(5 MG) BY MOUTH DAILY, Disp: 90 tablet, Rfl: 0    warfarin ANTICOAGULANT (COUMADIN) 2.5 MG tablet, Take 1.25 mg by mouth Every Mon, Wed, Fri Morning, Disp: , Rfl:     warfarin ANTICOAGULANT (COUMADIN) 2.5 MG tablet, Take 2.5 mg by mouth four times a week On Tuesday, Thursday, Saturday, and Sunday, Disp: , Rfl:               Review of Systems:   A complete ROS was performed and is negative other than what is stated in the HPI.          Physical Exam:   BP (!) 165/86   Pulse 78   Temp 97.8  F (36.6  C) (Oral)   Resp 25   Ht 1.727 m (5' 8\")   Wt 73 kg (161 lb)   SpO2 93%   BMI 24.48 kg/m       GENERAL: Alert and oriented x 3. NAD.   HEENT: Anicteric sclera. Mucous membranes moist.   CV: RRR. S1, S2. No murmurs appreciated.   RESPIRATORY: Effort normal on room air.  Coarse breath sounds on the left posterior lateral lung fields.  No wheezes.    GI: Abdomen soft and non distended with normoactive bowel sounds present in all quadrants. No tenderness, rebound, guarding.   NEUROLOGICAL: No focal deficits. Moves all extremities.    EXTREMITIES: trace peripheral edema. Intact bilateral pedal pulses.   SKIN: No jaundice. No rashes.          Data:   CBC RESULTS:   Recent Labs   Lab Test 07/26/24  1116   WBC 13.5*   RBC 4.31*   HGB 12.8*   HCT 38.6*   MCV 90   MCH 29.7   MCHC 33.2   RDW 14.1        Last " Comprehensive Metabolic Panel:  Lab Results   Component Value Date     (L) 07/26/2024    POTASSIUM 3.9 07/26/2024    CHLORIDE 97 (L) 07/26/2024    CO2 22 07/26/2024    ANIONGAP 13 07/26/2024     (H) 07/26/2024    BUN 35.4 (H) 07/26/2024    CR 1.31 (H) 07/26/2024    GFRESTIMATED 53 (L) 07/26/2024    ARNOL 9.1 07/26/2024

## 2024-07-27 ENCOUNTER — APPOINTMENT (OUTPATIENT)
Dept: SPEECH THERAPY | Facility: CLINIC | Age: 89
DRG: 871 | End: 2024-07-27
Attending: INTERNAL MEDICINE
Payer: COMMERCIAL

## 2024-07-27 LAB
ANION GAP SERPL CALCULATED.3IONS-SCNC: 12 MMOL/L (ref 7–15)
BUN SERPL-MCNC: 22.9 MG/DL (ref 8–23)
CALCIUM SERPL-MCNC: 8.3 MG/DL (ref 8.8–10.4)
CHLORIDE SERPL-SCNC: 106 MMOL/L (ref 98–107)
CREAT SERPL-MCNC: 0.9 MG/DL (ref 0.67–1.17)
EGFRCR SERPLBLD CKD-EPI 2021: 83 ML/MIN/1.73M2
ERYTHROCYTE [DISTWIDTH] IN BLOOD BY AUTOMATED COUNT: 14.4 % (ref 10–15)
GLUCOSE SERPL-MCNC: 90 MG/DL (ref 70–99)
HCO3 SERPL-SCNC: 20 MMOL/L (ref 22–29)
HCT VFR BLD AUTO: 33.9 % (ref 40–53)
HGB BLD-MCNC: 11.1 G/DL (ref 13.3–17.7)
INR PPP: 2.37 (ref 0.85–1.15)
MCH RBC QN AUTO: 29.4 PG (ref 26.5–33)
MCHC RBC AUTO-ENTMCNC: 32.7 G/DL (ref 31.5–36.5)
MCV RBC AUTO: 90 FL (ref 78–100)
PLATELET # BLD AUTO: 160 10E3/UL (ref 150–450)
POTASSIUM SERPL-SCNC: 3.6 MMOL/L (ref 3.4–5.3)
RBC # BLD AUTO: 3.77 10E6/UL (ref 4.4–5.9)
SODIUM SERPL-SCNC: 138 MMOL/L (ref 135–145)
WBC # BLD AUTO: 11.3 10E3/UL (ref 4–11)

## 2024-07-27 PROCEDURE — 258N000003 HC RX IP 258 OP 636: Performed by: INTERNAL MEDICINE

## 2024-07-27 PROCEDURE — 36415 COLL VENOUS BLD VENIPUNCTURE: CPT | Performed by: INTERNAL MEDICINE

## 2024-07-27 PROCEDURE — 99232 SBSQ HOSP IP/OBS MODERATE 35: CPT | Performed by: INTERNAL MEDICINE

## 2024-07-27 PROCEDURE — 250N000013 HC RX MED GY IP 250 OP 250 PS 637: Performed by: INTERNAL MEDICINE

## 2024-07-27 PROCEDURE — 85610 PROTHROMBIN TIME: CPT | Performed by: INTERNAL MEDICINE

## 2024-07-27 PROCEDURE — 120N000001 HC R&B MED SURG/OB

## 2024-07-27 PROCEDURE — 250N000011 HC RX IP 250 OP 636: Performed by: INTERNAL MEDICINE

## 2024-07-27 PROCEDURE — 85027 COMPLETE CBC AUTOMATED: CPT | Performed by: INTERNAL MEDICINE

## 2024-07-27 PROCEDURE — 80048 BASIC METABOLIC PNL TOTAL CA: CPT | Performed by: INTERNAL MEDICINE

## 2024-07-27 PROCEDURE — 92610 EVALUATE SWALLOWING FUNCTION: CPT | Mod: GN | Performed by: SPEECH-LANGUAGE PATHOLOGIST

## 2024-07-27 RX ORDER — WARFARIN SODIUM 2.5 MG/1
2.5 TABLET ORAL
Status: COMPLETED | OUTPATIENT
Start: 2024-07-27 | End: 2024-07-27

## 2024-07-27 RX ORDER — GLIPIZIDE 10 MG/1
1 TABLET ORAL 3 TIMES DAILY
Status: DISCONTINUED | OUTPATIENT
Start: 2024-07-27 | End: 2024-07-28 | Stop reason: HOSPADM

## 2024-07-27 RX ADMIN — SODIUM CHLORIDE: 9 INJECTION, SOLUTION INTRAVENOUS at 07:31

## 2024-07-27 RX ADMIN — METOPROLOL SUCCINATE 50 MG: 50 TABLET, EXTENDED RELEASE ORAL at 08:24

## 2024-07-27 RX ADMIN — ROSUVASTATIN CALCIUM 5 MG: 5 TABLET, FILM COATED ORAL at 08:24

## 2024-07-27 RX ADMIN — CEFTRIAXONE 2 G: 2 INJECTION, POWDER, FOR SOLUTION INTRAMUSCULAR; INTRAVENOUS at 16:16

## 2024-07-27 RX ADMIN — PANTOPRAZOLE SODIUM 40 MG: 40 TABLET, DELAYED RELEASE ORAL at 06:38

## 2024-07-27 RX ADMIN — WARFARIN SODIUM 2.5 MG: 2.5 TABLET ORAL at 17:15

## 2024-07-27 RX ADMIN — DOXYCYCLINE 100 MG: 100 INJECTION, POWDER, LYOPHILIZED, FOR SOLUTION INTRAVENOUS at 05:10

## 2024-07-27 RX ADMIN — DOXYCYCLINE 100 MG: 100 INJECTION, POWDER, LYOPHILIZED, FOR SOLUTION INTRAVENOUS at 17:10

## 2024-07-27 RX ADMIN — DOXAZOSIN 8 MG: 4 TABLET ORAL at 19:21

## 2024-07-27 ASSESSMENT — ACTIVITIES OF DAILY LIVING (ADL)
ADLS_ACUITY_SCORE: 30
ADLS_ACUITY_SCORE: 32
ADLS_ACUITY_SCORE: 30
ADLS_ACUITY_SCORE: 32
ADLS_ACUITY_SCORE: 30
ADLS_ACUITY_SCORE: 32
ADLS_ACUITY_SCORE: 30
ADLS_ACUITY_SCORE: 30
ADLS_ACUITY_SCORE: 32
ADLS_ACUITY_SCORE: 30

## 2024-07-27 NOTE — PLAN OF CARE
"Goal Outcome Evaluation:      Plan of Care Reviewed With: patient, spouse    Overall Patient Progress: improvingOverall Patient Progress: improving    Outcome Evaluation: VS stable.LA down to 1.1. Continue IV fluid 75 cc/h. Denied pain. Tylenol given for fever,      Problem: Adult Inpatient Plan of Care  Goal: Plan of Care Review  Description: The Plan of Care Review/Shift note should be completed every shift.  The Outcome Evaluation is a brief statement about your assessment that the patient is improving, declining, or no change.  This information will be displayed automatically on your shift  note.  Outcome: Progressing  Flowsheets (Taken 7/26/2024 2223)  Outcome Evaluation: VS stable.LA down to 1.1. Continue IV fluid 75 cc/h. Denied pain. Tylenol given for fever,  Plan of Care Reviewed With:   patient   spouse  Overall Patient Progress: improving  Goal: Patient-Specific Goal (Individualized)  Description: You can add care plan individualizations to a care plan. Examples of Individualization might be:  \"Parent requests to be called daily at 9am for status\", \"I have a hard time hearing out of my right ear\", or \"Do not touch me to wake me up as it startles  me\".  Outcome: Progressing  Goal: Absence of Hospital-Acquired Illness or Injury  Outcome: Progressing  Intervention: Identify and Manage Fall Risk  Recent Flowsheet Documentation  Taken 7/26/2024 2000 by Betzaida Pryor, RN  Safety Promotion/Fall Prevention:   activity supervised   assistive device/personal items within reach   nonskid shoes/slippers when out of bed   patient and family education  Intervention: Prevent Skin Injury  Recent Flowsheet Documentation  Taken 7/26/2024 2000 by Betzaida Pyror, RN  Body Position: position changed independently  Goal: Optimal Comfort and Wellbeing  Outcome: Progressing  Goal: Readiness for Transition of Care  Outcome: Progressing  Intervention: Mutually Develop Transition Plan  Recent Flowsheet Documentation  Taken " 7/26/2024 1900 by Betzaida Pryor, RN  Equipment Currently Used at Home: none     Problem: Pneumonia  Goal: Fluid Balance  Outcome: Progressing  Goal: Resolution of Infection Signs and Symptoms  Outcome: Progressing  Goal: Effective Oxygenation and Ventilation  Outcome: Progressing  Intervention: Promote Airway Secretion Clearance  Recent Flowsheet Documentation  Taken 7/26/2024 2000 by Betzaida Pryor, RN  Cough And Deep Breathing: done with encouragement

## 2024-07-27 NOTE — PLAN OF CARE
"VS stable. Diminished LS. No complaints of pain. [place]. Tele is 100% A paced.     Goal Outcome Evaluation:      Plan of Care Reviewed With: patient    Overall Patient Progress: improvingOverall Patient Progress: improving    Outcome Evaluation: VS stable. No complaints of pain.      Problem: Adult Inpatient Plan of Care  Goal: Plan of Care Review  Description: The Plan of Care Review/Shift note should be completed every shift.  The Outcome Evaluation is a brief statement about your assessment that the patient is improving, declining, or no change.  This information will be displayed automatically on your shift  note.  Outcome: Progressing  Flowsheets (Taken 7/27/2024 0417)  Outcome Evaluation: VS stable. No complaints of pain.  Plan of Care Reviewed With: patient  Overall Patient Progress: improving  Goal: Patient-Specific Goal (Individualized)  Description: You can add care plan individualizations to a care plan. Examples of Individualization might be:  \"Parent requests to be called daily at 9am for status\", \"I have a hard time hearing out of my right ear\", or \"Do not touch me to wake me up as it startles  me\".  Outcome: Progressing  Goal: Absence of Hospital-Acquired Illness or Injury  Outcome: Progressing  Intervention: Identify and Manage Fall Risk  Recent Flowsheet Documentation  Taken 7/27/2024 0030 by Maria D Bellamy RN  Safety Promotion/Fall Prevention:   clutter free environment maintained   lighting adjusted   nonskid shoes/slippers when out of bed  Intervention: Prevent Skin Injury  Recent Flowsheet Documentation  Taken 7/27/2024 0030 by Maria D Bellamy, RN  Body Position: position changed independently  Intervention: Prevent Infection  Recent Flowsheet Documentation  Taken 7/27/2024 0030 by Maria D Bellamy, RN  Infection Prevention: single patient room provided  Goal: Optimal Comfort and Wellbeing  Outcome: Progressing  Goal: Readiness for Transition of Care  Outcome: Progressing     Problem: " Pneumonia  Goal: Fluid Balance  Outcome: Progressing  Goal: Resolution of Infection Signs and Symptoms  Outcome: Progressing  Goal: Effective Oxygenation and Ventilation  Outcome: Progressing  Intervention: Optimize Oxygenation and Ventilation  Recent Flowsheet Documentation  Taken 7/27/2024 0030 by Maria D Bellamy RN  Airway/Ventilation Management: airway patency maintained

## 2024-07-27 NOTE — PROGRESS NOTES
"Pt's wife was given the yellow copy of the \"home medication storage receipt\" where the pt's home medications were locked up securely in pharmacy. She plans to go to  the pt's home meds and bring them home with her today.  "

## 2024-07-27 NOTE — PROGRESS NOTES
"   IP Clinical Swallow Evaluation  [facility]  07/27/24 5601   Appointment Info   Signing Clinician's Name / Credentials (SLP) Preston Tamayo MS CCC-SLP   General Information   Onset of Illness/Injury or Date of Surgery 07/26/24   Referring Physician David Bashir MD   Patient/Family Therapy Goal Statement (SLP) Patient denies any swallowing difficulty.   Pertinent History of Current Problem Per provider \"Nathen Gage is a 88 year old male with past medical history of atrial fibrillation on chronic anticoagulation with warfarin, GERD, hypertension, gout who presented to the ED with an altered mental status via EMS.  Patient per EMS was hypotensive with blood pressure in the 70s initially and later recovered quickly.\" SLP consult for swallow evaluation to r/o aspiration.   General Observations Alert, oriented and pleasant.   Pain Assessment   Patient Currently in Pain No   Type of Evaluation   Type of Evaluation Swallow Evaluation   Oral Motor   Oral Musculature generally intact   Structural Abnormalities none present   Mucosal Quality good   Dentition (Oral Motor)   Dentition (Oral Motor) natural dentition;adequate dentition   Facial Symmetry (Oral Motor)   Facial Symmetry (Oral Motor) WNL   Lip Function (Oral Motor)   Lip Range of Motion (Oral Motor) WNL   Lip Strength (Oral Motor) WNL   Tongue Function (Oral Motor)   Tongue Strength (Oral Motor) WNL   Tongue Coordination/Speed (Oral Motor) WNL   Tongue ROM (Oral Motor) WNL   Jaw Function (Oral Motor)   Jaw Function (Oral Motor) WNL   Facial Sensation   Facial Sensation WNL   Cough/Swallow/Gag Reflex (Oral Motor)   Soft Palate/Velum (Oral Motor) WNL   Volitional Throat Clear/Cough (Oral Motor) WNL   Volitional Swallow (Oral Motor) WNL   Vocal Quality/Secretion Management (Oral Motor)   Vocal Quality (Oral Motor) WNL   Secretion Management (Oral Motor) WNL   General Swallowing Observations   Past History of Dysphagia None per patient " or in EMR.   Current Diet/Method of Nutritional Intake (General Swallowing Observations, NIS) regular diet;thin liquids (level 0)   Swallowing Evaluation Clinical swallow evaluation   Clinical Swallow Evaluation   Feeding Assistance no assistance needed   Clinical Swallow Evaluation Textures Trialed thin liquids;pureed;solid foods   Clinical Swallow Eval: Thin Liquid Texture Trial   Mode of Presentation, Thin Liquids straw;self-fed   Volume of Liquid or Food Presented 4 ounces water   Oral Phase of Swallow WFL   Pharyngeal Phase of Swallow intact   Diagnostic Statement WNL   Clinical Swallow Evaluation: Puree Solid Texture Trial   Mode of Presentation, Puree spoon;self-fed   Volume of Puree Presented 4 ounces pudding   Oral Phase, Puree WFL   Pharyngeal Phase, Puree intact   Diagnostic Statement WNL   Clinical Swallow Evaluation: Solid Food Texture Trial   Mode of Presentation self-fed   Volume Presented Bart cracker   Oral Phase WFL   Pharyngeal Phase intact   Diagnostic Statement WNL   Esophageal Phase of Swallow   Patient reports or presents with symptoms of esophageal dysphagia No   Swallowing Recommendations   Diet Consistency Recommendations regular diet;thin liquids (level 0)   Supervision Level for Intake patient independent   Recommended Feeding/Eating Techniques (Swallow Eval) patient is independent, no specific recommendations   Medication Administration Recommendations, Swallowing (SLP) whole with liquids   Instrumental Assessment Recommendations instrumental evaluation not recommended at this time   Clinical Impression   Criteria for Skilled Therapeutic Interventions Met (SLP Eval) Evaluation only   Risks & Benefits of therapy have been explained evaluation/treatment results reviewed;care plan/treatment goals reviewed;risks/benefits reviewed;current/potential barriers reviewed;participants voiced agreement with care plan;participants included;patient;spouse/significant other   Clinical Impression  Comments Clinical swallow evaluation completed per provider orders. Trials of thin, puree and solids assessed. Patient presents with a functional oropharyngeal swallow on Regular diet. Oral mech exam unremarkable. No oral difficulties. Pharyngeal phase revealed timely swallow with good hyoloaryngeal elevation, no overt s/sx of aspiration and no c/o food sticking. No SLP follow-up indicated as swallow appears to be at baseline and patient is tolerating a regular diet with thin liquids.   SLP Total Evaluation Time   Eval: oral/pharyngeal swallow function, clinical swallow Minutes (15144) 28   Total Session Time   Total Session Time (sum of timed and untimed services) 28

## 2024-07-27 NOTE — PLAN OF CARE
"Vss with  this am and down to 138 later this shift, no co pain/cp/sob. Tele SR with APaced beats at times, warfarin ordered for tonight. SLP ordered for dysphagia today however this RN told dr joseph that there were no issues this am with med administration but wished to proceed, eval shows no swallowing concerns at this time (see note for details). Alarms on for safety as pt can be impulsive and not call for assistance at times, up with Ax1+gb, Cahto, forgetful and at times disoriented to time.  IVF/ZURI continue, some hesitancy noted with urination otherwise voiding WDL. Pt's wife to bring pt's home meds back home with her today (see previous progress note for details). Continue poc and monitoring, poss discharge in am.       Goal Outcome Evaluation:      Plan of Care Reviewed With: patient, spouse    Overall Patient Progress: improvingOverall Patient Progress: improving    Outcome Evaluation: ZURI/IVF continue, no breathing issues this shift      Problem: Adult Inpatient Plan of Care  Goal: Plan of Care Review  Description: The Plan of Care Review/Shift note should be completed every shift.  The Outcome Evaluation is a brief statement about your assessment that the patient is improving, declining, or no change.  This information will be displayed automatically on your shift  note.  Outcome: Not Progressing  Flowsheets (Taken 7/27/2024 1320)  Outcome Evaluation: ZURI/IVF continue, no breathing issues this shift  Plan of Care Reviewed With:   patient   spouse  Overall Patient Progress: improving  Goal: Patient-Specific Goal (Individualized)  Description: You can add care plan individualizations to a care plan. Examples of Individualization might be:  \"Parent requests to be called daily at 9am for status\", \"I have a hard time hearing out of my right ear\", or \"Do not touch me to wake me up as it startles  me\".  Outcome: Not Progressing  Goal: Absence of Hospital-Acquired Illness or Injury  Outcome: Not " Progressing  Intervention: Identify and Manage Fall Risk  Recent Flowsheet Documentation  Taken 7/27/2024 1229 by Viviane Alvarez RN  Safety Promotion/Fall Prevention: safety round/check completed  Taken 7/27/2024 1027 by Viviane Alvarez RN  Safety Promotion/Fall Prevention: safety round/check completed  Taken 7/27/2024 0938 by Viviane Alvarez RN  Safety Promotion/Fall Prevention: safety round/check completed  Taken 7/27/2024 0829 by Viviane Alvarez RN  Safety Promotion/Fall Prevention:   activity supervised   assistive device/personal items within reach   treat underlying cause   treat reversible contributory factors   supervised activity   safety round/check completed   room organization consistent   room door open   patient and family education   nonskid shoes/slippers when out of bed   lighting adjusted   increase visualization of patient   increased rounding and observation   clutter free environment maintained  Taken 7/27/2024 0737 by Viviane Alvarez RN  Safety Promotion/Fall Prevention: safety round/check completed  Intervention: Prevent Skin Injury  Recent Flowsheet Documentation  Taken 7/27/2024 0829 by Viviane Alvarez RN  Body Position: position changed independently  Intervention: Prevent and Manage VTE (Venous Thromboembolism) Risk  Recent Flowsheet Documentation  Taken 7/27/2024 0829 by Viviane Alvarez RN  VTE Prevention/Management: SCDs off (sequential compression devices)  Goal: Optimal Comfort and Wellbeing  Outcome: Not Progressing  Goal: Readiness for Transition of Care  Outcome: Not Progressing     Problem: Pneumonia  Goal: Fluid Balance  Outcome: Not Progressing  Goal: Resolution of Infection Signs and Symptoms  Outcome: Not Progressing     Problem: Pneumonia  Goal: Effective Oxygenation and Ventilation  Outcome: Progressing  Intervention: Promote Airway Secretion Clearance  Recent Flowsheet Documentation  Taken 7/27/2024 0829 by Viviane Alvarez RN  Cough And Deep Breathing: done  with encouragement

## 2024-07-28 VITALS
TEMPERATURE: 97.9 F | OXYGEN SATURATION: 96 % | DIASTOLIC BLOOD PRESSURE: 81 MMHG | WEIGHT: 161 LBS | HEIGHT: 68 IN | RESPIRATION RATE: 20 BRPM | BODY MASS INDEX: 24.4 KG/M2 | HEART RATE: 65 BPM | SYSTOLIC BLOOD PRESSURE: 135 MMHG

## 2024-07-28 LAB — INR PPP: 1.73 (ref 0.85–1.15)

## 2024-07-28 PROCEDURE — 85610 PROTHROMBIN TIME: CPT | Performed by: INTERNAL MEDICINE

## 2024-07-28 PROCEDURE — 99239 HOSP IP/OBS DSCHRG MGMT >30: CPT | Performed by: INTERNAL MEDICINE

## 2024-07-28 PROCEDURE — 36415 COLL VENOUS BLD VENIPUNCTURE: CPT | Performed by: INTERNAL MEDICINE

## 2024-07-28 PROCEDURE — 258N000003 HC RX IP 258 OP 636: Performed by: INTERNAL MEDICINE

## 2024-07-28 PROCEDURE — 250N000013 HC RX MED GY IP 250 OP 250 PS 637: Performed by: INTERNAL MEDICINE

## 2024-07-28 PROCEDURE — 250N000011 HC RX IP 250 OP 636: Performed by: INTERNAL MEDICINE

## 2024-07-28 RX ORDER — WARFARIN SODIUM 2.5 MG/1
TABLET ORAL
COMMUNITY
Start: 2024-07-29

## 2024-07-28 RX ORDER — DOXYCYCLINE 100 MG/1
100 CAPSULE ORAL 2 TIMES DAILY
Qty: 14 CAPSULE | Refills: 0 | Status: SHIPPED | OUTPATIENT
Start: 2024-07-28 | End: 2024-08-04

## 2024-07-28 RX ORDER — WARFARIN SODIUM 2.5 MG/1
TABLET ORAL
COMMUNITY
Start: 2024-07-29 | End: 2024-07-28

## 2024-07-28 RX ORDER — WARFARIN SODIUM 3 MG/1
3 TABLET ORAL ONCE
Status: COMPLETED | OUTPATIENT
Start: 2024-07-28 | End: 2024-07-28

## 2024-07-28 RX ADMIN — ROSUVASTATIN CALCIUM 5 MG: 5 TABLET, FILM COATED ORAL at 08:27

## 2024-07-28 RX ADMIN — METOPROLOL SUCCINATE 50 MG: 50 TABLET, EXTENDED RELEASE ORAL at 08:27

## 2024-07-28 RX ADMIN — DOXYCYCLINE 100 MG: 100 INJECTION, POWDER, LYOPHILIZED, FOR SOLUTION INTRAVENOUS at 05:20

## 2024-07-28 RX ADMIN — WARFARIN SODIUM 3 MG: 3 TABLET ORAL at 10:00

## 2024-07-28 RX ADMIN — SODIUM CHLORIDE: 9 INJECTION, SOLUTION INTRAVENOUS at 08:30

## 2024-07-28 RX ADMIN — DEXTRAN 70, GLYCERIN, HYPROMELLOSE 1 DROP: 1; 2; 3 SOLUTION/ DROPS OPHTHALMIC at 08:28

## 2024-07-28 RX ADMIN — PANTOPRAZOLE SODIUM 40 MG: 40 TABLET, DELAYED RELEASE ORAL at 06:46

## 2024-07-28 ASSESSMENT — ACTIVITIES OF DAILY LIVING (ADL)
ADLS_ACUITY_SCORE: 32
ADLS_ACUITY_SCORE: 31
ADLS_ACUITY_SCORE: 32
ADLS_ACUITY_SCORE: 31
ADLS_ACUITY_SCORE: 32
ADLS_ACUITY_SCORE: 32
ADLS_ACUITY_SCORE: 31
ADLS_ACUITY_SCORE: 32

## 2024-07-28 NOTE — PLAN OF CARE
"VS stable. Diminished LS. Disoriented to time. Forgetful. Tele is 100% A paced. No complaints of pain.      Goal Outcome Evaluation:      Plan of Care Reviewed With: patient    Overall Patient Progress: improvingOverall Patient Progress: improving    Outcome Evaluation: VS stable. IV abx.      Problem: Adult Inpatient Plan of Care  Goal: Plan of Care Review  Description: The Plan of Care Review/Shift note should be completed every shift.  The Outcome Evaluation is a brief statement about your assessment that the patient is improving, declining, or no change.  This information will be displayed automatically on your shift  note.  Outcome: Progressing  Flowsheets (Taken 7/28/2024 0425)  Outcome Evaluation: VS stable. IV abx.  Plan of Care Reviewed With: patient  Overall Patient Progress: improving  Goal: Patient-Specific Goal (Individualized)  Description: You can add care plan individualizations to a care plan. Examples of Individualization might be:  \"Parent requests to be called daily at 9am for status\", \"I have a hard time hearing out of my right ear\", or \"Do not touch me to wake me up as it startles  me\".  Outcome: Progressing  Goal: Absence of Hospital-Acquired Illness or Injury  Outcome: Progressing  Intervention: Identify and Manage Fall Risk  Recent Flowsheet Documentation  Taken 7/28/2024 0000 by Maria D Bellamy RN  Safety Promotion/Fall Prevention:   clutter free environment maintained   lighting adjusted   nonskid shoes/slippers when out of bed  Intervention: Prevent Skin Injury  Recent Flowsheet Documentation  Taken 7/28/2024 0000 by Maria D Bellamy, RN  Body Position: position changed independently  Intervention: Prevent Infection  Recent Flowsheet Documentation  Taken 7/28/2024 0000 by Maria D Bellamy, RN  Infection Prevention: single patient room provided  Goal: Optimal Comfort and Wellbeing  Outcome: Progressing  Goal: Readiness for Transition of Care  Outcome: Progressing     Problem: " Pneumonia  Goal: Fluid Balance  Outcome: Progressing  Goal: Resolution of Infection Signs and Symptoms  Outcome: Progressing  Goal: Effective Oxygenation and Ventilation  Outcome: Progressing  Intervention: Optimize Oxygenation and Ventilation  Recent Flowsheet Documentation  Taken 7/28/2024 0000 by Maria D Bellamy RN  Airway/Ventilation Management: airway patency maintained

## 2024-07-28 NOTE — PHARMACY-ANTICOAGULATION SERVICE
Clinical Pharmacy- Warfarin Discharge Note      Warfarin PTA Regimen: 1.25 mg MWF and 2.5 mg ROW      Anticoagulation Dose History  More data exists         Latest Ref Rng & Units 4/9/2024 5/7/2024 6/11/2024 7/18/2024 7/26/2024 7/27/2024 7/28/2024   Recent Dosing and Labs   warfarin ANTICOAGULANT (COUMADIN) 2.5 MG tablet - - - - - - 2.5 mg, $Given -   warfarin ANTICOAGULANT (COUMADIN) 3 MG tablet - - - - - - - 3 mg, $Given       1.73   INR 0.85 - 1.15 2.4  2.1  2.1  2.4  2.80  2.40  2.37  1.73       Details          Multiple values from one day are sorted in reverse-chronological order             Patient admitted with warfarin as pta med.   Agree with plan to dose with 2.5 mg daily until has INR checked early this week.   NOTE - patient is going to be on doxycycline and augmentin  for one week.      The patient should have an INR checked  7/30/24 .

## 2024-07-28 NOTE — PROVIDER NOTIFICATION
Paged dr joseph: per tele - pt had a 2.1 and 2.4 second pauses at 0704, asymptomatic.    7:26 AM  Md acknowledged.

## 2024-07-28 NOTE — PROGRESS NOTES
Pt is in stable condition, vss, no co pain/cp/sob.  Pt dc home today.  All dc education reviewed with pt/spouse in regards to: diet (print outs given), activity, safety, s/s to report, medications and rx, follow up appointments/care, etc.  Questions were answered and pt/spouse verbalized understanding.  All belongings were packed up to be sent with pt (including hearing aid and glasses, medications were taken home by wife yesterday), will dc via wc by staff to main entrance via private transport once pt completes a walk (per wife's request she would like to see him go a longer distance prior to discharge) and lunch (per wife, there isn't much food in the house today and she still needs to go get groceries).         12:44 PM  Pt is now ready to go after his walk and lunch.

## 2024-07-28 NOTE — PLAN OF CARE
"Goal Outcome Evaluation:      Plan of Care Reviewed With: patient    Overall Patient Progress: improvingOverall Patient Progress: improving    Outcome Evaluation: Forgetful, VS stable, denied SOB. Up in room. Denied pain. Continue IV fluid.      Problem: Adult Inpatient Plan of Care  Goal: Plan of Care Review  Description: The Plan of Care Review/Shift note should be completed every shift.  The Outcome Evaluation is a brief statement about your assessment that the patient is improving, declining, or no change.  This information will be displayed automatically on your shift  note.  Outcome: Progressing  Flowsheets (Taken 7/27/2024 2134)  Outcome Evaluation: Forgetful, VS stable, denied SOB. Up in room. Denied pain. Continue IV fluid.  Plan of Care Reviewed With: patient  Overall Patient Progress: improving  Goal: Patient-Specific Goal (Individualized)  Description: You can add care plan individualizations to a care plan. Examples of Individualization might be:  \"Parent requests to be called daily at 9am for status\", \"I have a hard time hearing out of my right ear\", or \"Do not touch me to wake me up as it startles  me\".  Outcome: Progressing  Goal: Absence of Hospital-Acquired Illness or Injury  Outcome: Progressing  Intervention: Identify and Manage Fall Risk  Recent Flowsheet Documentation  Taken 7/27/2024 1600 by Betzaida Pryor RN  Safety Promotion/Fall Prevention:   activity supervised   clutter free environment maintained   nonskid shoes/slippers when out of bed   supervised activity   toileting scheduled  Intervention: Prevent Skin Injury  Recent Flowsheet Documentation  Taken 7/27/2024 1600 by Betzaida Pryor RN  Body Position: position changed independently  Intervention: Prevent and Manage VTE (Venous Thromboembolism) Risk  Recent Flowsheet Documentation  Taken 7/27/2024 1600 by Betzaida Pryor RN  VTE Prevention/Management: SCDs off (sequential compression devices)  Intervention: Prevent " Infection  Recent Flowsheet Documentation  Taken 7/27/2024 1600 by Betzaida Pryor, RN  Infection Prevention:   hand hygiene promoted   single patient room provided  Goal: Optimal Comfort and Wellbeing  Outcome: Progressing  Goal: Readiness for Transition of Care  Outcome: Progressing     Problem: Pneumonia  Goal: Fluid Balance  Outcome: Progressing  Goal: Resolution of Infection Signs and Symptoms  Outcome: Progressing  Goal: Effective Oxygenation and Ventilation  Outcome: Progressing  Intervention: Promote Airway Secretion Clearance  Recent Flowsheet Documentation  Taken 7/27/2024 1600 by Betzaida Pryor RN  Cough And Deep Breathing: done with encouragement

## 2024-07-28 NOTE — DISCHARGE SUMMARY
Physician Discharge Summary           [facility]  Hospitalist Discharge Summary-[facility]    Name: Nathen Gage    MRN: [id number]     YOB: 1935    Age: 88 year old                                                     Primary care provider: D[facility] - [place], [facility]    Admit date:  7/26/2024    Discharge date and time: 7/28/2024    Discharge Physician: David Bashir M.D., M.B.A.       Primary Discharge Diagnosis      Acute septic encephalopathy   Multifocal pneumonia - Community acquired   Severe sepsis due to Pneumonia     Secondary Diagnosis /chronic medical conditions     Past Medical History:   Diagnosis Date    Actinic keratosis     Acute idiopathic gout of left knee 4/27/2016    Atrial fibrillation (H)     on Eliquis    Dilated aortic root (H24)     Esophageal reflux     Esophagitis     H/O: GI bleed 2010    Hyperlipidaemia     Hyperlipidemia LDL goal <100     Impotence of organic origin     Presbycusis, unspecified laterality 4/27/2016    Pulmonary hypertension (H)     Sick sinus syndrome (H)     pacemaker implanted 2011    Unspecified essential hypertension      Past Surgical History:  Past Surgical History:   Procedure Laterality Date    EP LEAD REPAIR FOR SINGLE ICD N/A 7/11/2023    Procedure: Pacemaker or Implantable Cardioverter Defibrillator Lead Repair-One Lead;  Surgeon: Scott Messer MD;  Location:  [facility]    EP PACEMAKER GENERATOR REPLACEMENT- DUAL N/A 7/11/2023    Procedure: Pacemaker Generator Replacement Dual;  Surgeon: Scott Messer MD;  Location:  [facility]    IMPLANT PACEMAKER  2011    Pacemaker/cardiac insitu / [employer] Dual chamber, V45    PHACOEMULSIFICATION CLEAR CORNEA WITH STANDARD INTRAOCULAR LENS IMPLANT Right 5/29/2018    Procedure: PHACOEMULSIFICATION CLEAR CORNEA WITH STANDARD INTRAOCULAR LENS IMPLANT;  RIGHT EYE PHACOEMULSIFICATION CLEAR CORNEA WITH STANDARD  INTRAOCULAR LENS IMPLANT ;  Surgeon: Mat Echevarria MD;  Location: [facility]           Brief Summary of Hospital stay :       Please refer to  Admission H&P note  and subsequent progress notes in EMR for full details of patient care.    Reason for Presentation to the Hospital: altered mental status       Brief Summary of Significant findings(Primary diagnosis )Procedures and treatments provided(Hospital course ,consults, procedures)     Nathen Gage is a 88 year old male with past medical history of atrial fibrillation on chronic anticoagulation with warfarin, GERD, hypertension, gout who presented to the ED with an altered mental status via EMS.  Patient per EMS was hypotensive with blood pressure in the 70s initially and later recovered quickly.     Problem list (medical problems addressed during hospital stay):      Altered mental status- suspect septic encephalopathy   --Suspect due to hypotension and orthostasis.  CT of the head was negative for acute intracranial abnormalities.  UA was negative for evidence of UTI.  Patient was awake alert oriented x 3 and was conversant prior arrival to the ED.  Blood work showed a lactic acid of 3.1, leukocytosis.  He was afebrile, not tachycardic.  Satting okay.  His procalcitonin later noted to be significantly elevated at 11.67.  CT of the chest abdomen and pelvis showing left lower lung patchy opacities suspicious for pneumonia.  Acute altered mental status is now resolved.     Multifocal pneumonia -Community-acquired pneumonia.   Severe sepsis   -- CT of the chest obtained reviewed.      Patient has significant procalcitonin level suggesting high risk of systemic infection.  Has mild leukocytosis.  Lactic acid 3.1 initially.  Started on Rocephin.  Received Rocephin 2 g and doxycycline 100 mg IV in the ED.  No concern for aspiration per speech path evaluation   --continued with IV abx  and monitored   -- Patient continues to do well with IV antibiotic.  He  "remained stable off oxygen.  Cultures remain negative to date.  Patient was discharged on Augmentin and doxycycline.  He was advised to follow-up with his primary care provider in the next 1 -2 weeks.       Transient hypotension, resolved with IV fluids  History of GERD, continue PTA PPI  JEFF   -- Increased serum creatinine level.  Suspect prerenal due to transient hypotension.  Avoid NSAIDs.  Losartan held .   --improved      Hyponatremia, acute, suspect due to GI loss.  Sodium level in the .  --improved      Benign essential hypertension  Afib      -Losartan was on hold and resumed on discharge.       History of gout arthritis  History of dyslipidemia.  Continue statin                Consultations during hospital stay:       PHARMACY TO DOSE WARFARIN  SPEECH LANGUAGE PATH ADULT IP CONSULT      Patient discharge Condition:     stable    /81 (BP Location: Left arm)   Pulse 65   Temp 97.9  F (36.6  C) (Oral)   Resp 20   Ht 1.727 m (5' 8\")   Wt 73 kg (161 lb)   SpO2 96%   BMI 24.48 kg/m         Discharge Instructions:       Patient/family instructions: Written discharge instruction given to patient/family    Discharge Medications:       Review of your medicines        START taking        Dose / Directions   amoxicillin-clavulanate 875-125 MG tablet  Commonly known as: AUGMENTIN      Dose: 1 tablet  Take 1 tablet by mouth 2 times daily for 7 days  Quantity: 14 tablet  Refills: 0     doxycycline hyclate 100 MG capsule  Commonly known as: VIBRAMYCIN      Dose: 100 mg  Take 1 capsule (100 mg) by mouth 2 times daily for 7 days  Quantity: 14 capsule  Refills: 0            CONTINUE these medicines which may have CHANGED, or have new prescriptions. If we are uncertain of the size of tablets/capsules you have at home, strength may be listed as something that might have changed.        Dose / Directions   warfarin ANTICOAGULANT 2.5 MG tablet  Commonly known as: COUMADIN  This may have changed:   how much to " take  how to take this  when to take this  additional instructions  Another medication with the same name was removed. Continue taking this medication, and follow the directions you see here.      Start taking on: July 29, 2024  Take as directed. If you are unsure how to take this medication, talk to your nurse or doctor.  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      TAKE 1 TABLET(8 MG) BY MOUTH AT BEDTIME  Quantity: 90 tablet  Refills: 0     losartan 100 MG tablet  Commonly known as: COZAAR  Used for: Secondary hypertension      Dose: 100 mg  Take 1 tablet (100 mg) by mouth daily  Quantity: 90 tablet  Refills: 3     metoprolol succinate ER 50 MG 24 hr tablet  Commonly known as: TOPROL XL  Used for: 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      Dose: 50 mg  Take 1 tablet (50 mg) by mouth daily  Quantity: 90 tablet  Refills: 2     pantoprazole 40 MG EC tablet  Commonly known as: PROTONIX  Used for: Gastroesophageal reflux disease with esophagitis      TAKE 1 TABLET(40 MG) BY MOUTH DAILY  Quantity: 90 tablet  Refills: 2     rosuvastatin 5 MG tablet  Commonly known as: CRESTOR  Used for: Essential hypertension, benign, Pure hypercholesterolemia      Dose: 5 mg  TAKE 1 TABLET(5 MG) BY MOUTH DAILY  Quantity: 90 tablet  Refills: 0               Where to get your medicines        These medications were sent to [facility] [id number] [facility]  [address] [place] [id number]      Phone: [phone]   amoxicillin-clavulanate 875-125 MG tablet  doxycycline  hyclate 100 MG capsule          Discharge diet:Orders Placed This Encounter      Combination Diet Low Saturated Fat Na <2400mg Diet, No Caffeine Diet      Diet          Discharge activity:Activity as tolerated    Discharge follow-up:    Follow up with primary care provider in 7 days or earlier if symptoms return or gets worse.          Other instructions:    We discussed with patient/family about detail discharge instructions as well as discharge medications above including potential risks,side effects and benefits.Patient/family understood benefits and potential serious side effects of taking these medications and need to follow up with PCP if the patient develops complications.  Patient is also advised to see a doctor immediately for severe symptoms.        Major procedure performed/  Significant Diagnostic Studies:         Results for orders placed or performed during the hospital encounter of 07/26/24   CT Head w/o Contrast    Narrative    CT SCAN OF THE HEAD WITHOUT CONTRAST   7/26/2024 11:50 AM     HISTORY: syncope, memory loss    TECHNIQUE:  Axial images of the head and coronal reformations without  IV contrast material. Radiation dose for this scan was reduced using  automated exposure control, adjustment of the mA and/or kV according  to patient size, or iterative reconstruction technique.    COMPARISON: 6/2/2014    FINDINGS: There is no evidence of intracranial hemorrhage, mass, acute  infarct or anomaly. The ventricles are normal in size, shape and  configuration. Moderate diffuse parenchymal volume loss. Moderate  patchy periventricular white matter hypodensities which are  nonspecific, but likely related to chronic microvascular ischemic  disease.     The visualized portions of the sinuses and mastoids appear normal.  Prior bilateral cataract extractions. The bony calvarium and bones of  the skull base appear intact.       Impression    IMPRESSION:     1. No evidence of acute intracranial hemorrhage,  mass, or herniation.  2. Diffuse parenchymal volume loss and white matter changes likely due  to chronic microvascular ischemic change.      DENNIS ALEMAN MD         SYSTEM ID:  [id number]   CT Chest/Abdomen/Pelvis w Contrast    Narrative    EXAM: CT CHEST/ABDOMEN/PELVIS W CONTRAST  LOCATION: [facility]  DATE: 7/26/2024    INDICATION: Sepsis without a source.  COMPARISON: None.  TECHNIQUE: CT scan of the chest, abdomen, and pelvis was performed following injection of IV contrast. Multiplanar reformats were obtained. Dose reduction techniques were used.   CONTRAST: 81mL Isovue 370    FINDINGS:   LUNGS AND PLEURA: Large region of groundglass and solid consolidation within the inferior left upper lobe and lingula with nodular and groundglass infiltrate also seen in the left lower lobe and posterior segment of the right upper lobe compatible with   multifocal pneumonia.    MEDIASTINUM/AXILLAE: Left-sided cardiac pacemaker. Small esophageal hiatal hernia.    CORONARY ARTERY CALCIFICATION: Severe.    HEPATOBILIARY: Small calcified gallstones. Tiny cyst in the caudate.    PANCREAS: Normal.    SPLEEN: Trace fluid around the spleen.    ADRENAL GLANDS: Normal.    KIDNEYS/BLADDER: Scattered nonobstructing stones in each kidney ranging in size from 1 to 3 mm. No ureteric stone or hydronephrosis. A few benign cysts in the kidneys which do not warrant follow-up.    BOWEL: Normal.    LYMPH NODES: Normal.    VASCULATURE: Normal.    PELVIC ORGANS: Trace free fluid. Mild prostatic enlargement and inhomogeneity.    MUSCULOSKELETAL: Old sternal fracture.      Impression    IMPRESSION:  1.  Multifocal infiltrates in both lungs compatible with multifocal pneumonia.    2.  Multiple nonobstructing stones in the kidneys.    3.  Cardiac pacemaker.    4.  Cholelithiasis.    5.  Prostatic enlargement and inhomogeneity.     *Note: Due to a large number of results and/or encounters for the requested time period, some  "results have not been displayed. A complete set of results can be found in Results Review.       Recent Labs   Lab 07/27/24  0653 07/26/24  1116   WBC 11.3* 13.5*   HGB 11.1* 12.8*   HCT 33.9* 38.6*   MCV 90 90    188     No results for input(s): \"CULT\" in the last 168 hours.  Recent Labs   Lab 07/27/24  0653 07/26/24  1116    132*   POTASSIUM 3.6 3.9   CHLORIDE 106 97*   CO2 20* 22   ANIONGAP 12 13   GLC 90 186*   BUN 22.9 35.4*   CR 0.90 1.31*   GFRESTIMATED 83 53*   ARNOL 8.3* 9.1   PROTTOTAL  --  6.7   ALBUMIN  --  3.8   BILITOTAL  --  1.2   ALKPHOS  --  69   AST  --  24   ALT  --  14       Recent Labs   Lab 07/27/24  0653 07/26/24  1116   GLC 90 186*       Recent Labs   Lab 07/28/24  0735 07/27/24  0653 07/26/24  1940   INR 1.73* 2.37* 2.80*           Pending Results:       Unresulted Labs Ordered in the Past 30 Days of this Admission       Date and Time Order Name Status Description    7/26/2024  3:04 PM Blood Culture Arm, Left Preliminary                Patient Allergies:       Allergies   Allergen Reactions    Neomycin Sulfate          Disposition:     Disposition: home         I saw and evaluated the patient on day of discharge and  discharge instructions reviewed  and  all the patient's questions and concerns addressed. Over 30 minutes spent on discharge and coordination of discharge process for this patient.      Disclaimer: This note consists of symbols derived from keyboarding, dictation and/or voice recognition software. As a result, there may be errors in the script that have gone undetected. Please consider this when interpreting information found in this chart      "

## 2024-07-29 ENCOUNTER — PATIENT OUTREACH (OUTPATIENT)
Dept: CARE COORDINATION | Facility: CLINIC | Age: 89
End: 2024-07-29
Payer: COMMERCIAL

## 2024-07-29 ENCOUNTER — TELEPHONE (OUTPATIENT)
Dept: ANTICOAGULATION | Facility: CLINIC | Age: 89
End: 2024-07-29
Payer: COMMERCIAL

## 2024-07-29 DIAGNOSIS — Z79.01 LONG TERM CURRENT USE OF ANTICOAGULANTS WITH INR GOAL OF 2.0-3.0: ICD-10-CM

## 2024-07-29 DIAGNOSIS — I48.19 PERSISTENT ATRIAL FIBRILLATION (H): Primary | ICD-10-CM

## 2024-07-29 NOTE — TELEPHONE ENCOUNTER
Writer spoke to Neal, wife Nichol was on speaker phone.  Patient reports he did take booster dose of 2.5 today, booster dose of 3mg was given on 7/29/24 while in-patient so this writer decreased today's warfarin dose, patient taking 2 antibiotic for pneumonia and has tight INR goal range of 2.0-2.5, see calendar.    Patient has provider office visit on 7/31/24, note attached to also check INR.    Fidelina Pizano RN  Anticoagulation Clinic

## 2024-07-29 NOTE — PROGRESS NOTES
Clinic Care Coordination Contact  Transitions of Care Outreach  Chief Complaint   Patient presents with    Clinic Care Coordination - Post Hospital       Most Recent Admission Date: 7/26/2024   Most Recent Admission Diagnosis: Lactic acidosis - E87.20  Transient hypotension - I95.9  Severe sepsis (H) - A41.9, R65.20  Leukocytosis, unspecified type - D72.829  Community acquired pneumonia of left lung, unspecified part of lung - J18.9     Most Recent Discharge Date: 7/28/2024   Most Recent Discharge Diagnosis: Leukocytosis, unspecified type - D72.829  Lactic acidosis - E87.20  Transient hypotension - I95.9  Severe sepsis (H) - A41.9, R65.20  Community acquired pneumonia of left lung, unspecified part of lung - J18.9     Transitions of Care Assessment    Discharge Assessment  How are you doing now that you are home?: Spoke with patient and wife who share that things are going well. PCP appt set for next Wed. Patient's wife did ask about what she should google for diet recommendation. Writer let her know she could try downloading apps or google recipes with low sat/no caffine. Writer also offered to send some over.  How are your symptoms? (Red Flag symptoms escalate to triage hotline per guidelines): Improved  Do you know how to contact your clinic care team if you have future questions or changes to your health status? : Yes  Does the patient have their discharge instructions? : Yes  Does the patient have questions regarding their discharge instructions? : No  Were you started on any new medications or were there changes to any of your previous medications? : Yes  Does the patient have all of their medications?: Yes  Do you have questions regarding any of your medications? : No  Do you have all of your needed medical supplies or equipment (DME)?  (i.e. oxygen tank, CPAP, cane, etc.): Yes    Post-op ([facility] Only)  If the patient had a surgery or procedure, do they have any questions for a nurse?: No    Post-op  (Clinicians Only)  Did the patient have surgery or a procedure: No        Follow up Plan     Discharge Follow-Up  Discharge follow up appointment scheduled in alignment with recommended follow up timeframe or Transitions of Risk Category? (Low = within 30 days; Moderate= within 14 days; High= within 7 days): Yes  Discharge Follow Up Appointment Date: 07/31/24  Discharge Follow Up Appointment Scheduled with?: Primary Care Provider    Future Appointments   Date Time Provider Department Center   7/31/2024 10:00 AM Gustavo Farrell APRN CNP [facility]   9/13/2024  9:00 AM Mishel Ghosh, ELOY [facility]       Outpatient Plan as outlined on AVS reviewed with patient.    For any urgent concerns, please contact our 24 hour nurse triage line: [phone] ([phone])       PETERSON Ta

## 2024-07-29 NOTE — TELEPHONE ENCOUNTER
ANTICOAGULATION  MANAGEMENT: Discharge Review    Nathen Gage chart reviewed for anticoagulation continuity of care    Hospital Admission on 7/26-7/28/24 for acute septic encephalopathy; severe sepsis due to pneumonia.    Discharge disposition: Home    Results:    Recent labs: (last 7 days)     07/26/24  1116 07/26/24  1940 07/27/24  0653 07/28/24  0735   INR 2.40* 2.80* 2.37* 1.73*     Anticoagulation inpatient management:     anticoagulation calendar updated with inpatient dosing    Anticoagulation discharge instructions:     Warfarin dosing:  Take 2.5mg x 2 days, check INR on 7/30/24, per discharge summary   Bridging: No   INR goal change: No      Medication changes affecting anticoagulation: Yes: both Augmentin and Doxycycline x 7 days    Additional factors affecting anticoagulation: Yes: pneumonia     PLAN     Recommend to check INR on 07/31/24    Left a detailed message for Neal and wife Nichol    Anticoagulation Calendar updated    Fidelina Pizano RN

## 2024-07-31 ENCOUNTER — OFFICE VISIT (OUTPATIENT)
Dept: FAMILY MEDICINE | Facility: CLINIC | Age: 89
End: 2024-07-31
Payer: COMMERCIAL

## 2024-07-31 ENCOUNTER — ANTICOAGULATION THERAPY VISIT (OUTPATIENT)
Dept: ANTICOAGULATION | Facility: CLINIC | Age: 89
End: 2024-07-31

## 2024-07-31 VITALS
RESPIRATION RATE: 14 BRPM | HEART RATE: 71 BPM | HEIGHT: 68 IN | DIASTOLIC BLOOD PRESSURE: 76 MMHG | SYSTOLIC BLOOD PRESSURE: 145 MMHG | BODY MASS INDEX: 25.46 KG/M2 | WEIGHT: 168 LBS | TEMPERATURE: 97.8 F | OXYGEN SATURATION: 97 %

## 2024-07-31 DIAGNOSIS — Z79.01 LONG TERM CURRENT USE OF ANTICOAGULANTS WITH INR GOAL OF 2.0-3.0: ICD-10-CM

## 2024-07-31 DIAGNOSIS — A41.9 SEVERE SEPSIS (H): ICD-10-CM

## 2024-07-31 DIAGNOSIS — I48.19 PERSISTENT ATRIAL FIBRILLATION (H): Primary | ICD-10-CM

## 2024-07-31 DIAGNOSIS — I48.91 ATRIAL FIBRILLATION, UNSPECIFIED TYPE (H): ICD-10-CM

## 2024-07-31 DIAGNOSIS — R65.20 SEVERE SEPSIS (H): ICD-10-CM

## 2024-07-31 DIAGNOSIS — D72.829 LEUKOCYTOSIS, UNSPECIFIED TYPE: ICD-10-CM

## 2024-07-31 DIAGNOSIS — K21.00 GASTROESOPHAGEAL REFLUX DISEASE WITH ESOPHAGITIS WITHOUT HEMORRHAGE: ICD-10-CM

## 2024-07-31 DIAGNOSIS — J18.9 COMMUNITY ACQUIRED PNEUMONIA OF LEFT LUNG, UNSPECIFIED PART OF LUNG: ICD-10-CM

## 2024-07-31 DIAGNOSIS — Z09 HOSPITAL DISCHARGE FOLLOW-UP: Primary | ICD-10-CM

## 2024-07-31 LAB
BACTERIA BLD CULT: NO GROWTH
ERYTHROCYTE [DISTWIDTH] IN BLOOD BY AUTOMATED COUNT: 13.6 % (ref 10–15)
HCT VFR BLD AUTO: 36 % (ref 40–53)
HGB BLD-MCNC: 11.8 G/DL (ref 13.3–17.7)
INR BLD: 2.4 (ref 0.9–1.1)
MCH RBC QN AUTO: 29.6 PG (ref 26.5–33)
MCHC RBC AUTO-ENTMCNC: 32.8 G/DL (ref 31.5–36.5)
MCV RBC AUTO: 90 FL (ref 78–100)
PLATELET # BLD AUTO: 233 10E3/UL (ref 150–450)
RBC # BLD AUTO: 3.99 10E6/UL (ref 4.4–5.9)
WBC # BLD AUTO: 6 10E3/UL (ref 4–11)

## 2024-07-31 PROCEDURE — 36416 COLLJ CAPILLARY BLOOD SPEC: CPT

## 2024-07-31 PROCEDURE — 85027 COMPLETE CBC AUTOMATED: CPT

## 2024-07-31 PROCEDURE — 85610 PROTHROMBIN TIME: CPT

## 2024-07-31 PROCEDURE — 99495 TRANSJ CARE MGMT MOD F2F 14D: CPT

## 2024-07-31 PROCEDURE — 36415 COLL VENOUS BLD VENIPUNCTURE: CPT

## 2024-07-31 RX ORDER — PANTOPRAZOLE SODIUM 40 MG/1
40 TABLET, DELAYED RELEASE ORAL DAILY
Qty: 90 TABLET | Refills: 2 | Status: SHIPPED | OUTPATIENT
Start: 2024-07-31

## 2024-07-31 ASSESSMENT — PAIN SCALES - GENERAL: PAINLEVEL: NO PAIN (0)

## 2024-07-31 NOTE — PROGRESS NOTES
ANTICOAGULATION MANAGEMENT     Nathen Gage 88 year old male is on warfarin with therapeutic INR result. (Goal INR 2.0-2.5)    Recent labs: (last 7 days)     07/31/24  1050   INR 2.4*       ASSESSMENT     Source(s): Chart Review and Patient/Caregiver Call     Warfarin doses taken: Booster dose(s) recently taken which may be affecting INR   Unfortunately, patient took 2.5 mg this morning versus his usual 1.25 mg, see calendar for dosing as advised by NICOLE Masters  Diet: No new diet changes identified  Medication/supplement changes:  Doxycycline and Augmentin 7/28-8/4/24 for pneumonia  New illness, injury, or hospitalization: Yes: currently being treated for pneumonia, was hospitalized 7/26-7/28/24  Signs or symptoms of bleeding or clotting: Yes: patient reports bruises from hospital stay.  Previous result: Subtherapeutic at discharge  Additional findings: None       PLAN     Recommended plan for temporary change(s) affecting INR     Dosing Instructions:  decrease dose to 1.25mg on 8/1 and 8/3/24  with next INR in 5 days       Summary  As of 7/31/2024      Full warfarin instructions:  7/31: 2.5 mg; 8/1: 1.25 mg; 8/3: 1.25 mg; Otherwise 1.25 mg every Mon, Wed, Fri; 2.5 mg all other days   Next INR check:  8/5/2024               Telephone call with Neal and wife Nichol who verbalizes understanding and agrees to plan and who agrees to plan and repeated back plan correctly    Lab visit scheduled    Education provided: Taking warfarin: Importance of taking warfarin as instructed    Plan made with [facility] Pharmacist Sonam Pizano, RN  Anticoagulation Clinic  7/31/2024    _______________________________________________________________________     Anticoagulation Episode Summary       Current INR goal:  2.0-2.5   TTR:  68.2% (1 y)   Target end date:  Indefinite   Send INR reminders to:  [facility]    Indications    Persistent atrial fibrillation (H) [I48.19]  Long term current use of anticoagulants with  INR goal of 2.0-3.0 [Z79.01]             Comments:               Anticoagulation Care Providers       Provider Role Specialty Phone number    Kushal Valentin MD Referring Family Medicine [phone]    Leonides Aggarwal PA-C Referring Family Medicine [phone]

## 2024-07-31 NOTE — PROGRESS NOTES
"  Assessment & Plan     (Z09) Hospital discharge follow-up  (primary encounter diagnosis)  (J18.9) Community acquired pneumonia of left lung, unspecified part of lung  (A41.9,  R65.20) Severe sepsis (H)  (D72.829) Leukocytosis, unspecified type  Comment: Overall patient improving quite a bit.  At this point continue to finish out current antibiotic regimen.  Will repeat CBC to ensure leukocytosis is trending appropriately.  Repeat chest x-ray ordered for 1 month from now to ensure pneumonia improvement/resolution.  Will follow-up on results and whether further direction is necessary. Patient fully understands and is agreeable with plan of care, at this point patient will follow up as needed unless acute concerns arise in the meantime.  Plan: CBC with platelets, XR Chest 2 Views    (I48.91) Atrial fibrillation, unspecified type (H)  (Z79.01) Long term current use of anticoagulants with INR goal of 2.0-3.0  Comment: Patient needs INR completed. On Coumadin.  Lab released.      (K21.00) Gastroesophageal reflux disease with esophagitis without hemorrhage  Comment: Stable needs refill Protonix.  Provided.  Plan: pantoprazole (PROTONIX) 40 MG EC tablet     MED REC REQUIRED  Post Medication Reconciliation Status: discharge medications reconciled, continue medications without change  BMI  Estimated body mass index is 25.73 kg/m  as calculated from the following:    Height as of this encounter: 1.721 m (5' 7.75\").    Weight as of this encounter: 76.2 kg (168 lb).     David De Jesus is a 88 year old, presenting for the following health issues:  Hospital F/U (Pneumonia) and lab (Pt needs INR)        7/31/2024     9:43 AM   Additional Questions   Roomed by Judith Harvey, CRIS-B   Accompanied by Wife / Nichol     History of Present Illness       Reason for visit:  Hospital Follow Up    He eats 0-1 servings of fruits and vegetables daily.He consumes 0 sweetened beverage(s) daily.He exercises with enough effort to increase his " "heart rate 20 to 29 minutes per day.  He exercises with enough effort to increase his heart rate 3 or less days per week.   He is taking medications regularly.     Hospital Follow-up Visit:    Hospital/Nursing Home/IP Rehab Facility: [facility]  Date of Admission: 7/26/24  Date of Discharge: 7/28/24  Reason(s) for Admission: Pneumonia  Was the patient in the ICU or did the patient experience delirium during hospitalization?  No  Do you have any other stressors you would like to discuss with your provider? No    Problems taking medications regularly:  None  Medication changes since discharge: Warfarin   Problems adhering to non-medication therapy:  None    Summary of hospitalization:  [facility] discharge summary reviewed  Diagnostic Tests/Treatments reviewed.  Follow up needed: repeat imaging  Other Healthcare Providers Involved in Patient s Care:         None  Update since discharge: improved.       Plan of care communicated with patient and spouse.      Review of Systems  Constitutional, HEENT, cardiovascular, pulmonary, gi and gu systems are negative, except as otherwise noted.      Objective    BP (!) 145/76 (BP Location: Right arm, Patient Position: Sitting, Cuff Size: Adult Regular)   Pulse 71   Temp 97.8  F (36.6  C) (Oral)   Resp 14   Ht 1.721 m (5' 7.75\")   Wt 76.2 kg (168 lb)   SpO2 97%   BMI 25.73 kg/m    Body mass index is 25.73 kg/m .  Physical Exam  Vitals and nursing note reviewed.   Constitutional:       General: He is not in acute distress.     Appearance: Normal appearance. He is not ill-appearing.   Cardiovascular:      Rate and Rhythm: Normal rate and regular rhythm.      Heart sounds: No murmur heard.     No friction rub. No gallop.   Pulmonary:      Effort: Pulmonary effort is normal. No accessory muscle usage or respiratory distress.      Breath sounds: Examination of the right-lower field reveals rales. Examination of the left-lower field reveals " decreased breath sounds and rales. Decreased breath sounds and rales present. No wheezing or rhonchi.   Abdominal:      General: Bowel sounds are normal. There is no distension.      Palpations: Abdomen is soft.      Tenderness: There is no abdominal tenderness. There is no guarding.   Skin:     General: Skin is warm and dry.   Neurological:      Mental Status: He is alert.   Psychiatric:         Mood and Affect: Mood normal.         Behavior: Behavior normal. Behavior is cooperative.         Thought Content: Thought content normal.         Judgment: Judgment normal.          Signed Electronically by: TYRESE Toribio CNP

## 2024-08-01 ENCOUNTER — ANCILLARY PROCEDURE (OUTPATIENT)
Dept: GENERAL RADIOLOGY | Facility: CLINIC | Age: 89
End: 2024-08-01
Payer: COMMERCIAL

## 2024-08-01 ENCOUNTER — MYC MEDICAL ADVICE (OUTPATIENT)
Dept: FAMILY MEDICINE | Facility: CLINIC | Age: 89
End: 2024-08-01

## 2024-08-01 DIAGNOSIS — Z09 HOSPITAL DISCHARGE FOLLOW-UP: ICD-10-CM

## 2024-08-01 PROCEDURE — 71046 X-RAY EXAM CHEST 2 VIEWS: CPT | Mod: TC | Performed by: RADIOLOGY

## 2024-08-05 ENCOUNTER — TELEPHONE (OUTPATIENT)
Dept: NEUROPSYCHOLOGY | Facility: CLINIC | Age: 89
End: 2024-08-05

## 2024-08-05 ENCOUNTER — ANTICOAGULATION THERAPY VISIT (OUTPATIENT)
Dept: ANTICOAGULATION | Facility: CLINIC | Age: 89
End: 2024-08-05

## 2024-08-05 ENCOUNTER — LAB (OUTPATIENT)
Dept: LAB | Facility: CLINIC | Age: 89
End: 2024-08-05
Payer: COMMERCIAL

## 2024-08-05 DIAGNOSIS — I48.19 PERSISTENT ATRIAL FIBRILLATION (H): Primary | ICD-10-CM

## 2024-08-05 DIAGNOSIS — Z79.01 LONG TERM CURRENT USE OF ANTICOAGULANTS WITH INR GOAL OF 2.0-3.0: ICD-10-CM

## 2024-08-05 LAB — INR BLD: 2.2 (ref 0.9–1.1)

## 2024-08-05 PROCEDURE — 36416 COLLJ CAPILLARY BLOOD SPEC: CPT

## 2024-08-05 PROCEDURE — 85610 PROTHROMBIN TIME: CPT

## 2024-08-05 NOTE — TELEPHONE ENCOUNTER
Postponing message x 1 day per Gustavo XIONG CNP  request below     Barby Márquez, Registered Nurse  [facility]

## 2024-08-05 NOTE — PROGRESS NOTES
ANTICOAGULATION MANAGEMENT     Nathen Gage 88 year old male is on warfarin with therapeutic INR result. (Goal INR 2.0-2.5)    Recent labs: (last 7 days)     08/05/24  0941   INR 2.2*       ASSESSMENT     Source(s): Chart Review and Patient/Caregiver Call     Warfarin doses taken: Warfarin taken as instructed  Diet: No new diet changes identified, however patient reports decreased appetite  Medication/supplement changes:  Patient completed prednisone and Augmentin 8/4/24   New illness, injury, or hospitalization: Yes: Patient reports he feels better from the pneumonia, continues to have diarrhea that developed while in the hospital   Signs or symptoms of bleeding or clotting: No, patient reports bruising while in the hospital are healing  Previous result: Therapeutic last visit; previously outside of goal range at hospital discharge 7/28/24  Additional findings: None       PLAN     Recommended plan for temporary change(s) affecting INR     Dosing Instructions: Continue your current warfarin dose with next INR in 1 week       Summary  As of 8/5/2024      Full warfarin instructions:  1.25 mg every Mon, Wed, Fri; 2.5 mg all other days   Next INR check:  8/12/2024               Telephone call with Neal who verbalizes understanding and agrees to plan    Lab visit scheduled    Education provided: Please call back if any changes to your diet, medications or how you've been taking warfarin  Contact [phone] with any changes, questions or concerns.     Plan made per [facility] anticoagulation protocol    Teresa Castro RN  Anticoagulation Clinic  8/5/2024    _______________________________________________________________________     Anticoagulation Episode Summary       Current INR goal:  2.0-2.5   TTR:  69.5% (1 y)   Target end date:  Indefinite   Send INR reminders to:  [facility]    Indications    Persistent atrial fibrillation (H) [I48.19]             Comments:               Anticoagulation Care Providers        Provider Role Specialty Phone number    Kushal Valentin MD Referring Family Medicine [phone]    Leonides Aggarwal PA-C Referring Family Medicine [phone]

## 2024-08-05 NOTE — TELEPHONE ENCOUNTER
See [facility] Message. Please review and advise. When we respond, number for radiology scheduling is [phone].    Brittney Ghosh RN on 8/5/2024 at 7:17 AM

## 2024-08-05 NOTE — TELEPHONE ENCOUNTER
Messaged patient via [employer]. Can we call tomorrow to ensure patient and spouse received message? Thanks!    TYRESE Toribio CNP

## 2024-08-05 NOTE — TELEPHONE ENCOUNTER
[facility] Call Center    Phone Message    May a detailed message be left on voicemail: yes     Reason for Call: Other: Pt spouse called stating that they received a call from , couldn't tell writer when. No notes of a call to Pt. Please call back to advise     Action Taken: Message routed to:  [facility] Neuropsychology    Travel Screening: Not Applicable     Date of Service:

## 2024-08-06 NOTE — TELEPHONE ENCOUNTER
RN spoke to patient and spouse     Reviewed my chart message from Gustavo XIONG CNP  - they state understanding   Patient and wife have questions regarding diet - reviewed AVS from 7/26-7/28 hospital discharge   Follow this diet upon discharge: Orders Placed This Encounter  Combination Diet Low Saturated Fat Na <2400mg Diet, No Caffeine Diet  RN explained low sodium, and reason this was advised   RN explained caffeine, and reason for this patient will have decaf coffee and monitor sodium/caffeine and fat in diet and if any other questions will return call   Transferred to scheduling for xray appt in 6 weeks     Barby Márquez, Registered Nurse  [facility]

## 2024-08-12 ENCOUNTER — ANTICOAGULATION THERAPY VISIT (OUTPATIENT)
Dept: ANTICOAGULATION | Facility: CLINIC | Age: 89
End: 2024-08-12

## 2024-08-12 ENCOUNTER — LAB (OUTPATIENT)
Dept: LAB | Facility: CLINIC | Age: 89
End: 2024-08-12
Payer: COMMERCIAL

## 2024-08-12 DIAGNOSIS — I48.19 PERSISTENT ATRIAL FIBRILLATION (H): Primary | ICD-10-CM

## 2024-08-12 DIAGNOSIS — Z79.01 LONG TERM CURRENT USE OF ANTICOAGULANTS WITH INR GOAL OF 2.0-3.0: ICD-10-CM

## 2024-08-12 LAB — INR BLD: 2.8 (ref 0.9–1.1)

## 2024-08-12 PROCEDURE — 36416 COLLJ CAPILLARY BLOOD SPEC: CPT

## 2024-08-12 PROCEDURE — 85610 PROTHROMBIN TIME: CPT

## 2024-08-12 NOTE — PROGRESS NOTES
ANTICOAGULATION MANAGEMENT     Nathen Gage 88 year old male is on warfarin with supratherapeutic INR result. (Goal INR 2.0-2.5)    Recent labs: (last 7 days)     08/12/24  1054   INR 2.8*       ASSESSMENT     Source(s): Chart Review and Patient/Caregiver Call     Warfarin doses taken: Warfarin taken as instructed  Diet: No new diet changes identified--patient reports he eats greens everyday, spinach, broccoli or apolonia.  Medication/supplement changes: Yes, recently finished antibiotic and Prednisone   New illness, injury, or hospitalization: Yes: recently treated for pneumonia, patient reports he feels better. Patient also reports that diarrhea has resolved.  Signs or symptoms of bleeding or clotting: No  Previous result: Therapeutic last 2(+) visits  Additional findings:  last 2 INRs were only 5 days apart. TTR is 71%, patient has been on current maintenance dose since 09/01/2023 so no change in maintenance dose today, ACRN to reevaluate in 1 week.       PLAN     Recommended plan for temporary change(s) affecting INR     Dosing Instructions: hold dose then continue your current warfarin dose with next INR in 1 week       Summary  As of 8/12/2024      Full warfarin instructions:  8/12: Hold; Otherwise 1.25 mg every Mon, Wed, Fri; 2.5 mg all other days   Next INR check:  8/19/2024               Telephone call with Neal who verbalizes understanding and agrees to plan and who agrees to plan and repeated back plan correctly    Lab visit scheduled    Education provided: Taking warfarin: Importance of taking warfarin as instructed  Dietary considerations: importance of consistent vitamin K intake    Plan made per [facility] anticoagulation protocol    Fidelina Pizano, RN  Anticoagulation Clinic  8/12/2024    _______________________________________________________________________     Anticoagulation Episode Summary       Current INR goal:  2.0-2.5   TTR:  70.5% (1 y)   Target end date:  Indefinite   Send INR reminders to:   [facility]    Indications    Persistent atrial fibrillation (H) [I48.19]             Comments:               Anticoagulation Care Providers       Provider Role Specialty Phone number    Kushal Valentin MD Referring Family Medicine [phone]    Leonides Aggarwal PA-C Referring Family Medicine [phone]

## 2024-08-19 ENCOUNTER — ANTICOAGULATION THERAPY VISIT (OUTPATIENT)
Dept: ANTICOAGULATION | Facility: CLINIC | Age: 89
End: 2024-08-19

## 2024-08-19 ENCOUNTER — LAB (OUTPATIENT)
Dept: LAB | Facility: CLINIC | Age: 89
End: 2024-08-19
Payer: COMMERCIAL

## 2024-08-19 DIAGNOSIS — Z79.01 LONG TERM CURRENT USE OF ANTICOAGULANTS WITH INR GOAL OF 2.0-3.0: ICD-10-CM

## 2024-08-19 DIAGNOSIS — I48.19 PERSISTENT ATRIAL FIBRILLATION (H): Primary | ICD-10-CM

## 2024-08-19 LAB — INR BLD: 1.9 (ref 0.9–1.1)

## 2024-08-19 PROCEDURE — 36416 COLLJ CAPILLARY BLOOD SPEC: CPT

## 2024-08-19 PROCEDURE — 85610 PROTHROMBIN TIME: CPT

## 2024-08-19 NOTE — PROGRESS NOTES
"ANTICOAGULATION MANAGEMENT     Nathen Gage 88 year old male is on warfarin with subtherapeutic INR result. (Goal INR 2.0-2.5)    Recent labs: (last 7 days)     08/19/24  0955   INR 1.9*       ASSESSMENT     Source(s): Chart Review and Patient/Caregiver Call     Warfarin doses taken: Held for 1 day  recently which may be affecting INR  Diet: No new diet changes identified  Medication/supplement changes: None noted  New illness, injury, or hospitalization: Yes: recently treated for pneumonia, patient reports he is feeling \"a lot better\" and no more episodes of diarrhea.  Signs or symptoms of bleeding or clotting: No  Previous result: Supratherapeutic  Additional findings: None       PLAN     Recommended plan for temporary change(s) affecting INR     Dosing Instructions: Continue your current warfarin dose with next INR in 10-14 days       Summary  As of 8/19/2024      Full warfarin instructions:  1.25 mg every Mon, Wed, Fri; 2.5 mg all other days   Next INR check:  8/30/2024               Telephone call with Neal who verbalizes understanding and agrees to plan and who agrees to plan and repeated back plan correctly    Lab visit scheduled    Education provided: Taking warfarin: Importance of taking warfarin as instructed  Dietary considerations: importance of consistent vitamin K intake    Plan made per [facility] anticoagulation protocol    Fidelina Pizano RN  Anticoagulation Clinic  8/19/2024    _______________________________________________________________________     Anticoagulation Episode Summary       Current INR goal:  2.0-2.5   TTR:  71.6% (1 y)   Target end date:  Indefinite   Send INR reminders to:  [facility]    Indications    Persistent atrial fibrillation (H) [I48.19]             Comments:               Anticoagulation Care Providers       Provider Role Specialty Phone number    Kushal Valentin MD Referring Family Medicine [phone]    Leonides Aggarwal PA-C Referring Family Medicine " [phone]

## 2024-08-26 NOTE — PROGRESS NOTES
[facility] consult received from [facility] on 7/26/24.  Patient admitted for acute septic encephalopathy, PPM interrogation requested. IV abx given.    Presenting rhythm: AP/VS, AP/ & AS/VS rates 60s  Battery: 12.5 years remaining   Lead Measurements: WNLs  Atrial Arrhythmias: none  Ventricular Arrhythmias: 5 ventricular high rates logged. No EGMs for review.    Follow-up Care: Pt due for in clinic device check 10/2024.    CHRISS Hooper

## 2024-08-30 ENCOUNTER — ANTICOAGULATION THERAPY VISIT (OUTPATIENT)
Dept: ANTICOAGULATION | Facility: CLINIC | Age: 89
End: 2024-08-30

## 2024-08-30 ENCOUNTER — LAB (OUTPATIENT)
Dept: LAB | Facility: CLINIC | Age: 89
End: 2024-08-30
Payer: COMMERCIAL

## 2024-08-30 DIAGNOSIS — Z79.01 LONG TERM CURRENT USE OF ANTICOAGULANTS WITH INR GOAL OF 2.0-3.0: ICD-10-CM

## 2024-08-30 DIAGNOSIS — I48.19 PERSISTENT ATRIAL FIBRILLATION (H): Primary | ICD-10-CM

## 2024-08-30 LAB — INR BLD: 1.8 (ref 0.9–1.1)

## 2024-08-30 PROCEDURE — 85610 PROTHROMBIN TIME: CPT

## 2024-08-30 PROCEDURE — 36416 COLLJ CAPILLARY BLOOD SPEC: CPT

## 2024-08-30 NOTE — PROGRESS NOTES
ANTICOAGULATION MANAGEMENT     Nathen Gage 88 year old male is on warfarin with subtherapeutic INR result. (Goal INR 2.0-2.5)    Recent labs: (last 7 days)     08/30/24  1259   INR 1.8*       ASSESSMENT     Source(s): Chart Review and Patient/Caregiver Call     Warfarin doses taken: Warfarin taken as instructed - does not believe that he's missed any doses  Diet: No new diet changes identified - continues daily V8  Medication/supplement changes: None noted  New illness, injury, or hospitalization: No  Signs or symptoms of bleeding or clotting: No  Previous result: Subtherapeutic  Additional findings: None       PLAN     Recommended plan for no diet, medication or health factor changes affecting INR     Dosing Instructions: Increase your warfarin dose (9.1% change) with next INR in 2 weeks       Summary  As of 8/30/2024      Full warfarin instructions:  1.25 mg every Mon, Thu; 2.5 mg all other days   Next INR check:  9/13/2024               Telephone call with Neal who verbalizes understanding and agrees to plan    Check at provider office visit    Education provided: Please call back if any changes to your diet, medications or how you've been taking warfarin    Plan made per [facility] anticoagulation protocol    Kristine Castro RN  Anticoagulation Clinic  8/30/2024    _______________________________________________________________________     Anticoagulation Episode Summary       Current INR goal:  2.0-2.5   TTR:  71.0% (1 y)   Target end date:  Indefinite   Send INR reminders to:  [facility]    Indications    Persistent atrial fibrillation (H) [I48.19]             Comments:               Anticoagulation Care Providers       Provider Role Specialty Phone number    Kushal Valentin MD Referring Family Medicine [phone]    Leonides Aggarwal PA-C Referring Family Medicine [phone]

## 2024-09-13 ENCOUNTER — TELEPHONE (OUTPATIENT)
Dept: FAMILY MEDICINE | Facility: CLINIC | Age: 89
End: 2024-09-13

## 2024-09-13 ENCOUNTER — ANCILLARY PROCEDURE (OUTPATIENT)
Dept: GENERAL RADIOLOGY | Facility: CLINIC | Age: 89
End: 2024-09-13
Payer: COMMERCIAL

## 2024-09-13 ENCOUNTER — OFFICE VISIT (OUTPATIENT)
Dept: FAMILY MEDICINE | Facility: CLINIC | Age: 89
End: 2024-09-13
Payer: COMMERCIAL

## 2024-09-13 ENCOUNTER — ANTICOAGULATION THERAPY VISIT (OUTPATIENT)
Dept: ANTICOAGULATION | Facility: CLINIC | Age: 89
End: 2024-09-13

## 2024-09-13 VITALS
DIASTOLIC BLOOD PRESSURE: 72 MMHG | TEMPERATURE: 97.7 F | WEIGHT: 161 LBS | SYSTOLIC BLOOD PRESSURE: 126 MMHG | OXYGEN SATURATION: 99 % | BODY MASS INDEX: 24.4 KG/M2 | HEIGHT: 68 IN | HEART RATE: 78 BPM | RESPIRATION RATE: 16 BRPM

## 2024-09-13 DIAGNOSIS — Z79.01 LONG TERM CURRENT USE OF ANTICOAGULANTS WITH INR GOAL OF 2.0-3.0: ICD-10-CM

## 2024-09-13 DIAGNOSIS — I48.19 PERSISTENT ATRIAL FIBRILLATION (H): Primary | ICD-10-CM

## 2024-09-13 DIAGNOSIS — I10 ESSENTIAL HYPERTENSION, BENIGN: ICD-10-CM

## 2024-09-13 DIAGNOSIS — E78.00 PURE HYPERCHOLESTEROLEMIA: ICD-10-CM

## 2024-09-13 DIAGNOSIS — N40.1 BENIGN LOCALIZED HYPERPLASIA OF PROSTATE WITH URINARY OBSTRUCTION: ICD-10-CM

## 2024-09-13 DIAGNOSIS — Z00.00 ENCOUNTER FOR MEDICARE ANNUAL WELLNESS EXAM: Primary | ICD-10-CM

## 2024-09-13 DIAGNOSIS — R41.89 COGNITIVE CHANGE: ICD-10-CM

## 2024-09-13 DIAGNOSIS — N13.8 BENIGN LOCALIZED HYPERPLASIA OF PROSTATE WITH URINARY OBSTRUCTION: ICD-10-CM

## 2024-09-13 DIAGNOSIS — Z23 NEED FOR INFLUENZA VACCINATION: ICD-10-CM

## 2024-09-13 DIAGNOSIS — J18.9 COMMUNITY ACQUIRED PNEUMONIA OF LEFT LUNG, UNSPECIFIED PART OF LUNG: ICD-10-CM

## 2024-09-13 LAB
ALBUMIN SERPL BCG-MCNC: 4.2 G/DL (ref 3.5–5.2)
ALP SERPL-CCNC: 88 U/L (ref 40–150)
ALT SERPL W P-5'-P-CCNC: 13 U/L (ref 0–70)
ANION GAP SERPL CALCULATED.3IONS-SCNC: 9 MMOL/L (ref 7–15)
AST SERPL W P-5'-P-CCNC: 21 U/L (ref 0–45)
BILIRUB SERPL-MCNC: 0.4 MG/DL
BUN SERPL-MCNC: 20.6 MG/DL (ref 8–23)
CALCIUM SERPL-MCNC: 9.1 MG/DL (ref 8.8–10.4)
CHLORIDE SERPL-SCNC: 101 MMOL/L (ref 98–107)
CREAT SERPL-MCNC: 1.08 MG/DL (ref 0.67–1.17)
CREAT UR-MCNC: 69.8 MG/DL
EGFRCR SERPLBLD CKD-EPI 2021: 66 ML/MIN/1.73M2
ERYTHROCYTE [DISTWIDTH] IN BLOOD BY AUTOMATED COUNT: 14.1 % (ref 10–15)
GLUCOSE SERPL-MCNC: 85 MG/DL (ref 70–99)
HCO3 SERPL-SCNC: 25 MMOL/L (ref 22–29)
HCT VFR BLD AUTO: 37.6 % (ref 40–53)
HGB BLD-MCNC: 12.3 G/DL (ref 13.3–17.7)
INR BLD: 2.5 (ref 0.9–1.1)
MCH RBC QN AUTO: 29.9 PG (ref 26.5–33)
MCHC RBC AUTO-ENTMCNC: 32.7 G/DL (ref 31.5–36.5)
MCV RBC AUTO: 92 FL (ref 78–100)
MICROALBUMIN UR-MCNC: <12 MG/L
MICROALBUMIN/CREAT UR: NORMAL MG/G{CREAT}
PLATELET # BLD AUTO: 205 10E3/UL (ref 150–450)
POTASSIUM SERPL-SCNC: 4.7 MMOL/L (ref 3.4–5.3)
PROT SERPL-MCNC: 6.9 G/DL (ref 6.4–8.3)
RBC # BLD AUTO: 4.11 10E6/UL (ref 4.4–5.9)
SODIUM SERPL-SCNC: 135 MMOL/L (ref 135–145)
WBC # BLD AUTO: 4.4 10E3/UL (ref 4–11)

## 2024-09-13 PROCEDURE — 85027 COMPLETE CBC AUTOMATED: CPT

## 2024-09-13 PROCEDURE — 36415 COLL VENOUS BLD VENIPUNCTURE: CPT

## 2024-09-13 PROCEDURE — G0439 PPPS, SUBSEQ VISIT: HCPCS

## 2024-09-13 PROCEDURE — 82043 UR ALBUMIN QUANTITATIVE: CPT

## 2024-09-13 PROCEDURE — 90662 IIV NO PRSV INCREASED AG IM: CPT

## 2024-09-13 PROCEDURE — 71046 X-RAY EXAM CHEST 2 VIEWS: CPT | Mod: TC | Performed by: RADIOLOGY

## 2024-09-13 PROCEDURE — 82570 ASSAY OF URINE CREATININE: CPT

## 2024-09-13 PROCEDURE — 80053 COMPREHEN METABOLIC PANEL: CPT

## 2024-09-13 PROCEDURE — 85610 PROTHROMBIN TIME: CPT

## 2024-09-13 PROCEDURE — G0008 ADMIN INFLUENZA VIRUS VAC: HCPCS

## 2024-09-13 PROCEDURE — 99214 OFFICE O/P EST MOD 30 MIN: CPT | Mod: 25

## 2024-09-13 PROCEDURE — 36416 COLLJ CAPILLARY BLOOD SPEC: CPT

## 2024-09-13 RX ORDER — DOXAZOSIN 8 MG/1
8 TABLET ORAL AT BEDTIME
Qty: 90 TABLET | Refills: 4 | Status: SHIPPED | OUTPATIENT
Start: 2024-09-13

## 2024-09-13 RX ORDER — ROSUVASTATIN CALCIUM 5 MG/1
5 TABLET, COATED ORAL DAILY
Qty: 90 TABLET | Refills: 4 | Status: SHIPPED | OUTPATIENT
Start: 2024-09-13

## 2024-09-13 NOTE — PATIENT INSTRUCTIONS
Patient Education   Preventive Care Advice   This is general advice given by our system to help you stay healthy. However, your care team may have specific advice just for you. Please talk to your care team about your preventive care needs.  Nutrition  Eat 5 or more servings of fruits and vegetables each day.  Try wheat bread, brown rice and whole grain pasta (instead of white bread, rice, and pasta).  Get enough calcium and vitamin D. Check the label on foods and aim for 100% of the RDA (recommended daily allowance).  Lifestyle  Exercise at least 150 minutes each week  (30 minutes a day, 5 days a week).  Do muscle strengthening activities 2 days a week. These help control your weight and prevent disease.  No smoking.  Wear sunscreen to prevent skin cancer.  Have a dental exam and cleaning every 6 months.  Yearly exams  See your health care team every year to talk about:  Any changes in your health.  Any medicines your care team has prescribed.  Preventive care, family planning, and ways to prevent chronic diseases.  Shots (vaccines)   HPV shots (up to age 26), if you've never had them before.  Hepatitis B shots (up to age 59), if you've never had them before.  COVID-19 shot: Get this shot when it's due.  Flu shot: Get a flu shot every year.  Tetanus shot: Get a tetanus shot every 10 years.  Pneumococcal, hepatitis A, and RSV shots: Ask your care team if you need these based on your risk.  Shingles shot (for age 50 and up)  General health tests  Diabetes screening:  Starting at age 35, Get screened for diabetes at least every 3 years.  If you are younger than age 35, ask your care team if you should be screened for diabetes.  Cholesterol test: At age 39, start having a cholesterol test every 5 years, or more often if advised.  Bone density scan (DEXA): At age 50, ask your care team if you should have this scan for osteoporosis (brittle bones).  Hepatitis C: Get tested at least once in your life.  STIs (sexually  transmitted infections)  Before age 24: Ask your care team if you should be screened for STIs.  After age 24: Get screened for STIs if you're at risk. You are at risk for STIs (including HIV) if:  You are sexually active with more than one person.  You don't use condoms every time.  You or a partner was diagnosed with a sexually transmitted infection.  If you are at risk for HIV, ask about PrEP medicine to prevent HIV.  Get tested for HIV at least once in your life, whether you are at risk for HIV or not.  Cancer screening tests  Cervical cancer screening: If you have a cervix, begin getting regular cervical cancer screening tests starting at age 21.  Breast cancer scan (mammogram): If you've ever had breasts, begin having regular mammograms starting at age 40. This is a scan to check for breast cancer.  Colon cancer screening: It is important to start screening for colon cancer at age 45.  Have a colonoscopy test every 10 years (or more often if you're at risk) Or, ask your provider about stool tests like a FIT test every year or Cologuard test every 3 years.  To learn more about your testing options, visit:   .  For help making a decision, visit:   https://bit.ly/dz94443.  Prostate cancer screening test: If you have a prostate, ask your care team if a prostate cancer screening test (PSA) at age 55 is right for you.  Lung cancer screening: If you are a current or former smoker ages 50 to 80, ask your care team if ongoing lung cancer screenings are right for you.  For informational purposes only. Not to replace the advice of your health care provider. Copyright   2023 [facility]. All rights reserved. Clinically reviewed by the [facility]. OneTouch [id number] - REV 01/24.  Learning About Activities of Daily Living  What are activities of daily living?     Activities of daily living (ADLs) are the basic self-care tasks you do every day. These include eating, bathing, dressing,  and moving around.  As you age, and if you have health problems, you may find that it's harder to do some of these tasks. If so, your doctor can suggest ideas that may help.  To measure what kind of help you may need, your doctor will ask how well you are able to do ADLs. Let your doctor know if there are any tasks that you are having trouble doing. This is an important first step to getting help. And when you have the help you need, you can stay as independent as possible.  How will a doctor assess your ADLs?  Asking about ADLs is part of a routine health checkup your doctor will likely do as you age. Your health check might be done in a doctor's office, in your home, or at a hospital. The goal is to find out if you are having any problems that could make it hard to care for yourself or that make it unsafe for you to be on your own.  To measure your ADLs, your doctor will ask how hard it is for you to do routine tasks. Your doctor may also want to know if you have changed the way you do a task because of a health problem. Your doctor may watch how you:  Walk back and forth.  Keep your balance while you stand or walk.  Move from sitting to standing or from a bed to a chair.  Button or unbutton a shirt or sweater.  Remove and put on your shoes.  It's common to feel a little worried or anxious if you find you can't do all the things you used to be able to do. Talking with your doctor about ADLs is a way to make sure you're as safe as possible and able to care for yourself as well as you can. You may want to bring a caregiver, friend, or family member to your checkup. They can help you talk to your doctor.  Follow-up care is a key part of your treatment and safety. Be sure to make and go to all appointments, and call your doctor if you are having problems. It's also a good idea to know your test results and keep a list of the medicines you take.  Current as of: October 24, 2023  Content Version: 14.1    1190-9537  Healthwise, [employer].   Care instructions adapted under license by your healthcare professional. If you have questions about a medical condition or this instruction, always ask your healthcare professional. [employer] disclaims any warranty or liability for your use of this information.    Preventing Falls: Care Instructions  Injuries and health problems such as trouble walking or poor eyesight can increase your risk of falling. So can some medicines. But there are things you can do to help prevent falls. You can exercise to get stronger. You can also arrange your home to make it safer.    Talk to your doctor about the medicines you take. Ask if any of them increase the risk of falls and whether they can be changed or stopped.   Try to exercise regularly. It can help improve your strength and balance. This can help lower your risk of falling.     Practice fall safety and prevention.    Wear low-heeled shoes that fit well and give your feet good support. Talk to your doctor if you have foot problems that make this hard.  Carry a cellphone or wear a medical alert device that you can use to call for help.  Use stepladders instead of chairs to reach high objects. Don't climb if you're at risk for falls. Ask for help, if needed.  Wear the correct eyeglasses, if you need them.    Make your home safer.    Remove rugs, cords, clutter, and furniture from walkways.  Keep your house well lit. Use night-lights in hallways and bathrooms.  Install and use sturdy handrails on stairways.  Wear nonskid footwear, even inside. Don't walk barefoot or in socks without shoes.    Be safe outside.    Use handrails, curb cuts, and ramps whenever possible.  Keep your hands free by using a shoulder bag or backpack.  Try to walk in well-lit areas. Watch out for uneven ground, changes in pavement, and debris.  Be careful in the winter. Walk on the grass or gravel when sidewalks are slippery. Use de-icer on steps and walkways.  "Add non-slip devices to shoes.    Put grab bars and nonskid mats in your shower or tub and near the toilet. Try to use a shower chair or bath bench when bathing.   Get into a tub or shower by putting in your weaker leg first. Get out with your strong side first. Have a phone or medical alert device in the bathroom with you.   Where can you learn more?  Go to https://www.[employer]/patiented  Enter G117 in the search box to learn more about \"Preventing Falls: Care Instructions.\"  Current as of: July 17, 2023               Content Version: 14.0    9025-8076 [employer].   Care instructions adapted under license by your healthcare professional. If you have questions about a medical condition or this instruction, always ask your healthcare professional. [employer] disclaims any warranty or liability for your use of this information.      Hearing Loss: Care Instructions  Overview     Hearing loss is a sudden or slow decrease in how well you hear. It can range from slight to profound. Permanent hearing loss can occur with aging. It also can happen when you are exposed long-term to loud noise. Examples include listening to loud music, riding motorcycles, or being around other loud machines.  Hearing loss can affect your work and home life. It can make you feel lonely or depressed. You may feel that you have lost your independence. But hearing aids and other devices can help you hear better and feel connected to others.  Follow-up care is a key part of your treatment and safety. Be sure to make and go to all appointments, and call your doctor if you are having problems. It's also a good idea to know your test results and keep a list of the medicines you take.  How can you care for yourself at home?  Avoid loud noises whenever possible. This helps keep your hearing from getting worse.  Always wear hearing protection around loud noises.  Wear a hearing aid as directed.  A professional can help " "you pick a hearing aid that will work best for you.  You can also get hearing aids over the counter for mild to moderate hearing loss.  Have hearing tests as your doctor suggests. They can show whether your hearing has changed. Your hearing aid may need to be adjusted.  Use other devices as needed. These may include:  Telephone amplifiers and hearing aids that can connect to a television, stereo, radio, or microphone.  Devices that use lights or vibrations. These alert you to the doorbell, a ringing telephone, or a baby monitor.  Television closed-captioning. This shows the words at the bottom of the screen. Most new TVs can do this.  TTY (text telephone). This lets you type messages back and forth on the telephone instead of talking or listening. These devices are also called TDD. When messages are typed on the keyboard, they are sent over the phone line to a receiving TTY. The message is shown on a monitor.  Use text messaging, social media, and email if it is hard for you to communicate by telephone.  Try to learn a listening technique called speechreading. It is not lipreading. You pay attention to people's gestures, expressions, posture, and tone of voice. These clues can help you understand what a person is saying. Face the person you are talking to, and have them face you. Make sure the lighting is good. You need to see the other person's face clearly.  Think about counseling if you need help to adjust to your hearing loss.  When should you call for help?  Watch closely for changes in your health, and be sure to contact your doctor if:    You think your hearing is getting worse.     You have new symptoms, such as dizziness or nausea.   Where can you learn more?  Go to https://www.[employer].net/patiented  Enter R798 in the search box to learn more about \"Hearing Loss: Care Instructions.\"  Current as of: September 27, 2023               Content Version: 14.0    1534-7651 [employer].   Care " instructions adapted under license by your healthcare professional. If you have questions about a medical condition or this instruction, always ask your healthcare professional. [employer], [employer] disclaims any warranty or liability for your use of this information.

## 2024-09-13 NOTE — PROGRESS NOTES
Preventive Care Visit  [facility]  Mishel Ghosh PA-C, Family Medicine  Sep 13, 2024    Assessment & Plan     (Z00.00) Encounter for Medicare annual wellness exam  (primary encounter diagnosis)  Stable exam. Routine screening labs. Influenza vaccination given clinic. Follow up in 1 year for annual wellness; sooner as needed for acute concerns.    (I10) Essential hypertension, benign  Well controlled with use of current medications. Due for labs and urine albumin, done in clinic today. No new side effects of the medication. Refill provided.  Plan: Albumin Random Urine Quantitative with Creat         Ratio, rosuvastatin (CRESTOR) 5 MG tablet, CBC         with platelets, Comprehensive metabolic panel         (BMP + Alb, Alk Phos, ALT, AST, Total. Bili,         TP)          (E78.00) Pure hypercholesterolemia  Well controlled with use of rosuvastatin. Check fasting lipid panel today. No new side effects of the medication. Refill provided.  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 No new side effects of the medication. Refill provided.  Plan: doxazosin (CARDURA) 8 MG tablet          (R41.89) Cognitive change  Evaluated for Neuropsych in July. Recommendation for Neurology referral. Referral was not placed so put in this order today. Follow up as needed.  Plan: Adult Neurology  Referral          (Z23) Need for influenza vaccination  Plan: INFLUENZA HIGH DOSE, TRIVALENT, PF (FLUZONE)          Patient has been advised of split billing requirements and indicates understanding: Yes    Counseling  Appropriate preventive services were addressed with this patient via screening, questionnaire, or discussion as appropriate for fall prevention, nutrition, physical activity, Tobacco-use cessation, social engagement, weight loss and cognition.  Checklist reviewing  preventive services available has been given to the patient.  Reviewed patient's diet, addressing concerns and/or questions.   He is at risk for lack of exercise and has been provided with information to increase physical activity for the benefit of his well-being.   Updated plan of care.  Patient reported difficulty with activities of daily living were addressed today.Patient reported safety concerns were addressed today.The patient was provided with written information regarding signs of hearing loss.       Follow up in 6 months for med check; sooner with any acute concerns.     David De Jesus is a 88 year old, presenting for the following:  Wellness Visit        9/13/2024     8:45 AM   Additional Questions   Roomed by Teresa   Accompanied by spouse     Health Care Directive  Patient does not have a Health Care Directive or Living Will: Discussed advance care planning with patient; however, patient declined at this time.    HPI        9/11/2024   General Health   How would you rate your overall physical health? Good   Feel stress (tense, anxious, or unable to sleep) Not at all          9/11/2024   Nutrition   Diet: Low salt    Other   If other, please elaborate: cardiac       Multiple values from one day are sorted in reverse-chronological order         9/11/2024   Exercise   Days per week of moderate/strenous exercise 3 days   Average minutes spent exercising at this level 30 min            9/11/2024   Social Factors   Frequency of gathering with friends or relatives Twice a week   Worry food won't last until get money to buy more No   Food not last or not have enough money for food? No   Do you have housing? (Housing is defined as stable permanent housing and does not include staying ouside in a car, in a tent, in an abandoned building, in an overnight shelter, or couch-surfing.) Yes   Are you worried about losing your housing? No   Lack of transportation? No   Unable to get utilities (heat,electricity)? Yes    Want help with housing or utility concern? No      (!) FINANCIAL RESOURCE STRAIN CONCERN      2024   Fall Risk   Gait Speed Test (Document in seconds) 4   Gait Speed Test Interpretation Less than or equal to 5.00 seconds - PASS             2024   Activities of Daily Living- Home Safety   Needs help with the following daily activites Preparing meals   Safety concerns in the home No grab bars in the bathroom          2024   Dental   Dentist two times every year? Yes          2024   Hearing Screening   Hearing concerns? (!) IT'S HARD TO FOLLOW A CONVERSATION IN A NOISY RESTAURANT OR CROWDED ROOM.    (!) TROUBLE UNDESTANDING A SPEAKER IN A PUBLIC MEETING OR [employer] SERVICE.       Multiple values from one day are sorted in reverse-chronological order         2024   Driving Risk Screening   Patient/family members have concerns about driving No          2024   General Alertness/Fatigue Screening   Have you been more tired than usual lately? No          2024   Urinary Incontinence Screening   Bothered by leaking urine in past 6 months No          2024   TB Screening   Were you born outside of the [place]? No        Today's PHQ-2 Score:       2024     8:13 AM   PHQ-2 (  [employer])   Q1: Little interest or pleasure in doing things 0   Q2: Feeling down, depressed or hopeless 0   PHQ-2 Score 0         2024   Substance Use   Alcohol more than 3/day or more than 7/wk No   Do you have a current opioid prescription? No   How severe/bad is pain from 1 to 10? 1/10   Do you use any other substances recreationally? No        Social History     Tobacco Use    Smoking status: Former     Current packs/day: 0.00     Types: Cigarettes     Quit date: 1975     Years since quittin.7    Smokeless tobacco: Never   Vaping Use    Vaping status: Never Used   Substance Use Topics    Alcohol use: Yes     Comment: 1- 2 BEERS WEEKLY    Drug use: Never     Reviewed and updated as needed this  visit by Provider   Tobacco  Allergies  Meds  Problems  Med Hx  Surg Hx  Fam Hx            Past Medical History:   Diagnosis Date    Actinic keratosis     Acute idiopathic gout of left knee 4/27/2016    Atrial fibrillation (H)     on Eliquis    Dilated aortic root (H24)     Esophageal reflux     Esophagitis     H/O: GI bleed 2010    Hyperlipidaemia     Hyperlipidemia LDL goal <100     Impotence of organic origin     Presbycusis, unspecified laterality 4/27/2016    Pulmonary hypertension (H)     Sick sinus syndrome (H)     pacemaker implanted 2011    Unspecified essential hypertension      Past Surgical History:   Procedure Laterality Date    EP LEAD REPAIR FOR SINGLE ICD N/A 7/11/2023    Procedure: Pacemaker or Implantable Cardioverter Defibrillator Lead Repair-One Lead;  Surgeon: Scott Messer MD;  [facility]  [facility]    EP PACEMAKER GENERATOR REPLACEMENT- DUAL N/A 7/11/2023    Procedure: Pacemaker Generator Replacement Dual;  Surgeon: Scott Messer MD;  Location:  [facility]    IMPLANT PACEMAKER  2011    Pacemaker/cardiac insitu / [employer] Dual chamber, V45    PHACOEMULSIFICATION CLEAR CORNEA WITH STANDARD INTRAOCULAR LENS IMPLANT Right 5/29/2018    Procedure: PHACOEMULSIFICATION CLEAR CORNEA WITH STANDARD INTRAOCULAR LENS IMPLANT;  RIGHT EYE PHACOEMULSIFICATION CLEAR CORNEA WITH STANDARD INTRAOCULAR LENS IMPLANT ;  Surgeon: Mat Echevarria MD;  Location:  [facility]     BP Readings from Last 3 Encounters:   09/13/24 126/72   07/31/24 (!) 145/76   07/28/24 135/81    Wt Readings from Last 3 Encounters:   09/13/24 73 kg (161 lb)   07/31/24 76.2 kg (168 lb)   07/26/24 73 kg (161 lb)        Patient Active Problem List   Diagnosis    Esophageal reflux    Essential hypertension, benign    Testicular hypofunction    Impotence of organic origin    Gout    Actinic keratosis    Sick sinus syndrome (H)    Syncope    Hyponatremia    Pacemaker,  artificial    Atrial fibrillation (H)    Dilated aortic root (H24)    Pulmonary hypertension (H)    H/O: GI bleed    Mixed hyperlipidemia    Faintness    Long-term (current) use of anticoagulants [Z79.01]    Acute idiopathic gout of left knee    Presbycusis, unspecified laterality    Chest pain    Pain in joint involving ankle and foot, right    SOB (shortness of breath)    Persistent atrial fibrillation (H)    Long term current use of anticoagulants with INR goal of 2.0-3.0    Lactic acidosis    Transient hypotension    Leukocytosis, unspecified type    Severe sepsis (H)    Community acquired pneumonia of left lung, unspecified part of lung     Past Surgical History:   Procedure Laterality Date    EP LEAD REPAIR FOR SINGLE ICD N/A 2023    Procedure: Pacemaker or Implantable Cardioverter Defibrillator Lead Repair-One Lead;  Surgeon: Scott Messer MD;  Location: [facility] CARDIAC CATH LAB    EP PACEMAKER GENERATOR REPLACEMENT- DUAL N/A 2023    Procedure: Pacemaker Generator Replacement Dual;  Surgeon: Scott Messer MD;  Location: [facility] CARDIAC CATH LAB    IMPLANT PACEMAKER  2011    Pacemaker/cardiac insitu / [employer] Dual chamber, V45    PHACOEMULSIFICATION CLEAR CORNEA WITH STANDARD INTRAOCULAR LENS IMPLANT Right 2018    Procedure: PHACOEMULSIFICATION CLEAR CORNEA WITH STANDARD INTRAOCULAR LENS IMPLANT;  RIGHT EYE PHACOEMULSIFICATION CLEAR CORNEA WITH STANDARD INTRAOCULAR LENS IMPLANT ;  Surgeon: Mat Echevarria MD;  Location: [facility]       Social History     Tobacco Use    Smoking status: Former     Current packs/day: 0.00     Types: Cigarettes     Quit date: 1975     Years since quittin.7    Smokeless tobacco: Never   Substance Use Topics    Alcohol use: Yes     Comment: 1- 2 BEERS WEEKLY     Family History   Problem Relation Age of Onset    Alzheimer Disease Brother     Cerebrovascular Disease Father 80    Family History Negative Sister      Family History Negative Son     Family History Negative Daughter     Family History Negative Sister     Family History Negative Daughter     Breast Cancer No family hx of     Prostate Cancer No family hx of          Current Outpatient Medications   Medication Sig Dispense Refill    doxazosin (CARDURA) 8 MG tablet TAKE 1 TABLET(8 MG) BY MOUTH AT BEDTIME 90 tablet 0    losartan (COZAAR) 100 MG tablet Take 1 tablet (100 mg) by mouth daily 90 tablet 3    metoprolol succinate ER (TOPROL XL) 50 MG 24 hr tablet Take 1 tablet (50 mg) by mouth daily 90 tablet 2    pantoprazole (PROTONIX) 40 MG EC tablet Take 1 tablet (40 mg) by mouth daily 90 tablet 2    Polyethylene Glycol 400 (BLINK TEARS OP) Place 1 drop into both eyes 3 times daily      rosuvastatin (CRESTOR) 5 MG tablet TAKE 1 TABLET(5 MG) BY MOUTH DAILY 90 tablet 0    warfarin ANTICOAGULANT (COUMADIN) 2.5 MG tablet Take 2.5 mg or 1 tablet daily for next 2 day and get INR check on 7/30/2024  for further dosing       Allergies   Allergen Reactions    Neomycin Sulfate      Current providers sharing in care for this patient include:  Patient Care Team:  Clinic - [facility] as PCP - Nito Izquierdo MD as MD (Neurology)  Raphael William DPM as Assigned Surgical Provider  Daniel Leon MD as Assigned Heart and Vascular Provider  Leonides Aggarwal PA-C as Assigned PCP  Mingo Olivier, PhD as Assigned Behavioral Health Provider    The following health maintenance items are reviewed in Epic and correct as of today:  Health Maintenance   Topic Date Due    RSV VACCINE (1 - 1-dose 75+ series) Never done    MICROALBUMIN  08/14/2024    INFLUENZA VACCINE (1) 09/01/2024    COVID-19 Vaccine (7 - 2023-24 season) 09/01/2024    ANNUAL REVIEW OF HM ORDERS  09/08/2024    MEDICARE ANNUAL WELLNESS VISIT  09/12/2024    LIPID  03/05/2025    CMP  07/26/2025    CREATININE  07/27/2025    FALL RISK ASSESSMENT  09/13/2025     "DTAP/TDAP/TD IMMUNIZATION (2 - Td or Tdap) 05/17/2027    ADVANCE CARE PLANNING  09/13/2029    PHQ-2 (once per calendar year)  Completed    Pneumococcal Vaccine: 65+ Years  Completed    ZOSTER IMMUNIZATION  Completed    HPV IMMUNIZATION  Aged Out    MENINGITIS IMMUNIZATION  Aged Out    RSV MONOCLONAL ANTIBODY  Aged Out     Review of Systems  Constitutional, HEENT, cardiovascular, pulmonary, GI, , musculoskeletal, neuro, skin, endocrine and psych systems are negative, except as otherwise noted.       Objective    Exam  /72 (BP Location: Left arm, Patient Position: Sitting, Cuff Size: Adult Regular)   Pulse 78   Temp 97.7  F (36.5  C) (Oral)   Resp 16   Ht 1.715 m (5' 7.5\")   Wt 73 kg (161 lb)   SpO2 99%   BMI 24.84 kg/m     Estimated body mass index is 24.84 kg/m  as calculated from the following:    Height as of this encounter: 1.715 m (5' 7.5\").    Weight as of this encounter: 73 kg (161 lb).    Physical Exam  GENERAL: alert and no distress  RESP: lungs clear to auscultation - no rales, rhonchi or wheezes  CV: regular rate and rhythm, normal S1 S2, no S3 or S4, no murmur, click or rub  MS: no gross musculoskeletal defects noted          9/13/2024   Mini Cog   Clock Draw Score 0 Abnormal   3 Item Recall 2 objects recalled   Mini Cog Total Score 2        Signed Electronically by: Mishel Ghosh PA-C    "

## 2024-09-13 NOTE — PROGRESS NOTES
ANTICOAGULATION MANAGEMENT     Nathen Gage 88 year old male is on warfarin with therapeutic INR result. (Goal INR 2.0-2.5)    Recent labs: (last 7 days)     09/13/24  1000   INR 2.5*       ASSESSMENT     Source(s): Chart Review and Patient/Caregiver Call     Warfarin doses taken: Warfarin taken as instructed + MD previously increased by 9.1%  Diet: No new diet changes identified  Medication/supplement changes: None noted  New illness, injury, or hospitalization: No  Signs or symptoms of bleeding or clotting: No  Previous result: Subtherapeutic  Additional findings: No changes per today's annual ov       PLAN     Recommended plan for no diet, medication or health factor changes affecting INR     Dosing Instructions: Continue your current warfarin dose with next INR in 2-3 weeks       Summary  As of 9/13/2024      Full warfarin instructions:  1.25 mg every Mon, Thu; 2.5 mg all other days   Next INR check:  10/1/2024               Telephone call with Neal who verbalizes understanding and agrees to plan    Lab visit scheduled    Education provided: Please call back if any changes to your diet, medications or how you've been taking warfarin  Symptom monitoring: monitoring for bleeding signs and symptoms and monitoring for clotting signs and symptoms    Plan made per [facility] anticoagulation protocol    Sayra Guerrero RN  9/13/2024  Anticoagulation Clinic  [employer] for routing messages: p [facility] [place]  [facility] patient phone line: [phone]        _______________________________________________________________________     Anticoagulation Episode Summary       Current INR goal:  2.0-2.5   TTR:  71.7% (1 y)   Target end date:  Indefinite   Send INR reminders to:  [facility] [place]    Indications    Persistent atrial fibrillation (H) [I48.19]             Comments:               Anticoagulation Care Providers       Provider Role Specialty Phone number    Kushal Valentin MD Referring Family Medicine  [phone]    Leonides Aggarwal PA-C [facility] [phone]

## 2024-09-13 NOTE — LETTER
September 21, 2024      Neal Gage  [address] [address]        Dear ,    We are writing to inform you of your test results.    Urine testing is normal and CMP shows normal kidney function, liver function, fasting blood sugar and electrolyte levels.         If you have any questions or concerns, please call the clinic at the number listed above.       Sincerely,      Mishel Ghosh

## 2024-09-13 NOTE — TELEPHONE ENCOUNTER
----- Message from Gustavo Farrell sent at 9/13/2024 11:40 AM CDT -----  Please notify patient that his pneumonia looks to have improved greatly. Follow up w/ any questions.    TYRESE Toribio CNP

## 2024-09-16 NOTE — TELEPHONE ENCOUNTER
RN spoke to wife Nichol C2C on file     Advised that xray is showing that pneumonia is improved     Continue to monitor for new symptoms to arise and call if any questions - Nichol will relay message to patient     Barby Márquez Registered Nurse  [facility]

## 2024-09-18 ENCOUNTER — ANCILLARY PROCEDURE (OUTPATIENT)
Dept: CARDIOLOGY | Facility: CLINIC | Age: 89
End: 2024-09-18
Attending: INTERNAL MEDICINE
Payer: COMMERCIAL

## 2024-09-18 DIAGNOSIS — Z95.0 CARDIAC PACEMAKER IN SITU: ICD-10-CM

## 2024-09-18 LAB
MDC_IDC_EPISODE_DTM: NORMAL
MDC_IDC_EPISODE_DURATION: 12 S
MDC_IDC_EPISODE_DURATION: 13 S
MDC_IDC_EPISODE_DURATION: 14 S
MDC_IDC_EPISODE_ID: NORMAL
MDC_IDC_EPISODE_TYPE: NORMAL
MDC_IDC_EPISODE_TYPE_INDUCED: NO
MDC_IDC_LEAD_CONNECTION_STATUS: NORMAL
MDC_IDC_LEAD_CONNECTION_STATUS: NORMAL
MDC_IDC_LEAD_IMPLANT_DT: NORMAL
MDC_IDC_LEAD_IMPLANT_DT: NORMAL
MDC_IDC_LEAD_LOCATION: NORMAL
MDC_IDC_LEAD_LOCATION: NORMAL
MDC_IDC_LEAD_LOCATION_DETAIL_1: NORMAL
MDC_IDC_LEAD_LOCATION_DETAIL_1: NORMAL
MDC_IDC_LEAD_MFG: NORMAL
MDC_IDC_LEAD_MFG: NORMAL
MDC_IDC_LEAD_MODEL: NORMAL
MDC_IDC_LEAD_MODEL: NORMAL
MDC_IDC_LEAD_POLARITY_TYPE: NORMAL
MDC_IDC_LEAD_POLARITY_TYPE: NORMAL
MDC_IDC_LEAD_SERIAL: NORMAL
MDC_IDC_LEAD_SERIAL: NORMAL
MDC_IDC_MSMT_BATTERY_DTM: NORMAL
MDC_IDC_MSMT_BATTERY_REMAINING_LONGEVITY: 162 MO
MDC_IDC_MSMT_BATTERY_REMAINING_PERCENTAGE: 100 %
MDC_IDC_MSMT_BATTERY_STATUS: NORMAL
MDC_IDC_MSMT_LEADCHNL_RA_IMPEDANCE_VALUE: 604 OHM
MDC_IDC_MSMT_LEADCHNL_RA_PACING_THRESHOLD_AMPLITUDE: 0.5 V
MDC_IDC_MSMT_LEADCHNL_RA_PACING_THRESHOLD_PULSEWIDTH: 0.4 MS
MDC_IDC_MSMT_LEADCHNL_RV_IMPEDANCE_VALUE: 450 OHM
MDC_IDC_MSMT_LEADCHNL_RV_PACING_THRESHOLD_AMPLITUDE: 1.4 V
MDC_IDC_MSMT_LEADCHNL_RV_PACING_THRESHOLD_PULSEWIDTH: 0.4 MS
MDC_IDC_PG_IMPLANT_DTM: NORMAL
MDC_IDC_PG_MFG: NORMAL
MDC_IDC_PG_MODEL: NORMAL
MDC_IDC_PG_SERIAL: NORMAL
MDC_IDC_PG_TYPE: NORMAL
MDC_IDC_SESS_CLINIC_NAME: NORMAL
MDC_IDC_SESS_DTM: NORMAL
MDC_IDC_SESS_TYPE: NORMAL
MDC_IDC_SET_BRADY_AT_MODE_SWITCH_MODE: NORMAL
MDC_IDC_SET_BRADY_AT_MODE_SWITCH_RATE: 170 {BEATS}/MIN
MDC_IDC_SET_BRADY_LOWRATE: 60 {BEATS}/MIN
MDC_IDC_SET_BRADY_MAX_SENSOR_RATE: 120 {BEATS}/MIN
MDC_IDC_SET_BRADY_MAX_TRACKING_RATE: 120 {BEATS}/MIN
MDC_IDC_SET_BRADY_MODE: NORMAL
MDC_IDC_SET_BRADY_PAV_DELAY_HIGH: 200 MS
MDC_IDC_SET_BRADY_PAV_DELAY_LOW: 300 MS
MDC_IDC_SET_BRADY_SAV_DELAY_HIGH: 200 MS
MDC_IDC_SET_BRADY_SAV_DELAY_LOW: 300 MS
MDC_IDC_SET_LEADCHNL_RA_PACING_AMPLITUDE: 2 V
MDC_IDC_SET_LEADCHNL_RA_PACING_CAPTURE_MODE: NORMAL
MDC_IDC_SET_LEADCHNL_RA_PACING_POLARITY: NORMAL
MDC_IDC_SET_LEADCHNL_RA_PACING_PULSEWIDTH: 0.4 MS
MDC_IDC_SET_LEADCHNL_RA_SENSING_ADAPTATION_MODE: NORMAL
MDC_IDC_SET_LEADCHNL_RA_SENSING_POLARITY: NORMAL
MDC_IDC_SET_LEADCHNL_RA_SENSING_SENSITIVITY: 0.5 MV
MDC_IDC_SET_LEADCHNL_RV_PACING_AMPLITUDE: 1.5 V
MDC_IDC_SET_LEADCHNL_RV_PACING_CAPTURE_MODE: NORMAL
MDC_IDC_SET_LEADCHNL_RV_PACING_POLARITY: NORMAL
MDC_IDC_SET_LEADCHNL_RV_PACING_PULSEWIDTH: 0.4 MS
MDC_IDC_SET_LEADCHNL_RV_SENSING_ADAPTATION_MODE: NORMAL
MDC_IDC_SET_LEADCHNL_RV_SENSING_POLARITY: NORMAL
MDC_IDC_SET_LEADCHNL_RV_SENSING_SENSITIVITY: 5 MV
MDC_IDC_SET_ZONE_DETECTION_INTERVAL: 353 MS
MDC_IDC_SET_ZONE_STATUS: NORMAL
MDC_IDC_SET_ZONE_TYPE: NORMAL
MDC_IDC_SET_ZONE_VENDOR_TYPE: NORMAL
MDC_IDC_STAT_AT_BURDEN_PERCENT: 0 %
MDC_IDC_STAT_AT_DTM_END: NORMAL
MDC_IDC_STAT_AT_DTM_START: NORMAL
MDC_IDC_STAT_BRADY_DTM_END: NORMAL
MDC_IDC_STAT_BRADY_DTM_START: NORMAL
MDC_IDC_STAT_BRADY_RA_PERCENT_PACED: 62 %
MDC_IDC_STAT_BRADY_RV_PERCENT_PACED: 30 %
MDC_IDC_STAT_EPISODE_RECENT_COUNT: 0
MDC_IDC_STAT_EPISODE_RECENT_COUNT: 0
MDC_IDC_STAT_EPISODE_RECENT_COUNT: 34
MDC_IDC_STAT_EPISODE_RECENT_COUNT_DTM_END: NORMAL
MDC_IDC_STAT_EPISODE_RECENT_COUNT_DTM_START: NORMAL
MDC_IDC_STAT_EPISODE_TYPE: NORMAL
MDC_IDC_STAT_EPISODE_VENDOR_TYPE: NORMAL
MDC_IDC_STAT_EPISODE_VENDOR_TYPE: NORMAL

## 2024-09-18 PROCEDURE — 93294 REM INTERROG EVL PM/LDLS PM: CPT | Performed by: INTERNAL MEDICINE

## 2024-09-18 PROCEDURE — 93296 REM INTERROG EVL PM/IDS: CPT | Performed by: INTERNAL MEDICINE

## 2024-10-01 ENCOUNTER — ANTICOAGULATION THERAPY VISIT (OUTPATIENT)
Dept: ANTICOAGULATION | Facility: CLINIC | Age: 89
End: 2024-10-01

## 2024-10-01 ENCOUNTER — LAB (OUTPATIENT)
Dept: LAB | Facility: CLINIC | Age: 89
End: 2024-10-01
Payer: COMMERCIAL

## 2024-10-01 ENCOUNTER — DOCUMENTATION ONLY (OUTPATIENT)
Dept: ANTICOAGULATION | Facility: CLINIC | Age: 89
End: 2024-10-01

## 2024-10-01 DIAGNOSIS — I48.19 PERSISTENT ATRIAL FIBRILLATION (H): Primary | ICD-10-CM

## 2024-10-01 DIAGNOSIS — Z79.01 LONG TERM CURRENT USE OF ANTICOAGULANTS WITH INR GOAL OF 2.0-3.0: ICD-10-CM

## 2024-10-01 LAB — INR BLD: 2.5 (ref 0.9–1.1)

## 2024-10-01 PROCEDURE — 36416 COLLJ CAPILLARY BLOOD SPEC: CPT

## 2024-10-01 PROCEDURE — 85610 PROTHROMBIN TIME: CPT

## 2024-10-01 NOTE — PROGRESS NOTES
ANTICOAGULATION MANAGEMENT     Nathen Gage 88 year old male is on warfarin with therapeutic INR result. (Goal INR 2.0-2.5)    Recent labs: (last 7 days)     10/01/24  0913   INR 2.5*       ASSESSMENT     Source(s): Chart Review and Patient/Caregiver Call     Warfarin doses taken: Warfarin taken as instructed  Diet: No new diet changes identified  Medication/supplement changes: None noted  New illness, injury, or hospitalization: No  Signs or symptoms of bleeding or clotting: No  Previous result: Therapeutic last visit; previously outside of goal range  Additional findings: None       PLAN     Recommended plan for no diet, medication or health factor changes affecting INR     Dosing Instructions: Continue your current warfarin dose with next INR in 4 weeks       Summary  As of 10/1/2024      Full warfarin instructions:  1.25 mg every Mon, Thu; 2.5 mg all other days   Next INR check:  10/29/2024               Telephone call with Neal who verbalizes understanding and agrees to plan    Lab visit scheduled    Education provided: Please call back if any changes to your diet, medications or how you've been taking warfarin  Contact [phone] with any changes, questions or concerns.     Plan made per [facility] anticoagulation protocol    Teresa Castro RN  10/1/2024  Anticoagulation Clinic  [employer] for routing messages: yordy [place]  [facility] patient phone line: [phone]        _______________________________________________________________________     Anticoagulation Episode Summary       Current INR goal:  2.0-2.5   TTR:  73.6% (1 y)   Target end date:  Indefinite   Send INR reminders to:  [facility] [place]    Indications    Persistent atrial fibrillation (H) [I48.19]             Comments:               Anticoagulation Care Providers       Provider Role Specialty Phone number    Kushal Valentin MD Referring Family Medicine [phone]    Leonides Aggarwal PA-C Referring Family Medicine  [phone]

## 2024-10-01 NOTE — PROGRESS NOTES
ANTICOAGULATION CLINIC REFERRAL RENEWAL REQUEST       An annual renewal order is required for all patients referred to [facility]tico[facility].?  Please review and sign the pended referral order for Nathen Gage.       ANTICOAGULATION SUMMARY      Warfarin indication(s)   Atrial Fibrillation    Mechanical heart valve present?  NO       Current goal range   2.0-2.5     Goal appropriate for indication? Lower INR goal range due to frequent nosebleeds at higher range      Time in Therapeutic Range (TTR)  (Goal > 60%) 73.6%       Office visit with referring provider's group within last year yes on 9/13/2024       Teresa Castro RN  [facility] Anticoagulation Clinic

## 2024-10-14 NOTE — INTERVAL H&P NOTE
Patient presenting with dizziness starting this morning once she got up and moved her head,  she denies hearing changes or ringing in her ears  However during my evaluation does report some dizziness when she was lying down.  MRI negative for acute intracranial process.   Possible BPPV.   Orthostatic  vitals negative.  Now  vertigo resolved.   This note is for the purpose of making the H&P performed in the clinic within the last 30 days available in the hospital surgical encounter.

## 2024-10-29 ENCOUNTER — ANTICOAGULATION THERAPY VISIT (OUTPATIENT)
Dept: ANTICOAGULATION | Facility: CLINIC | Age: 89
End: 2024-10-29

## 2024-10-29 ENCOUNTER — LAB (OUTPATIENT)
Dept: LAB | Facility: CLINIC | Age: 89
End: 2024-10-29
Payer: COMMERCIAL

## 2024-10-29 DIAGNOSIS — I48.19 PERSISTENT ATRIAL FIBRILLATION (H): Primary | ICD-10-CM

## 2024-10-29 DIAGNOSIS — I48.19 PERSISTENT ATRIAL FIBRILLATION (H): ICD-10-CM

## 2024-10-29 LAB — INR BLD: 2.8 (ref 0.9–1.1)

## 2024-10-29 PROCEDURE — 85610 PROTHROMBIN TIME: CPT

## 2024-10-29 PROCEDURE — 36416 COLLJ CAPILLARY BLOOD SPEC: CPT

## 2024-10-29 NOTE — PROGRESS NOTES
ANTICOAGULATION MANAGEMENT     Nathen Gage 89 year old male is on warfarin with supratherapeutic INR result. (Goal INR 2.0-2.5)    Recent labs: (last 7 days)     10/29/24  0840   INR 2.8*         ASSESSMENT     Source(s): Chart Review  Previous INR was Therapeutic last 2(+) visits  Medication, diet, health changes since last INR chart reviewed; none identified         PLAN     Unable to reach Neal yee 2 today.    Left message to take reduced dose of warfarin, 1.25 mg tonight. Request call back for assessment.    Follow up required to confirm warfarin dose taken and assess for changes and discuss dosing instructions and confirm understanding of instructions    Fidelina Pizano, RN  10/29/2024  Anticoagulation Clinic  [facility] for routing messages: yordy IVEY [place]  [facility] patient phone line: [phone]

## 2024-10-30 NOTE — PROGRESS NOTES
ANTICOAGULATION MANAGEMENT     Nathen Gage 89 year old male is on warfarin with supratherapeutic INR result. (Goal INR 2.0-2.5)    Recent labs: (last 7 days)     10/29/24  0840   INR 2.8*       ASSESSMENT     Source(s): Chart Review and Patient/Caregiver Call     Warfarin doses taken: Warfarin taken as instructed--he did not get the message to take 1/2 dose on 10/29/24  Diet: No new diet changes identified-patient reports he did not eat greens on 10/29/24 but will resume tonight (no diet changes prior to INR on 10/29/24).  Medication/supplement changes: None noted  New illness, injury, or hospitalization: No  Signs or symptoms of bleeding or clotting: No  Previous result: Therapeutic last 2(+) visits  Additional findings: patient reports he traveled to [place], [place], that is why he did not receive [facility] phone calls.       PLAN     Recommended plan for no diet, medication or health factor changes affecting INR     Dosing Instructions: Continue your current warfarin dose with next INR in 2 weeks       Summary  As of 10/29/2024      Full warfarin instructions:  1.25 mg every Mon, Thu; 2.5 mg all other days   Next INR check:  11/12/2024               Telephone call with Neal who verbalizes understanding and agrees to plan    Lab visit scheduled    Education provided: Dietary considerations: importance of consistent vitamin K intake  Importance of returning INR phone calls and/or listening to our voicemail, also to let lab and/or [facility] know if you are not able to be reached on day of INR.    Plan made per [facility] anticoagulation protocol    Fidelina Pizano RN  10/30/2024  Anticoagulation Clinic  [facility] for routing messages: p [facility] patient phone line: [phone]        _______________________________________________________________________     Anticoagulation Episode Summary       Current INR goal:  2.0-2.5   TTR:  68.1% (12 mo)   Target end date:  Indefinite   Send INR reminders to:  [facility] [place]    Indications    Persistent atrial fibrillation (H) [I48.19]             Comments:  --             Anticoagulation Care Providers       Provider Role Specialty Phone number    Kushal Valentin MD Referring Family Medicine [phone]    Leonides Aggarwal PA-C Referring Family Medicine [phone]    Dontae Champion PA-C Referring [facility] [phone]

## 2024-10-30 NOTE — PROGRESS NOTES
4th attempt, will leave encounter open since INR is out of range.    Fidelina Pizano RN  Anticoagulation Clinic     Cardiac

## 2024-10-30 NOTE — PROGRESS NOTES
Left  to call [phone] with transfer to [facility] OR [facility]  Fidelina Pizano RN  Anticoagulation Clinic

## 2024-11-07 NOTE — CONFIDENTIAL NOTE
Reason for visit: Cognitive change    Referring Provider: Mishel Ghosh PA-C    Office Visit Notes: 09/13/2024     Notes:   Provider:           IMAGING   STATUS/LOCATION   DATE/TYPE   MRI/MRA Internal  10/14/2004   CT/CTA Internal 07/26/2024   LABS NA    EEG NA    EMG NA    NEUOROPSYCH   TEST: Internal 07/02/2024

## 2024-11-12 ENCOUNTER — ANTICOAGULATION THERAPY VISIT (OUTPATIENT)
Dept: ANTICOAGULATION | Facility: CLINIC | Age: 89
End: 2024-11-12

## 2024-11-12 ENCOUNTER — LAB (OUTPATIENT)
Dept: LAB | Facility: CLINIC | Age: 89
End: 2024-11-12
Payer: COMMERCIAL

## 2024-11-12 DIAGNOSIS — I48.19 PERSISTENT ATRIAL FIBRILLATION (H): ICD-10-CM

## 2024-11-12 DIAGNOSIS — I48.19 PERSISTENT ATRIAL FIBRILLATION (H): Primary | ICD-10-CM

## 2024-11-12 LAB — INR BLD: 2.5 (ref 0.9–1.1)

## 2024-11-12 PROCEDURE — 85610 PROTHROMBIN TIME: CPT

## 2024-11-12 PROCEDURE — 36416 COLLJ CAPILLARY BLOOD SPEC: CPT

## 2024-11-12 NOTE — CONFIDENTIAL NOTE
General Call    Contacts       Contact Date/Time Type Contact Phone/Fax    11/12/2024 09:55 AM CST Phone (Outgoing) Neal Gage (Self) [phone] (H)    Left Message -  [phone] or kayleigh lise simeon    11/12/2024 01:15 PM CST Phone (Incoming) Neal Gage (Self) [phone] (H)     Anticoag          Reason for Call:  Anticoag    What are your questions or concerns:  Patient is returning a call to [facility] regarding today's INR.    Date of last appointment with provider: n/a    Could we send this information to you in [facility] or would you prefer to receive a phone call?:   Patient would prefer a phone call   Okay to leave a detailed message?: Yes at Home number on file [phone] (home)

## 2024-11-12 NOTE — PROGRESS NOTES
ANTICOAGULATION MANAGEMENT     Nathen Gage 89 year old male is on warfarin with therapeutic INR result. (Goal INR 2.0-2.5)    Recent labs: (last 7 days)     11/12/24  0834   INR 2.5*         ASSESSMENT     Source(s): Chart Review and Patient/Caregiver Call     Warfarin doses taken: Warfarin taken as instructed  Diet: No new diet changes identified  Medication/supplement changes: None noted  New illness, injury, or hospitalization: No  Signs or symptoms of bleeding or clotting: No  Previous result: Supratherapeutic  Additional findings: None       PLAN     Recommended plan for no diet, medication or health factor changes affecting INR     Dosing Instructions: Continue your current warfarin dose with next INR in 3 weeks       Summary  As of 11/12/2024      Full warfarin instructions:  1.25 mg every Mon, Thu; 2.5 mg all other days   Next INR check:  12/3/2024               Telephone call with Neal who verbalizes understanding and agrees to plan    Lab visit scheduled    Education provided: None required    Plan made per [facility] anticoagulation protocol    Fidelina Pizano RN  11/12/2024  Anticoagulation Clinic  [employer] for routing messages: yordy IVEY [employer]  [facility] patient phone line: [phone]        _______________________________________________________________________     Anticoagulation Episode Summary       Current INR goal:  2.0-2.5   TTR:  64.3% (1 y)   Target end date:  Indefinite   Send INR reminders to:  [facility]    Indications    Persistent atrial fibrillation (H) [I48.19]             Comments:  --             Anticoagulation Care Providers       Provider Role Specialty Phone number    Kushal Valentin MD Referring Family Medicine [phone]    Leonides Aggarwal PA-C Referring Family Medicine [phone]    Dontae Champion PA-C Referring Family Medicine [phone]

## 2024-12-03 ENCOUNTER — LAB (OUTPATIENT)
Dept: LAB | Facility: CLINIC | Age: 89
End: 2024-12-03
Payer: COMMERCIAL

## 2024-12-03 ENCOUNTER — ANTICOAGULATION THERAPY VISIT (OUTPATIENT)
Dept: ANTICOAGULATION | Facility: CLINIC | Age: 89
End: 2024-12-03

## 2024-12-03 DIAGNOSIS — I48.19 PERSISTENT ATRIAL FIBRILLATION (H): ICD-10-CM

## 2024-12-03 DIAGNOSIS — I48.19 PERSISTENT ATRIAL FIBRILLATION (H): Primary | ICD-10-CM

## 2024-12-03 LAB — INR BLD: 2.2 (ref 0.9–1.1)

## 2024-12-03 PROCEDURE — 85610 PROTHROMBIN TIME: CPT

## 2024-12-03 PROCEDURE — 36416 COLLJ CAPILLARY BLOOD SPEC: CPT

## 2024-12-03 NOTE — PROGRESS NOTES
ANTICOAGULATION MANAGEMENT     Nathen Gage 89 year old male is on warfarin with therapeutic INR result. (Goal INR 2.0-2.5)    Recent labs: (last 7 days)     12/03/24  0840   INR 2.2*       ASSESSMENT     Source(s): Chart Review and Patient/Caregiver Call     Warfarin doses taken: Warfarin taken as instructed  Diet: No new diet changes identified  Medication/supplement changes: None noted  New illness, injury, or hospitalization: No  Signs or symptoms of bleeding or clotting: No  Previous result: Therapeutic last visit; previously outside of goal range  Additional findings: None       PLAN     Recommended plan for no diet, medication or health factor changes affecting INR     Dosing Instructions: Continue your current warfarin dose with next INR in 4 weeks       Summary  As of 12/3/2024      Full warfarin instructions:  1.25 mg every Mon, Thu; 2.5 mg all other days   Next INR check:  12/31/2024               Telephone call with Neal who verbalizes understanding and agrees to plan    Lab visit scheduled    Education provided: Please call back if any changes to your diet, medications or how you've been taking warfarin  Contact [phone] with any changes, questions or concerns.     Plan made per [facility] anticoagulation protocol    Teresa Castro RN  12/3/2024  Anticoagulation Clinic  [employer] for routing messages: yordy [employer]  [facility] patient phone line: [phone]        _______________________________________________________________________     Anticoagulation Episode Summary       Current INR goal:  2.0-2.5   TTR:  67.8% (1 y)   Target end date:  Indefinite   Send INR reminders to:  [facility]    Indications    Persistent atrial fibrillation (H) [I48.19]             Comments:  --             Anticoagulation Care Providers       Provider Role Specialty Phone number    Kushal Valentin MD Referring Family Medicine [phone]    Leonides Aggarwal PA-C Referring Family Medicine  [phone]    Dontae Champion PA-C [facility] [phone]

## 2024-12-04 ENCOUNTER — ANCILLARY PROCEDURE (OUTPATIENT)
Dept: CARDIOLOGY | Facility: CLINIC | Age: 89
End: 2024-12-04
Attending: INTERNAL MEDICINE
Payer: COMMERCIAL

## 2024-12-04 DIAGNOSIS — Z95.0 CARDIAC PACEMAKER IN SITU: ICD-10-CM

## 2024-12-04 DIAGNOSIS — I48.19 PERSISTENT ATRIAL FIBRILLATION (H): Primary | ICD-10-CM

## 2024-12-04 LAB
MDC_IDC_LEAD_CONNECTION_STATUS: NORMAL
MDC_IDC_LEAD_CONNECTION_STATUS: NORMAL
MDC_IDC_LEAD_IMPLANT_DT: NORMAL
MDC_IDC_LEAD_IMPLANT_DT: NORMAL
MDC_IDC_LEAD_LOCATION: NORMAL
MDC_IDC_LEAD_LOCATION: NORMAL
MDC_IDC_LEAD_LOCATION_DETAIL_1: NORMAL
MDC_IDC_LEAD_LOCATION_DETAIL_1: NORMAL
MDC_IDC_LEAD_MFG: NORMAL
MDC_IDC_LEAD_MFG: NORMAL
MDC_IDC_LEAD_MODEL: NORMAL
MDC_IDC_LEAD_MODEL: NORMAL
MDC_IDC_LEAD_POLARITY_TYPE: NORMAL
MDC_IDC_LEAD_POLARITY_TYPE: NORMAL
MDC_IDC_LEAD_SERIAL: NORMAL
MDC_IDC_LEAD_SERIAL: NORMAL
MDC_IDC_MSMT_BATTERY_DTM: NORMAL
MDC_IDC_MSMT_BATTERY_REMAINING_LONGEVITY: 156 MO
MDC_IDC_MSMT_BATTERY_REMAINING_PERCENTAGE: 100 %
MDC_IDC_MSMT_BATTERY_STATUS: NORMAL
MDC_IDC_MSMT_LEADCHNL_RA_IMPEDANCE_VALUE: 627 OHM
MDC_IDC_MSMT_LEADCHNL_RA_PACING_THRESHOLD_AMPLITUDE: 0.5 V
MDC_IDC_MSMT_LEADCHNL_RA_PACING_THRESHOLD_PULSEWIDTH: 0.4 MS
MDC_IDC_MSMT_LEADCHNL_RV_IMPEDANCE_VALUE: 450 OHM
MDC_IDC_MSMT_LEADCHNL_RV_PACING_THRESHOLD_AMPLITUDE: 1.2 V
MDC_IDC_MSMT_LEADCHNL_RV_PACING_THRESHOLD_PULSEWIDTH: 0.4 MS
MDC_IDC_PG_IMPLANT_DTM: NORMAL
MDC_IDC_PG_MFG: NORMAL
MDC_IDC_PG_MODEL: NORMAL
MDC_IDC_PG_SERIAL: NORMAL
MDC_IDC_PG_TYPE: NORMAL
MDC_IDC_SESS_CLINIC_NAME: NORMAL
MDC_IDC_SESS_DTM: NORMAL
MDC_IDC_SESS_TYPE: NORMAL
MDC_IDC_SET_BRADY_AT_MODE_SWITCH_MODE: NORMAL
MDC_IDC_SET_BRADY_AT_MODE_SWITCH_RATE: 170 {BEATS}/MIN
MDC_IDC_SET_BRADY_LOWRATE: 60 {BEATS}/MIN
MDC_IDC_SET_BRADY_MAX_SENSOR_RATE: 120 {BEATS}/MIN
MDC_IDC_SET_BRADY_MAX_TRACKING_RATE: 120 {BEATS}/MIN
MDC_IDC_SET_BRADY_MODE: NORMAL
MDC_IDC_SET_BRADY_PAV_DELAY_HIGH: 200 MS
MDC_IDC_SET_BRADY_PAV_DELAY_LOW: 300 MS
MDC_IDC_SET_BRADY_SAV_DELAY_HIGH: 200 MS
MDC_IDC_SET_BRADY_SAV_DELAY_LOW: 300 MS
MDC_IDC_SET_LEADCHNL_RA_PACING_AMPLITUDE: 2 V
MDC_IDC_SET_LEADCHNL_RA_PACING_CAPTURE_MODE: NORMAL
MDC_IDC_SET_LEADCHNL_RA_PACING_POLARITY: NORMAL
MDC_IDC_SET_LEADCHNL_RA_PACING_PULSEWIDTH: 0.4 MS
MDC_IDC_SET_LEADCHNL_RA_SENSING_ADAPTATION_MODE: NORMAL
MDC_IDC_SET_LEADCHNL_RA_SENSING_POLARITY: NORMAL
MDC_IDC_SET_LEADCHNL_RA_SENSING_SENSITIVITY: 0.5 MV
MDC_IDC_SET_LEADCHNL_RV_PACING_AMPLITUDE: 1.7 V
MDC_IDC_SET_LEADCHNL_RV_PACING_CAPTURE_MODE: NORMAL
MDC_IDC_SET_LEADCHNL_RV_PACING_POLARITY: NORMAL
MDC_IDC_SET_LEADCHNL_RV_PACING_PULSEWIDTH: 0.4 MS
MDC_IDC_SET_LEADCHNL_RV_SENSING_ADAPTATION_MODE: NORMAL
MDC_IDC_SET_LEADCHNL_RV_SENSING_POLARITY: NORMAL
MDC_IDC_SET_LEADCHNL_RV_SENSING_SENSITIVITY: 5 MV
MDC_IDC_SET_ZONE_DETECTION_INTERVAL: 353 MS
MDC_IDC_SET_ZONE_STATUS: NORMAL
MDC_IDC_SET_ZONE_TYPE: NORMAL
MDC_IDC_SET_ZONE_VENDOR_TYPE: NORMAL
MDC_IDC_STAT_AT_BURDEN_PERCENT: 0 %
MDC_IDC_STAT_AT_DTM_END: NORMAL
MDC_IDC_STAT_AT_DTM_START: NORMAL
MDC_IDC_STAT_BRADY_DTM_END: NORMAL
MDC_IDC_STAT_BRADY_DTM_START: NORMAL
MDC_IDC_STAT_BRADY_RA_PERCENT_PACED: 62 %
MDC_IDC_STAT_BRADY_RV_PERCENT_PACED: 27 %
MDC_IDC_STAT_EPISODE_RECENT_COUNT: 0
MDC_IDC_STAT_EPISODE_RECENT_COUNT: 0
MDC_IDC_STAT_EPISODE_RECENT_COUNT: 42
MDC_IDC_STAT_EPISODE_RECENT_COUNT_DTM_END: NORMAL
MDC_IDC_STAT_EPISODE_RECENT_COUNT_DTM_START: NORMAL
MDC_IDC_STAT_EPISODE_TYPE: NORMAL
MDC_IDC_STAT_EPISODE_VENDOR_TYPE: NORMAL
MDC_IDC_STAT_EPISODE_VENDOR_TYPE: NORMAL

## 2024-12-04 PROCEDURE — 93280 PM DEVICE PROGR EVAL DUAL: CPT | Performed by: INTERNAL MEDICINE

## 2024-12-05 ENCOUNTER — ANCILLARY PROCEDURE (OUTPATIENT)
Dept: GENERAL RADIOLOGY | Facility: CLINIC | Age: 89
End: 2024-12-05
Payer: COMMERCIAL

## 2024-12-05 ENCOUNTER — TELEPHONE (OUTPATIENT)
Dept: ANTICOAGULATION | Facility: CLINIC | Age: 89
End: 2024-12-05

## 2024-12-05 ENCOUNTER — OFFICE VISIT (OUTPATIENT)
Dept: INTERNAL MEDICINE | Facility: CLINIC | Age: 89
End: 2024-12-05
Payer: COMMERCIAL

## 2024-12-05 VITALS
RESPIRATION RATE: 18 BRPM | HEART RATE: 67 BPM | WEIGHT: 167.7 LBS | OXYGEN SATURATION: 98 % | DIASTOLIC BLOOD PRESSURE: 63 MMHG | SYSTOLIC BLOOD PRESSURE: 112 MMHG | TEMPERATURE: 98.8 F | BODY MASS INDEX: 25.88 KG/M2

## 2024-12-05 DIAGNOSIS — J18.9 COMMUNITY ACQUIRED PNEUMONIA OF LEFT LUNG, UNSPECIFIED PART OF LUNG: Primary | ICD-10-CM

## 2024-12-05 DIAGNOSIS — I48.19 PERSISTENT ATRIAL FIBRILLATION (H): Primary | ICD-10-CM

## 2024-12-05 DIAGNOSIS — J18.9 COMMUNITY ACQUIRED PNEUMONIA OF LEFT LUNG, UNSPECIFIED PART OF LUNG: ICD-10-CM

## 2024-12-05 DIAGNOSIS — H61.23 BILATERAL IMPACTED CERUMEN: ICD-10-CM

## 2024-12-05 LAB
BASOPHILS # BLD AUTO: 0 10E3/UL (ref 0–0.2)
BASOPHILS NFR BLD AUTO: 0 %
EOSINOPHIL # BLD AUTO: 0.2 10E3/UL (ref 0–0.7)
EOSINOPHIL NFR BLD AUTO: 2 %
ERYTHROCYTE [DISTWIDTH] IN BLOOD BY AUTOMATED COUNT: 13 % (ref 10–15)
HCT VFR BLD AUTO: 36.3 % (ref 40–53)
HGB BLD-MCNC: 12.5 G/DL (ref 13.3–17.7)
IMM GRANULOCYTES # BLD: 0 10E3/UL
IMM GRANULOCYTES NFR BLD: 0 %
LYMPHOCYTES # BLD AUTO: 1.2 10E3/UL (ref 0.8–5.3)
LYMPHOCYTES NFR BLD AUTO: 10 %
MCH RBC QN AUTO: 30.3 PG (ref 26.5–33)
MCHC RBC AUTO-ENTMCNC: 34.4 G/DL (ref 31.5–36.5)
MCV RBC AUTO: 88 FL (ref 78–100)
MONOCYTES # BLD AUTO: 1.5 10E3/UL (ref 0–1.3)
MONOCYTES NFR BLD AUTO: 13 %
NEUTROPHILS # BLD AUTO: 9.1 10E3/UL (ref 1.6–8.3)
NEUTROPHILS NFR BLD AUTO: 75 %
PLATELET # BLD AUTO: 187 10E3/UL (ref 150–450)
RBC # BLD AUTO: 4.13 10E6/UL (ref 4.4–5.9)
WBC # BLD AUTO: 12.1 10E3/UL (ref 4–11)

## 2024-12-05 RX ORDER — WARFARIN SODIUM 2.5 MG/1
TABLET ORAL
Qty: 90 TABLET | Refills: 1 | Status: SHIPPED | OUTPATIENT
Start: 2024-12-05

## 2024-12-05 RX ORDER — AZITHROMYCIN 250 MG/1
TABLET, FILM COATED ORAL
Qty: 6 TABLET | Refills: 0 | Status: SHIPPED | OUTPATIENT
Start: 2024-12-05 | End: 2024-12-10

## 2024-12-05 ASSESSMENT — PAIN SCALES - GENERAL: PAINLEVEL_OUTOF10: NO PAIN (0)

## 2024-12-05 NOTE — TELEPHONE ENCOUNTER
"ANTICOAGULATION  MANAGEMENT     Interacting Medication Review    Interacting medication(s): Azithromycin (Zithromax, Z-yamileth) with warfarin.  (Also Augmentin x 7 days 12/5-12/12/24).    Duration: 5 days (12/5 to 12/10/24)    Indication: Pneumonia    New medication?: Yes, interaction may increase INR and risk of bleeding. Closer INR monitoring recommended.        PLAN     Continue your current warfarin dose with next INR in 4 days            Telephone call with wife Nichol who verbalizes understanding and agrees to plan    New recheck date updated on anticoagulation calendar     [facility] priority set/moved to \"high\" for new major drug interaction    Plan made per [facility] anticoagulation protocol    Fidelina Pizano, RN  12/5/2024  Anticoagulation Clinic  [employer] for routing messages: yordy IVEY [employer]  [facility] patient phone line: [phone]    "

## 2024-12-05 NOTE — TELEPHONE ENCOUNTER
ANTICOAGULATION MANAGEMENT:  Medication Refill    Anticoagulation Summary  As of 12/3/2024      Warfarin maintenance plan:  1.25 mg (2.5 mg x 0.5) every Mon, Thu; 2.5 mg (2.5 mg x 1) all other days   Next INR check:  12/31/2024   Target end date:  Indefinite    Indications    Persistent atrial fibrillation (H) [I48.19]                 Anticoagulation Care Providers       Provider Role Specialty Phone number    Kushal Valentin MD Referring [facility] Medicine [phone]    Leonides Aggarwal PA-C Referring [facility] Medicine [phone]    Dontae Champion PA-C Referring [facility] [phone]            Refill Criteria    Visit with referring provider/group: Meets criteria: visit within referring provider group in the last 15 months on 09/13/2024    [facility] referral last signed: 10/01/2024; within last year:  Yes    Lab monitoring not exceeding 2 weeks overdue: Yes    Nathen meets all criteria for refill. Rx instructions and quantity supplied updated to match patient's current dosing plan. Warfarin 90 day supply with 1 refill granted per [facility] protocol     Fidelina Pizano, RN  Anticoagulation Clinic

## 2024-12-05 NOTE — PROGRESS NOTES
Assessment & Plan     Community acquired pneumonia of left lung, unspecified part of lung  Pt states that he thought this morning he had a fever. Pt endorses bilateral sinus pressure. Pt did not take any medicine.  Patient has crackles on the base on the left posteriorly  - amoxicillin-clavulanate (AUGMENTIN) 875-125 MG tablet; Take 1 tablet by mouth 2 times daily for 7 days.  - azithromycin (ZITHROMAX) 250 MG tablet; Take 2 tablets (500 mg) by mouth daily for 1 day, THEN 1 tablet (250 mg) daily for 4 days.  - XR Chest 2 Views; Future  - CBC with platelets and differential; Future  - CBC with platelets and differential    Bilateral impacted cerumen  carbamide peroxide (DEBROX) 6.5 % otic solution  Instrumentation and irrigation in the office a success and ear canals are clear with tympanic membranes pearly white bilaterally                Subjective   Neal is a 89 year old, presenting for the following health issues: Last night pt had slight sore throat and unproductive cough that kept him up in the night. No nasal congestion. No SOB out of the ordinary. Most exertional which is his baseline. Pt states that he thought this morning he had a fever. Pt endorses bilateral sinus pressure. Pt did not take any medicine. Pt isn't sure what makes it feel worse.  Patient had an episode 7/26/2024 where he had symptoms except for confusion and he ended in the hospital for pneumonia until 7/28/2024.  Because of his history and him saying that he feels off when he does not normally have indicators of feeling unwell, prescribing azithromycin and Augmentin sending him for a chest x-ray and CBC with differential.  Explained to the patient to take antibiotics with food probiotic or yogurt to reduce stomach upset.  URI        12/5/2024     2:50 PM   Additional Questions   Roomed by hope r   Accompanied by self         12/5/2024     2:50 PM   Patient Reported Additional Medications   Patient reports taking the following new  medications n/a     URI    History of Present Illness       Reason for visit:  Cough   He is taking medications regularly.                 Review of Systems  Constitutional, HEENT, cardiovascular, pulmonary, gi and gu systems are negative, except as otherwise noted.      Objective    /63 (BP Location: Right arm, Patient Position: Sitting, Cuff Size: Adult Regular)   Pulse 67   Temp 98.8  F (37.1  C) (Oral)   Resp 18   Wt 76.1 kg (167 lb 11.2 oz)   SpO2 98%   BMI 25.88 kg/m    Body mass index is 25.88 kg/m .  Physical Exam   GENERAL: alert and no distress  HENT: normal cephalic/atraumatic, right ear: occluded with wax, left ear: occluded with wax, nose and mouth without ulcers or lesions, oropharynx clear, and oral mucous membranes moist  NECK: no adenopathy, no asymmetry, masses, or scars  RESP: rales L lower posterior and decreased breath sounds throughout  CV: regular rate and rhythm, normal S1 S2, no S3 or S4, no murmur, click or rub, no peripheral edema  ABDOMEN: soft, nontender, no hepatosplenomegaly, no masses and bowel sounds normal  MS: no gross musculoskeletal defects noted, no edema  SKIN: no suspicious lesions or rashes  NEURO: Normal strength and tone, mentation intact and speech normal  PSYCH: mentation appears normal, affect normal/bright            Signed Electronically by: TYRESE Taylor CNP

## 2024-12-09 ENCOUNTER — LAB (OUTPATIENT)
Dept: LAB | Facility: CLINIC | Age: 89
End: 2024-12-09
Payer: COMMERCIAL

## 2024-12-09 ENCOUNTER — ANTICOAGULATION THERAPY VISIT (OUTPATIENT)
Dept: ANTICOAGULATION | Facility: CLINIC | Age: 89
End: 2024-12-09

## 2024-12-09 DIAGNOSIS — I48.19 PERSISTENT ATRIAL FIBRILLATION (H): Primary | ICD-10-CM

## 2024-12-09 DIAGNOSIS — I48.19 PERSISTENT ATRIAL FIBRILLATION (H): ICD-10-CM

## 2024-12-09 LAB — INR BLD: 1.5 (ref 0.9–1.1)

## 2024-12-09 PROCEDURE — 85610 PROTHROMBIN TIME: CPT

## 2024-12-09 PROCEDURE — 36416 COLLJ CAPILLARY BLOOD SPEC: CPT

## 2024-12-09 NOTE — PROGRESS NOTES
"ANTICOAGULATION MANAGEMENT     Nathen Gage 89 year old male is on warfarin with subtherapeutic INR result. (Goal INR 2.0-2.5)    Recent labs: (last 7 days)     12/09/24  1120   INR 1.5*       ASSESSMENT     Warfarin Lab Questionnaire    Warfarin Doses Last 7 Days      12/9/2024    11:19 AM   Dose in Tablet or Mg   TAB or MG? milligram (mg)           12/9/2024   Warfarin Lab Questionnaire   Missed doses within past 14 days? No   Changes in diet or alcohol within past 14 days? No   Medication changes since last result? No, Yes, Z-pack 12/5-12/10/24; amoxicillin 12/5-12/12/24   Injuries or illness since last result? No, Yes, currently being treated for pneumonia.  Patient reports he is feeling \"a little\" better, denies cough   New shortness of breath, severe headaches or sudden changes in vision since last result? No   Abnormal bleeding since last result? No   Upcoming surgery, procedure? No   Best number to call with results? [phone]        Previous result: Therapeutic last 2(+) visits  Additional findings: None         PLAN     Recommended plan for temporary change(s) affecting INR     Dosing Instructions: Avoid greens today, booster dose then continue your current warfarin dose with next INR in 10-14 days       Summary  As of 12/9/2024      Full warfarin instructions:  12/9: 2.5 mg; Otherwise 1.25 mg every Mon, Thu; 2.5 mg all other days   Next INR check:  12/20/2024               Telephone call with Neal who verbalizes understanding and agrees to plan and who agrees to plan and repeated back plan correctly    Lab visit scheduled    Education provided: Taking warfarin: Importance of taking warfarin as instructed  Dietary considerations: importance of consistent vitamin K intake    Plan made with [facility] Pharmacist Sonam Pizano, ARTSI  12/9/2024  Anticoagulation Clinic  [employer] for routing messages: yordy IVEY [facility] patient phone line: " [phone]        _______________________________________________________________________     Anticoagulation Episode Summary       Current INR goal:  2.0-2.5   TTR:  68.2% (1 y)   Target end date:  Indefinite   Send INR reminders to:  [facility]    Indications    Persistent atrial fibrillation (H) [I48.19]             Comments:  --             Anticoagulation Care Providers       Provider Role Specialty Phone number    Kushal Valentin MD Referring Family Medicine [phone]    Leonides Aggarwal PA-C Referring Family Medicine [phone]    Dontae Champion PA-C Referring Family Medicine [phone]

## 2024-12-12 DIAGNOSIS — N13.8 BENIGN LOCALIZED HYPERPLASIA OF PROSTATE WITH URINARY OBSTRUCTION: ICD-10-CM

## 2024-12-12 DIAGNOSIS — N40.1 BENIGN LOCALIZED HYPERPLASIA OF PROSTATE WITH URINARY OBSTRUCTION: ICD-10-CM

## 2024-12-12 RX ORDER — METOPROLOL SUCCINATE 50 MG/1
50 TABLET, EXTENDED RELEASE ORAL DAILY
Qty: 90 TABLET | Refills: 1 | Status: SHIPPED | OUTPATIENT
Start: 2024-12-12

## 2024-12-18 ENCOUNTER — HOSPITAL ENCOUNTER (OUTPATIENT)
Dept: CT IMAGING | Facility: CLINIC | Age: 89
Discharge: HOME | End: 2024-12-18
Payer: COMMERCIAL

## 2024-12-18 DIAGNOSIS — J18.9 COMMUNITY ACQUIRED PNEUMONIA OF LEFT LUNG, UNSPECIFIED PART OF LUNG: ICD-10-CM

## 2024-12-18 PROCEDURE — 71250 CT THORAX DX C-: CPT

## 2024-12-19 ENCOUNTER — TELEPHONE (OUTPATIENT)
Dept: CARDIOLOGY | Facility: CLINIC | Age: 89
End: 2024-12-19
Payer: COMMERCIAL

## 2024-12-19 NOTE — TELEPHONE ENCOUNTER
Received a call from someone (perhaps pt's wife?) saying they are confused because they have one document saying pt has a remote check in March and another document saying pt needs to schedule an in-clinic in December.     Chart reviewed. Pt was just seen in device clinic on 12/4/2024. The letter they are referring to is from his last remote in September saying he needed to schedule for December, which is already done.     Called back to number provided, spoke with pt. Explained that he is not due for another in-clinic right now, and we will just keep the remote check in March as scheduled. Pt states understanding.

## 2024-12-31 ENCOUNTER — ANTICOAGULATION THERAPY VISIT (OUTPATIENT)
Dept: ANTICOAGULATION | Facility: CLINIC | Age: 89
End: 2024-12-31

## 2024-12-31 ENCOUNTER — LAB (OUTPATIENT)
Dept: LAB | Facility: CLINIC | Age: 89
End: 2024-12-31
Payer: COMMERCIAL

## 2024-12-31 DIAGNOSIS — I48.19 PERSISTENT ATRIAL FIBRILLATION (H): ICD-10-CM

## 2024-12-31 DIAGNOSIS — I48.19 PERSISTENT ATRIAL FIBRILLATION (H): Primary | ICD-10-CM

## 2024-12-31 LAB — INR BLD: 2.2 (ref 0.9–1.1)

## 2024-12-31 PROCEDURE — 36416 COLLJ CAPILLARY BLOOD SPEC: CPT

## 2024-12-31 PROCEDURE — 85610 PROTHROMBIN TIME: CPT

## 2024-12-31 NOTE — PROGRESS NOTES
ANTICOAGULATION MANAGEMENT     Nathen Gage 89 year old male is on warfarin with therapeutic INR result. (Goal INR 2.0-2.5)    Recent labs: (last 7 days)     12/31/24  0833   INR 2.2*       ASSESSMENT     Source(s): Chart Review and Patient/Caregiver Call     Warfarin doses taken: Warfarin taken as instructed  Diet: No new diet changes identified  Medication/supplement changes: None noted  New illness, injury, or hospitalization: No  Signs or symptoms of bleeding or clotting: No  Previous result: Therapeutic last visit; previously outside of goal range  Additional findings: Patient will be traveling to [place], [place] 1/7-1/24/25       PLAN     Recommended plan for no diet, medication or health factor changes affecting INR     Dosing Instructions: Continue your current warfarin dose with next INR 4 weeks due to travel.    Summary  As of 12/31/2024      Full warfarin instructions:  1.25 mg every Mon, Thu; 2.5 mg all other days   Next INR check:  1/27/2025               Telephone call with Neal who verbalizes understanding and agrees to plan    Lab visit scheduled    Education provided: Taking warfarin: Importance of taking warfarin as instructed    Plan made per [facility] anticoagulation protocol    Fidelina Pizano RN  12/31/2024  Anticoagulation Clinic  [employer] for routing messages: yordy [employer]  [facility] patient phone line: [phone]        _______________________________________________________________________     Anticoagulation Episode Summary       Current INR goal:  2.0-2.5   TTR:  68.3% (1 y)   Target end date:  Indefinite   Send INR reminders to:  [facility]    Indications    Persistent atrial fibrillation (H) [I48.19]             Comments:  --             Anticoagulation Care Providers       Provider Role Specialty Phone number    Kushal Valentin MD Referring Family Medicine [phone]    Leonides Aggarwal PA-C Referring Family Medicine [phone]    Dontae Champion PA-C Referring  Family Medicine [phone]

## 2025-01-02 ENCOUNTER — TELEPHONE (OUTPATIENT)
Dept: NEUROLOGY | Facility: CLINIC | Age: 89
End: 2025-01-02

## 2025-01-02 ENCOUNTER — PRE VISIT (OUTPATIENT)
Dept: NEUROLOGY | Facility: CLINIC | Age: 89
End: 2025-01-02

## 2025-01-02 NOTE — TELEPHONE ENCOUNTER
Provider out today. Please call patient to reschedule. Patient requesting to try mobile phone first. [phone]

## 2025-01-27 ENCOUNTER — ANTICOAGULATION THERAPY VISIT (OUTPATIENT)
Dept: ANTICOAGULATION | Facility: CLINIC | Age: 89
End: 2025-01-27

## 2025-01-27 ENCOUNTER — LAB (OUTPATIENT)
Dept: LAB | Facility: CLINIC | Age: 89
End: 2025-01-27
Payer: COMMERCIAL

## 2025-01-27 DIAGNOSIS — I48.19 PERSISTENT ATRIAL FIBRILLATION (H): Primary | ICD-10-CM

## 2025-01-27 DIAGNOSIS — I48.19 PERSISTENT ATRIAL FIBRILLATION (H): ICD-10-CM

## 2025-01-27 LAB — INR BLD: 2.3 (ref 0.9–1.1)

## 2025-01-27 PROCEDURE — 85610 PROTHROMBIN TIME: CPT

## 2025-01-27 PROCEDURE — 36416 COLLJ CAPILLARY BLOOD SPEC: CPT

## 2025-01-27 NOTE — PROGRESS NOTES
ANTICOAGULATION MANAGEMENT     Nathen Gage 89 year old male is on warfarin with therapeutic INR result. (Goal INR 2.0-2.5)    Recent labs: (last 7 days)     01/27/25  0839   INR 2.3*       ASSESSMENT     Source(s): Chart Review and Patient/Caregiver Call     Warfarin doses taken: Warfarin taken as instructed  Diet: No new diet changes identified  Medication/supplement changes: None noted  New illness, injury, or hospitalization: No  Signs or symptoms of bleeding or clotting: No  Previous result: Therapeutic last 2(+) visits  Additional findings: None       PLAN     Recommended plan for no diet, medication or health factor changes affecting INR     Dosing Instructions: Continue your current warfarin dose with next INR in 5 weeks       Summary  As of 1/27/2025      Full warfarin instructions:  1.25 mg every Mon, Thu; 2.5 mg all other days   Next INR check:  3/3/2025               Telephone call with Neal who verbalizes understanding and agrees to plan    Lab visit scheduled    Education provided: Please call back if any changes to your diet, medications or how you've been taking warfarin  Goal range and lab monitoring: goal range and significance of current result, Importance of therapeutic range, and Importance of following up at instructed interval    Plan made per [facility] anticoagulation protocol    Charleen Chauhan RN  1/27/2025  Anticoagulation Clinic  [employer] for routing messages: [employer]  [facility] patient phone line: [phone]        _______________________________________________________________________     Anticoagulation Episode Summary       Current INR goal:  2.0-2.5   TTR:  68.3% (1 y)   Target end date:  Indefinite   Send INR reminders to:  [facility]    Indications    Persistent atrial fibrillation (H) [I48.19]             Comments:  --             Anticoagulation Care Providers       Provider Role Specialty Phone number    Kushal Valentin MD Referring Family Medicine  [phone]    Leonides Aggarwal PA-C Referring Family Medicine [phone]    Dontae Champion PA-C Referring Family Medicine [phone]

## 2025-01-27 NOTE — TELEPHONE ENCOUNTER
NEUROLOGY PRE- VISIT      RECORDS RECEIVED FROM: Referred by Mishel Ghosh PA-C   REASON FOR VISIT: Cognitive change   PROVIDER: Thu   DATE OF APPT: 01/30/2025   NOTES (FOR ALL VISITS) STATUS DETAILS   OFFICE NOTE from referring provider Internal Referral and notes in chart   OFFICE NOTE from other specialist Internal Neuropsych 07/02/2024   DISCHARGE SUMMARY from hospital N/A    DISCHARGE REPORT from ER N/A    EEG N/A    EMG REPORT N/A         IMAGING  (FOR ALL VISITS)     MRI (HEAD, NECK, SPINE) Internal MRI brain 10/14/2004   CT (HEAD, NECK, SPINE) Internal CT head 07/26/2024   XR  (HEAD, NECK, SPINE) N/A

## 2025-01-29 NOTE — PROGRESS NOTES
INITIAL NEUROLOGY CONSULTATION    DATE OF VISIT: 1/30/2025  CLINIC LOCATION: [facility]  MRN: [id number]  PATIENT NAME: Nathen Gage  YOB: 1935    REASON FOR VISIT: No chief complaint on file.    HISTORY OF PRESENT ILLNESS:                                                    Mr. Nathen Gage is 89 year old ambidextrous male patient with past medical history of hypertension, sick sinus syndrome, atrial fibrillation (on warfarin), diabetes mellitus type 2, and gout, who was seen today for cognitive changes.    Per patient's report, symptoms started *** ago.  He developed trouble remembering certain information.  He denies any focal neurological symptoms.    Family history is positive for stroke and dementia/Alzheimer's disease.    Neuropsychologic testing from 7/2/2024 did not indicate evidence of significant change or decline in global cognitive functioning, though memory function was significantly poor than expected.  Variability with encoding information was noticed, including of new information was in the impaired range without ability to recall any information after a delay.  Executive functioning was also affected, including cognitive flexibility and planning.  It felt that he meets criteria for mild cognitive impairment with possible etiologies of vascular disease.  Anxiety felt possibly contributory.    Laboratory evaluation from December 2024 includes elevated WBC of 12.1 and low hemoglobin of 12.5.  His INR ranged from 2.8-1.5.  Most recent level is 2.3 in January 2025.  TSH was not checked for a long time.  Vitamin B12 was never checked.    Head CT without contrast from 7/26/2024 demonstrated diffuse parenchymal volume loss and white matter changes felt to be due to chronic microvascular ischemic disease.  Brain MRI without contrast from 10/12/2004 did not demonstrate definite pituitary adenomas.  All images were personally reviewed and independently interpreted.    No  additional useful information is available in Care Everywhere, which was reviewed.  PAST MEDICAL/SURGICAL HISTORY:                                                    I personally reviewed patient's past medical and surgical history with the patient at today's visit.  MEDICATIONS:                                                    I personally reviewed patient's medications and allergies with the patient at today's visit.  ALLERGIES:                                                      Allergies   Allergen Reactions    Neomycin Sulfate      EXAM:                                                    VITAL SIGNS:   There were no vitals taken for this visit.  Kevin Cognitive Assessment:          Kevin Cognitive Assessment Score:   /30.     General: pt is in NAD, cooperative.  Skin: normal turgor, moist mucous membranes, no lesions/rashes noticed.  HEENT: ATNC, EOMI, PERRL, white sclera, normal conjunctiva, no nystagmus or ptosis. No carotid bruits bilaterally.  Respiratory: lung sounds clear to auscultation bilaterally, no crackles, wheezes, rhonchi. Symmetric lung excursion, no accessory respiratory muscle use.  Cardiovascular: normal S1/S2, no murmurs/rubs/gallops.   Abdomen: Not distended.  : deferred.    Neurological:  Mental: alert, follows commands, MoCA as per above, no aphasia or dysarthria. Fund of knowledge is {MYAPPROPRIATE:976212}  Cranial Nerves:  CN II: visual acuity - able to accurately count fingers with each eye. Visual fields intact, fundi: discs sharp, no papilledema and normal vessels bilaterally.  CN III, IV, VI: EOM intact, pupils equal and reactive  CN V: facial sensation nl  CN VII: face symmetric, no facial droop  CN VIII: hearing bilaterally reduced  CN IX: palate elevation symmetric, uvula at midline  CN XI SCM normal, shoulder shrug nl  CN XII: tongue midline  Motor: Strength: 5/5 in all major groups of all extremities. Normal tone. No abnormal movements. No pronator drift  b/l.  Reflexes: Triceps, biceps, brachioradialis, patellar, and achilles reflexes normal and symmetric. No clonus noted. Toes are down-going b/l.   Sensory: light touch, pinprick, and vibration intact. Romberg: negative.  Coordination: FNF and heel-shin tests intact b/l.   Gait:  Normal, able to tandem walk *** without difficulty.  DATA:   LABS/EEG/IMAGING/OTHER STUDIES: I reviewed pertinent medical records, as detailed in the history of present illness.  ASSESSMENT AND PLAN:      ASSESSMENT: Nathen Gage is a 89 year old male patient with listed above past medical history, who presents with ***.    We had a detailed discussion with the patient regarding his presenting complaints.  The neurological exam today is ***.    DIAGNOSES:  No diagnosis found.  PLAN: There are no Patient Instructions on file for this visit.    Total Time: *** minutes spent on the date of the encounter doing chart review, history and exam, documentation and further activities per the note.    Nito Andino MD  [facility] Neurology  (Chart documentation was completed in part with Dragon voice-recognition software. Even though reviewed, some grammatical, spelling, and word errors may remain.)

## 2025-01-30 ENCOUNTER — LAB (OUTPATIENT)
Dept: LAB | Facility: CLINIC | Age: 89
End: 2025-01-30
Payer: COMMERCIAL

## 2025-01-30 ENCOUNTER — OFFICE VISIT (OUTPATIENT)
Dept: NEUROLOGY | Facility: CLINIC | Age: 89
End: 2025-01-30
Payer: COMMERCIAL

## 2025-01-30 ENCOUNTER — PRE VISIT (OUTPATIENT)
Dept: NEUROLOGY | Facility: CLINIC | Age: 89
End: 2025-01-30

## 2025-01-30 VITALS — DIASTOLIC BLOOD PRESSURE: 79 MMHG | OXYGEN SATURATION: 98 % | HEART RATE: 60 BPM | SYSTOLIC BLOOD PRESSURE: 126 MMHG

## 2025-01-30 DIAGNOSIS — G31.84 MILD COGNITIVE IMPAIRMENT OF UNCERTAIN OR UNKNOWN ETIOLOGY: ICD-10-CM

## 2025-01-30 DIAGNOSIS — R41.89 COGNITIVE DECLINE: Primary | ICD-10-CM

## 2025-01-30 DIAGNOSIS — R41.89 COGNITIVE DECLINE: ICD-10-CM

## 2025-01-30 LAB
Lab: NORMAL
Lab: NORMAL
PERFORMING LABORATORY: NORMAL
PERFORMING LABORATORY: NORMAL
SPECIMEN STATUS: NORMAL
SPECIMEN STATUS: NORMAL
TEST NAME: NORMAL
TEST NAME: NORMAL
TSH SERPL DL<=0.005 MIU/L-ACNC: 2.94 UIU/ML (ref 0.3–4.2)
VIT B12 SERPL-MCNC: 735 PG/ML (ref 232–1245)

## 2025-01-30 PROCEDURE — 84207 ASSAY OF VITAMIN B-6: CPT

## 2025-01-30 PROCEDURE — 82234 BETA-AMYLOID 1-42 (ABETA 42): CPT

## 2025-01-30 PROCEDURE — 84999 UNLISTED CHEMISTRY PROCEDURE: CPT

## 2025-01-30 PROCEDURE — 82233 BETA-AMYLOID 1-40 (ABETA 40): CPT

## 2025-01-30 PROCEDURE — 84425 ASSAY OF VITAMIN B-1: CPT

## 2025-01-30 PROCEDURE — 36415 COLL VENOUS BLD VENIPUNCTURE: CPT

## 2025-01-30 PROCEDURE — 83921 ORGANIC ACID SINGLE QUANT: CPT

## 2025-01-30 PROCEDURE — 83520 IMMUNOASSAY QUANT NOS NONAB: CPT

## 2025-01-30 PROCEDURE — 82607 VITAMIN B-12: CPT

## 2025-01-30 PROCEDURE — 84443 ASSAY THYROID STIM HORMONE: CPT

## 2025-01-30 ASSESSMENT — MONTREAL COGNITIVE ASSESSMENT (MOCA)
6. READ LIST OF DIGITS [FORWARD/BACKWARD]: 2
8. SERIAL SUBTRACTION OF 7S: 2
12. MEMORY INDEX SCORE: 0
WHAT LEVEL OF EDUCATION WAS ATTAINED: 0
9. REPEAT EACH SENTENCE: 1
WHAT IS THE TOTAL SCORE (OUT OF 30): 21
VISUOSPATIAL/EXECUTIVE SUBSCORE: 4
8. SERIAL SUBTRACTION OF 7S: 2
7. [VIGILENCE] TAP WHEN HEARING DESIGNATED LETTER: 1
11. FOR EACH PAIR OF WORDS, WHAT CATEGORY DO THEY BELONG TO (OUT OF 2): 1
9. REPEAT EACH SENTENCE: 1
13. ORIENTATION SUBSCORE: 6
WHAT IS THE TOTAL SCORE (OUT OF 30): 21
WHAT LEVEL OF EDUCATION WAS ATTAINED: 0
10. [FLUENCY] NAME WORDS STARTING WITH DESIGNATED LETTER: 1
4. NAME EACH OF THE THREE ANIMALS SHOWN: 3
7. [VIGILENCE] TAP WHEN HEARING DESIGNATED LETTER: 1
12. MEMORY INDEX SCORE: 0
VISUOSPATIAL/EXECUTIVE SUBSCORE: 4
13. ORIENTATION SUBSCORE: 6
4. NAME EACH OF THE THREE ANIMALS SHOWN: 3
11. FOR EACH PAIR OF WORDS, WHAT CATEGORY DO THEY BELONG TO (OUT OF 2): 1
6. READ LIST OF DIGITS [FORWARD/BACKWARD]: 2
10. [FLUENCY] NAME WORDS STARTING WITH DESIGNATED LETTER: 1

## 2025-01-30 NOTE — PROGRESS NOTES
INITIAL NEUROLOGY CONSULTATION    DATE OF VISIT: 1/30/2025  CLINIC LOCATION: [facility]  MRN: [id number]  PATIENT NAME: Nathen Gage  YOB: 1935    REASON FOR VISIT:   Chief Complaint   Patient presents with    Consult     Cognitive Changes      HISTORY OF PRESENT ILLNESS:                                                    Mr. Nathen Gage is 89 year old ambidextrous male patient with past medical history of hypertension, sick sinus syndrome, atrial fibrillation (on warfarin), diabetes mellitus type 2, and gout, who was seen today for cognitive changes.  The patient is accompanied by his wife of 56 years.    Per patient's report, his wife wanted him to come due to his forgetfulness.  He believes that it is more of a nuisance versus real problem.  He occasionally has word finding difficulties and trouble with the calendar, as well as sometimes misplaces his belongings.  He takes his medications on time.  Both of them manage finances, but he denies any difficulty.  Regarding driving, he might occasionally get lost, but otherwise denies any recent car accidents or tickets.  Denies any safety issues or mood changes.  No focal neurological symptoms.  No prior history of head injuries, seizures, CNS infections or strokes.    According to patient's wife, gradually progressive cognitive decline started several years ago and became more noticeable since December 2024.  She feels that short-term memory is preferentially affected.  He tends to repeat himself and frequently misplaces his belongings.  He had 1 instance when he did not pay the bill on time, but otherwise he manages finances well.  He took a couple wrong turns while driving, but she did not notice any additional issues.  His mood/personality are not changed, though occasionally he might be irritated at her (he used to be very kind).  Has difficulty with names, especially in new places.  She has no additional  concerns.    Family history is positive for stroke and dementia/Alzheimer's disease.    Neuropsychologic testing from 7/2/2024 did not indicate evidence of significant change or decline in global cognitive functioning, though memory function was significantly poorer than expected.  Variability with encoding information was noticed, including learning of new information that was in the impaired range without ability to recall any information after a delay on less structured tasks.  Executive functioning was also affected, including cognitive flexibility and planning.  It felt that he meets criteria for mild cognitive impairment with possible etiologies of vascular disease.  Alzheimer's disease felt less likely.  Anxiety felt possibly contributory.    Laboratory evaluation from December 2024 includes elevated WBC of 12.1 and low hemoglobin of 12.5.  His INR ranged from 2.8-1.5.  Most recent level is 2.3 in January 2025.  TSH was not checked for a long time.  Vitamin B12 was never checked.    Head CT without contrast from 7/26/2024 demonstrated diffuse parenchymal volume loss and white matter changes felt to be due to chronic microvascular ischemic disease.  Brain MRI without contrast from 10/12/2004 did not demonstrate definite pituitary adenomas.  All images were personally reviewed and independently interpreted.    No additional useful information is available in Care Everywhere, which was reviewed.  PAST MEDICAL/SURGICAL HISTORY:                                                    I personally reviewed patient's past medical and surgical history with the patient at today's visit.  MEDICATIONS:                                                    I personally reviewed patient's medications and allergies with the patient at today's visit.  ALLERGIES:                                                      Allergies   Allergen Reactions    Neomycin Sulfate      EXAM:                                                    VITAL  SIGNS:   /79 (BP Location: Right arm, Patient Position: Sitting, Cuff Size: Adult Regular)   Pulse 60   SpO2 98%   Kevin Cognitive Assessment:    Kevin Cognitive Assessment (MOCA)  Visuospatial/Executive : 4  Naming: 3  Attention - Digits: 2  Attention - Letters: 1  Attention - Subtraction: 2  Language - Repeat: 1  Language - Fluency : 1  Abstraction: 1  Delayed Recall: 0  Orientation: 6  Education: 0  MOCA Score: 21  Administered by: : Concepción WEATHERS     [place] Cognitive Assessment Score:  MOCA Score: 21/30.     General: pt is in NAD, cooperative.  Skin: normal turgor, moist mucous membranes, no lesions/rashes noticed.  HEENT: ATNC, EOMI, PERRL, white sclera, normal conjunctiva, no nystagmus or ptosis. No carotid bruits bilaterally.  Respiratory: lung sounds clear to auscultation bilaterally, no crackles, wheezes, rhonchi. Symmetric lung excursion, no accessory respiratory muscle use.  Cardiovascular: Irregularly irregular, no murmurs/rubs/gallops.   Abdomen: Not distended.  : deferred.    Neurological:  Mental: alert, follows commands, MoCA as per above, no aphasia or dysarthria. Fund of knowledge is diminished for age.  Cranial Nerves:  CN II: visual acuity - able to accurately count fingers with each eye. Visual fields intact, fundi: discs sharp, no papilledema and normal vessels bilaterally.  CN III, IV, VI: EOM intact, pupils equal and reactive  CN V: facial sensation nl  CN VII: face symmetric, no facial droop  CN VIII: hearing bilaterally reduced  CN IX: palate elevation symmetric, uvula at midline  CN XI SCM normal, shoulder shrug nl  CN XII: tongue midline  Motor: Strength: 5/5 in all major groups of all extremities. Normal tone. No abnormal movements. No pronator drift b/l.  Reflexes: Triceps, biceps, brachioradialis, and patellar reflexes normal and symmetric, achilles reflexes are diminished bilaterally. No clonus noted. Toes are down-going b/l.   Sensory: light touch, pinprick, and vibration  intact. Romberg: negative.  Coordination: FNF and heel-shin tests intact b/l.   Gait:  Normal, able to tandem walk with mild difficulty.  DATA:   LABS/EEG/IMAGING/OTHER STUDIES: I reviewed pertinent medical records, as detailed in the history of present illness.  ASSESSMENT AND PLAN:      ASSESSMENT: Nathen Gage is a 89 year old male patient with listed above past medical history, who presents with progressive cognitive decline.    We had a detailed discussion with the patient and his wife regarding his presenting complaints.  The neurological exam today is non-focal.  MoCA today is 21/30, consistent with mild cognitive impairment.  Prior neuropsychologic testing also demonstrated findings consistent with mild cognitive impairment with vascular versus neurodegenerative etiology.  Neurodegenerative etiology felt less likely.  I reviewed recent head CT, which demonstrated white matter changes along with parenchymal volume loss.    We discussed that his clinical presentation might be consistent with vascular brain changes, but additional etiology includes thyroid dysfunction, vitamin deficiency, and neurodegenerative conditions.  I would suggest obtaining additional blood work along with CPT.  After that we will review available treatment options, though he will not be a candidate for antiamyloid drugs given that he is on warfarin.    DIAGNOSES:    ICD-10-CM    1. Cognitive decline  R41.89 Adult Neurology  Referral        PLAN: At today's visit we thoroughly discussed various diagnostic possibilities for patient's symptoms, necessary evaluation, and the plan, which includes:  Orders Placed This Encounter   Procedures    Vitamin B6    Vitamin B12    Vitamin B1 whole blood    TSH with free T4 reflex    Methylmalonic Acid    [facility]; [id number]; Phosphorylated tau 181 (Laboratory Miscellaneous Order)    [facility]; ; Phosphorylated tau 217 (Laboratory Miscellaneous Order)    Other Laboratory;  [facility]; Beta amyloid 42/40 ratio, [place] [address] (Laboratory Miscellaneous Order)    Occupational Therapy  Referral     No new medications.    I advised the patient to stay physically and mentally active with particular emphasis on daily mentally stimulating activities of her choice (such as crosswords, puzzles, sudoku, etc.), Mediterranean diet, strenuous aerobic exercises 30 to 60 minutes/day 5 to 7 days/week, and increased social interaction.     Additional recommendations after the work-up.    Next follow-up appointment is in the next 4-6 weeks or earlier if needed.    Total Time: 65 minutes spent on the date of the encounter doing chart review, history and exam, documentation and further activities per the note.    Nito Andino MD  [facility] Neu[facility]  (Chart documentation was completed in part with Dragon voice-recognition software. Even though reviewed, some grammatical, spelling, and word errors may remain.)

## 2025-01-30 NOTE — PROGRESS NOTES
"Nathen Gage is a 89 year old male who presents for:  Chief Complaint   Patient presents with    Consult     Cognitive Changes         Initial Vitals:  /79 (BP Location: Right arm, Patient Position: Sitting, Cuff Size: Adult Regular)   Pulse 60   SpO2 98%  Estimated body mass index is 25.88 kg/m  as calculated from the following:    Height as of 9/13/24: 1.715 m (5' 7.5\").    Weight as of 12/5/24: 76.1 kg (167 lb 11.2 oz).. There is no height or weight on file to calculate BSA. BP completed using cuff size: regular    MoCA 7.1= 21/30     Concepción Varghese   "

## 2025-01-30 NOTE — LETTER
1/30/2025      Nathen Gage  [address]  [place] [place] [address]      Dear Colleague,    Thank you for referring your patient, Nathen Gage, to the [facility]. Please see a copy of my visit note below.    INITIAL NEUROLOGY CONSULTATION    DATE OF VISIT: 1/30/2025  CLINIC LOCATION: [facility]  MRN: [id number]  PATIENT NAME: Nathen Gage  YOB: 1935    REASON FOR VISIT:   Chief Complaint   Patient presents with     Consult     Cognitive Changes      HISTORY OF PRESENT ILLNESS:                                                    Mr. Nathen Gage is 89 year old ambidextrous male patient with past medical history of hypertension, sick sinus syndrome, atrial fibrillation (on warfarin), diabetes mellitus type 2, and gout, who was seen today for cognitive changes.  The patient is accompanied by his wife of 56 years.    Per patient's report, his wife wanted him to come due to his forgetfulness.  He believes that it is more of a nuisance versus real problem.  He occasionally has word finding difficulties and trouble with the calendar, as well as sometimes misplaces his belongings.  He takes his medications on time.  Both of them manage finances, but he denies any difficulty.  Regarding driving, he might occasionally get lost, but otherwise denies any recent car accidents or tickets.  Denies any safety issues or mood changes.  No focal neurological symptoms.  No prior history of head injuries, seizures, CNS infections or strokes.    According to patient's wife, gradually progressive cognitive decline started several years ago and became more noticeable since December 2024.  She feels that short-term memory is preferentially affected.  He tends to repeat himself and frequently misplaces his belongings.  He had 1 instance when he did not pay the bill on time, but otherwise he manages finances well.  He took a couple wrong turns while driving,  but she did not notice any additional issues.  His mood/personality are not changed, though occasionally he might be irritated at her (he used to be very kind).  Has difficulty with names, especially in new places.  She has no additional concerns.    Family history is positive for stroke and dementia/Alzheimer's disease.    Neuropsychologic testing from 7/2/2024 did not indicate evidence of significant change or decline in global cognitive functioning, though memory function was significantly poorer than expected.  Variability with encoding information was noticed, including learning of new information that was in the impaired range without ability to recall any information after a delay on less structured tasks.  Executive functioning was also affected, including cognitive flexibility and planning.  It felt that he meets criteria for mild cognitive impairment with possible etiologies of vascular disease.  Alzheimer's disease felt less likely.  Anxiety felt possibly contributory.    Laboratory evaluation from December 2024 includes elevated WBC of 12.1 and low hemoglobin of 12.5.  His INR ranged from 2.8-1.5.  Most recent level is 2.3 in January 2025.  TSH was not checked for a long time.  Vitamin B12 was never checked.    Head CT without contrast from 7/26/2024 demonstrated diffuse parenchymal volume loss and white matter changes felt to be due to chronic microvascular ischemic disease.  Brain MRI without contrast from 10/12/2004 did not demonstrate definite pituitary adenomas.  All images were personally reviewed and independently interpreted.    No additional useful information is available in Care Everywhere, which was reviewed.  PAST MEDICAL/SURGICAL HISTORY:                                                    I personally reviewed patient's past medical and surgical history with the patient at today's visit.  MEDICATIONS:                                                    I personally reviewed patient's  medications and allergies with the patient at today's visit.  ALLERGIES:                                                      Allergies   Allergen Reactions     Neomycin Sulfate      EXAM:                                                    VITAL SIGNS:   There were no vitals taken for this visit.  Kevin Cognitive Assessment:    Kevin Cognitive Assessment (MOCA)  Visuospatial/Executive : 4  Naming: 3  Attention - Digits: 2  Attention - Letters: 1  Attention - Subtraction: 2  Language - Repeat: 1  Language - Fluency : 1  Abstraction: 1  Delayed Recall: 0  Orientation: 6  Education: 0  MOCA Score: 21  Administered by: : Concepción WEATHERS     [place] Cognitive Assessment Score:  MOCA Score: 21/30.     General: pt is in NAD, cooperative.  Skin: normal turgor, moist mucous membranes, no lesions/rashes noticed.  HEENT: ATNC, EOMI, PERRL, white sclera, normal conjunctiva, no nystagmus or ptosis. No carotid bruits bilaterally.  Respiratory: lung sounds clear to auscultation bilaterally, no crackles, wheezes, rhonchi. Symmetric lung excursion, no accessory respiratory muscle use.  Cardiovascular: normal S1/S2, no murmurs/rubs/gallops.   Abdomen: Not distended.  : deferred.    Neurological:  Mental: alert, follows commands, MoCA as per above, no aphasia or dysarthria. Fund of knowledge is diminished for age.  Cranial Nerves:  CN II: visual acuity - able to accurately count fingers with each eye. Visual fields intact, fundi: discs sharp, no papilledema and normal vessels bilaterally.  CN III, IV, VI: EOM intact, pupils equal and reactive  CN V: facial sensation nl  CN VII: face symmetric, no facial droop  CN VIII: hearing bilaterally reduced  CN IX: palate elevation symmetric, uvula at midline  CN XI SCM normal, shoulder shrug nl  CN XII: tongue midline  Motor: Strength: 5/5 in all major groups of all extremities. Normal tone. No abnormal movements. No pronator drift b/l.  Reflexes: Triceps, biceps, brachioradialis, and patellar  reflexes normal and symmetric, achilles reflexes are diminished bilaterally. No clonus noted. Toes are down-going b/l.   Sensory: light touch, pinprick, and vibration intact. Romberg: negative.  Coordination: FNF and heel-shin tests intact b/l.   Gait:  Normal, able to tandem walk with mild difficulty.  DATA:   LABS/EEG/IMAGING/OTHER STUDIES: I reviewed pertinent medical records, as detailed in the history of present illness.  ASSESSMENT AND PLAN:      ASSESSMENT: Nathen Gage is a 89 year old male patient with listed above past medical history, who presents with progressive cognitive decline.    We had a detailed discussion with the patient and his wife regarding his presenting complaints.  The neurological exam today is non-focal.  MoCA today is 21/30, consistent with mild cognitive impairment.  Prior neuropsychologic testing also demonstrated findings consistent with mild cognitive impairment with vascular versus neurodegenerative etiology.  Neurodegenerative etiology felt less likely.  I reviewed recent head CT, which demonstrated white matter changes along with parenchymal volume loss.    We discussed that his clinical presentation might be consistent with vascular brain changes, but additional etiology includes thyroid dysfunction, vitamin deficiency, and neurodegenerative conditions.  I would suggest obtaining additional blood work along with CPT.  After that we will review available treatment options, though he will not be a candidate for antiamyloid drugs given that he is on warfarin.    DIAGNOSES:    ICD-10-CM    1. Cognitive decline  R41.89 Adult Neurology  Referral        PLAN: At today's visit we thoroughly discussed various diagnostic possibilities for patient's symptoms, necessary evaluation, and the plan, which includes:  Orders Placed This Encounter   Procedures     Vitamin B6     Vitamin B12     Vitamin B1 whole blood     TSH with free T4 reflex     Methylmalonic Acid     [facility];  [id number]; Phosphorylated tau 181 (Laboratory Miscellaneous Order)     [facility]; ; Phosphorylated tau 217 (Laboratory Miscellaneous Order)     Other Laboratory; [facility]; Beta amyloid 42/40 ratio, test [place] [id number] (Laboratory Miscellaneous Order)     Occupational Therapy  Referral     No new medications.    I advised the patient to stay physically and mentally active with particular emphasis on daily mentally stimulating activities of her choice (such as crosswords, puzzles, sudoku, etc.), Mediterranean diet, strenuous aerobic exercises 30 to 60 minutes/day 5 to 7 days/week, and increased social interaction.     Additional recommendations after the work-up.    Next follow-up appointment is in the next 4-6 weeks or earlier if needed.    Total Time: 65 minutes spent on the date of the encounter doing chart review, history and exam, documentation and further activities per the note.    Nito Andino MD  [facility]  (Chart documentation was completed in part with Dragon voice-recognition software. Even though reviewed, some grammatical, spelling, and word errors may remain.)            Again, thank you for allowing me to participate in the care of your patient.        Sincerely,        Nito Andino MD    Electronically signed

## 2025-01-31 LAB
Lab: NORMAL
PERFORMING LABORATORY: NORMAL
SPECIMEN STATUS: NORMAL
TEST NAME: NORMAL

## 2025-02-01 LAB — PYRIDOXAL PHOS SERPL-SCNC: 46.5 NMOL/L

## 2025-02-02 LAB — VIT B1 PYROPHOSHATE BLD-SCNC: 105 NMOL/L

## 2025-02-03 LAB
LABCORP INTERFACED MISCELLANEOUS TEST RESULT: NORMAL
MAYO MISC RESULT: ABNORMAL

## 2025-02-05 LAB
METHYLMALONATE SERPL-SCNC: 0.3 UMOL/L (ref 0–0.4)
SCANNED LAB RESULT: NORMAL
TEST NAME: NORMAL

## 2025-02-10 NOTE — H&P
[facility]  History and Physical   Hospitalist Service    Mehul Mayo MD    Nathen Gage MRN# [id number]   YOB: 1935 Age: 82 year old      Date of Admission:  12/24/2017           Assessment and Plan:   Nathen Gage is an 82-year-old male with history of hypertension, sick sinus syndrome status post pacemaker placement, pulmonary hypertension, erectile dysfunction, hypercholesterolemia, GI bleed in 2010, esophagitis, GERD, dilated aortic arch, atrial fibrillation for which he is chronically anticoagulated with Coumadin, and gout involving left knee.  He presented to the emergency department early in the morning on 12/24/17 for evaluation of 1 day of chest tightness, diaphoresis, shortness of breath.  Symptoms persisted so he came to the emergency department for evaluation.  Symptoms resolved with nitroglycerin.  He denies fever or cough.  Emergency department evaluation showed stable vital signs.  Basic metabolic panel, liver function tests, and troponin were unremarkable.  CBC showed white blood cell count of 13.2 but was otherwise unremarkable.  INR was therapeutic at 2.19.  EKG showed no acute ischemic changes.  Chest x-ray showed left midlung infiltrate consistent with pneumonia.    Problem list:    1.  Chest pain.  Etiology is uncertain.  This could be related to pneumonia (see below).  Consider also due to coronary artery disease as he has several risk factors for this (hypertension, hypercholesterolemia, and age).  Nathen will be ruled out for myocardial infarction with serial troponins.  If he does rule out and I would consider outpatient Lexiscan stress test.    2.  Possible right midlung pneumonia.  Treat with Rocephin and Zithromax.  Plan on discharging on Ceftin and Zithromax.  He does not have fever or cough but does have suggestive x-ray findings and leukocytosis.    3.  Sick sinus syndrome status post pacemaker placement.    4.  Atrial fibrillation with chronic  Detail Level: Detailed anticoagulation with Coumadin.  I will ask pharmacy to manage Coumadin.    5.  Stable medical conditions include hypertension, hypercholesterolemia, and GERD.    Full code  Coumadin will cover DVT prophylaxis  Disposition: Admit to observation           Code Status:   Full Code         Primary Care Physician:   Kushal Valentin [phone]         Chief Complaint:   Chest pain    History is obtained from Nathen, his wife, Dr. Lynn, and the medical record         History of Present Illness:   Nathen Gage is an 82-year-old male with history of hypertension, sick sinus syndrome status post pacemaker placement, pulmonary hypertension, erectile dysfunction, hypercholesterolemia, GI bleed in 2010, esophagitis, GERD, dilated aortic arch, atrial fibrillation for which he is chronically anticoagulated with Coumadin, and gout involving left knee.  He presented to the emergency department early in the morning on 12/24/17 for evaluation of 1 day of chest tightness, diaphoresis, shortness of breath.  Symptoms persisted so he came to the emergency department for evaluation.  Symptoms resolved with nitroglycerin.  He denies fever or cough.  Emergency department evaluation showed stable vital signs.  Basic metabolic panel, liver function tests, and troponin were unremarkable.  CBC showed white blood cell count of 13.2 but was otherwise unremarkable.  INR was therapeutic at 2.19.  EKG showed no acute ischemic changes.  Chest x-ray showed left midlung infiltrate consistent with pneumonia.           Past Medical History:     Patient Active Problem List   Diagnosis     Esophageal reflux     Essential hypertension, benign     Testicular hypofunction     Impotence of organic origin     Gout     Actinic keratosis     Sick sinus syndrome (H)     Syncope     Hyponatremia     Community acquired pneumonia     Advanced directives, counseling/discussion     Pacemaker, artificial     Health Care Home     Atrial fibrillation (H)      Dilated aortic root (H)     Pulmonary hypertension     H/O: GI bleed     Mixed hyperlipidemia     Faintness     Long-term (current) use of anticoagulants [Z79.01]     Acute idiopathic gout of left knee     Presbycusis, unspecified laterality     Chest pain      Past Medical History:   Diagnosis Date     Actinic keratosis      Acute idiopathic gout of left knee 4/27/2016     Atrial fibrillation (H)     on Eliquis     Dilated aortic root (H)      Esophageal reflux      Esophagitis      H/O: GI bleed 2010     Hyperlipidaemia      Hyperlipidemia LDL goal <100      Impotence of organic origin      Presbycusis, unspecified laterality 4/27/2016     Pulmonary hypertension      Sick sinus syndrome (H)     pacemaker implanted 2011     Unspecified essential hypertension              Past Surgical History:     Past Surgical History:   Procedure Laterality Date     IMPLANT PACEMAKER  2011    Pacemaker/cardiac insitu / [employer] Dual chamber, V45            Home Medications:     Prior to Admission medications    Medication Sig Last Dose Taking? Auth Provider   doxazosin (CARDURA) 4 MG tablet Take 1 tablet (4 mg) by mouth daily   Kushal Valentin MD   warfarin (COUMADIN) 2.5 MG tablet Take 1.25mg every Tu / Take 2.5mg all other days OR As directed by INR Clinic   Kushal Valentin MD   pantoprazole (PROTONIX) 40 MG EC tablet Take 1 tablet (40 mg) by mouth daily   Kushal Valentin MD   losartan (COZAAR) 50 MG tablet Take 1 tablet (50 mg) by mouth daily   Kushal Valentin MD   atorvastatin (LIPITOR) 20 MG tablet Take 1 tablet (20 mg) by mouth daily   Kushal Valentin MD            Allergies:     Allergies   Allergen Reactions     Neomycin Sulfate             Social History:     Social History   Substance Use Topics     Smoking status: Former Smoker     Quit date: 1/1/1975     Smokeless tobacco: Never Used     Alcohol use Yes      Comment: 1- 2 BEERS WEEKLY             Family History:  "    Family History   Problem Relation Age of Onset     Alzheimer Disease Brother      CEREBROVASCULAR DISEASE Father 80     Family History Negative Sister      Family History Negative Son      Family History Negative Daughter      Family History Negative Sister      Family History Negative Daughter      Breast Cancer No family hx of      Prostate Cancer No family hx of               Review of Systems:   The 10 point Review of Systems is negative other than as noted in the HPI.           Physical Exam:   Blood pressure 112/58, pulse 78, temperature 98.7  F (37.1  C), temperature source Oral, resp. rate 12, height 1.727 m (5' 8\"), weight 75.8 kg (167 lb), SpO2 97 %.  167 lbs 0 oz      GENERAL: Pleasant and cooperative. No acute distress.  EYES: Pupils equal and round. No scleral erythema or icterus.  ENT: External ears are normal without deformity. Posterior oropharynx is without erythem, swelling, or exudate.  NECK: Supple. No masses or swelling. No tenderness. Thyroid is normal without mass or tenderness.  CHEST: Clear to auscultation. Normal breath sounds. No retractions.   CV: Regular rate and rhythm. No JVD. Pulses normal.  ABDOMEN: Bowel sounds present. No tenderness. No masses or hernia.  EXTREMETIES: No clubbing, cyanosis, or ischemia.  SKIN: Warm and dry to touch. No wounds or rashes.  NEUROLOGIC: Strength and sensation are normal. Deep tendon reflexes are normal. Cranial nerves are normal.           Data:   All new lab and imaging data was reviewed.     Results for orders placed or performed during the hospital encounter of 12/24/17 (from the past 24 hour(s))   CBC with platelets differential   Result Value Ref Range    WBC 13.2 (H) 4.0 - 11.0 10e9/L    RBC Count 4.60 4.4 - 5.9 10e12/L    Hemoglobin 13.6 13.3 - 17.7 g/dL    Hematocrit 40.3 40.0 - 53.0 %    MCV 88 78 - 100 fl    MCH 29.6 26.5 - 33.0 pg    MCHC 33.7 31.5 - 36.5 g/dL    RDW 13.9 10.0 - 15.0 %    Platelet Count 167 150 - 450 10e9/L    Diff " Method Automated Method     % Neutrophils 86.0 %    % Lymphocytes 6.0 %    % Monocytes 6.5 %    % Eosinophils 0.9 %    % Basophils 0.2 %    % Immature Granulocytes 0.4 %    Nucleated RBCs 0 0 /100    Absolute Neutrophil 11.3 (H) 1.6 - 8.3 10e9/L    Absolute Lymphocytes 0.8 0.8 - 5.3 10e9/L    Absolute Monocytes 0.9 0.0 - 1.3 10e9/L    Absolute Eosinophils 0.1 0.0 - 0.7 10e9/L    Absolute Basophils 0.0 0.0 - 0.2 10e9/L    Abs Immature Granulocytes 0.1 0 - 0.4 10e9/L    Absolute Nucleated RBC 0.0    Troponin I   Result Value Ref Range    Troponin I ES <0.015 0.000 - 0.045 ug/L   Comprehensive metabolic panel   Result Value Ref Range    Sodium 132 (L) 133 - 144 mmol/L    Potassium 3.8 3.4 - 5.3 mmol/L    Chloride 100 94 - 109 mmol/L    Carbon Dioxide 24 20 - 32 mmol/L    Anion Gap 8 3 - 14 mmol/L    Glucose 146 (H) 70 - 99 mg/dL    Urea Nitrogen 23 7 - 30 mg/dL    Creatinine 0.93 0.66 - 1.25 mg/dL    GFR Estimate 78 >60 mL/min/1.7m2    GFR Estimate If Black >90 >60 mL/min/1.7m2    Calcium 8.6 8.5 - 10.1 mg/dL    Bilirubin Total 0.7 0.2 - 1.3 mg/dL    Albumin 3.5 3.4 - 5.0 g/dL    Protein Total 7.0 6.8 - 8.8 g/dL    Alkaline Phosphatase 83 40 - 150 U/L    ALT 20 0 - 70 U/L    AST 15 0 - 45 U/L   INR   Result Value Ref Range    INR 2.19 (H) 0.86 - 1.14   XR Chest 2 Views    Narrative    CHEST 2 VIEWS   12/24/2017 2:53 AM     HISTORY: Chest pain.    COMPARISON: 6/2/2014.    FINDINGS: A small region of ill-defined hazy opacities in the lateral  aspect of the mid left lung. The right lung is clear. Normal-sized  cardiac silhouette. Atherosclerotic calcification in the thoracic  aorta. Left anterior chest wall cardiac device with lead tips in the  right atrium and right ventricle.      Impression    IMPRESSION: A small region of hazy opacities in the mid left lung is  nonspecific, but most likely represents pneumonia. A followup chest  radiograph would be useful in one month to confirm resolution.    MORGAN RUSSO MD    Lactic acid   Result Value Ref Range    Lactic Acid 1.6 0.4 - 2.0 mmol/L   Troponin I (now)   Result Value Ref Range    Troponin I ES <0.015 0.000 - 0.045 ug/L         Detail Level: Generalized

## 2025-02-26 ENCOUNTER — THERAPY VISIT (OUTPATIENT)
Dept: OCCUPATIONAL THERAPY | Facility: CLINIC | Age: 89
End: 2025-02-26
Attending: PSYCHIATRY & NEUROLOGY
Payer: COMMERCIAL

## 2025-02-26 DIAGNOSIS — R41.89 COGNITIVE DECLINE: ICD-10-CM

## 2025-02-26 PROCEDURE — 97535 SELF CARE MNGMENT TRAINING: CPT | Mod: GO

## 2025-02-26 PROCEDURE — 97166 OT EVAL MOD COMPLEX 45 MIN: CPT | Mod: GO

## 2025-02-26 PROCEDURE — 96125 COGNITIVE TEST BY HC PRO: CPT | Mod: GO

## 2025-02-26 NOTE — PROGRESS NOTES
Cognitive Performance Test    SUMMARY OF TEST:    The Cognitive Performance Test (CPT) is a standardized performance-based assessment to measure working memory/executive function processing capacities that underlie functional performance. Subtasks include common basic and instrumental activities of daily living (ADL/IADL) which are rated based on the manner in which patients respond to task demands of varying complexity. The total CPT score describes a level of functioning that indicates how information is processed, implications for functional activities, potential safety risks and a recommended level of supervision or assist based on cognitive function. The highest total score on this test is in the range of 5.6 to 5.8.    DATE OF TESTIN25    RESULTS OF TESTING:                                                                                         CPT Subtest Results    MEDBOX: 5.0 / 6 SHOP/GLOVES: 5.0 / 6 PHONE: 5.0 / 6   WASH:  5.0 / 5 TRAVEL: n/a TOAST: 5.0 / 5   DRESS: 5.0 / 5   TOTAL CPT SCORE:  30 / 33     Average CPT Score  5.0 / 5.6    INTERPRETATION OF TEST RESULTS:    Based on the Cognitive Performance Test, this patient scored at CPT Level 5.0.  See CPT Levels reference below.    Summary of functional cognitive status:   Medbox: Initially placed 2 of 4 medications correctly. Needed repeat of instructions to continue with task following placement of first medication. Was able to correct his errors with prn medication and every other day medication with specific cues.    Shop: Checked the wallet prior to selection but began without determining amount of money. Initially chose $9.59 belt. Needed cue to look at bottom belts and to find all the money in the wallet. Was able to pay the exact amount for the belt.    Wash: Needed repeat of instructions to initiate going to sink then was able to sequence appropriately.    Toast: Noticed that toaster was unplugged and requested an outlet. Sequenced task  appropriately.    Phone: Attempted to use phone without a number and required specific directions to initiate use of phone book. Was able to locate appropriate category and phone number, dial without error, and ask the correct question (initially forgot question but was able to pause for a few seconds to think it through).    Dress: Selected appropriate coat and rain headwear.    Factors affecting performance:  Impaired vision  Impaired hearing  New or complex medical issue    Recommendations:    Assist for ADL/IADL:  Medication management (new medications)  Supervision for ADL/IADL:  Finances. Spouse/family to monitor overall safety with routine medications, shopping, and driving but direct supervision does not appear to be required at this time.  Supervision in living setting:  Weekly checks                                                       TIME ADMINISTERING TEST: 33 min    TIME FOR INTERPRETATION AND PREPARATION OF REPORT: 15 min    TOTAL TIME: 48 min      CPT Levels Reference:    Patient's Average CPT Score:  5.0                                                                                                                                                  Individual scores range along a continuum as outlined below.  In addition to cognitive status, other factors may affect safety in a home environment.  Please refer to specific recommendations for this patient.    ___5.6-5.8  Normal functioning (absence of cognitive-functional disability).  Independent in managing personal affairs, monitors and directs own behavior.  Uses complex information to carry out daily activities with safety and accuracy.    Proficient with instrumental activities of daily living (IADL) and learning new activity.  Problems are anticipated, errors are avoided, and consequences of actions are considered.      _X__5.0   Mild cognitive-functional disability; deficits in working memory and executive thought processes. Difficulty using  "complex information. Problems may be observed with recent memory, judgment, reasoning and planning ahead. May be impulsive or have difficulty anticipating consequences.  Safety:  May require assistance to plan ahead; or to manage complex medication schedules, appointments or finances.  Hazardous activities may need to be monitored or limited.  ADL:  Mild functional decline.  Able to complete basic self-care and routine household tasks.  May have difficulty with complex daily tasks such as reading, writing, meal preparation, shopping or driving.   Learns through hands on teaching. Self-centered behavior or difficulty considering the needs of others may be seen related to trouble seeing the  whole picture\". Can appear disorganized or uninhibited.    ___4.5  Mild to moderate cognitive-functional disability. Significant deficits in working memory and executive thought processes. Judgment, reasoning and planning show obvious impairment.  Distractible with inability to shift attention/actions given competing stimuli.  Difficulty with problem solving and managing details. Complex daily tasks performed with inconsistency, difficulty, or error.     Safety:  Medications should be monitored, stove use may require supervision, and driving ability may be affected.  Impaired safety awareness with inability to anticipate potential problems.  May not recognize or respond to emergent situations. Requires frequent check-in support.   ADL:  Mild difficulty with simple everyday self-care tasks. Benefits from structured, routine activity.  Will likely need reminders to complete tasks outside of the routine. Requires assistance with planning and IADL tasks like shopping and finances. Learns concrete tasks through repetition, but performance may not generalize. Tends to be impulsive with poor insight. Self centered behavior or inability to consider the needs of others is common.    ___4.0  Moderate cognitive-functional disability; " abstract to concrete thought processes. Working memory and executive function impairments are obvious. Difficulty with planning and problem solving.  Behavior is goal-directed, but unable to follow multi-step directions, is easily distracted, and may not recognize mistakes.  Inability to anticipate hazards or understand precautions.  Safety:  Recommend 24-hour supervision for safety. Supervision needed for medication management and for hazardous activities. May not be able to follow a restricted diet. Can get lost in unfamiliar surroundings. Generally, persons functioning at level 4 should not be driving.   ADL:  Some decline in quality or frequency of ADL.  Collin enhanced by use of a routine, simple concrete directions, and caregiver set-up of needed items. Complex tasks such as money or home management typically requires assistance.  Relies heavily on vision to guide behavior; will ignore objects/hazards not in plain sight and can be distracted by irrelevant objects. Often has poor insight.  Able to carry out social conversation and may verbally  cover  for deficits leading caregivers to believe they are capable of functioning independently.       ___3.5  Moderate cognitive-functional disability; increased cues needed for task completion. Aware of concrete task steps but needs prompting or cues to initiate and complete simple tasks. Attention span is limited, simple directions may need to be repeated, and re-focus to a topic or task may be required.  Safety:  24-hour supervision required for safety and for assistance with daily tasks. Assistance required with medications, and access to medication should be limited. Meals, nutrition and dietary restrictions need to be monitored.  All hazardous activities should be restricted or supervised. Should not drive. Prone to wandering and can become lost.  ADL:  Moderate functional decline. Familiar tasks usually requires set-up of supplies and directions to complete  steps. May need objects handed to them for task initiation. Function best with a set schedule in familiar surroundings with familiar people. All complex tasks must be done by others. Vocabulary is diminished and speech often unfocused.

## 2025-02-26 NOTE — PROGRESS NOTES
"OCCUPATIONAL THERAPY EVALUATION  Type of Visit: Evaluation     Fall Risk Screen:  Fall screen completed by: OT  Have you fallen 2 or more times in the past year?: No  Have you fallen and had an injury in the past year?: No  Is patient a fall risk?: No  Fall screen comments: Did fall once when  trying to arrange items in their storage room while standing on a chair to reach high and accidentally stepped off the chair    Subjective        Presenting condition or subjective complaint:    Date of onset: 01/30/25 (date of order used)    Relevant medical history:       Neal is a pleasant 89 year old, ambidextrous male who presents to OP OT for further evaluation of cognitive decline with his wife, Nichol. Per neurology visit on 1/30/25:  \"patient with past medical history of hypertension, sick sinus syndrome, atrial fibrillation (on warfarin), diabetes mellitus type 2, and gout, who was seen today for cognitive changes.  The patient is accompanied by his wife of 56 years.     Per patient's report, his wife wanted him to come due to his forgetfulness.  He believes that it is more of a nuisance versus real problem.  He occasionally has word finding difficulties and trouble with the calendar, as well as sometimes misplaces his belongings.  He takes his medications on time.  Both of them manage finances, but he denies any difficulty.  Regarding driving, he might occasionally get lost, but otherwise denies any recent car accidents or tickets.  Denies any safety issues or mood changes.  No focal neurological symptoms.  No prior history of head injuries, seizures, CNS infections or strokes.     According to patient's wife, gradually progressive cognitive decline started several years ago and became more noticeable since December 2024.  She feels that short-term memory is preferentially affected.  He tends to repeat himself and frequently misplaces his belongings.  He had 1 instance when he did not pay the bill on time, but " otherwise he manages finances well.  He took a couple wrong turns while driving, but she did not notice any additional issues.  His mood/personality are not changed, though occasionally he might be irritated at her (he used to be very kind).  Has difficulty with names, especially in new places.  She has no additional concerns.     Family history is positive for stroke and dementia/Alzheimer's disease.     Neuropsychologic testing from 7/2/2024 did not indicate evidence of significant change or decline in global cognitive functioning, though memory function was significantly poorer than expected.  Variability with encoding information was noticed, including learning of new information that was in the impaired range without ability to recall any information after a delay on less structured tasks.  Executive functioning was also affected, including cognitive flexibility and planning.  It felt that he meets criteria for mild cognitive impairment with possible etiologies of vascular disease.  Alzheimer's disease felt less likely.  Anxiety felt possibly contributory.     Laboratory evaluation from December 2024 includes elevated WBC of 12.1 and low hemoglobin of 12.5.  His INR ranged from 2.8-1.5.  Most recent level is 2.3 in January 2025.  TSH was not checked for a long time.  Vitamin B12 was never checked.     Head CT without contrast from 7/26/2024 demonstrated diffuse parenchymal volume loss and white matter changes felt to be due to chronic microvascular ischemic disease.  Brain MRI without contrast from 10/12/2004 did not demonstrate definite pituitary adenomas.  All images were personally reviewed and independently interpreted.     No additional useful information is available in Care Everywhere, which was reviewed.     MoCA 8.1 was 21/30 on 1/30/25 neurology visit.    Past Medical History:   Diagnosis Date    Actinic keratosis     Acute idiopathic gout of left knee 4/27/2016    Atrial fibrillation (H)     on  Eliquis    Dilated aortic root     Esophageal reflux     Esophagitis     H/O: GI bleed 2010    Hyperlipidaemia     Hyperlipidemia LDL goal <100     Impotence of organic origin     Presbycusis, unspecified laterality 4/27/2016    Pulmonary hypertension (H)     Sick sinus syndrome (H)     pacemaker implanted 2011    Unspecified essential hypertension      Dates & types of surgery:    Past Surgical History:   Procedure Laterality Date    EP LEAD REPAIR FOR SINGLE ICD N/A 7/11/2023    Procedure: Pacemaker or Implantable Cardioverter Defibrillator Lead Repair-One Lead;  Surgeon: Scott Messer MD;  Location:  [facility]    EP PACEMAKER GENERATOR REPLACEMENT- DUAL N/A 7/11/2023    Procedure: Pacemaker Generator Replacement Dual;  Surgeon: Scott Messer MD;  Location:  [facility]    IMPLANT PACEMAKER  2011    Pacemaker/cardiac insitu / [employer] Dual chamber, V45    PHACOEMULSIFICATION CLEAR CORNEA WITH STANDARD INTRAOCULAR LENS IMPLANT Right 5/29/2018    Procedure: PHACOEMULSIFICATION CLEAR CORNEA WITH STANDARD INTRAOCULAR LENS IMPLANT;  RIGHT EYE PHACOEMULSIFICATION CLEAR CORNEA WITH STANDARD INTRAOCULAR LENS IMPLANT ;  Surgeon: Mat Echevarria MD;  Location: [facility]     Prior diagnostic imaging/testing results: Other not sure per pt report, see above and EHR for details  Prior therapy history for the same diagnosis, illness or injury: No      Prior Level of Function  Transfers: Independent  Ambulation: Independent  ADL: Independent  IADL: Driving, Finances, Housekeeping, Meal preparation, Medication management, Yard work    Living Environment  Social support: With a significant other or spouse (wife, Nichol, who joins for today's session; 3 adult children)  Type of home: [facility]   Stairs to enter the home: No       Ramp: No   Stairs inside the home: Yes 1 Is there a railing: Yes     Help at home: None  Equipment owned: Grab bars; Bath bench  "    Employment: No  ; 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, 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  Hobbies/Interests: art painting, reading, traveling on short trips (recently went to [place] to visit their son)    Patient goals for therapy: my memory    Pain assessment: Pain denied     Objective     Cognitive Status Examination  Orientation: Person (time and place not formally assessed this date)  Level of Consciousness: Alert  Follows Commands and Answers Questions: 100% of the time, Follows single step instructions  Personal Safety and Judgement: At risk behaviors demonstrated - appears to be aware of general safety in daily activities but lacks insight into extent of deficits and impact on daily function  Memory: Impaired  Attention: Alternating attention impaired, difficulty shifting between tasks, Divided attention impaired, difficulty with simultaneous tasks, Difficulty with dual tasking   Organization/Problem Solving: Problem solving impaired  Executive Function: Working memory impaired, decreased storage of information for performing tasks, Cognitive flexibility impaired, Planning ability impaired  Comments: Neal is [place] with bilateral hearing aids. OTR donned clear face mask for completion of assessment this date. Neal reports min changes in STM such as occasionally forgetting something when he walks into a room. Denies difficulty with LTM. Occasional difficulty with word-finding. Reports needing \"maybe a little bit\" more cognitive processing time. Spouse, Nichol, reports that STM is \"tough. Some days are better than others.\" Endorses she believes it is primarily STM. Nichol denies Neal repeating information in conversation but will sometimes ask questions a couple times, such as what time they need to leave the home. The biggest change Nichol has noticed is that Neal can no longer remember the dance steps for common dances - they used to do a lot of dancing " together. She has him practicing dance moves as part of his exercise routine nearly daily. Scored 21/30 on MoCA 8.1 on 1/30/25 visit with neurology. With CPT Testing this date, pt scored 5.0 / 5.6 - see separate CPT note for details.    VISUAL SKILLS  Visual Acuity: Wears glasses (reports he has noticed he can read far distance better without his glasses and thinks he may need an updated prescription, did just have an eye exam a month ago without any changes to Rx)  Visual Field: Appears normal  Visual Attention: Appears normal  Oculomotor: Appears normal    SENSATION: UE Sensation WNL, LE Sensation WNL    POSTURE: Sitting Posture: Forward head  RANGE OF MOTION: UE AROM WFL  STRENGTH: UE Strength WFL, LE Strength WFL; ambidextrous, writes with L hand  MUSCLE TONE: WFL  COORDINATION: WFL  BALANCE: WFL, reports only occasional difficulty with balance    FUNCTIONAL MOBILITY  Assistive Device(s): None  Ambulation: IND  Wheelchair: n/a    BED MOBILITY: WFL, Independent    TRANSFERS: WFL, Independent    BATHING: WFL, Independent  Equipment: n/a    UPPER BODY DRESSING: WFL, Independent  Equipment: n/a    LOWER BODY DRESSING: WFL, Independent  Equipment: n/a    TOILETING: WFL, Independent  Equipment: n/a    GROOMING: WFL, Independent  Equipment: n/a    EATING/SELF FEEDING: WFL, Independent   Equipment: n/a    ACTIVITY TOLERANCE: Reports he thinks he has more energy than people he knows that are 8-10 years older than him. Denies trouble with sleeping. For physical activity, he is trying to get in the habit of going to the [facility] and walking on the treadmill for about a mile, doing some strengthening, and stationary biking. Will bike outdoors in the summer.    INSTRUMENTAL ACTIVITIES OF DAILY LIVING (IADL):   Medication management: manages mod I by labeling bottles AM/PM, denies forgetfulness with medications, notes he sticks to his daily routine  Meal Planning/Prep: spouse is primary cook in the household, Neal can prepare  himself something simple  Home/Financial Management: spouse completes most of the household management tasks, they do share some of the cleaning. Neal manages finances, denies forgetfulness with paying bills but reports he does need to spend extra time and double check he is paying the right amount  Communication/Computer Use: cell phone, computer - reports he is able to use these devices for what he needs (has an old cell phone that he is used to using)  Community Mobility: IND with driving. He doesn't recall having any issues with getting lost when driving. Spouse, Nichol, reports they did make a few wrong turns when traveling in [place] without a GPS but otherwise he normally does very well.  Care of Others: Sometimes dog-sit for her daughter when she is out of town.        Assessment & Plan   CLINICAL IMPRESSIONS  Medical Diagnosis: Cognitive Decline    Treatment Diagnosis: Decreased safety and IND with ADLs/IADLs    Impression/Assessment: Pt is a 89 year old male presenting to Occupational Therapy due to cognitive decline.  The following significant findings have been identified: Impaired balance, Impaired cognition, Impaired hearing, and Impaired visual perception.  These identified deficits interfere with their ability to perform recreational activities, household chores, driving , household mobility, community mobility, medication management, financial management,  yard work, meal planning and preparation, and community or volunteer activities as compared to previous level of function.     Clinical Decision Making (Complexity):  Assessment of Occupational Performance: 5 or more Performance Deficits  Occupational Performance Limitations: functional mobility, communication management, driving and community mobility, health management and maintenance, home establishment and management, meal preparation and cleanup, shopping, leisure activities, and social participation  Clinical Decision Making (Complexity):  Moderate complexity    PLAN OF CARE  Treatment Interventions:  Interventions: Cognitive Skills, Community/Work Reintegration, Self-Care/Home Management, Therapeutic Activity    Long Term Goals   OT Goal 1  Goal Identifier: Memory/Safety  Goal Description: Pt and family to report understanding of Cognitive Performance Test results and 3 compensatory strategies to promote safety and IND within home and community settings.  Rationale: In order to maximize safety and independence with performance of self-care activities;In order to maximize safety and independence with ADL/IADLs;In order to maximize safety and independence with cognitive function within the home or community;In order to maximize independence with tasks requiring functional cognition/executive function for school, work, medication or financial mgmt;In order to safely and appropriately apply compensatory strategies with ADL/IADL performance  Goal Progress: Goal met. Administered CPT and educated pt and spouse on results. Educated on memory compensatory strategies for IND with ADLs/IADLs - see daily flowsheet for details.  Target Date: 02/26/25  Date Met: 02/26/25      Frequency of Treatment: 1x/week  Duration of Treatment: 1 time only evaluation and treatment     Recommended Referrals to Other Professionals: none at this time  Education Assessment: Learner/Method: Patient;Family;Listening;Reading  Education Comments: see daily flowsheet for details     Risks and benefits of evaluation/treatment have been explained.   Patient/Family/caregiver agrees with Plan of Care.     Evaluation Time:    OT Eval, Moderate Complexity Minutes (64645): 26       Signing Clinician: FERNANDO Ceballos [facility]                                                                                   OUTPATIENT OCCUPATIONAL THERAPY      PLAN OF TREATMENT FOR OUTPATIENT REHABILITATION   Patient's Last Name, First Name, Nathen Hannah Date  of Birth:  1935   Provider's Name   [facility]   Medical Record No.  [id number]     Onset Date: 01/30/25 (date of order used) Start of Care Date: 02/26/25     Medical Diagnosis:  Cognitive Decline      OT Treatment Diagnosis:  Decreased safety and IND with ADLs/IADLs Plan of Treatment  Frequency/Duration:1x/week/1 time only evaluation and treatment    Certification date from 02/26/25   To 02/26/25        See note for plan of treatment details and functional goals     FERNANDO Ceballos                         I CERTIFY THE NEED FOR THESE SERVICES FURNISHED UNDER        THIS PLAN OF TREATMENT AND WHILE UNDER MY CARE     (Physician attestation of this document indicates review and certification of the therapy plan).              Referring Provider:  Nito Andino    Initial Assessment  See Epic Evaluation- 02/26/25

## 2025-02-28 NOTE — PROGRESS NOTES
ESTABLISHED PATIENT NEUROLOGY NOTE    DATE OF VISIT: 3/3/2025  CLINIC LOCATION: [facility]  MRN: [id number]  PATIENT NAME: Nathen Gage  YOB: 1935    REASON FOR VISIT: No chief complaint on file.    SUBJECTIVE:                                                      HISTORY OF PRESENT ILLNESS: Patient is here to follow up regarding cognitive decline. Please refer to my initial note (1/30/2025) for further information.    Since the last visit, the patient reports ***.  He denies interval development of new focal neurological symptoms.    Laboratory evaluation from 1/30/2025 includes normal TSH (2.94), vitamin B1 (105), B12 (735), and B6 (46.5) as well as methylmalonic acid level. Beta amyloid 42/40 ratio was in intermediate range of 0.157, indicating intermediate risk of Alzheimer's disease. Phosphorylated tau 217 was elevated (positive) at 0.384, so as phosphorylated tau 181 (1.33).    CPT was 5.0/5.6 on 2/26/2025, consistent with mild cognitive functional disability that requires weekly checks for safety.  EXAM:                                                    Physical Exam:   Vitals: There were no vitals taken for this visit.    General: pt is in NAD, cooperative.  Skin: normal turgor, moist mucous membranes, no lesions/rashes noticed.  HEENT: ATNC, white sclera, normal conjunctiva.  Respiratory: Symmetric lung excursion, no accessory respiratory muscle use.  Abdomen: Non distended.  Neurological: awake, cooperative, follows commands, no same changes compared to the initial visit.  ASSESSMENT AND PLAN:                                                    Assessment: 89 year old male patient presents for follow-up of cognitive decline after completion of recommended testing.  We discussed the results, as did suspected diagnosis of likely multifactorial mild cognitive impairment, including vascular factors and probable Alzheimer's disease.  He will not be a candidate for  antiamyloid drugs because of his warfarin use, but we discussed additional treatment options that could be considered in the future if we see progression of cognitive dysfunction.    Diagnoses:  No diagnosis found.  Plan:  There are no Patient Instructions on file for this visit.    Total Time: *** minutes spent on the date of the encounter doing chart review, history and exam, documentation and further activities per the note.    Nito Andino MD  [facility]  (Chart documentation was completed in part with Dragon voice-recognition software. Even though reviewed, some grammatical, spelling, and word errors may remain.)

## 2025-03-03 ENCOUNTER — ANTICOAGULATION THERAPY VISIT (OUTPATIENT)
Dept: ANTICOAGULATION | Facility: CLINIC | Age: 89
End: 2025-03-03

## 2025-03-03 ENCOUNTER — LAB (OUTPATIENT)
Dept: LAB | Facility: CLINIC | Age: 89
End: 2025-03-03
Payer: COMMERCIAL

## 2025-03-03 ENCOUNTER — TELEPHONE (OUTPATIENT)
Dept: NEUROLOGY | Facility: CLINIC | Age: 89
End: 2025-03-03

## 2025-03-03 ENCOUNTER — OFFICE VISIT (OUTPATIENT)
Dept: NEUROLOGY | Facility: CLINIC | Age: 89
End: 2025-03-03
Payer: COMMERCIAL

## 2025-03-03 VITALS — SYSTOLIC BLOOD PRESSURE: 137 MMHG | DIASTOLIC BLOOD PRESSURE: 74 MMHG | HEART RATE: 72 BPM | OXYGEN SATURATION: 100 %

## 2025-03-03 DIAGNOSIS — I48.19 PERSISTENT ATRIAL FIBRILLATION (H): Primary | ICD-10-CM

## 2025-03-03 DIAGNOSIS — I48.19 PERSISTENT ATRIAL FIBRILLATION (H): ICD-10-CM

## 2025-03-03 DIAGNOSIS — G30.9 MILD COGNITIVE IMPAIRMENT DUE TO ALZHEIMER'S DISEASE (H): Primary | ICD-10-CM

## 2025-03-03 DIAGNOSIS — G31.84 MILD COGNITIVE IMPAIRMENT DUE TO ALZHEIMER'S DISEASE (H): Primary | ICD-10-CM

## 2025-03-03 LAB — INR BLD: 2.5 (ref 0.9–1.1)

## 2025-03-03 PROCEDURE — 99215 OFFICE O/P EST HI 40 MIN: CPT | Performed by: PSYCHIATRY & NEUROLOGY

## 2025-03-03 PROCEDURE — 85610 PROTHROMBIN TIME: CPT

## 2025-03-03 PROCEDURE — 36416 COLLJ CAPILLARY BLOOD SPEC: CPT

## 2025-03-03 PROCEDURE — 3078F DIAST BP <80 MM HG: CPT | Performed by: PSYCHIATRY & NEUROLOGY

## 2025-03-03 PROCEDURE — 3075F SYST BP GE 130 - 139MM HG: CPT | Performed by: PSYCHIATRY & NEUROLOGY

## 2025-03-03 NOTE — TELEPHONE ENCOUNTER
I think we extensively discussed that during today's visit, but if it was not clear, the normal score on MoCA is 26-30 out of possible 30 points.  This is where majority of scores will be if person has normal memory regardless of age.  Hopefully it helps, rest of the questions could be discussed when they come back for follow-up.   Thanks,   Nito Andino MD.     Attempted to return Nichol's call to relay Dr. Andino's message about the MoCA scores. TRC.    Radha CAMACHO RN, BSN  [facility]

## 2025-03-03 NOTE — PROGRESS NOTES
ANTICOAGULATION MANAGEMENT     Nathen Gage 89 year old male is on warfarin with therapeutic INR result. (Goal INR 2.0-2.5)    Recent labs: (last 7 days)     03/03/25  0825   INR 2.5*         ASSESSMENT     Source(s): Chart Review  Previous INR was Therapeutic last 2(+) visits  Medication, diet, health changes since last INR   INR trending up  Neurology office visit today-no change in care plan, notes patient likely has Alzheimer's          PLAN     Recommended plan for no diet, medication or health factor changes affecting INR     Dosing Instructions: Continue your current warfarin dose with next INR in 6 weeks       Summary  As of 3/3/2025      Full warfarin instructions:  1.25 mg every Mon, Thu; 2.5 mg all other days   Next INR check:  4/14/2025               Detailed voice message left for Neal with dosing instructions and follow up date.     Contact [phone] to schedule and with any changes, questions or concerns.     Education provided: Please call back if any changes to your diet, medications or how you've been taking warfarin  Taking warfarin: Importance of taking warfarin as instructed    Plan made per [facility] anticoagulation protocol    Fidelina Pizano, RN  3/3/2025  Anticoagulation Clinic  [employer] for routing messages: [employer]  [facility] patient phone line: [phone]        _______________________________________________________________________     Anticoagulation Episode Summary       Current INR goal:  2.0-2.5   TTR:  75.0% (1 y)   Target end date:  Indefinite   Send INR reminders to:  [facility]    Indications    Persistent atrial fibrillation (H) [I48.19]             Comments:  --             Anticoagulation Care Providers       Provider Role Specialty Phone number    Kushal Valentin MD Referring Family Medicine [phone]    Leonides Aggarwal PA-C Referring Family Medicine [phone]    Dontae Champion PA-C Referring Family Medicine [phone]

## 2025-03-03 NOTE — PROGRESS NOTES
ESTABLISHED PATIENT NEUROLOGY NOTE    DATE OF VISIT: 3/3/2025  CLINIC LOCATION: [facility]  MRN: [id number]  PATIENT NAME: Nathen Gage  YOB: 1935    REASON FOR VISIT:   Chief Complaint   Patient presents with    RECHECK     Review testing - feels stable     SUBJECTIVE:                                                      HISTORY OF PRESENT ILLNESS: Patient is here to follow up regarding cognitive decline. Please refer to my initial note (1/30/2025) for further information.  Accompanied by his wife.    Since the last visit, the patient reports no memory/cognitive changes.  He denies interval development of new focal neurological symptoms.  Has occasional diarrhea that he plans to discuss with his PCP.    Laboratory evaluation from 1/30/2025 includes normal TSH (2.94), vitamin B1 (105), B12 (735), and B6 (46.5) as well as methylmalonic acid level. Beta amyloid 42/40 ratio was in intermediate range of 0.157, indicating intermediate risk of Alzheimer's disease. Phosphorylated tau 217 was elevated (positive) at 0.384, so as phosphorylated tau 181 (1.33).    CPT was 5.0/5.6 on 2/26/2025, consistent with mild cognitive functional disability that requires weekly checks for safety.  EXAM:                                                    Physical Exam:   Vitals: /74 (BP Location: Left arm, Patient Position: Sitting, Cuff Size: Adult Regular)   Pulse 72   SpO2 100%     General: pt is in NAD, cooperative.  Skin: normal turgor, moist mucous membranes, no lesions/rashes noticed.  HEENT: ATNC, white sclera, normal conjunctiva.  Respiratory: Symmetric lung excursion, no accessory respiratory muscle use.  Abdomen: Non distended.  Neurological: awake, cooperative, follows commands, no same changes compared to the initial visit.  ASSESSMENT AND PLAN:                                                    Assessment: 89 year old male patient presents for follow-up of cognitive decline after  completion of recommended testing.  We discussed the results, suspected diagnosis of likely multifactorial mild cognitive impairment, including vascular factors and probable Alzheimer's disease.  He will not be a candidate for antiamyloid drugs because of his warfarin use, but we discussed additional treatment options that could be considered in the future if we see progression of cognitive dysfunction.  We also reviewed recommended lifestyle modifications to help with cognitive health, as well as treatment options, including donepezil, galantamine, rivastigmine, and memantine, though the first 3 might cause GI issues and currently should not be started.    Diagnoses:    ICD-10-CM    1. 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    We decided to continue monitoring patient's cognitive status while he works on lifestyle modifications.  We will repeat cognitive test next time he comes.    Next follow-up appointment is in the next 6 months or earlier if needed.    Total Time: 41 minutes spent on the date of the encounter doing chart review, history and exam, documentation and further activities per the note.    Nito Andino MD  [facility]  (Chart documentation was completed in part with Dragon voice-recognition software. Even though reviewed, some grammatical, spelling, and word errors may remain.)

## 2025-03-03 NOTE — TELEPHONE ENCOUNTER
Nichol returned RN's call. Relayed Dr. Andino's message about the MoCA score. Nichol verbalized understanding and had no further questions.     Radha CMAACHO RN, BSN  [facility]

## 2025-03-03 NOTE — PATIENT INSTRUCTIONS
AFTER VISIT SUMMARY (AVS):    At today's visit we thoroughly discussed current symptoms, evaluation results, diagnosis, available treatment options, and the plan.    We decided to continue monitoring your cognitive status while you work on lifestyle modifications.  We will repeat cognitive test next time you come.    Next follow-up appointment is in the next 6 months or earlier if needed.    Please do not hesitate to call me with any questions or concerns.    Thanks.

## 2025-03-03 NOTE — LETTER
3/3/2025      Nathen Gage  [address]  [facility] [address]      Dear Colleague,    Thank you for referring your patient, Nathen Gage, to the [facility]UROLOG[facility]. Please see a copy of my visit note below.    ESTABLISHED PATIENT NEUROLOGY NOTE    DATE OF VISIT: 3/3/2025  CLINIC LOCATION: [facility]  MRN: [id number]  PATIENT NAME: Nathen Gage  YOB: 1935    REASON FOR VISIT:   Chief Complaint   Patient presents with     RECHECK     Review testing - feels stable     SUBJECTIVE:                                                      HISTORY OF PRESENT ILLNESS: Patient is here to follow up regarding cognitive decline. Please refer to my initial note (1/30/2025) for further information.  Accompanied by his wife.    Since the last visit, the patient reports no memory/cognitive changes.  He denies interval development of new focal neurological symptoms.  Has occasional diarrhea that he plans to discuss with his PCP.    Laboratory evaluation from 1/30/2025 includes normal TSH (2.94), vitamin B1 (105), B12 (735), and B6 (46.5) as well as methylmalonic acid level. Beta amyloid 42/40 ratio was in intermediate range of 0.157, indicating intermediate risk of Alzheimer's disease. Phosphorylated tau 217 was elevated (positive) at 0.384, so as phosphorylated tau 181 (1.33).    CPT was 5.0/5.6 on 2/26/2025, consistent with mild cognitive functional disability that requires weekly checks for safety.  EXAM:                                                    Physical Exam:   Vitals: /74 (BP Location: Left arm, Patient Position: Sitting, Cuff Size: Adult Regular)   Pulse 72   SpO2 100%     General: pt is in NAD, cooperative.  Skin: normal turgor, moist mucous membranes, no lesions/rashes noticed.  HEENT: ATNC, white sclera, normal conjunctiva.  Respiratory: Symmetric lung excursion, no accessory respiratory muscle use.  Abdomen: Non  distended.  Neurological: awake, cooperative, follows commands, no same changes compared to the initial visit.  ASSESSMENT AND PLAN:                                                    Assessment: 89 year old male patient presents for follow-up of cognitive decline after completion of recommended testing.  We discussed the results, suspected diagnosis of likely multifactorial mild cognitive impairment, including vascular factors and probable Alzheimer's disease.  He will not be a candidate for antiamyloid drugs because of his warfarin use, but we discussed additional treatment options that could be considered in the future if we see progression of cognitive dysfunction.  We also reviewed recommended lifestyle modifications to help with cognitive health, as well as treatment options, including donepezil, galantamine, rivastigmine, and memantine, though the first 3 might cause GI issues and currently should not be started.    Diagnoses:    ICD-10-CM    1. 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    We decided to continue monitoring patient's cognitive status while he works on lifestyle modifications.  We will repeat cognitive test next time he comes.    Next follow-up appointment is in the next 6 months or earlier if needed.    Total Time: 41 minutes spent on the date of the encounter doing chart review, history and exam, documentation and further activities per the note.    Nito Andino MD  [facility] Neurology  (Chart documentation was completed in part with Dragon voice-recognition software. Even though reviewed, some grammatical, spelling, and word errors may remain.)      Again, thank you for allowing me to participate in the care of your patient.        Sincerely,        Nito Andino MD    Electronically  signed

## 2025-03-03 NOTE — TELEPHONE ENCOUNTER
[facility] Call Center    Phone Message    May a detailed message be left on voicemail: yes     Reason for Call: Other: Pts spouse Nichol (ctc on file) is requesting a call back to further discuss patients paper test scores. Nichol would like to know what the average test scores are in other people.     Please advise    Action Taken: Other: Neurology    Travel Screening: Not Applicable

## 2025-03-03 NOTE — NURSING NOTE
"Nathen Gage is a 89 year old male who presents for:  Chief Complaint   Patient presents with    RECHECK     Review testing - feels stable        Initial Vitals:  /74 (BP Location: Left arm, Patient Position: Sitting, Cuff Size: Adult Regular)   Pulse 72   SpO2 100%  Estimated body mass index is 25.88 kg/m  as calculated from the following:    Height as of 9/13/24: 1.715 m (5' 7.5\").    Weight as of 12/5/24: 76.1 kg (167 lb 11.2 oz).. There is no height or weight on file to calculate BSA. BP completed using cuff size: regular    Antonio Etienne  "

## 2025-03-04 ENCOUNTER — MYC MEDICAL ADVICE (OUTPATIENT)
Dept: NEUROLOGY | Facility: CLINIC | Age: 89
End: 2025-03-04
Payer: COMMERCIAL

## 2025-03-04 NOTE — TELEPHONE ENCOUNTER
Spoke with Nichol.     She does have access to [address].     She said Dr. Andino mentioned Mediterranean diet.     Writer sent message with education on mediterranean diet and resources to learn about alzheimer's.     Advised for Nichol to call back if she has further questions or concerns.  She verbalized understanding.     Stephanie RN, BSN  [facility] Neurology

## 2025-03-04 NOTE — TELEPHONE ENCOUNTER
[facility] Call Center    Phone Message    May a detailed message be left on voicemail: yes     Reason for Call:     Nichol patients spouse called to speak to care team again, would like clarification regarding diet that provider suggested and does the clinic have any handouts they can send to Nichol explaining alzheimers and dementia? She can be reached from now to 10:30 or after 3:30 and will be free all day tomorrow.     Action Taken: Other: cs neurology    Travel Screening: Not Applicable     Date of Service:

## 2025-03-05 ENCOUNTER — ANCILLARY PROCEDURE (OUTPATIENT)
Dept: CARDIOLOGY | Facility: CLINIC | Age: 89
End: 2025-03-05
Attending: INTERNAL MEDICINE
Payer: COMMERCIAL

## 2025-03-05 DIAGNOSIS — Z95.0 CARDIAC PACEMAKER IN SITU: ICD-10-CM

## 2025-03-05 LAB
MDC_IDC_EPISODE_DTM: NORMAL
MDC_IDC_EPISODE_DURATION: 12 S
MDC_IDC_EPISODE_DURATION: 13 S
MDC_IDC_EPISODE_DURATION: 13 S
MDC_IDC_EPISODE_DURATION: 14 S
MDC_IDC_EPISODE_DURATION: 15 S
MDC_IDC_EPISODE_DURATION: 23 S
MDC_IDC_EPISODE_ID: NORMAL
MDC_IDC_EPISODE_TYPE: NORMAL
MDC_IDC_EPISODE_TYPE_INDUCED: NO
MDC_IDC_LEAD_CONNECTION_STATUS: NORMAL
MDC_IDC_LEAD_CONNECTION_STATUS: NORMAL
MDC_IDC_LEAD_IMPLANT_DT: NORMAL
MDC_IDC_LEAD_IMPLANT_DT: NORMAL
MDC_IDC_LEAD_LOCATION: NORMAL
MDC_IDC_LEAD_LOCATION: NORMAL
MDC_IDC_LEAD_LOCATION_DETAIL_1: NORMAL
MDC_IDC_LEAD_LOCATION_DETAIL_1: NORMAL
MDC_IDC_LEAD_MFG: NORMAL
MDC_IDC_LEAD_MFG: NORMAL
MDC_IDC_LEAD_MODEL: NORMAL
MDC_IDC_LEAD_MODEL: NORMAL
MDC_IDC_LEAD_POLARITY_TYPE: NORMAL
MDC_IDC_LEAD_POLARITY_TYPE: NORMAL
MDC_IDC_LEAD_SERIAL: NORMAL
MDC_IDC_LEAD_SERIAL: NORMAL
MDC_IDC_MSMT_BATTERY_DTM: NORMAL
MDC_IDC_MSMT_BATTERY_REMAINING_LONGEVITY: 150 MO
MDC_IDC_MSMT_BATTERY_REMAINING_PERCENTAGE: 100 %
MDC_IDC_MSMT_BATTERY_STATUS: NORMAL
MDC_IDC_MSMT_LEADCHNL_RA_IMPEDANCE_VALUE: 642 OHM
MDC_IDC_MSMT_LEADCHNL_RA_PACING_THRESHOLD_AMPLITUDE: 0.4 V
MDC_IDC_MSMT_LEADCHNL_RA_PACING_THRESHOLD_PULSEWIDTH: 0.4 MS
MDC_IDC_MSMT_LEADCHNL_RV_IMPEDANCE_VALUE: 449 OHM
MDC_IDC_MSMT_LEADCHNL_RV_PACING_THRESHOLD_AMPLITUDE: 1.7 V
MDC_IDC_MSMT_LEADCHNL_RV_PACING_THRESHOLD_PULSEWIDTH: 0.4 MS
MDC_IDC_PG_IMPLANT_DTM: NORMAL
MDC_IDC_PG_MFG: NORMAL
MDC_IDC_PG_MODEL: NORMAL
MDC_IDC_PG_SERIAL: NORMAL
MDC_IDC_PG_TYPE: NORMAL
MDC_IDC_SESS_CLINIC_NAME: NORMAL
MDC_IDC_SESS_DTM: NORMAL
MDC_IDC_SESS_TYPE: NORMAL
MDC_IDC_SET_BRADY_AT_MODE_SWITCH_MODE: NORMAL
MDC_IDC_SET_BRADY_AT_MODE_SWITCH_RATE: 170 {BEATS}/MIN
MDC_IDC_SET_BRADY_LOWRATE: 60 {BEATS}/MIN
MDC_IDC_SET_BRADY_MAX_SENSOR_RATE: 120 {BEATS}/MIN
MDC_IDC_SET_BRADY_MAX_TRACKING_RATE: 120 {BEATS}/MIN
MDC_IDC_SET_BRADY_MODE: NORMAL
MDC_IDC_SET_BRADY_PAV_DELAY_HIGH: 200 MS
MDC_IDC_SET_BRADY_PAV_DELAY_LOW: 300 MS
MDC_IDC_SET_BRADY_SAV_DELAY_HIGH: 200 MS
MDC_IDC_SET_BRADY_SAV_DELAY_LOW: 300 MS
MDC_IDC_SET_LEADCHNL_RA_PACING_AMPLITUDE: 2 V
MDC_IDC_SET_LEADCHNL_RA_PACING_CAPTURE_MODE: NORMAL
MDC_IDC_SET_LEADCHNL_RA_PACING_POLARITY: NORMAL
MDC_IDC_SET_LEADCHNL_RA_PACING_PULSEWIDTH: 0.4 MS
MDC_IDC_SET_LEADCHNL_RA_SENSING_ADAPTATION_MODE: NORMAL
MDC_IDC_SET_LEADCHNL_RA_SENSING_POLARITY: NORMAL
MDC_IDC_SET_LEADCHNL_RA_SENSING_SENSITIVITY: 0.5 MV
MDC_IDC_SET_LEADCHNL_RV_PACING_AMPLITUDE: 3.5 V
MDC_IDC_SET_LEADCHNL_RV_PACING_CAPTURE_MODE: NORMAL
MDC_IDC_SET_LEADCHNL_RV_PACING_POLARITY: NORMAL
MDC_IDC_SET_LEADCHNL_RV_PACING_PULSEWIDTH: 0.4 MS
MDC_IDC_SET_LEADCHNL_RV_SENSING_ADAPTATION_MODE: NORMAL
MDC_IDC_SET_LEADCHNL_RV_SENSING_POLARITY: NORMAL
MDC_IDC_SET_LEADCHNL_RV_SENSING_SENSITIVITY: 5 MV
MDC_IDC_SET_ZONE_DETECTION_INTERVAL: 353 MS
MDC_IDC_SET_ZONE_STATUS: NORMAL
MDC_IDC_SET_ZONE_TYPE: NORMAL
MDC_IDC_SET_ZONE_VENDOR_TYPE: NORMAL
MDC_IDC_STAT_AT_BURDEN_PERCENT: 0 %
MDC_IDC_STAT_AT_DTM_END: NORMAL
MDC_IDC_STAT_AT_DTM_START: NORMAL
MDC_IDC_STAT_BRADY_DTM_END: NORMAL
MDC_IDC_STAT_BRADY_DTM_START: NORMAL
MDC_IDC_STAT_BRADY_RA_PERCENT_PACED: 61 %
MDC_IDC_STAT_BRADY_RV_PERCENT_PACED: 18 %
MDC_IDC_STAT_EPISODE_RECENT_COUNT: 0
MDC_IDC_STAT_EPISODE_RECENT_COUNT: 0
MDC_IDC_STAT_EPISODE_RECENT_COUNT: 66
MDC_IDC_STAT_EPISODE_RECENT_COUNT_DTM_END: NORMAL
MDC_IDC_STAT_EPISODE_RECENT_COUNT_DTM_START: NORMAL
MDC_IDC_STAT_EPISODE_TYPE: NORMAL
MDC_IDC_STAT_EPISODE_VENDOR_TYPE: NORMAL
MDC_IDC_STAT_EPISODE_VENDOR_TYPE: NORMAL

## 2025-03-05 PROCEDURE — 93294 REM INTERROG EVL PM/LDLS PM: CPT | Performed by: INTERNAL MEDICINE

## 2025-03-05 PROCEDURE — 93296 REM INTERROG EVL PM/IDS: CPT | Performed by: INTERNAL MEDICINE

## 2025-04-14 ENCOUNTER — ANTICOAGULATION THERAPY VISIT (OUTPATIENT)
Dept: ANTICOAGULATION | Facility: CLINIC | Age: 89
End: 2025-04-14

## 2025-04-14 ENCOUNTER — LAB (OUTPATIENT)
Dept: LAB | Facility: CLINIC | Age: 89
End: 2025-04-14
Payer: COMMERCIAL

## 2025-04-14 DIAGNOSIS — I48.19 PERSISTENT ATRIAL FIBRILLATION (H): ICD-10-CM

## 2025-04-14 DIAGNOSIS — I48.19 PERSISTENT ATRIAL FIBRILLATION (H): Primary | ICD-10-CM

## 2025-04-14 LAB — INR BLD: 2.2 (ref 0.9–1.1)

## 2025-04-14 PROCEDURE — 85610 PROTHROMBIN TIME: CPT

## 2025-04-14 PROCEDURE — 36415 COLL VENOUS BLD VENIPUNCTURE: CPT

## 2025-04-14 NOTE — PROGRESS NOTES
"ANTICOAGULATION MANAGEMENT     Nathen Gage 89 year old male is on warfarin with therapeutic INR result. (Goal INR 2.0-2.5)    Recent labs: (last 7 days)     04/14/25  0919   INR 2.2*       ASSESSMENT     Source(s): Chart Review and Patient/Caregiver Call     Warfarin doses taken: Warfarin taken as instructed  Diet: No new diet changes identified  Medication/supplement changes: None noted  New illness, injury, or hospitalization: No - per 3/3 OV notes \"probable Alzheimer's disease\"  Signs or symptoms of bleeding or clotting: No  Previous result: Therapeutic last 2(+) visits  Additional findings: Facundo will celebrate their 57th wedding anniversary on 6/22/25. They are taking a trip around this time to celebrate.       PLAN     Recommended plan for no diet, medication or health factor changes affecting INR     Dosing Instructions: Continue your current warfarin dose with next INR in 7 weeks   (due to holiday)    Summary  As of 4/14/2025      Full warfarin instructions:  1.25 mg every Mon, Thu; 2.5 mg all other days   Next INR check:  6/2/2025               Telephone call with wife, Nichol, who verbalizes understanding and agrees to plan    Lab visit scheduled    Education provided: Please call back if any changes to your diet, medications or how you've been taking warfarin    Plan made per [facility] anticoagulation protocol    Kristine Castro RN  4/14/2025  Anticoagulation Clinic  [facility] for routing messages: p [facility] [place]  [facility] patient phone line: [phone]        _______________________________________________________________________     Anticoagulation Episode Summary       Current INR goal:  2.0-2.5   TTR:  78.6% (1 y)   Target end date:  Indefinite   Send INR reminders to:  [facility]    Indications    Persistent atrial fibrillation (H) [I48.19]             Comments:  --             Anticoagulation Care Providers       Provider Role Specialty Phone number    Barbie, " Kushal Wills MD Referring Family Medicine [phone]    Leonides Aggarwal PA-C Referring Family Medicine [phone]    Dontae Champion PA-C Referring Family Medicine [phone]

## 2025-04-22 DIAGNOSIS — K21.00 GASTROESOPHAGEAL REFLUX DISEASE WITH ESOPHAGITIS WITHOUT HEMORRHAGE: ICD-10-CM

## 2025-04-22 RX ORDER — PANTOPRAZOLE SODIUM 40 MG/1
40 TABLET, DELAYED RELEASE ORAL DAILY
Qty: 90 TABLET | Refills: 1 | Status: SHIPPED | OUTPATIENT
Start: 2025-04-22

## 2025-05-19 NOTE — PROGRESS NOTES
"HISTORY OF PRESENT ILLNESS:     This is a 89 year old male who follows with Dr Leon at [facility]  His past medical history includes:  Atrial fibrillation, sick sinus syndrome s/p PPM, hypertension, dilated ascending aorta, hyperlipidemia, BPH, and cognitive decline     Mr Gage received a dual-chamber PPM for bradydysrhythmia, sick sinus syndrome with sinus pause (2011)  He was found to have some paroxysmal atrial fibrillation on device interrogations and has remained on chronic Warfarin  His generator was changed in 2023    ECHO (2023) LVEF 60%, 1-2+ TR, ascending aorta 4.0 cm    Device interrogation (3/2025) showed 61% A-paced  18% V-paced  Has some RV lead issues showing ventricular high rates    Our visit today is for annual review    Mr Gage is active for his age  He walks a mile a day and does light weights and uses the recumbent bike  He denies any chest pain, significant shortness of breath, palpitations, orthopnea, or peripheral edema        /52 (BP Location: Right arm, Patient Position: Sitting, Cuff Size: Adult Regular)   Pulse 67   Ht 1.721 m (5' 7.75\")   Wt 77.3 kg (170 lb 8 oz)   SpO2 99%   BMI 26.12 kg/m        IMPRESSION AND PLAN     SSS s/p dual-chamber PPM (2011)  -some noise noted in ventricular lead  -continue device cares     Paroxysmal Atrial Fibrillation  -denies any significant palpitaitons  -on chronic Warfarin  (CHADS2 Vasc score:3)     Hypertension:  -on Losartan 100 mg, Metoprolol  mg  -BP well controlled     Hyperlipidemia:  -on Crestor 5 mg  -LDL 87     Mild Ascending Aorta Dilatation  -consider repeating ECHO next  year    The total time for the visit today was 30 minutes which includes patient visit, reviewing of records, discussion, and placing of orders of the outpatient coordination of cardiovascular care as described.  The level of medical decision making during this visit was of moderate complexity.  Thank you for allowing me to participate in " their care.    The longitudinal plan of care for the diagnosis(es)/condition(s) as documented were addressed during this visit. Due to the added complexity in care, I will continue to support Neal in the subsequent management and with ongoing continuity of care.      Orders Placed This Encounter   Procedures    Basic metabolic panel    Follow-Up with Cardiology       Orders Placed This Encounter   Medications    losartan (COZAAR) 100 MG tablet     Sig: Take 1 tablet (100 mg) by mouth daily.     Dispense:  90 tablet     Refill:  3    metoprolol succinate ER (TOPROL XL) 50 MG 24 hr tablet     Sig: Take 1 tablet (50 mg) by mouth daily.     Dispense:  90 tablet     Refill:  3       Medications Discontinued During This Encounter   Medication Reason    losartan (COZAAR) 100 MG tablet Reorder (No AVS)    metoprolol succinate ER (TOPROL XL) 50 MG 24 hr tablet          Encounter Diagnoses   Name Primary?    Secondary hypertension     Cardiac pacemaker in situ Yes       CURRENT MEDICATIONS:  Current Outpatient Medications   Medication Sig Dispense Refill    carbamide peroxide (DEBROX) 6.5 % otic solution Place 5 drops into both ears 2 times daily. (Patient taking differently: Place 5 drops into both ears 2 times daily. PRN) 15 mL 0    doxazosin (CARDURA) 8 MG tablet Take 1 tablet (8 mg) by mouth at bedtime. 90 tablet 4    losartan (COZAAR) 100 MG tablet Take 1 tablet (100 mg) by mouth daily. 90 tablet 3    metoprolol succinate ER (TOPROL XL) 50 MG 24 hr tablet Take 1 tablet (50 mg) by mouth daily. 90 tablet 3    pantoprazole (PROTONIX) 40 MG EC tablet Take 1 tablet (40 mg) by mouth daily. 90 tablet 1    Polyethylene Glycol 400 (BLINK TEARS OP) Place 1 drop into both eyes 3 times daily      rosuvastatin (CRESTOR) 5 MG tablet Take 1 tablet (5 mg) by mouth daily. 90 tablet 4    warfarin ANTICOAGULANT (COUMADIN) 2.5 MG tablet TAKE 1.25 MG EVERY MON & THUR / TAKE 2.5 MG ALL OTHER DAYS OR PER INR CLINIC 90 tablet 1       ALLERGIES      Allergies   Allergen Reactions    Neomycin Sulfate        PAST MEDICAL HISTORY:  Past Medical History:   Diagnosis Date    Actinic keratosis     Acute idiopathic gout of left knee 4/27/2016    Atrial fibrillation (H)     on Eliquis    Dilated aortic root     Esophageal reflux     Esophagitis     H/O: GI bleed 2010    Hyperlipidaemia     Hyperlipidemia LDL goal <100     Impotence of organic origin     Presbycusis, unspecified laterality 4/27/2016    Pulmonary hypertension (H)     Sick sinus syndrome (H)     pacemaker implanted 2011    Unspecified essential hypertension        PAST SURGICAL HISTORY:  Past Surgical History:   Procedure Laterality Date    EP LEAD REPAIR FOR SINGLE ICD N/A 7/11/2023    Procedure: Pacemaker or Implantable Cardioverter Defibrillator Lead Repair-One Lead;  Surgeon: Scott Messer MD;  Location:  HEART CARDIAC CATH LAB    EP PACEMAKER GENERATOR REPLACEMENT- DUAL N/A 7/11/2023    Procedure: Pacemaker Generator Replacement Dual;  Surgeon: Scott Messer MD;  Location:  HEART CARDIAC CATH LAB    IMPLANT PACEMAKER  2011    Pacemaker/cardiac insitu / [employer] Dual chamber, V45    PHACOEMULSIFICATION CLEAR CORNEA WITH STANDARD INTRAOCULAR LENS IMPLANT Right 5/29/2018    Procedure: PHACOEMULSIFICATION CLEAR CORNEA WITH STANDARD INTRAOCULAR LENS IMPLANT;  RIGHT EYE PHACOEMULSIFICATION CLEAR CORNEA WITH STANDARD INTRAOCULAR LENS IMPLANT ;  Surgeon: Mat Echevarria MD;  Location[facility]       FAMILY HISTORY:  Family History   Problem Relation Age of Onset    Alzheimer Disease Brother     Cerebrovascular Disease Father 80    Family History Negative Sister     Family History Negative Son     Family History Negative Daughter     Family History Negative Sister     Family History Negative Daughter     Breast Cancer No family hx of     Prostate Cancer No family hx of        SOCIAL HISTORY:  Social History     Socioeconomic History    Marital status:       Spouse name: None    Number of children: None    Years of education: None    Highest education level: None   Tobacco Use    Smoking status: Former     Current packs/day: 0.00     Types: Cigarettes     Quit date: 1975     Years since quittin.4    Smokeless tobacco: Never   Vaping Use    Vaping status: Never Used   Substance and Sexual Activity    Alcohol use: Yes     Comment: 1- 2 BEERS WEEKLY    Drug use: Never    Sexual activity: Not Currently     Partners: Female   Other Topics Concern    Parent/sibling w/ CABG, MI or angioplasty before 65F 55M? No    Caffeine Concern No     Comment: 1 cup coffee per day    Sleep Concern No    Stress Concern No    Weight Concern No    Special Diet Yes     Comment: on warfarin     Exercise Yes     Comment: states he is very active, dancing, skiing, walking     Bike Helmet Yes    Seat Belt Yes     Social Drivers of Health     Financial Resource Strain: High Risk (2024)    Financial Resource Strain     Within the past 12 months, have you or your family members you live with been unable to get utilities (heat, electricity) when it was really needed?: Yes   Food Insecurity: Low Risk  (2024)    Food Insecurity     Within the past 12 months, did you worry that your food would run out before you got money to buy more?: No     Within the past 12 months, did the food you bought just not last and you didn t have money to get more?: No   Transportation Needs: Low Risk  (2024)    Transportation Needs     Within the past 12 months, has lack of transportation kept you from medical appointments, getting your medicines, non-medical meetings or appointments, work, or from getting things that you need?: No   Physical Activity: Insufficiently Active (2024)    Exercise Vital Sign     Days of Exercise per Week: 3 days     Minutes of Exercise per Session: 30 min   Stress: No Stress Concern Present (2024)    [employer] - Occupational  "Stress Questionnaire     Feeling of Stress : Not at all   Social Connections: Unknown (9/11/2024)    Social Connection and Isolation Panel [NHANES]     Frequency of Social Gatherings with Friends and Family: Twice a week   Interpersonal Safety: Low Risk  (2/26/2025)    Interpersonal Safety     Do you feel physically and emotionally safe where you currently live?: Yes     Within the past 12 months, have you been hit, slapped, kicked or otherwise physically hurt by someone?: No     Within the past 12 months, have you been humiliated or emotionally abused in other ways by your partner or ex-partner?: No   Housing Stability: Low Risk  (9/11/2024)    Housing Stability     Do you have housing? : Yes     Are you worried about losing your housing?: No       Review of Systems:  Skin:  not assessed       Eyes:  Positive for glasses    ENT:  not assessed      Respiratory:  Positive for dyspnea on exertion w/stairs   Cardiovascular:    lightheadedness, Positive for LH standing up to quickly  Gastroenterology: not assessed      Genitourinary:  not assessed      Musculoskeletal:  not assessed      Neurologic:  not assessed      Psychiatric:  not assessed      Heme/Lymph/Imm:         Endocrine:  not assessed        Physical Exam:  Vitals: /52 (BP Location: Right arm, Patient Position: Sitting, Cuff Size: Adult Regular)   Pulse 67   Ht 1.721 m (5' 7.75\")   Wt 77.3 kg (170 lb 8 oz)   SpO2 99%   BMI 26.12 kg/m      Constitutional:  in no acute distress        Skin:  warm and dry to the touch   pacemaker incision in the left infraclavicular area was well-healed      Head:  normocephalic        Eyes:           Lymph:      ENT:  no pallor or cyanosis        Neck:  JVP normal        Respiratory:  clear to auscultation, normal respiratory excursion         Cardiac: regular rhythm, normal S1 and S2       systolic murmur, LUSB, grade 1        pulses full and equal                                        GI:           Extremities " and Muscular Skeletal:  no edema              Neurological:  no gross motor deficits        Psych:  Alert and Oriented x 3, affect appropriate, oriented to time, person and place          CC  Daniel Leon MD  No address on file

## 2025-05-21 ENCOUNTER — OFFICE VISIT (OUTPATIENT)
Dept: CARDIOLOGY | Facility: CLINIC | Age: 89
End: 2025-05-21
Payer: COMMERCIAL

## 2025-05-21 VITALS
OXYGEN SATURATION: 99 % | DIASTOLIC BLOOD PRESSURE: 52 MMHG | HEART RATE: 67 BPM | BODY MASS INDEX: 25.84 KG/M2 | HEIGHT: 68 IN | SYSTOLIC BLOOD PRESSURE: 106 MMHG | WEIGHT: 170.5 LBS

## 2025-05-21 DIAGNOSIS — I15.9 SECONDARY HYPERTENSION: ICD-10-CM

## 2025-05-21 DIAGNOSIS — Z95.0 CARDIAC PACEMAKER IN SITU: Primary | ICD-10-CM

## 2025-05-21 RX ORDER — LOSARTAN POTASSIUM 100 MG/1
100 TABLET ORAL DAILY
Qty: 90 TABLET | Refills: 3 | Status: SHIPPED | OUTPATIENT
Start: 2025-05-21

## 2025-05-21 RX ORDER — METOPROLOL SUCCINATE 50 MG/1
50 TABLET, EXTENDED RELEASE ORAL DAILY
Qty: 90 TABLET | Refills: 3 | Status: SHIPPED | OUTPATIENT
Start: 2025-05-21

## 2025-05-21 NOTE — LETTER
"5/21/2025    [facility]  [address]  [facility] [address]    RE: Nathen Colonkoffi       Dear Colleague,     I had the pleasure of seeing Nathen Gage in the [facility].  HISTORY OF PRESENT ILLNESS:     This is a 89 year old male who follows with Dr Leon at [facility]  His past medical history includes:  Atrial fibrillation, sick sinus syndrome s/p PPM, hypertension, dilated ascending aorta, hyperlipidemia, BPH, and cognitive decline     Mr Gage received a dual-chamber PPM for bradydysrhythmia, sick sinus syndrome with sinus pause (2011)  He was found to have some paroxysmal atrial fibrillation on device interrogations and has remained on chronic Warfarin  His generator was changed in 2023    ECHO (2023) LVEF 60%, 1-2+ TR, ascending aorta 4.0 cm    Device interrogation (3/2025) showed 61% A-paced  18% V-paced  Has some RV lead issues showing ventricular high rates    Our visit today is for annual review    Mr Gage is active for his age  He walks a mile a day and does light weights and uses the recumbent bike  He denies any chest pain, significant shortness of breath, palpitations, orthopnea, or peripheral edema        /52 (BP Location: Right arm, Patient Position: Sitting, Cuff Size: Adult Regular)   Pulse 67   Ht 1.721 m (5' 7.75\")   Wt 77.3 kg (170 lb 8 oz)   SpO2 99%   BMI 26.12 kg/m        IMPRESSION AND PLAN     SSS s/p dual-chamber PPM (2011)  -some noise noted in ventricular lead  -continue device cares     Paroxysmal Atrial Fibrillation  -denies any significant palpitaitons  -on chronic Warfarin  (CHADS2 Vasc score:3)     Hypertension:  -on Losartan 100 mg, Metoprolol  mg  -BP well controlled     Hyperlipidemia:  -on Crestor 5 mg  -LDL 87     Mild Ascending Aorta Dilatation  -consider repeating ECHO next  year    The total time for the visit today was 30 minutes which includes patient visit, reviewing of records, " discussion, and placing of orders of the outpatient coordination of cardiovascular care as described.  The level of medical decision making during this visit was of moderate complexity.  Thank you for allowing me to participate in their care.    The longitudinal plan of care for the diagnosis(es)/condition(s) as documented were addressed during this visit. Due to the added complexity in care, I will continue to support Neal in the subsequent management and with ongoing continuity of care.      Orders Placed This Encounter   Procedures     Basic metabolic panel     Follow-Up with Cardiology       Orders Placed This Encounter   Medications     losartan (COZAAR) 100 MG tablet     Sig: Take 1 tablet (100 mg) by mouth daily.     Dispense:  90 tablet     Refill:  3     metoprolol succinate ER (TOPROL XL) 50 MG 24 hr tablet     Sig: Take 1 tablet (50 mg) by mouth daily.     Dispense:  90 tablet     Refill:  3       Medications Discontinued During This Encounter   Medication Reason     losartan (COZAAR) 100 MG tablet Reorder (No AVS)     metoprolol succinate ER (TOPROL XL) 50 MG 24 hr tablet          Encounter Diagnoses   Name Primary?     Secondary hypertension      Cardiac pacemaker in situ Yes       CURRENT MEDICATIONS:  Current Outpatient Medications   Medication Sig Dispense Refill     carbamide peroxide (DEBROX) 6.5 % otic solution Place 5 drops into both ears 2 times daily. (Patient taking differently: Place 5 drops into both ears 2 times daily. PRN) 15 mL 0     doxazosin (CARDURA) 8 MG tablet Take 1 tablet (8 mg) by mouth at bedtime. 90 tablet 4     losartan (COZAAR) 100 MG tablet Take 1 tablet (100 mg) by mouth daily. 90 tablet 3     metoprolol succinate ER (TOPROL XL) 50 MG 24 hr tablet Take 1 tablet (50 mg) by mouth daily. 90 tablet 3     pantoprazole (PROTONIX) 40 MG EC tablet Take 1 tablet (40 mg) by mouth daily. 90 tablet 1     Polyethylene Glycol 400 (BLINK TEARS OP) Place 1 drop into both eyes 3 times daily        rosuvastatin (CRESTOR) 5 MG tablet Take 1 tablet (5 mg) by mouth daily. 90 tablet 4     warfarin ANTICOAGULANT (COUMADIN) 2.5 MG tablet TAKE 1.25 MG EVERY MON & THUR / TAKE 2.5 MG ALL OTHER DAYS OR PER INR CLINIC 90 tablet 1       ALLERGIES     Allergies   Allergen Reactions     Neomycin Sulfate        PAST MEDICAL HISTORY:  Past Medical History:   Diagnosis Date     Actinic keratosis      Acute idiopathic gout of left knee 4/27/2016     Atrial fibrillation (H)     on Eliquis     Dilated aortic root      Esophageal reflux      Esophagitis      H/O: GI bleed 2010     Hyperlipidaemia      Hyperlipidemia LDL goal <100      Impotence of organic origin      Presbycusis, unspecified laterality 4/27/2016     Pulmonary hypertension (H)      Sick sinus syndrome (H)     pacemaker implanted 2011     Unspecified essential hypertension        PAST SURGICAL HISTORY:  Past Surgical History:   Procedure Laterality Date     EP LEAD REPAIR FOR SINGLE ICD N/A 7/11/2023    Procedure: Pacemaker or Implantable Cardioverter Defibrillator Lead Repair-One Lead;  Surgeon: Scott Messer MD;  Location:  HEART CARDIAC CATH LAB     EP PACEMAKER GENERATOR REPLACEMENT- DUAL N/A 7/11/2023    Procedure: Pacemaker Generator Replacement Dual;  Surgeon: Scott Messer MD;  Location:  HEART CARDIAC CATH LAB     IMPLANT PACEMAKER  2011    Pacemaker/cardiac insitu / [employer] Dual chamber, V45     PHACOEMULSIFICATION CLEAR CORNEA WITH STANDARD INTRAOCULAR LENS IMPLANT Right 5/29/2018    Procedure: PHACOEMULSIFICATION CLEAR CORNEA WITH STANDARD INTRAOCULAR LENS IMPLANT;  RIGHT EYE PHACOEMULSIFICATION CLEAR CORNEA WITH STANDARD INTRAOCULAR LENS IMPLANT ;  Surgeon: Mat Echevarria MD;  Location: [facility]       FAMILY HISTORY:  Family History   Problem Relation Age of Onset     Alzheimer Disease Brother      Cerebrovascular Disease Father 80     Family History Negative Sister      Family History Negative Son       Family History Negative Daughter      Family History Negative Sister      Family History Negative Daughter      Breast Cancer No family hx of      Prostate Cancer No family hx of        SOCIAL HISTORY:  Social History     Socioeconomic History     Marital status:      Spouse name: None     Number of children: None     Years of education: None     Highest education level: None   Tobacco Use     Smoking status: Former     Current packs/day: 0.00     Types: Cigarettes     Quit date: 1975     Years since quittin.4     Smokeless tobacco: Never   Vaping Use     Vaping status: Never Used   Substance and Sexual Activity     Alcohol use: Yes     Comment: 1- 2 BEERS WEEKLY     Drug use: Never     Sexual activity: Not Currently     Partners: Female   Other Topics Concern     Parent/sibling w/ CABG, MI or angioplasty before 65F 55M? No     Caffeine Concern No     Comment: 1 cup coffee per day     Sleep Concern No     Stress Concern No     Weight Concern No     Special Diet Yes     Comment: on warfarin      Exercise Yes     Comment: states he is very active, dancing, skiing, walking      Bike Helmet Yes     Seat Belt Yes     Social Drivers of Health     Financial Resource Strain: High Risk (2024)    Financial Resource Strain      Within the past 12 months, have you or your family members you live with been unable to get utilities (heat, electricity) when it was really needed?: Yes   Food Insecurity: Low Risk  (2024)    Food Insecurity      Within the past 12 months, did you worry that your food would run out before you got money to buy more?: No      Within the past 12 months, did the food you bought just not last and you didn t have money to get more?: No   Transportation Needs: Low Risk  (2024)    Transportation Needs      Within the past 12 months, has lack of transportation kept you from medical appointments, getting your medicines, non-medical meetings or appointments, work, or from  "getting things that you need?: No   Physical Activity: Insufficiently Active (9/11/2024)    Exercise Vital Sign      Days of Exercise per Week: 3 days      Minutes of Exercise per Session: 30 min   Stress: No Stress Concern Present (9/11/2024)    [employer] [employer] - Occupational Stress Questionnaire      Feeling of Stress : Not at all   Social Connections: Unknown (9/11/2024)    Social Connection and Isolation Panel [NHANES]      Frequency of Social Gatherings with Friends and Family: Twice a week   Interpersonal Safety: Low Risk  (2/26/2025)    Interpersonal Safety      Do you feel physically and emotionally safe where you currently live?: Yes      Within the past 12 months, have you been hit, slapped, kicked or otherwise physically hurt by someone?: No      Within the past 12 months, have you been humiliated or emotionally abused in other ways by your partner or ex-partner?: No   Housing Stability: Low Risk  (9/11/2024)    Housing Stability      Do you have housing? : Yes      Are you worried about losing your housing?: No       Review of Systems:  Skin:  not assessed       Eyes:  Positive for glasses    ENT:  not assessed      Respiratory:  Positive for dyspnea on exertion w/stairs   Cardiovascular:    lightheadedness, Positive for LH standing up to quickly  Gastroenterology: not assessed      Genitourinary:  not assessed      Musculoskeletal:  not assessed      Neurologic:  not assessed      Psychiatric:  not assessed      Heme/Lymph/Imm:         Endocrine:  not assessed        Physical Exam:  Vitals: /52 (BP Location: Right arm, Patient Position: Sitting, Cuff Size: Adult Regular)   Pulse 67   Ht 1.721 m (5' 7.75\")   Wt 77.3 kg (170 lb 8 oz)   SpO2 99%   BMI 26.12 kg/m      Constitutional:  in no acute distress        Skin:  warm and dry to the touch   pacemaker incision in the left infraclavicular area was well-healed      Head:  normocephalic        Eyes:           Lymph:  "     ENT:  no pallor or cyanosis        Neck:  JVP normal        Respiratory:  clear to auscultation, normal respiratory excursion         Cardiac: regular rhythm, normal S1 and S2       systolic murmur, LUSB, grade 1        pulses full and equal                                        GI:           Extremities and Muscular Skeletal:  no edema              Neurological:  no gross motor deficits        Psych:  Alert and Oriented x 3, affect appropriate, oriented to time, person and place          CC  Daniel Leon MD  No address on file                      Thank you for allowing me to participate in the care of your patient.      Sincerely,     TYRESE [facility]  cc:   Daniel Leon MD  No address on file

## 2025-06-02 ENCOUNTER — LAB (OUTPATIENT)
Dept: LAB | Facility: CLINIC | Age: 89
End: 2025-06-02
Payer: COMMERCIAL

## 2025-06-02 ENCOUNTER — RESULTS FOLLOW-UP (OUTPATIENT)
Dept: ANTICOAGULATION | Facility: CLINIC | Age: 89
End: 2025-06-02

## 2025-06-02 ENCOUNTER — TELEPHONE (OUTPATIENT)
Dept: ANTICOAGULATION | Facility: OTHER | Age: 89
End: 2025-06-02

## 2025-06-02 ENCOUNTER — ANTICOAGULATION THERAPY VISIT (OUTPATIENT)
Dept: ANTICOAGULATION | Facility: CLINIC | Age: 89
End: 2025-06-02

## 2025-06-02 DIAGNOSIS — I48.19 PERSISTENT ATRIAL FIBRILLATION (H): Primary | ICD-10-CM

## 2025-06-02 DIAGNOSIS — I48.19 PERSISTENT ATRIAL FIBRILLATION (H): ICD-10-CM

## 2025-06-02 LAB — INR BLD: 3.1 (ref 0.9–1.1)

## 2025-06-02 PROCEDURE — 36416 COLLJ CAPILLARY BLOOD SPEC: CPT

## 2025-06-02 PROCEDURE — 85610 PROTHROMBIN TIME: CPT

## 2025-06-02 NOTE — TELEPHONE ENCOUNTER
Call returned to patient, see 6/2/2025 Anticoagulation encounter for warfarin dosing instruction.  Teresa Castro RN, BSN  Anticoagulation Clinic

## 2025-06-02 NOTE — TELEPHONE ENCOUNTER
Patient Returning Call    Reason for call:  patient is returning a call from [facility]    Information relayed to patient:  n/a    Patient has additional questions:  No      Could we send this information to you in [employer] or would you prefer to receive a phone call?:   Patient would prefer a phone call   Okay to leave a detailed message?: Yes at Home number on file [phone] (home)

## 2025-06-02 NOTE — PROGRESS NOTES
ANTICOAGULATION MANAGEMENT     Nathen Gage 89 year old male is on warfarin with supratherapeutic INR result. (Goal INR 2.0-2.5)    Recent labs: (last 7 days)     06/02/25  0919   INR 3.1*       ASSESSMENT     Source(s): Chart Review and Patient/Caregiver Call     Warfarin doses taken: Warfarin taken as instructed  Diet: Decreased greens/vitamin K in diet; plans to resume previous intake  Medication/supplement changes: None noted  New illness, injury, or hospitalization: No  Signs or symptoms of bleeding or clotting: No  Previous result: Therapeutic last 2(+) visits  Additional findings: going on a road trip beginning 6/16/25, should be returning 6/28/25  Patient stated he does not like to go longer than 4 weeks between INR checks       PLAN     Recommended plan for temporary change(s) affecting INR     Dosing Instructions: hold dose then continue your current warfarin dose with next INR in 8 days       Summary  As of 6/2/2025      Full warfarin instructions:  6/2: Hold; Otherwise 1.25 mg every Mon, Thu; 2.5 mg all other days   Next INR check:  6/10/2025               Telephone call with Neal who agrees to plan and repeated back plan correctly    Lab visit scheduled    Education provided: Please call back if any changes to your diet, medications or how you've been taking warfarin  Contact [phone] with any changes, questions or concerns.     Plan made per [facility] anticoagulation protocol    Teresa Castro RN  6/2/2025  Anticoagulation Clinic  [email or web address] for routing messages: p [facility] [place]  [facility] patient phone line: [phone]        _______________________________________________________________________     Anticoagulation Episode Summary       Current INR goal:  2.0-2.5   TTR:  69.5% (1 y)   Target end date:  Indefinite   Send INR reminders to:  [facility] [place]    Indications    Persistent atrial fibrillation (H) [I48.19]             Comments:  --             Anticoagulation Care Providers        Provider Role Specialty Phone number    Kushal Valentin MD Referring Family Medicine [phone]    Leonides Aggarwal PA-C Referring Family Medicine [phone]    Dontae Champion PA-C Referring Family Medicine [phone]

## 2025-06-10 ENCOUNTER — RESULTS FOLLOW-UP (OUTPATIENT)
Dept: ANTICOAGULATION | Facility: CLINIC | Age: 89
End: 2025-06-10

## 2025-06-10 ENCOUNTER — ANTICOAGULATION THERAPY VISIT (OUTPATIENT)
Dept: ANTICOAGULATION | Facility: CLINIC | Age: 89
End: 2025-06-10

## 2025-06-10 ENCOUNTER — LAB (OUTPATIENT)
Dept: LAB | Facility: CLINIC | Age: 89
End: 2025-06-10
Payer: COMMERCIAL

## 2025-06-10 DIAGNOSIS — I48.19 PERSISTENT ATRIAL FIBRILLATION (H): Primary | ICD-10-CM

## 2025-06-10 DIAGNOSIS — I48.19 PERSISTENT ATRIAL FIBRILLATION (H): ICD-10-CM

## 2025-06-10 LAB — INR BLD: 2.1 (ref 0.9–1.1)

## 2025-06-10 PROCEDURE — 85610 PROTHROMBIN TIME: CPT

## 2025-06-10 PROCEDURE — 36416 COLLJ CAPILLARY BLOOD SPEC: CPT

## 2025-06-10 NOTE — PROGRESS NOTES
ANTICOAGULATION MANAGEMENT     Nathen Gage 89 year old male is on warfarin with therapeutic INR result. (Goal INR 2.0-2.5)    Recent labs: (last 7 days)     06/10/25  1005   INR 2.1*       ASSESSMENT     Source(s): Chart Review and Patient/Caregiver Call     Warfarin doses taken: Warfarin taken as instructed  Diet: No new diet changes identified  Medication/supplement changes: None noted  New illness, injury, or hospitalization: No  Signs or symptoms of bleeding or clotting: No  Previous result: Supratherapeutic  Additional findings: Will be leaving on a road trip on 6/16 for about 2-3 weeks.  Unsure when they will be back.       PLAN     Recommended plan for no diet, medication or health factor changes affecting INR     Dosing Instructions: Continue your current warfarin dose with next INR in 3 weeks (upon return from vacation)       Summary  As of 6/10/2025      Full warfarin instructions:  1.25 mg every Mon, Thu; 2.5 mg all other days   Next INR check:  7/7/2025               Telephone call with Neal who agrees to plan and repeated back plan correctly    Lab visit scheduled    Education provided: Please call back if any changes to your diet, medications or how you've been taking warfarin    Plan made per [facility] anticoagulation protocol    Joanne Bustos RN  6/10/2025  Anticoagulation Clinic  [employer] for routing messages: p [facility] [place]  [facility] patient phone line: [phone]        _______________________________________________________________________     Anticoagulation Episode Summary       Current INR goal:  2.0-2.5   TTR:  68.2% (1 y)   Target end date:  Indefinite   Send INR reminders to:  "[employer]" [facility] [place]    Indications    Persistent atrial fibrillation (H) [I48.19]             Comments:  --             Anticoagulation Care Providers       Provider Role Specialty Phone number    Kushal Valentin MD Referring Family Medicine [phone]    Leonidse Aggarwal PA-C Referring Family  Medicine [phone]    Dontae Champion PA-C Referring [facility] Medicine [phone]

## 2025-07-05 DIAGNOSIS — I48.19 PERSISTENT ATRIAL FIBRILLATION (H): ICD-10-CM

## 2025-07-07 ENCOUNTER — ANTICOAGULATION THERAPY VISIT (OUTPATIENT)
Dept: ANTICOAGULATION | Facility: CLINIC | Age: 89
End: 2025-07-07

## 2025-07-07 ENCOUNTER — LAB (OUTPATIENT)
Dept: LAB | Facility: CLINIC | Age: 89
End: 2025-07-07
Payer: COMMERCIAL

## 2025-07-07 DIAGNOSIS — I48.19 PERSISTENT ATRIAL FIBRILLATION (H): ICD-10-CM

## 2025-07-07 DIAGNOSIS — I48.19 PERSISTENT ATRIAL FIBRILLATION (H): Primary | ICD-10-CM

## 2025-07-07 LAB — INR BLD: 3.3 (ref 0.9–1.1)

## 2025-07-07 PROCEDURE — 36416 COLLJ CAPILLARY BLOOD SPEC: CPT

## 2025-07-07 PROCEDURE — 85610 PROTHROMBIN TIME: CPT

## 2025-07-07 NOTE — PROGRESS NOTES
ANTICOAGULATION MANAGEMENT     Nathenjoya Gage 89 year old male is on warfarin with supratherapeutic INR result. (Goal INR 2.0-2.5)    Recent labs: (last 7 days)     07/07/25  0859   INR 3.3*       ASSESSMENT     Source(s): Chart Review  Previous INR was Therapeutic last visit; previously outside of goal range  Medication, diet, health changes since last INR: was recently on a 3 week road trip, diet may have been different during this time         PLAN     Unable to reach Neal terese today.    Left message to hold warfarin tonight. Request call back for assessment.    Follow up required to discuss out of range result     Kristine Castro RN  7/7/2025  Anticoagulation Clinic  [facility] for routing messages: p [facility] [place]  [facility] patient phone line: [phone]

## 2025-07-08 ENCOUNTER — TELEPHONE (OUTPATIENT)
Dept: FAMILY MEDICINE | Facility: CLINIC | Age: 89
End: 2025-07-08
Payer: COMMERCIAL

## 2025-07-08 RX ORDER — WARFARIN SODIUM 2.5 MG/1
TABLET ORAL
Qty: 80 TABLET | Refills: 1 | Status: SHIPPED | OUTPATIENT
Start: 2025-07-08

## 2025-07-08 NOTE — PROGRESS NOTES
ANTICOAGULATION MANAGEMENT     Nathen Gage 89 year old male is on warfarin with supratherapeutic INR result. (Goal INR 2.0-2.5)    Recent labs: (last 7 days)     07/07/25  0859   INR 3.3*       ASSESSMENT     Source(s): Chart Review and Patient/Caregiver Call     Warfarin doses taken: Warfarin taken as instructed  Diet: Decreased greens/vitamin K in diet; plans to resume previous intake - has been on a road trip the last 2.5 weeks so ate a bit differently  Medication/supplement changes: None noted  New illness, injury, or hospitalization: No  Signs or symptoms of bleeding or clotting: No  Previous result: Therapeutic last visit; previously outside of goal range  Additional findings: Confirmed that he received the instruction to hold his warfarin dose yesterday.        PLAN     Recommended plan for temporary change(s) affecting INR     Dosing Instructions: hold dose then continue your current warfarin dose with next INR in 1-2 weeks       Summary  As of 7/7/2025      Full warfarin instructions:  7/7: Hold; Otherwise 1.25 mg every Mon, Thu; 2.5 mg all other days   Next INR check:  7/21/2025               Telephone call with Neal who verbalizes understanding and agrees to plan    Lab visit scheduled    Education provided: Please call back if any changes to your diet, medications or how you've been taking warfarin    Plan made per [facility] anticoagulation protocol    Kristine Castro RN  7/8/2025  Anticoagulation Clinic  [employer] for routing messages: p ANTICO[facility] [place]  [facility] patient phone line: [phone]        _______________________________________________________________________     Anticoagulation Episode Summary       Current INR goal:  2.0-2.5   TTR:  63.1% (12 mo)   Target end date:  Indefinite   Send INR reminders to:  [facility] [place]    Indications    Persistent atrial fibrillation (H) [I48.19]             Comments:  --             Anticoagulation Care Providers       Provider Role  Specialty Phone number    Kushal Valentin MD Referring Family Medicine [phone]    Leonides Aggarwal PA-C Referring Family Medicine [phone]    Dontae Champion PA-C Referring Family Medicine [phone]

## 2025-07-08 NOTE — TELEPHONE ENCOUNTER
Patient Returning Call    Reason for call:  Pt is requesting a call back from INR nurse Kristine, was called earlier and wanted to follow up    Information relayed to patient:  Will send a te for a call back    Patient has additional questions:  No      Could we send this information to you in [facility] or would you prefer to receive a phone call?:   Patient would prefer a phone call   Okay to leave a detailed message?: Yes at Home number on file [phone] (home)

## 2025-07-08 NOTE — TELEPHONE ENCOUNTER
Spoke with patient/returned call. See [facility] note for detailing.    Kristine Castro RN  [facility] Anticoagulation [facility]  [phone]

## 2025-07-08 NOTE — TELEPHONE ENCOUNTER
ANTICOAGULATION MANAGEMENT:  Medication Refill    Anticoagulation Summary  As of 7/7/2025      Warfarin maintenance plan:  1.25 mg (2.5 mg x 0.5) every Mon, Thu; 2.5 mg (2.5 mg x 1) all other days   Next INR check:  7/21/2025   Target end date:  Indefinite    Indications    Persistent atrial fibrillation (H) [I48.19]                 Anticoagulation Care Providers       Provider Role Specialty Phone number    Kushal Valentin MD Referring [facility] Medicine [phone]    Leonides Aggarwal PA-C Referring [facility] Medicine [phone]    Dontae Champion PA-C Referring [facility] [phone]            Refill Criteria    Visit with referring provider/group: Meets criteria: visit within referring provider group in the last 15 months on 9/13/24    [facility] referral last signed: 10/01/2024; within last year:  Yes    Lab monitoring is up to date (not exceeding 2 weeks overdue): Yes    Nathen meets all criteria for refill. Rx instructions and quantity supplied updated to match patient's current dosing plan. Warfarin 90 day supply with 1 refill granted per [facility] protocol     Kristine Castro RN  Anticoagulation Clinic

## 2025-07-21 ENCOUNTER — LAB (OUTPATIENT)
Dept: LAB | Facility: CLINIC | Age: 89
End: 2025-07-21
Payer: COMMERCIAL

## 2025-07-21 ENCOUNTER — ANTICOAGULATION THERAPY VISIT (OUTPATIENT)
Dept: ANTICOAGULATION | Facility: CLINIC | Age: 89
End: 2025-07-21

## 2025-07-21 DIAGNOSIS — I48.19 PERSISTENT ATRIAL FIBRILLATION (H): Primary | ICD-10-CM

## 2025-07-21 DIAGNOSIS — I48.19 PERSISTENT ATRIAL FIBRILLATION (H): ICD-10-CM

## 2025-07-21 LAB — INR BLD: 2.9 (ref 0.9–1.1)

## 2025-07-21 PROCEDURE — 85610 PROTHROMBIN TIME: CPT

## 2025-07-21 PROCEDURE — 36416 COLLJ CAPILLARY BLOOD SPEC: CPT

## 2025-07-21 NOTE — PROGRESS NOTES
ANTICOAGULATION MANAGEMENT     Nathen Gage 89 year old male is on warfarin with supratherapeutic INR result. (Goal INR 2.0-2.5)    Recent labs: (last 7 days)     07/21/25  0906   INR 2.9*       ASSESSMENT     Source(s): Chart Review and Patient/Caregiver Call     Warfarin doses taken: Warfarin taken as instructed  Diet: No new diet changes identified - continues daily greens and V8  Medication/supplement changes: None noted  New illness, injury, or hospitalization: No  Signs or symptoms of bleeding or clotting: No  Previous result: Supratherapeutic  Additional findings: None       PLAN     Recommended plan for no diet, medication or health factor changes affecting INR     Dosing Instructions: decrease your warfarin dose (8.3% change) with next INR in 2 weeks       Summary  As of 7/21/2025      Full warfarin instructions:  1.25 mg every Mon, Wed, Fri; 2.5 mg all other days   Next INR check:  8/5/2025               Telephone call with Nichol (spouse) who verbalizes understanding and agrees to plan + MyC sent    Lab visit scheduled    Education provided: Please call back if any changes to your diet, medications or how you've been taking warfarin    Plan made per [facility] anticoagulation protocol    Kristine Castro RN  7/21/2025  Anticoagulation Clinic  [employer] for routing messages: p [place]  [facility] patient phone line: [phone]        _______________________________________________________________________     Anticoagulation Episode Summary       Current INR goal:  2.0-2.5   TTR:  59.3% (1 y)   Target end date:  Indefinite   Send INR reminders to:  [facility] [place]    Indications    Persistent atrial fibrillation (H) [I48.19]             Comments:  --             Anticoagulation Care Providers       Provider Role Specialty Phone number    Kushal Valentin MD Referring Family Medicine [phone]    Leonides Aggarwal PA-C Referring Family Medicine [phone]    Dontae Champion  ELOY TURPIN [facility] [phone]

## 2025-08-05 ENCOUNTER — LAB (OUTPATIENT)
Dept: LAB | Facility: CLINIC | Age: 89
End: 2025-08-05
Payer: COMMERCIAL

## 2025-08-05 ENCOUNTER — ANTICOAGULATION THERAPY VISIT (OUTPATIENT)
Dept: ANTICOAGULATION | Facility: CLINIC | Age: 89
End: 2025-08-05

## 2025-08-05 DIAGNOSIS — I48.19 PERSISTENT ATRIAL FIBRILLATION (H): ICD-10-CM

## 2025-08-05 DIAGNOSIS — I48.19 PERSISTENT ATRIAL FIBRILLATION (H): Primary | ICD-10-CM

## 2025-08-05 LAB — INR BLD: 2.4 (ref 0.9–1.1)

## 2025-08-05 PROCEDURE — 85610 PROTHROMBIN TIME: CPT

## 2025-08-05 PROCEDURE — 36415 COLL VENOUS BLD VENIPUNCTURE: CPT

## 2025-08-14 ENCOUNTER — PATIENT OUTREACH (OUTPATIENT)
Dept: CARE COORDINATION | Facility: CLINIC | Age: 89
End: 2025-08-14
Payer: COMMERCIAL

## 2025-08-26 ENCOUNTER — ANTICOAGULATION THERAPY VISIT (OUTPATIENT)
Dept: ANTICOAGULATION | Facility: CLINIC | Age: 89
End: 2025-08-26

## 2025-08-26 ENCOUNTER — DOCUMENTATION ONLY (OUTPATIENT)
Dept: ANTICOAGULATION | Facility: CLINIC | Age: 89
End: 2025-08-26

## 2025-08-26 ENCOUNTER — RESULTS FOLLOW-UP (OUTPATIENT)
Dept: ANTICOAGULATION | Facility: CLINIC | Age: 89
End: 2025-08-26

## 2025-08-26 ENCOUNTER — LAB (OUTPATIENT)
Dept: LAB | Facility: CLINIC | Age: 89
End: 2025-08-26
Payer: COMMERCIAL

## 2025-08-26 DIAGNOSIS — I48.19 PERSISTENT ATRIAL FIBRILLATION (H): ICD-10-CM

## 2025-08-26 DIAGNOSIS — I48.91 ATRIAL FIBRILLATION, UNSPECIFIED TYPE (H): Primary | ICD-10-CM

## 2025-08-26 DIAGNOSIS — Z79.01 LONG TERM CURRENT USE OF ANTICOAGULANT THERAPY: ICD-10-CM

## 2025-08-26 DIAGNOSIS — I48.19 PERSISTENT ATRIAL FIBRILLATION (H): Primary | ICD-10-CM

## 2025-08-26 LAB — INR BLD: 1.9 (ref 0.9–1.1)

## 2025-08-26 PROCEDURE — 36415 COLL VENOUS BLD VENIPUNCTURE: CPT

## 2025-08-26 PROCEDURE — 85610 PROTHROMBIN TIME: CPT

## 2025-09-02 ENCOUNTER — OFFICE VISIT (OUTPATIENT)
Dept: NEUROLOGY | Facility: CLINIC | Age: 89
End: 2025-09-02
Payer: COMMERCIAL

## 2025-09-02 VITALS
SYSTOLIC BLOOD PRESSURE: 129 MMHG | HEART RATE: 63 BPM | WEIGHT: 174 LBS | BODY MASS INDEX: 26.65 KG/M2 | DIASTOLIC BLOOD PRESSURE: 75 MMHG | OXYGEN SATURATION: 99 %

## 2025-09-02 DIAGNOSIS — G30.9 MILD COGNITIVE IMPAIRMENT DUE TO ALZHEIMER'S DISEASE (H): Primary | ICD-10-CM

## 2025-09-02 DIAGNOSIS — G31.84 MILD COGNITIVE IMPAIRMENT DUE TO ALZHEIMER'S DISEASE (H): Primary | ICD-10-CM

## 2025-09-02 PROCEDURE — G2211 COMPLEX E/M VISIT ADD ON: HCPCS | Performed by: PSYCHIATRY & NEUROLOGY

## 2025-09-02 PROCEDURE — 3074F SYST BP LT 130 MM HG: CPT | Performed by: PSYCHIATRY & NEUROLOGY

## 2025-09-02 PROCEDURE — 99214 OFFICE O/P EST MOD 30 MIN: CPT | Performed by: PSYCHIATRY & NEUROLOGY

## 2025-09-02 PROCEDURE — 3078F DIAST BP <80 MM HG: CPT | Performed by: PSYCHIATRY & NEUROLOGY

## 2025-09-02 RX ORDER — DONEPEZIL HYDROCHLORIDE 10 MG/1
TABLET, FILM COATED ORAL
Qty: 90 TABLET | Refills: 1 | Status: SHIPPED | OUTPATIENT
Start: 2025-09-02

## 2025-09-02 ASSESSMENT — MONTREAL COGNITIVE ASSESSMENT (MOCA)
9. REPEAT EACH SENTENCE: 0
VISUOSPATIAL/EXECUTIVE SUBSCORE: 4
11. FOR EACH PAIR OF WORDS, WHAT CATEGORY DO THEY BELONG TO (OUT OF 2): 2
WHAT LEVEL OF EDUCATION WAS ATTAINED: 0
8. SERIAL SUBTRACTION OF 7S: 2
13. ORIENTATION SUBSCORE: 5
6. READ LIST OF DIGITS [FORWARD/BACKWARD]: 1
12. MEMORY INDEX SCORE: 0
7. [VIGILENCE] TAP WHEN HEARING DESIGNATED LETTER: 1
WHAT IS THE TOTAL SCORE (OUT OF 30): 18
4. NAME EACH OF THE THREE ANIMALS SHOWN: 2
10. [FLUENCY] NAME WORDS STARTING WITH DESIGNATED LETTER: 1

## (undated) DEVICE — EYE SOL BSS 500ML

## (undated) DEVICE — EYE TIP IRRIGATION & ASPIRATION POLYMER 35D BENT 8065751511

## (undated) DEVICE — EYE KNIFE SLIT XSTAR VISITEC 2.5MM 45DEG DBL BEVEL 370825

## (undated) DEVICE — EYE SHIELD PLASTIC

## (undated) DEVICE — TAPE MICROPORE 2"X1.5YD 1530S-2

## (undated) DEVICE — PACK PCMKR PERM SRG PROC LF SAN32PC573

## (undated) DEVICE — GLOVE PROTEXIS MICRO 7.0  2D73PM70

## (undated) DEVICE — EYE CANN IRR 25GA HYDRODISSECTING 585037

## (undated) DEVICE — PACK CATARACT CUSTOM SO DALE SEY32CTFCX

## (undated) DEVICE — Device

## (undated) DEVICE — LINEN TOWEL PACK X5 5464

## (undated) DEVICE — DEFIB PRO-PADZ LVP LQD GEL ADULT 8900-2105-01

## (undated) DEVICE — GLOVE PROTEXIS POWDER FREE SMT 8.5 2D72PT85X

## (undated) DEVICE — GLOVE PROTEXIS MICRO 6.0  2D73PM60

## (undated) DEVICE — EYE PACK BVI READYPAK KIT #2

## (undated) DEVICE — EYE PACK CUSTOM ANTERIOR 30DEG TIP CENTURION PPK6682-04

## (undated) RX ORDER — CEFAZOLIN SODIUM 2 G/100ML
INJECTION, SOLUTION INTRAVENOUS
Status: DISPENSED
Start: 2023-07-11

## (undated) RX ORDER — FENTANYL CITRATE 50 UG/ML
INJECTION, SOLUTION INTRAMUSCULAR; INTRAVENOUS
Status: DISPENSED
Start: 2023-07-11

## (undated) RX ORDER — BUPIVACAINE HYDROCHLORIDE 2.5 MG/ML
INJECTION, SOLUTION EPIDURAL; INFILTRATION; INTRACAUDAL
Status: DISPENSED
Start: 2023-07-11

## (undated) RX ORDER — LIDOCAINE HYDROCHLORIDE 10 MG/ML
INJECTION, SOLUTION EPIDURAL; INFILTRATION; INTRACAUDAL; PERINEURAL
Status: DISPENSED
Start: 2018-05-29

## (undated) RX ORDER — LIDOCAINE HYDROCHLORIDE 10 MG/ML
INJECTION, SOLUTION EPIDURAL; INFILTRATION; INTRACAUDAL; PERINEURAL
Status: DISPENSED
Start: 2023-07-11

## (undated) RX ORDER — ONDANSETRON 2 MG/ML
INJECTION INTRAMUSCULAR; INTRAVENOUS
Status: DISPENSED
Start: 2018-05-29